# Patient Record
Sex: FEMALE | Race: BLACK OR AFRICAN AMERICAN | Employment: OTHER | URBAN - METROPOLITAN AREA
[De-identification: names, ages, dates, MRNs, and addresses within clinical notes are randomized per-mention and may not be internally consistent; named-entity substitution may affect disease eponyms.]

---

## 2021-08-11 ENCOUNTER — APPOINTMENT (OUTPATIENT)
Dept: GENERAL RADIOLOGY | Facility: HOSPITAL | Age: 61
End: 2021-08-11
Attending: EMERGENCY MEDICINE
Payer: MEDICAID

## 2021-08-11 ENCOUNTER — HOSPITAL ENCOUNTER (EMERGENCY)
Facility: HOSPITAL | Age: 61
Discharge: HOME | End: 2021-08-12
Attending: EMERGENCY MEDICINE
Payer: MEDICAID

## 2021-08-11 DIAGNOSIS — K59.00 CONSTIPATION, UNSPECIFIED CONSTIPATION TYPE: Primary | ICD-10-CM

## 2021-08-11 PROCEDURE — 93010 ELECTROCARDIOGRAM REPORT: CPT | Performed by: EMERGENCY MEDICINE

## 2021-08-11 PROCEDURE — 93005 ELECTROCARDIOGRAM TRACING: CPT

## 2021-08-11 PROCEDURE — 74022 RADEX COMPL AQT ABD SERIES: CPT | Performed by: EMERGENCY MEDICINE

## 2021-08-11 PROCEDURE — 99284 EMERGENCY DEPT VISIT MOD MDM: CPT

## 2021-08-12 VITALS
OXYGEN SATURATION: 97 % | HEIGHT: 63 IN | TEMPERATURE: 97 F | WEIGHT: 170 LBS | DIASTOLIC BLOOD PRESSURE: 72 MMHG | HEART RATE: 80 BPM | RESPIRATION RATE: 16 BRPM | SYSTOLIC BLOOD PRESSURE: 121 MMHG | BODY MASS INDEX: 30.12 KG/M2

## 2021-08-12 RX ORDER — MAGNESIUM CARB/ALUMINUM HYDROX 105-160MG
296 TABLET,CHEWABLE ORAL ONCE
Qty: 296 ML | Refills: 0 | Status: SHIPPED | OUTPATIENT
Start: 2021-08-12 | End: 2021-08-12

## 2021-08-12 RX ORDER — ONDANSETRON 4 MG/1
4 TABLET, ORALLY DISINTEGRATING ORAL EVERY 4 HOURS PRN
Qty: 10 TABLET | Refills: 0 | Status: SHIPPED | OUTPATIENT
Start: 2021-08-12 | End: 2021-08-19

## 2021-08-12 RX ORDER — DOCUSATE SODIUM 100 MG/1
100 CAPSULE, LIQUID FILLED ORAL 2 TIMES DAILY
Qty: 30 CAPSULE | Refills: 0 | Status: SHIPPED | OUTPATIENT
Start: 2021-08-12 | End: 2021-08-27

## 2021-08-12 NOTE — ED PROVIDER NOTES
Patient Seen in: [facility] AND [facility] Emergency Department    History   Patient presents with:  Constipation  Nausea/Vomiting/Diarrhea  Difficulty Breathing      HPI    59-year-old female presents the ER with complaint of constipation.   Patient states she has interaction with the patient were taken. The patient was already wearing a droplet mask on my arrival to the room.  Personal protective equipment including droplet mask, eye protection, and gloves were worn throughout the duration of the exam.  Handwashing 0004    IMPRESSION:  No acute pulmonary process. No abnormally dilated loops of bowel to suggest a bowel obstruction. No free air.     ED Medications Administered: Medications - No data to display      [facility]      08/11/21  2308   BP: 127/68   Pulse: 88 for 15 days.   Qty: 30 capsule Refills: 0

## 2021-08-12 NOTE — ED INITIAL ASSESSMENT (HPI)
Constipation x2.5 weeks, last time she passed any stool was \"a few days ago. \" +N/V. Reporting shortness of breath starting today, worse with ambulation. +passing gas. Denies fevers, cough, urinary symptoms.

## 2021-09-06 ENCOUNTER — HOSPITAL ENCOUNTER (EMERGENCY)
Facility: HOSPITAL | Age: 61
Discharge: HOME | End: 2021-09-06
Attending: EMERGENCY MEDICINE
Payer: MEDICAID

## 2021-09-06 ENCOUNTER — APPOINTMENT (OUTPATIENT)
Dept: CT IMAGING | Facility: HOSPITAL | Age: 61
End: 2021-09-06
Attending: EMERGENCY MEDICINE
Payer: MEDICAID

## 2021-09-06 VITALS
TEMPERATURE: 98 F | HEART RATE: 99 BPM | BODY MASS INDEX: 29.44 KG/M2 | RESPIRATION RATE: 18 BRPM | SYSTOLIC BLOOD PRESSURE: 130 MMHG | HEIGHT: 62 IN | WEIGHT: 160 LBS | DIASTOLIC BLOOD PRESSURE: 95 MMHG | OXYGEN SATURATION: 99 %

## 2021-09-06 DIAGNOSIS — E87.6 HYPOKALEMIA: ICD-10-CM

## 2021-09-06 DIAGNOSIS — K59.00 CONSTIPATION, UNSPECIFIED CONSTIPATION TYPE: ICD-10-CM

## 2021-09-06 DIAGNOSIS — R10.84 ABDOMINAL PAIN, GENERALIZED: Primary | ICD-10-CM

## 2021-09-06 LAB
ALBUMIN SERPL-MCNC: 3.4 G/DL (ref 3.4–5)
ALP LIVER SERPL-CCNC: 85 U/L
ALT SERPL-CCNC: 15 U/L
ANION GAP SERPL CALC-SCNC: 12 MMOL/L (ref 0–18)
AST SERPL-CCNC: 30 U/L (ref 15–37)
BASOPHILS # BLD AUTO: 0.02 X10(3) UL (ref 0–0.2)
BASOPHILS NFR BLD AUTO: 0.3 %
BILIRUB DIRECT SERPL-MCNC: <0.1 MG/DL (ref 0–0.2)
BILIRUB SERPL-MCNC: 0.4 MG/DL (ref 0.1–2)
BILIRUB UR QL CFM: NEGATIVE
BUN BLD-MCNC: 8 MG/DL (ref 7–18)
BUN/CREAT SERPL: 6.9 (ref 10–20)
CALCIUM BLD-MCNC: 9.6 MG/DL (ref 8.5–10.1)
CHLORIDE SERPL-SCNC: 108 MMOL/L (ref 98–112)
CO2 SERPL-SCNC: 17 MMOL/L (ref 21–32)
COLOR UR: YELLOW
CREAT BLD-MCNC: 1.16 MG/DL
DEPRECATED RDW RBC AUTO: 53.5 FL (ref 35.1–46.3)
EOSINOPHIL # BLD AUTO: 0.01 X10(3) UL (ref 0–0.7)
EOSINOPHIL NFR BLD AUTO: 0.1 %
ERYTHROCYTE [DISTWIDTH] IN BLOOD BY AUTOMATED COUNT: 15.5 % (ref 11–15)
GLUCOSE BLD-MCNC: 154 MG/DL (ref 70–99)
GLUCOSE UR-MCNC: NEGATIVE MG/DL
HAV IGM SER QL: 1.7 MG/DL (ref 1.6–2.6)
HCT VFR BLD AUTO: 38.5 %
HGB BLD-MCNC: 12.2 G/DL
HGB UR QL STRIP.AUTO: NEGATIVE
HYALINE CASTS #/AREA URNS AUTO: PRESENT /LPF
IMM GRANULOCYTES # BLD AUTO: 0.05 X10(3) UL (ref 0–1)
IMM GRANULOCYTES NFR BLD: 0.7 %
LIPASE SERPL-CCNC: 275 U/L (ref 73–393)
LYMPHOCYTES # BLD AUTO: 0.58 X10(3) UL (ref 1–4)
LYMPHOCYTES NFR BLD AUTO: 7.7 %
M PROTEIN MFR SERPL ELPH: 6.9 G/DL (ref 6.4–8.2)
MCH RBC QN AUTO: 29.6 PG (ref 26–34)
MCHC RBC AUTO-ENTMCNC: 31.7 G/DL (ref 31–37)
MCV RBC AUTO: 93.4 FL
MONOCYTES # BLD AUTO: 0.33 X10(3) UL (ref 0.1–1)
MONOCYTES NFR BLD AUTO: 4.4 %
NEUTROPHILS # BLD AUTO: 6.56 X10 (3) UL (ref 1.5–7.7)
NEUTROPHILS # BLD AUTO: 6.56 X10(3) UL (ref 1.5–7.7)
NEUTROPHILS NFR BLD AUTO: 86.8 %
NITRITE UR QL STRIP.AUTO: NEGATIVE
OSMOLALITY SERPL CALC.SUM OF ELEC: 285 MOSM/KG (ref 275–295)
PH UR: 6 [PH] (ref 5–8)
PLATELET # BLD AUTO: 335 10(3)UL (ref 150–450)
POTASSIUM SERPL-SCNC: 2.9 MMOL/L (ref 3.5–5.1)
PROT UR-MCNC: 100 MG/DL
RBC # BLD AUTO: 4.12 X10(6)UL
SODIUM SERPL-SCNC: 137 MMOL/L (ref 136–145)
SP GR UR STRIP: 1.03 (ref 1–1.03)
UROBILINOGEN UR STRIP-ACNC: <2
WBC # BLD AUTO: 7.6 X10(3) UL (ref 4–11)

## 2021-09-06 PROCEDURE — 81001 URINALYSIS AUTO W/SCOPE: CPT | Performed by: EMERGENCY MEDICINE

## 2021-09-06 PROCEDURE — 74177 CT ABD & PELVIS W/CONTRAST: CPT | Performed by: EMERGENCY MEDICINE

## 2021-09-06 PROCEDURE — 80076 HEPATIC FUNCTION PANEL: CPT | Performed by: EMERGENCY MEDICINE

## 2021-09-06 PROCEDURE — 96374 THER/PROPH/DIAG INJ IV PUSH: CPT

## 2021-09-06 PROCEDURE — 85025 COMPLETE CBC W/AUTO DIFF WBC: CPT | Performed by: EMERGENCY MEDICINE

## 2021-09-06 PROCEDURE — 87086 URINE CULTURE/COLONY COUNT: CPT | Performed by: EMERGENCY MEDICINE

## 2021-09-06 PROCEDURE — 83735 ASSAY OF MAGNESIUM: CPT | Performed by: EMERGENCY MEDICINE

## 2021-09-06 PROCEDURE — 96361 HYDRATE IV INFUSION ADD-ON: CPT

## 2021-09-06 PROCEDURE — 83690 ASSAY OF LIPASE: CPT | Performed by: EMERGENCY MEDICINE

## 2021-09-06 PROCEDURE — 99284 EMERGENCY DEPT VISIT MOD MDM: CPT

## 2021-09-06 PROCEDURE — 80048 BASIC METABOLIC PNL TOTAL CA: CPT | Performed by: EMERGENCY MEDICINE

## 2021-09-06 PROCEDURE — 96375 TX/PRO/DX INJ NEW DRUG ADDON: CPT

## 2021-09-06 RX ORDER — METOCLOPRAMIDE 10 MG/1
10 TABLET ORAL 3 TIMES DAILY PRN
Qty: 10 TABLET | Refills: 0 | Status: SHIPPED | OUTPATIENT
Start: 2021-09-06 | End: 2021-10-06

## 2021-09-06 RX ORDER — KETOROLAC TROMETHAMINE 15 MG/ML
15 INJECTION, SOLUTION INTRAMUSCULAR; INTRAVENOUS ONCE
Status: COMPLETED | OUTPATIENT
Start: 2021-09-06 | End: 2021-09-06

## 2021-09-06 RX ORDER — METOCLOPRAMIDE HYDROCHLORIDE 5 MG/ML
INJECTION INTRAMUSCULAR; INTRAVENOUS
Status: COMPLETED
Start: 2021-09-06 | End: 2021-09-06

## 2021-09-06 RX ORDER — METOCLOPRAMIDE HYDROCHLORIDE 5 MG/ML
5 INJECTION INTRAMUSCULAR; INTRAVENOUS ONCE
Status: COMPLETED | OUTPATIENT
Start: 2021-09-06 | End: 2021-09-06

## 2021-09-06 RX ORDER — POTASSIUM CHLORIDE 20 MEQ/1
40 TABLET, EXTENDED RELEASE ORAL ONCE
Status: COMPLETED | OUTPATIENT
Start: 2021-09-06 | End: 2021-09-06

## 2021-09-06 RX ORDER — POTASSIUM CHLORIDE 20 MEQ/1
20 TABLET, EXTENDED RELEASE ORAL DAILY
Qty: 5 TABLET | Refills: 0 | Status: SHIPPED | OUTPATIENT
Start: 2021-09-06 | End: 2021-09-11

## 2021-09-06 RX ORDER — DICYCLOMINE HCL 20 MG
20 TABLET ORAL 4 TIMES DAILY PRN
Qty: 30 TABLET | Refills: 0 | Status: SHIPPED | OUTPATIENT
Start: 2021-09-06 | End: 2021-10-06

## 2021-09-06 NOTE — ED PROVIDER NOTES
Patient Seen in: [facility] AND [facility] Emergency Department      History   Patient presents with:  Constipation  Nausea/Vomiting/Diarrhea    Stated Complaint: constipation     HPI/Subjective:   HPI    17-year-old female here with her daughter with history of 72.6 kg   SpO2 99%   BMI 29.26 kg/m²         Physical Exam    Constitutional: Oriented to person, place, and time. Appears well-developed. No distress. Head: Normocephalic and atraumatic.    Eyes: Conjunctivae are normal. Pupils are equal, round, and cindy Platelet.   Procedure                               Abnormality         Status                     ---------                               -----------         ------                     CBC W/ DIFFERENTIAL[[id number]]          Abnormal            Final resul edema suspected in the pancreaticoduodenal groove. SPLEEN: No enlargement. ADRENALS:   No defined mass or abnormal enlargement. KIDNEYS:   Symmetric enhancement is seen without evidence of hydronephrosis or underlying solid masses.  GI/MESENTERY:  Jacqui Grady superior endplate compression deformity in the L4 vertebral body is of uncertain chronicity; correlate clinically for overlying point tenderness. Grade I anterolisthesis of L5 on L6 due to facet joint arthropathy.      Dictated by (CST): Willie Garcia, her primary doctor first and return before with any worsening or change. She feels well at the time of discharge.                              Disposition and Plan     Clinical Impression:  Abdominal pain, generalized  (primary encounter diagnosis)  Consti

## 2021-09-06 NOTE — ED INITIAL ASSESSMENT (HPI)
Pt states constipation, nausea/vomiting, weakness, urinary retention, abd pain. Last bm (small) on Wednesday.

## 2024-01-01 ENCOUNTER — APPOINTMENT (OUTPATIENT)
Dept: GENERAL RADIOLOGY | Facility: HOSPITAL | Age: 64
End: 2024-01-01
Attending: HOSPITALIST
Payer: COMMERCIAL

## 2024-01-01 ENCOUNTER — APPOINTMENT (OUTPATIENT)
Dept: GENERAL RADIOLOGY | Facility: HOSPITAL | Age: 64
End: 2024-01-01
Attending: PHYSICIAN ASSISTANT
Payer: COMMERCIAL

## 2024-01-01 ENCOUNTER — APPOINTMENT (OUTPATIENT)
Dept: GENERAL RADIOLOGY | Facility: HOSPITAL | Age: 64
End: 2024-01-01
Attending: EMERGENCY MEDICINE
Payer: COMMERCIAL

## 2024-01-01 ENCOUNTER — ANESTHESIA (OUTPATIENT)
Dept: ENDOSCOPY | Facility: HOSPITAL | Age: 64
End: 2024-01-01
Payer: COMMERCIAL

## 2024-01-01 ENCOUNTER — ANESTHESIA EVENT (OUTPATIENT)
Dept: ENDOSCOPY | Facility: HOSPITAL | Age: 64
End: 2024-01-01
Payer: COMMERCIAL

## 2024-01-01 ENCOUNTER — APPOINTMENT (OUTPATIENT)
Dept: PICC SERVICES | Facility: HOSPITAL | Age: 64
End: 2024-01-01
Attending: HOSPITALIST
Payer: COMMERCIAL

## 2024-01-01 ENCOUNTER — HOSPITAL ENCOUNTER (INPATIENT)
Facility: HOSPITAL | Age: 64
LOS: 1 days | End: 2024-01-01
Attending: EMERGENCY MEDICINE | Admitting: HOSPITALIST
Payer: COMMERCIAL

## 2024-01-01 ENCOUNTER — APPOINTMENT (OUTPATIENT)
Dept: CT IMAGING | Facility: HOSPITAL | Age: 64
End: 2024-01-01
Attending: EMERGENCY MEDICINE
Payer: COMMERCIAL

## 2024-01-01 ENCOUNTER — APPOINTMENT (OUTPATIENT)
Dept: GENERAL RADIOLOGY | Facility: HOSPITAL | Age: 64
End: 2024-01-01
Attending: INTERNAL MEDICINE
Payer: COMMERCIAL

## 2024-01-01 ENCOUNTER — APPOINTMENT (OUTPATIENT)
Dept: ULTRASOUND IMAGING | Facility: HOSPITAL | Age: 64
End: 2024-01-01
Attending: HOSPITALIST
Payer: COMMERCIAL

## 2024-01-01 ENCOUNTER — HOSPITAL ENCOUNTER (INPATIENT)
Facility: HOSPITAL | Age: 64
LOS: 13 days | Discharge: NURSING, REHAB OR LONG-TERM CARE | End: 2024-01-01
Attending: EMERGENCY MEDICINE | Admitting: INTERNAL MEDICINE
Payer: COMMERCIAL

## 2024-01-01 VITALS
DIASTOLIC BLOOD PRESSURE: 87 MMHG | TEMPERATURE: 98 F | SYSTOLIC BLOOD PRESSURE: 118 MMHG | HEART RATE: 80 BPM | WEIGHT: 164 LBS | OXYGEN SATURATION: 100 % | BODY MASS INDEX: 30.18 KG/M2 | RESPIRATION RATE: 16 BRPM | HEIGHT: 62 IN

## 2024-01-01 VITALS
TEMPERATURE: 98 F | WEIGHT: 164 LBS | RESPIRATION RATE: 30 BRPM | SYSTOLIC BLOOD PRESSURE: 33 MMHG | BODY MASS INDEX: 30 KG/M2 | HEART RATE: 122 BPM | DIASTOLIC BLOOD PRESSURE: 24 MMHG | OXYGEN SATURATION: 44 %

## 2024-01-01 DIAGNOSIS — R13.10 ODYNOPHAGIA: ICD-10-CM

## 2024-01-01 DIAGNOSIS — E27.40 ADRENAL INSUFFICIENCY (HCC): ICD-10-CM

## 2024-01-01 DIAGNOSIS — L03.115 CELLULITIS OF RIGHT LOWER EXTREMITY: Primary | ICD-10-CM

## 2024-01-01 DIAGNOSIS — R41.82 ALTERED MENTAL STATUS, UNSPECIFIED ALTERED MENTAL STATUS TYPE: ICD-10-CM

## 2024-01-01 DIAGNOSIS — E16.2 HYPOGLYCEMIA: Primary | ICD-10-CM

## 2024-01-01 LAB
ALBUMIN SERPL-MCNC: 1.2 G/DL (ref 3.2–4.8)
ALBUMIN SERPL-MCNC: 1.7 G/DL (ref 3.2–4.8)
ALBUMIN SERPL-MCNC: 1.9 G/DL (ref 3.2–4.8)
ALBUMIN SERPL-MCNC: 1.9 G/DL (ref 3.2–4.8)
ALBUMIN SERPL-MCNC: 2.1 G/DL (ref 3.2–4.8)
ALBUMIN SERPL-MCNC: 2.2 G/DL (ref 3.2–4.8)
ALBUMIN SERPL-MCNC: 3.2 G/DL (ref 3.2–4.8)
ALBUMIN/GLOB SERPL: 1 {RATIO} (ref 1–2)
ALBUMIN/GLOB SERPL: 1.1 {RATIO} (ref 1–2)
ALBUMIN/GLOB SERPL: 1.2 {RATIO} (ref 1–2)
ALBUMIN/GLOB SERPL: 1.3 {RATIO} (ref 1–2)
ALP LIVER SERPL-CCNC: 201 U/L
ALP LIVER SERPL-CCNC: 275 U/L
ALP LIVER SERPL-CCNC: 331 U/L
ALP LIVER SERPL-CCNC: 373 U/L
ALP LIVER SERPL-CCNC: 373 U/L
ALP LIVER SERPL-CCNC: 534 U/L
ALT SERPL-CCNC: 124 U/L
ALT SERPL-CCNC: 31 U/L
ALT SERPL-CCNC: 44 U/L
ALT SERPL-CCNC: 85 U/L
ALT SERPL-CCNC: 85 U/L
ALT SERPL-CCNC: 98 U/L
ANION GAP SERPL CALC-SCNC: 10 MMOL/L (ref 0–18)
ANION GAP SERPL CALC-SCNC: 11 MMOL/L (ref 0–18)
ANION GAP SERPL CALC-SCNC: 12 MMOL/L (ref 0–18)
ANION GAP SERPL CALC-SCNC: 14 MMOL/L (ref 0–18)
ANION GAP SERPL CALC-SCNC: 6 MMOL/L (ref 0–18)
ANION GAP SERPL CALC-SCNC: 6 MMOL/L (ref 0–18)
ANION GAP SERPL CALC-SCNC: 8 MMOL/L (ref 0–18)
ANION GAP SERPL CALC-SCNC: 8 MMOL/L (ref 0–18)
ANION GAP SERPL CALC-SCNC: 9 MMOL/L (ref 0–18)
ANION GAP SERPL CALC-SCNC: 9 MMOL/L (ref 0–18)
APTT PPP: 94.5 SECONDS (ref 23–36)
AST SERPL-CCNC: 180 U/L (ref ?–34)
AST SERPL-CCNC: 180 U/L (ref ?–34)
AST SERPL-CCNC: 23 U/L (ref ?–34)
AST SERPL-CCNC: 241 U/L (ref ?–34)
AST SERPL-CCNC: 37 U/L (ref ?–34)
AST SERPL-CCNC: 410 U/L (ref ?–34)
ATRIAL RATE: 112 BPM
ATRIAL RATE: 136 BPM
ATRIAL RATE: 88 BPM
BASE EXCESS BLD CALC-SCNC: -23.3 MMOL/L (ref ?–2)
BASOPHILS # BLD AUTO: 0.01 X10(3) UL (ref 0–0.2)
BASOPHILS # BLD AUTO: 0.02 X10(3) UL (ref 0–0.2)
BASOPHILS # BLD AUTO: 0.03 X10(3) UL (ref 0–0.2)
BASOPHILS # BLD AUTO: 0.03 X10(3) UL (ref 0–0.2)
BASOPHILS # BLD: 0 X10(3) UL (ref 0–0.2)
BASOPHILS # BLD: 0 X10(3) UL (ref 0–0.2)
BASOPHILS NFR BLD AUTO: 0.1 %
BASOPHILS NFR BLD AUTO: 0.2 %
BASOPHILS NFR BLD AUTO: 0.3 %
BASOPHILS NFR BLD AUTO: 0.3 %
BASOPHILS NFR BLD: 0 %
BASOPHILS NFR BLD: 0 %
BILIRUB DIRECT SERPL-MCNC: 0.1 MG/DL (ref ?–0.3)
BILIRUB DIRECT SERPL-MCNC: 0.5 MG/DL (ref ?–0.3)
BILIRUB SERPL-MCNC: 0.2 MG/DL (ref 0.2–1.1)
BILIRUB SERPL-MCNC: 0.3 MG/DL (ref 0.2–1.1)
BILIRUB SERPL-MCNC: 0.3 MG/DL (ref 0.2–1.1)
BILIRUB SERPL-MCNC: 0.6 MG/DL (ref 0.2–1.1)
BILIRUB UR QL: NEGATIVE
BILIRUB UR QL: NEGATIVE
BUN BLD-MCNC: 11 MG/DL (ref 9–23)
BUN BLD-MCNC: 14 MG/DL (ref 9–23)
BUN BLD-MCNC: 15 MG/DL (ref 9–23)
BUN BLD-MCNC: 17 MG/DL (ref 9–23)
BUN BLD-MCNC: 17 MG/DL (ref 9–23)
BUN BLD-MCNC: 18 MG/DL (ref 9–23)
BUN BLD-MCNC: 5 MG/DL (ref 9–23)
BUN BLD-MCNC: 5 MG/DL (ref 9–23)
BUN BLD-MCNC: 6 MG/DL (ref 9–23)
BUN BLD-MCNC: 7 MG/DL (ref 9–23)
BUN BLD-MCNC: <5 MG/DL (ref 9–23)
BUN/CREAT SERPL: 10.7 (ref 10–20)
BUN/CREAT SERPL: 11.3 (ref 10–20)
BUN/CREAT SERPL: 11.8 (ref 10–20)
BUN/CREAT SERPL: 14 (ref 10–20)
BUN/CREAT SERPL: 14.7 (ref 10–20)
BUN/CREAT SERPL: 15.8 (ref 10–20)
BUN/CREAT SERPL: 19 (ref 10–20)
BUN/CREAT SERPL: 19.5 (ref 10–20)
BUN/CREAT SERPL: 8.5 (ref 10–20)
BUN/CREAT SERPL: 8.8 (ref 10–20)
BUN/CREAT SERPL: 9.3 (ref 10–20)
BUN/CREAT SERPL: 9.3 (ref 10–20)
BUN/CREAT SERPL: 9.4 (ref 10–20)
CA-I BLD-SCNC: 1.15 MMOL/L (ref 0.95–1.32)
CALCIUM BLD-MCNC: 5.7 MG/DL (ref 8.7–10.4)
CALCIUM BLD-MCNC: 5.7 MG/DL (ref 8.7–10.4)
CALCIUM BLD-MCNC: 6.1 MG/DL (ref 8.7–10.4)
CALCIUM BLD-MCNC: 6.4 MG/DL (ref 8.7–10.4)
CALCIUM BLD-MCNC: 7.7 MG/DL (ref 8.7–10.4)
CALCIUM BLD-MCNC: 7.7 MG/DL (ref 8.7–10.4)
CALCIUM BLD-MCNC: 7.8 MG/DL (ref 8.7–10.4)
CALCIUM BLD-MCNC: 7.9 MG/DL (ref 8.7–10.4)
CALCIUM BLD-MCNC: 8 MG/DL (ref 8.7–10.4)
CALCIUM BLD-MCNC: 8.1 MG/DL (ref 8.7–10.4)
CALCIUM BLD-MCNC: 8.2 MG/DL (ref 8.7–10.4)
CALCIUM BLD-MCNC: 8.2 MG/DL (ref 8.7–10.4)
CALCIUM BLD-MCNC: 8.4 MG/DL (ref 8.7–10.4)
CALCIUM BLD-MCNC: 8.9 MG/DL (ref 8.7–10.4)
CHLORIDE SERPL-SCNC: 106 MMOL/L (ref 98–112)
CHLORIDE SERPL-SCNC: 112 MMOL/L (ref 98–112)
CHLORIDE SERPL-SCNC: 112 MMOL/L (ref 98–112)
CHLORIDE SERPL-SCNC: 113 MMOL/L (ref 98–112)
CHLORIDE SERPL-SCNC: 114 MMOL/L (ref 98–112)
CHLORIDE SERPL-SCNC: 115 MMOL/L (ref 98–112)
CHLORIDE SERPL-SCNC: 116 MMOL/L (ref 98–112)
CHLORIDE SERPL-SCNC: 117 MMOL/L (ref 98–112)
CHLORIDE SERPL-SCNC: 121 MMOL/L (ref 98–112)
CHOLEST SERPL-MCNC: 55 MG/DL (ref ?–200)
CLARITY UR: CLEAR
CO2 SERPL-SCNC: 12 MMOL/L (ref 21–32)
CO2 SERPL-SCNC: 14 MMOL/L (ref 21–32)
CO2 SERPL-SCNC: 15 MMOL/L (ref 21–32)
CO2 SERPL-SCNC: 15 MMOL/L (ref 21–32)
CO2 SERPL-SCNC: 16 MMOL/L (ref 21–32)
CO2 SERPL-SCNC: 17 MMOL/L (ref 21–32)
CO2 SERPL-SCNC: 18 MMOL/L (ref 21–32)
CO2 SERPL-SCNC: 18 MMOL/L (ref 21–32)
CO2 SERPL-SCNC: <10 MMOL/L (ref 21–32)
COHGB MFR BLD: 1.5 % (ref 0–3)
COLOR UR: YELLOW
CREAT BLD-MCNC: 0.39 MG/DL
CREAT BLD-MCNC: 0.49 MG/DL
CREAT BLD-MCNC: 0.51 MG/DL
CREAT BLD-MCNC: 0.53 MG/DL
CREAT BLD-MCNC: 0.54 MG/DL
CREAT BLD-MCNC: 0.54 MG/DL
CREAT BLD-MCNC: 0.56 MG/DL
CREAT BLD-MCNC: 0.58 MG/DL
CREAT BLD-MCNC: 0.68 MG/DL
CREAT BLD-MCNC: 0.71 MG/DL
CREAT BLD-MCNC: 0.75 MG/DL
CREAT BLD-MCNC: 0.77 MG/DL
CREAT BLD-MCNC: 0.95 MG/DL
CREAT BLD-MCNC: 1.14 MG/DL
CREAT BLD-MCNC: 1.21 MG/DL
CREAT BLD-MCNC: 1.51 MG/DL
DEPRECATED HBV CORE AB SER IA-ACNC: 133 NG/ML
DEPRECATED RDW RBC AUTO: 49.9 FL (ref 35.1–46.3)
DEPRECATED RDW RBC AUTO: 50.5 FL (ref 35.1–46.3)
DEPRECATED RDW RBC AUTO: 51.8 FL (ref 35.1–46.3)
DEPRECATED RDW RBC AUTO: 51.9 FL (ref 35.1–46.3)
DEPRECATED RDW RBC AUTO: 52 FL (ref 35.1–46.3)
DEPRECATED RDW RBC AUTO: 52.1 FL (ref 35.1–46.3)
DEPRECATED RDW RBC AUTO: 52.2 FL (ref 35.1–46.3)
DEPRECATED RDW RBC AUTO: 52.5 FL (ref 35.1–46.3)
DEPRECATED RDW RBC AUTO: 52.7 FL (ref 35.1–46.3)
DEPRECATED RDW RBC AUTO: 53 FL (ref 35.1–46.3)
DEPRECATED RDW RBC AUTO: 57 FL (ref 35.1–46.3)
DEPRECATED RDW RBC AUTO: 62.4 FL (ref 35.1–46.3)
EGFRCR SERPLBLD CKD-EPI 2021: 101 ML/MIN/1.73M2 (ref 60–?)
EGFRCR SERPLBLD CKD-EPI 2021: 102 ML/MIN/1.73M2 (ref 60–?)
EGFRCR SERPLBLD CKD-EPI 2021: 103 ML/MIN/1.73M2 (ref 60–?)
EGFRCR SERPLBLD CKD-EPI 2021: 104 ML/MIN/1.73M2 (ref 60–?)
EGFRCR SERPLBLD CKD-EPI 2021: 105 ML/MIN/1.73M2 (ref 60–?)
EGFRCR SERPLBLD CKD-EPI 2021: 111 ML/MIN/1.73M2 (ref 60–?)
EGFRCR SERPLBLD CKD-EPI 2021: 38 ML/MIN/1.73M2 (ref 60–?)
EGFRCR SERPLBLD CKD-EPI 2021: 50 ML/MIN/1.73M2 (ref 60–?)
EGFRCR SERPLBLD CKD-EPI 2021: 54 ML/MIN/1.73M2 (ref 60–?)
EGFRCR SERPLBLD CKD-EPI 2021: 67 ML/MIN/1.73M2 (ref 60–?)
EGFRCR SERPLBLD CKD-EPI 2021: 86 ML/MIN/1.73M2 (ref 60–?)
EGFRCR SERPLBLD CKD-EPI 2021: 89 ML/MIN/1.73M2 (ref 60–?)
EGFRCR SERPLBLD CKD-EPI 2021: 95 ML/MIN/1.73M2 (ref 60–?)
EGFRCR SERPLBLD CKD-EPI 2021: 97 ML/MIN/1.73M2 (ref 60–?)
EOSINOPHIL # BLD AUTO: 0 X10(3) UL (ref 0–0.7)
EOSINOPHIL # BLD AUTO: 0.01 X10(3) UL (ref 0–0.7)
EOSINOPHIL # BLD: 0 X10(3) UL (ref 0–0.7)
EOSINOPHIL # BLD: 0 X10(3) UL (ref 0–0.7)
EOSINOPHIL NFR BLD AUTO: 0 %
EOSINOPHIL NFR BLD AUTO: 0.1 %
EOSINOPHIL NFR BLD: 0 %
EOSINOPHIL NFR BLD: 0 %
ERYTHROCYTE [DISTWIDTH] IN BLOOD BY AUTOMATED COUNT: 15.3 % (ref 11–15)
ERYTHROCYTE [DISTWIDTH] IN BLOOD BY AUTOMATED COUNT: 15.7 % (ref 11–15)
ERYTHROCYTE [DISTWIDTH] IN BLOOD BY AUTOMATED COUNT: 15.9 % (ref 11–15)
ERYTHROCYTE [DISTWIDTH] IN BLOOD BY AUTOMATED COUNT: 16 % (ref 11–15)
ERYTHROCYTE [DISTWIDTH] IN BLOOD BY AUTOMATED COUNT: 16 % (ref 11–15)
ERYTHROCYTE [DISTWIDTH] IN BLOOD BY AUTOMATED COUNT: 16.2 % (ref 11–15)
ERYTHROCYTE [DISTWIDTH] IN BLOOD BY AUTOMATED COUNT: 16.3 % (ref 11–15)
ERYTHROCYTE [DISTWIDTH] IN BLOOD BY AUTOMATED COUNT: 16.4 % (ref 11–15)
ERYTHROCYTE [DISTWIDTH] IN BLOOD BY AUTOMATED COUNT: 16.6 % (ref 11–15)
ERYTHROCYTE [DISTWIDTH] IN BLOOD BY AUTOMATED COUNT: 17 % (ref 11–15)
ERYTHROCYTE [DISTWIDTH] IN BLOOD BY AUTOMATED COUNT: 17.5 % (ref 11–15)
ERYTHROCYTE [DISTWIDTH] IN BLOOD BY AUTOMATED COUNT: 17.6 % (ref 11–15)
EST. AVERAGE GLUCOSE BLD GHB EST-MCNC: 91 MG/DL (ref 68–126)
GLOBULIN PLAS-MCNC: 0.9 G/DL (ref 2–3.5)
GLOBULIN PLAS-MCNC: 1.5 G/DL (ref 2–3.5)
GLOBULIN PLAS-MCNC: 2 G/DL (ref 2–3.5)
GLOBULIN PLAS-MCNC: 2.6 G/DL (ref 2–3.5)
GLUCOSE BLD-MCNC: 106 MG/DL (ref 70–99)
GLUCOSE BLD-MCNC: 109 MG/DL (ref 70–99)
GLUCOSE BLD-MCNC: 114 MG/DL (ref 70–99)
GLUCOSE BLD-MCNC: 133 MG/DL (ref 70–99)
GLUCOSE BLD-MCNC: 139 MG/DL (ref 70–99)
GLUCOSE BLD-MCNC: 140 MG/DL (ref 70–99)
GLUCOSE BLD-MCNC: 141 MG/DL (ref 70–99)
GLUCOSE BLD-MCNC: 287 MG/DL (ref 70–99)
GLUCOSE BLD-MCNC: 326 MG/DL (ref 70–99)
GLUCOSE BLD-MCNC: 416 MG/DL (ref 70–99)
GLUCOSE BLD-MCNC: 48 MG/DL (ref 70–99)
GLUCOSE BLD-MCNC: 68 MG/DL (ref 70–99)
GLUCOSE BLD-MCNC: 69 MG/DL (ref 70–99)
GLUCOSE BLD-MCNC: 77 MG/DL (ref 70–99)
GLUCOSE BLD-MCNC: 93 MG/DL (ref 70–99)
GLUCOSE BLD-MCNC: 95 MG/DL (ref 70–99)
GLUCOSE BLDC GLUCOMTR-MCNC: 102 MG/DL (ref 70–99)
GLUCOSE BLDC GLUCOMTR-MCNC: 104 MG/DL (ref 70–99)
GLUCOSE BLDC GLUCOMTR-MCNC: 105 MG/DL (ref 70–99)
GLUCOSE BLDC GLUCOMTR-MCNC: 106 MG/DL (ref 70–99)
GLUCOSE BLDC GLUCOMTR-MCNC: 109 MG/DL (ref 70–99)
GLUCOSE BLDC GLUCOMTR-MCNC: 109 MG/DL (ref 70–99)
GLUCOSE BLDC GLUCOMTR-MCNC: 110 MG/DL (ref 70–99)
GLUCOSE BLDC GLUCOMTR-MCNC: 113 MG/DL (ref 70–99)
GLUCOSE BLDC GLUCOMTR-MCNC: 115 MG/DL (ref 70–99)
GLUCOSE BLDC GLUCOMTR-MCNC: 116 MG/DL (ref 70–99)
GLUCOSE BLDC GLUCOMTR-MCNC: 117 MG/DL (ref 70–99)
GLUCOSE BLDC GLUCOMTR-MCNC: 121 MG/DL (ref 70–99)
GLUCOSE BLDC GLUCOMTR-MCNC: 123 MG/DL (ref 70–99)
GLUCOSE BLDC GLUCOMTR-MCNC: 123 MG/DL (ref 70–99)
GLUCOSE BLDC GLUCOMTR-MCNC: 124 MG/DL (ref 70–99)
GLUCOSE BLDC GLUCOMTR-MCNC: 13 MG/DL (ref 70–99)
GLUCOSE BLDC GLUCOMTR-MCNC: 130 MG/DL (ref 70–99)
GLUCOSE BLDC GLUCOMTR-MCNC: 143 MG/DL (ref 70–99)
GLUCOSE BLDC GLUCOMTR-MCNC: 143 MG/DL (ref 70–99)
GLUCOSE BLDC GLUCOMTR-MCNC: 146 MG/DL (ref 70–99)
GLUCOSE BLDC GLUCOMTR-MCNC: 147 MG/DL (ref 70–99)
GLUCOSE BLDC GLUCOMTR-MCNC: 154 MG/DL (ref 70–99)
GLUCOSE BLDC GLUCOMTR-MCNC: 165 MG/DL (ref 70–99)
GLUCOSE BLDC GLUCOMTR-MCNC: 180 MG/DL (ref 70–99)
GLUCOSE BLDC GLUCOMTR-MCNC: 189 MG/DL (ref 70–99)
GLUCOSE BLDC GLUCOMTR-MCNC: 275 MG/DL (ref 70–99)
GLUCOSE BLDC GLUCOMTR-MCNC: 278 MG/DL (ref 70–99)
GLUCOSE BLDC GLUCOMTR-MCNC: 314 MG/DL (ref 70–99)
GLUCOSE BLDC GLUCOMTR-MCNC: 319 MG/DL (ref 70–99)
GLUCOSE BLDC GLUCOMTR-MCNC: 378 MG/DL (ref 70–99)
GLUCOSE BLDC GLUCOMTR-MCNC: 42 MG/DL (ref 70–99)
GLUCOSE BLDC GLUCOMTR-MCNC: 49 MG/DL (ref 70–99)
GLUCOSE BLDC GLUCOMTR-MCNC: 55 MG/DL (ref 70–99)
GLUCOSE BLDC GLUCOMTR-MCNC: 57 MG/DL (ref 70–99)
GLUCOSE BLDC GLUCOMTR-MCNC: 57 MG/DL (ref 70–99)
GLUCOSE BLDC GLUCOMTR-MCNC: 59 MG/DL (ref 70–99)
GLUCOSE BLDC GLUCOMTR-MCNC: 60 MG/DL (ref 70–99)
GLUCOSE BLDC GLUCOMTR-MCNC: 61 MG/DL (ref 70–99)
GLUCOSE BLDC GLUCOMTR-MCNC: 61 MG/DL (ref 70–99)
GLUCOSE BLDC GLUCOMTR-MCNC: 62 MG/DL (ref 70–99)
GLUCOSE BLDC GLUCOMTR-MCNC: 67 MG/DL (ref 70–99)
GLUCOSE BLDC GLUCOMTR-MCNC: 69 MG/DL (ref 70–99)
GLUCOSE BLDC GLUCOMTR-MCNC: 69 MG/DL (ref 70–99)
GLUCOSE BLDC GLUCOMTR-MCNC: 72 MG/DL (ref 70–99)
GLUCOSE BLDC GLUCOMTR-MCNC: 73 MG/DL (ref 70–99)
GLUCOSE BLDC GLUCOMTR-MCNC: 75 MG/DL (ref 70–99)
GLUCOSE BLDC GLUCOMTR-MCNC: 77 MG/DL (ref 70–99)
GLUCOSE BLDC GLUCOMTR-MCNC: 80 MG/DL (ref 70–99)
GLUCOSE BLDC GLUCOMTR-MCNC: 80 MG/DL (ref 70–99)
GLUCOSE BLDC GLUCOMTR-MCNC: 81 MG/DL (ref 70–99)
GLUCOSE BLDC GLUCOMTR-MCNC: 84 MG/DL (ref 70–99)
GLUCOSE BLDC GLUCOMTR-MCNC: 85 MG/DL (ref 70–99)
GLUCOSE BLDC GLUCOMTR-MCNC: 87 MG/DL (ref 70–99)
GLUCOSE BLDC GLUCOMTR-MCNC: 90 MG/DL (ref 70–99)
GLUCOSE BLDC GLUCOMTR-MCNC: 91 MG/DL (ref 70–99)
GLUCOSE BLDC GLUCOMTR-MCNC: 94 MG/DL (ref 70–99)
GLUCOSE BLDC GLUCOMTR-MCNC: 95 MG/DL (ref 70–99)
GLUCOSE BLDC GLUCOMTR-MCNC: 98 MG/DL (ref 70–99)
GLUCOSE UR-MCNC: NORMAL MG/DL
GLUCOSE UR-MCNC: NORMAL MG/DL
HBA1C MFR BLD: 4.8 % (ref ?–5.7)
HCO3 BLDA-SCNC: 6 MEQ/L (ref 21–27)
HCT VFR BLD AUTO: 13.3 %
HCT VFR BLD AUTO: 20.4 %
HCT VFR BLD AUTO: 26.7 %
HCT VFR BLD AUTO: 26.8 %
HCT VFR BLD AUTO: 27.6 %
HCT VFR BLD AUTO: 29.2 %
HCT VFR BLD AUTO: 29.5 %
HCT VFR BLD AUTO: 29.9 %
HCT VFR BLD AUTO: 30.4 %
HCT VFR BLD AUTO: 30.6 %
HCT VFR BLD AUTO: 31.2 %
HCT VFR BLD AUTO: 38.5 %
HDLC SERPL-MCNC: <5 MG/DL (ref 40–59)
HGB BLD-MCNC: 10 G/DL
HGB BLD-MCNC: 12.5 G/DL
HGB BLD-MCNC: 4 G/DL
HGB BLD-MCNC: 6.6 G/DL
HGB BLD-MCNC: 8.7 G/DL
HGB BLD-MCNC: 8.8 G/DL
HGB BLD-MCNC: 9 G/DL
HGB BLD-MCNC: 9.1 G/DL
HGB BLD-MCNC: 9.4 G/DL
HGB BLD-MCNC: 9.6 G/DL
HGB BLD-MCNC: 9.7 G/DL
HGB BLD-MCNC: 9.8 G/DL
HGB BLD-MCNC: 9.8 G/DL
HGB UR QL STRIP.AUTO: NEGATIVE
HYALINE CASTS #/AREA URNS AUTO: PRESENT /LPF
IMM GRANULOCYTES # BLD AUTO: 0.07 X10(3) UL (ref 0–1)
IMM GRANULOCYTES # BLD AUTO: 0.11 X10(3) UL (ref 0–1)
IMM GRANULOCYTES # BLD AUTO: 0.15 X10(3) UL (ref 0–1)
IMM GRANULOCYTES # BLD AUTO: 0.18 X10(3) UL (ref 0–1)
IMM GRANULOCYTES # BLD AUTO: 0.18 X10(3) UL (ref 0–1)
IMM GRANULOCYTES # BLD AUTO: 0.24 X10(3) UL (ref 0–1)
IMM GRANULOCYTES NFR BLD: 0.6 %
IMM GRANULOCYTES NFR BLD: 0.7 %
IMM GRANULOCYTES NFR BLD: 0.8 %
IMM GRANULOCYTES NFR BLD: 1.4 %
IMM GRANULOCYTES NFR BLD: 1.7 %
IMM GRANULOCYTES NFR BLD: 1.9 %
IMM GRANULOCYTES NFR BLD: 2 %
IMM GRANULOCYTES NFR BLD: 2 %
INR BLD: 2.41 (ref 0.8–1.2)
INR BLD: 3.96 (ref 0.8–1.2)
IRON SATN MFR SERPL: 12 %
IRON SERPL-MCNC: 14 UG/DL
KETONES UR-MCNC: NEGATIVE MG/DL
KETONES UR-MCNC: NEGATIVE MG/DL
LACTATE BLD-SCNC: 9.6 MMOL/L (ref 0.5–2)
LACTATE SERPL-SCNC: 1.1 MMOL/L (ref 0.5–2)
LACTATE SERPL-SCNC: 6 MMOL/L (ref 0.5–2)
LACTATE SERPL-SCNC: 7 MMOL/L (ref 0.5–2)
LACTATE SERPL-SCNC: 8.8 MMOL/L (ref 0.5–2)
LACTATE SERPL-SCNC: 9.5 MMOL/L (ref 0.5–2)
LEUKOCYTE ESTERASE UR QL STRIP.AUTO: 500
LEUKOCYTE ESTERASE UR QL STRIP.AUTO: NEGATIVE
LIPASE SERPL-CCNC: 31 U/L (ref 12–53)
LYMPHOCYTES # BLD AUTO: 0.8 X10(3) UL (ref 1–4)
LYMPHOCYTES # BLD AUTO: 1.21 X10(3) UL (ref 1–4)
LYMPHOCYTES # BLD AUTO: 1.23 X10(3) UL (ref 1–4)
LYMPHOCYTES # BLD AUTO: 1.25 X10(3) UL (ref 1–4)
LYMPHOCYTES # BLD AUTO: 1.68 X10(3) UL (ref 1–4)
LYMPHOCYTES # BLD AUTO: 2.58 X10(3) UL (ref 1–4)
LYMPHOCYTES # BLD AUTO: 2.63 X10(3) UL (ref 1–4)
LYMPHOCYTES # BLD AUTO: 2.69 X10(3) UL (ref 1–4)
LYMPHOCYTES NFR BLD AUTO: 10.6 %
LYMPHOCYTES NFR BLD AUTO: 11.7 %
LYMPHOCYTES NFR BLD AUTO: 15.8 %
LYMPHOCYTES NFR BLD AUTO: 17.6 %
LYMPHOCYTES NFR BLD AUTO: 29.3 %
LYMPHOCYTES NFR BLD AUTO: 29.7 %
LYMPHOCYTES NFR BLD AUTO: 30 %
LYMPHOCYTES NFR BLD AUTO: 7.3 %
LYMPHOCYTES NFR BLD: 0.6 X10(3) UL (ref 1–4)
LYMPHOCYTES NFR BLD: 1.85 X10(3) UL (ref 1–4)
LYMPHOCYTES NFR BLD: 14 %
LYMPHOCYTES NFR BLD: 7 %
MAGNESIUM SERPL-MCNC: 1.6 MG/DL (ref 1.6–2.6)
MAGNESIUM SERPL-MCNC: 1.7 MG/DL (ref 1.6–2.6)
MAGNESIUM SERPL-MCNC: 1.8 MG/DL (ref 1.6–2.6)
MAGNESIUM SERPL-MCNC: 2 MG/DL (ref 1.6–2.6)
MAGNESIUM SERPL-MCNC: 2.1 MG/DL (ref 1.6–2.6)
MCH RBC QN AUTO: 27.8 PG (ref 26–34)
MCH RBC QN AUTO: 28.1 PG (ref 26–34)
MCH RBC QN AUTO: 28.4 PG (ref 26–34)
MCH RBC QN AUTO: 28.7 PG (ref 26–34)
MCH RBC QN AUTO: 28.9 PG (ref 26–34)
MCH RBC QN AUTO: 28.9 PG (ref 26–34)
MCH RBC QN AUTO: 29 PG (ref 26–34)
MCH RBC QN AUTO: 29 PG (ref 26–34)
MCH RBC QN AUTO: 29.3 PG (ref 26–34)
MCH RBC QN AUTO: 29.5 PG (ref 26–34)
MCHC RBC AUTO-ENTMCNC: 30.1 G/DL (ref 31–37)
MCHC RBC AUTO-ENTMCNC: 31.9 G/DL (ref 31–37)
MCHC RBC AUTO-ENTMCNC: 32 G/DL (ref 31–37)
MCHC RBC AUTO-ENTMCNC: 32.1 G/DL (ref 31–37)
MCHC RBC AUTO-ENTMCNC: 32.2 G/DL (ref 31–37)
MCHC RBC AUTO-ENTMCNC: 32.4 G/DL (ref 31–37)
MCHC RBC AUTO-ENTMCNC: 32.5 G/DL (ref 31–37)
MCHC RBC AUTO-ENTMCNC: 32.8 G/DL (ref 31–37)
MCHC RBC AUTO-ENTMCNC: 33 G/DL (ref 31–37)
MCHC RBC AUTO-ENTMCNC: 33.7 G/DL (ref 31–37)
MCV RBC AUTO: 82.4 FL
MCV RBC AUTO: 87.4 FL
MCV RBC AUTO: 87.6 FL
MCV RBC AUTO: 87.6 FL
MCV RBC AUTO: 88.1 FL
MCV RBC AUTO: 88.7 FL
MCV RBC AUTO: 89.1 FL
MCV RBC AUTO: 89.5 FL
MCV RBC AUTO: 90.1 FL
MCV RBC AUTO: 90.4 FL
MCV RBC AUTO: 90.4 FL
MCV RBC AUTO: 96.4 FL
METAMYELOCYTES # BLD: 0.26 X10(3) UL
METAMYELOCYTES # BLD: 1.58 X10(3) UL
METAMYELOCYTES NFR BLD: 12 %
METAMYELOCYTES NFR BLD: 3 %
METHGB MFR BLD: 1 % SAT (ref 0.4–1.5)
MONOCYTES # BLD AUTO: 0.44 X10(3) UL (ref 0.1–1)
MONOCYTES # BLD AUTO: 0.49 X10(3) UL (ref 0.1–1)
MONOCYTES # BLD AUTO: 0.62 X10(3) UL (ref 0.1–1)
MONOCYTES # BLD AUTO: 0.83 X10(3) UL (ref 0.1–1)
MONOCYTES # BLD AUTO: 0.86 X10(3) UL (ref 0.1–1)
MONOCYTES # BLD AUTO: 0.93 X10(3) UL (ref 0.1–1)
MONOCYTES # BLD AUTO: 0.98 X10(3) UL (ref 0.1–1)
MONOCYTES # BLD AUTO: 1.06 X10(3) UL (ref 0.1–1)
MONOCYTES # BLD: 0 X10(3) UL (ref 0.1–1)
MONOCYTES # BLD: 0.34 X10(3) UL (ref 0.1–1)
MONOCYTES NFR BLD AUTO: 10.8 %
MONOCYTES NFR BLD AUTO: 12 %
MONOCYTES NFR BLD AUTO: 4.1 %
MONOCYTES NFR BLD AUTO: 5.7 %
MONOCYTES NFR BLD AUTO: 6.5 %
MONOCYTES NFR BLD AUTO: 8.2 %
MONOCYTES NFR BLD AUTO: 8.9 %
MONOCYTES NFR BLD AUTO: 9.1 %
MONOCYTES NFR BLD: 0 %
MONOCYTES NFR BLD: 4 %
MRSA DNA SPEC QL NAA+PROBE: POSITIVE
MYELOCYTES # BLD: 0.09 X10(3) UL
MYELOCYTES # BLD: 0.13 X10(3) UL
MYELOCYTES NFR BLD: 1 %
MYELOCYTES NFR BLD: 1 %
NEUTROPHILS # BLD AUTO: 11.06 X10 (3) UL (ref 1.5–7.7)
NEUTROPHILS # BLD AUTO: 4.92 X10 (3) UL (ref 1.5–7.7)
NEUTROPHILS # BLD AUTO: 4.92 X10(3) UL (ref 1.5–7.7)
NEUTROPHILS # BLD AUTO: 5.06 X10 (3) UL (ref 1.5–7.7)
NEUTROPHILS # BLD AUTO: 5.06 X10(3) UL (ref 1.5–7.7)
NEUTROPHILS # BLD AUTO: 5.44 X10 (3) UL (ref 1.5–7.7)
NEUTROPHILS # BLD AUTO: 5.44 X10(3) UL (ref 1.5–7.7)
NEUTROPHILS # BLD AUTO: 5.91 X10 (3) UL (ref 1.5–7.7)
NEUTROPHILS # BLD AUTO: 5.91 X10(3) UL (ref 1.5–7.7)
NEUTROPHILS # BLD AUTO: 7.05 X10 (3) UL (ref 1.5–7.7)
NEUTROPHILS # BLD AUTO: 7.05 X10(3) UL (ref 1.5–7.7)
NEUTROPHILS # BLD AUTO: 7.19 X10 (3) UL (ref 1.5–7.7)
NEUTROPHILS # BLD AUTO: 8.3 X10 (3) UL (ref 1.5–7.7)
NEUTROPHILS # BLD AUTO: 8.3 X10(3) UL (ref 1.5–7.7)
NEUTROPHILS # BLD AUTO: 9.15 X10 (3) UL (ref 1.5–7.7)
NEUTROPHILS # BLD AUTO: 9.15 X10(3) UL (ref 1.5–7.7)
NEUTROPHILS # BLD AUTO: 9.84 X10 (3) UL (ref 1.5–7.7)
NEUTROPHILS # BLD AUTO: 9.84 X10(3) UL (ref 1.5–7.7)
NEUTROPHILS NFR BLD AUTO: 57.1 %
NEUTROPHILS NFR BLD AUTO: 57.4 %
NEUTROPHILS NFR BLD AUTO: 59.3 %
NEUTROPHILS NFR BLD AUTO: 73.8 %
NEUTROPHILS NFR BLD AUTO: 77 %
NEUTROPHILS NFR BLD AUTO: 79.3 %
NEUTROPHILS NFR BLD AUTO: 83.1 %
NEUTROPHILS NFR BLD AUTO: 83.2 %
NEUTROPHILS NFR BLD: 65 %
NEUTROPHILS NFR BLD: 81 %
NEUTS BAND NFR BLD: 3 %
NEUTS BAND NFR BLD: 7 %
NEUTS HYPERSEG # BLD: 7.22 X10(3) UL (ref 1.5–7.7)
NEUTS HYPERSEG # BLD: 9.5 X10(3) UL (ref 1.5–7.7)
NITRITE UR QL STRIP.AUTO: NEGATIVE
NITRITE UR QL STRIP.AUTO: NEGATIVE
NRBC BLD MANUAL-RTO: 2 %
NRBC BLD MANUAL-RTO: 8 %
O2 CT BLD-SCNC: 6.9 VOL% (ref 15–23)
O2/TOTAL GAS SETTING VFR VENT: 50 %
OSMOLALITY SERPL CALC.SUM OF ELEC: 280 MOSM/KG (ref 275–295)
OSMOLALITY SERPL CALC.SUM OF ELEC: 283 MOSM/KG (ref 275–295)
OSMOLALITY SERPL CALC.SUM OF ELEC: 284 MOSM/KG (ref 275–295)
OSMOLALITY SERPL CALC.SUM OF ELEC: 286 MOSM/KG (ref 275–295)
OSMOLALITY SERPL CALC.SUM OF ELEC: 287 MOSM/KG (ref 275–295)
OSMOLALITY SERPL CALC.SUM OF ELEC: 288 MOSM/KG (ref 275–295)
OSMOLALITY SERPL CALC.SUM OF ELEC: 290 MOSM/KG (ref 275–295)
OSMOLALITY SERPL CALC.SUM OF ELEC: 291 MOSM/KG (ref 275–295)
OSMOLALITY SERPL CALC.SUM OF ELEC: 292 MOSM/KG (ref 275–295)
OSMOLALITY SERPL CALC.SUM OF ELEC: 292 MOSM/KG (ref 275–295)
OSMOLALITY SERPL CALC.SUM OF ELEC: 300 MOSM/KG (ref 275–295)
OSMOLALITY SERPL CALC.SUM OF ELEC: 305 MOSM/KG (ref 275–295)
OSMOLALITY SERPL CALC.SUM OF ELEC: 307 MOSM/KG (ref 275–295)
OXYGEN LITERS/MINUTE: 40 L/MIN
P AXIS: 29 DEGREES
P AXIS: 41 DEGREES
P AXIS: 70 DEGREES
P-R INTERVAL: 162 MS
P-R INTERVAL: 162 MS
P-R INTERVAL: 168 MS
P-R INTERVAL: 68 MS
PCO2 BLDA: 23 MM HG (ref 35–45)
PH BLDA: 7.02 [PH] (ref 7.35–7.45)
PH UR: 6 [PH] (ref 5–8)
PH UR: 7.5 [PH] (ref 5–8)
PHOSPHATE SERPL-MCNC: 1.8 MG/DL (ref 2.4–5.1)
PHOSPHATE SERPL-MCNC: 3.4 MG/DL (ref 2.4–5.1)
PHOSPHATE SERPL-MCNC: 3.5 MG/DL (ref 2.4–5.1)
PLATELET # BLD AUTO: 121 10(3)UL (ref 150–450)
PLATELET # BLD AUTO: 136 10(3)UL (ref 150–450)
PLATELET # BLD AUTO: 157 10(3)UL (ref 150–450)
PLATELET # BLD AUTO: 160 10(3)UL (ref 150–450)
PLATELET # BLD AUTO: 163 10(3)UL (ref 150–450)
PLATELET # BLD AUTO: 20 10(3)UL (ref 150–450)
PLATELET # BLD AUTO: 207 10(3)UL (ref 150–450)
PLATELET # BLD AUTO: 213 10(3)UL (ref 150–450)
PLATELET # BLD AUTO: 228 10(3)UL (ref 150–450)
PLATELET # BLD AUTO: 260 10(3)UL (ref 150–450)
PLATELET # BLD AUTO: 339 10(3)UL (ref 150–450)
PLATELET # BLD AUTO: 77 10(3)UL (ref 150–450)
PLATELET MORPHOLOGY: NORMAL
PLATELETS.RETICULATED NFR BLD AUTO: 4.2 % (ref 0–7)
PLATELETS.RETICULATED NFR BLD AUTO: 8.1 % (ref 0–7)
PO2 BLDA: 39 MM HG (ref 80–100)
POTASSIUM BLD-SCNC: 3.9 MMOL/L (ref 3.6–5.1)
POTASSIUM SERPL-SCNC: 2.8 MMOL/L (ref 3.5–5.1)
POTASSIUM SERPL-SCNC: 3 MMOL/L (ref 3.5–5.1)
POTASSIUM SERPL-SCNC: 3.1 MMOL/L (ref 3.5–5.1)
POTASSIUM SERPL-SCNC: 3.1 MMOL/L (ref 3.5–5.1)
POTASSIUM SERPL-SCNC: 3.3 MMOL/L (ref 3.5–5.1)
POTASSIUM SERPL-SCNC: 3.6 MMOL/L (ref 3.5–5.1)
POTASSIUM SERPL-SCNC: 3.7 MMOL/L (ref 3.5–5.1)
POTASSIUM SERPL-SCNC: 3.7 MMOL/L (ref 3.5–5.1)
POTASSIUM SERPL-SCNC: 3.8 MMOL/L (ref 3.5–5.1)
POTASSIUM SERPL-SCNC: 3.8 MMOL/L (ref 3.5–5.1)
POTASSIUM SERPL-SCNC: 3.9 MMOL/L (ref 3.5–5.1)
POTASSIUM SERPL-SCNC: 4 MMOL/L (ref 3.5–5.1)
POTASSIUM SERPL-SCNC: 4 MMOL/L (ref 3.5–5.1)
POTASSIUM SERPL-SCNC: 4.2 MMOL/L (ref 3.5–5.1)
POTASSIUM SERPL-SCNC: 4.3 MMOL/L (ref 3.5–5.1)
POTASSIUM SERPL-SCNC: 4.8 MMOL/L (ref 3.5–5.1)
PROCALCITONIN SERPL-MCNC: >100 NG/ML (ref ?–0.05)
PROMYELOCYTES # BLD: 0.09 X10(3) UL
PROMYELOCYTES # BLD: 0.13 X10(3) UL
PROMYELOCYTES NFR BLD: 1 %
PROMYELOCYTES NFR BLD: 1 %
PROT SERPL-MCNC: 2.1 G/DL (ref 5.7–8.2)
PROT SERPL-MCNC: 3.2 G/DL (ref 5.7–8.2)
PROT SERPL-MCNC: 3.9 G/DL (ref 5.7–8.2)
PROT SERPL-MCNC: 5.8 G/DL (ref 5.7–8.2)
PROT UR-MCNC: 30 MG/DL
PROT UR-MCNC: 70 MG/DL
PROTHROMBIN TIME: 27.5 SECONDS (ref 11.6–14.8)
PROTHROMBIN TIME: 40.5 SECONDS (ref 11.6–14.8)
PUNCTURE CHARGE: NO
Q-T INTERVAL: 258 MS
Q-T INTERVAL: 268 MS
Q-T INTERVAL: 292 MS
Q-T INTERVAL: 330 MS
QRS DURATION: 70 MS
QRS DURATION: 72 MS
QRS DURATION: 74 MS
QRS DURATION: 76 MS
QTC CALCULATION (BEZET): 312 MS
QTC CALCULATION (BEZET): 398 MS
QTC CALCULATION (BEZET): 403 MS
QTC CALCULATION (BEZET): 519 MS
R AXIS: -10 DEGREES
R AXIS: -22 DEGREES
R AXIS: -33 DEGREES
R AXIS: 8 DEGREES
RBC # BLD AUTO: 1.38 X10(6)UL
RBC # BLD AUTO: 2.28 X10(6)UL
RBC # BLD AUTO: 3.06 X10(6)UL
RBC # BLD AUTO: 3.15 X10(6)UL
RBC # BLD AUTO: 3.24 X10(6)UL
RBC # BLD AUTO: 3.32 X10(6)UL
RBC # BLD AUTO: 3.34 X10(6)UL
RBC # BLD AUTO: 3.35 X10(6)UL
RBC # BLD AUTO: 3.41 X10(6)UL
RBC # BLD AUTO: 3.45 X10(6)UL
RBC # BLD AUTO: 3.45 X10(6)UL
RBC # BLD AUTO: 4.26 X10(6)UL
SAO2 % BLDA: 56 % (ref 94–100)
SARS-COV-2 RNA RESP QL NAA+PROBE: NOT DETECTED
SODIUM BLD-SCNC: 135 MMOL/L (ref 135–145)
SODIUM SERPL-SCNC: 133 MMOL/L (ref 136–145)
SODIUM SERPL-SCNC: 137 MMOL/L (ref 136–145)
SODIUM SERPL-SCNC: 137 MMOL/L (ref 136–145)
SODIUM SERPL-SCNC: 138 MMOL/L (ref 136–145)
SODIUM SERPL-SCNC: 139 MMOL/L (ref 136–145)
SODIUM SERPL-SCNC: 139 MMOL/L (ref 136–145)
SODIUM SERPL-SCNC: 140 MMOL/L (ref 136–145)
SODIUM SERPL-SCNC: 140 MMOL/L (ref 136–145)
SODIUM SERPL-SCNC: 141 MMOL/L (ref 136–145)
SODIUM SERPL-SCNC: 142 MMOL/L (ref 136–145)
SODIUM SERPL-SCNC: 142 MMOL/L (ref 136–145)
SODIUM SERPL-SCNC: 143 MMOL/L (ref 136–145)
SP GR UR STRIP: 1.02 (ref 1–1.03)
SP GR UR STRIP: >1.03 (ref 1–1.03)
T AXIS: -71 DEGREES
T AXIS: 169 DEGREES
T AXIS: 45 DEGREES
T AXIS: 49 DEGREES
TIBC SERPL-MCNC: 115 UG/DL (ref 250–425)
TOTAL CELLS COUNTED BLD: 100
TOTAL CELLS COUNTED BLD: 100
TRANSFERRIN SERPL-MCNC: 77 MG/DL (ref 250–380)
TRIGL SERPL-MCNC: 98 MG/DL (ref 30–149)
TROPONIN I SERPL HS-MCNC: 90 NG/L
TSI SER-ACNC: 2.41 UIU/ML (ref 0.55–4.78)
UROBILINOGEN UR STRIP-ACNC: NORMAL
UROBILINOGEN UR STRIP-ACNC: NORMAL
VANCOMYCIN PEAK SERPL-MCNC: 42.7 UG/ML (ref 30–50)
VANCOMYCIN TROUGH SERPL-MCNC: 13.1 UG/ML (ref 10–20)
VANCOMYCIN TROUGH SERPL-MCNC: 21.2 UG/ML (ref 10–20)
VENTRICULAR RATE: 112 BPM
VENTRICULAR RATE: 136 BPM
VENTRICULAR RATE: 149 BPM
VENTRICULAR RATE: 88 BPM
WBC # BLD AUTO: 10.5 X10(3) UL (ref 4–11)
WBC # BLD AUTO: 11 X10(3) UL (ref 4–11)
WBC # BLD AUTO: 11.8 X10(3) UL (ref 4–11)
WBC # BLD AUTO: 11.8 X10(3) UL (ref 4–11)
WBC # BLD AUTO: 13.2 X10(3) UL (ref 4–11)
WBC # BLD AUTO: 13.8 X10(3) UL (ref 4–11)
WBC # BLD AUTO: 7.7 X10(3) UL (ref 4–11)
WBC # BLD AUTO: 8.6 X10(3) UL (ref 4–11)
WBC # BLD AUTO: 8.6 X10(3) UL (ref 4–11)
WBC # BLD AUTO: 8.9 X10(3) UL (ref 4–11)
WBC # BLD AUTO: 9.2 X10(3) UL (ref 4–11)
WBC # BLD AUTO: 9.6 X10(3) UL (ref 4–11)
WBC #/AREA URNS AUTO: >50 /HPF

## 2024-01-01 PROCEDURE — 99233 SBSQ HOSP IP/OBS HIGH 50: CPT | Performed by: HOSPITALIST

## 2024-01-01 PROCEDURE — 99255 IP/OBS CONSLTJ NEW/EST HI 80: CPT | Performed by: INTERNAL MEDICINE

## 2024-01-01 PROCEDURE — 99238 HOSP IP/OBS DSCHRG MGMT 30/<: CPT | Performed by: HOSPITALIST

## 2024-01-01 PROCEDURE — 74221 X-RAY XM ESOPHAGUS 2CNTRST: CPT | Performed by: PHYSICIAN ASSISTANT

## 2024-01-01 PROCEDURE — 74177 CT ABD & PELVIS W/CONTRAST: CPT | Performed by: EMERGENCY MEDICINE

## 2024-01-01 PROCEDURE — 99233 SBSQ HOSP IP/OBS HIGH 50: CPT | Performed by: INTERNAL MEDICINE

## 2024-01-01 PROCEDURE — 06HY33Z INSERTION OF INFUSION DEVICE INTO LOWER VEIN, PERCUTANEOUS APPROACH: ICD-10-PCS | Performed by: EMERGENCY MEDICINE

## 2024-01-01 PROCEDURE — 71260 CT THORAX DX C+: CPT | Performed by: EMERGENCY MEDICINE

## 2024-01-01 PROCEDURE — 90792 PSYCH DIAG EVAL W/MED SRVCS: CPT | Performed by: NURSE PRACTITIONER

## 2024-01-01 PROCEDURE — 93971 EXTREMITY STUDY: CPT | Performed by: HOSPITALIST

## 2024-01-01 PROCEDURE — 43239 EGD BIOPSY SINGLE/MULTIPLE: CPT | Performed by: INTERNAL MEDICINE

## 2024-01-01 PROCEDURE — 99232 SBSQ HOSP IP/OBS MODERATE 35: CPT | Performed by: REGISTERED NURSE

## 2024-01-01 PROCEDURE — 99223 1ST HOSP IP/OBS HIGH 75: CPT | Performed by: INTERNAL MEDICINE

## 2024-01-01 PROCEDURE — 0BH17EZ INSERTION OF ENDOTRACHEAL AIRWAY INTO TRACHEA, VIA NATURAL OR ARTIFICIAL OPENING: ICD-10-PCS | Performed by: EMERGENCY MEDICINE

## 2024-01-01 PROCEDURE — 71045 X-RAY EXAM CHEST 1 VIEW: CPT | Performed by: EMERGENCY MEDICINE

## 2024-01-01 PROCEDURE — 02HV33Z INSERTION OF INFUSION DEVICE INTO SUPERIOR VENA CAVA, PERCUTANEOUS APPROACH: ICD-10-PCS | Performed by: INTERNAL MEDICINE

## 2024-01-01 PROCEDURE — 99232 SBSQ HOSP IP/OBS MODERATE 35: CPT | Performed by: NURSE PRACTITIONER

## 2024-01-01 PROCEDURE — 99232 SBSQ HOSP IP/OBS MODERATE 35: CPT | Performed by: INTERNAL MEDICINE

## 2024-01-01 PROCEDURE — 99231 SBSQ HOSP IP/OBS SF/LOW 25: CPT | Performed by: NURSE PRACTITIONER

## 2024-01-01 PROCEDURE — 99291 CRITICAL CARE FIRST HOUR: CPT | Performed by: HOSPITALIST

## 2024-01-01 PROCEDURE — 99497 ADVNCD CARE PLAN 30 MIN: CPT | Performed by: HOSPITALIST

## 2024-01-01 PROCEDURE — 74220 X-RAY XM ESOPHAGUS 1CNTRST: CPT | Performed by: INTERNAL MEDICINE

## 2024-01-01 PROCEDURE — 99223 1ST HOSP IP/OBS HIGH 75: CPT | Performed by: HOSPITALIST

## 2024-01-01 PROCEDURE — B5181ZA FLUOROSCOPY OF SUPERIOR VENA CAVA USING LOW OSMOLAR CONTRAST, GUIDANCE: ICD-10-PCS | Performed by: INTERNAL MEDICINE

## 2024-01-01 PROCEDURE — 71045 X-RAY EXAM CHEST 1 VIEW: CPT | Performed by: HOSPITALIST

## 2024-01-01 PROCEDURE — 70450 CT HEAD/BRAIN W/O DYE: CPT | Performed by: EMERGENCY MEDICINE

## 2024-01-01 PROCEDURE — 99222 1ST HOSP IP/OBS MODERATE 55: CPT | Performed by: INTERNAL MEDICINE

## 2024-01-01 PROCEDURE — 99239 HOSP IP/OBS DSCHRG MGMT >30: CPT | Performed by: INTERNAL MEDICINE

## 2024-01-01 PROCEDURE — 5A1945Z RESPIRATORY VENTILATION, 24-96 CONSECUTIVE HOURS: ICD-10-PCS | Performed by: EMERGENCY MEDICINE

## 2024-01-01 PROCEDURE — 99291 CRITICAL CARE FIRST HOUR: CPT | Performed by: INTERNAL MEDICINE

## 2024-01-01 PROCEDURE — 0D718ZZ DILATION OF UPPER ESOPHAGUS, VIA NATURAL OR ARTIFICIAL OPENING ENDOSCOPIC: ICD-10-PCS | Performed by: INTERNAL MEDICINE

## 2024-01-01 PROCEDURE — 73620 X-RAY EXAM OF FOOT: CPT | Performed by: HOSPITALIST

## 2024-01-01 PROCEDURE — 0DB18ZX EXCISION OF UPPER ESOPHAGUS, VIA NATURAL OR ARTIFICIAL OPENING ENDOSCOPIC, DIAGNOSTIC: ICD-10-PCS | Performed by: INTERNAL MEDICINE

## 2024-01-01 PROCEDURE — 99222 1ST HOSP IP/OBS MODERATE 55: CPT | Performed by: NURSE PRACTITIONER

## 2024-01-01 PROCEDURE — 5A0935A ASSISTANCE WITH RESPIRATORY VENTILATION, LESS THAN 24 CONSECUTIVE HOURS, HIGH NASAL FLOW/VELOCITY: ICD-10-PCS | Performed by: EMERGENCY MEDICINE

## 2024-01-01 PROCEDURE — 74220 X-RAY XM ESOPHAGUS 1CNTRST: CPT | Performed by: PHYSICIAN ASSISTANT

## 2024-01-01 PROCEDURE — 99233 SBSQ HOSP IP/OBS HIGH 50: CPT | Performed by: OTHER

## 2024-01-01 PROCEDURE — 43249 ESOPH EGD DILATION <30 MM: CPT | Performed by: INTERNAL MEDICINE

## 2024-01-01 PROCEDURE — 3E043XZ INTRODUCTION OF VASOPRESSOR INTO CENTRAL VEIN, PERCUTANEOUS APPROACH: ICD-10-PCS | Performed by: EMERGENCY MEDICINE

## 2024-01-01 PROCEDURE — 99232 SBSQ HOSP IP/OBS MODERATE 35: CPT | Performed by: PHYSICIAN ASSISTANT

## 2024-01-01 RX ORDER — VANCOMYCIN HYDROCHLORIDE
15 EVERY 24 HOURS
Status: DISCONTINUED | OUTPATIENT
Start: 2024-01-01 | End: 2024-01-01 | Stop reason: DRUGHIGH

## 2024-01-01 RX ORDER — CLOPIDOGREL BISULFATE 75 MG/1
75 TABLET ORAL DAILY
Status: DISCONTINUED | OUTPATIENT
Start: 2024-01-01 | End: 2024-01-01

## 2024-01-01 RX ORDER — DEXTROSE MONOHYDRATE 25 G/50ML
50 INJECTION, SOLUTION INTRAVENOUS
Status: DISCONTINUED | OUTPATIENT
Start: 2024-01-01 | End: 2024-01-01

## 2024-01-01 RX ORDER — HYDROCODONE BITARTRATE AND ACETAMINOPHEN 5; 325 MG/1; MG/1
1 TABLET ORAL EVERY 4 HOURS PRN
Status: DISCONTINUED | OUTPATIENT
Start: 2024-01-01 | End: 2024-01-01

## 2024-01-01 RX ORDER — MAGNESIUM SULFATE HEPTAHYDRATE 40 MG/ML
2 INJECTION, SOLUTION INTRAVENOUS ONCE
Status: COMPLETED | OUTPATIENT
Start: 2024-01-01 | End: 2024-01-01

## 2024-01-01 RX ORDER — ONDANSETRON 4 MG/1
8 TABLET, ORALLY DISINTEGRATING ORAL
Status: DISCONTINUED | OUTPATIENT
Start: 2024-01-01 | End: 2024-01-01

## 2024-01-01 RX ORDER — DEXTROSE MONOHYDRATE 100 MG/ML
INJECTION, SOLUTION INTRAVENOUS CONTINUOUS
Status: DISCONTINUED | OUTPATIENT
Start: 2024-01-01 | End: 2024-01-01

## 2024-01-01 RX ORDER — HYDROCODONE BITARTRATE AND ACETAMINOPHEN 7.5; 325 MG/15ML; MG/15ML
15 SOLUTION ORAL EVERY 4 HOURS PRN
Qty: 1 EACH | Refills: 0 | Status: SHIPPED | OUTPATIENT
Start: 2024-01-01 | End: 2024-01-01

## 2024-01-01 RX ORDER — ACETAMINOPHEN 160 MG/5ML
650 SOLUTION ORAL EVERY 4 HOURS PRN
Status: DISCONTINUED | OUTPATIENT
Start: 2024-01-01 | End: 2024-01-01

## 2024-01-01 RX ORDER — POTASSIUM CHLORIDE 14.9 MG/ML
20 INJECTION INTRAVENOUS ONCE
Status: COMPLETED | OUTPATIENT
Start: 2024-01-01 | End: 2024-01-01

## 2024-01-01 RX ORDER — ASPIRIN 81 MG/1
81 TABLET, CHEWABLE ORAL DAILY
Status: DISCONTINUED | OUTPATIENT
Start: 2024-01-01 | End: 2024-01-01

## 2024-01-01 RX ORDER — NICOTINE POLACRILEX 4 MG
30 LOZENGE BUCCAL
Status: DISCONTINUED | OUTPATIENT
Start: 2024-01-01 | End: 2024-01-01

## 2024-01-01 RX ORDER — MAGNESIUM OXIDE 400 MG/1
400 TABLET ORAL ONCE
Status: COMPLETED | OUTPATIENT
Start: 2024-01-01 | End: 2024-01-01

## 2024-01-01 RX ORDER — SODIUM CHLORIDE 9 MG/ML
INJECTION, SOLUTION INTRAVENOUS CONTINUOUS
Status: DISCONTINUED | OUTPATIENT
Start: 2024-01-01 | End: 2024-01-01

## 2024-01-01 RX ORDER — SODIUM BICARBONATE 650 MG/1
650 TABLET ORAL 2 TIMES DAILY
Status: DISCONTINUED | OUTPATIENT
Start: 2024-01-01 | End: 2024-01-01

## 2024-01-01 RX ORDER — MAGNESIUM OXIDE 400 MG/1
400 TABLET ORAL ONCE
Status: DISCONTINUED | OUTPATIENT
Start: 2024-01-01 | End: 2024-01-01

## 2024-01-01 RX ORDER — LAMOTRIGINE 25 MG/1
50 TABLET ORAL 2 TIMES DAILY
Qty: 120 TABLET | Refills: 0 | Status: SHIPPED | OUTPATIENT
Start: 2024-01-01 | End: 2024-01-01

## 2024-01-01 RX ORDER — ONDANSETRON 2 MG/ML
4 INJECTION INTRAMUSCULAR; INTRAVENOUS ONCE
Status: COMPLETED | OUTPATIENT
Start: 2024-01-01 | End: 2024-01-01

## 2024-01-01 RX ORDER — DEXTROSE MONOHYDRATE 25 G/50ML
INJECTION, SOLUTION INTRAVENOUS
Status: COMPLETED
Start: 2024-01-01 | End: 2024-01-01

## 2024-01-01 RX ORDER — PROCHLORPERAZINE EDISYLATE 5 MG/ML
5 INJECTION INTRAMUSCULAR; INTRAVENOUS EVERY 8 HOURS PRN
Status: DISCONTINUED | OUTPATIENT
Start: 2024-01-01 | End: 2024-01-01

## 2024-01-01 RX ORDER — POTASSIUM CHLORIDE 1.5 G/1.58G
40 POWDER, FOR SOLUTION ORAL ONCE
Status: COMPLETED | OUTPATIENT
Start: 2024-01-01 | End: 2024-01-01

## 2024-01-01 RX ORDER — VANCOMYCIN HYDROCHLORIDE
15 ONCE
Status: COMPLETED | OUTPATIENT
Start: 2024-01-01 | End: 2024-01-01

## 2024-01-01 RX ORDER — HYDROCODONE BITARTRATE AND ACETAMINOPHEN 7.5; 325 MG/15ML; MG/15ML
15 SOLUTION ORAL EVERY 4 HOURS PRN
Status: DISCONTINUED | OUTPATIENT
Start: 2024-01-01 | End: 2024-01-01

## 2024-01-01 RX ORDER — MORPHINE SULFATE 4 MG/ML
4 INJECTION, SOLUTION INTRAMUSCULAR; INTRAVENOUS ONCE
Status: COMPLETED | OUTPATIENT
Start: 2024-01-01 | End: 2024-01-01

## 2024-01-01 RX ORDER — ALBUMIN HUMAN 50 G/1000ML
25 SOLUTION INTRAVENOUS ONCE
Status: COMPLETED | OUTPATIENT
Start: 2024-01-01 | End: 2024-01-01

## 2024-01-01 RX ORDER — OXYBUTYNIN CHLORIDE 5 MG/1
10 TABLET, EXTENDED RELEASE ORAL DAILY
Status: DISCONTINUED | OUTPATIENT
Start: 2024-01-01 | End: 2024-01-01

## 2024-01-01 RX ORDER — POTASSIUM CHLORIDE 1500 MG/1
40 TABLET, EXTENDED RELEASE ORAL ONCE
Status: DISCONTINUED | OUTPATIENT
Start: 2024-01-01 | End: 2024-01-01

## 2024-01-01 RX ORDER — DEXTROSE MONOHYDRATE 100 MG/ML
1000 INJECTION, SOLUTION INTRAVENOUS ONCE
Status: COMPLETED | OUTPATIENT
Start: 2024-01-01 | End: 2024-01-01

## 2024-01-01 RX ORDER — DEXTROSE MONOHYDRATE 25 G/50ML
50 INJECTION, SOLUTION INTRAVENOUS ONCE
Status: COMPLETED | OUTPATIENT
Start: 2024-01-01 | End: 2024-01-01

## 2024-01-01 RX ORDER — ACETAMINOPHEN 325 MG/1
650 TABLET ORAL EVERY 4 HOURS PRN
Status: DISCONTINUED | OUTPATIENT
Start: 2024-01-01 | End: 2024-01-01

## 2024-01-01 RX ORDER — ALBUMIN (HUMAN) 12.5 G/50ML
SOLUTION INTRAVENOUS
Status: DISPENSED
Start: 2024-01-01 | End: 2024-01-01

## 2024-01-01 RX ORDER — HEPARIN SODIUM 5000 [USP'U]/ML
5000 INJECTION, SOLUTION INTRAVENOUS; SUBCUTANEOUS EVERY 12 HOURS SCHEDULED
Status: DISCONTINUED | OUTPATIENT
Start: 2024-01-01 | End: 2024-01-01

## 2024-01-01 RX ORDER — HYDROCORTISONE SODIUM SUCCINATE 100 MG/2ML
100 INJECTION INTRAMUSCULAR; INTRAVENOUS EVERY 8 HOURS
Status: DISCONTINUED | OUTPATIENT
Start: 2024-01-01 | End: 2024-01-01

## 2024-01-01 RX ORDER — HYDROCORTISONE SODIUM SUCCINATE 100 MG/2ML
INJECTION INTRAMUSCULAR; INTRAVENOUS
Status: COMPLETED
Start: 2024-01-01 | End: 2024-01-01

## 2024-01-01 RX ORDER — QUETIAPINE FUMARATE 25 MG/1
50 TABLET, FILM COATED ORAL NIGHTLY
Status: DISCONTINUED | OUTPATIENT
Start: 2024-01-01 | End: 2024-01-01

## 2024-01-01 RX ORDER — DEXTROSE MONOHYDRATE 25 G/50ML
50 INJECTION, SOLUTION INTRAVENOUS AS NEEDED
Status: DISCONTINUED | OUTPATIENT
Start: 2024-01-01 | End: 2024-01-01

## 2024-01-01 RX ORDER — POTASSIUM CHLORIDE 29.8 MG/ML
40 INJECTION INTRAVENOUS ONCE
Status: COMPLETED | OUTPATIENT
Start: 2024-01-01 | End: 2024-01-01

## 2024-01-01 RX ORDER — ALPRAZOLAM 0.25 MG/1
0.25 TABLET ORAL 2 TIMES DAILY PRN
Status: DISCONTINUED | OUTPATIENT
Start: 2024-01-01 | End: 2024-01-01

## 2024-01-01 RX ORDER — DOCUSATE SODIUM 100 MG/1
100 CAPSULE, LIQUID FILLED ORAL 2 TIMES DAILY
COMMUNITY

## 2024-01-01 RX ORDER — ACETAMINOPHEN 500 MG
500 TABLET ORAL EVERY 4 HOURS PRN
Status: DISCONTINUED | OUTPATIENT
Start: 2024-01-01 | End: 2024-01-01

## 2024-01-01 RX ORDER — ONDANSETRON 4 MG/1
4 TABLET, FILM COATED ORAL EVERY 8 HOURS PRN
COMMUNITY

## 2024-01-01 RX ORDER — PREDNISONE 10 MG/1
10 TABLET ORAL 2 TIMES DAILY
Status: DISCONTINUED | OUTPATIENT
Start: 2024-01-01 | End: 2024-01-01

## 2024-01-01 RX ORDER — ONDANSETRON 2 MG/ML
4 INJECTION INTRAMUSCULAR; INTRAVENOUS EVERY 6 HOURS PRN
Status: DISCONTINUED | OUTPATIENT
Start: 2024-01-01 | End: 2024-01-01

## 2024-01-01 RX ORDER — NICOTINE POLACRILEX 4 MG
15 LOZENGE BUCCAL
Status: DISCONTINUED | OUTPATIENT
Start: 2024-01-01 | End: 2024-01-01

## 2024-01-01 RX ORDER — NALOXONE HYDROCHLORIDE 0.4 MG/ML
0.08 INJECTION, SOLUTION INTRAMUSCULAR; INTRAVENOUS; SUBCUTANEOUS ONCE AS NEEDED
Status: DISCONTINUED | OUTPATIENT
Start: 2024-01-01 | End: 2024-01-01 | Stop reason: HOSPADM

## 2024-01-01 RX ORDER — ALBUMIN (HUMAN) 12.5 G/50ML
25 SOLUTION INTRAVENOUS ONCE
Status: COMPLETED | OUTPATIENT
Start: 2024-01-01 | End: 2024-01-01

## 2024-01-01 RX ORDER — HYDROXYCHLOROQUINE SULFATE 200 MG/1
200 TABLET, FILM COATED ORAL 2 TIMES DAILY
Status: DISCONTINUED | OUTPATIENT
Start: 2024-01-01 | End: 2024-01-01

## 2024-01-01 RX ORDER — SODIUM CHLORIDE, SODIUM LACTATE, POTASSIUM CHLORIDE, CALCIUM CHLORIDE 600; 310; 30; 20 MG/100ML; MG/100ML; MG/100ML; MG/100ML
INJECTION, SOLUTION INTRAVENOUS CONTINUOUS
Status: DISCONTINUED | OUTPATIENT
Start: 2024-01-01 | End: 2024-01-01

## 2024-01-01 RX ORDER — SUCRALFATE ORAL 1 G/10ML
1 SUSPENSION ORAL
Qty: 1200 ML | Refills: 0 | Status: SHIPPED | OUTPATIENT
Start: 2024-01-01 | End: 2024-01-01

## 2024-01-01 RX ORDER — NICOTINE POLACRILEX 4 MG
15 LOZENGE BUCCAL ONCE
Status: COMPLETED | OUTPATIENT
Start: 2024-01-01 | End: 2024-01-01

## 2024-01-01 RX ORDER — DEXMEDETOMIDINE HYDROCHLORIDE 4 UG/ML
INJECTION, SOLUTION INTRAVENOUS
Status: COMPLETED
Start: 2024-01-01 | End: 2024-01-01

## 2024-01-01 RX ORDER — MORPHINE SULFATE 2 MG/ML
2 INJECTION, SOLUTION INTRAMUSCULAR; INTRAVENOUS EVERY 4 HOURS PRN
Status: DISCONTINUED | OUTPATIENT
Start: 2024-01-01 | End: 2024-01-01

## 2024-01-01 RX ORDER — LAMOTRIGINE 25 MG/1
50 TABLET ORAL 2 TIMES DAILY
Status: DISCONTINUED | OUTPATIENT
Start: 2024-01-01 | End: 2024-01-01

## 2024-01-01 RX ORDER — HYDROCODONE BITARTRATE AND ACETAMINOPHEN 5; 325 MG/1; MG/1
2 TABLET ORAL EVERY 4 HOURS PRN
Status: DISCONTINUED | OUTPATIENT
Start: 2024-01-01 | End: 2024-01-01

## 2024-01-01 RX ORDER — SODIUM BICARBONATE 650 MG/1
650 TABLET ORAL 2 TIMES DAILY
Qty: 60 TABLET | Refills: 0 | Status: SHIPPED | OUTPATIENT
Start: 2024-01-01 | End: 2024-01-01

## 2024-01-01 RX ORDER — ETOMIDATE 2 MG/ML
INJECTION INTRAVENOUS
Status: COMPLETED
Start: 2024-01-01 | End: 2024-01-01

## 2024-01-01 RX ORDER — DEXTROSE MONOHYDRATE AND SODIUM CHLORIDE 5; .45 G/100ML; G/100ML
INJECTION, SOLUTION INTRAVENOUS CONTINUOUS
Status: DISCONTINUED | OUTPATIENT
Start: 2024-01-01 | End: 2024-01-01

## 2024-01-01 RX ORDER — MIRTAZAPINE 15 MG/1
15 TABLET, FILM COATED ORAL NIGHTLY
Status: DISCONTINUED | OUTPATIENT
Start: 2024-01-01 | End: 2024-01-01

## 2024-01-01 RX ORDER — SODIUM CHLORIDE 9 MG/ML
INJECTION, SOLUTION INTRAVENOUS ONCE
Status: DISCONTINUED | OUTPATIENT
Start: 2024-01-01 | End: 2024-01-01

## 2024-01-01 RX ORDER — ONDANSETRON 8 MG/1
8 TABLET, ORALLY DISINTEGRATING ORAL
Status: SHIPPED | COMMUNITY
Start: 2024-01-01

## 2024-01-01 RX ORDER — SUCRALFATE ORAL 1 G/10ML
1 SUSPENSION ORAL
Status: DISCONTINUED | OUTPATIENT
Start: 2024-01-01 | End: 2024-01-01

## 2024-01-01 RX ORDER — MORPHINE SULFATE 4 MG/ML
INJECTION, SOLUTION INTRAMUSCULAR; INTRAVENOUS
Status: DISPENSED
Start: 2024-01-01 | End: 2024-01-01

## 2024-01-01 RX ORDER — ACETAMINOPHEN 160 MG/5ML
650 SOLUTION ORAL EVERY 6 HOURS PRN
Status: DISCONTINUED | OUTPATIENT
Start: 2024-01-01 | End: 2024-01-01

## 2024-01-01 RX ORDER — HYDROCORTISONE SODIUM SUCCINATE 100 MG/2ML
50 INJECTION INTRAMUSCULAR; INTRAVENOUS ONCE
Status: DISCONTINUED | OUTPATIENT
Start: 2024-01-01 | End: 2024-01-01

## 2024-01-01 RX ORDER — ONDANSETRON 4 MG/1
4 TABLET, ORALLY DISINTEGRATING ORAL
Status: DISCONTINUED | OUTPATIENT
Start: 2024-01-01 | End: 2024-01-01

## 2024-01-01 RX ORDER — ALBUTEROL SULFATE 90 UG/1
2 INHALANT RESPIRATORY (INHALATION) EVERY 4 HOURS PRN
Status: DISCONTINUED | OUTPATIENT
Start: 2024-01-01 | End: 2024-01-01

## 2024-01-01 RX ORDER — LORAZEPAM 2 MG/ML
1 CONCENTRATE ORAL ONCE
Status: COMPLETED | OUTPATIENT
Start: 2024-01-01 | End: 2024-01-01

## 2024-01-01 RX ORDER — HEPARIN SODIUM 5000 [USP'U]/ML
5000 INJECTION, SOLUTION INTRAVENOUS; SUBCUTANEOUS EVERY 8 HOURS SCHEDULED
Status: DISCONTINUED | OUTPATIENT
Start: 2024-01-01 | End: 2024-01-01

## 2024-01-01 RX ORDER — DEXTROSE MONOHYDRATE 100 MG/ML
1000 INJECTION, SOLUTION INTRAVENOUS ONCE
Status: DISCONTINUED | OUTPATIENT
Start: 2024-01-01 | End: 2024-01-01

## 2024-01-01 RX ORDER — METOPROLOL TARTRATE 25 MG/1
12.5 TABLET, FILM COATED ORAL 2 TIMES DAILY
Qty: 30 TABLET | Refills: 0 | Status: SHIPPED | OUTPATIENT
Start: 2024-01-01 | End: 2024-01-01

## 2024-01-01 RX ORDER — HYDROCORTISONE SODIUM SUCCINATE 100 MG/2ML
100 INJECTION INTRAMUSCULAR; INTRAVENOUS ONCE
Status: COMPLETED | OUTPATIENT
Start: 2024-01-01 | End: 2024-01-01

## 2024-01-01 RX ORDER — DEXMEDETOMIDINE HYDROCHLORIDE 4 UG/ML
INJECTION, SOLUTION INTRAVENOUS CONTINUOUS
Status: DISCONTINUED | OUTPATIENT
Start: 2024-01-01 | End: 2024-01-01

## 2024-01-01 RX ORDER — LIDOCAINE HYDROCHLORIDE 10 MG/ML
5 INJECTION, SOLUTION EPIDURAL; INFILTRATION; INTRACAUDAL; PERINEURAL
Status: COMPLETED | OUTPATIENT
Start: 2024-01-01 | End: 2024-01-01

## 2024-01-01 RX ORDER — VANCOMYCIN 2 GRAM/500 ML IN 0.9 % SODIUM CHLORIDE INTRAVENOUS
25 ONCE
Status: COMPLETED | OUTPATIENT
Start: 2024-01-01 | End: 2024-01-01

## 2024-01-01 RX ORDER — MIRTAZAPINE 7.5 MG/1
7.5 TABLET, FILM COATED ORAL NIGHTLY
Status: DISCONTINUED | OUTPATIENT
Start: 2024-01-01 | End: 2024-01-01

## 2024-01-01 RX ORDER — MIRTAZAPINE 15 MG/1
15 TABLET, FILM COATED ORAL NIGHTLY
Qty: 30 TABLET | Refills: 0 | Status: SHIPPED | OUTPATIENT
Start: 2024-01-01 | End: 2024-01-01

## 2024-01-01 RX ORDER — DOCUSATE SODIUM 100 MG/1
100 CAPSULE, LIQUID FILLED ORAL 2 TIMES DAILY
Status: DISCONTINUED | OUTPATIENT
Start: 2024-01-01 | End: 2024-01-01

## 2024-01-01 RX ADMIN — ONDANSETRON 4 MG: 2 INJECTION INTRAMUSCULAR; INTRAVENOUS at 10:12:00

## 2024-06-28 ENCOUNTER — OFFICE VISIT (OUTPATIENT)
Dept: INTERNAL MEDICINE CLINIC | Facility: CLINIC | Age: 64
End: 2024-06-28
Payer: COMMERCIAL

## 2024-06-28 VITALS
DIASTOLIC BLOOD PRESSURE: 56 MMHG | WEIGHT: 192 LBS | SYSTOLIC BLOOD PRESSURE: 95 MMHG | BODY MASS INDEX: 35.33 KG/M2 | HEART RATE: 92 BPM | HEIGHT: 62 IN | RESPIRATION RATE: 16 BRPM

## 2024-06-28 DIAGNOSIS — I10 BENIGN HYPERTENSION: ICD-10-CM

## 2024-06-28 DIAGNOSIS — E78.2 MIXED HYPERLIPIDEMIA: ICD-10-CM

## 2024-06-28 DIAGNOSIS — M05.20 RHEUMATOID ARTERITIS (HCC): ICD-10-CM

## 2024-06-28 DIAGNOSIS — Z09 HOSPITAL DISCHARGE FOLLOW-UP: Primary | ICD-10-CM

## 2024-06-28 DIAGNOSIS — R55 SYNCOPE AND COLLAPSE: ICD-10-CM

## 2024-06-28 PROCEDURE — 99204 OFFICE O/P NEW MOD 45 MIN: CPT | Performed by: NURSE PRACTITIONER

## 2024-06-28 RX ORDER — PREDNISONE 5 MG/1
5 TABLET ORAL DAILY
COMMUNITY
Start: 2004-06-27

## 2024-06-28 RX ORDER — ATORVASTATIN CALCIUM 40 MG/1
40 TABLET, FILM COATED ORAL DAILY
COMMUNITY
Start: 2004-06-27 | End: 2024-06-28 | Stop reason: ALTCHOICE

## 2024-06-28 RX ORDER — PREDNISONE 1 MG/1
1 TABLET ORAL AS DIRECTED
COMMUNITY
Start: 2024-06-21

## 2024-06-28 RX ORDER — HYDROCODONE BITARTRATE AND ACETAMINOPHEN 10; 325 MG/1; MG/1
1 TABLET ORAL 3 TIMES DAILY
Qty: 30 TABLET | Refills: 0 | Status: SHIPPED | OUTPATIENT
Start: 2024-06-28

## 2024-06-28 RX ORDER — FUROSEMIDE 40 MG/1
1 TABLET ORAL DAILY
COMMUNITY

## 2024-06-28 RX ORDER — METOPROLOL TARTRATE 50 MG/1
50 TABLET, FILM COATED ORAL 2 TIMES DAILY
COMMUNITY
Start: 2024-05-16

## 2024-06-28 RX ORDER — FOLIC ACID 1 MG/1
1 TABLET ORAL DAILY
Qty: 90 TABLET | Refills: 0 | Status: SHIPPED | OUTPATIENT
Start: 2024-06-28

## 2024-06-28 RX ORDER — FOLIC ACID 1 MG/1
1 TABLET ORAL DAILY
COMMUNITY
Start: 2004-06-27 | End: 2024-06-28

## 2024-06-28 RX ORDER — METHOTREXATE 2.5 MG/1
2.5 TABLET ORAL WEEKLY
COMMUNITY
Start: 2024-05-16

## 2024-06-28 RX ORDER — OXYBUTYNIN CHLORIDE 10 MG/1
10 TABLET, EXTENDED RELEASE ORAL DAILY
COMMUNITY

## 2024-06-28 RX ORDER — IBUPROFEN 600 MG/1
1 TABLET ORAL 3 TIMES DAILY
COMMUNITY
Start: 2022-09-15

## 2024-06-28 RX ORDER — HYDROXYCHLOROQUINE SULFATE 200 MG/1
200 TABLET, FILM COATED ORAL 2 TIMES DAILY
Qty: 180 TABLET | Refills: 0 | Status: SHIPPED | OUTPATIENT
Start: 2024-06-28

## 2024-06-28 RX ORDER — ATORVASTATIN CALCIUM 10 MG/1
1 TABLET, FILM COATED ORAL DAILY
COMMUNITY
Start: 2024-05-16

## 2024-06-28 RX ORDER — HYDROXYCHLOROQUINE SULFATE 200 MG/1
200 TABLET, FILM COATED ORAL 2 TIMES DAILY
COMMUNITY
Start: 2008-06-27 | End: 2024-06-28

## 2024-06-28 RX ORDER — POTASSIUM CHLORIDE 750 MG/1
1 TABLET, FILM COATED, EXTENDED RELEASE ORAL 2 TIMES DAILY
COMMUNITY
Start: 2023-09-06

## 2024-06-28 RX ORDER — ERGOCALCIFEROL 1.25 MG/1
50000 CAPSULE ORAL WEEKLY
COMMUNITY

## 2024-06-28 RX ORDER — AMLODIPINE BESYLATE 5 MG/1
1 TABLET ORAL DAILY
COMMUNITY

## 2024-06-28 RX ORDER — CEFUROXIME AXETIL 500 MG/1
500 TABLET ORAL 2 TIMES DAILY
COMMUNITY
Start: 2024-06-21

## 2024-06-28 RX ORDER — LOSARTAN POTASSIUM 50 MG/1
50 TABLET ORAL DAILY
COMMUNITY

## 2024-06-28 RX ORDER — CLOPIDOGREL BISULFATE 75 MG/1
75 TABLET ORAL DAILY
COMMUNITY
Start: 2024-06-22

## 2024-06-28 RX ORDER — CYCLOBENZAPRINE HCL 10 MG
10 TABLET ORAL NIGHTLY
COMMUNITY
Start: 2024-06-21

## 2024-06-28 RX ORDER — OMEPRAZOLE 40 MG/1
40 CAPSULE, DELAYED RELEASE ORAL DAILY
COMMUNITY
Start: 2023-11-01

## 2024-06-28 NOTE — PROGRESS NOTES
Sheri Garzon is a 63 year old female.  Chief Complaint   Patient presents with    Hospital F/U     HPI:   She presents to the office for hospital follow up. She went to the ER on 6/21.     She was also hospitalized on 5/16 at [facility].     She had 2 episodes where she blacked out. She is unsure what happened. She woke up on the floor. She was told by her PCP in [place] to go to the ER for evaluation. She had an angiogram when she was hospitalized on 6/21 but she is unsure of the results. She does not have the records from her last hospital stay. The records are not available on my chart.     She has a history of Rheumatoid Arthritis  HTN, hyperlipidemia.     She needs a referral for rheumatology and pain management. She does take norco for pain related to RA.     She lived in [place] for 3 years. She is originally from [place]. She has recently relocated from [place]. She is here in the office today with her sister.     Current Outpatient Medications   Medication Sig Dispense Refill    amLODIPine 5 MG Oral Tab Take 1 tablet (5 mg total) by mouth daily.      Aspirin 81 MG Oral Cap 81 mg.      atorvastatin 10 MG Oral Tab Take 1 tablet (10 mg total) by mouth daily.      cefuroxime 500 MG Oral Tab Take 1 tablet (500 mg total) by mouth 2 (two) times daily.      clopidogrel 75 MG Oral Tab Take 1 tablet (75 mg total) by mouth daily.      cyclobenzaprine 10 MG Oral Tab Take 1 tablet (10 mg total) by mouth nightly.      ergocalciferol 1.25 MG (09713 UT) Oral Cap Take 1 capsule (50,000 Units total) by mouth once a week.      furosemide 40 MG Oral Tab Take 1 tablet (40 mg total) by mouth daily.      ibuprofen 600 MG Oral Tab Take 1 tablet (600 mg total) by mouth 3 (three) times daily.      losartan 50 MG Oral Tab Take 1 tablet (50 mg total) by mouth daily.      methotrexate 2.5 MG Oral Tab Take 1 tablet (2.5 mg total) by mouth once a week.      metoprolol tartrate 50 MG Oral Tab Take 1 tablet (50 mg total) by  mouth 2 (two) times daily.      Omeprazole 40 MG Oral Capsule Delayed Release Take 1 capsule (40 mg total) by mouth daily.      oxybutynin ER 10 MG Oral Tablet 24 Hr Take 1 tablet (10 mg total) by mouth daily.      Potassium Chloride ER 10 MEQ Oral Tab CR Take 1 tablet (10 mEq total) by mouth 2 (two) times daily.      predniSONE 5 MG Oral Tab Take 1 tablet (5 mg total) by mouth daily. 4xs weekly Sunday,Tuesday,Thursday, saturday      predniSONE 1 MG Oral Tab Take 1 tablet (1 mg total) by mouth As Directed. 3tablets Monday,Wednesday, Friday      folic acid 1 MG Oral Tab Take 1 tablet (1 mg total) by mouth daily. 90 tablet 0    hydroxychloroquine 200 MG Oral Tab Take 1 tablet (200 mg total) by mouth 2 (two) times daily. 180 tablet 0    HYDROcodone-acetaminophen (NORCO)  MG Oral Tab Take 1 tablet by mouth in the morning and 1 tablet at noon and 1 tablet in the evening. 30 tablet 0      Past Medical History:    Asthma (HCC)    Esophageal reflux    Essential hypertension    Hyperlipidemia    Pancreatitis (HCC)    RA (rheumatoid arthritis) (HCC)      Past Surgical History:   Procedure Laterality Date    Revision of uterine anomaly      Rotator cuff repair      Wrist fracture surgery        Social History:  Social History     Socioeconomic History    Marital status: Single   Tobacco Use    Smoking status: Never    Smokeless tobacco: Never   Vaping Use    Vaping status: Never Used   Substance and Sexual Activity    Alcohol use: Not Currently    Drug use: Never      History reviewed. No pertinent family history.   Allergies   Allergen Reactions    Penicillins OTHER (SEE COMMENTS)     esophagitis    Sulfa Antibiotics OTHER (SEE COMMENTS)     esophagitis        REVIEW OF SYSTEMS:     Review of Systems   Constitutional:  Negative for fever.   HENT: Negative.     Respiratory:  Negative for cough, shortness of breath and wheezing.    Cardiovascular:  Negative for chest pain.   Gastrointestinal:  Negative for abdominal pain.    Genitourinary: Negative.    Musculoskeletal:  Positive for arthralgias and gait problem (wheelchair).   Skin: Negative.    Psychiatric/Behavioral: Negative.        Wt Readings from Last 5 Encounters:   06/28/24 192 lb (87.1 kg)   09/06/21 160 lb (72.6 kg)   08/11/21 170 lb (77.1 kg)     Body mass index is 35.12 kg/m².      EXAM:   BP 95/56   Pulse 92   Resp 16   Ht 5' 2\" (1.575 m)   Wt 192 lb (87.1 kg)   BMI 35.12 kg/m²     Physical Exam  Vitals reviewed.   Constitutional:       Appearance: Normal appearance.   HENT:      Head: Normocephalic.   Cardiovascular:      Rate and Rhythm: Normal rate and regular rhythm.      Pulses: Normal pulses.   Pulmonary:      Breath sounds: Normal breath sounds. No wheezing.   Musculoskeletal:         General: No swelling. Normal range of motion.   Skin:     General: Skin is warm and dry.   Neurological:      Mental Status: She is alert and oriented to person, place, and time.   Psychiatric:         Mood and Affect: Mood normal.         Behavior: Behavior normal.            ASSESSMENT AND PLAN:   1. Hospital discharge follow-up  - unable to review records  - dw patient to request medical records     2. Syncope and collapse  - Cardio Referral - Internal    3. Rheumatoid arteritis (HCC)  - methotrexate 2.5 MG Oral Tab; Take 1 tablet (2.5 mg total) by mouth once a week.  - hydroxychloroquine 200 MG Oral Tab; Take 1 tablet (200 mg total) by mouth 2 (two) times daily.  Dispense: 180 tablet; Refill: 0  - Rheumatology Referral - In Network  - Pain Management Referral - In Network  - HYDROcodone-acetaminophen (NORCO)  MG Oral Tab; Take 1 tablet by mouth in the morning and 1 tablet at noon and 1 tablet in the evening.  Dispense: 30 tablet; Refill: 0    4. Benign hypertension  - CPM  - BP stable in office   - amLODIPine 5 MG Oral Tab; Take 1 tablet (5 mg total) by mouth daily.  - losartan 50 MG Oral Tab; Take 1 tablet (50 mg total) by mouth daily.  - metoprolol tartrate 50 MG Oral  Tab; Take 1 tablet (50 mg total) by mouth 2 (two) times daily.    5. Mixed hyperlipidemia  - atorvastatin 10 MG Oral Tab; Take 1 tablet (10 mg total) by mouth daily.      The patient indicates understanding of these issues and agrees to the plan.  Return for make an appointment with cardiology / if you have another episode of syncope go to ER.    Patient to establish care with PCP.

## 2024-07-01 ENCOUNTER — TELEPHONE (OUTPATIENT)
Dept: INTERNAL MEDICINE CLINIC | Facility: CLINIC | Age: 64
End: 2024-07-01

## 2024-07-01 DIAGNOSIS — R39.9 UTI SYMPTOMS: Primary | ICD-10-CM

## 2024-07-01 NOTE — TELEPHONE ENCOUNTER
Patient called (identified name and ),   States she saw Umu BECK 2024 to follow up hospital discharge in [place].  Was supposed to treat UTI with cefuroxime 500 mg but she never got it filled because pharmacy had a question about it (she is listed with penicillin allergy, causing esophagitis). States she is having dark urine, frequency and urgency.  Umu BECK, please advise, do you want to recheck urine?

## 2024-07-01 NOTE — TELEPHONE ENCOUNTER
Verified name and .    Patient was advised of EBONY Sanz message. Patient verbalizes understanding and agrees with plan.

## 2024-07-01 NOTE — TELEPHONE ENCOUNTER
Umu Schmid patient is calling to know the status of her request from earlier today. Patient was inform we are still waiting for the reply.

## 2024-07-06 ENCOUNTER — LAB ENCOUNTER (OUTPATIENT)
Dept: LAB | Facility: HOSPITAL | Age: 64
End: 2024-07-06
Attending: NURSE PRACTITIONER
Payer: COMMERCIAL

## 2024-07-06 DIAGNOSIS — R39.9 UTI SYMPTOMS: Primary | ICD-10-CM

## 2024-07-08 ENCOUNTER — LAB ENCOUNTER (OUTPATIENT)
Dept: LAB | Facility: HOSPITAL | Age: 64
End: 2024-07-08
Attending: NURSE PRACTITIONER
Payer: COMMERCIAL

## 2024-07-08 DIAGNOSIS — R39.9 UTI SYMPTOMS: ICD-10-CM

## 2024-07-09 ENCOUNTER — LAB ENCOUNTER (OUTPATIENT)
Dept: LAB | Facility: HOSPITAL | Age: 64
End: 2024-07-09
Attending: NURSE PRACTITIONER
Payer: COMMERCIAL

## 2024-07-09 DIAGNOSIS — R39.9 UTI SYMPTOMS: ICD-10-CM

## 2024-07-09 LAB
BILIRUB UR QL: NEGATIVE
COLOR UR: YELLOW
GLUCOSE UR-MCNC: NORMAL MG/DL
HGB UR QL STRIP.AUTO: NEGATIVE
HYALINE CASTS #/AREA URNS AUTO: PRESENT /LPF
KETONES UR-MCNC: NEGATIVE MG/DL
LEUKOCYTE ESTERASE UR QL STRIP.AUTO: 500
NITRITE UR QL STRIP.AUTO: NEGATIVE
PH UR: 6.5 [PH] (ref 5–8)
PROT UR-MCNC: 30 MG/DL
SP GR UR STRIP: 1.01 (ref 1–1.03)
UROBILINOGEN UR STRIP-ACNC: NORMAL

## 2024-07-09 PROCEDURE — 87186 SC STD MICRODIL/AGAR DIL: CPT

## 2024-07-09 PROCEDURE — 87077 CULTURE AEROBIC IDENTIFY: CPT

## 2024-07-09 PROCEDURE — 87086 URINE CULTURE/COLONY COUNT: CPT

## 2024-07-09 PROCEDURE — 81001 URINALYSIS AUTO W/SCOPE: CPT

## 2024-07-11 ENCOUNTER — TELEPHONE (OUTPATIENT)
Dept: INTERNAL MEDICINE CLINIC | Facility: CLINIC | Age: 64
End: 2024-07-11

## 2024-07-11 RX ORDER — CIPROFLOXACIN 500 MG/1
500 TABLET, FILM COATED ORAL 2 TIMES DAILY
Qty: 14 TABLET | Refills: 0 | Status: SHIPPED | OUTPATIENT
Start: 2024-07-11 | End: 2024-07-18

## 2024-07-11 NOTE — TELEPHONE ENCOUNTER
Patient states she needs the ciprofloxacin sent to [facility] on [address] In [place]. It was sent to [facility] in [facility]. She is staying at her sister's house.     Medication canceled at [facility] and sent to requested [facility]amesh.

## 2024-07-21 ENCOUNTER — APPOINTMENT (OUTPATIENT)
Dept: CT IMAGING | Facility: HOSPITAL | Age: 64
End: 2024-07-21
Attending: STUDENT IN AN ORGANIZED HEALTH CARE EDUCATION/TRAINING PROGRAM
Payer: COMMERCIAL

## 2024-07-21 ENCOUNTER — HOSPITAL ENCOUNTER (INPATIENT)
Facility: HOSPITAL | Age: 64
LOS: 5 days | Discharge: HOME | End: 2024-07-27
Attending: STUDENT IN AN ORGANIZED HEALTH CARE EDUCATION/TRAINING PROGRAM | Admitting: HOSPITALIST
Payer: COMMERCIAL

## 2024-07-21 DIAGNOSIS — E87.20 METABOLIC ACIDOSIS: ICD-10-CM

## 2024-07-21 DIAGNOSIS — E86.0 DEHYDRATION: Primary | ICD-10-CM

## 2024-07-21 LAB
ALBUMIN SERPL-MCNC: 3.7 G/DL (ref 3.2–4.8)
ALP LIVER SERPL-CCNC: 92 U/L
ALT SERPL-CCNC: 17 U/L
ANION GAP SERPL CALC-SCNC: 11 MMOL/L (ref 0–18)
ANION GAP SERPL CALC-SCNC: 14 MMOL/L (ref 0–18)
AST SERPL-CCNC: 37 U/L (ref ?–34)
BASOPHILS # BLD AUTO: 0.05 X10(3) UL (ref 0–0.2)
BASOPHILS NFR BLD AUTO: 0.7 %
BILIRUB DIRECT SERPL-MCNC: 0.2 MG/DL (ref ?–0.3)
BILIRUB SERPL-MCNC: 0.6 MG/DL (ref 0.2–1.1)
BILIRUB UR QL: NEGATIVE
BUN BLD-MCNC: 6 MG/DL (ref 9–23)
BUN BLD-MCNC: 9 MG/DL (ref 9–23)
BUN/CREAT SERPL: 8.1 (ref 10–20)
BUN/CREAT SERPL: 9.3 (ref 10–20)
CALCIUM BLD-MCNC: 7.8 MG/DL (ref 8.7–10.4)
CALCIUM BLD-MCNC: 8.6 MG/DL (ref 8.7–10.4)
CHLORIDE SERPL-SCNC: 112 MMOL/L (ref 98–112)
CHLORIDE SERPL-SCNC: 115 MMOL/L (ref 98–112)
CLARITY UR: CLEAR
CO2 SERPL-SCNC: 14 MMOL/L (ref 21–32)
CO2 SERPL-SCNC: 14 MMOL/L (ref 21–32)
CREAT BLD-MCNC: 0.74 MG/DL
CREAT BLD-MCNC: 0.97 MG/DL
DEPRECATED RDW RBC AUTO: 54.3 FL (ref 35.1–46.3)
EGFRCR SERPLBLD CKD-EPI 2021: 65 ML/MIN/1.73M2 (ref 60–?)
EGFRCR SERPLBLD CKD-EPI 2021: 90 ML/MIN/1.73M2 (ref 60–?)
EOSINOPHIL # BLD AUTO: 0.05 X10(3) UL (ref 0–0.7)
EOSINOPHIL NFR BLD AUTO: 0.7 %
ERYTHROCYTE [DISTWIDTH] IN BLOOD BY AUTOMATED COUNT: 17.2 % (ref 11–15)
GLUCOSE BLD-MCNC: 75 MG/DL (ref 70–99)
GLUCOSE BLD-MCNC: 84 MG/DL (ref 70–99)
GLUCOSE UR-MCNC: NORMAL MG/DL
HCT VFR BLD AUTO: 36.4 %
HGB BLD-MCNC: 12.1 G/DL
HGB UR QL STRIP.AUTO: NEGATIVE
HYALINE CASTS #/AREA URNS AUTO: PRESENT /LPF
IMM GRANULOCYTES # BLD AUTO: 0.06 X10(3) UL (ref 0–1)
IMM GRANULOCYTES NFR BLD: 0.8 %
LEUKOCYTE ESTERASE UR QL STRIP.AUTO: 250
LIPASE SERPL-CCNC: 34 U/L (ref 12–53)
LYMPHOCYTES # BLD AUTO: 1.9 X10(3) UL (ref 1–4)
LYMPHOCYTES NFR BLD AUTO: 26.6 %
MAGNESIUM SERPL-MCNC: 1.3 MG/DL (ref 1.6–2.6)
MCH RBC QN AUTO: 28.9 PG (ref 26–34)
MCHC RBC AUTO-ENTMCNC: 33.2 G/DL (ref 31–37)
MCV RBC AUTO: 86.9 FL
MONOCYTES # BLD AUTO: 1 X10(3) UL (ref 0.1–1)
MONOCYTES NFR BLD AUTO: 14 %
NEUTROPHILS # BLD AUTO: 4.09 X10 (3) UL (ref 1.5–7.7)
NEUTROPHILS # BLD AUTO: 4.09 X10(3) UL (ref 1.5–7.7)
NEUTROPHILS NFR BLD AUTO: 57.2 %
NITRITE UR QL STRIP.AUTO: NEGATIVE
OSMOLALITY SERPL CALC.SUM OF ELEC: 286 MOSM/KG (ref 275–295)
OSMOLALITY SERPL CALC.SUM OF ELEC: 288 MOSM/KG (ref 275–295)
PH UR: 6.5 [PH] (ref 5–8)
PLATELET # BLD AUTO: 264 10(3)UL (ref 150–450)
POTASSIUM SERPL-SCNC: 2.8 MMOL/L (ref 3.5–5.1)
POTASSIUM SERPL-SCNC: 3.8 MMOL/L (ref 3.5–5.1)
PROT SERPL-MCNC: 6.2 G/DL (ref 5.7–8.2)
PROT UR-MCNC: 20 MG/DL
RBC # BLD AUTO: 4.19 X10(6)UL
SODIUM SERPL-SCNC: 140 MMOL/L (ref 136–145)
SODIUM SERPL-SCNC: 140 MMOL/L (ref 136–145)
UROBILINOGEN UR STRIP-ACNC: NORMAL
WBC # BLD AUTO: 7.2 X10(3) UL (ref 4–11)

## 2024-07-21 PROCEDURE — 99223 1ST HOSP IP/OBS HIGH 75: CPT | Performed by: HOSPITALIST

## 2024-07-21 PROCEDURE — 74177 CT ABD & PELVIS W/CONTRAST: CPT | Performed by: STUDENT IN AN ORGANIZED HEALTH CARE EDUCATION/TRAINING PROGRAM

## 2024-07-21 RX ORDER — SODIUM CHLORIDE, SODIUM LACTATE, POTASSIUM CHLORIDE, CALCIUM CHLORIDE 600; 310; 30; 20 MG/100ML; MG/100ML; MG/100ML; MG/100ML
INJECTION, SOLUTION INTRAVENOUS ONCE
Status: COMPLETED | OUTPATIENT
Start: 2024-07-21 | End: 2024-07-21

## 2024-07-21 RX ORDER — MAGNESIUM SULFATE HEPTAHYDRATE 40 MG/ML
2 INJECTION, SOLUTION INTRAVENOUS ONCE
Status: COMPLETED | OUTPATIENT
Start: 2024-07-21 | End: 2024-07-21

## 2024-07-21 RX ORDER — ONDANSETRON 2 MG/ML
4 INJECTION INTRAMUSCULAR; INTRAVENOUS ONCE
Status: COMPLETED | OUTPATIENT
Start: 2024-07-21 | End: 2024-07-21

## 2024-07-21 RX ORDER — METOCLOPRAMIDE HYDROCHLORIDE 5 MG/ML
10 INJECTION INTRAMUSCULAR; INTRAVENOUS ONCE
Status: COMPLETED | OUTPATIENT
Start: 2024-07-21 | End: 2024-07-21

## 2024-07-21 RX ORDER — MAGNESIUM HYDROXIDE/ALUMINUM HYDROXICE/SIMETHICONE 120; 1200; 1200 MG/30ML; MG/30ML; MG/30ML
30 SUSPENSION ORAL ONCE
Status: DISCONTINUED | OUTPATIENT
Start: 2024-07-21 | End: 2024-07-21

## 2024-07-21 NOTE — ED INITIAL ASSESSMENT (HPI)
S: pt has a litany of complaints. Most recently c/o vomiting, sore to right posterior leg and sob. Pt had a recent angiogram on 6/21. Pt states that these symptoms (black outs, sob, decreased po intake) have been occurring since angiogram but there is a visit dated 5/16 where patient was complaining of the same symptoms.

## 2024-07-21 NOTE — ED PROVIDER NOTES
Diffuse lateral  Patient Seen in: [facility] Emergency Department      History     Chief Complaint   Patient presents with    Abdomen/Flank Pain     Stated Complaint: Weakness    Subjective:   HPI    63 yo female with history of RA hypertension hyperlipidemia asthma and prior bouts of pancreatitis presenting with generalized weakness.  She describes a 3-week history of daily multiple episodes of nonbloody nonbilious emesis constipation decreased urine output as well as generalized weakness.  No chest pain or trouble breathing.  No fevers chills or night sweats.  No diarrhea.  No bleeding from any orifice.  She was taking antibiotics for a right calf wound, but has not been able to tolerate them due to vomiting.  Review of chart notes that she was started on ciprofloxacin.  Before that she was taking cefuroxime.  She was also on methotrexate but this was stopped as it interacted with patient's antibiotics.    Objective:   Past Medical History:    Asthma (HCC)    Esophageal reflux    Essential hypertension    Hyperlipidemia    Pancreatitis (HCC)    RA (rheumatoid arthritis) (HCC)              Past Surgical History:   Procedure Laterality Date    Revision of uterine anomaly      Rotator cuff repair      Wrist fracture surgery                  Social History     Socioeconomic History    Marital status: Single   Tobacco Use    Smoking status: Never    Smokeless tobacco: Never   Vaping Use    Vaping status: Never Used   Substance and Sexual Activity    Alcohol use: Not Currently    Drug use: Never              Review of Systems    Positive for stated Chief Complaint: Abdomen/Flank Pain    Other systems are as noted in HPI.  Constitutional and vital signs reviewed.      All other systems reviewed and negative except as noted above.    Physical Exam     ED Triage Vitals [07/21/24 1415]   /72   Pulse (!) 128   Resp 20   Temp 98.3 °F (36.8 °C)   Temp src    SpO2 100 %   O2 Device None (Room air)       Current  Vitals:   Vital Signs  BP: 110/66  Pulse: 104  Resp: 22  Temp: 98.3 °F (36.8 °C)  MAP (mmHg): 81    Oxygen Therapy  SpO2: 100 %  O2 Device: None (Room air)            Physical Exam    Constitutional: awake, alert, no sig distress  HENT: mmm, no lesions,  Neck: normal range of motion, no tenderness, supple.  Eyes: PERRL, EOMI, conjunctiva normal, no discharge. Sclera anicteric.  Cardiovascular: tachycardic regular  Respiratory: Normal breath sounds, no respiratory distress, no wheezing, no chest tenderness.  GI: Bowel sounds normal, Soft, no tenderness, no masses, no pulsatile masses.  : No CVA tenderness.  Skin: Warm, dry, no erythema, no rash.  Musculoskeletal: Intact distal pulses, no edema, no tenderness, no cyanosis, no clubbing. Good range of motion in all major joints. No tenderness to palpation or major deformities noted. Back- No tenderness.  Neurologic: Alert & oriented x 3, normal motor function, normal sensory function, no focal deficits noted.  Psych: Calm, cooperative, nl affect        ED Course     Labs Reviewed   BASIC METABOLIC PANEL (8) - Abnormal; Notable for the following components:       Result Value    Potassium 2.8 (*)     CO2 14.0 (*)     BUN/CREA Ratio 9.3 (*)     Calcium, Total 8.6 (*)     All other components within normal limits   HEPATIC FUNCTION PANEL (7) - Abnormal; Notable for the following components:    AST 37 (*)     All other components within normal limits   MAGNESIUM - Abnormal; Notable for the following components:    Magnesium 1.3 (*)     All other components within normal limits   URINALYSIS WITH CULTURE REFLEX - Abnormal; Notable for the following components:    Ketones Urine Trace (*)     Protein Urine 20 (*)     Leukocyte Esterase Urine 250 (*)     WBC Urine 6-10 (*)     RBC Urine 3-5 (*)     Bacteria Urine Rare (*)     Squamous Epi. Cells Few (*)     Hyaline Casts Present (*)     All other components within normal limits   BASIC METABOLIC PANEL (8) - Abnormal; Notable  for the following components:    Chloride 115 (*)     CO2 14.0 (*)     BUN 6 (*)     BUN/CREA Ratio 8.1 (*)     Calcium, Total 7.8 (*)     All other components within normal limits   CBC W/ DIFFERENTIAL - Abnormal; Notable for the following components:    RDW-SD 54.3 (*)     RDW 17.2 (*)     All other components within normal limits   LIPASE - Normal   CBC WITH DIFFERENTIAL WITH PLATELET    Narrative:     The following orders were created for panel order CBC With Differential With Platelet.  Procedure                               Abnormality         Status                     ---------                               -----------         ------                     CBC W/ DIFFERENTIAL[[id number]]          Abnormal            Final result                 Please view results for these tests on the individual orders.   RAINBOW DRAW LAVENDER   RAINBOW DRAW LIGHT GREEN   RAINBOW DRAW BLUE   URINE CULTURE, ROUTINE                 ED Course as of 07/21/24 2227  ------------------------------------------------------------  Time: 07/21 1455  Comment: EKG as interpreted by ED Physician:  BPM, left axis deviation, no st segment changes, no stemi. Qtc 411ms  ------------------------------------------------------------  Time: 07/21 1746  Comment: CONCLUSION:   1. Limited exam secondary to patient respiratory motion artifact.   2. Mural thickening of distal gastric body and antrum suggests gastritis.  Correlation with endoscopy recommended if not recently performed.                 MDM      64F with history as above presenting for evaluation of generalized weakness and vomiting on arrival she is tachycardic otherwise vitals are stable and reassuring.  DDx includes dehydration metabolic derangement AMRIT pancreatitis    Admission disposition: 7/21/2024  9:59 PM         I have independently reviewed patient's CT abdomen and pelvis, I do not appreciate any acute intra-abdominal pathology.    Labs reviewed which is significant for  persistent nongap metabolic acidosis despite IV fluids.  Hypokalemia and hypomagnesemia were corrected.  These are likely attributable to prolonged vomiting.    Given continued vomiting in the ER and persistent metabolic acidosis will admit.  Discussed with hospitalist.                           Medical Decision Making      Disposition and Plan     Clinical Impression:  1. Dehydration    2. Metabolic acidosis         Disposition:  Admit  7/21/2024  9:59 pm    Follow-up:  No follow-up provider specified.        Medications Prescribed:  Current Discharge Medication List                            Hospital Problems       Present on Admission  Date Reviewed: 6/28/2024            ICD-10-CM Noted POA    Dehydration E86.0 7/21/2024 Unknown

## 2024-07-22 PROBLEM — E87.20 METABOLIC ACIDOSIS: Status: ACTIVE | Noted: 2024-07-22

## 2024-07-22 PROBLEM — R11.14 BILIOUS VOMITING WITH NAUSEA: Status: ACTIVE | Noted: 2024-07-22

## 2024-07-22 PROBLEM — E87.20 METABOLIC ACIDOSIS: Status: ACTIVE | Noted: 2024-01-01

## 2024-07-22 PROBLEM — R11.14 BILIOUS VOMITING WITH NAUSEA: Status: ACTIVE | Noted: 2024-01-01

## 2024-07-22 LAB
ANION GAP SERPL CALC-SCNC: 13 MMOL/L (ref 0–18)
ATRIAL RATE: 126 BPM
BASOPHILS # BLD AUTO: 0.03 X10(3) UL (ref 0–0.2)
BASOPHILS NFR BLD AUTO: 0.5 %
BUN BLD-MCNC: <5 MG/DL (ref 9–23)
CALCIUM BLD-MCNC: 7.6 MG/DL (ref 8.7–10.4)
CHLORIDE SERPL-SCNC: 119 MMOL/L (ref 98–112)
CO2 SERPL-SCNC: 12 MMOL/L (ref 21–32)
CREAT BLD-MCNC: 0.65 MG/DL
DEPRECATED RDW RBC AUTO: 56 FL (ref 35.1–46.3)
EGFRCR SERPLBLD CKD-EPI 2021: 98 ML/MIN/1.73M2 (ref 60–?)
EOSINOPHIL # BLD AUTO: 0.07 X10(3) UL (ref 0–0.7)
EOSINOPHIL NFR BLD AUTO: 1.2 %
ERYTHROCYTE [DISTWIDTH] IN BLOOD BY AUTOMATED COUNT: 17.7 % (ref 11–15)
GLUCOSE BLD-MCNC: 56 MG/DL (ref 70–99)
GLUCOSE BLDC GLUCOMTR-MCNC: 111 MG/DL (ref 70–99)
GLUCOSE BLDC GLUCOMTR-MCNC: 40 MG/DL (ref 70–99)
GLUCOSE BLDC GLUCOMTR-MCNC: 93 MG/DL (ref 70–99)
HCT VFR BLD AUTO: 30.2 %
HGB BLD-MCNC: 10 G/DL
IMM GRANULOCYTES # BLD AUTO: 0.04 X10(3) UL (ref 0–1)
IMM GRANULOCYTES NFR BLD: 0.7 %
LYMPHOCYTES # BLD AUTO: 2.06 X10(3) UL (ref 1–4)
LYMPHOCYTES NFR BLD AUTO: 34.6 %
MAGNESIUM SERPL-MCNC: 1.6 MG/DL (ref 1.6–2.6)
MCH RBC QN AUTO: 28.9 PG (ref 26–34)
MCHC RBC AUTO-ENTMCNC: 33.1 G/DL (ref 31–37)
MCV RBC AUTO: 87.3 FL
MONOCYTES # BLD AUTO: 0.84 X10(3) UL (ref 0.1–1)
MONOCYTES NFR BLD AUTO: 14.1 %
NEUTROPHILS # BLD AUTO: 2.92 X10 (3) UL (ref 1.5–7.7)
NEUTROPHILS # BLD AUTO: 2.92 X10(3) UL (ref 1.5–7.7)
NEUTROPHILS NFR BLD AUTO: 48.9 %
O2 CT BLD-SCNC: 13.9 VOL% (ref 15–23)
P AXIS: 42 DEGREES
P-R INTERVAL: 162 MS
PCO2 BLDA: 21 MM HG (ref 35–45)
PH BLDA: 7.37 [PH] (ref 7.35–7.45)
PLATELET # BLD AUTO: 248 10(3)UL (ref 150–450)
PO2 BLDA: 95 MM HG (ref 80–100)
POTASSIUM SERPL-SCNC: 3.7 MMOL/L (ref 3.5–5.1)
PROCALCITONIN SERPL-MCNC: 0.1 NG/ML (ref ?–0.05)
PUNCTURE CHARGE: YES
Q-T INTERVAL: 284 MS
QRS DURATION: 60 MS
QTC CALCULATION (BEZET): 411 MS
R AXIS: -9 DEGREES
RBC # BLD AUTO: 3.46 X10(6)UL
SAO2 % BLDA: 98.3 % (ref 94–100)
SODIUM SERPL-SCNC: 144 MMOL/L (ref 136–145)
T AXIS: 62 DEGREES
VENTRICULAR RATE: 126 BPM
WBC # BLD AUTO: 6 X10(3) UL (ref 4–11)

## 2024-07-22 PROCEDURE — 99223 1ST HOSP IP/OBS HIGH 75: CPT | Performed by: INTERNAL MEDICINE

## 2024-07-22 PROCEDURE — 99233 SBSQ HOSP IP/OBS HIGH 50: CPT | Performed by: HOSPITALIST

## 2024-07-22 RX ORDER — METOCLOPRAMIDE HYDROCHLORIDE 5 MG/ML
10 INJECTION INTRAMUSCULAR; INTRAVENOUS EVERY 6 HOURS PRN
Status: DISCONTINUED | OUTPATIENT
Start: 2024-07-22 | End: 2024-07-22

## 2024-07-22 RX ORDER — ATORVASTATIN CALCIUM 10 MG/1
10 TABLET, FILM COATED ORAL DAILY
Status: DISCONTINUED | OUTPATIENT
Start: 2024-07-22 | End: 2024-07-27

## 2024-07-22 RX ORDER — HYDROCODONE BITARTRATE AND ACETAMINOPHEN 10; 325 MG/1; MG/1
1 TABLET ORAL EVERY 6 HOURS PRN
Status: DISCONTINUED | OUTPATIENT
Start: 2024-07-22 | End: 2024-07-27

## 2024-07-22 RX ORDER — ACETAMINOPHEN 325 MG/1
650 TABLET ORAL EVERY 6 HOURS PRN
Status: DISCONTINUED | OUTPATIENT
Start: 2024-07-22 | End: 2024-07-27

## 2024-07-22 RX ORDER — POTASSIUM CHLORIDE 1.5 G/1.58G
10 POWDER, FOR SOLUTION ORAL 2 TIMES DAILY
Status: DISCONTINUED | OUTPATIENT
Start: 2024-07-22 | End: 2024-07-23

## 2024-07-22 RX ORDER — METOCLOPRAMIDE HYDROCHLORIDE 5 MG/ML
10 INJECTION INTRAMUSCULAR; INTRAVENOUS EVERY 6 HOURS
Status: DISCONTINUED | OUTPATIENT
Start: 2024-07-22 | End: 2024-07-27

## 2024-07-22 RX ORDER — AMLODIPINE BESYLATE 5 MG/1
5 TABLET ORAL DAILY
Status: DISCONTINUED | OUTPATIENT
Start: 2024-07-22 | End: 2024-07-27

## 2024-07-22 RX ORDER — ONDANSETRON 2 MG/ML
4 INJECTION INTRAMUSCULAR; INTRAVENOUS EVERY 6 HOURS PRN
Status: DISCONTINUED | OUTPATIENT
Start: 2024-07-22 | End: 2024-07-27

## 2024-07-22 RX ORDER — MAGNESIUM HYDROXIDE/ALUMINUM HYDROXICE/SIMETHICONE 120; 1200; 1200 MG/30ML; MG/30ML; MG/30ML
30 SUSPENSION ORAL 4 TIMES DAILY PRN
Status: DISCONTINUED | OUTPATIENT
Start: 2024-07-22 | End: 2024-07-27

## 2024-07-22 RX ORDER — DEXTROSE MONOHYDRATE 25 G/50ML
50 INJECTION, SOLUTION INTRAVENOUS AS NEEDED
Status: DISCONTINUED | OUTPATIENT
Start: 2024-07-22 | End: 2024-07-22

## 2024-07-22 RX ORDER — DEXTROSE MONOHYDRATE AND SODIUM CHLORIDE 5; .45 G/100ML; G/100ML
INJECTION, SOLUTION INTRAVENOUS CONTINUOUS
Status: DISCONTINUED | OUTPATIENT
Start: 2024-07-22 | End: 2024-07-23

## 2024-07-22 RX ORDER — ZOLPIDEM TARTRATE 5 MG/1
5 TABLET ORAL NIGHTLY PRN
Status: DISCONTINUED | OUTPATIENT
Start: 2024-07-22 | End: 2024-07-27

## 2024-07-22 RX ORDER — DEXTROSE MONOHYDRATE 25 G/50ML
50 INJECTION, SOLUTION INTRAVENOUS
Status: DISCONTINUED | OUTPATIENT
Start: 2024-07-22 | End: 2024-07-27

## 2024-07-22 RX ORDER — HEPARIN SODIUM 5000 [USP'U]/ML
5000 INJECTION, SOLUTION INTRAVENOUS; SUBCUTANEOUS EVERY 12 HOURS
Status: DISCONTINUED | OUTPATIENT
Start: 2024-07-22 | End: 2024-07-27

## 2024-07-22 RX ORDER — ASPIRIN 81 MG/1
81 TABLET ORAL DAILY
Status: DISCONTINUED | OUTPATIENT
Start: 2024-07-22 | End: 2024-07-27

## 2024-07-22 RX ORDER — CLOPIDOGREL BISULFATE 75 MG/1
75 TABLET ORAL DAILY
Status: DISCONTINUED | OUTPATIENT
Start: 2024-07-22 | End: 2024-07-27

## 2024-07-22 RX ORDER — MORPHINE SULFATE 2 MG/ML
1 INJECTION, SOLUTION INTRAMUSCULAR; INTRAVENOUS EVERY 6 HOURS PRN
Status: DISCONTINUED | OUTPATIENT
Start: 2024-07-22 | End: 2024-07-22

## 2024-07-22 RX ORDER — SODIUM CHLORIDE AND POTASSIUM CHLORIDE 150; 900 MG/100ML; MG/100ML
INJECTION, SOLUTION INTRAVENOUS CONTINUOUS
Status: DISCONTINUED | OUTPATIENT
Start: 2024-07-22 | End: 2024-07-22

## 2024-07-22 RX ORDER — NICOTINE POLACRILEX 4 MG
15 LOZENGE BUCCAL
Status: DISCONTINUED | OUTPATIENT
Start: 2024-07-22 | End: 2024-07-27

## 2024-07-22 RX ORDER — MAGNESIUM OXIDE 400 MG/1
800 TABLET ORAL ONCE
Status: DISCONTINUED | OUTPATIENT
Start: 2024-07-22 | End: 2024-07-27

## 2024-07-22 RX ORDER — NICOTINE POLACRILEX 4 MG
30 LOZENGE BUCCAL
Status: DISCONTINUED | OUTPATIENT
Start: 2024-07-22 | End: 2024-07-27

## 2024-07-22 RX ORDER — MORPHINE SULFATE 2 MG/ML
2 INJECTION, SOLUTION INTRAMUSCULAR; INTRAVENOUS EVERY 6 HOURS PRN
Status: DISCONTINUED | OUTPATIENT
Start: 2024-07-22 | End: 2024-07-27

## 2024-07-22 NOTE — H&P
[facility]  part of [facility]    History & Physical    Sheri Garzon Patient Status:  Emergency    1960 MRN [id number]   Location [facility] EMERGENCY DEPARTMENT Attending Shorty Roman*   Hosp Day # 0 PCP No primary care provider on file.     Date:  2024  Date of Admission:  2024    Chief Complaint:  Chief Complaint   Patient presents with    Abdomen/Flank Pain       History of Present Illness:  Sheri Garzon is a(n) 64 year old female, with a past medical history significant for rheumatoid arthritis, hypertension, GERD, asthma presents with complaint of intractable nausea vomiting and abdominal discomfort ongoing for the past few weeks.  Describes the onset as sudden, symptoms however have persisted since with very poor oral intake as a result of her nausea.  Claims her emesis appears bilious in nature denies any blood.  Her abdominal pain is cramping in nature and sharp at times.  Denies any diarrhea or hematochezia however claims she has had extended periods of constipation of late.  For symptoms she went to an urgent care facility recently was diagnosed with possible pneumonia and UTI given oral antibiotics which she claims she has not been able to tolerate.  Claims she has not had an EGD approximately 2 years ago and colonoscopy twice over the last year, with no significant findings.  CT scan done in ER shows evidence of likely gastritis.    History:  Past Medical History:    Asthma (HCC)    Esophageal reflux    Essential hypertension    Hyperlipidemia    Pancreatitis (HCC)    RA (rheumatoid arthritis) (HCC)     Past Surgical History:   Procedure Laterality Date    Revision of uterine anomaly      Rotator cuff repair      Wrist fracture surgery       Family history: No sick contacts   reports that she has never smoked. She has never used smokeless tobacco. She reports that she does not currently use alcohol. She reports that she does not use  drugs.    Allergies:  Allergies   Allergen Reactions    Penicillins OTHER (SEE COMMENTS)     esophagitis    Sulfa Antibiotics OTHER (SEE COMMENTS)     esophagitis       Home Medications:  Prior to Admission Medications   Prescriptions Last Dose Informant Patient Reported? Taking?   Aspirin 81 MG Oral Cap   Yes No   Si mg.   HYDROcodone-acetaminophen (NORCO)  MG Oral Tab   No No   Sig: Take 1 tablet by mouth in the morning and 1 tablet at noon and 1 tablet in the evening.   Omeprazole 40 MG Oral Capsule Delayed Release   Yes No   Sig: Take 1 capsule (40 mg total) by mouth daily.   Potassium Chloride ER 10 MEQ Oral Tab CR   Yes No   Sig: Take 1 tablet (10 mEq total) by mouth 2 (two) times daily.   amLODIPine 5 MG Oral Tab   Yes No   Sig: Take 1 tablet (5 mg total) by mouth daily.   atorvastatin 10 MG Oral Tab   Yes No   Sig: Take 1 tablet (10 mg total) by mouth daily.   cefuroxime 500 MG Oral Tab   Yes No   Sig: Take 1 tablet (500 mg total) by mouth 2 (two) times daily.   ciprofloxacin 500 MG Oral Tab   No No   Sig: Take 1 tablet (500 mg total) by mouth 2 (two) times daily for 7 days.   clopidogrel 75 MG Oral Tab   Yes No   Sig: Take 1 tablet (75 mg total) by mouth daily.   cyclobenzaprine 10 MG Oral Tab   Yes No   Sig: Take 1 tablet (10 mg total) by mouth nightly.   ergocalciferol 1.25 MG (77243 UT) Oral Cap   Yes No   Sig: Take 1 capsule (50,000 Units total) by mouth once a week.   folic acid 1 MG Oral Tab   No No   Sig: Take 1 tablet (1 mg total) by mouth daily.   furosemide 40 MG Oral Tab   Yes No   Sig: Take 1 tablet (40 mg total) by mouth daily.   hydroxychloroquine 200 MG Oral Tab   No No   Sig: Take 1 tablet (200 mg total) by mouth 2 (two) times daily.   ibuprofen 600 MG Oral Tab   Yes No   Sig: Take 1 tablet (600 mg total) by mouth 3 (three) times daily.   losartan 50 MG Oral Tab   Yes No   Sig: Take 1 tablet (50 mg total) by mouth daily.   methotrexate 2.5 MG Oral Tab   Yes No   Sig: Take 1 tablet  (2.5 mg total) by mouth once a week.   metoprolol tartrate 50 MG Oral Tab   Yes No   Sig: Take 1 tablet (50 mg total) by mouth 2 (two) times daily.   oxybutynin ER 10 MG Oral Tablet 24 Hr   Yes No   Sig: Take 1 tablet (10 mg total) by mouth daily.   predniSONE 1 MG Oral Tab   Yes No   Sig: Take 1 tablet (1 mg total) by mouth As Directed. 3tablets Monday,Wednesday, Friday   predniSONE 5 MG Oral Tab   Yes No   Sig: Take 1 tablet (5 mg total) by mouth daily. 4xs weekly Sunday,Tuesday,Thursday, saturday      Facility-Administered Medications: None       Review of Systems:  Constitutional:  Weakness, Fatigue.  Eye:  Negative.  Ear/Nose/Mouth/Throat:  Negative.  Respiratory:  Negative  Cardiovascular: Negative  Gastrointestinal: Nausea vomiting abdominal pain and constipation  Genitourinary:  Negative  Endocrine:  Negative.  Immunologic:  Negative.  Musculoskeletal:  Negative.  Integumentary:  Negative.  Neurologic:  Negative.  Psychiatric:  Negative.  ROS reviewed as documented in chart    Physical Exam:  Temp:  [98.3 °F (36.8 °C)] 98.3 °F (36.8 °C)  Pulse:  [102-128] 104  Resp:  [15-24] 22  BP: (108-119)/(66-94) 110/66  SpO2:  [91 %-100 %] 100 %    General:  Alert and oriented.  Diffuse skin problem:  None.  Eye:  Pupils are equal, round and reactive to light, extraocular movements are intact, Normal conjunctiva.  HENT:  Normocephalic, oral mucosa is moist.  Head:  Normocephalic, atraumatic.  Neck:  Supple, non-tender, no carotid bruit, no jugular venous distention, no lymphadenopathy, no thyromegaly.  Respiratory:  Lungs are clear to auscultation, respirations are non-labored, breath sounds are equal, symmetrical chest wall expansion.  Cardiovascular:  Normal rate, regular rhythm, no murmur, no edema.  Gastrointestinal:  Soft, non-tender, non-distended, normal bowel sounds, no organomegaly.  Lymphatics:  No lymphadenopathy neck, axilla, groin.  Musculoskeletal: Normal range of motion.  normal strength.  Feet:  Normal  pulses.  Neurologic:  Alert, oriented, no focal deficits, cranial nerves II-XII are grossly intact.  Cognition and Speech:  Oriented, speech clear and coherent.  Psychiatric:  Cooperative, appropriate mood & affect.      Laboratory Data:   Lab Results   Component Value Date    WBC 7.2 07/21/2024    HGB 12.1 07/21/2024    HCT 36.4 07/21/2024    .0 07/21/2024    CREATSERUM 0.74 07/21/2024    BUN 6 07/21/2024     07/21/2024    K 3.8 07/21/2024     07/21/2024    CO2 14.0 07/21/2024    GLU 75 07/21/2024    CA 7.8 07/21/2024    ALB 3.7 07/21/2024    ALKPHO 92 07/21/2024    BILT 0.6 07/21/2024    TP 6.2 07/21/2024    AST 37 07/21/2024    ALT 17 07/21/2024    LIP 34 07/21/2024    MG 1.3 07/21/2024       Imaging:  CT ABDOMEN+PELVIS(CONTRAST ONLY)(CPT=74177)    Result Date: 7/21/2024  CONCLUSION:  1. Limited exam secondary to patient respiratory motion artifact. 2. Mural thickening of distal gastric body and antrum suggests gastritis.  Correlation with endoscopy recommended if not recently performed.    Dictated by (CST): Cristóbal Banks MD on 7/21/2024 at 5:32 PM     Finalized by (CST): Cristóbal Banks MD on 7/21/2024 at 5:39 PM            Assessment and Plan:    Intractable emesis secondary to gastritis  Will start patient on Protonix 40 mg IV daily, IV fluids initiated.  Will use around-the-clock Reglan and as needed Zofran, will attempt dietary challenge with clear liquids, advance as tolerated.  Consider GI consult if continues to remain unable to tolerate p.o. hold NSAIDs    Nonanion gap metabolic acidosis  Secondary to volume loss, as stated previously IV fluids initiated, will repeat labs in AM.    Acute UTI  Will start patient on Rocephin 1 g IV by every 24 hours, cultures pending, will continue to monitor.    Hypokalemia/hypomagnesemia  Secondary to repeated emesis, initiate electrolyte replacement protocol.    Essential hypertension  Blood pressure well-controlled, resume home meds.    Rheumatoid  arthritis  Stable, continue outpatient management upon discharge.    Prophylaxis  Subcutaneous heparin    CODE STATUS  Full    Primary care physician  No primary care provider on file.    MDM: High, acute illness/severe exacerbation of chronic illness posing threat to life.  IV medications requiring close inpatient monitoring  60 minutes spent on this admission - examining patient, obtaining history, reviewing previous medical records, going over test results/imaging and discussing plan of care. All questions answered.     Disposition  Clinical course will dictate outcome      RONNELL SUAREZ MD  7/21/2024  10:31 PM

## 2024-07-22 NOTE — CONSULTS
[facility]   Gastroenterology Consultation Note    Sheri Garzon  Patient Status:    Inpatient  Date of Admission:         7/21/2024, Hospital day #0  Attending:   Leroy Hsieh MD  PCP:     No primary care provider on file.    Reason for Consultation:  N/v, weight loss, dysphagia    History of Present Illness:  Sheri Garzon is a a(n) 64 year old female w/ a history of GERD, HTN, asthma, HLD, RA on plaquenil, on PLAVIX who presents with intractable nausea and emesis and dehydration. States ongoing for at least a month but worsened over the past 2 weeks with poor oral intake, persistent nausea with intermittent emesis often described as bilious in appearance. Chronic heartburn with sensation of dysphagia. Also with mid upper abdominal pain. No diarrhea. Does note constipation. No blood in stool. No fever or chills. Takes nsaids. Last EGD over 10 years ago.    History:  Past Medical History:    Asthma (HCC)    Esophageal reflux    Essential hypertension    Hyperlipidemia    Pancreatitis (HCC)    RA (rheumatoid arthritis) (HCC)     Past Surgical History:   Procedure Laterality Date    Revision of uterine anomaly      Rotator cuff repair      Wrist fracture surgery       No family history on file.   reports that she has never smoked. She has never used smokeless tobacco. She reports that she does not currently use alcohol. She reports that she does not use drugs.    Allergies:  Allergies   Allergen Reactions    Gadolinium Derivatives FEVER    Penicillins OTHER (SEE COMMENTS)     esophagitis    Sulfa Antibiotics OTHER (SEE COMMENTS)     esophagitis       Medications:    Current Facility-Administered Medications:     amLODIPine (Norvasc) tab 5 mg, 5 mg, Oral, Daily    atorvastatin (Lipitor) tab 10 mg, 10 mg, Oral, Daily    aspirin DR tab 81 mg, 81 mg, Oral, Daily    clopidogrel (Plavix) tab 75 mg, 75 mg, Oral, Daily    HYDROcodone-acetaminophen (Norco)  MG per tab 1 tablet, 1 tablet, Oral, Q6H  PRN    potassium chloride (Klor-Con) 20 MEQ oral powder 10 mEq, 10 mEq, Oral, BID    acetaminophen (Tylenol) tab 650 mg, 650 mg, Oral, Q6H PRN    heparin (Porcine) 5000 UNIT/ML injection 5,000 Units, 5,000 Units, Subcutaneous, Q12H    ondansetron (Zofran) 4 MG/2ML injection 4 mg, 4 mg, Intravenous, Q6H PRN    zolpidem (Ambien) tab 5 mg, 5 mg, Oral, Nightly PRN    alum-mag hydroxide-simethicone (Maalox) 200-200-20 MG/5ML oral suspension 30 mL, 30 mL, Oral, QID PRN    cefTRIAXone (Rocephin) 1 g in sodium chloride 0.9% 100 mL IVPB-ADDV, 1 g, Intravenous, Q24H    magnesium oxide (Mag-Ox) tab 800 mg, 800 mg, Oral, Once    metoclopramide (Reglan) 5 mg/mL injection 10 mg, 10 mg, Intravenous, Q6H    morphINE PF 2 MG/ML injection 2 mg, 2 mg, Intravenous, Q6H PRN    dextrose 50% injection 50 mL, 50 mL, Intravenous, PRN    dextrose 5%-sodium chloride 0.45% infusion, , Intravenous, Continuous    pantoprazole (Protonix) 40 mg in sodium chloride 0.9% PF 10 mL IV push, 40 mg, Intravenous, Q12H  Medications Prior to Admission   Medication Sig Dispense Refill Last Dose    amLODIPine 5 MG Oral Tab Take 1 tablet (5 mg total) by mouth daily.   7/21/2024    Aspirin 81 MG Oral Cap 81 mg.   7/21/2024 at 0900    atorvastatin 10 MG Oral Tab Take 1 tablet (10 mg total) by mouth daily.   7/21/2024 at 0900    cefuroxime 500 MG Oral Tab Take 1 tablet (500 mg total) by mouth 2 (two) times daily.   7/21/2024    clopidogrel 75 MG Oral Tab Take 1 tablet (75 mg total) by mouth daily.   7/21/2024    cyclobenzaprine 10 MG Oral Tab Take 1 tablet (10 mg total) by mouth nightly.   7/21/2024    ergocalciferol 1.25 MG (45596 UT) Oral Cap Take 1 capsule (50,000 Units total) by mouth once a week.   Past Week    furosemide 40 MG Oral Tab Take 1 tablet (40 mg total) by mouth daily.   7/21/2024    ibuprofen 600 MG Oral Tab Take 1 tablet (600 mg total) by mouth 3 (three) times daily.   7/21/2024 at 0900    losartan 50 MG Oral Tab Take 1 tablet (50 mg total) by  mouth daily.   2024 at 0900    methotrexate 2.5 MG Oral Tab Take 1 tablet (2.5 mg total) by mouth once a week.   2024    metoprolol tartrate 50 MG Oral Tab Take 1 tablet (50 mg total) by mouth 2 (two) times daily.   2024    Omeprazole 40 MG Oral Capsule Delayed Release Take 1 capsule (40 mg total) by mouth daily.   2024    oxybutynin ER 10 MG Oral Tablet 24 Hr Take 1 tablet (10 mg total) by mouth daily.   2024    Potassium Chloride ER 10 MEQ Oral Tab CR Take 1 tablet (10 mEq total) by mouth 2 (two) times daily.   2024    predniSONE 5 MG Oral Tab Take 1 tablet (5 mg total) by mouth daily. 4xs weekly ,Tuesday,Thursday, 2024    predniSONE 1 MG Oral Tab Take 1 tablet (1 mg total) by mouth As Directed. 3tablets Monday,Wednesday, 2024    folic acid 1 MG Oral Tab Take 1 tablet (1 mg total) by mouth daily. 90 tablet 0 2024    hydroxychloroquine 200 MG Oral Tab Take 1 tablet (200 mg total) by mouth 2 (two) times daily. 180 tablet 0 2024    HYDROcodone-acetaminophen (NORCO)  MG Oral Tab Take 1 tablet by mouth in the morning and 1 tablet at noon and 1 tablet in the evening. 30 tablet 0 2024 at 0900    [] ciprofloxacin 500 MG Oral Tab Take 1 tablet (500 mg total) by mouth 2 (two) times daily for 7 days. 14 tablet 0        Review of Systems:  CONSTITUTIONAL:  negative for fevers, rigors  EYES:  negative for diplopia   RESPIRATORY:  negative for severe shortness of breath  CARDIOVASCULAR:  negative for crushing sub-sternal chest pain  GASTROINTESTINAL:  see HPI  GENITOURINARY:  negative for dysuria or gross hematuria  INTEGUMENT/BREAST:  SKIN:  negative for jaundice   ALLERGIC/IMMUNOLOGIC:  negative for hay fever  ENDOCRINE:  negative for cold intolerance and heat intolerance  MUSCULOSKELETAL:  negative for joint effusion/severe erythema  NEURO: negative for dizziness or loss of conscious or paresthesias  BEHAVIOR/PSYCH:  negative for  psychotic behavior    Physical Exam:    Blood pressure 103/68, pulse 100, temperature 97.7 °F (36.5 °C), temperature source Oral, resp. rate 16, weight 183 lb 12.8 oz (83.4 kg), SpO2 100%. Body mass index is 33.62 kg/m².    General: awake, alert and oriented, no acute distress  HEENT: moist mucus membranes  PULM: no conversational dyspnea  CARDIOVASCULAR: regular rate and rhythm, the extremities are warm and well perfused  GI: soft, non-tender, non-distended, + BS, no rebound/guarding   EXTREMITIES: no edema, moving all extremities  SKIN: no visible rash  NEURO: appropriate and interactive    Laboratory Data:  Lab Results   Component Value Date    WBC 6.0 07/22/2024    HGB 10.0 07/22/2024    HCT 30.2 07/22/2024    .0 07/22/2024    CREATSERUM 0.65 07/22/2024    BUN <5 07/22/2024     07/22/2024    K 3.7 07/22/2024     07/22/2024    CO2 12.0 07/22/2024    GLU 56 07/22/2024    CA 7.6 07/22/2024    MG 1.6 07/22/2024       Imaging:  CT ABDOMEN+PELVIS(CONTRAST ONLY)(CPT=74177)    Result Date: 7/21/2024  CONCLUSION:  1. Limited exam secondary to patient respiratory motion artifact. 2. Mural thickening of distal gastric body and antrum suggests gastritis.  Correlation with endoscopy recommended if not recently performed.    Dictated by (CST): Cristóbal Banks MD on 7/21/2024 at 5:32 PM     Finalized by (CST): Cristóbal Banks MD on 7/21/2024 at 5:39 PM           Assessment & Plan   Sheri Garzon is a a(n) 64 year old female w/ a history of GERD, HTN, asthma, HLD, RA on plaquenil, on PLAVIX who presents with intractable nausea and emesis with upper abdominal pain leading to weight loss and dysphagia. Possible PUD vs erosive esophagitis vs peptic stricture vs neoplasm.     Recommend:  -empiric PPI  -plan EGD tomorrow    Maryse Hardin MD  [facility]  7/22/2024    This note was partially prepared using Dragon Medical voice recognition dictation software. As a result, errors may occur. When  identified, these errors have been corrected. While every attempt is made to correct errors during dictation, discrepancies may still exist.

## 2024-07-22 NOTE — PLAN OF CARE
Patient is A&Ox4. VSS. RA. Bolus administered this morning d/t low bp, blood pressure normalized after. Blood sugar was low, MD Notified, new orders were placed. Blood sugar increased following D50 amp administration. GI placed on consult today. Patient to be NPO after midnight for an EGD tomorrow. Patient is continent and SBA. Call light and personal belongings are within reach. Bed is at the lowest point. Frequent rounding is being done throughout shift.   Problem: Patient Centered Care  Goal: Patient preferences are identified and integrated in the patient's plan of care  Description: Interventions:  - What would you like us to know as we care for you?   - Provide timely, complete, and accurate information to patient/family  - Incorporate patient and family knowledge, values, beliefs, and cultural backgrounds into the planning and delivery of care  - Encourage patient/family to participate in care and decision-making at the level they choose  - Honor patient and family perspectives and choices  Outcome: Progressing     Problem: Patient/Family Goals  Goal: Patient/Family Long Term Goal  Description: Patient's Long Term Goal:     Interventions:  - See additional Care Plan goals for specific interventions  Outcome: Progressing  Goal: Patient/Family Short Term Goal  Description: Patient's Short Term Goal:     Interventions:   - See additional Care Plan goals for specific interventions  Outcome: Progressing     Problem: PAIN - ADULT  Goal: Verbalizes/displays adequate comfort level or patient's stated pain goal  Description: INTERVENTIONS:  - Encourage pt to monitor pain and request assistance  - Assess pain using appropriate pain scale  - Administer analgesics based on type and severity of pain and evaluate response  - Implement non-pharmacological measures as appropriate and evaluate response  - Consider cultural and social influences on pain and pain management  - Manage/alleviate anxiety  - Utilize distraction  and/or relaxation techniques  - Monitor for opioid side effects  - Notify MD/LIP if interventions unsuccessful or patient reports new pain  - Anticipate increased pain with activity and pre-medicate as appropriate  Outcome: Progressing     Problem: RISK FOR INFECTION - ADULT  Goal: Absence of fever/infection during anticipated neutropenic period  Description: INTERVENTIONS  - Monitor WBC  - Administer growth factors as ordered  - Implement neutropenic guidelines  Outcome: Progressing     Problem: SAFETY ADULT - FALL  Goal: Free from fall injury  Description: INTERVENTIONS:  - Assess pt frequently for physical needs  - Identify cognitive and physical deficits and behaviors that affect risk of falls.  - [place] fall precautions as indicated by assessment.  - Educate pt/family on patient safety including physical limitations  - Instruct pt to call for assistance with activity based on assessment  - Modify environment to reduce risk of injury  - Provide assistive devices as appropriate  - Consider OT/PT consult to assist with strengthening/mobility  - Encourage toileting schedule  Outcome: Progressing     Problem: DISCHARGE PLANNING  Goal: Discharge to home or other facility with appropriate resources  Description: INTERVENTIONS:  - Identify barriers to discharge w/pt and caregiver  - Include patient/family/discharge partner in discharge planning  - Arrange for needed discharge resources and transportation as appropriate  - Identify discharge learning needs (meds, wound care, etc)  - Arrange for interpreters to assist at discharge as needed  - Consider post-discharge preferences of patient/family/discharge partner  - Complete POLST form as appropriate  - Assess patient's ability to be responsible for managing their own health  - Refer to Case Management Department for coordinating discharge planning if the patient needs post-hospital services based on physician/LIP order or complex needs related to functional status,  cognitive ability or social support system  Outcome: Progressing     Problem: Altered Communication/Language Barrier  Goal: Patient/Family is able to understand and participate in their care  Description: Interventions:  - Assess communication ability and preferred communication style  - Implement communication aides and strategies  - Use visual cues when possible  - Listen attentively, be patient, do not interrupt  - Minimize distractions  - Allow time for understanding and response  - Establish method for patient to ask for assistance (call light)  - Provide an  as needed  - Communicate barriers and strategies to overcome with those who interact with patient  Outcome: Progressing

## 2024-07-22 NOTE — PROGRESS NOTES
[facility]  part of [facility]    Progress Note    Sheri Garzon Patient Status:  Inpatient    1960 MRN [id number]   Location [facility] 4W/SW/SE Attending Leroy Hsieh MD   Hosp Day # 0 PCP No primary care provider on file.     Chief Complaint:     Dehydration    Subjective:   Subjective:    Patient seen and examined  Hypoglycemic today.   Tachycardic, normotensive.  Afebrile.    Objective:   Blood pressure 103/68, pulse 100, temperature 97.7 °F (36.5 °C), temperature source Oral, resp. rate 16, weight 183 lb 12.8 oz (83.4 kg), SpO2 100%.  Physical Exam    General: Patient is alert and oriented x3 does not appear to be in acute distress at this time  HEENT: EOMI PERRLA, atraumatic normocephalic  Cardiac: S1-S2 appreciated  Lungs: Good air entry bilaterally clear to auscultation  Abdomen: Soft nontender nondistended positive bowel sounds  Ext: Peripheral pulses are positive  Neuro: No focal deficits noted  Psych: Normal mood  Skin: No rashes noted  MSK: Full range of motion intact      Results:   Lab Results   Component Value Date    WBC 6.0 2024    HGB 10.0 (L) 2024    HCT 30.2 (L) 2024    .0 2024    CREATSERUM 0.65 2024    BUN <5 (L) 2024     2024    K 3.7 2024     (H) 2024    CO2 12.0 (L) 2024    GLU 56 (L) 2024    CA 7.6 (L) 2024    ALB 3.7 2024    ALKPHO 92 2024    BILT 0.6 2024    TP 6.2 2024    AST 37 (H) 2024    ALT 17 2024    LIP 34 2024    MG 1.6 2024       CT ABDOMEN+PELVIS(CONTRAST ONLY)(CPT=74177)    Result Date: 2024  CONCLUSION:  1. Limited exam secondary to patient respiratory motion artifact. 2. Mural thickening of distal gastric body and antrum suggests gastritis.  Correlation with endoscopy recommended if not recently performed.    Dictated by (CST): Cristóbal Banks MD on 2024 at 5:32 PM     Finalized by  (CST): Cristóbal Banks MD on 7/21/2024 at 5:39 PM         EKG 12 Lead    Result Date: 7/22/2024  Sinus tachycardia Possible Left atrial enlargement Septal infarct , age undetermined Abnormal ECG No previous ECGs found in [facility]     Assessment & Plan:       Intractable emesis secondary to gastritis  -Will start patient on Protonix 40 mg IV daily, IV fluids initiated.    -Will use around-the-clock Reglan and as needed Zofran,   -will attempt dietary challenge with clear liquids, advance as tolerated.    -Consider GI consult if continues to remain unable to tolerate p.o. hold NSAIDs     Hypoglycemia  -stat AMP of D50  -initiation of hypoglycemia protocol  -will switch D5 gtt,   -will monitor.    Nonanion gap metabolic acidosis  Secondary to volume loss, as stated previously IV fluids initiated, will repeat labs in AM.     Acute UTI  Will start patient on Rocephin 1 g IV by every 24 hours, cultures pending, will continue to monitor.     Hypokalemia/hypomagnesemia  Secondary to repeated emesis, initiate electrolyte replacement protocol.     Essential hypertension  Blood pressure well-controlled, resume home meds.     Rheumatoid arthritis  Stable, continue outpatient management upon discharge.     Prophylaxis  Subcutaneous heparin     CODE STATUS  Full     Global A/P  -severe hypoglycemia - will initiate d5 GTT, hypoglycemia protocol.   -Severe hypokalemia - improved, continue electrolyte replacement protocol.   -Metabolic Acidosis - will check ABG.   -continue to monitor closely  -continue IV abx for UTI.   -Reviewed previous consultant notes  -Reviewed CBC, BMP, Mag, and Phos  -Reviewed tests ordered  -Repeat labs in am  -MDM: High, severe exacerbation of chronic illness posing a threat to life. IV medications requiring close inpatient monitoring.         **Certification      PHYSICIAN Certification of Need for Inpatient Hospitalization - Initial Certification    Patient will require inpatient services that will reasonably  be expected to span two midnight's based on the clinical documentation in H+P.   Based on patients current state of illness, I anticipate that, after discharge, patient will require TBD.      Leroy Hsieh MD

## 2024-07-22 NOTE — ED QUICK NOTES
Orders for admission, patient is aware of plan and ready to go upstairs. Any questions, please call ED RN jamie at extension [phone].     Patient Covid vaccination status: Fully vaccinated     COVID Test Ordered in ED: None    COVID Suspicion at Admission: N/A    Running Infusions:      Mental Status/LOC at time of transport: A&Ox4    Other pertinent information:   CIWA score: N/A   NIH score:  N/A

## 2024-07-22 NOTE — PLAN OF CARE
Patient admitted onto unit. A&Ox4. RA. VSS. NPO at this time after episode of emesis, per orders. PRN Zofran and Reglan given. Continuous IVF in place. Voiding freely to the bathroom. Bed alarm on. Fall precautions in place. Call light within reach. Plan TBD.   Problem: Patient Centered Care  Goal: Patient preferences are identified and integrated in the patient's plan of care  Description: Interventions:  - What would you like us to know as we care for you? I live with my sister and daughter.  - Provide timely, complete, and accurate information to patient/family  - Incorporate patient and family knowledge, values, beliefs, and cultural backgrounds into the planning and delivery of care  - Encourage patient/family to participate in care and decision-making at the level they choose  - Honor patient and family perspectives and choices  Outcome: Progressing     Problem: Patient/Family Goals  Goal: Patient/Family Long Term Goal  Description: Patient's Long Term Goal: Go home    Interventions:  - monitor labs, replace electrolytes, consults  - See additional Care Plan goals for specific interventions  Outcome: Progressing  Goal: Patient/Family Short Term Goal  Description: Patient's Short Term Goal: nausea control    Interventions:   - IVF, antemetic medications  - See additional Care Plan goals for specific interventions  Outcome: Progressing

## 2024-07-22 NOTE — H&P (VIEW-ONLY)
[facility]   Gastroenterology Consultation Note    Sheri Garzon  Patient Status:    Inpatient  Date of Admission:         7/21/2024, Hospital day #0  Attending:   Leroy Hsieh MD  PCP:     No primary care provider on file.    Reason for Consultation:  N/v, weight loss, dysphagia    History of Present Illness:  Sheri Garzon is a a(n) 64 year old female w/ a history of GERD, HTN, asthma, HLD, RA on plaquenil, on PLAVIX who presents with intractable nausea and emesis and dehydration. States ongoing for at least a month but worsened over the past 2 weeks with poor oral intake, persistent nausea with intermittent emesis often described as bilious in appearance. Chronic heartburn with sensation of dysphagia. Also with mid upper abdominal pain. No diarrhea. Does note constipation. No blood in stool. No fever or chills. Takes nsaids. Last EGD over 10 years ago.    History:  Past Medical History:    Asthma (HCC)    Esophageal reflux    Essential hypertension    Hyperlipidemia    Pancreatitis (HCC)    RA (rheumatoid arthritis) (HCC)     Past Surgical History:   Procedure Laterality Date    Revision of uterine anomaly      Rotator cuff repair      Wrist fracture surgery       No family history on file.   reports that she has never smoked. She has never used smokeless tobacco. She reports that she does not currently use alcohol. She reports that she does not use drugs.    Allergies:  Allergies   Allergen Reactions    Gadolinium Derivatives FEVER    Penicillins OTHER (SEE COMMENTS)     esophagitis    Sulfa Antibiotics OTHER (SEE COMMENTS)     esophagitis       Medications:    Current Facility-Administered Medications:     amLODIPine (Norvasc) tab 5 mg, 5 mg, Oral, Daily    atorvastatin (Lipitor) tab 10 mg, 10 mg, Oral, Daily    aspirin DR tab 81 mg, 81 mg, Oral, Daily    clopidogrel (Plavix) tab 75 mg, 75 mg, Oral, Daily    HYDROcodone-acetaminophen (Norco)  MG per tab 1 tablet, 1 tablet, Oral, Q6H  PRN    potassium chloride (Klor-Con) 20 MEQ oral powder 10 mEq, 10 mEq, Oral, BID    acetaminophen (Tylenol) tab 650 mg, 650 mg, Oral, Q6H PRN    heparin (Porcine) 5000 UNIT/ML injection 5,000 Units, 5,000 Units, Subcutaneous, Q12H    ondansetron (Zofran) 4 MG/2ML injection 4 mg, 4 mg, Intravenous, Q6H PRN    zolpidem (Ambien) tab 5 mg, 5 mg, Oral, Nightly PRN    alum-mag hydroxide-simethicone (Maalox) 200-200-20 MG/5ML oral suspension 30 mL, 30 mL, Oral, QID PRN    cefTRIAXone (Rocephin) 1 g in sodium chloride 0.9% 100 mL IVPB-ADDV, 1 g, Intravenous, Q24H    magnesium oxide (Mag-Ox) tab 800 mg, 800 mg, Oral, Once    metoclopramide (Reglan) 5 mg/mL injection 10 mg, 10 mg, Intravenous, Q6H    morphINE PF 2 MG/ML injection 2 mg, 2 mg, Intravenous, Q6H PRN    dextrose 50% injection 50 mL, 50 mL, Intravenous, PRN    dextrose 5%-sodium chloride 0.45% infusion, , Intravenous, Continuous    pantoprazole (Protonix) 40 mg in sodium chloride 0.9% PF 10 mL IV push, 40 mg, Intravenous, Q12H  Medications Prior to Admission   Medication Sig Dispense Refill Last Dose    amLODIPine 5 MG Oral Tab Take 1 tablet (5 mg total) by mouth daily.   7/21/2024    Aspirin 81 MG Oral Cap 81 mg.   7/21/2024 at 0900    atorvastatin 10 MG Oral Tab Take 1 tablet (10 mg total) by mouth daily.   7/21/2024 at 0900    cefuroxime 500 MG Oral Tab Take 1 tablet (500 mg total) by mouth 2 (two) times daily.   7/21/2024    clopidogrel 75 MG Oral Tab Take 1 tablet (75 mg total) by mouth daily.   7/21/2024    cyclobenzaprine 10 MG Oral Tab Take 1 tablet (10 mg total) by mouth nightly.   7/21/2024    ergocalciferol 1.25 MG (57363 UT) Oral Cap Take 1 capsule (50,000 Units total) by mouth once a week.   Past Week    furosemide 40 MG Oral Tab Take 1 tablet (40 mg total) by mouth daily.   7/21/2024    ibuprofen 600 MG Oral Tab Take 1 tablet (600 mg total) by mouth 3 (three) times daily.   7/21/2024 at 0900    losartan 50 MG Oral Tab Take 1 tablet (50 mg total) by  mouth daily.   2024 at 0900    methotrexate 2.5 MG Oral Tab Take 1 tablet (2.5 mg total) by mouth once a week.   2024    metoprolol tartrate 50 MG Oral Tab Take 1 tablet (50 mg total) by mouth 2 (two) times daily.   2024    Omeprazole 40 MG Oral Capsule Delayed Release Take 1 capsule (40 mg total) by mouth daily.   2024    oxybutynin ER 10 MG Oral Tablet 24 Hr Take 1 tablet (10 mg total) by mouth daily.   2024    Potassium Chloride ER 10 MEQ Oral Tab CR Take 1 tablet (10 mEq total) by mouth 2 (two) times daily.   2024    predniSONE 5 MG Oral Tab Take 1 tablet (5 mg total) by mouth daily. 4xs weekly ,Tuesday,Thursday, 2024    predniSONE 1 MG Oral Tab Take 1 tablet (1 mg total) by mouth As Directed. 3tablets Monday,Wednesday, 2024    folic acid 1 MG Oral Tab Take 1 tablet (1 mg total) by mouth daily. 90 tablet 0 2024    hydroxychloroquine 200 MG Oral Tab Take 1 tablet (200 mg total) by mouth 2 (two) times daily. 180 tablet 0 2024    HYDROcodone-acetaminophen (NORCO)  MG Oral Tab Take 1 tablet by mouth in the morning and 1 tablet at noon and 1 tablet in the evening. 30 tablet 0 2024 at 0900    [] ciprofloxacin 500 MG Oral Tab Take 1 tablet (500 mg total) by mouth 2 (two) times daily for 7 days. 14 tablet 0        Review of Systems:  CONSTITUTIONAL:  negative for fevers, rigors  EYES:  negative for diplopia   RESPIRATORY:  negative for severe shortness of breath  CARDIOVASCULAR:  negative for crushing sub-sternal chest pain  GASTROINTESTINAL:  see HPI  GENITOURINARY:  negative for dysuria or gross hematuria  INTEGUMENT/BREAST:  SKIN:  negative for jaundice   ALLERGIC/IMMUNOLOGIC:  negative for hay fever  ENDOCRINE:  negative for cold intolerance and heat intolerance  MUSCULOSKELETAL:  negative for joint effusion/severe erythema  NEURO: negative for dizziness or loss of conscious or paresthesias  BEHAVIOR/PSYCH:  negative for  psychotic behavior    Physical Exam:    Blood pressure 103/68, pulse 100, temperature 97.7 °F (36.5 °C), temperature source Oral, resp. rate 16, weight 183 lb 12.8 oz (83.4 kg), SpO2 100%. Body mass index is 33.62 kg/m².    General: awake, alert and oriented, no acute distress  HEENT: moist mucus membranes  PULM: no conversational dyspnea  CARDIOVASCULAR: regular rate and rhythm, the extremities are warm and well perfused  GI: soft, non-tender, non-distended, + BS, no rebound/guarding   EXTREMITIES: no edema, moving all extremities  SKIN: no visible rash  NEURO: appropriate and interactive    Laboratory Data:  Lab Results   Component Value Date    WBC 6.0 07/22/2024    HGB 10.0 07/22/2024    HCT 30.2 07/22/2024    .0 07/22/2024    CREATSERUM 0.65 07/22/2024    BUN <5 07/22/2024     07/22/2024    K 3.7 07/22/2024     07/22/2024    CO2 12.0 07/22/2024    GLU 56 07/22/2024    CA 7.6 07/22/2024    MG 1.6 07/22/2024       Imaging:  CT ABDOMEN+PELVIS(CONTRAST ONLY)(CPT=74177)    Result Date: 7/21/2024  CONCLUSION:  1. Limited exam secondary to patient respiratory motion artifact. 2. Mural thickening of distal gastric body and antrum suggests gastritis.  Correlation with endoscopy recommended if not recently performed.    Dictated by (CST): Cristóbal Banks MD on 7/21/2024 at 5:32 PM     Finalized by (CST): Cristóbal Banks MD on 7/21/2024 at 5:39 PM           Assessment & Plan   Sheri Garzon is a a(n) 64 year old female w/ a history of GERD, HTN, asthma, HLD, RA on plaquenil, on PLAVIX who presents with intractable nausea and emesis with upper abdominal pain leading to weight loss and dysphagia. Possible PUD vs erosive esophagitis vs peptic stricture vs neoplasm.     Recommend:  -empiric PPI  -plan EGD tomorrow    Maryse Hardin MD  [facility]  7/22/2024    This note was partially prepared using Dragon Medical voice recognition dictation software. As a result, errors may occur. When  identified, these errors have been corrected. While every attempt is made to correct errors during dictation, discrepancies may still exist.

## 2024-07-23 ENCOUNTER — APPOINTMENT (OUTPATIENT)
Dept: PICC SERVICES | Facility: HOSPITAL | Age: 64
End: 2024-07-23
Attending: INTERNAL MEDICINE
Payer: COMMERCIAL

## 2024-07-23 ENCOUNTER — APPOINTMENT (OUTPATIENT)
Dept: CT IMAGING | Facility: HOSPITAL | Age: 64
End: 2024-07-23
Attending: HOSPITALIST
Payer: COMMERCIAL

## 2024-07-23 ENCOUNTER — ANESTHESIA EVENT (OUTPATIENT)
Dept: ENDOSCOPY | Facility: HOSPITAL | Age: 64
End: 2024-07-23
Payer: COMMERCIAL

## 2024-07-23 ENCOUNTER — ANESTHESIA (OUTPATIENT)
Dept: ENDOSCOPY | Facility: HOSPITAL | Age: 64
End: 2024-07-23
Payer: COMMERCIAL

## 2024-07-23 PROBLEM — E87.6 HYPOKALEMIA: Status: ACTIVE | Noted: 2024-01-01

## 2024-07-23 PROBLEM — Z78.9 DIFFICULT INTRAVENOUS ACCESS: Status: ACTIVE | Noted: 2024-07-23

## 2024-07-23 PROBLEM — E87.6 HYPOKALEMIA: Status: ACTIVE | Noted: 2024-07-23

## 2024-07-23 PROBLEM — Z78.9 DIFFICULT INTRAVENOUS ACCESS: Status: ACTIVE | Noted: 2024-01-01

## 2024-07-23 LAB
ANION GAP SERPL CALC-SCNC: 10 MMOL/L (ref 0–18)
BASOPHILS # BLD AUTO: 0.04 X10(3) UL (ref 0–0.2)
BASOPHILS NFR BLD AUTO: 0.8 %
BUN BLD-MCNC: <5 MG/DL (ref 9–23)
CALCIUM BLD-MCNC: 7.3 MG/DL (ref 8.7–10.4)
CHLORIDE SERPL-SCNC: 119 MMOL/L (ref 98–112)
CO2 SERPL-SCNC: 14 MMOL/L (ref 21–32)
CREAT BLD-MCNC: 0.7 MG/DL
D DIMER PPP FEU-MCNC: 1.69 UG/ML FEU (ref ?–0.64)
DEPRECATED RDW RBC AUTO: 56 FL (ref 35.1–46.3)
EGFRCR SERPLBLD CKD-EPI 2021: 97 ML/MIN/1.73M2 (ref 60–?)
EOSINOPHIL # BLD AUTO: 0.15 X10(3) UL (ref 0–0.7)
EOSINOPHIL NFR BLD AUTO: 2.9 %
ERYTHROCYTE [DISTWIDTH] IN BLOOD BY AUTOMATED COUNT: 18 % (ref 11–15)
GLUCOSE BLD-MCNC: 96 MG/DL (ref 70–99)
GLUCOSE BLDC GLUCOMTR-MCNC: 53 MG/DL (ref 70–99)
GLUCOSE BLDC GLUCOMTR-MCNC: 74 MG/DL (ref 70–99)
GLUCOSE BLDC GLUCOMTR-MCNC: 82 MG/DL (ref 70–99)
HCT VFR BLD AUTO: 29.7 %
HGB BLD-MCNC: 10 G/DL
IMM GRANULOCYTES # BLD AUTO: 0.03 X10(3) UL (ref 0–1)
IMM GRANULOCYTES NFR BLD: 0.6 %
LYMPHOCYTES # BLD AUTO: 1.82 X10(3) UL (ref 1–4)
LYMPHOCYTES NFR BLD AUTO: 35.1 %
MAGNESIUM SERPL-MCNC: 1.3 MG/DL (ref 1.6–2.6)
MAGNESIUM SERPL-MCNC: 1.3 MG/DL (ref 1.6–2.6)
MCH RBC QN AUTO: 29.2 PG (ref 26–34)
MCHC RBC AUTO-ENTMCNC: 33.7 G/DL (ref 31–37)
MCV RBC AUTO: 86.8 FL
MONOCYTES # BLD AUTO: 0.7 X10(3) UL (ref 0.1–1)
MONOCYTES NFR BLD AUTO: 13.5 %
NEUTROPHILS # BLD AUTO: 2.44 X10 (3) UL (ref 1.5–7.7)
NEUTROPHILS # BLD AUTO: 2.44 X10(3) UL (ref 1.5–7.7)
NEUTROPHILS NFR BLD AUTO: 47.1 %
PHOSPHATE SERPL-MCNC: 1.6 MG/DL (ref 2.4–5.1)
PLATELET # BLD AUTO: 269 10(3)UL (ref 150–450)
POTASSIUM SERPL-SCNC: 2.9 MMOL/L (ref 3.5–5.1)
POTASSIUM SERPL-SCNC: 3.2 MMOL/L (ref 3.5–5.1)
POTASSIUM SERPL-SCNC: 4.6 MMOL/L (ref 3.5–5.1)
RBC # BLD AUTO: 3.42 X10(6)UL
SODIUM SERPL-SCNC: 143 MMOL/L (ref 136–145)
WBC # BLD AUTO: 5.2 X10(3) UL (ref 4–11)

## 2024-07-23 PROCEDURE — 05H633Z INSERTION OF INFUSION DEVICE INTO LEFT SUBCLAVIAN VEIN, PERCUTANEOUS APPROACH: ICD-10-PCS | Performed by: INTERNAL MEDICINE

## 2024-07-23 PROCEDURE — B547ZZA ULTRASONOGRAPHY OF LEFT SUBCLAVIAN VEIN, GUIDANCE: ICD-10-PCS | Performed by: INTERNAL MEDICINE

## 2024-07-23 PROCEDURE — 99233 SBSQ HOSP IP/OBS HIGH 50: CPT | Performed by: HOSPITALIST

## 2024-07-23 PROCEDURE — 99233 SBSQ HOSP IP/OBS HIGH 50: CPT | Performed by: INTERNAL MEDICINE

## 2024-07-23 PROCEDURE — 71260 CT THORAX DX C+: CPT | Performed by: HOSPITALIST

## 2024-07-23 RX ORDER — COSYNTROPIN 0.25 MG/ML
0.25 INJECTION, POWDER, FOR SOLUTION INTRAMUSCULAR; INTRAVENOUS ONCE
Status: COMPLETED | OUTPATIENT
Start: 2024-07-24 | End: 2024-07-24

## 2024-07-23 RX ORDER — MAGNESIUM SULFATE HEPTAHYDRATE 40 MG/ML
2 INJECTION, SOLUTION INTRAVENOUS ONCE
Status: COMPLETED | OUTPATIENT
Start: 2024-07-23 | End: 2024-07-23

## 2024-07-23 RX ORDER — POTASSIUM CHLORIDE 750 MG/1
10 TABLET, EXTENDED RELEASE ORAL 2 TIMES DAILY
Status: DISCONTINUED | OUTPATIENT
Start: 2024-07-23 | End: 2024-07-24

## 2024-07-23 RX ORDER — DEXTROSE MONOHYDRATE 100 MG/ML
INJECTION, SOLUTION INTRAVENOUS CONTINUOUS
Status: DISCONTINUED | OUTPATIENT
Start: 2024-07-23 | End: 2024-07-25

## 2024-07-23 NOTE — CM/SW NOTE
07/23/24 1100   CM/SW Referral Data   Referral Source    Reason for Referral Discharge planning   Informant Patient;Sibling  (Gladys Whitney)   Medical Hx   Does patient have an established PCP? No   Patient Info   Patient's Current Mental Status at Time of Assessment Alert;Oriented   Patient's Home Environment House   Number of Levels in Home 3   Number of Stair in Home 5-6 in between each level   Patient lives with Sibling   Patient Status Prior to Admission   Independent with ADLs and Mobility No   Pt. requires assistance with Ambulating   Services in place prior to admission DME/Supplies at home   Type of DME/Supplies Straight Cane   Discharge Needs   Anticipated D/C needs To be determined       CM self-referral for discharge planning.     LAURA intern met with pt, pt's sister Gladys at bedside. Above assessment completed.     Pt currently lives with her sister Gladys. Gladys states that her home is a multi-level home and there are 5-6 steps in between each level. Gladys state that pt goes on the 2nd and 3rd level because the kitchen and bathroom are on those floors. Pt reports having no difficulty going up stairs.     Pt states she ambulates with a cane.   Pt has no home O2.   Pt states she has no other medical equipment at home.     Pt states that she is independent with ADLs. Pt states that she still drives but if needed, her sister or her daughter drives her to and from places.     Pt states that she has no hx of HH or SULTANA. Pt states that she would be interested in having HH at discharge.     Pt requested that SW intern provide her any resources for affordable housing. SW intern to follow up with pt with housing resources. Pt states that she recently just relocated to her sister's home short-term until she is able to find a place for herself to stay.     Plan: TBD - pending pt needs    Nanci Wagner, LAURA Intern, MSW candidate.

## 2024-07-23 NOTE — PROGRESS NOTES
[facility]     Gastroenterology Progress Note    Sheir Garzon Patient Status:  Inpatient    1960 MRN [id number]   Location [facility] ENDOSCOPY LAB SUITES Attending Leroy Hsieh MD   Hosp Day # 1 PCP No primary care provider on file.       Subjective:   No n/v today, hasn't eaten or drank anything. Procedure delayed 2/2 low K. Then cancelled pending cards evaluation.     Objective:   Blood pressure 118/86, pulse 106, temperature 98.4 °F (36.9 °C), temperature source Oral, resp. rate 21, height 5' 2\" (1.575 m), weight 183 lb 12.8 oz (83.4 kg), SpO2 99%. Body mass index is 33.62 kg/m².    General: awake, alert and oriented, no acute distress  HEENT: moist mucus membranes  PULM: no conversational dyspnea  CARDIOVASCULAR: regular rate and rhythm, the extremities are warm and well perfused  GI: soft, non-tender, non-distended, + BS, no rebound/guarding   EXTREMITIES: no edema, moving all extremities  SKIN: no visible rash  NEURO: appropriate and interactive    Assessment and Plan:   Sheri Garzon is a a(n) 64 year old female w/ a history of GERD, HTN, asthma, HLD, RA on plaquenil, on PLAVIX who presents with intractable nausea and emesis with upper abdominal pain leading to weight loss and dysphagia. Possible PUD vs erosive esophagitis vs peptic stricture vs neoplasm.     Continue PPI BID  Awaiting cards evaluation  Plan EGD pending cards maricarmen Serra Ma, MD  [facility] Gastroenterology      Results:     Lab Results   Component Value Date    WBC 5.2 2024    HGB 10.0 (L) 2024    HCT 29.7 (L) 2024    .0 2024    CREATSERUM 0.70 2024    BUN <5 (L) 2024     2024    K 3.2 (L) 2024     (H) 2024    CO2 14.0 (L) 2024    GLU 96 2024    CA 7.3 (L) 2024    ALB 3.7 2024    ALKPHO 92 2024    BILT 0.6 2024    TP 6.2 2024    AST 37 (H) 2024    ALT 17 2024    LIP  34 07/21/2024    DDIMER 1.69 (H) 07/23/2024    MG 1.3 (L) 07/23/2024    MG 1.3 (L) 07/23/2024    PHOS 1.6 (L) 07/23/2024       CT ABDOMEN+PELVIS(CONTRAST ONLY)(CPT=74177)    Result Date: 7/21/2024  CONCLUSION:  1. Limited exam secondary to patient respiratory motion artifact. 2. Mural thickening of distal gastric body and antrum suggests gastritis.  Correlation with endoscopy recommended if not recently performed.    Dictated by (CST): Cristóbal Banks MD on 7/21/2024 at 5:32 PM     Finalized by (CST): Cristóbal Banks MD on 7/21/2024 at 5:39 PM

## 2024-07-23 NOTE — PROGRESS NOTES
[facility]  part of [facility]    Progress Note    Sheri Garzon Patient Status:  Inpatient    1960 MRN [id number]   Location [facility] 4W/SW/SE Attending Leroy Hsieh MD   Hosp Day # 1 PCP No primary care provider on file.     Chief Complaint:     Dehydration    Subjective:   Subjective:    Patient seen and examined  Hypoglycemic today.   Tachycardic, normotensive.  Afebrile.  Tachyardic, hypoglycemic    Objective:   Blood pressure (!) 123/95, pulse (!) 122, temperature 98.4 °F (36.9 °C), temperature source Oral, resp. rate 18, weight 183 lb 12.8 oz (83.4 kg), SpO2 100%.  Physical Exam    General: Patient is alert and oriented x3 does not appear to be in acute distress at this time  HEENT: EOMI PERRLA, atraumatic normocephalic  Cardiac: S1-S2 appreciated  Lungs: Good air entry bilaterally clear to auscultation  Abdomen: Soft nontender nondistended positive bowel sounds  Ext: Peripheral pulses are positive  Neuro: No focal deficits noted  Psych: Normal mood  Skin: No rashes noted  MSK: Full range of motion intact      Results:   Lab Results   Component Value Date    WBC 5.2 2024    HGB 10.0 (L) 2024    HCT 29.7 (L) 2024    .0 2024    CREATSERUM 0.70 2024    BUN <5 (L) 2024     2024    K 4.6 2024     (H) 2024    CO2 14.0 (L) 2024    GLU 96 2024    CA 7.3 (L) 2024    ALB 3.7 2024    ALKPHO 92 2024    BILT 0.6 2024    TP 6.2 2024    AST 37 (H) 2024    ALT 17 2024    LIP 34 2024    MG 1.3 (L) 2024    MG 1.3 (L) 2024    PHOS 1.6 (L) 2024       CT ABDOMEN+PELVIS(CONTRAST ONLY)(CPT=74177)    Result Date: 2024  CONCLUSION:  1. Limited exam secondary to patient respiratory motion artifact. 2. Mural thickening of distal gastric body and antrum suggests gastritis.  Correlation with endoscopy recommended if not recently performed.     Dictated by (CST): Cristóbal Banks MD on 7/21/2024 at 5:32 PM     Finalized by (CST): Cristóbal Banks MD on 7/21/2024 at 5:39 PM         EKG 12 Lead    Result Date: 7/22/2024  Sinus tachycardia Possible Left atrial enlargement Septal infarct , age undetermined Abnormal ECG No previous ECGs found in [facility] Confirmed by ITA TURPIN PRATIK ([id number]) on 7/22/2024 5:53:22 PM     Assessment & Plan:       Intractable emesis secondary to gastritis  -Will start patient on Protonix 40 mg IV daily, IV fluids initiated.    -Will use around-the-clock Reglan and as needed Zofran,   -will attempt dietary challenge with clear liquids, advance as tolerated.    -Consider GI consult if continues to remain unable to tolerate p.o. hold NSAIDs     Hypoglycemia  -stat AMP of D50  -initiation of hypoglycemia protocol  -will switch D5 gtt,   -will monitor.    Nonanion gap metabolic acidosis  Secondary to volume loss, as stated previously IV fluids initiated, will repeat labs in AM.     Acute UTI  Will start patient on Rocephin 1 g IV by every 24 hours, cultures pending, will continue to monitor.     Hypokalemia/hypomagnesemia  Secondary to repeated emesis, initiate electrolyte replacement protocol.     Essential hypertension  Blood pressure well-controlled, resume home meds.     Rheumatoid arthritis  Stable, continue outpatient management upon discharge.     Prophylaxis  Subcutaneous heparin     CODE STATUS  Full     Global A/P  -severe hypoglycemia - will initiate d5 GTT, still persistent will change to d10 and have endocrinology placed on consult.   -tachycardia - persistent, will order Echo and have Cards placed on consult, patient with recent NSTEMI in [place].   -Ddimer elevated - CTA chest, LE duplex ordered.  -Persistent metabolic acidosis - will have nephro placed on consult  -hypoglycemia protocol.   -Severe hypokalemia - , continue electrolyte replacement protocol.   -plan for EGD today - but deferred due to electrolyte  abnormalities, maybe later today.  -Metabolic Acidosis    -continue to monitor closely  -continue IV abx for UTI.   -Reviewed previous consultant notes  -Reviewed CBC, BMP, Mag, and Phos  -Reviewed tests ordered  -Repeat labs in am  -MDM: High, severe exacerbation of chronic illness posing a threat to life. IV medications requiring close inpatient monitoring.       Leroy Hsieh MD

## 2024-07-23 NOTE — PAYOR COMM NOTE
--------------  ADMISSION REVIEW     Payor: [employer]  Subscriber #:  [id number]  Authorization Number: [id number]    Admit date: 7/22/24  Admit time: 12:20 AM       REVIEW DOCUMENTATION:     ED Provider Notes        ED Provider Notes signed by Shorty Roman MD at 7/21/2024 10:27 PM       Author: Shorty Roman MD Service: -- Author Type: Physician    Filed: 7/21/2024 10:27 PM Date of Service: 7/21/2024  2:59 PM Status: Signed    : Shorty Roman MD (Physician)         Diffuse lateral  Patient Seen in: [facility] Emergency Department      History     Chief Complaint   Patient presents with    Abdomen/Flank Pain     Stated Complaint: Weakness    Subjective:   HPI    65 yo female with history of RA hypertension hyperlipidemia asthma and prior bouts of pancreatitis presenting with generalized weakness.  She describes a 3-week history of daily multiple episodes of nonbloody nonbilious emesis constipation decreased urine output as well as generalized weakness.  No chest pain or trouble breathing.  No fevers chills or night sweats.  No diarrhea.  No bleeding from any orifice.  She was taking antibiotics for a right calf wound, but has not been able to tolerate them due to vomiting.  Review of chart notes that she was started on ciprofloxacin.  Before that she was taking cefuroxime.  She was also on methotrexate but this was stopped as it interacted with patient's antibiotics.    Objective:   Past Medical History:    Asthma (HCC)    Esophageal reflux    Essential hypertension    Hyperlipidemia    Pancreatitis (HCC)    RA (rheumatoid arthritis) (HCC)              Past Surgical History:   Procedure Laterality Date    Revision of uterine anomaly      Rotator cuff repair      Wrist fracture surgery                  Social History     Socioeconomic History    Marital status: Single   Tobacco Use    Smoking status: Never    Smokeless tobacco: Never   Vaping Use    Vaping  status: Never Used   Substance and Sexual Activity    Alcohol use: Not Currently    Drug use: Never              Review of Systems    Positive for stated Chief Complaint: Abdomen/Flank Pain    Other systems are as noted in HPI.  Constitutional and vital signs reviewed.      All other systems reviewed and negative except as noted above.    Physical Exam     ED Triage Vitals [07/21/24 1415]   /72   Pulse (!) 128   Resp 20   Temp 98.3 °F (36.8 °C)   Temp src    SpO2 100 %   O2 Device None (Room air)       Current Vitals:   Vital Signs  BP: 110/66  Pulse: 104  Resp: 22  Temp: 98.3 °F (36.8 °C)  MAP (mmHg): 81    Oxygen Therapy  SpO2: 100 %  O2 Device: None (Room air)            Physical Exam    Constitutional: awake, alert, no sig distress  HENT: mmm, no lesions,  Neck: normal range of motion, no tenderness, supple.  Eyes: PERRL, EOMI, conjunctiva normal, no discharge. Sclera anicteric.  Cardiovascular: tachycardic regular  Respiratory: Normal breath sounds, no respiratory distress, no wheezing, no chest tenderness.  GI: Bowel sounds normal, Soft, no tenderness, no masses, no pulsatile masses.  : No CVA tenderness.  Skin: Warm, dry, no erythema, no rash.  Musculoskeletal: Intact distal pulses, no edema, no tenderness, no cyanosis, no clubbing. Good range of motion in all major joints. No tenderness to palpation or major deformities noted. Back- No tenderness.  Neurologic: Alert & oriented x 3, normal motor function, normal sensory function, no focal deficits noted.  Psych: Calm, cooperative, nl affect        ED Course     Labs Reviewed   BASIC METABOLIC PANEL (8) - Abnormal; Notable for the following components:       Result Value    Potassium 2.8 (*)     CO2 14.0 (*)     BUN/CREA Ratio 9.3 (*)     Calcium, Total 8.6 (*)     All other components within normal limits   HEPATIC FUNCTION PANEL (7) - Abnormal; Notable for the following components:    AST 37 (*)     All other components within normal limits    MAGNESIUM - Abnormal; Notable for the following components:    Magnesium 1.3 (*)     All other components within normal limits   URINALYSIS WITH CULTURE REFLEX - Abnormal; Notable for the following components:    Ketones Urine Trace (*)     Protein Urine 20 (*)     Leukocyte Esterase Urine 250 (*)     WBC Urine 6-10 (*)     RBC Urine 3-5 (*)     Bacteria Urine Rare (*)     Squamous Epi. Cells Few (*)     Hyaline Casts Present (*)     All other components within normal limits   BASIC METABOLIC PANEL (8) - Abnormal; Notable for the following components:    Chloride 115 (*)     CO2 14.0 (*)     BUN 6 (*)     BUN/CREA Ratio 8.1 (*)     Calcium, Total 7.8 (*)     All other components within normal limits   CBC W/ DIFFERENTIAL - Abnormal; Notable for the following components:    RDW-SD 54.3 (*)     RDW 17.2 (*)     All other components within normal limits   LIPASE - Normal   CBC WITH DIFFERENTIAL WITH PLATELET    Narrative:     The following orders were created for panel order CBC With Differential With Platelet.  Procedure                               Abnormality         Status                     ---------                               -----------         ------                     CBC W/ DIFFERENTIAL[[id number]]          Abnormal            Final result                 Please view results for these tests on the individual orders.   RAINBOW DRAW LAVENDER   RAINBOW DRAW LIGHT GREEN   RAINBOW DRAW BLUE   URINE CULTURE, ROUTINE                 ED Course as of 07/21/24 2227  ------------------------------------------------------------  Time: 07/21 1455  Comment: EKG as interpreted by ED Physician:  BPM, left axis deviation, no st segment changes, no stemi. Qtc 411ms  ------------------------------------------------------------  Time: 07/21 1746  Comment: CONCLUSION:   1. Limited exam secondary to patient respiratory motion artifact.   2. Mural thickening of distal gastric body and antrum suggests gastritis.   Correlation with endoscopy recommended if not recently performed.                MDM      64F with history as above presenting for evaluation of generalized weakness and vomiting on arrival she is tachycardic otherwise vitals are stable and reassuring.  DDx includes dehydration metabolic derangement AMRIT pancreatitis    Admission disposition: 7/21/2024  9:59 PM         I have independently reviewed patient's CT abdomen and pelvis, I do not appreciate any acute intra-abdominal pathology.    Labs reviewed which is significant for persistent nongap metabolic acidosis despite IV fluids.  Hypokalemia and hypomagnesemia were corrected.  These are likely attributable to prolonged vomiting.    Given continued vomiting in the ER and persistent metabolic acidosis will admit.  Discussed with hospitalist.                           Medical Decision Making      Disposition and Plan     Clinical Impression:  1. Dehydration    2. Metabolic acidosis         Disposition:  Admit  7/21/2024  9:59 pm    Follow-up:  No follow-up provider specified.        Medications Prescribed:  Current Discharge Medication List                            Hospital Problems       Present on Admission  Date Reviewed: 6/28/2024            ICD-10-CM Noted POA    Dehydration E86.0 7/21/2024 Unknown                     Signed by Shorty Roman MD on 7/21/2024 10:27 PM         MEDICATIONS ADMINISTERED IN LAST 1 DAY:  amLODIPine (Norvasc) tab 5 mg       Date Action Dose Route User    7/23/2024 0816 Given 5 mg Oral Mee Espino RN          cefTRIAXone (Rocephin) 1 g in sodium chloride 0.9% 100 mL IVPB-ADDV       Date Action Dose Route User    7/23/2024 0127 New Bag 1 g Intravenous Candida Ortega RN          dextrose 10% infusion       Date Action Dose Route User    7/23/2024 1430 New Bag (none) Intravenous Mee Espino RN          dextrose 50% injection 50 mL       Date Action Dose Route User    7/23/2024 1348 Given 50 mL Intravenous Srinivas  JULIET Caban          dextrose 5%-sodium chloride 0.45% infusion       Date Action Dose Route User    7/23/2024 0817 New Bag (none) Intravenous Mee Espino RN    7/23/2024 0210 New Bag (none) Intravenous Candida Ortega RN          heparin (Porcine) 5000 UNIT/ML injection 5,000 Units       Date Action Dose Route User    7/22/2024 2018 Given 5,000 Units Subcutaneous (Right Upper Arm) Candida Ortega RN          HYDROcodone-acetaminophen (Norco)  MG per tab 1 tablet       Date Action Dose Route User    7/22/2024 1621 Given 1 tablet Oral Mee Espino RN          metoclopramide (Reglan) 5 mg/mL injection 10 mg       Date Action Dose Route User    7/23/2024 1430 Given 10 mg Intravenous Mee Espino RN    7/23/2024 0816 Given 10 mg Intravenous Mee Espino RN    7/23/2024 0127 Given 10 mg Intravenous Candida Ortega RN    7/22/2024 2024 Given 10 mg Intravenous Candida Ortega RN          morphINE PF 2 MG/ML injection 2 mg       Date Action Dose Route User    7/23/2024 1430 Given 2 mg Intravenous Mee Espino RN    7/23/2024 0828 Given 2 mg Intravenous Mee Espino RN          pantoprazole (Protonix) 40 mg in sodium chloride 0.9% PF 10 mL IV push       Date Action Dose Route User    7/23/2024 0816 Given 40 mg Intravenous Mee Espino RN    7/22/2024 2019 Given 40 mg Intravenous Candida Ortega RN          potassium chloride (Klor-Con) 20 MEQ oral powder 10 mEq       Date Action Dose Route User    7/22/2024 2023 Given 10 mEq Oral Candida Ortega RN          potassium chloride 40 mEq in 250mL sodium chloride 0.9% IVPB premix       Date Action Dose Route User    7/23/2024 1430 New Bag 40 mEq Intravenous Mee Espino RN          potassium chloride (Klor-Con M10) tab 10 mEq       Date Action Dose Route User    7/23/2024 1126 Given 10 mEq Oral Mee Espino RN          potassium phosphate dibasic 15 mmol in sodium chloride 0.9% 250 mL IVPB       Date Action Dose Route User    7/23/2024 0817 New Bag  15 mmol Intravenous Mee Espino, JULIET          sodium chloride 0.9 % IV bolus 500 mL       Date Action Dose Route User    7/23/2024 0523 New Bag 500 mL Intravenous Candida Ortega, RN            Vitals (last day)       Date/Time Temp Pulse Resp BP SpO2 Weight O2 Device O2 Flow Rate (L/min) [facility]    07/23/24 1539 -- 106 21 118/86 99 % -- None (Room air) --     07/23/24 0806 98.4 °F (36.9 °C) 122 18 123/95 100 % -- None (Room air) --     07/23/24 0627 -- -- -- 93/63 -- -- -- --     07/23/24 0506 98.4 °F (36.9 °C) 111 18 88/72 99 % -- None (Room air) --     07/22/24 1947 97.6 °F (36.4 °C) 120 18 119/80 100 % -- None (Room air) --     07/22/24 1900 -- 119 -- -- -- -- -- --     07/22/24 1618 98.4 °F (36.9 °C) -- 16 116/87 100 % -- None (Room air) --     07/22/24 1610 98.6 °F (37 °C) 100 16 104/70 100 % -- None (Room air) --     07/22/24 0946 -- -- -- 103/68 -- -- -- --     07/22/24 0756 -- -- -- 90/76 -- -- -- --     07/22/24 0747 97.7 °F (36.5 °C) 100 16 81/65 100 % -- None (Room air) --     07/22/24 0627 -- -- -- -- -- 183 lb 12.8 oz (83.4 kg) -- -- [facility]    07/22/24 0459 98.7 °F (37.1 °C) 103 16 104/73 100 % -- None (Room air) -- DJ    07/22/24 0025 97.4 °F (36.3 °C) 93 16 109/76 95 % -- None (Room air) -- [facility]    07/22/24 0000 -- 92 15 106/78 100 % -- None (Room air) --      7/21 H&P    Chief Complaint:      Chief Complaint   Patient presents with    Abdomen/Flank Pain         History of Present Illness:  Sheri Garzon is a(n) 64 year old female, with a past medical history significant for rheumatoid arthritis, hypertension, GERD, asthma presents with complaint of intractable nausea vomiting and abdominal discomfort ongoing for the past few weeks.  Describes the onset as sudden, symptoms however have persisted since with very poor oral intake as a result of her nausea.  Claims her emesis appears bilious in nature denies any blood.  Her abdominal pain is cramping in nature and sharp at times.  Denies  any diarrhea or hematochezia however claims she has had extended periods of constipation of late.  For symptoms she went to an urgent care facility recently was diagnosed with possible pneumonia and UTI given oral antibiotics which she claims she has not been able to tolerate.  Claims she has not had an EGD approximately 2 years ago and colonoscopy twice over the last year, with no significant findings.  CT scan done in ER shows evidence of likely gastritis.       Assessment and Plan:     Intractable emesis secondary to gastritis  Will start patient on Protonix 40 mg IV daily, IV fluids initiated.  Will use around-the-clock Reglan and as needed Zofran, will attempt dietary challenge with clear liquids, advance as tolerated.  Consider GI consult if continues to remain unable to tolerate p.o. hold NSAIDs     Nonanion gap metabolic acidosis  Secondary to volume loss, as stated previously IV fluids initiated, will repeat labs in AM.     Acute UTI  Will start patient on Rocephin 1 g IV by every 24 hours, cultures pending, will continue to monitor.     Hypokalemia/hypomagnesemia  Secondary to repeated emesis, initiate electrolyte replacement protocol.     Essential hypertension  Blood pressure well-controlled, resume home meds.     Rheumatoid arthritis  Stable, continue outpatient management upon discharge.     Prophylaxis  Subcutaneous heparin     CODE STATUS  Full    7/22 HOSPITALIST NOTE    Patient seen and examined  Hypoglycemic today.   Tachycardic, normotensive.  Afebrile.           Objective:  Blood pressure 103/68, pulse 100, temperature 97.7 °F (36.5 °C), temperature source Oral, resp. rate 16, weight 183 lb 12.8 oz (83.4 kg), SpO2 100%.      Results:        Lab Results   Component Value Date     WBC 6.0 07/22/2024     HGB 10.0 (L) 07/22/2024     HCT 30.2 (L) 07/22/2024     .0 07/22/2024     CREATSERUM 0.65 07/22/2024     BUN <5 (L) 07/22/2024      07/22/2024     K 3.7 07/22/2024      (H)  07/22/2024     CO2 12.0 (L) 07/22/2024     GLU 56 (L) 07/22/2024     CA 7.6 (L) 07/22/2024          Assessment & Plan:  Intractable emesis secondary to gastritis  -Will start patient on Protonix 40 mg IV daily, IV fluids initiated.    -Will use around-the-clock Reglan and as needed Zofran,   -will attempt dietary challenge with clear liquids, advance as tolerated.    -Consider GI consult if continues to remain unable to tolerate p.o. hold NSAIDs     Hypoglycemia  -stat AMP of D50  -initiation of hypoglycemia protocol  -will switch D5 gtt,   -will monitor.     Nonanion gap metabolic acidosis  Secondary to volume loss, as stated previously IV fluids initiated, will repeat labs in AM.     Acute UTI  Will start patient on Rocephin 1 g IV by every 24 hours, cultures pending, will continue to monitor.     Hypokalemia/hypomagnesemia  Secondary to repeated emesis, initiate electrolyte replacement protocol.     Essential hypertension  Blood pressure well-controlled, resume home meds.     Rheumatoid arthritis  Stable, continue outpatient management upon discharge.     Prophylaxis  Subcutaneous heparin     CODE STATUS  Full     Global A/P  -severe hypoglycemia - will initiate d5 GTT, hypoglycemia protocol.   -Severe hypokalemia - improved, continue electrolyte replacement protocol.   -Metabolic Acidosis - will check ABG.   -continue to monitor closely  -continue IV abx for UTI.   -Reviewed previous consultant notes  -Reviewed CBC, BMP, Mag, and Phos  -Reviewed tests ordered  -Repeat labs in am  -MDM: High, severe exacerbation of chronic illness posing a threat to life. IV medications requiring close inpatient monitoring.      7/22 GI CONSULT NOTE    Reason for Consultation:  N/v, weight loss, dysphagia     History of Present Illness:  Sheri Grazon is a a(n) 64 year old female w/ a history of GERD, HTN, asthma, HLD, RA on plaquenil, on PLAVIX who presents with intractable nausea and emesis and dehydration. States ongoing for  at least a month but worsened over the past 2 weeks with poor oral intake, persistent nausea with intermittent emesis often described as bilious in appearance. Chronic heartburn with sensation of dysphagia. Also with mid upper abdominal pain. No diarrhea. Does note constipation. No blood in stool. No fever or chills. Takes nsaids. Last EGD over 10 years ago.      Assessment & Plan   Sheri Garzon is a a(n) 64 year old female w/ a history of GERD, HTN, asthma, HLD, RA on plaquenil, on PLAVIX who presents with intractable nausea and emesis with upper abdominal pain leading to weight loss and dysphagia. Possible PUD vs erosive esophagitis vs peptic stricture vs neoplasm.      Recommend:  -empiric PPI  -plan EGD tomorrow     7/23 HOSPITALIST NOTE    Subjective:  Subjective:     Patient seen and examined  Hypoglycemic today.   Tachycardic, normotensive.  Afebrile.  Tachyardic, hypoglycemic           Objective:  Blood pressure (!) 123/95, pulse (!) 122, temperature 98.4 °F (36.9 °C), temperature source Oral, resp. rate 18, weight 183 lb 12.8 oz (83.4 kg), SpO2 100%.         Results:        Lab Results   Component Value Date     WBC 5.2 07/23/2024     HGB 10.0 (L) 07/23/2024     HCT 29.7 (L) 07/23/2024     .0 07/23/2024     CREATSERUM 0.70 07/23/2024     BUN <5 (L) 07/23/2024      07/23/2024     K 4.6 07/23/2024      (H) 07/23/2024     CO2 14.0 (L) 07/23/2024     GLU 96 07/23/2024     CA 7.3 (L) 07/23/2024                 Assessment & Plan:  Intractable emesis secondary to gastritis  -Will start patient on Protonix 40 mg IV daily, IV fluids initiated.    -Will use around-the-clock Reglan and as needed Zofran,   -will attempt dietary challenge with clear liquids, advance as tolerated.    -Consider GI consult if continues to remain unable to tolerate p.o. hold NSAIDs     Hypoglycemia  -stat AMP of D50  -initiation of hypoglycemia protocol  -will switch D5 gtt,   -will monitor.     Nonanion gap metabolic  acidosis  Secondary to volume loss, as stated previously IV fluids initiated, will repeat labs in AM.     Acute UTI  Will start patient on Rocephin 1 g IV by every 24 hours, cultures pending, will continue to monitor.     Hypokalemia/hypomagnesemia  Secondary to repeated emesis, initiate electrolyte replacement protocol.     Essential hypertension  Blood pressure well-controlled, resume home meds.     Rheumatoid arthritis  Stable, continue outpatient management upon discharge.     Prophylaxis  Subcutaneous heparin     CODE STATUS  Full     Global A/P  -severe hypoglycemia - will initiate d5 GTT, still persistent will change to d10 and have endocrinology placed on consult.   -tachycardia - persistent, will order Echo and have Cards placed on consult, patient with recent NSTEMI in [place].   -Persistent metabolic acidosis - will have nephro placed on consult  -hypoglycemia protocol.   -Severe hypokalemia - , continue electrolyte replacement protocol.   -plan for EGD today - but deferred due to electrolyte abnormalities, maybe later today.  -Metabolic Acidosis    -continue to monitor closely  -continue IV abx for UTI.   -Reviewed previous consultant notes  -Reviewed CBC, BMP, Mag, and Phos  -Reviewed tests ordered  -Repeat labs in am  -MDM: High, severe exacerbation of chronic illness posing a threat to life. IV medications requiring close inpatient monitoring.

## 2024-07-23 NOTE — PLAN OF CARE
Patient is A&Ox4. VSS. RA. Up independently. EGD today with Dr. Hardin, patient was NPO. Hypoglycemia episode occurred today, blood sugar increased with administration of D50 amp. Potassium was replaced today as well. Heart rated noted to be elevated on exertion. Consults placed today for: nephrology (metabolic acidosis), endocrinology (persistent hypoglycemia), and cardiology (tachycardia). Patient is continent. Dextrose 10 running at 75cc/hr. Heparin and SCDs for VTE prophylaxis. Call light is within reach as well as personal belongings.   Problem: Patient Centered Care  Goal: Patient preferences are identified and integrated in the patient's plan of care  Description: Interventions:  - What would you like us to know as we care for you?   - Provide timely, complete, and accurate information to patient/family  - Incorporate patient and family knowledge, values, beliefs, and cultural backgrounds into the planning and delivery of care  - Encourage patient/family to participate in care and decision-making at the level they choose  - Honor patient and family perspectives and choices  Outcome: Progressing     Problem: Patient/Family Goals  Goal: Patient/Family Long Term Goal  Description: Patient's Long Term Goal:     Interventions:  - See additional Care Plan goals for specific interventions  Outcome: Progressing  Goal: Patient/Family Short Term Goal  Description: Patient's Short Term Goal:     Interventions:  - See additional Care Plan goals for specific interventions  Outcome: Progressing     Problem: PAIN - ADULT  Goal: Verbalizes/displays adequate comfort level or patient's stated pain goal  Description: INTERVENTIONS:  - Encourage pt to monitor pain and request assistance  - Assess pain using appropriate pain scale  - Administer analgesics based on type and severity of pain and evaluate response  - Implement non-pharmacological measures as appropriate and evaluate response  - Consider cultural and social influences on  pain and pain management  - Manage/alleviate anxiety  - Utilize distraction and/or relaxation techniques  - Monitor for opioid side effects  - Notify MD/LIP if interventions unsuccessful or patient reports new pain  - Anticipate increased pain with activity and pre-medicate as appropriate  Outcome: Progressing     Problem: RISK FOR INFECTION - ADULT  Goal: Absence of fever/infection during anticipated neutropenic period  Description: INTERVENTIONS  - Monitor WBC  - Administer growth factors as ordered  - Implement neutropenic guidelines  Outcome: Progressing     Problem: SAFETY ADULT - FALL  Goal: Free from fall injury  Description: INTERVENTIONS:  - Assess pt frequently for physical needs  - Identify cognitive and physical deficits and behaviors that affect risk of falls.  - [place] fall precautions as indicated by assessment.  - Educate pt/family on patient safety including physical limitations  - Instruct pt to call for assistance with activity based on assessment  - Modify environment to reduce risk of injury  - Provide assistive devices as appropriate  - Consider OT/PT consult to assist with strengthening/mobility  - Encourage toileting schedule  Outcome: Progressing     Problem: DISCHARGE PLANNING  Goal: Discharge to home or other facility with appropriate resources  Description: INTERVENTIONS:  - Identify barriers to discharge w/pt and caregiver  - Include patient/family/discharge partner in discharge planning  - Arrange for needed discharge resources and transportation as appropriate  - Identify discharge learning needs (meds, wound care, etc)  - Arrange for interpreters to assist at discharge as needed  - Consider post-discharge preferences of patient/family/discharge partner  - Complete POLST form as appropriate  - Assess patient's ability to be responsible for managing their own health  - Refer to Case Management Department for coordinating discharge planning if the patient needs post-hospital services  based on physician/LIP order or complex needs related to functional status, cognitive ability or social support system  Outcome: Progressing     Problem: Altered Communication/Language Barrier  Goal: Patient/Family is able to understand and participate in their care  Description: Interventions:  - Assess communication ability and preferred communication style  - Implement communication aides and strategies  - Use visual cues when possible  - Listen attentively, be patient, do not interrupt  - Minimize distractions  - Allow time for understanding and response  - Establish method for patient to ask for assistance (call light)  - Provide an  as needed  - Communicate barriers and strategies to overcome with those who interact with patient  Outcome: Progressing

## 2024-07-23 NOTE — CONSULTS
[facility]  part of [facility]    Nephrology Consult Note    Date of Admission:  7/21/2024  Date of Consult:  7/23/2024   Reason for Consultation:   Hypokalemia, metabolic acidosis    History of Present Illness:   Patient is a 64 year old female with past medical history of HTN, HLD, RA, h/o pancreatitis, and GERD who presented with intermittent N/V and generalized weakness for 3 weeks. Denies diarrhea. She was recently started on antibiotics for a UTI outpatient. On admission, she is found to have potassium of 2.8 and bicarb 14.  Denies nausea and vomiting since admission.      Past Medical History  Past Medical History:    Asthma (HCC)    Esophageal reflux    Essential hypertension    Hyperlipidemia    Pancreatitis (HCC)    RA (rheumatoid arthritis) (HCC)       Past Surgical History  Past Surgical History:   Procedure Laterality Date    Revision of uterine anomaly      Rotator cuff repair      Wrist fracture surgery         Family History  No family history on file.    Social History  Pediatric History   Patient Parents    Not on file     Other Topics Concern    Not on file   Social History Narrative    Not on file           Current Medications:  Current Facility-Administered Medications   Medication Dose Route Frequency    potassium chloride 40 mEq in 250mL sodium chloride 0.9% IVPB premix  40 mEq Intravenous Once    potassium chloride (Klor-Con M10) tab 10 mEq  10 mEq Oral BID    magnesium sulfate in sterile water for injection 2 g/50mL IVPB premix 2 g  2 g Intravenous Once    dextrose 10% infusion   Intravenous Continuous    amLODIPine (Norvasc) tab 5 mg  5 mg Oral Daily    atorvastatin (Lipitor) tab 10 mg  10 mg Oral Daily    aspirin DR tab 81 mg  81 mg Oral Daily    clopidogrel (Plavix) tab 75 mg  75 mg Oral Daily    HYDROcodone-acetaminophen (Norco)  MG per tab 1 tablet  1 tablet Oral Q6H PRN    acetaminophen (Tylenol) tab 650 mg  650 mg Oral Q6H PRN    heparin (Porcine) 5000  UNIT/ML injection 5,000 Units  5,000 Units Subcutaneous Q12H    ondansetron (Zofran) 4 MG/2ML injection 4 mg  4 mg Intravenous Q6H PRN    zolpidem (Ambien) tab 5 mg  5 mg Oral Nightly PRN    alum-mag hydroxide-simethicone (Maalox) 200-200-20 MG/5ML oral suspension 30 mL  30 mL Oral QID PRN    cefTRIAXone (Rocephin) 1 g in sodium chloride 0.9% 100 mL IVPB-ADDV  1 g Intravenous Q24H    magnesium oxide (Mag-Ox) tab 800 mg  800 mg Oral Once    metoclopramide (Reglan) 5 mg/mL injection 10 mg  10 mg Intravenous Q6H    morphINE PF 2 MG/ML injection 2 mg  2 mg Intravenous Q6H PRN    pantoprazole (Protonix) 40 mg in sodium chloride 0.9% PF 10 mL IV push  40 mg Intravenous Q12H    glucose (Dex4) 15 GM/59ML oral liquid 15 g  15 g Oral Q15 Min PRN    Or    glucose (Glutose) 40% oral gel 15 g  15 g Oral Q15 Min PRN    Or    glucose-vitamin C (Dex-4) chewable tab 4 tablet  4 tablet Oral Q15 Min PRN    Or    dextrose 50% injection 50 mL  50 mL Intravenous Q15 Min PRN    Or    glucose (Dex4) 15 GM/59ML oral liquid 30 g  30 g Oral Q15 Min PRN    Or    glucose (Glutose) 40% oral gel 30 g  30 g Oral Q15 Min PRN    Or    glucose-vitamin C (Dex-4) chewable tab 8 tablet  8 tablet Oral Q15 Min PRN       Allergies  Allergies   Allergen Reactions    Gadolinium Derivatives FEVER    Penicillins OTHER (SEE COMMENTS)     esophagitis    Sulfa Antibiotics OTHER (SEE COMMENTS)     esophagitis       Review of Systems:   Review of Systems   Constitutional:  Positive for fatigue. Negative for appetite change, chills and fever.   HENT:  Negative for ear pain, rhinorrhea, sore throat and trouble swallowing.    Eyes:  Negative for pain and discharge.   Respiratory:  Negative for cough, chest tightness and shortness of breath.    Cardiovascular:  Negative for chest pain and leg swelling.   Gastrointestinal:  Negative for abdominal pain, constipation, diarrhea and vomiting.   Genitourinary:  Negative for decreased urine volume, dysuria and flank pain.    Musculoskeletal:  Negative for arthralgias, back pain, gait problem and myalgias.   Skin:  Negative for rash.   Neurological:  Negative for dizziness, speech difficulty, weakness, numbness and headaches.   Psychiatric/Behavioral:  Negative for agitation and confusion.        Physical Exam:   Height: --  Weight: --  BSA (Calculated - sq m): --  Pulse: 122 (07/23 0806)  BP: 123/95 (07/23 0806)  Temp: 98.4 °F (36.9 °C) (07/23 0806)  Do Not Use - Resp Rate: --  SpO2: 100 % (07/23 0806)  Temp:  [97.6 °F (36.4 °C)-98.6 °F (37 °C)] 98.4 °F (36.9 °C)  Pulse:  [100-122] 122  Resp:  [16-18] 18  BP: ()/(63-95) 123/95  SpO2:  [99 %-100 %] 100 %  SpO2: 100 %   No intake or output data in the 24 hours ending 07/23/24 1436  Wt Readings from Last 5 Encounters:   07/22/24 183 lb 12.8 oz (83.4 kg)   06/28/24 192 lb (87.1 kg)   09/06/21 160 lb (72.6 kg)   08/11/21 170 lb (77.1 kg)       Physical Exam  Constitutional:       Appearance: Normal appearance.   HENT:      Head: Normocephalic and atraumatic.      Nose: Nose normal.   Eyes:      General: No scleral icterus.  Cardiovascular:      Rate and Rhythm: Normal rate and regular rhythm.      Heart sounds: Normal heart sounds. No murmur heard.  Pulmonary:      Effort: Pulmonary effort is normal.      Breath sounds: Normal breath sounds.   Abdominal:      General: Bowel sounds are normal. There is no distension.      Palpations: Abdomen is soft.   Musculoskeletal:         General: No tenderness. Normal range of motion.      Cervical back: Normal range of motion and neck supple.      Comments: Neg edema   Skin:     General: Skin is warm and dry.      Findings: No rash.   Neurological:      General: No focal deficit present.      Mental Status: She is alert and oriented to person, place, and time. Mental status is at baseline.   Psychiatric:         Mood and Affect: Mood normal.         Behavior: Behavior normal.         Thought Content: Thought content normal.         Results:      Laboratory Data:  Recent Labs   Lab 07/21/24  1451 07/22/24  0934 07/23/24  0530   RBC 4.19 3.46* 3.42*   HGB 12.1 10.0* 10.0*   HCT 36.4 30.2* 29.7*   MCV 86.9 87.3 86.8   NEPRELIM 4.09 2.92 2.44   WBC 7.2 6.0 5.2   .0 248.0 269.0     Recent Labs   Lab 07/21/24  1451 07/21/24  2107 07/22/24  0934 07/23/24  0530 07/23/24  1249   GLU 84 75 56* 96  --    BUN 9 6* <5* <5*  --    CREATSERUM 0.97 0.74 0.65 0.70  --    CA 8.6* 7.8* 7.6* 7.3*  --    ALB 3.7  --   --   --   --     140 144 143  --    K 2.8* 3.8 3.7 2.9* 4.6    115* 119* 119*  --    CO2 14.0* 14.0* 12.0* 14.0*  --    ALKPHO 92  --   --   --   --    AST 37*  --   --   --   --    ALT 17  --   --   --   --    BILT 0.6  --   --   --   --    TP 6.2  --   --   --   --          Impression and Recommendations:     Patient is a 64 year old female with past medical history of HTN, HLD, RA, h/o pancreatitis, and GERD who presented with intermittent N/V and generalized weakness for 3 weeks.    Hypokalemia:   Likely due to decreased intake/vomiting.   Potassium improved to 3.2. Cont IV repletion.     Metabolic acidosis:   Likely due to NS  Now on D10 since hypoglycemia. Cont fluids.     N/V:   For EGD today.   GI following.       Thank you for allowing me to participate in the care of your patient.      Jarvis Fleming MD  Staff Nephrologist  7/23/2024

## 2024-07-23 NOTE — PLAN OF CARE
Alert and oriented x4 on room air. Remote tele, no calls this shift. ACHS. Denies N/V. NPO since midnight, plan for EGD today. IVF running as ordered. Call light within reach.    BP low this AM, Dr. Alvarez notified and bolus ordered. BP improved to systolic in the 90s.     Lab called critical K+ of 2.9   Dr. Alvarez notified, instructed to follow electrolyte protocol.    Problem: Patient Centered Care  Goal: Patient preferences are identified and integrated in the patient's plan of care  Description: Interventions:  - What would you like us to know as we care for you?   - Provide timely, complete, and accurate information to patient/family  - Incorporate patient and family knowledge, values, beliefs, and cultural backgrounds into the planning and delivery of care  - Encourage patient/family to participate in care and decision-making at the level they choose  - Honor patient and family perspectives and choices  Outcome: Progressing     Problem: Patient/Family Goals  Goal: Patient/Family Long Term Goal  Description: Patient's Long Term Goal:     Interventions:  -   - See additional Care Plan goals for specific interventions  Outcome: Progressing  Goal: Patient/Family Short Term Goal  Description: Patient's Short Term Goal:     Interventions:   -   - See additional Care Plan goals for specific interventions  Outcome: Progressing     Problem: PAIN - ADULT  Goal: Verbalizes/displays adequate comfort level or patient's stated pain goal  Description: INTERVENTIONS:  - Encourage pt to monitor pain and request assistance  - Assess pain using appropriate pain scale  - Administer analgesics based on type and severity of pain and evaluate response  - Implement non-pharmacological measures as appropriate and evaluate response  - Consider cultural and social influences on pain and pain management  - Manage/alleviate anxiety  - Utilize distraction and/or relaxation techniques  - Monitor for opioid side effects  - Notify  MD/LIP if interventions unsuccessful or patient reports new pain  - Anticipate increased pain with activity and pre-medicate as appropriate  Outcome: Progressing     Problem: RISK FOR INFECTION - ADULT  Goal: Absence of fever/infection during anticipated neutropenic period  Description: INTERVENTIONS  - Monitor WBC  - Administer growth factors as ordered  - Implement neutropenic guidelines  Outcome: Progressing     Problem: SAFETY ADULT - FALL  Goal: Free from fall injury  Description: INTERVENTIONS:  - Assess pt frequently for physical needs  - Identify cognitive and physical deficits and behaviors that affect risk of falls.  - Edwards fall precautions as indicated by assessment.  - Educate pt/family on patient safety including physical limitations  - Instruct pt to call for assistance with activity based on assessment  - Modify environment to reduce risk of injury  - Provide assistive devices as appropriate  - Consider OT/PT consult to assist with strengthening/mobility  - Encourage toileting schedule  Outcome: Progressing     Problem: DISCHARGE PLANNING  Goal: Discharge to home or other facility with appropriate resources  Description: INTERVENTIONS:  - Identify barriers to discharge w/pt and caregiver  - Include patient/family/discharge partner in discharge planning  - Arrange for needed discharge resources and transportation as appropriate  - Identify discharge learning needs (meds, wound care, etc)  - Arrange for interpreters to assist at discharge as needed  - Consider post-discharge preferences of patient/family/discharge partner  - Complete POLST form as appropriate  - Assess patient's ability to be responsible for managing their own health  - Refer to Case Management Department for coordinating discharge planning if the patient needs post-hospital services based on physician/LIP order or complex needs related to functional status, cognitive ability or social support system  Outcome: Progressing      Problem: Altered Communication/Language Barrier  Goal: Patient/Family is able to understand and participate in their care  Description: Interventions:  - Assess communication ability and preferred communication style  - Implement communication aides and strategies  - Use visual cues when possible  - Listen attentively, be patient, do not interrupt  - Minimize distractions  - Allow time for understanding and response  - Establish method for patient to ask for assistance (call light)  - Provide an  as needed  - Communicate barriers and strategies to overcome with those who interact with patient  Outcome: Progressing      Cimetidine Counseling:  I discussed with the patient the risks of Cimetidine including but not limited to gynecomastia, headache, diarrhea, nausea, drowsiness, arrhythmias, pancreatitis, skin rashes, psychosis, bone marrow suppression and kidney toxicity.

## 2024-07-23 NOTE — INTERVAL H&P NOTE
Pre-op Diagnosis: nausea vomiting    The above referenced H&P was reviewed by Maryse Sanders Ma, MD on 7/23/2024, the patient was examined and no significant changes have occurred in the patient's condition since the H&P was performed.  I discussed with the patient and/or legal representative the potential benefits, risks and side effects of this procedure; the likelihood of the patient achieving goals; and potential problems that might occur during recuperation.  I discussed reasonable alternatives to the procedure, including risks, benefits and side effects related to the alternatives and risks related to not receiving this procedure.  We will proceed with procedure as planned.

## 2024-07-24 ENCOUNTER — APPOINTMENT (OUTPATIENT)
Dept: ULTRASOUND IMAGING | Facility: HOSPITAL | Age: 64
End: 2024-07-24
Attending: HOSPITALIST
Payer: COMMERCIAL

## 2024-07-24 ENCOUNTER — APPOINTMENT (OUTPATIENT)
Dept: CT IMAGING | Facility: HOSPITAL | Age: 64
End: 2024-07-24
Attending: INTERNAL MEDICINE
Payer: COMMERCIAL

## 2024-07-24 PROBLEM — E83.42 HYPOMAGNESEMIA: Status: ACTIVE | Noted: 2024-07-24

## 2024-07-24 PROBLEM — E83.42 HYPOMAGNESEMIA: Status: ACTIVE | Noted: 2024-01-01

## 2024-07-24 LAB
ANION GAP SERPL CALC-SCNC: 12 MMOL/L (ref 0–18)
BASOPHILS # BLD AUTO: 0.04 X10(3) UL (ref 0–0.2)
BASOPHILS NFR BLD AUTO: 0.7 %
BUN BLD-MCNC: <5 MG/DL (ref 9–23)
CALCIUM BLD-MCNC: 7.4 MG/DL (ref 8.7–10.4)
CHLORIDE SERPL-SCNC: 119 MMOL/L (ref 98–112)
CO2 SERPL-SCNC: 11 MMOL/L (ref 21–32)
CORTIS SERPL-MCNC: 24.2 UG/DL
CORTIS SERPL-MCNC: 8.3 UG/DL
CREAT BLD-MCNC: 0.63 MG/DL
DEPRECATED RDW RBC AUTO: 57.1 FL (ref 35.1–46.3)
EGFRCR SERPLBLD CKD-EPI 2021: 99 ML/MIN/1.73M2 (ref 60–?)
EOSINOPHIL # BLD AUTO: 0.09 X10(3) UL (ref 0–0.7)
EOSINOPHIL NFR BLD AUTO: 1.7 %
ERYTHROCYTE [DISTWIDTH] IN BLOOD BY AUTOMATED COUNT: 18.3 % (ref 11–15)
GLUCOSE BLD-MCNC: 82 MG/DL (ref 70–99)
HCT VFR BLD AUTO: 30.4 %
HGB BLD-MCNC: 10.1 G/DL
IMM GRANULOCYTES # BLD AUTO: 0.02 X10(3) UL (ref 0–1)
IMM GRANULOCYTES NFR BLD: 0.4 %
LYMPHOCYTES # BLD AUTO: 1.7 X10(3) UL (ref 1–4)
LYMPHOCYTES NFR BLD AUTO: 31.3 %
MAGNESIUM SERPL-MCNC: 1.5 MG/DL (ref 1.6–2.6)
MCH RBC QN AUTO: 28.9 PG (ref 26–34)
MCHC RBC AUTO-ENTMCNC: 33.2 G/DL (ref 31–37)
MCV RBC AUTO: 87.1 FL
MONOCYTES # BLD AUTO: 0.74 X10(3) UL (ref 0.1–1)
MONOCYTES NFR BLD AUTO: 13.6 %
NEUTROPHILS # BLD AUTO: 2.85 X10 (3) UL (ref 1.5–7.7)
NEUTROPHILS # BLD AUTO: 2.85 X10(3) UL (ref 1.5–7.7)
NEUTROPHILS NFR BLD AUTO: 52.3 %
PHOSPHATE SERPL-MCNC: 1.7 MG/DL (ref 2.4–5.1)
PLATELET # BLD AUTO: 328 10(3)UL (ref 150–450)
POTASSIUM SERPL-SCNC: 3.7 MMOL/L (ref 3.5–5.1)
RBC # BLD AUTO: 3.49 X10(6)UL
SODIUM SERPL-SCNC: 142 MMOL/L (ref 136–145)
WBC # BLD AUTO: 5.4 X10(3) UL (ref 4–11)

## 2024-07-24 PROCEDURE — 99233 SBSQ HOSP IP/OBS HIGH 50: CPT | Performed by: INTERNAL MEDICINE

## 2024-07-24 PROCEDURE — 99232 SBSQ HOSP IP/OBS MODERATE 35: CPT | Performed by: REGISTERED NURSE

## 2024-07-24 PROCEDURE — 75635 CT ANGIO ABDOMINAL ARTERIES: CPT | Performed by: INTERNAL MEDICINE

## 2024-07-24 PROCEDURE — 93970 EXTREMITY STUDY: CPT | Performed by: HOSPITALIST

## 2024-07-24 RX ORDER — POLYETHYLENE GLYCOL 3350 17 G/17G
17 POWDER, FOR SOLUTION ORAL DAILY
Status: DISCONTINUED | OUTPATIENT
Start: 2024-07-24 | End: 2024-07-27

## 2024-07-24 RX ORDER — PREDNISONE 5 MG/1
10 TABLET ORAL
Status: DISCONTINUED | OUTPATIENT
Start: 2024-07-24 | End: 2024-07-27

## 2024-07-24 RX ORDER — LIDOCAINE HYDROCHLORIDE 10 MG/ML
5 INJECTION, SOLUTION EPIDURAL; INFILTRATION; INTRACAUDAL; PERINEURAL
Status: CANCELLED | OUTPATIENT
Start: 2024-07-24 | End: 2024-07-24

## 2024-07-24 RX ORDER — POTASSIUM CHLORIDE 20 MEQ/1
20 TABLET, EXTENDED RELEASE ORAL 2 TIMES DAILY
Status: DISCONTINUED | OUTPATIENT
Start: 2024-07-24 | End: 2024-07-25

## 2024-07-24 NOTE — PROGRESS NOTES
Patient is a 64 year old female who was admitted to the hospital for Dehydration:  Patient was admitted with nausea and vomiting metabolic acidosis and hypokalemia.  Patient also had hypoglycemia and she has history of rheumatoid arthritis patient has been on prednisone for the past 23 years.  She was diagnosed with adrenal insufficiency.  She is also been mildly tachycardic therefore CT scan of the chest was done without evidence for pulmonary embolism.  There was no DVT on lower extremity venous duplex.  Patient did have a cardiac catheterization in [place] and she was told that she had small arteries but no obstruction.  She was placed on aspirin and clopidogrel.  Afterwards she developed an ulceration of her right leg from the inside out where she could feel the right leg burning.  She had no injury to the area prior to her procedure.  There was no injury afterwards either.    Past Medical History:   Past Medical History:    Asthma (HCC)    Esophageal reflux    Essential hypertension    Hyperlipidemia    Pancreatitis (HCC)    RA (rheumatoid arthritis) (HCC)       Past Surgical History:  Past Surgical History:   Procedure Laterality Date    Revision of uterine anomaly      Rotator cuff repair      Wrist fracture surgery         Family History:  History reviewed. No pertinent family history.    Social History:  Pediatric History   Patient Parents    Not on file     Other Topics Concern    Not on file   Social History Narrative    Not on file           Current Medications:  Current Facility-Administered Medications   Medication Dose Route Frequency    polyethylene glycol (PEG 3350) (Miralax) 17 g oral packet 17 g  17 g Oral Daily    predniSONE (Deltasone) tab 10 mg  10 mg Oral Daily with breakfast    potassium chloride (Klor-Con M20) tab 20 mEq  20 mEq Oral BID    dextrose 10% infusion   Intravenous Continuous    amLODIPine (Norvasc) tab 5 mg  5 mg Oral Daily    atorvastatin (Lipitor) tab 10 mg  10 mg Oral Daily     aspirin DR tab 81 mg  81 mg Oral Daily    clopidogrel (Plavix) tab 75 mg  75 mg Oral Daily    HYDROcodone-acetaminophen (Norco)  MG per tab 1 tablet  1 tablet Oral Q6H PRN    acetaminophen (Tylenol) tab 650 mg  650 mg Oral Q6H PRN    heparin (Porcine) 5000 UNIT/ML injection 5,000 Units  5,000 Units Subcutaneous Q12H    ondansetron (Zofran) 4 MG/2ML injection 4 mg  4 mg Intravenous Q6H PRN    zolpidem (Ambien) tab 5 mg  5 mg Oral Nightly PRN    alum-mag hydroxide-simethicone (Maalox) 200-200-20 MG/5ML oral suspension 30 mL  30 mL Oral QID PRN    magnesium oxide (Mag-Ox) tab 800 mg  800 mg Oral Once    metoclopramide (Reglan) 5 mg/mL injection 10 mg  10 mg Intravenous Q6H    morphINE PF 2 MG/ML injection 2 mg  2 mg Intravenous Q6H PRN    pantoprazole (Protonix) 40 mg in sodium chloride 0.9% PF 10 mL IV push  40 mg Intravenous Q12H    glucose (Dex4) 15 GM/59ML oral liquid 15 g  15 g Oral Q15 Min PRN    Or    glucose (Glutose) 40% oral gel 15 g  15 g Oral Q15 Min PRN    Or    glucose-vitamin C (Dex-4) chewable tab 4 tablet  4 tablet Oral Q15 Min PRN    Or    dextrose 50% injection 50 mL  50 mL Intravenous Q15 Min PRN    Or    glucose (Dex4) 15 GM/59ML oral liquid 30 g  30 g Oral Q15 Min PRN    Or    glucose (Glutose) 40% oral gel 30 g  30 g Oral Q15 Min PRN    Or    glucose-vitamin C (Dex-4) chewable tab 8 tablet  8 tablet Oral Q15 Min PRN     Medications Prior to Admission   Medication Sig    amLODIPine 5 MG Oral Tab Take 1 tablet (5 mg total) by mouth daily.    Aspirin 81 MG Oral Cap 81 mg.    atorvastatin 10 MG Oral Tab Take 1 tablet (10 mg total) by mouth daily.    cefuroxime 500 MG Oral Tab Take 1 tablet (500 mg total) by mouth 2 (two) times daily.    clopidogrel 75 MG Oral Tab Take 1 tablet (75 mg total) by mouth daily.    cyclobenzaprine 10 MG Oral Tab Take 1 tablet (10 mg total) by mouth nightly.    ergocalciferol 1.25 MG (77061 UT) Oral Cap Take 1 capsule (50,000 Units total) by mouth once a week.     furosemide 40 MG Oral Tab Take 1 tablet (40 mg total) by mouth daily.    ibuprofen 600 MG Oral Tab Take 1 tablet (600 mg total) by mouth 3 (three) times daily.    losartan 50 MG Oral Tab Take 1 tablet (50 mg total) by mouth daily.    methotrexate 2.5 MG Oral Tab Take 1 tablet (2.5 mg total) by mouth once a week.    metoprolol tartrate 50 MG Oral Tab Take 1 tablet (50 mg total) by mouth 2 (two) times daily.    Omeprazole 40 MG Oral Capsule Delayed Release Take 1 capsule (40 mg total) by mouth daily.    oxybutynin ER 10 MG Oral Tablet 24 Hr Take 1 tablet (10 mg total) by mouth daily.    Potassium Chloride ER 10 MEQ Oral Tab CR Take 1 tablet (10 mEq total) by mouth 2 (two) times daily.    predniSONE 5 MG Oral Tab Take 1 tablet (5 mg total) by mouth daily. 4xs weekly ,Tuesday,Thursday, saturday    predniSONE 1 MG Oral Tab Take 1 tablet (1 mg total) by mouth As Directed. 3tablets Monday,Wednesday, Friday    folic acid 1 MG Oral Tab Take 1 tablet (1 mg total) by mouth daily.    hydroxychloroquine 200 MG Oral Tab Take 1 tablet (200 mg total) by mouth 2 (two) times daily.    HYDROcodone-acetaminophen (NORCO)  MG Oral Tab Take 1 tablet by mouth in the morning and 1 tablet at noon and 1 tablet in the evening.    [] ciprofloxacin 500 MG Oral Tab Take 1 tablet (500 mg total) by mouth 2 (two) times daily for 7 days.       Allergies:  Allergies   Allergen Reactions    Gadolinium Derivatives FEVER    Penicillins OTHER (SEE COMMENTS)     esophagitis    Sulfa Antibiotics OTHER (SEE COMMENTS)     esophagitis       Review of Systems:   A comprehensive 12 point review of system was performed.  All other review of systems are negative.    Physical Exam:   Blood pressure 118/81, pulse 104, temperature 98.8 °F (37.1 °C), temperature source Oral, resp. rate 16, height 5' 2\" (1.575 m), weight 183 lb 12.8 oz (83.4 kg), SpO2 91%.  Intake/Output:   Last 3 shifts: [id number]   This shift:   I/O this shift:  In: 1000  [P.O.:400; I.V.:600]  Out: -        Vent Settings:      Hemodynamic parameters (last 24 hours):      Scheduled Meds:    polyethylene glycol (PEG 3350)  17 g Oral Daily    predniSONE  10 mg Oral Daily with breakfast    potassium chloride  20 mEq Oral BID    amLODIPine  5 mg Oral Daily    atorvastatin  10 mg Oral Daily    aspirin  81 mg Oral Daily    clopidogrel  75 mg Oral Daily    heparin  5,000 Units Subcutaneous Q12H    magnesium oxide  800 mg Oral Once    metoclopramide  10 mg Intravenous Q6H    pantoprazole  40 mg Intravenous Q12H       Continuous Infusions:    dextrose 75 mL/hr at 07/24/24 0702         Physical Exam:    General: No acute distress.  HEENT: No scleral icterus.  External ears are normal.  Lids are normal.  Oral mucosa is moist.  Neck: No JVD, no bruits.  Cardiac: Normal rate, No murmur.  Lungs: Clear without rhales, rhochi or wheezing.  Abdomen: Soft, non-tender. No abdominal bruit. No hepatojugular reflux.  Extremities: No edema.  R leg ulceration with scab. R DP pulse palpable.   Neurologic: Alert and moving all 4 extremities.  Psychiatry: Patient has calm affect.  Lymphatic: No cervical or lower extremity lymphadenopathy.  Skin/nails: No clubbing.  Musculoskeletal: No joint effusions.    Results:     Laboratory Data:  Lab Results   Component Value Date    WBC 5.4 07/24/2024    HGB 10.1 (L) 07/24/2024    HCT 30.4 (L) 07/24/2024    .0 07/24/2024    CREATSERUM 0.63 07/24/2024    BUN <5 (L) 07/24/2024     07/24/2024    K 3.7 07/24/2024     (H) 07/24/2024    CO2 11.0 (L) 07/24/2024    GLU 82 07/24/2024    CA 7.4 (L) 07/24/2024    ALB 3.7 07/21/2024    ALKPHO 92 07/21/2024    TP 6.2 07/21/2024    AST 37 (H) 07/21/2024    ALT 17 07/21/2024    LIP 34 07/21/2024    DDIMER 1.69 (H) 07/23/2024    MG 1.5 (L) 07/24/2024    PHOS 1.7 (L) 07/24/2024         Imaging:  @[email or web address]@        IMPRESSION:  Sinus tachycardia of unclear etiology ? Due to adrenal insufficiency.  We will obtain  echocardiogram.  It has been ordered and it is still pending.  Patient was ruled out for pulmonary embolism there were no DVTs but lower extremity venous duplex.    Right lower extremity ulceration of unclear etiology.  There is a suspicion it could be arterial embolization from the groin site after procedure.  She denies having any kind of closure device that she recalls or having any kind of card.    Coronary artery disease with small vessel ischemic disease as described by the patient.  Patient is on dual antiplatelet therapy along with statin.    Adrenal insufficiency.  Patient has been resumed on prednisone.    Recommendations: Proceed with lower extremity CT angiogram to look for any arterial embolus.  She does have a good pulse on the right lower extremity dorsalis pedis.  Right leg is warm.        Thank you very much for the consultation. Please do not hesitate to call with questions.    Marquis Moctezuma DO [facility]  [address]  [place] [address]  [phone]

## 2024-07-24 NOTE — PLAN OF CARE
Alert and oriented x4 on room air. Remote tele, no calls this shift. Up standby assist and cane. No N/V. IVF running as ordered. EGD postponed. Pt needs cardiology done. Needs echo and venous doppler of lower extremities. Stim test to be done this AM per nursing communication. Per Dr. Powell, cosyntropin to be given sometime in the morning ideally before 10am.      Problem: Patient Centered Care  Goal: Patient preferences are identified and integrated in the patient's plan of care  Description: Interventions:  - What would you like us to know as we care for you?   - Provide timely, complete, and accurate information to patient/family  - Incorporate patient and family knowledge, values, beliefs, and cultural backgrounds into the planning and delivery of care  - Encourage patient/family to participate in care and decision-making at the level they choose  - Honor patient and family perspectives and choices  Outcome: Progressing     Problem: Patient/Family Goals  Goal: Patient/Family Long Term Goal  Description: Patient's Long Term Goal:     Interventions:  -   - See additional Care Plan goals for specific interventions  Outcome: Progressing  Goal: Patient/Family Short Term Goal  Description: Patient's Short Term Goal:     Interventions:   -   - See additional Care Plan goals for specific interventions  Outcome: Progressing     Problem: PAIN - ADULT  Goal: Verbalizes/displays adequate comfort level or patient's stated pain goal  Description: INTERVENTIONS:  - Encourage pt to monitor pain and request assistance  - Assess pain using appropriate pain scale  - Administer analgesics based on type and severity of pain and evaluate response  - Implement non-pharmacological measures as appropriate and evaluate response  - Consider cultural and social influences on pain and pain management  - Manage/alleviate anxiety  - Utilize distraction and/or relaxation techniques  - Monitor for opioid side effects  - Notify MD/LIP if  interventions unsuccessful or patient reports new pain  - Anticipate increased pain with activity and pre-medicate as appropriate  Outcome: Progressing     Problem: RISK FOR INFECTION - ADULT  Goal: Absence of fever/infection during anticipated neutropenic period  Description: INTERVENTIONS  - Monitor WBC  - Administer growth factors as ordered  - Implement neutropenic guidelines  Outcome: Progressing     Problem: SAFETY ADULT - FALL  Goal: Free from fall injury  Description: INTERVENTIONS:  - Assess pt frequently for physical needs  - Identify cognitive and physical deficits and behaviors that affect risk of falls.  - [place] fall precautions as indicated by assessment.  - Educate pt/family on patient safety including physical limitations  - Instruct pt to call for assistance with activity based on assessment  - Modify environment to reduce risk of injury  - Provide assistive devices as appropriate  - Consider OT/PT consult to assist with strengthening/mobility  - Encourage toileting schedule  Outcome: Progressing     Problem: DISCHARGE PLANNING  Goal: Discharge to home or other facility with appropriate resources  Description: INTERVENTIONS:  - Identify barriers to discharge w/pt and caregiver  - Include patient/family/discharge partner in discharge planning  - Arrange for needed discharge resources and transportation as appropriate  - Identify discharge learning needs (meds, wound care, etc)  - Arrange for interpreters to assist at discharge as needed  - Consider post-discharge preferences of patient/family/discharge partner  - Complete POLST form as appropriate  - Assess patient's ability to be responsible for managing their own health  - Refer to Case Management Department for coordinating discharge planning if the patient needs post-hospital services based on physician/LIP order or complex needs related to functional status, cognitive ability or social support system  Outcome: Progressing     Problem:  Altered Communication/Language Barrier  Goal: Patient/Family is able to understand and participate in their care  Description: Interventions:  - Assess communication ability and preferred communication style  - Implement communication aides and strategies  - Use visual cues when possible  - Listen attentively, be patient, do not interrupt  - Minimize distractions  - Allow time for understanding and response  - Establish method for patient to ask for assistance (call light)  - Provide an  as needed  - Communicate barriers and strategies to overcome with those who interact with patient  Outcome: Progressing

## 2024-07-24 NOTE — DIETARY NOTE
ADULT NUTRITION INITIAL ASSESSMENT    Pt is at moderate nutrition risk.  Pt does not meet malnutrition criteria.      RECOMMENDATIONS TO MD: See Nutrition Intervention for oral nutritional supplement (ONS) specifics     ADMITTING DIAGNOSIS:  Dehydration [E86.0]  Metabolic acidosis [E87.20]  PERTINENT PAST MEDICAL HISTORY:   Past Medical History:    Asthma (HCC)    Esophageal reflux    Essential hypertension    Hyperlipidemia    Pancreatitis (HCC)    RA (rheumatoid arthritis) (HCC)     PATIENT STATUS: Initial 07/24/24: Pt admit for dehydration and metabolic acidosis. PMH sig for RA, HTN, Hyperlipidemia, Asthma, hx pancreatitis and others noted above. Pt assessed due to screening at risk for decreased appetite and unintentional weight loss (MST 2). Chart reviewed, pt admitted with c/o generalized weakness. Pt c/o multiple episodes of non-bloody non-bilious emesis, constipation and decreased urine output. Discussion with RN, plan for NPO at midnight for EGD tomorrow. Intake reviewed, 100% x 1 meal since admit (good). Pt visited, family at bedside. Pt reports decreased appetite/intake for last 3 weeks (< 50%). Pt reports using Ensure Original prior to admission (PTA) - 2x/day but would only get about 3/4 of one in (prefers vanilla or strawberry). Denies nausea or abdominal pain. Pt reports weight loss, usual body weight (UBW) around 189# but got down to ~160#. Current weight 183# 12.8 oz. EMR weight review, last known weight 192# on 6/28/24 - 8.2# or 4.3% weight loss x 1 month (non-significant). Per EMR weight review utilizing care everywhere, pt noted weighing 205# on 5/16/24 - discussed with pt, reports not accurate. Nutrition focused physical exam (NFPE) completed, see below for details. Encouraged increased energy and protein intake. Discussed adding oral nutritional supplement (ONS) to help maximize nutrition - pt in agreement to Ensure Compact (dislikes Ensure Enlive/High Protein).     FOOD/NUTRITION RELATED  HISTORY:  Appetite:  decreased/poor appetite PTA x 3 weeks per pt  Intake: ~100% x1 meals documented since admit  Intake Meeting Needs: No, but oral nutrition supplements (ONS) to maximize  Percent Meals Eaten (last 3 days)       Date/Time Percent Meals Eaten (%)    07/24/24 1156 100 %           Food Allergies: No Known Food Allergies (NKFA)  Cultural/Ethnic/B[employer]ist Preferences: Not Obtained    GASTROINTESTINAL: constipation Pt reports no BM  x 3 weeks  Miralax daily noted    MEDICATIONS: reviewed D10W @ 75 ml/hr (provides 612 calories from Dextrose and 1800 ml volume)  Oral Prednisone daily  Electrolyte replacement per protocol (non-cardiac)   polyethylene glycol (PEG 3350)  17 g Oral Daily    predniSONE  10 mg Oral Daily with breakfast    potassium chloride  20 mEq Oral BID    magnesium sulfate  4 g Intravenous Once    amLODIPine  5 mg Oral Daily    atorvastatin  10 mg Oral Daily    aspirin  81 mg Oral Daily    clopidogrel  75 mg Oral Daily    heparin  5,000 Units Subcutaneous Q12H    magnesium oxide  800 mg Oral Once    metoclopramide  10 mg Intravenous Q6H    pantoprazole  40 mg Intravenous Q12H      dextrose 75 mL/hr at 07/24/24 0702     LABS: reviewed Hypokalemia resolved  Hypomagnesemia (1.5) and Hypophosphatemia (1.7) noted  Recent Labs     07/21/24  1451 07/21/24  2107 07/22/24  0934 07/23/24  0530 07/23/24  1249 07/23/24  1445 07/24/24  0855 07/24/24  1100   GLU 84 75 56* 96  --   --  82  --    BUN 9 6* <5* <5*  --   --  <5*  --    CREATSERUM 0.97 0.74 0.65 0.70  --   --  0.63  --    CA 8.6* 7.8* 7.6* 7.3*  --   --  7.4*  --    MG 1.3*  --  1.6 1.3*  1.3*  --   --   --  1.5*    140 144 143  --   --  142  --    K 2.8* 3.8 3.7 2.9* 4.6 3.2* 3.7  --     115* 119* 119*  --   --  119*  --    CO2 14.0* 14.0* 12.0* 14.0*  --   --  11.0*  --    PHOS  --   --   --  1.6*  --   --   --  1.7*   [facility] 288 165  --   --   --   --   --   --      NUTRITION RELATED PHYSICAL FINDINGS:  - Nutrition Focused  Physical Exam (NFPE): well nourished   - Fluid Accumulation: none  see RN documentation for details  - Skin Integrity: Pressure Injury: unstageable on right;lower;posterior leg  see RN documentation for details    ANTHROPOMETRICS:  HT: 157.5 cm (5' 2\")  WT: 83.4 kg (183 lb 12.8 oz)   BMI: Body mass index is 33.62 kg/m².  BMI CLASSIFICATION: 30-34.9 kg/m2 - obesity class I  IBW: 110 lbs        167% IBW  Usual Body Wt: 189 lbs per pt      97% UBW    WEIGHT HISTORY:  Patient Weight(s) for the past 336 hrs:   Weight   07/22/24 0627 83.4 kg (183 lb 12.8 oz)     Wt Readings from Last 10 Encounters:   07/22/24 83.4 kg (183 lb 12.8 oz)   06/28/24 87.1 kg (192 lb)   09/06/21 72.6 kg (160 lb)   08/11/21 77.1 kg (170 lb)     NUTRITION DIAGNOSIS/PROBLEM:   Inadequate oral intake related to Decreased ability to consume sufficient energy as evidenced by pt reports decreased appetite/intake x 3 weeks PTA    NUTRITION INTERVENTION:   NUTRITION PRESCRIPTION:   Estimated Nutrition needs: --dosing wt of 83.4 kg - wt taken on 7/22/2024  Calories: 3468-6827 calories/day (18-22 calories per kg Dosing wt)  Protein: 65-90 g protein/day (1.3-1.8 g protein/kg Ideal body wt (IBW)50 kg)    - Diet:       Procedures    Regular/General diet Is Patient on Accuchecks? No    NPO      - RD Care Plan: Encouraged increased PO intake, Encouraged small frequent meals with emphasis on high calorie/high protein, and Initiated ONS (oral nutritional supplements)  - Meals and snacks: Encouraged small frequent meals and Encouraged increased PO intake  - Medical Food Supplements-RD added Ensure Compact (220 calories/ 9 g protein each) BID Vanilla. Rational/use of oral supplements discussed.  - Vitamin and mineral supplements: none  - Feeding assistance: meal set up  - Nutrition education: Discussed importance of adequate energy and protein intake     - Coordination of nutrition care: collaboration with other providers  - Discharge and transfer of nutrition care  to new setting or provider: monitor plans    MONITOR AND EVALUATE/NUTRITION GOALS:  - Food and Nutrient Intake:      Monitor: adequacy of PO intake and adequacy of supplement intake  - Food and Nutrient Administration:      Monitor: N/A  - Anthropometric Measurement:    Monitor weight  - Nutrition Goals:      maintain wt within 5%, PO and supplement greater than 75% of needs, good supplement intake, labs within acceptable limits, minimize lean body mass loss, support wound healing, and improved GI status    DIETITIAN FOLLOW UP: RD to follow and monitor nutrition status    Patsy Sutherland MS, NANDO, RDN, LDN  [id number]

## 2024-07-24 NOTE — PROGRESS NOTES
[facility]     Gastroenterology Progress Note    Sheri Garzon Patient Status:  Inpatient    1960 MRN [id number]   Location [facility] 4W/SW/SE Attending Shorty Couch MD   Hosp Day # 2 PCP No primary care provider on file.       Subjective:   Nausea improved, no vomiting  Feels she still has a lot o phlegm contributing to her symptoms, typically takes mucinex with improvement  Tolerating general diet for lunch  No BM today, passing gas  No fever, chills  No chest pain, SOB  Objective:   Blood pressure 102/69, pulse 104, temperature 97.5 °F (36.4 °C), temperature source Oral, resp. rate 17, height 5' 2\" (1.575 m), weight 183 lb 12.8 oz (83.4 kg), SpO2 96%. Body mass index is 33.62 kg/m².    Gen: awake, alert patient, NAD  HEENT: EOMI, the sclera appears anicteric, oropharynx clear, mucus membranes appear moist  CV: RRR  Lung: no conversational dyspnea   Abdomen: soft NTND abdomen with NABS appreciated   Skin: dry, warm, no jaundice  Ext: no LE edema is evident  Neuro: Alert and interactive  Psych: calm, cooperative    Assessment and Plan:   Sheri Garzon is a a(n) 64 year old female w/ a history of GERD, HTN, asthma, HLD, RA on plaquenil, on PLAVIX who presents with intractable nausea and emesis with upper abdominal pain leading to weight loss and dysphagia. Possible PUD vs erosive esophagitis vs peptic stricture vs neoplasm.     #N/V  #Epigastric Pain  #Weight Loss  #Dysphagia  -Labs on admission with hypokalemia, hypomagnesemia, LFTs and lipase WNL, normocytic anemia   -Etiology possible PUD vs erosive esophagitis vs peptic stricture vs neoplasm  -Last EGD >10 years ago  -Plan for EGD once cleared by cardiology.    Recommend:  -Diet as tolerated  -Empiric PPI  -EGD once cleared by cardiology    Case discussed with Maryse Hardin MD and Marie CHUNG.    Margaret Levy DNP, FNP-[facility] Gastroenterology  2024      Results:     Lab Results   Component Value Date     WBC 5.2 07/23/2024    HGB 10.0 (L) 07/23/2024    HCT 29.7 (L) 07/23/2024    .0 07/23/2024    CREATSERUM 0.70 07/23/2024    BUN <5 (L) 07/23/2024     07/23/2024    K 3.2 (L) 07/23/2024     (H) 07/23/2024    CO2 14.0 (L) 07/23/2024    GLU 96 07/23/2024    CA 7.3 (L) 07/23/2024    ALB 3.7 07/21/2024    ALKPHO 92 07/21/2024    BILT 0.6 07/21/2024    TP 6.2 07/21/2024    AST 37 (H) 07/21/2024    ALT 17 07/21/2024    LIP 34 07/21/2024    DDIMER 1.69 (H) 07/23/2024    MG 1.3 (L) 07/23/2024    MG 1.3 (L) 07/23/2024    PHOS 1.6 (L) 07/23/2024       CT CHEST PE AORTA (IV ONLY) (CPT=71260)    Result Date: 7/23/2024  CONCLUSION:   No evidence of acute pulmonary embolism to the 1st subsegmental pulmonary artery level.  Enlargement of the main pulmonary artery suggests pulmonary hypertension.  No aneurysm or dissection of the thoracic aorta.  No acute pulmonary consolidation.  Additional chronic or incidental findings are described in the body of this report.    elm-remote    Dictated by (CST): Ti Zhou MD on 7/23/2024 at 7:45 PM     Finalized by (CST): Ti Zhou MD on 7/23/2024 at 7:48 PM

## 2024-07-24 NOTE — PROGRESS NOTES
[facility]     Sheri Garzon Patient Status:  Inpatient    1960 MRN [id number]   Location [facility] 4W/SW/SE Attending Shorty Couch MD   Hosp Day # 2 PCP No primary care provider on file.     Chief Complaint:   Chief Complaint   Patient presents with    Abdomen/Flank Pain        Subjective:     Patient seen sitting in bed.  No family at bedside.  Patient denies acute events overnight.  Patient reports only mild nausea today, but much improved.  Denies further episodes of emesis.  Tolerating some p.o. intake.    Objective:      Vital signs:  Vitals:    24 1539 24 2018 24 0511 24 0755   BP: 118/86 122/77 (!) 108/96 102/69   BP Location: Left arm Radial Right arm Right arm   Pulse: 106 98 104    Resp:    Temp:  97.7 °F (36.5 °C) 98.5 °F (36.9 °C) 97.5 °F (36.4 °C)   TempSrc:  Oral Axillary Oral   SpO2: 99% 100% 100% 96%   Weight:       Height: 5' 2\" (1.575 m)        No intake or output data in the 24 hours ending 24 1046        Physical Exam:    GENERAL:  Awake and alert, in no acute distress.  HEART:  Regular rhythm, regular rate  LUNGS:  Air entry was minimally decreased.  No crackles or wheezes   ABDOMEN: Soft and non-tender.    PSYCHIATRIC: Normal mood    Diagnostic Data:    Labs:    Recent Labs   Lab 24  1451 24  0934 24  0530   WBC 7.2 6.0 5.2   HGB 12.1 10.0* 10.0*   MCV 86.9 87.3 86.8   .0 248.0 269.0       Recent Labs   Lab 24  1451 24  2107 24  0934 24  0530 24  1249 24  1445   GLU 84 75 56* 96  --   --    BUN 9 6* <5* <5*  --   --    CREATSERUM 0.97 0.74 0.65 0.70  --   --    CA 8.6* 7.8* 7.6* 7.3*  --   --    ALB 3.7  --   --   --   --   --     140 144 143  --   --    K 2.8* 3.8 3.7 2.9* 4.6 3.2*    115* 119* 119*  --   --    CO2 14.0* 14.0* 12.0* 14.0*  --   --    ALKPHO 92  --   --   --   --   --     AST 37*  --   --   --   --   --    ALT 17  --   --   --   --   --    BILT 0.6  --   --   --   --   --    TP 6.2  --   --   --   --   --            Estimated Creatinine Clearance: 64.2 mL/min (based on SCr of 0.7 mg/dL).    No results for input(s): \"PTP\", \"INR\" in the last 168 hours.         COVID-19  No results found for: \"COVID19\"    Pro-Calcitonin  Recent Labs   Lab 07/22/24  0934   PCT 0.10*       Cardiac  No results for input(s): \"TROP\", \"PBNP\" in the last 168 hours.    Inflammatory Markers  Recent Labs   Lab 07/23/24  1445   DDIMER 1.69*       Culture:  Hospital Encounter on 07/21/24   1. Urine Culture, Routine     Status: None    Collection Time: 07/21/24  6:56 PM    Specimen: Urine, clean catch   Result Value Ref Range    Urine Culture  N/A     >100,000 cfu/ml Multiple species present- probable contamination.       US VENOUS DOPPLER LEG BILAT - DIAG IMG (CPT=93970)    Result Date: 7/24/2024  CONCLUSION:   No evidence of deep venous thrombosis.     Dictated by (CST): Ron Whitman MD on 7/24/2024 at 10:06 AM     Finalized by (CST): Ron Whitman MD on 7/24/2024 at 10:06 AM          CT CHEST PE AORTA (IV ONLY) (CPT=71260)    Result Date: 7/23/2024  CONCLUSION:   No evidence of acute pulmonary embolism to the 1st subsegmental pulmonary artery level.  Enlargement of the main pulmonary artery suggests pulmonary hypertension.  No aneurysm or dissection of the thoracic aorta.  No acute pulmonary consolidation.  Additional chronic or incidental findings are described in the body of this report.    elm-remote    Dictated by (CST): Ti Zhou MD on 7/23/2024 at 7:45 PM     Finalized by (CST): Ti Zhou MD on 7/23/2024 at 7:48 PM          CT ABDOMEN+PELVIS(CONTRAST ONLY)(CPT=74177)    Result Date: 7/21/2024  CONCLUSION:  1. Limited exam secondary to patient respiratory motion artifact. 2. Mural thickening of distal gastric body and antrum suggests gastritis.  Correlation with endoscopy recommended if not  recently performed.    Dictated by (CST): Cristóbal Banks MD on 7/21/2024 at 5:32 PM     Finalized by (CST): Cristóbal Banks MD on 7/21/2024 at 5:39 PM                 Medications:    potassium chloride  10 mEq Oral BID    amLODIPine  5 mg Oral Daily    atorvastatin  10 mg Oral Daily    aspirin  81 mg Oral Daily    clopidogrel  75 mg Oral Daily    heparin  5,000 Units Subcutaneous Q12H    cefTRIAXone  1 g Intravenous Q24H    magnesium oxide  800 mg Oral Once    metoclopramide  10 mg Intravenous Q6H    pantoprazole  40 mg Intravenous Q12H       Assessment & Plan:        Intractable emesis   -Possibly secondary to gastritis  -Continue on Protonix 4  -Weaning IV fluids   -Antiemetics as needed  -advance diet as tolerated.    -GI consulted, planning for EGD once cleared by cardiology.  Appreciate further recommendations.     History of ACS  -Patient describes recent NSTEMI in [place].  -Per patient report, did not have stent placed at that time.  Patient describes areas of disease which was not stented and opted for medical management  -Continue on aspirin and Plavix  -Cardiology consulted, appreciate further recommendations     Adrenal insufficiency   -With episode of hypoglycemia  -hypoglycemia protocol  -Patient on chronic prednisone, 10 mg daily, due to rheumatoid arthritis.  Restart prednisone  -Endocrinology on consult, appreciate further recommendations  -Continue to monitor     Nonanion gap metabolic acidosis  Hypokalemia  Likely secondary to decreased p.o. intake and vomiting.  Weaning D5 IV fluids  Continue to monitor.    Possible acute UTI  -UA with some signs of infection  -Urinary culture with multiple species, possible contaminant  -Completed short course of Rocephin      Essential hypertension  Controlled  -Continue current regimen  -Monitor and add agents as needed     Rheumatoid arthritis  Stable  -Restarted on prednisone as above  - continue outpatient management with methotrexate upon  discharge.      Plan of care discussed with patient at bedside . Discussed management/test result(s) with Rn and GI consultant    Quality:  DVT Prophylaxis: Heparin  CODE status: Full  Estimated date of discharge: TBD  Discharge is dependent on: clinical stability    55 minutes spent discussing with other providers, examining patient, obtaining history, reviewing medical records, interpreting and communicating test results/imaging, ordering tests/medications, discussing plan of care and documenting information.      Shorty Couch MD          This note was prepared using Dragon Medical voice recognition dictation software. As a result errors may occur. When identified these errors have been corrected. While every attempt is made to correct errors during dictation discrepancies may still exist

## 2024-07-24 NOTE — CONSULTS
[facility]  part of [facility]    Report of Consultation    Sheri Garzon Patient Status:  Inpatient    1960 MRN [id number]   Location [facility] 4W/SW/SE Attending Leroy Hsieh MD   Hosp Day # 2 PCP No primary care provider on file.     Date of Admission:  2024  Date of Consult:  2024     Reason for Consultation:  Hypoglycemia     History of Present Illness:  Sheri Garzon is a a(n) 64 year old female.     65 y/o F with rheumatoid arthritis presents for initial evaluation of hypoglycemia. Since admission she has developed recurrent hypoglycemia despite dextrose infusion.     Of note she has a long history of Rheumatoid Arthritis and maintained on prednisone for the past 23 years. She has been maintained on Prednisone 8-10mg per day for over 20 years.  She does feel well when maintained on prednisone therapy.     History:  Past Medical History:    Asthma (HCC)    Esophageal reflux    Essential hypertension    Hyperlipidemia    Pancreatitis (HCC)    RA (rheumatoid arthritis) (HCC)     Past Surgical History:   Procedure Laterality Date    Revision of uterine anomaly      Rotator cuff repair      Wrist fracture surgery       History reviewed. No pertinent family history.   reports that she has never smoked. She has never used smokeless tobacco. She reports that she does not currently use alcohol. She reports that she does not use drugs.    Allergies:  Allergies   Allergen Reactions    Gadolinium Derivatives FEVER    Penicillins OTHER (SEE COMMENTS)     esophagitis    Sulfa Antibiotics OTHER (SEE COMMENTS)     esophagitis       Medications:    Current Facility-Administered Medications:     potassium chloride (Klor-Con M10) tab 10 mEq, 10 mEq, Oral, BID    dextrose 10% infusion, , Intravenous, Continuous    cosyntropin (Cortrosyn) injection 0.25 mg, 0.25 mg, Intravenous, Once    amLODIPine (Norvasc) tab 5 mg, 5 mg, Oral, Daily    atorvastatin (Lipitor) tab 10 mg,  10 mg, Oral, Daily    aspirin DR tab 81 mg, 81 mg, Oral, Daily    clopidogrel (Plavix) tab 75 mg, 75 mg, Oral, Daily    HYDROcodone-acetaminophen (Norco)  MG per tab 1 tablet, 1 tablet, Oral, Q6H PRN    acetaminophen (Tylenol) tab 650 mg, 650 mg, Oral, Q6H PRN    heparin (Porcine) 5000 UNIT/ML injection 5,000 Units, 5,000 Units, Subcutaneous, Q12H    ondansetron (Zofran) 4 MG/2ML injection 4 mg, 4 mg, Intravenous, Q6H PRN    zolpidem (Ambien) tab 5 mg, 5 mg, Oral, Nightly PRN    alum-mag hydroxide-simethicone (Maalox) 200-200-20 MG/5ML oral suspension 30 mL, 30 mL, Oral, QID PRN    cefTRIAXone (Rocephin) 1 g in sodium chloride 0.9% 100 mL IVPB-ADDV, 1 g, Intravenous, Q24H    magnesium oxide (Mag-Ox) tab 800 mg, 800 mg, Oral, Once    metoclopramide (Reglan) 5 mg/mL injection 10 mg, 10 mg, Intravenous, Q6H    morphINE PF 2 MG/ML injection 2 mg, 2 mg, Intravenous, Q6H PRN    pantoprazole (Protonix) 40 mg in sodium chloride 0.9% PF 10 mL IV push, 40 mg, Intravenous, Q12H    glucose (Dex4) 15 GM/59ML oral liquid 15 g, 15 g, Oral, Q15 Min PRN **OR** glucose (Glutose) 40% oral gel 15 g, 15 g, Oral, Q15 Min PRN **OR** glucose-vitamin C (Dex-4) chewable tab 4 tablet, 4 tablet, Oral, Q15 Min PRN **OR** dextrose 50% injection 50 mL, 50 mL, Intravenous, Q15 Min PRN **OR** glucose (Dex4) 15 GM/59ML oral liquid 30 g, 30 g, Oral, Q15 Min PRN **OR** glucose (Glutose) 40% oral gel 30 g, 30 g, Oral, Q15 Min PRN **OR** glucose-vitamin C (Dex-4) chewable tab 8 tablet, 8 tablet, Oral, Q15 Min PRN    A comprehensive 10 point review of systems was completed.  Pertinent positives and negatives noted in the the HPI.    Physical Exam:  Blood pressure 102/69, pulse 104, temperature 97.5 °F (36.4 °C), temperature source Oral, resp. rate 17, height 5' 2\" (1.575 m), weight 183 lb 12.8 oz (83.4 kg), SpO2 96%.    General: Alert, orientated x3.  Cooperative.  No apparent distress.  HEENT: Exam is unremarkable.  Neck: Supple.  Abdomen:  Bowel  sounds present  Extremities:  No lower extremity edema noted.  Skin: Normal texture and turgor.  Lymphatic:  No cervical lymphadenopathy.    Impression and Plan:  Patient Active Problem List   Diagnosis    Dehydration    Metabolic acidosis    Bilious vomiting with nausea    Difficult intravenous access    Hypokalemia       Adrenal Insufficiency  - Discussed hypoglycemia is secondary to lack of adrenal function  - Discussed importance of long term steroid therapy   - Discussed importance of telling MDs that she has adrenal insufficiency and getting medical alert bracelet  - Restart Prednisone 10mg PO daily per home regimen    Will follow     Meredith Powell MD  7/24/2024  8:15 AM

## 2024-07-24 NOTE — PLAN OF CARE
Problem: PAIN - ADULT  Goal: Verbalizes/displays adequate comfort level or patient's stated pain goal  Description: INTERVENTIONS:  - Encourage pt to monitor pain and request assistance  - Assess pain using appropriate pain scale  - Administer analgesics based on type and severity of pain and evaluate response  - Implement non-pharmacological measures as appropriate and evaluate response  - Consider cultural and social influences on pain and pain management  - Manage/alleviate anxiety  - Utilize distraction and/or relaxation techniques  - Monitor for opioid side effects  - Notify MD/LIP if interventions unsuccessful or patient reports new pain  - Anticipate increased pain with activity and pre-medicate as appropriate  Outcome: Progressing     Problem: RISK FOR INFECTION - ADULT  Goal: Absence of fever/infection during anticipated neutropenic period  Description: INTERVENTIONS  - Monitor WBC  - Administer growth factors as ordered  - Implement neutropenic guidelines  Outcome: Progressing     Problem: SAFETY ADULT - FALL  Goal: Free from fall injury  Description: INTERVENTIONS:  - Assess pt frequently for physical needs  - Identify cognitive and physical deficits and behaviors that affect risk of falls.  - [place] fall precautions as indicated by assessment.  - Educate pt/family on patient safety including physical limitations  - Instruct pt to call for assistance with activity based on assessment  - Modify environment to reduce risk of injury  - Provide assistive devices as appropriate  - Consider OT/PT consult to assist with strengthening/mobility  - Encourage toileting schedule  Outcome: Progressing     Problem: DISCHARGE PLANNING  Goal: Discharge to home or other facility with appropriate resources  Description: INTERVENTIONS:  - Identify barriers to discharge w/pt and caregiver  - Include patient/family/discharge partner in discharge planning  - Arrange for needed discharge resources and transportation as  POAG OUDiscussed diagnosis with patientDiscussed the chronic progressive nature of this disease and various treatment optionsOCT done today; No NFL thinning OU. IOP improved to 16 OU. Cup to Disc 0.3 OUContinue off drops at this time hx of SLT in March of 2017. Continue to monitorRTC in 3 months w/Optos Ocular Allergies OU. Discussed diagnosis in detail with patient. Papillae improved on today's examContinue [place] QD OU/PRN. Continue systane PRN instead of clear eyesContinue to monitor. ERM/[place] ODDiscussed diagnosis in detail with patient. Patient is stable at this time. Continue to monitor. [place] OUDiscussed diagnosis in detail with patient. Patient is stable at this time. Continue to monitor.; 's Notes: MR 2/13/17DFE  1/4/21Optos 2/10/20OCT 1/4/21VF  9/25/20[place] 6/26/20SLT OD 2/22/17SLT OS 3/22/17 appropriate  - Identify discharge learning needs (meds, wound care, etc)  - Arrange for interpreters to assist at discharge as needed  - Consider post-discharge preferences of patient/family/discharge partner  - Complete POLST form as appropriate  - Assess patient's ability to be responsible for managing their own health  - Refer to Case Management Department for coordinating discharge planning if the patient needs post-hospital services based on physician/LIP order or complex needs related to functional status, cognitive ability or social support system  Outcome: Progressing   Pt alert and awake ivf continuing at 75 ml/hr via Midline cath. Unable to draw from midline cath. Phlebotomy try to draw blood and she was not able to draw. Cortisol level needed to be done x2 after cortisol injection and was not done. Notified Dr Powell and she is aware.pt went for doppler this morning and Echo is still pending.

## 2024-07-24 NOTE — PROGRESS NOTES
[facility]  part of [facility]    Nephrology Progress Note    Chief Complaint   Patient presents with    Abdomen/Flank Pain       Subjective:   64 year old female, following for metabolic acidosis, hypokalemia.     Denies nausea and vomiting.   Did not eat much yet.     Review of Systems:   Review of Systems   Constitutional:  Positive for fatigue. Negative for chills and fever.   Respiratory:  Negative for cough and shortness of breath.    Cardiovascular:  Negative for chest pain and leg swelling.   Gastrointestinal:  Negative for abdominal pain, constipation, diarrhea, nausea and vomiting.   Genitourinary:  Negative for difficulty urinating, dysuria and flank pain.       Objective:   Temp:  [97.5 °F (36.4 °C)-98.5 °F (36.9 °C)] 97.5 °F (36.4 °C)  Pulse:  [] 104  Resp:  [17-21] 17  BP: (102-122)/(69-96) 102/69  SpO2:  [96 %-100 %] 96 %  SpO2: 96 %   No intake or output data in the 24 hours ending 07/24/24 1115  Wt Readings from Last 3 Encounters:   07/22/24 183 lb 12.8 oz (83.4 kg)   06/28/24 192 lb (87.1 kg)   09/06/21 160 lb (72.6 kg)     Physical Exam  Constitutional:       Appearance: Normal appearance.   Cardiovascular:      Rate and Rhythm: Normal rate and regular rhythm.      Heart sounds: Normal heart sounds.   Pulmonary:      Effort: Pulmonary effort is normal.      Breath sounds: Normal breath sounds.   Musculoskeletal:         General: Normal range of motion.   Skin:     General: Skin is warm and dry.   Neurological:      General: No focal deficit present.      Mental Status: She is alert and oriented to person, place, and time.   Psychiatric:         Mood and Affect: Mood normal.         Behavior: Behavior normal.         Medications:  Current Facility-Administered Medications   Medication Dose Route Frequency    potassium chloride (Klor-Con M10) tab 10 mEq  10 mEq Oral BID    dextrose 10% infusion   Intravenous Continuous    amLODIPine (Norvasc) tab 5 mg  5 mg Oral Daily     atorvastatin (Lipitor) tab 10 mg  10 mg Oral Daily    aspirin DR tab 81 mg  81 mg Oral Daily    clopidogrel (Plavix) tab 75 mg  75 mg Oral Daily    HYDROcodone-acetaminophen (Norco)  MG per tab 1 tablet  1 tablet Oral Q6H PRN    acetaminophen (Tylenol) tab 650 mg  650 mg Oral Q6H PRN    heparin (Porcine) 5000 UNIT/ML injection 5,000 Units  5,000 Units Subcutaneous Q12H    ondansetron (Zofran) 4 MG/2ML injection 4 mg  4 mg Intravenous Q6H PRN    zolpidem (Ambien) tab 5 mg  5 mg Oral Nightly PRN    alum-mag hydroxide-simethicone (Maalox) 200-200-20 MG/5ML oral suspension 30 mL  30 mL Oral QID PRN    cefTRIAXone (Rocephin) 1 g in sodium chloride 0.9% 100 mL IVPB-ADDV  1 g Intravenous Q24H    magnesium oxide (Mag-Ox) tab 800 mg  800 mg Oral Once    metoclopramide (Reglan) 5 mg/mL injection 10 mg  10 mg Intravenous Q6H    morphINE PF 2 MG/ML injection 2 mg  2 mg Intravenous Q6H PRN    pantoprazole (Protonix) 40 mg in sodium chloride 0.9% PF 10 mL IV push  40 mg Intravenous Q12H    glucose (Dex4) 15 GM/59ML oral liquid 15 g  15 g Oral Q15 Min PRN    Or    glucose (Glutose) 40% oral gel 15 g  15 g Oral Q15 Min PRN    Or    glucose-vitamin C (Dex-4) chewable tab 4 tablet  4 tablet Oral Q15 Min PRN    Or    dextrose 50% injection 50 mL  50 mL Intravenous Q15 Min PRN    Or    glucose (Dex4) 15 GM/59ML oral liquid 30 g  30 g Oral Q15 Min PRN    Or    glucose (Glutose) 40% oral gel 30 g  30 g Oral Q15 Min PRN    Or    glucose-vitamin C (Dex-4) chewable tab 8 tablet  8 tablet Oral Q15 Min PRN              Results:     Recent Labs   Lab 07/21/24  1451 07/22/24  0934 07/23/24  0530   RBC 4.19 3.46* 3.42*   HGB 12.1 10.0* 10.0*   HCT 36.4 30.2* 29.7*   MCV 86.9 87.3 86.8   NEPRELIM 4.09 2.92 2.44   WBC 7.2 6.0 5.2   .0 248.0 269.0     Recent Labs   Lab 07/21/24  1451 07/21/24  2107 07/22/24  0934 07/23/24  0530 07/23/24  1249 07/23/24  1445   GLU 84 75 56* 96  --   --    BUN 9 6* <5* <5*  --   --    CREATSERUM 0.97  0.74 0.65 0.70  --   --    CA 8.6* 7.8* 7.6* 7.3*  --   --    ALB 3.7  --   --   --   --   --     140 144 143  --   --    K 2.8* 3.8 3.7 2.9* 4.6 3.2*    115* 119* 119*  --   --    CO2 14.0* 14.0* 12.0* 14.0*  --   --    ALKPHO 92  --   --   --   --   --    AST 37*  --   --   --   --   --    ALT 17  --   --   --   --   --    BILT 0.6  --   --   --   --   --    TP 6.2  --   --   --   --   --      No results found for: \"PTT\", \"INR\"  No results for input(s): \"BNP\" in the last 168 hours.  Recent Labs   Lab 07/21/24  1451 07/22/24  0934 07/23/24  0530   MG 1.3* 1.6 1.3*  1.3*   PHOS  --   --  1.6*        Recent Labs   Lab 07/21/24  1451 07/23/24  0530   PHOS  --  1.6*   ALB 3.7  --        US VENOUS DOPPLER LEG BILAT - DIAG IMG (CPT=93970)    Result Date: 7/24/2024  CONCLUSION:   No evidence of deep venous thrombosis.     Dictated by (CST): Ron Whitman MD on 7/24/2024 at 10:06 AM     Finalized by (CST): Ron Whitman MD on 7/24/2024 at 10:06 AM          CT CHEST PE AORTA (IV ONLY) (CPT=71260)    Result Date: 7/23/2024  CONCLUSION:   No evidence of acute pulmonary embolism to the 1st subsegmental pulmonary artery level.  Enlargement of the main pulmonary artery suggests pulmonary hypertension.  No aneurysm or dissection of the thoracic aorta.  No acute pulmonary consolidation.  Additional chronic or incidental findings are described in the body of this report.    [facility]    Dictated by (CST): Ti Zhou MD on 7/23/2024 at 7:45 PM     Finalized by (CST): Ti Zhou MD on 7/23/2024 at 7:48 PM               Assessment and Plan:     Patient is a 64 year old female with past medical history of HTN, HLD, RA, h/o pancreatitis, and GERD who presented with intermittent N/V and generalized weakness for 3 weeks.     Hypokalemia:   Likely due to decreased intake/vomiting.   Potassium improved to 3.7.   Cont potassium chloride 20 meq BID.     Hypomagnesemia:   Mag 4 grams IV x 1 ordered.      Metabolic  acidosis:   Likely due to NS/decrease oral intake.   Now on D10 since hypoglycemic. Cont fluids. Once BG stable can start sodium bicarb drip.      N/V:   GI following.     RA:   Restarted on Prednisone 10 mg daily.       Jarvis Fleming MD  7/24/2024

## 2024-07-24 NOTE — PROGRESS NOTES
Chart reviewed.  Full consult to follow.  CT angiogram of the chest without evidence for pulmonary embolism.  Echo has been ordered and pending.

## 2024-07-25 ENCOUNTER — APPOINTMENT (OUTPATIENT)
Dept: CV DIAGNOSTICS | Facility: HOSPITAL | Age: 64
End: 2024-07-25
Attending: HOSPITALIST
Payer: COMMERCIAL

## 2024-07-25 LAB
ANION GAP SERPL CALC-SCNC: 9 MMOL/L (ref 0–18)
BASOPHILS # BLD AUTO: 0.02 X10(3) UL (ref 0–0.2)
BASOPHILS NFR BLD AUTO: 0.4 %
BUN BLD-MCNC: <5 MG/DL (ref 9–23)
C-PEPTIDE: 1.6 NG/ML
CALCIUM BLD-MCNC: 8 MG/DL (ref 8.7–10.4)
CHLORIDE SERPL-SCNC: 115 MMOL/L (ref 98–112)
CO2 SERPL-SCNC: 12 MMOL/L (ref 21–32)
CREAT BLD-MCNC: 0.64 MG/DL
DEPRECATED RDW RBC AUTO: 57.7 FL (ref 35.1–46.3)
EGFRCR SERPLBLD CKD-EPI 2021: 99 ML/MIN/1.73M2 (ref 60–?)
EOSINOPHIL # BLD AUTO: 0.01 X10(3) UL (ref 0–0.7)
EOSINOPHIL NFR BLD AUTO: 0.2 %
ERYTHROCYTE [DISTWIDTH] IN BLOOD BY AUTOMATED COUNT: 18.1 % (ref 11–15)
GLUCOSE BLD-MCNC: 101 MG/DL (ref 70–99)
HCT VFR BLD AUTO: 31.2 %
HGB BLD-MCNC: 10 G/DL
IMM GRANULOCYTES # BLD AUTO: 0.04 X10(3) UL (ref 0–1)
IMM GRANULOCYTES NFR BLD: 0.8 %
LYMPHOCYTES # BLD AUTO: 2.09 X10(3) UL (ref 1–4)
LYMPHOCYTES NFR BLD AUTO: 43.5 %
MCH RBC QN AUTO: 28.1 PG (ref 26–34)
MCHC RBC AUTO-ENTMCNC: 32.1 G/DL (ref 31–37)
MCV RBC AUTO: 87.6 FL
MONOCYTES # BLD AUTO: 0.53 X10(3) UL (ref 0.1–1)
MONOCYTES NFR BLD AUTO: 11 %
NEUTROPHILS # BLD AUTO: 2.12 X10 (3) UL (ref 1.5–7.7)
NEUTROPHILS # BLD AUTO: 2.12 X10(3) UL (ref 1.5–7.7)
NEUTROPHILS NFR BLD AUTO: 44.1 %
PLATELET # BLD AUTO: 326 10(3)UL (ref 150–450)
POTASSIUM SERPL-SCNC: 3.6 MMOL/L (ref 3.5–5.1)
RBC # BLD AUTO: 3.56 X10(6)UL
SODIUM SERPL-SCNC: 136 MMOL/L (ref 136–145)
WBC # BLD AUTO: 4.8 X10(3) UL (ref 4–11)

## 2024-07-25 PROCEDURE — 99232 SBSQ HOSP IP/OBS MODERATE 35: CPT | Performed by: PHYSICIAN ASSISTANT

## 2024-07-25 PROCEDURE — 93306 TTE W/DOPPLER COMPLETE: CPT | Performed by: HOSPITALIST

## 2024-07-25 PROCEDURE — 99233 SBSQ HOSP IP/OBS HIGH 50: CPT | Performed by: INTERNAL MEDICINE

## 2024-07-25 RX ORDER — SODIUM BICARBONATE 650 MG/1
650 TABLET ORAL 4 TIMES DAILY
Status: DISCONTINUED | OUTPATIENT
Start: 2024-07-25 | End: 2024-07-27

## 2024-07-25 RX ORDER — POTASSIUM CHLORIDE 750 MG/1
10 TABLET, EXTENDED RELEASE ORAL 2 TIMES DAILY
Status: DISCONTINUED | OUTPATIENT
Start: 2024-07-25 | End: 2024-07-27

## 2024-07-25 NOTE — PROGRESS NOTES
[facility]     Gastroenterology Progress Note    Sheri Garzon Patient Status:  Inpatient    1960 MRN [id number]   Location [facility] 4W/SW/SE Attending Shorty Couch MD   Hosp Day # 3 PCP No primary care provider on file.       Subjective:   Patient continues to have nausea this am.     No abdominal pain. No vomiting.   Had small BM.   No CP, SOB, dizziness or light headedness  Objective:   Blood pressure 100/70, pulse 104, temperature 97.8 °F (36.6 °C), temperature source Oral, resp. rate 17, height 5' 2\" (1.575 m), weight 183 lb 12.8 oz (83.4 kg), SpO2 96%. Body mass index is 33.62 kg/m².    Gen: awake, alert patient, NAD  HEENT: EOMI, the sclera appears anicteric, oropharynx clear, mucus membranes appear moist  CV: RRR  Lung: no conversational dyspnea   Abdomen: soft NTND abdomen with NABS appreciated   Skin: dry, warm, no jaundice  Ext: no LE edema is evident  Neuro: Alert and interactive  Psych: calm, cooperative    Assessment and Plan:     Sheri Garzon is a a(n) 64 year old female w/ a history of GERD, HTN, asthma, HLD, RA on plaquenil, on PLAVIX who presents with intractable nausea and emesis with upper abdominal pain leading to weight loss and dysphagia. Possible PUD vs erosive esophagitis vs peptic stricture vs neoplasm.      #N/V  #Epigastric Pain  #Weight Loss  #Dysphagia  -Labs on admission with hypokalemia, hypomagnesemia, LFTs and lipase WNL, normocytic anemia   -Etiology possible PUD vs erosive esophagitis vs peptic stricture vs neoplasm  -Last EGD >10 years ago  -Plan for EGD tomorrow     Recommend:  -Diet as tolerated  -Empiric PPI  -NPO might night  -EGD tomorrow    EGD consent: I have discussed the risks, benefits, and alternatives to upper endoscopy/enteroscopy with the patient/primary decision maker [who demonstrated understanding], including but not limited to the risks of bleeding, infection, pain, death, as well as the risks of anesthesia and  perforation all leading to prolonged hospitalization, surgical intervention, or even death. I also specifically mentioned the miss rate of upper endoscopy of 5-10% in the best of all circumstances.  The patient has agreed to sign an informed consent and elected to proceed with procedure with possible intervention [i.e. polypectomy, stent placement, etc.] as indicated.      Case discussed with Maryse Hardin MD and Linnette CHUNG.    Juana Carroll PA-C  [facility]  7/25/2024      Results:     Lab Results   Component Value Date    WBC 4.8 07/25/2024    HGB 10.0 (L) 07/25/2024    HCT 31.2 (L) 07/25/2024    .0 07/25/2024    CREATSERUM 0.64 07/25/2024    BUN <5 (L) 07/25/2024     07/25/2024    K 3.6 07/25/2024     (H) 07/25/2024    CO2 12.0 (L) 07/25/2024     (H) 07/25/2024    CA 8.0 (L) 07/25/2024    ALB 3.7 07/21/2024    ALKPHO 92 07/21/2024    BILT 0.6 07/21/2024    TP 6.2 07/21/2024    AST 37 (H) 07/21/2024    ALT 17 07/21/2024    LIP 34 07/21/2024    DDIMER 1.69 (H) 07/23/2024    MG 1.5 (L) 07/24/2024    PHOS 1.7 (L) 07/24/2024       CTA ABDOMEN/PELVIS LOWER EXT BILAT W RUNOFF (WND=89142)    Result Date: 7/24/2024  CONCLUSION:   1. Symmetrical loss of opacification in the bilateral plantar arteries likely relating to atherosclerosis.  Otherwise, no  hemodynamically significant stenosis or evidence of arterial embolism in the major arteries of the bilateral lower extremities.   2. Subcutaneous edema in the right lower posterior calf.  No discrete fluid collection.  3. Advanced degenerative joint disease of the knees with bilateral joint effusions.  4. Right middle lobe pulmonary nodule measuring 2 mm.  If there are risk factors for lung cancer than optional follow-up CT in 1 year could be obtained.  5. Additional chronic or incidental findings are described in the body of this report.    [facility]     Dictated by (CST): Ti Zhou MD on 7/24/2024 at 8:48 PM     Finalized  by (CST): Ti Zhou MD on 7/24/2024 at 9:01 PM          US VENOUS DOPPLER LEG BILAT - DIAG IMG (CPT=93970)    Result Date: 7/24/2024  CONCLUSION:   No evidence of deep venous thrombosis.     Dictated by (CST): Ron Whitman MD on 7/24/2024 at 10:06 AM     Finalized by (CST): Ron Whitman MD on 7/24/2024 at 10:06 AM          CT CHEST PE AORTA (IV ONLY) (CPT=71260)    Result Date: 7/23/2024  CONCLUSION:   No evidence of acute pulmonary embolism to the 1st subsegmental pulmonary artery level.  Enlargement of the main pulmonary artery suggests pulmonary hypertension.  No aneurysm or dissection of the thoracic aorta.  No acute pulmonary consolidation.  Additional chronic or incidental findings are described in the body of this report.    [facility]    Dictated by (CST): Ti Zhou MD on 7/23/2024 at 7:45 PM     Finalized by (CST): Ti Zhou MD on 7/23/2024 at 7:48 PM

## 2024-07-25 NOTE — PLAN OF CARE
Problem: Patient Centered Care  Goal: Patient preferences are identified and integrated in the patient's plan of care  Description: Interventions:  - What would you like us to know as we care for you? I am agreeable to having EGD  - Provide timely, complete, and accurate information to patient/family  - Incorporate patient and family knowledge, values, beliefs, and cultural backgrounds into the planning and delivery of care  - Encourage patient/family to participate in care and decision-making at the level they choose  - Honor patient and family perspectives and choices  Outcome: Progressing     Problem: Patient/Family Goals  Goal: Patient/Family Long Term Goal  Description: Patient's Long Term Goal: to have normal   GI function    Interventions:  - planned EGD  - See additional Care Plan goals for specific interventions  Outcome: Progressing  Goal: Patient/Family Short Term Goal  Description: Patient's Short Term Goal: to be able biran eat today    Interventions:   - diet resumed.  - See additional Care Plan goals for specific interventions  Outcome: Progressing     Problem: PAIN - ADULT  Goal: Verbalizes/displays adequate comfort level or patient's stated pain goal  Description: INTERVENTIONS:  - Encourage pt to monitor pain and request assistance  - Assess pain using appropriate pain scale  - Administer analgesics based on type and severity of pain and evaluate response  - Implement non-pharmacological measures as appropriate and evaluate response  - Consider cultural and social influences on pain and pain management  - Manage/alleviate anxiety  - Utilize distraction and/or relaxation techniques  - Monitor for opioid side effects  - Notify MD/LIP if interventions unsuccessful or patient reports new pain  - Anticipate increased pain with activity and pre-medicate as appropriate  Outcome: Progressing     Problem: RISK FOR INFECTION - ADULT  Goal: Absence of fever/infection during anticipated neutropenic  period  Description: INTERVENTIONS  - Monitor WBC  - Administer growth factors as ordered  - Implement neutropenic guidelines  Outcome: Progressing     Problem: SAFETY ADULT - FALL  Goal: Free from fall injury  Description: INTERVENTIONS:  - Assess pt frequently for physical needs  - Identify cognitive and physical deficits and behaviors that affect risk of falls.  - [place] fall precautions as indicated by assessment.  - Educate pt/family on patient safety including physical limitations  - Instruct pt to call for assistance with activity based on assessment  - Modify environment to reduce risk of injury  - Provide assistive devices as appropriate  - Consider OT/PT consult to assist with strengthening/mobility  - Encourage toileting schedule  Outcome: Progressing     Problem: DISCHARGE PLANNING  Goal: Discharge to home or other facility with appropriate resources  Description: INTERVENTIONS:  - Identify barriers to discharge w/pt and caregiver  - Include patient/family/discharge partner in discharge planning  - Arrange for needed discharge resources and transportation as appropriate  - Identify discharge learning needs (meds, wound care, etc)  - Arrange for interpreters to assist at discharge as needed  - Consider post-discharge preferences of patient/family/discharge partner  - Complete POLST form as appropriate  - Assess patient's ability to be responsible for managing their own health  - Refer to Case Management Department for coordinating discharge planning if the patient needs post-hospital services based on physician/LIP order or complex needs related to functional status, cognitive ability or social support system  Outcome: Progressing     Problem: Altered Communication/Language Barrier  Goal: Patient/Family is able to understand and participate in their care  Description: Interventions:  - Assess communication ability and preferred communication style  - Implement communication aides and strategies  - Use  visual cues when possible  - Listen attentively, be patient, do not interrupt  - Minimize distractions  - Allow time for understanding and response  - Establish method for patient to ask for assistance (call light)  - Provide an  as needed  - Communicate barriers and strategies to overcome with those who interact with patient  Outcome: Progressing   EGD rescheduled for tomorrow.

## 2024-07-25 NOTE — PROGRESS NOTES
[facility]     Sheri Garzon Patient Status:  Inpatient    1960 MRN [id number]   Location [facility] 4W/SW/SE Attending Shorty Couch MD   Hosp Day # 3 PCP No primary care provider on file.     Chief Complaint:   Chief Complaint   Patient presents with    Abdomen/Flank Pain        Subjective:     Patient seen sitting in bed.  No family at bedside.  Patient denies acute events overnight.  Patient reports she has been able to tolerate some liquid diet.  Patient did note episode of nausea and vomiting this a.m. after attempting potassium pills.      Objective:      Vital signs:  Vitals:    24 1604 24 2102 24 0545 24 0837   BP: 118/81 115/80 114/82 100/70   BP Location: Radial Radial Right arm Right arm   Pulse:       Resp: 16 16 17    Temp:  97.7 °F (36.5 °C) 97.5 °F (36.4 °C) 97.8 °F (36.6 °C)   TempSrc: Oral Temporal Temporal Oral   SpO2: 91% 100% 95% 96%   Weight:       Height:           Intake/Output Summary (Last 24 hours) at 2024 1022  Last data filed at 2024 1848  Gross per 24 hour   Intake 1400 ml   Output --   Net 1400 ml           Physical Exam:    GENERAL:  Awake and alert, in no acute distress.  HEART:  Regular rhythm, regular rate  LUNGS:  Air entry was minimally decreased.  No crackles or wheezes   ABDOMEN: Soft and non-tender.    EXT: Posterior aspect of right lower extremity with ulceration, without surrounding erythema or warmth.  Scab/eschar intact.  PSYCHIATRIC: Normal mood    Diagnostic Data:    Labs:    Recent Labs   Lab 24  0530 24  1350 24  0620   WBC 5.2 5.4 4.8   HGB 10.0* 10.1* 10.0*   MCV 86.8 87.1 87.6   .0 328.0 326.0       Recent Labs   Lab 24  1451 24  2107 24  0530 24  1249 24  1445 24  0855 24  0620   GLU 84   < > 96  --   --  82 101*   BUN 9   < > <5*  --   --  <5* <5*   CREATSERUM 0.97   < >  0.70  --   --  0.63 0.64   CA 8.6*   < > 7.3*  --   --  7.4* 8.0*   ALB 3.7  --   --   --   --   --   --       < > 143  --   --  142 136   K 2.8*   < > 2.9*   < > 3.2* 3.7 3.6      < > 119*  --   --  119* 115*   CO2 14.0*   < > 14.0*  --   --  11.0* 12.0*   ALKPHO 92  --   --   --   --   --   --    AST 37*  --   --   --   --   --   --    ALT 17  --   --   --   --   --   --    BILT 0.6  --   --   --   --   --   --    TP 6.2  --   --   --   --   --   --     < > = values in this interval not displayed.           Estimated Creatinine Clearance: 70.2 mL/min (based on SCr of 0.64 mg/dL).    No results for input(s): \"PTP\", \"INR\" in the last 168 hours.         COVID-19  No results found for: \"COVID19\"    Pro-Calcitonin  Recent Labs   Lab 07/22/24  0934   PCT 0.10*       Cardiac  No results for input(s): \"TROP\", \"PBNP\" in the last 168 hours.    Inflammatory Markers  Recent Labs   Lab 07/23/24  1445   DDIMER 1.69*       Culture:  Hospital Encounter on 07/21/24   1. Urine Culture, Routine     Status: None    Collection Time: 07/21/24  6:56 PM    Specimen: Urine, clean catch   Result Value Ref Range    Urine Culture  N/A     >100,000 cfu/ml Multiple species present- probable contamination.       CTA ABDOMEN/PELVIS LOWER EXT BILAT W RUNOFF (LDK=90597)    Result Date: 7/24/2024  CONCLUSION:   1. Symmetrical loss of opacification in the bilateral plantar arteries likely relating to atherosclerosis.  Otherwise, no  hemodynamically significant stenosis or evidence of arterial embolism in the major arteries of the bilateral lower extremities.   2. Subcutaneous edema in the right lower posterior calf.  No discrete fluid collection.  3. Advanced degenerative joint disease of the knees with bilateral joint effusions.  4. Right middle lobe pulmonary nodule measuring 2 mm.  If there are risk factors for lung cancer than optional follow-up CT in 1 year could be obtained.  5. Additional chronic or incidental findings are  described in the body of this report.    elm-remote     Dictated by (CST): Ti Zhou MD on 7/24/2024 at 8:48 PM     Finalized by (CST): Ti Zhou MD on 7/24/2024 at 9:01 PM          US VENOUS DOPPLER LEG BILAT - DIAG IMG (CPT=93970)    Result Date: 7/24/2024  CONCLUSION:   No evidence of deep venous thrombosis.     Dictated by (CST): Ron Whitman MD on 7/24/2024 at 10:06 AM     Finalized by (CST): Ron Whitman MD on 7/24/2024 at 10:06 AM          CT CHEST PE AORTA (IV ONLY) (CPT=71260)    Result Date: 7/23/2024  CONCLUSION:   No evidence of acute pulmonary embolism to the 1st subsegmental pulmonary artery level.  Enlargement of the main pulmonary artery suggests pulmonary hypertension.  No aneurysm or dissection of the thoracic aorta.  No acute pulmonary consolidation.  Additional chronic or incidental findings are described in the body of this report.    elm-remote    Dictated by (CST): Ti Zhou MD on 7/23/2024 at 7:45 PM     Finalized by (CST): Ti Zhou MD on 7/23/2024 at 7:48 PM                 Medications:    sodium phosphate  15 mmol Intravenous Once    polyethylene glycol (PEG 3350)  17 g Oral Daily    predniSONE  10 mg Oral Daily with breakfast    potassium chloride  20 mEq Oral BID    amLODIPine  5 mg Oral Daily    atorvastatin  10 mg Oral Daily    aspirin  81 mg Oral Daily    clopidogrel  75 mg Oral Daily    heparin  5,000 Units Subcutaneous Q12H    magnesium oxide  800 mg Oral Once    metoclopramide  10 mg Intravenous Q6H    pantoprazole  40 mg Intravenous Q12H       Assessment & Plan:        Intractable emesis   -Possibly secondary to gastritis  -Continue on Protonix  -Weaning IV fluids   -Antiemetics as needed  -advance diet as tolerated.    -GI consulted, planning for EGD once cleared by cardiology.  Appreciate further recommendations.     History of ACS  -Patient describes recent NSTEMI in [place].  -Per patient report, did not have stent placed at that time.  Patient describes  areas of disease which was not stented and opted for medical management  -Continue on statin, aspirin and Plavix  -Cardiology consulted, appreciate further recommendations     Adrenal insufficiency   -With episode of hypoglycemia  -hypoglycemia protocol  -Patient on chronic prednisone, 10 mg daily, due to rheumatoid arthritis.  Continue daily prednisone  -Endocrinology on consult, appreciate further recommendations  -Continue to monitor     Nonanion gap metabolic acidosis  Hypokalemia  Likely secondary to decreased p.o. intake and vomiting.  Started on bicarb gtt.  Continue to monitor.  Nephrology on consult, appreciate further recommendations    Possible acute UTI  -UA with some signs of infection  -Urinary culture with multiple species, possible contaminant  -Completed short course of Rocephin      Essential hypertension  Controlled  -Continue current regimen  -Monitor and add agents as needed     Rheumatoid arthritis  Stable  -Restarted on prednisone as above  - continue outpatient management with methotrexate upon discharge.      Plan of care discussed with patient at bedside . Discussed management/test result(s) with Rn and GI consultant    Quality:  DVT Prophylaxis: Heparin  CODE status: Full  Estimated date of discharge: TBD  Discharge is dependent on: clinical stability    52 minutes spent discussing with other providers, examining patient, obtaining history, reviewing medical records, interpreting and communicating test results/imaging, ordering tests/medications, discussing plan of care and documenting information.      Shorty Couch MD          This note was prepared using Dragon Medical voice recognition dictation software. As a result errors may occur. When identified these errors have been corrected. While every attempt is made to correct errors during dictation discrepancies may still exist

## 2024-07-25 NOTE — PLAN OF CARE
Patient is Aox4 on RA. No acute changes during shift. Hep subcutaneous, ASA, and Plavix. Rmeote tele no calls. NPO. Plan for EGD. CT completed earlier in shift. Midline to R upper arm. 1x cane asst.      Problem: Patient Centered Care  Goal: Patient preferences are identified and integrated in the patient's plan of care  Description: Interventions:  - What would you like us to know as we care for you?       Problem: SAFETY ADULT - FALL  Goal: Free from fall injury  Description: INTERVENTIONS:  - Assess pt frequently for physical needs  - Identify cognitive and physical deficits and behaviors that affect risk of falls.  - [employer] fall precautions as indicated by assessment.  - Educate pt/family on patient safety including physical limitations  - Instruct pt to call for assistance with activity based on assessment  - Modify environment to reduce risk of injury  - Provide assistive devices as appropriate  - Consider OT/PT consult to assist with strengthening/mobility  - Encourage toileting schedule  Outcome: Progressing     Problem: DISCHARGE PLANNING  Goal: Discharge to home or other facility with appropriate resources  Description: INTERVENTIONS:  - Identify barriers to discharge w/pt and caregiver  - Include patient/family/discharge partner in discharge planning  - Arrange for needed discharge resources and transportation as appropriate  - Identify discharge learning needs (meds, wound care, etc)  - Arrange for interpreters to assist at discharge as needed  - Consider post-discharge preferences of patient/family/discharge partner  - Complete POLST form as appropriate  - Assess patient's ability to be responsible for managing their own health  - Refer to Case Management Department for coordinating discharge planning if the patient needs post-hospital services based on physician/LIP order or complex needs related to functional status, cognitive ability or social support system  Outcome: Progressing     Problem:  Altered Communication/Language Barrier  Goal: Patient/Family is able to understand and participate in their care  Description: Interventions:  - Assess communication ability and preferred communication style  - Implement communication aides and strategies  - Use visual cues when possible  - Listen attentively, be patient, do not interrupt  - Minimize distractions  - Allow time for understanding and response  - Establish method for patient to ask for assistance (call light)  - Provide an  as needed  - Communicate barriers and strategies to overcome with those who interact with patient  Outcome: Progressing   Problem: PAIN - ADULT  Goal: Verbalizes/displays adequate comfort level or patient's stated pain goal  Description: INTERVENTIONS:  - Encourage pt to monitor pain and request assistance  - Assess pain using appropriate pain scale  - Administer analgesics based on type and severity of pain and evaluate response  - Implement non-pharmacological measures as appropriate and evaluate response  - Consider cultural and social influences on pain and pain management  - Manage/alleviate anxiety  - Utilize distraction and/or relaxation techniques  - Monitor for opioid side effects  - Notify MD/LIP if interventions unsuccessful or patient reports new pain  - Anticipate increased pain with activity and pre-medicate as appropriate  Outcome: Progressing     Problem: RISK FOR INFECTION - ADULT  Goal: Absence of fever/infection during anticipated neutropenic period  Description: INTERVENTIONS  - Monitor WBC  - Administer growth factors as ordered  - Implement neutropenic guidelines  Outcome: Progressing     - Provide timely, complete, and accurate information to patient/family  - Incorporate patient and family knowledge, values, beliefs, and cultural backgrounds into the planning and delivery of care  - Encourage patient/family to participate in care and decision-making at the level they choose  - [place] patient and  family perspectives and choices  Outcome: Progressing

## 2024-07-25 NOTE — PROGRESS NOTES
[facility]  part of [facility]    Progress Note    Sheri Garzon Patient Status:  Inpatient    1960 MRN [id number]   Location [facility] 4W/SW/SE Attending Shorty Couch MD   Hosp Day # 3 PCP No primary care provider on file.     Subjective:  She is feeling much improved this am.  Weakness improved.  Normal glucose.  Stim testing was not performed yesterday due to access issues.     A comprehensive 10 point review of systems was completed.  Pertinent positives and negatives noted in the the HPI.      Objective/Physical Exam:  Physical Exam:   General: Alert, orientated x3.  Cooperative.  No apparent distress.  Vital Signs:  Blood pressure 114/82, pulse 104, temperature 97.5 °F (36.4 °C), temperature source Temporal, resp. rate 17, height 5' 2\" (1.575 m), weight 183 lb 12.8 oz (83.4 kg), SpO2 95%.  HEENT: Exam is unremarkable.  Neck: Supple.  Abdomen:  Bowel sounds present  Extremities:  No lower extremity edema noted.  Skin: Normal texture and turgor.  Lymphatic:  No cervical lymphadenopathy.    Labs:  Reviewed BMP    Assessment/Plan:  Patient Active Problem List   Diagnosis    Dehydration    Metabolic acidosis    Bilious vomiting with nausea    Difficult intravenous access    Hypokalemia    Hypomagnesemia     Adrenal Insufficiency  - Discussed hypoglycemia is secondary to lack of adrenal function  - Discussed importance of long term steroid therapy   - Discussed importance of telling MDs that she has adrenal insufficiency and getting medical alert bracelet  - Continue Prednisone 10mg PO daily per home regimen  - Discontinue D10 infusion     Endocrine Service will sign off.  Please call back with questions.      Will follow        Meredith Powell MD  2024  7:46 AM

## 2024-07-25 NOTE — PROGRESS NOTES
[facility]  part of [facility]    Cardiology Hospital Progress Note       Sheri Garzon Patient Status:  Inpatient    1960 MRN [id number]   Location [facility] 4W/SW/SE Attending Shorty Couch MD   Hosp Day # 3 PCP No primary care provider on file.       Patient is a 64 year old female who was admitted to the hospital for Dehydration:  Subjective:  Patient seen in the room  No cardiac c/o  C Monitor Sinus rhythm    HPI: patient admitted with N/V and hypokalemia and hypoglycemia with H/O RA on chronic use of Prednisone for 23 years.H/O Adrenal insufficiency. Patient was tachycardic and tachypnic, had Chest CTA without PE and No DVT per V doppler.  Patient states that she had c cath done in [place] in last 1 year and was told that she has small coronaries without occlusive disease. She was placed on DAP -ASA+Plavix and Statin.     Past Medical History:   Past Medical History:    Asthma (HCC)    Esophageal reflux    Essential hypertension    Hyperlipidemia    Pancreatitis (HCC)    RA (rheumatoid arthritis) (HCC)       Past Surgical History:  Past Surgical History:   Procedure Laterality Date    Revision of uterine anomaly      Rotator cuff repair      Wrist fracture surgery         Family History:  History reviewed. No pertinent family history.    Social History:  Pediatric History   Patient Parents    Not on file     Other Topics Concern    Not on file   Social History Narrative    Not on file           Current Medications:  Current Facility-Administered Medications   Medication Dose Route Frequency    sodium phosphate 15 mmol in 0.9% NaCl 100mL IVPB premix  15 mmol Intravenous Once    polyethylene glycol (PEG 3350) (Miralax) 17 g oral packet 17 g  17 g Oral Daily    predniSONE (Deltasone) tab 10 mg  10 mg Oral Daily with breakfast    potassium chloride (Klor-Con M20) tab 20 mEq  20 mEq Oral BID    amLODIPine (Norvasc) tab 5 mg  5 mg Oral Daily    atorvastatin (Lipitor)  tab 10 mg  10 mg Oral Daily    aspirin DR tab 81 mg  81 mg Oral Daily    clopidogrel (Plavix) tab 75 mg  75 mg Oral Daily    HYDROcodone-acetaminophen (Norco)  MG per tab 1 tablet  1 tablet Oral Q6H PRN    acetaminophen (Tylenol) tab 650 mg  650 mg Oral Q6H PRN    heparin (Porcine) 5000 UNIT/ML injection 5,000 Units  5,000 Units Subcutaneous Q12H    ondansetron (Zofran) 4 MG/2ML injection 4 mg  4 mg Intravenous Q6H PRN    zolpidem (Ambien) tab 5 mg  5 mg Oral Nightly PRN    alum-mag hydroxide-simethicone (Maalox) 200-200-20 MG/5ML oral suspension 30 mL  30 mL Oral QID PRN    magnesium oxide (Mag-Ox) tab 800 mg  800 mg Oral Once    metoclopramide (Reglan) 5 mg/mL injection 10 mg  10 mg Intravenous Q6H    morphINE PF 2 MG/ML injection 2 mg  2 mg Intravenous Q6H PRN    pantoprazole (Protonix) 40 mg in sodium chloride 0.9% PF 10 mL IV push  40 mg Intravenous Q12H    glucose (Dex4) 15 GM/59ML oral liquid 15 g  15 g Oral Q15 Min PRN    Or    glucose (Glutose) 40% oral gel 15 g  15 g Oral Q15 Min PRN    Or    glucose-vitamin C (Dex-4) chewable tab 4 tablet  4 tablet Oral Q15 Min PRN    Or    dextrose 50% injection 50 mL  50 mL Intravenous Q15 Min PRN    Or    glucose (Dex4) 15 GM/59ML oral liquid 30 g  30 g Oral Q15 Min PRN    Or    glucose (Glutose) 40% oral gel 30 g  30 g Oral Q15 Min PRN    Or    glucose-vitamin C (Dex-4) chewable tab 8 tablet  8 tablet Oral Q15 Min PRN     Medications Prior to Admission   Medication Sig    amLODIPine 5 MG Oral Tab Take 1 tablet (5 mg total) by mouth daily.    Aspirin 81 MG Oral Cap 81 mg.    atorvastatin 10 MG Oral Tab Take 1 tablet (10 mg total) by mouth daily.    cefuroxime 500 MG Oral Tab Take 1 tablet (500 mg total) by mouth 2 (two) times daily.    clopidogrel 75 MG Oral Tab Take 1 tablet (75 mg total) by mouth daily.    cyclobenzaprine 10 MG Oral Tab Take 1 tablet (10 mg total) by mouth nightly.    ergocalciferol 1.25 MG (85505 UT) Oral Cap Take 1 capsule (50,000 Units  total) by mouth once a week.    furosemide 40 MG Oral Tab Take 1 tablet (40 mg total) by mouth daily.    ibuprofen 600 MG Oral Tab Take 1 tablet (600 mg total) by mouth 3 (three) times daily.    losartan 50 MG Oral Tab Take 1 tablet (50 mg total) by mouth daily.    methotrexate 2.5 MG Oral Tab Take 1 tablet (2.5 mg total) by mouth once a week.    metoprolol tartrate 50 MG Oral Tab Take 1 tablet (50 mg total) by mouth 2 (two) times daily.    Omeprazole 40 MG Oral Capsule Delayed Release Take 1 capsule (40 mg total) by mouth daily.    oxybutynin ER 10 MG Oral Tablet 24 Hr Take 1 tablet (10 mg total) by mouth daily.    Potassium Chloride ER 10 MEQ Oral Tab CR Take 1 tablet (10 mEq total) by mouth 2 (two) times daily.    predniSONE 5 MG Oral Tab Take 1 tablet (5 mg total) by mouth daily. 4xs weekly ,Tuesday,Thursday, saturday    predniSONE 1 MG Oral Tab Take 1 tablet (1 mg total) by mouth As Directed. 3tablets Monday,Wednesday, Friday    folic acid 1 MG Oral Tab Take 1 tablet (1 mg total) by mouth daily.    hydroxychloroquine 200 MG Oral Tab Take 1 tablet (200 mg total) by mouth 2 (two) times daily.    HYDROcodone-acetaminophen (NORCO)  MG Oral Tab Take 1 tablet by mouth in the morning and 1 tablet at noon and 1 tablet in the evening.    [] ciprofloxacin 500 MG Oral Tab Take 1 tablet (500 mg total) by mouth 2 (two) times daily for 7 days.       Allergies:  Allergies   Allergen Reactions    Gadolinium Derivatives FEVER    Penicillins OTHER (SEE COMMENTS)     esophagitis    Sulfa Antibiotics OTHER (SEE COMMENTS)     esophagitis       Review of Systems:   A comprehensive 12 point review of system was performed.  All pertinent positive and negative findings per HPI.    Physical Exam:   Blood pressure 100/70, pulse 104, temperature 97.8 °F (36.6 °C), temperature source Oral, resp. rate 17, height 5' 2\" (1.575 m), weight 183 lb 12.8 oz (83.4 kg), SpO2 96%.  Intake/Output:   Last 3 shifts:  [id number]@   This shift: No intake/output data recorded.     Vent Settings:      Hemodynamic parameters (last 24 hours):      Scheduled Meds:    sodium phosphate  15 mmol Intravenous Once    polyethylene glycol (PEG 3350)  17 g Oral Daily    predniSONE  10 mg Oral Daily with breakfast    potassium chloride  20 mEq Oral BID    amLODIPine  5 mg Oral Daily    atorvastatin  10 mg Oral Daily    aspirin  81 mg Oral Daily    clopidogrel  75 mg Oral Daily    heparin  5,000 Units Subcutaneous Q12H    magnesium oxide  800 mg Oral Once    metoclopramide  10 mg Intravenous Q6H    pantoprazole  40 mg Intravenous Q12H       Continuous Infusions:       Physical Exam:    General: No acute distress./Obese  HEENT: No scleral icterus.  External ears are normal.  Lids are normal.  Oral mucosa is moist.  Neck: No JVD, no bruits.  Cardiac: Normal rate, No murmur.  Lungs: Clear without rhales, rhochi or wheezing.  Abdomen: Soft, non-tender.  Extremities: No edema.  RLE ulcer. Pulses palpable bilat LE  Neurologic: Alert and moving all 4 extremities.  Psychiatry: Patient has calm affect.      Results:     Laboratory Data:  Lab Results   Component Value Date    WBC 4.8 07/25/2024    HGB 10.0 (L) 07/25/2024    HCT 31.2 (L) 07/25/2024    .0 07/25/2024    CREATSERUM 0.64 07/25/2024    BUN <5 (L) 07/25/2024     07/25/2024    K 3.6 07/25/2024     (H) 07/25/2024    CO2 12.0 (L) 07/25/2024     (H) 07/25/2024    CA 8.0 (L) 07/25/2024    ALB 3.7 07/21/2024    ALKPHO 92 07/21/2024    TP 6.2 07/21/2024    AST 37 (H) 07/21/2024    ALT 17 07/21/2024    LIP 34 07/21/2024    DDIMER 1.69 (H) 07/23/2024    MG 1.5 (L) 07/24/2024    PHOS 1.7 (L) 07/24/2024         Imaging:    PROCEDURE: CTA ABDOMEN/PELVIS LOWER EXT BILAT W RUNOFF (CPT=75635)     COMPARISON: [facility], CT ABDOMEN + PELVIS (CONTRAST ONLY) (CPT=74177), 7/21/2024, 4:34 PM.  [facility], CT ABDOMEN + PELVIS (CONTRAST ONLY) (CPT=74177),  9/06/2021, 1:33 PM.     INDICATIONS: Rle ulcer. Suspected arterial embolus after cath.     TECHNIQUE: CT images of the abdomen, pelvis, and lower extremities were obtained without and with non-ionic intravenous contrast material. Multi-planar reformatted and 3-D images were created with vessel analysis performed on an independent workstation.   Automated exposure control for dose reduction was used. Adjustment of the mA and/or kV was done based on the patient's size. Use of iterative reconstruction technique for dose reduction was used.  Dose information is transmitted to the ACR (American  College of Radiology) NRDR (National Radiology Data Registry) which includes the Dose Index Registry.     FINDINGS:     ANGIOGRAPHY:  Precontrast:  Mild calcified atherosclerosis.  Post-contrast:  No aneurysm or dissection of the abdominal aorta.  The celiac trunk is patent.  The left gastric artery arises directly from the aorta.    The SMA is patent.  There are single renal arteries which are patent.  Mild narrowing at the origin of the left renal artery.  The ALLEN is patent.    The iliac arteries are patent bilaterally.  The common femoral arteries are patent bilaterally.  The deep and superficial femoral arteries are patent bilaterally.  There are bilateral 3 vessel runoffs.  Multifocal mild stenoses are seen.  The dorsalis pedis arteries are opacified bilaterally.  There is poor opacification of the plantar arteries bilaterally.     LOWER THORAX: Coronary artery calcifications are seen.  The heart is normal size.  Right middle lobe pulmonary nodule measures 2 mm.  LIVER:   No enlargement, atrophy, abnormal density, or significant focal lesion.  GALLBLADDER: Normal size and appearance.  BILIARY:   No visible dilatation or calcification.    PANCREAS:   No lesion, fluid collection, ductal dilatation, or atrophy.    SPLEEN:   No enlargement or focal lesion.    ADRENALS:   No mass or enlargement.    KIDNEYS:   No mass,  obstruction, or calcification.    RETROPERITONEUM:   No mass or enlarged adenopathy.    AORTA/VASCULAR:   No aneurysm.  PERITONEUM: No free fluid or air.  GI TRACT/MESENTERY:   No visible mass, obstruction, or bowel wall thickening. The appendix is unremarkable.  Mild diverticulosis without evidence of acute diverticulitis.  URINARY BLADDER: Excreted contrast from prior intravenous contrast administration.  REPRODUCTIVE ORGANS: The uterus is present.  The ovaries are unremarkable.  ABDOMINAL WALL:   No acute abnormality.  BONES: No acute fracture.  Anterolisthesis of L5 on S1 measures 5 mm.  Unchanged compression deformities at the superior endplates of L4, L2, and T11.  Moderate spondylosis.  LOWER EXTREMITIES: No acute fracture or dislocation.  Small bilateral knee effusions.  Moderate degenerative joint disease of the bilateral knees.  No discrete fluid collections identified.  Subcutaneous edema of the right lower posterior calf series 5,  image [id number].                  Impression   CONCLUSION:     1. Symmetrical loss of opacification in the bilateral plantar arteries likely relating to atherosclerosis.  Otherwise, no  hemodynamically significant stenosis or evidence of arterial embolism in the major arteries of the bilateral lower extremities.       2. Subcutaneous edema in the right lower posterior calf.  No discrete fluid collection.     3. Advanced degenerative joint disease of the knees with bilateral joint effusions.     4. Right middle lobe pulmonary nodule measuring 2 mm.  If there are risk factors for lung cancer than optional follow-up CT in 1 year could be obtained.     5. Additional chronic or incidental findings are described in the body of this report.           ECHO:pending       IMPRESSION:  Sinus tachycardia of unclear etiology ? Due to adrenal insufficiency.  We will obtain echocardiogram.  It has been ordered and it is still pending.  Patient was ruled out for pulmonary embolism there were no  DVTs but lower extremity venous duplex.    Right lower extremity ulceration of unclear etiology.  There is a suspicion it could be arterial embolization from the groin site after procedure.  She denies having any kind of closure device that she recalls : currently LE CTA as noted with peripheral atherosclerotic disease bilaterally symmetrical in [place] arteries. No evidence of Arterial Thrombus/embolism    Coronary artery disease with small vessel ischemic disease as described by the patient.  Patient is on dual antiplatelet therapy along with statin.    Adrenal insufficiency.  Patient has been resumed on prednisone.    Lung nodule: Non smoker: to be followed by PCP    Anemia of chronic disease and blood loss: Hb 10.0      RECOMMENDATIONS:    Await ECHO: will check it   Continue current dual antiplatelet therapy with ASA 81+Plavix 75 mg  Continue Statin  Amlodipine to be continued  Aggressive Physical therapy to improve circulation in LE  Weight loss advised    This was D/W patient in detail with complete understanding    D/W Nursing staff    Will follow in office post discharge, all instructions given     Stable from cardiac standpoint     Thank you very much for the consultation. Please do not hesitate to call with questions.    Montrell Garzon MD  [facility]  [address]  [place] [address]  [phone]    This note was prepared using Dragon Medical voice recognition dictation software. As a result errors may occur. When identified these errors have been corrected. While every attempt is made to correct errors during dictation discrepancies may still exist

## 2024-07-25 NOTE — PROGRESS NOTES
[facility]  part of [facility]    Nephrology Progress Note    Chief Complaint   Patient presents with    Abdomen/Flank Pain       Subjective:   64 year old female, following for metabolic acidosis, hypokalemia.     Had some vomiting after taking potassium tabs.   Does not have much appetite, not eating much yet.     Review of Systems:   Review of Systems   Constitutional:  Positive for fatigue. Negative for chills and fever.   Respiratory:  Negative for cough and shortness of breath.    Cardiovascular:  Negative for chest pain and leg swelling.   Gastrointestinal:  Negative for abdominal pain, constipation, diarrhea, nausea and vomiting.   Genitourinary:  Negative for difficulty urinating, dysuria and flank pain.       Objective:   Temp:  [97.5 °F (36.4 °C)-97.8 °F (36.6 °C)] 97.8 °F (36.6 °C)  Resp:  [16-17] 17  BP: (100-118)/(70-82) 100/70  SpO2:  [91 %-100 %] 96 %  SpO2: 96 %     Intake/Output Summary (Last 24 hours) at 7/25/2024 1349  Last data filed at 7/24/2024 1848  Gross per 24 hour   Intake 1000 ml   Output --   Net 1000 ml     Wt Readings from Last 3 Encounters:   07/22/24 183 lb 12.8 oz (83.4 kg)   06/28/24 192 lb (87.1 kg)   09/06/21 160 lb (72.6 kg)     Physical Exam  Constitutional:       Appearance: Normal appearance.   Cardiovascular:      Rate and Rhythm: Normal rate and regular rhythm.      Heart sounds: Normal heart sounds.   Pulmonary:      Effort: Pulmonary effort is normal.      Breath sounds: Normal breath sounds.   Musculoskeletal:         General: Normal range of motion.   Skin:     General: Skin is warm and dry.   Neurological:      General: No focal deficit present.      Mental Status: She is alert and oriented to person, place, and time.   Psychiatric:         Mood and Affect: Mood normal.         Behavior: Behavior normal.         Medications:  Current Facility-Administered Medications   Medication Dose Route Frequency    polyethylene glycol (PEG 3350) (Miralax) 17 g  oral packet 17 g  17 g Oral Daily    predniSONE (Deltasone) tab 10 mg  10 mg Oral Daily with breakfast    potassium chloride (Klor-Con M20) tab 20 mEq  20 mEq Oral BID    amLODIPine (Norvasc) tab 5 mg  5 mg Oral Daily    atorvastatin (Lipitor) tab 10 mg  10 mg Oral Daily    aspirin DR tab 81 mg  81 mg Oral Daily    clopidogrel (Plavix) tab 75 mg  75 mg Oral Daily    HYDROcodone-acetaminophen (Norco)  MG per tab 1 tablet  1 tablet Oral Q6H PRN    acetaminophen (Tylenol) tab 650 mg  650 mg Oral Q6H PRN    heparin (Porcine) 5000 UNIT/ML injection 5,000 Units  5,000 Units Subcutaneous Q12H    ondansetron (Zofran) 4 MG/2ML injection 4 mg  4 mg Intravenous Q6H PRN    zolpidem (Ambien) tab 5 mg  5 mg Oral Nightly PRN    alum-mag hydroxide-simethicone (Maalox) 200-200-20 MG/5ML oral suspension 30 mL  30 mL Oral QID PRN    magnesium oxide (Mag-Ox) tab 800 mg  800 mg Oral Once    metoclopramide (Reglan) 5 mg/mL injection 10 mg  10 mg Intravenous Q6H    morphINE PF 2 MG/ML injection 2 mg  2 mg Intravenous Q6H PRN    pantoprazole (Protonix) 40 mg in sodium chloride 0.9% PF 10 mL IV push  40 mg Intravenous Q12H    glucose (Dex4) 15 GM/59ML oral liquid 15 g  15 g Oral Q15 Min PRN    Or    glucose (Glutose) 40% oral gel 15 g  15 g Oral Q15 Min PRN    Or    glucose-vitamin C (Dex-4) chewable tab 4 tablet  4 tablet Oral Q15 Min PRN    Or    dextrose 50% injection 50 mL  50 mL Intravenous Q15 Min PRN    Or    glucose (Dex4) 15 GM/59ML oral liquid 30 g  30 g Oral Q15 Min PRN    Or    glucose (Glutose) 40% oral gel 30 g  30 g Oral Q15 Min PRN    Or    glucose-vitamin C (Dex-4) chewable tab 8 tablet  8 tablet Oral Q15 Min PRN              Results:     Recent Labs   Lab 07/23/24  0530 07/24/24  1350 07/25/24  0620   RBC 3.42* 3.49* 3.56*   HGB 10.0* 10.1* 10.0*   HCT 29.7* 30.4* 31.2*   MCV 86.8 87.1 87.6   NEPRELIM 2.44 2.85 2.12   WBC 5.2 5.4 4.8   .0 328.0 326.0     Recent Labs   Lab 07/21/24  1451 07/21/24  2103  07/23/24  0530 07/23/24  1249 07/23/24  1445 07/24/24  0855 07/25/24  0620   GLU 84   < > 96  --   --  82 101*   BUN 9   < > <5*  --   --  <5* <5*   CREATSERUM 0.97   < > 0.70  --   --  0.63 0.64   CA 8.6*   < > 7.3*  --   --  7.4* 8.0*   ALB 3.7  --   --   --   --   --   --       < > 143  --   --  142 136   K 2.8*   < > 2.9*   < > 3.2* 3.7 3.6      < > 119*  --   --  119* 115*   CO2 14.0*   < > 14.0*  --   --  11.0* 12.0*   ALKPHO 92  --   --   --   --   --   --    AST 37*  --   --   --   --   --   --    ALT 17  --   --   --   --   --   --    BILT 0.6  --   --   --   --   --   --    TP 6.2  --   --   --   --   --   --     < > = values in this interval not displayed.     No results found for: \"PTT\", \"INR\"  No results for input(s): \"BNP\" in the last 168 hours.  Recent Labs   Lab 07/22/24  0934 07/23/24  0530 07/24/24  1100   MG 1.6 1.3*  1.3* 1.5*   PHOS  --  1.6* 1.7*        Recent Labs   Lab 07/21/24  1451 07/23/24  0530 07/24/24  1100   PHOS  --  1.6* 1.7*   ALB 3.7  --   --        CTA ABDOMEN/PELVIS LOWER EXT BILAT W RUNOFF (XXO=23302)    Result Date: 7/24/2024  CONCLUSION:   1. Symmetrical loss of opacification in the bilateral plantar arteries likely relating to atherosclerosis.  Otherwise, no  hemodynamically significant stenosis or evidence of arterial embolism in the major arteries of the bilateral lower extremities.   2. Subcutaneous edema in the right lower posterior calf.  No discrete fluid collection.  3. Advanced degenerative joint disease of the knees with bilateral joint effusions.  4. Right middle lobe pulmonary nodule measuring 2 mm.  If there are risk factors for lung cancer than optional follow-up CT in 1 year could be obtained.  5. Additional chronic or incidental findings are described in the body of this report.    elm-remote     Dictated by (CST): Ti Zhou MD on 7/24/2024 at 8:48 PM     Finalized by (CST): Ti Zhou MD on 7/24/2024 at 9:01 PM          US VENOUS DOPPLER  LEG BILAT - DIAG IMG (CPT=93970)    Result Date: 7/24/2024  CONCLUSION:   No evidence of deep venous thrombosis.     Dictated by (CST): Ron Whitman MD on 7/24/2024 at 10:06 AM     Finalized by (CST): Ron Whitman MD on 7/24/2024 at 10:06 AM          CT CHEST PE AORTA (IV ONLY) (CPT=71260)    Result Date: 7/23/2024  CONCLUSION:   No evidence of acute pulmonary embolism to the 1st subsegmental pulmonary artery level.  Enlargement of the main pulmonary artery suggests pulmonary hypertension.  No aneurysm or dissection of the thoracic aorta.  No acute pulmonary consolidation.  Additional chronic or incidental findings are described in the body of this report.    elm-remote    Dictated by (CST): Ti Zhou MD on 7/23/2024 at 7:45 PM     Finalized by (CST): Ti Zhou MD on 7/23/2024 at 7:48 PM               Assessment and Plan:     Patient is a 64 year old female with past medical history of HTN, HLD, RA, h/o pancreatitis, and GERD who presented with intermittent N/V and generalized weakness for 3 weeks.     Hypokalemia:   Likely due to decreased intake/vomiting.   Potassium improved to 3.6 today.   Will give potassium chloride 40 meq IV x 1.    Cont potassium chloride 10 meq BID. Not tolerating 20 meq tabs    Hypomagnesemia:   Mag 4 grams IV x 1 7/24     Metabolic acidosis:   Likely due to NS/decrease oral intake.   Bicarb 12 today. Will start sodium bicarb 650 mg 4x/day.      N/V:   Improved.   GI following.     RA:   Restarted on Prednisone 10 mg daily.       Jarvis Fleming MD  7/25/2024

## 2024-07-26 ENCOUNTER — ANESTHESIA (OUTPATIENT)
Dept: ENDOSCOPY | Facility: HOSPITAL | Age: 64
End: 2024-07-26
Payer: COMMERCIAL

## 2024-07-26 ENCOUNTER — ANESTHESIA EVENT (OUTPATIENT)
Dept: ENDOSCOPY | Facility: HOSPITAL | Age: 64
End: 2024-07-26
Payer: COMMERCIAL

## 2024-07-26 ENCOUNTER — TELEPHONE (OUTPATIENT)
Dept: PAIN CLINIC | Facility: HOSPITAL | Age: 64
End: 2024-07-26

## 2024-07-26 PROBLEM — K44.9 HIATAL HERNIA: Status: ACTIVE | Noted: 2024-01-01

## 2024-07-26 PROBLEM — K44.9 HIATAL HERNIA: Status: ACTIVE | Noted: 2024-07-26

## 2024-07-26 LAB
ANION GAP SERPL CALC-SCNC: 7 MMOL/L (ref 0–18)
BASOPHILS # BLD AUTO: 0.04 X10(3) UL (ref 0–0.2)
BASOPHILS NFR BLD AUTO: 0.9 %
BUN BLD-MCNC: <5 MG/DL (ref 9–23)
CALCIUM BLD-MCNC: 7.9 MG/DL (ref 8.7–10.4)
CHLORIDE SERPL-SCNC: 120 MMOL/L (ref 98–112)
CO2 SERPL-SCNC: 14 MMOL/L (ref 21–32)
CREAT BLD-MCNC: 0.67 MG/DL
DEPRECATED RDW RBC AUTO: 58.4 FL (ref 35.1–46.3)
EGFRCR SERPLBLD CKD-EPI 2021: 98 ML/MIN/1.73M2 (ref 60–?)
EOSINOPHIL # BLD AUTO: 0.12 X10(3) UL (ref 0–0.7)
EOSINOPHIL NFR BLD AUTO: 2.6 %
ERYTHROCYTE [DISTWIDTH] IN BLOOD BY AUTOMATED COUNT: 18.5 % (ref 11–15)
GLUCOSE BLD-MCNC: 67 MG/DL (ref 70–99)
GLUCOSE BLDC GLUCOMTR-MCNC: 101 MG/DL (ref 70–99)
GLUCOSE BLDC GLUCOMTR-MCNC: 105 MG/DL (ref 70–99)
GLUCOSE BLDC GLUCOMTR-MCNC: 155 MG/DL (ref 70–99)
GLUCOSE BLDC GLUCOMTR-MCNC: 60 MG/DL (ref 70–99)
GLUCOSE BLDC GLUCOMTR-MCNC: 68 MG/DL (ref 70–99)
GLUCOSE BLDC GLUCOMTR-MCNC: 72 MG/DL (ref 70–99)
GLUCOSE BLDC GLUCOMTR-MCNC: 79 MG/DL (ref 70–99)
GLUCOSE BLDC GLUCOMTR-MCNC: 92 MG/DL (ref 70–99)
HCT VFR BLD AUTO: 29.4 %
HGB BLD-MCNC: 9.5 G/DL
IMM GRANULOCYTES # BLD AUTO: 0.05 X10(3) UL (ref 0–1)
IMM GRANULOCYTES NFR BLD: 1.1 %
LYMPHOCYTES # BLD AUTO: 1.9 X10(3) UL (ref 1–4)
LYMPHOCYTES NFR BLD AUTO: 40.9 %
MAGNESIUM SERPL-MCNC: 1.7 MG/DL (ref 1.6–2.6)
MCH RBC QN AUTO: 28.1 PG (ref 26–34)
MCHC RBC AUTO-ENTMCNC: 32.3 G/DL (ref 31–37)
MCV RBC AUTO: 87 FL
MONOCYTES # BLD AUTO: 0.77 X10(3) UL (ref 0.1–1)
MONOCYTES NFR BLD AUTO: 16.6 %
NEUTROPHILS # BLD AUTO: 1.76 X10 (3) UL (ref 1.5–7.7)
NEUTROPHILS # BLD AUTO: 1.76 X10(3) UL (ref 1.5–7.7)
NEUTROPHILS NFR BLD AUTO: 37.9 %
PHOSPHATE SERPL-MCNC: 2.1 MG/DL (ref 2.4–5.1)
PLATELET # BLD AUTO: 302 10(3)UL (ref 150–450)
POTASSIUM SERPL-SCNC: 4.2 MMOL/L (ref 3.5–5.1)
RBC # BLD AUTO: 3.38 X10(6)UL
SODIUM SERPL-SCNC: 141 MMOL/L (ref 136–145)
WBC # BLD AUTO: 4.6 X10(3) UL (ref 4–11)

## 2024-07-26 PROCEDURE — 0DB98ZX EXCISION OF DUODENUM, VIA NATURAL OR ARTIFICIAL OPENING ENDOSCOPIC, DIAGNOSTIC: ICD-10-PCS | Performed by: INTERNAL MEDICINE

## 2024-07-26 PROCEDURE — 43239 EGD BIOPSY SINGLE/MULTIPLE: CPT | Performed by: INTERNAL MEDICINE

## 2024-07-26 PROCEDURE — 0DB68ZX EXCISION OF STOMACH, VIA NATURAL OR ARTIFICIAL OPENING ENDOSCOPIC, DIAGNOSTIC: ICD-10-PCS | Performed by: INTERNAL MEDICINE

## 2024-07-26 PROCEDURE — 99233 SBSQ HOSP IP/OBS HIGH 50: CPT | Performed by: INTERNAL MEDICINE

## 2024-07-26 PROCEDURE — 0DB58ZX EXCISION OF ESOPHAGUS, VIA NATURAL OR ARTIFICIAL OPENING ENDOSCOPIC, DIAGNOSTIC: ICD-10-PCS | Performed by: INTERNAL MEDICINE

## 2024-07-26 RX ORDER — SODIUM CHLORIDE, SODIUM LACTATE, POTASSIUM CHLORIDE, CALCIUM CHLORIDE 600; 310; 30; 20 MG/100ML; MG/100ML; MG/100ML; MG/100ML
INJECTION, SOLUTION INTRAVENOUS CONTINUOUS
Status: DISCONTINUED | OUTPATIENT
Start: 2024-07-26 | End: 2024-07-26

## 2024-07-26 RX ORDER — DEXTROSE MONOHYDRATE AND SODIUM CHLORIDE 5; .9 G/100ML; G/100ML
INJECTION, SOLUTION INTRAVENOUS CONTINUOUS
Status: DISCONTINUED | OUTPATIENT
Start: 2024-07-26 | End: 2024-07-26

## 2024-07-26 RX ORDER — DEXAMETHASONE SODIUM PHOSPHATE 4 MG/ML
VIAL (ML) INJECTION AS NEEDED
Status: DISCONTINUED | OUTPATIENT
Start: 2024-07-26 | End: 2024-07-26 | Stop reason: SURG

## 2024-07-26 RX ORDER — PANTOPRAZOLE SODIUM 40 MG/1
40 TABLET, DELAYED RELEASE ORAL
Status: DISCONTINUED | OUTPATIENT
Start: 2024-07-26 | End: 2024-07-27

## 2024-07-26 RX ORDER — PANTOPRAZOLE SODIUM 40 MG/1
40 TABLET, DELAYED RELEASE ORAL
Status: DISCONTINUED | OUTPATIENT
Start: 2024-07-27 | End: 2024-07-26

## 2024-07-26 RX ORDER — NALOXONE HYDROCHLORIDE 0.4 MG/ML
0.08 INJECTION, SOLUTION INTRAMUSCULAR; INTRAVENOUS; SUBCUTANEOUS ONCE AS NEEDED
Status: DISCONTINUED | OUTPATIENT
Start: 2024-07-26 | End: 2024-07-26 | Stop reason: HOSPADM

## 2024-07-26 RX ORDER — SODIUM CHLORIDE 9 MG/ML
INJECTION, SOLUTION INTRAVENOUS CONTINUOUS
Status: DISCONTINUED | OUTPATIENT
Start: 2024-07-26 | End: 2024-07-26

## 2024-07-26 RX ORDER — ONDANSETRON 2 MG/ML
4 INJECTION INTRAMUSCULAR; INTRAVENOUS ONCE AS NEEDED
Status: DISCONTINUED | OUTPATIENT
Start: 2024-07-26 | End: 2024-07-26 | Stop reason: HOSPADM

## 2024-07-26 RX ORDER — SODIUM CHLORIDE, SODIUM LACTATE, POTASSIUM CHLORIDE, CALCIUM CHLORIDE 600; 310; 30; 20 MG/100ML; MG/100ML; MG/100ML; MG/100ML
INJECTION, SOLUTION INTRAVENOUS CONTINUOUS PRN
Status: DISCONTINUED | OUTPATIENT
Start: 2024-07-26 | End: 2024-07-26 | Stop reason: SURG

## 2024-07-26 RX ADMIN — SODIUM CHLORIDE, SODIUM LACTATE, POTASSIUM CHLORIDE, CALCIUM CHLORIDE: 600; 310; 30; 20 INJECTION, SOLUTION INTRAVENOUS at 14:57:00

## 2024-07-26 RX ADMIN — SODIUM CHLORIDE, SODIUM LACTATE, POTASSIUM CHLORIDE, CALCIUM CHLORIDE: 600; 310; 30; 20 INJECTION, SOLUTION INTRAVENOUS at 14:31:00

## 2024-07-26 RX ADMIN — DEXAMETHASONE SODIUM PHOSPHATE 4 MG: 4 MG/ML VIAL (ML) INJECTION at 14:36:00

## 2024-07-26 NOTE — PROGRESS NOTES
[facility]  part of [facility]    Nephrology Progress Note    Chief Complaint   Patient presents with    Abdomen/Flank Pain       Subjective:   64 year old female, following for metabolic acidosis, hypokalemia.     Had some vomiting after dinner last night.   Did not eat breakfast or lunch since was NPO for EGD.  Had EGD today which only showed a small hiatal hernia and otherwise normal.     Review of Systems:   Review of Systems   Constitutional:  Positive for fatigue. Negative for chills and fever.   Respiratory:  Negative for cough and shortness of breath.    Cardiovascular:  Negative for chest pain and leg swelling.   Gastrointestinal:  Positive for nausea. Negative for abdominal pain, constipation, diarrhea and vomiting.   Genitourinary:  Negative for difficulty urinating, dysuria and flank pain.       Objective:   Temp:  [97.5 °F (36.4 °C)-98.4 °F (36.9 °C)] 97.5 °F (36.4 °C)  Pulse:  [] 96  Resp:  [16-26] 19  BP: ()/(54-82) 104/67  SpO2:  [97 %-100 %] 100 %  SpO2: 100 %     Intake/Output Summary (Last 24 hours) at 7/26/2024 1817  Last data filed at 7/26/2024 1700  Gross per 24 hour   Intake 340 ml   Output --   Net 340 ml     Wt Readings from Last 3 Encounters:   07/22/24 183 lb 12.8 oz (83.4 kg)   06/28/24 192 lb (87.1 kg)   09/06/21 160 lb (72.6 kg)     Physical Exam  Constitutional:       Appearance: Normal appearance.   Cardiovascular:      Rate and Rhythm: Normal rate and regular rhythm.      Heart sounds: Normal heart sounds.   Pulmonary:      Effort: Pulmonary effort is normal.      Breath sounds: Normal breath sounds.   Musculoskeletal:         General: Normal range of motion.   Skin:     General: Skin is warm and dry.   Neurological:      General: No focal deficit present.      Mental Status: She is alert and oriented to person, place, and time.   Psychiatric:         Mood and Affect: Mood normal.         Behavior: Behavior normal.         Medications:  Current  Facility-Administered Medications   Medication Dose Route Frequency    pantoprazole (Protonix) DR tab 40 mg  40 mg Oral BID AC    sodium bicarbonate 150 mEq in dextrose 5% 1,000 mL infusion  150 mEq Intravenous Continuous    potassium chloride (Klor-Con M10) tab 10 mEq  10 mEq Oral BID    [Held by provider] sodium bicarbonate tab 650 mg  650 mg Oral QID    polyethylene glycol (PEG 3350) (Miralax) 17 g oral packet 17 g  17 g Oral Daily    predniSONE (Deltasone) tab 10 mg  10 mg Oral Daily with breakfast    amLODIPine (Norvasc) tab 5 mg  5 mg Oral Daily    atorvastatin (Lipitor) tab 10 mg  10 mg Oral Daily    aspirin DR tab 81 mg  81 mg Oral Daily    clopidogrel (Plavix) tab 75 mg  75 mg Oral Daily    HYDROcodone-acetaminophen (Norco)  MG per tab 1 tablet  1 tablet Oral Q6H PRN    acetaminophen (Tylenol) tab 650 mg  650 mg Oral Q6H PRN    heparin (Porcine) 5000 UNIT/ML injection 5,000 Units  5,000 Units Subcutaneous Q12H    ondansetron (Zofran) 4 MG/2ML injection 4 mg  4 mg Intravenous Q6H PRN    zolpidem (Ambien) tab 5 mg  5 mg Oral Nightly PRN    alum-mag hydroxide-simethicone (Maalox) 200-200-20 MG/5ML oral suspension 30 mL  30 mL Oral QID PRN    magnesium oxide (Mag-Ox) tab 800 mg  800 mg Oral Once    metoclopramide (Reglan) 5 mg/mL injection 10 mg  10 mg Intravenous Q6H    morphINE PF 2 MG/ML injection 2 mg  2 mg Intravenous Q6H PRN    glucose (Dex4) 15 GM/59ML oral liquid 15 g  15 g Oral Q15 Min PRN    Or    glucose (Glutose) 40% oral gel 15 g  15 g Oral Q15 Min PRN    Or    glucose-vitamin C (Dex-4) chewable tab 4 tablet  4 tablet Oral Q15 Min PRN    Or    dextrose 50% injection 50 mL  50 mL Intravenous Q15 Min PRN    Or    glucose (Dex4) 15 GM/59ML oral liquid 30 g  30 g Oral Q15 Min PRN    Or    glucose (Glutose) 40% oral gel 30 g  30 g Oral Q15 Min PRN    Or    glucose-vitamin C (Dex-4) chewable tab 8 tablet  8 tablet Oral Q15 Min PRN              Results:     Recent Labs   Lab 07/24/24  1350  07/25/24  0620 07/26/24  0640   RBC 3.49* 3.56* 3.38*   HGB 10.1* 10.0* 9.5*   HCT 30.4* 31.2* 29.4*   MCV 87.1 87.6 87.0   NEPRELIM 2.85 2.12 1.76   WBC 5.4 4.8 4.6   .0 326.0 302.0     Recent Labs   Lab 07/21/24  1451 07/21/24  2107 07/24/24  0855 07/25/24  0620 07/26/24  0640   GLU 84   < > 82 101* 67*   BUN 9   < > <5* <5* <5*   CREATSERUM 0.97   < > 0.63 0.64 0.67   CA 8.6*   < > 7.4* 8.0* 7.9*   ALB 3.7  --   --   --   --       < > 142 136 141   K 2.8*   < > 3.7 3.6 4.2      < > 119* 115* 120*   CO2 14.0*   < > 11.0* 12.0* 14.0*   ALKPHO 92  --   --   --   --    AST 37*  --   --   --   --    ALT 17  --   --   --   --    BILT 0.6  --   --   --   --    TP 6.2  --   --   --   --     < > = values in this interval not displayed.     No results found for: \"PTT\", \"INR\"  No results for input(s): \"BNP\" in the last 168 hours.  Recent Labs   Lab 07/23/24 0530 07/24/24 1100 07/26/24  0640   MG 1.3*  1.3* 1.5* 1.7   PHOS 1.6* 1.7* 2.1*        Recent Labs   Lab 07/21/24  1451 07/23/24  0530 07/24/24 1100 07/26/24  0640   PHOS  --  1.6* 1.7* 2.1*   ALB 3.7  --   --   --        CTA ABDOMEN/PELVIS LOWER EXT BILAT W RUNOFF ([id number])    Result Date: 7/24/2024  CONCLUSION:   1. Symmetrical loss of opacification in the bilateral plantar arteries likely relating to atherosclerosis.  Otherwise, no  hemodynamically significant stenosis or evidence of arterial embolism in the major arteries of the bilateral lower extremities.   2. Subcutaneous edema in the right lower posterior calf.  No discrete fluid collection.  3. Advanced degenerative joint disease of the knees with bilateral joint effusions.  4. Right middle lobe pulmonary nodule measuring 2 mm.  If there are risk factors for lung cancer than optional follow-up CT in 1 year could be obtained.  5. Additional chronic or incidental findings are described in the body of this report.    el-remote     Dictated by (CST): Ti Zhou MD on 7/24/2024 at 8:48  PM     Finalized by (CST): Ti Zhou MD on 7/24/2024 at 9:01 PM               Assessment and Plan:     Patient is a 64 year old female with past medical history of HTN, HLD, RA, h/o pancreatitis, and GERD who presented with intermittent N/V and generalized weakness for 3 weeks.     Hypokalemia:   Likely due to decreased intake/vomiting.   Potassium improved to 4.2 today.   Cont potassium chloride 10 meq BID.     Hypomagnesemia:   Mg 1.7. Mag 4 grams IV x 1 7/24    Hypophosphatemia:   Phos 2.1 today. Potassium phos 15 mmol x 1 today.      Metabolic acidosis:   Likely due to NS/decreased oral intake.   Bicarb 14 today. Will start sodium bicarb drip at 75 ml/hr for 1L.      N/V:   Still has some nausea, not eating much.   GI following.     RA:   On Prednisone 10 mg daily.       Jarvis Fleming MD  7/26/2024

## 2024-07-26 NOTE — ANESTHESIA PREPROCEDURE EVALUATION
Anesthesia PreOp Note    HPI:     Sheri Garzon is a 64 year old female who presents for preoperative consultation requested by: NEEL Wynne MD    Date of Surgery: 7/21/2024 - 7/26/2024    Procedure(s):  ESOPHAGOGASTRODUODENOSCOPY (EGD)  Indication: nausea/ vomiting    Relevant Problems   No relevant active problems       NPO:  Last Liquid Consumption Date: 07/26/24  Last Liquid Consumption Time: 1130  Last Solid Consumption Date: 07/25/24  Last Solid Consumption Time: 2300  Last Liquid Consumption Date: 07/26/24          History Review:  Patient Active Problem List    Diagnosis Date Noted    Hypomagnesemia 07/24/2024    Difficult intravenous access 07/23/2024    Hypokalemia 07/23/2024    Metabolic acidosis 07/22/2024    Bilious vomiting with nausea 07/22/2024    Dehydration 07/21/2024       Past Medical History:    Asthma (HCC)    Esophageal reflux    Essential hypertension    Hyperlipidemia    Pancreatitis (HCC)    RA (rheumatoid arthritis) (HCC)       Past Surgical History:   Procedure Laterality Date    Revision of uterine anomaly      Rotator cuff repair      Wrist fracture surgery         Medications Prior to Admission   Medication Sig Dispense Refill Last Dose    amLODIPine 5 MG Oral Tab Take 1 tablet (5 mg total) by mouth daily.   7/21/2024    Aspirin 81 MG Oral Cap 81 mg.   7/21/2024 at 0900    atorvastatin 10 MG Oral Tab Take 1 tablet (10 mg total) by mouth daily.   7/21/2024 at 0900    cefuroxime 500 MG Oral Tab Take 1 tablet (500 mg total) by mouth 2 (two) times daily.   7/21/2024    clopidogrel 75 MG Oral Tab Take 1 tablet (75 mg total) by mouth daily.   7/21/2024    cyclobenzaprine 10 MG Oral Tab Take 1 tablet (10 mg total) by mouth nightly.   7/21/2024    ergocalciferol 1.25 MG (48561 UT) Oral Cap Take 1 capsule (50,000 Units total) by mouth once a week.   Past Week    furosemide 40 MG Oral Tab Take 1 tablet (40 mg total) by mouth daily.   7/21/2024    ibuprofen 600 MG Oral Tab Take 1 tablet  (600 mg total) by mouth 3 (three) times daily.   2024 at 0900    losartan 50 MG Oral Tab Take 1 tablet (50 mg total) by mouth daily.   2024 at 0900    methotrexate 2.5 MG Oral Tab Take 1 tablet (2.5 mg total) by mouth once a week.   2024    metoprolol tartrate 50 MG Oral Tab Take 1 tablet (50 mg total) by mouth 2 (two) times daily.   2024    Omeprazole 40 MG Oral Capsule Delayed Release Take 1 capsule (40 mg total) by mouth daily.   2024    oxybutynin ER 10 MG Oral Tablet 24 Hr Take 1 tablet (10 mg total) by mouth daily.   2024    Potassium Chloride ER 10 MEQ Oral Tab CR Take 1 tablet (10 mEq total) by mouth 2 (two) times daily.   2024    predniSONE 5 MG Oral Tab Take 1 tablet (5 mg total) by mouth daily. 4xs weekly ,Tuesday,Thursday, 2024    predniSONE 1 MG Oral Tab Take 1 tablet (1 mg total) by mouth As Directed. 3tablets Monday,Wednesday, 2024    folic acid 1 MG Oral Tab Take 1 tablet (1 mg total) by mouth daily. 90 tablet 0 2024    hydroxychloroquine 200 MG Oral Tab Take 1 tablet (200 mg total) by mouth 2 (two) times daily. 180 tablet 0 2024    HYDROcodone-acetaminophen (NORCO)  MG Oral Tab Take 1 tablet by mouth in the morning and 1 tablet at noon and 1 tablet in the evening. 30 tablet 0 2024 at 0900    [] ciprofloxacin 500 MG Oral Tab Take 1 tablet (500 mg total) by mouth 2 (two) times daily for 7 days. 14 tablet 0      Current Facility-Administered Medications Ordered in Epic   Medication Dose Route Frequency Provider Last Rate Last Admin    dextrose 5%-sodium chloride 0.9% infusion   Intravenous Continuous Shorty Couch  mL/hr at 24 1307 Rate Change at 24 1307    potassium chloride (Klor-Con M10) tab 10 mEq  10 mEq Oral BID Jarvis Fleming MD   10 mEq at 07/25/24 2102    sodium bicarbonate tab 650 mg  650 mg Oral QID Jarvis Fleming MD   650 mg at 24    polyethylene glycol  (PEG 3350) (Miralax) 17 g oral packet 17 g  17 g Oral Daily Shorty Couch MD   17 g at 07/24/24 1224    predniSONE (Deltasone) tab 10 mg  10 mg Oral Daily with breakfast Meredith Powell MD   10 mg at 07/25/24 0933    amLODIPine (Norvasc) tab 5 mg  5 mg Oral Daily Alfonso Perez MD   5 mg at 07/25/24 0933    atorvastatin (Lipitor) tab 10 mg  10 mg Oral Daily Alfonso Perez MD   10 mg at 07/25/24 0933    aspirin DR tab 81 mg  81 mg Oral Daily Alfonso Perez MD   81 mg at 07/25/24 0933    clopidogrel (Plavix) tab 75 mg  75 mg Oral Daily Alfonso Perez MD   75 mg at 07/25/24 0933    HYDROcodone-acetaminophen (Norco)  MG per tab 1 tablet  1 tablet Oral Q6H PRN Alfonso Perez MD   1 tablet at 07/22/24 1621    acetaminophen (Tylenol) tab 650 mg  650 mg Oral Q6H PRN Alfonso Perez MD   650 mg at 07/26/24 1038    heparin (Porcine) 5000 UNIT/ML injection 5,000 Units  5,000 Units Subcutaneous Q12H Alfonso Perez MD   5,000 Units at 07/25/24 2101    ondansetron (Zofran) 4 MG/2ML injection 4 mg  4 mg Intravenous Q6H PRN Alfonso Perez MD   4 mg at 07/22/24 0150    zolpidem (Ambien) tab 5 mg  5 mg Oral Nightly PRN Alfonso Perez MD        alum-mag hydroxide-simethicone (Maalox) 200-200-20 MG/5ML oral suspension 30 mL  30 mL Oral QID PRN Alfonso Perez MD        magnesium oxide (Mag-Ox) tab 800 mg  800 mg Oral Once Yari Mcmahon MD        metoclopramide (Reglan) 5 mg/mL injection 10 mg  10 mg Intravenous Q6H Alfonso Perez MD   10 mg at 07/26/24 0500    morphINE PF 2 MG/ML injection 2 mg  2 mg Intravenous Q6H PRN Leroy Hsieh MD   2 mg at 07/25/24 1159    pantoprazole (Protonix) 40 mg in sodium chloride 0.9% PF 10 mL IV push  40 mg Intravenous Q12H Maryse Hardin MD   40 mg at 07/26/24 0839    glucose (Dex4) 15 GM/59ML oral liquid 15 g  15 g Oral Q15 Min PRN Leroy Hsieh MD        Or    glucose (Glutose) 40% oral gel 15 g  15 g Oral Q15 Min PRN Leroy Hsieh MD        Or     glucose-vitamin C (Dex-4) chewable tab 4 tablet  4 tablet Oral Q15 Min PRN Leroy Hsieh MD        Or    dextrose 50% injection 50 mL  50 mL Intravenous Q15 Min PRN Leroy Hsieh MD   50 mL at 07/26/24 1258    Or    glucose (Dex4) 15 GM/59ML oral liquid 30 g  30 g Oral Q15 Min PRN Leroy Hsieh MD        Or    glucose (Glutose) 40% oral gel 30 g  30 g Oral Q15 Min PRN Leroy Hsieh MD        Or    glucose-vitamin C (Dex-4) chewable tab 8 tablet  8 tablet Oral Q15 Min PRN Leroy Hsieh MD         No current Epic-ordered outpatient medications on file.       Allergies   Allergen Reactions    Gadolinium Derivatives FEVER    Penicillins OTHER (SEE COMMENTS)     esophagitis    Sulfa Antibiotics OTHER (SEE COMMENTS)     esophagitis       History reviewed. No pertinent family history.  Social History     Socioeconomic History    Marital status: Single   Tobacco Use    Smoking status: Never    Smokeless tobacco: Never   Vaping Use    Vaping status: Never Used   Substance and Sexual Activity    Alcohol use: Not Currently    Drug use: Never       Available pre-op labs reviewed.  Lab Results   Component Value Date    WBC 4.6 07/26/2024    RBC 3.38 (L) 07/26/2024    HGB 9.5 (L) 07/26/2024    HCT 29.4 (L) 07/26/2024    MCV 87.0 07/26/2024    MCH 28.1 07/26/2024    MCHC 32.3 07/26/2024    RDW 18.5 (H) 07/26/2024    .0 07/26/2024     Lab Results   Component Value Date     07/26/2024    K 4.2 07/26/2024     (H) 07/26/2024    CO2 14.0 (L) 07/26/2024    BUN <5 (L) 07/26/2024    CREATSERUM 0.67 07/26/2024    GLU 67 (L) 07/26/2024    PGLU 105 (H) 07/26/2024    CA 7.9 (L) 07/26/2024          Vital Signs:  Body mass index is 33.62 kg/m².   height is 1.575 m (5' 2\") and weight is 83.4 kg (183 lb 12.8 oz). Her oral temperature is 98.4 °F (36.9 °C). Her blood pressure is 114/82 and her pulse is 102. Her respiration is 20 and oxygen saturation is 97%.   Vitals:    07/26/24 0447 07/26/24 0653  07/26/24 0728 07/26/24 1310   BP: 90/54 (!) 87/62 90/58 114/82   Pulse: 102      Resp: 17  16 20   Temp:   98.2 °F (36.8 °C) 98.4 °F (36.9 °C)   TempSrc:   Oral Oral   SpO2: 98%  98% 97%   Weight:       Height:            Anesthesia Evaluation     Patient summary reviewed and Nursing notes reviewed    No history of anesthetic complications   Airway   Mallampati: III  TM distance: >3 FB  Neck ROM: limited  Dental          Pulmonary - normal exam   (+) asthma, sleep apnea  Cardiovascular - normal exam  (+) hypertension    ROS comment: Echo 7/25/24:  1. Left ventricle: The cavity size was normal. Wall thickness was moderately increased. Systolic function was normal. The estimated ejection fraction was 55-60%, by visual assessment. No diagnostic evidence for regional wall motion abnormalities. Doppler parameters are consistent with abnormal left ventricular relaxation - grade 1 diastolic dysfunction.   2. Ventricular septum: Thickness was moderately increased.   3. Left atrium: The left atrial volume was normal.   4. Mitral valve: There was mild regurgitation.       Neuro/Psych      Comments: + right arm weakness due to old rotator cuff injury    GI/Hepatic/Renal    (+) GERD    Comments:  + recent UTI on antibiotics    Endo/Other    (+) arthritis (RA. Arthritis in most joints but still able to move neck, though not full range of motion)    Comments: + adrenal insuffiencey due to chronic prednisone use x 23 years  Abdominal   (+) obese                 Anesthesia Plan:   ASA:  3  Plan:   MAC  Post-op Pain Management: IV analgesics  Plan Comments: Hypoglycemic this morning, now on D5 infusion. Blood sugar 92 in pre-op, denies feeling lightheaded. Is awake and alert.   Cleared by cardiology, echo from yesterday normal.   Has not vomited at all today.   Possibility of GA discussed.   Informed Consent Plan and Risks Discussed With:  Patient  Discussed plan with:  Surgeon      I have informed Sheri Garzon and/or legal  guardian or family member of the nature of the anesthetic plan, benefits, risks including possible dental damage if relevant, major complications, and any alternative forms of anesthetic management.   All of the patient's questions were answered to the best of my ability. The patient desires the anesthetic management as planned.  Eliza Nieto MD  7/26/2024 1:48 PM  Present on Admission:   Difficult intravenous access

## 2024-07-26 NOTE — PLAN OF CARE
Sheri is A&Ox4 on room air. She went down for an EGD today. She is on telemetry. Voiding freely. Is ambulating x1 with a cane. Is receiving IVF. Patients glucose dropped over night, and had another hypoglycemic episode in the afternoon. MD aware. D50 given. Patient on a full liquid diet. Encouraging oral intake. Plan is to dc back home once medically cleared.  Problem: Patient Centered Care  Goal: Patient preferences are identified and integrated in the patient's plan of care  Description: Interventions:  - What would you like us to know as we care for you?   - Provide timely, complete, and accurate information to patient/family  - Incorporate patient and family knowledge, values, beliefs, and cultural backgrounds into the planning and delivery of care  - Encourage patient/family to participate in care and decision-making at the level they choose  - Honor patient and family perspectives and choices  Outcome: Progressing     Problem: PAIN - ADULT  Goal: Verbalizes/displays adequate comfort level or patient's stated pain goal  Description: INTERVENTIONS:  - Encourage pt to monitor pain and request assistance  - Assess pain using appropriate pain scale  - Administer analgesics based on type and severity of pain and evaluate response  - Implement non-pharmacological measures as appropriate and evaluate response  - Consider cultural and social influences on pain and pain management  - Manage/alleviate anxiety  - Utilize distraction and/or relaxation techniques  - Monitor for opioid side effects  - Notify MD/LIP if interventions unsuccessful or patient reports new pain  - Anticipate increased pain with activity and pre-medicate as appropriate  Outcome: Progressing     Problem: RISK FOR INFECTION - ADULT  Goal: Absence of fever/infection during anticipated neutropenic period  Description: INTERVENTIONS  - Monitor WBC  - Administer growth factors as ordered  - Implement neutropenic guidelines  Outcome: Progressing      Problem: SAFETY ADULT - FALL  Goal: Free from fall injury  Description: INTERVENTIONS:  - Assess pt frequently for physical needs  - Identify cognitive and physical deficits and behaviors that affect risk of falls.  - [place] fall precautions as indicated by assessment.  - Educate pt/family on patient safety including physical limitations  - Instruct pt to call for assistance with activity based on assessment  - Modify environment to reduce risk of injury  - Provide assistive devices as appropriate  - Consider OT/PT consult to assist with strengthening/mobility  - Encourage toileting schedule  Outcome: Progressing     Problem: DISCHARGE PLANNING  Goal: Discharge to home or other facility with appropriate resources  Description: INTERVENTIONS:  - Identify barriers to discharge w/pt and caregiver  - Include patient/family/discharge partner in discharge planning  - Arrange for needed discharge resources and transportation as appropriate  - Identify discharge learning needs (meds, wound care, etc)  - Arrange for interpreters to assist at discharge as needed  - Consider post-discharge preferences of patient/family/discharge partner  - Complete POLST form as appropriate  - Assess patient's ability to be responsible for managing their own health  - Refer to Case Management Department for coordinating discharge planning if the patient needs post-hospital services based on physician/LIP order or complex needs related to functional status, cognitive ability or social support system  Outcome: Progressing     Problem: Altered Communication/Language Barrier  Goal: Patient/Family is able to understand and participate in their care  Description: Interventions:  - Assess communication ability and preferred communication style  - Implement communication aides and strategies  - Use visual cues when possible  - Listen attentively, be patient, do not interrupt  - Minimize distractions  - Allow time for understanding and response  -  Establish method for patient to ask for assistance (call light)  - Provide an  as needed  - Communicate barriers and strategies to overcome with those who interact with patient  Outcome: Progressing

## 2024-07-26 NOTE — OPERATIVE REPORT
ESOPHAGOGASTRODUODENOSCOPY (EGD) REPORT    Sheri Garzon     1960 Age 64 year old   PCP No primary care provider on file. Endoscopist Noa Wynne MD     Date of procedure: 24    Procedure: EGD w/cold biopsy    Pre-operative diagnosis: Poor appetite, weight loss    Post-operative diagnosis: Hiatal hernia    Medications: MAC    Complications: none    Procedure:  Informed consent was obtained from the patient after the risks of the procedure were discussed, including but not limited to bleeding, perforation, aspiration, infection, or possibility of a missed lesion. After discussions of the risks/benefits and alternatives to this procedure, as well as the planned sedation, the patient was placed in the left lateral decubitus position and begun on continuous blood pressure pulse oximetry and EKG monitoring and this was maintained throughout the procedure. Once an adequate level of sedation was obtained a bite block was placed. Then the lubricated tip of the [id number] diagnostic video upper endoscope was inserted and advanced using direct visualization into the posterior pharynx and ultimately into the esophagus.    Complications: None    Findings:      1. Esophagus: The squamocolumnar junction was noted at 36 cm and appeared regular. The diaphragmatic pinch was noted noted at 39 cm from the incisors. A 3 cm sliding hiatal hernia is noted. The esophageal mucosa appeared normal . There was no endoscopic findings of esophagitis, stricture or Sanabria's esophagus. Biopsies taken from proximal esophagus.    2. Stomach: The stomach distended normally. Normal rugal folds were seen. The pylorus was patent. The gastric mucosa appeared s/p random biopsies. There was a thickened antral fold that appears benign and biopsied separately. Retroflexion revealed a normal fundus and a mildly-patulous cardia.     3. Duodenum: The duodenal mucosa appeared normal in the 1st and 2nd portion of the duodenum. S/p  biopsies.    Impression:  1. Small hiatal hernia, otherwise normal.  2. No mass, ulcer or stricture.  3. Suspect nausea/vomiting and poor appetite issues may be due to dysbiosis from recent oral antibiotics or abx side-effect.    Recommend:  1. Await pathology.  2. Full liquid diet, advance diet as tolerated.  3. Continue oral PPI BID.  4. Avoid caffeine, chocolate, peppermint, and alcohol. Also avoid lying down flat or in a recumbent position for 3 hours after a meal.   5. Okay for plavix.   6. Okay for PRN zofran and reglan.     Okay to dc from GI standpoint when tolerating PO and f/u in GI clinic.  Will follow peripherally, if persistent nausea/vomiting, please obtain gastric emptying scan.    Specimens: gastric, duodenal, esophageal  Blood loss: <1 ml

## 2024-07-26 NOTE — TELEPHONE ENCOUNTER
Patient stated she is currently hospitalized and will not be able to come to today's appointment.  As requested patient was rescheduled.

## 2024-07-26 NOTE — PROGRESS NOTES
[facility]  [facility]     Sheri Garzon Patient Status:  Inpatient    1960 MRN [id number]   Location [facility] 4W/SW/SE Attending Shorty Couch MD   Hosp Day # 4 PCP No primary care provider on file.     Chief Complaint:   Chief Complaint   Patient presents with    Abdomen/Flank Pain        Subjective:     Patient seen sitting in bed.  No family at bedside.  Patient denies acute events overnight.  Patient reports she continues to have intermittent nausea.  Denies further evidence of vomiting.      Per RN, patient with episode of hypoglycemia this a.m.  Improved after D50.  Of note, patient is n.p.o. for EGD today.    Objective:      Vital signs:  Vitals:    24 1929 24 0447 24 0653 24 0728   BP: 111/70 90/54 (!) 87/62 90/58   BP Location: Right arm Right arm  Right arm   Pulse: 91 102     Resp: 18 17  16   Temp: 97.6 °F (36.4 °C)   98.2 °F (36.8 °C)   TempSrc: Temporal   Oral   SpO2: 100% 98%  98%   Weight:       Height:           Intake/Output Summary (Last 24 hours) at 2024 1000  Last data filed at 2024 1800  Gross per 24 hour   Intake 250 ml   Output --   Net 250 ml           Physical Exam:    GENERAL:  Awake and alert, in no acute distress.  HEART:  Regular rhythm, regular rate  LUNGS:  Air entry was minimally decreased.  No crackles or wheezes   ABDOMEN: Soft and non-tender.    EXT: Posterior aspect of right lower extremity with ulceration, without surrounding erythema or warmth.  Scab/eschar intact.  PSYCHIATRIC: Normal mood    Diagnostic Data:    Labs:    Recent Labs   Lab 24  1350 24  0620 24  0640   WBC 5.4 4.8 4.6   HGB 10.1* 10.0* 9.5*   MCV 87.1 87.6 87.0   .0 326.0 302.0       Recent Labs   Lab 24  1451 24  2107 24  0855 24  0620 24  0640   GLU 84   < > 82 101* 67*   BUN 9   < > <5* <5* <5*   CREATSERUM 0.97   < > 0.63 0.64 0.67   CA  8.6*   < > 7.4* 8.0* 7.9*   ALB 3.7  --   --   --   --       < > 142 136 141   K 2.8*   < > 3.7 3.6 4.2      < > 119* 115* 120*   CO2 14.0*   < > 11.0* 12.0* 14.0*   ALKPHO 92  --   --   --   --    AST 37*  --   --   --   --    ALT 17  --   --   --   --    BILT 0.6  --   --   --   --    TP 6.2  --   --   --   --     < > = values in this interval not displayed.           Estimated Creatinine Clearance: 67.1 mL/min (based on SCr of 0.67 mg/dL).    No results for input(s): \"PTP\", \"INR\" in the last 168 hours.         COVID-19  No results found for: \"COVID19\"    Pro-Calcitonin  Recent Labs   Lab 07/22/24  0934   PCT 0.10*       Cardiac  No results for input(s): \"TROP\", \"PBNP\" in the last 168 hours.    Inflammatory Markers  Recent Labs   Lab 07/23/24  1445   DDIMER 1.69*       Culture:  Hospital Encounter on 07/21/24   1. Urine Culture, Routine     Status: None    Collection Time: 07/21/24  6:56 PM    Specimen: Urine, clean catch   Result Value Ref Range    Urine Culture  N/A     >100,000 cfu/ml Multiple species present- probable contamination.       CTA ABDOMEN/PELVIS LOWER EXT BILAT W RUNOFF (SLH=62620)    Result Date: 7/24/2024  CONCLUSION:   1. Symmetrical loss of opacification in the bilateral plantar arteries likely relating to atherosclerosis.  Otherwise, no  hemodynamically significant stenosis or evidence of arterial embolism in the major arteries of the bilateral lower extremities.   2. Subcutaneous edema in the right lower posterior calf.  No discrete fluid collection.  3. Advanced degenerative joint disease of the knees with bilateral joint effusions.  4. Right middle lobe pulmonary nodule measuring 2 mm.  If there are risk factors for lung cancer than optional follow-up CT in 1 year could be obtained.  5. Additional chronic or incidental findings are described in the body of this report.    elm-remote     Dictated by (CST): Ti Zhou MD on 7/24/2024 at 8:48 PM     Finalized by (CST): Winnie,  Ti CASILLAS MD on 7/24/2024 at 9:01 PM          US VENOUS DOPPLER LEG BILAT - DIAG IMG (CPT=93970)    Result Date: 7/24/2024  CONCLUSION:   No evidence of deep venous thrombosis.     Dictated by (CST): Ron Whitman MD on 7/24/2024 at 10:06 AM     Finalized by (CST): Ron Whitman MD on 7/24/2024 at 10:06 AM          CT CHEST PE AORTA (IV ONLY) (CPT=71260)    Result Date: 7/23/2024  CONCLUSION:   No evidence of acute pulmonary embolism to the 1st subsegmental pulmonary artery level.  Enlargement of the main pulmonary artery suggests pulmonary hypertension.  No aneurysm or dissection of the thoracic aorta.  No acute pulmonary consolidation.  Additional chronic or incidental findings are described in the body of this report.    elm-remote    Dictated by (CST): Ti Zhou MD on 7/23/2024 at 7:45 PM     Finalized by (CST): Ti Zhou MD on 7/23/2024 at 7:48 PM                 Medications:    sodium phosphate  15 mmol Intravenous Once    potassium chloride  10 mEq Oral BID    sodium bicarbonate  650 mg Oral QID    polyethylene glycol (PEG 3350)  17 g Oral Daily    predniSONE  10 mg Oral Daily with breakfast    amLODIPine  5 mg Oral Daily    atorvastatin  10 mg Oral Daily    aspirin  81 mg Oral Daily    clopidogrel  75 mg Oral Daily    heparin  5,000 Units Subcutaneous Q12H    magnesium oxide  800 mg Oral Once    metoclopramide  10 mg Intravenous Q6H    pantoprazole  40 mg Intravenous Q12H       Assessment & Plan:        Intractable emesis   -Possibly secondary to gastritis  -Continue on Protonix  -Antiemetics as needed  -advance diet as tolerated.    -GI consulted, planning for EGD on 7/26 as cleared by cardiology.  Appreciate further recommendations.     History of ACS  -Patient describes recent NSTEMI in [place].  -Per patient report, did not have stent placed at that time.  Patient describes areas of disease which was not stented and opted for medical management  -Continue on statin, aspirin and  Plavix  -Cardiology consulted, appreciate further recommendations     Adrenal insufficiency   -With recurrent episodes of hypoglycemia  -hypoglycemia protocol  -Starting D5 IV fluids as patient is n.p.o. for procedure today.  -Patient on chronic prednisone, 10 mg daily, due to rheumatoid arthritis.  Continue daily prednisone  -Endocrinology on consult, appreciate further recommendations  -Continue to monitor     Nonanion gap metabolic acidosis  Hypokalemia  Likely secondary to decreased p.o. intake and vomiting.    -on bicarb gtt.  Continue to monitor.  Nephrology on consult, appreciate further recommendations    Possible acute UTI  -UA with some signs of infection  -Urinary culture with multiple species, possible contaminant  -Completed course of Rocephin      Essential hypertension  Controlled  -Continue current regimen  -Monitor and add agents as needed     Rheumatoid arthritis  Stable  -Restarted on prednisone as above  - continue outpatient management with methotrexate upon discharge.      Plan of care discussed with patient at bedside . Discussed management/test result(s) with Rn and GI consultant    Quality:  DVT Prophylaxis: Heparin  CODE status: Full  Estimated date of discharge: TBD  Discharge is dependent on: clinical stability      Shorty Couch MD          This note was prepared using Dragon Medical voice recognition dictation software. As a result errors may occur. When identified these errors have been corrected. While every attempt is made to correct errors during dictation discrepancies may still exist

## 2024-07-26 NOTE — ANESTHESIA POSTPROCEDURE EVALUATION
Patient: Sheri Garzon    Procedure Summary       Date: 07/26/24 Room / Location: [facility] ENDOSCOPY 01 / [facility] ENDOSCOPY    Anesthesia Start: 1430 Anesthesia Stop: 1458    Procedure: ESOPHAGOGASTRODUODENOSCOPY (EGD) Diagnosis: (hiatal hernia)    Surgeons: NEEL Wynne MD Anesthesiologist: Eliza Nieto MD    Anesthesia Type: MAC ASA Status: 3            Anesthesia Type: MAC    Vitals Value Taken Time   BP 90/58 07/26/24 1457   Temp  07/26/24 1458   Pulse 106 07/26/24 1457   Resp 24 07/26/24 1457   SpO2 99 % 07/26/24 1457   Vitals shown include unfiled device data.    [facility] AN Post Evaluation:   Patient Evaluated in PACU  Patient Participation: complete - patient participated  Level of Consciousness: awake and alert  Pain Management: adequate  Airway Patency:patent  Dental exam unchanged from preop  Yes    Nausea/Vomiting: none  Cardiovascular Status: acceptable and hemodynamically stable  Respiratory Status: acceptable, nonlabored ventilation, nasal cannula and spontaneous ventilation  Postoperative Hydration acceptable  Comments: Teeth noted to be intact after bite block removal.   Did not need to be orally suctioned by anesthesiologist at any point during procedure.   D5 infusion continued throughout procedure and in post-op, Sheri awake and alert in recovery.        Eliza Nieto MD  7/26/2024 2:58 PM

## 2024-07-26 NOTE — PROGRESS NOTES
[facility]  part of [facility]    Cardiology Hospital Progress Note       Sheri Garzon Patient Status:  Inpatient    1960 MRN [id number]   Location [facility] 4W/SW/SE Attending Shorty Couch MD   Hosp Day # 4 PCP No primary care provider on file.       Patient is a 64 year old female who was admitted to the hospital for Dehydration:  Subjective:  Patient seen in the room  No cardiac c/o  C Monitor Sinus rhythm  Patient for EGD today per GI    HPI: patient admitted with N/V and hypokalemia and hypoglycemia with H/O RA on chronic use of Prednisone for 23 years.H/O Adrenal insufficiency. Patient was tachycardic and tachypnic, had Chest CTA without PE and No DVT per V doppler.  Patient states that she had c cath done in [place] in last 1 year and was told that she has small coronaries without occlusive disease. She was placed on DAP -ASA+Plavix and Statin.     Past Medical History:   Past Medical History:    Asthma (HCC)    Esophageal reflux    Essential hypertension    Hyperlipidemia    Pancreatitis (HCC)    RA (rheumatoid arthritis) (HCC)       Past Surgical History:  Past Surgical History:   Procedure Laterality Date    Revision of uterine anomaly      Rotator cuff repair      Wrist fracture surgery         Family History:  History reviewed. No pertinent family history.    Social History:  Pediatric History   Patient Parents    Not on file     Other Topics Concern    Not on file   Social History Narrative    Not on file           Current Medications:  Current Facility-Administered Medications   Medication Dose Route Frequency    sodium phosphate 15 mmol in 0.9% NaCl 100mL IVPB premix  15 mmol Intravenous Once    dextrose 5%-sodium chloride 0.9% infusion   Intravenous Continuous    potassium chloride (Klor-Con M10) tab 10 mEq  10 mEq Oral BID    sodium bicarbonate tab 650 mg  650 mg Oral QID    polyethylene glycol (PEG 3350) (Miralax) 17 g oral packet 17 g  17 g Oral  Daily    predniSONE (Deltasone) tab 10 mg  10 mg Oral Daily with breakfast    amLODIPine (Norvasc) tab 5 mg  5 mg Oral Daily    atorvastatin (Lipitor) tab 10 mg  10 mg Oral Daily    aspirin DR tab 81 mg  81 mg Oral Daily    clopidogrel (Plavix) tab 75 mg  75 mg Oral Daily    HYDROcodone-acetaminophen (Norco)  MG per tab 1 tablet  1 tablet Oral Q6H PRN    acetaminophen (Tylenol) tab 650 mg  650 mg Oral Q6H PRN    heparin (Porcine) 5000 UNIT/ML injection 5,000 Units  5,000 Units Subcutaneous Q12H    ondansetron (Zofran) 4 MG/2ML injection 4 mg  4 mg Intravenous Q6H PRN    zolpidem (Ambien) tab 5 mg  5 mg Oral Nightly PRN    alum-mag hydroxide-simethicone (Maalox) 200-200-20 MG/5ML oral suspension 30 mL  30 mL Oral QID PRN    magnesium oxide (Mag-Ox) tab 800 mg  800 mg Oral Once    metoclopramide (Reglan) 5 mg/mL injection 10 mg  10 mg Intravenous Q6H    morphINE PF 2 MG/ML injection 2 mg  2 mg Intravenous Q6H PRN    pantoprazole (Protonix) 40 mg in sodium chloride 0.9% PF 10 mL IV push  40 mg Intravenous Q12H    glucose (Dex4) 15 GM/59ML oral liquid 15 g  15 g Oral Q15 Min PRN    Or    glucose (Glutose) 40% oral gel 15 g  15 g Oral Q15 Min PRN    Or    glucose-vitamin C (Dex-4) chewable tab 4 tablet  4 tablet Oral Q15 Min PRN    Or    dextrose 50% injection 50 mL  50 mL Intravenous Q15 Min PRN    Or    glucose (Dex4) 15 GM/59ML oral liquid 30 g  30 g Oral Q15 Min PRN    Or    glucose (Glutose) 40% oral gel 30 g  30 g Oral Q15 Min PRN    Or    glucose-vitamin C (Dex-4) chewable tab 8 tablet  8 tablet Oral Q15 Min PRN     Medications Prior to Admission   Medication Sig    amLODIPine 5 MG Oral Tab Take 1 tablet (5 mg total) by mouth daily.    Aspirin 81 MG Oral Cap 81 mg.    atorvastatin 10 MG Oral Tab Take 1 tablet (10 mg total) by mouth daily.    cefuroxime 500 MG Oral Tab Take 1 tablet (500 mg total) by mouth 2 (two) times daily.    clopidogrel 75 MG Oral Tab Take 1 tablet (75 mg total) by mouth daily.     cyclobenzaprine 10 MG Oral Tab Take 1 tablet (10 mg total) by mouth nightly.    ergocalciferol 1.25 MG (51622 UT) Oral Cap Take 1 capsule (50,000 Units total) by mouth once a week.    furosemide 40 MG Oral Tab Take 1 tablet (40 mg total) by mouth daily.    ibuprofen 600 MG Oral Tab Take 1 tablet (600 mg total) by mouth 3 (three) times daily.    losartan 50 MG Oral Tab Take 1 tablet (50 mg total) by mouth daily.    methotrexate 2.5 MG Oral Tab Take 1 tablet (2.5 mg total) by mouth once a week.    metoprolol tartrate 50 MG Oral Tab Take 1 tablet (50 mg total) by mouth 2 (two) times daily.    Omeprazole 40 MG Oral Capsule Delayed Release Take 1 capsule (40 mg total) by mouth daily.    oxybutynin ER 10 MG Oral Tablet 24 Hr Take 1 tablet (10 mg total) by mouth daily.    Potassium Chloride ER 10 MEQ Oral Tab CR Take 1 tablet (10 mEq total) by mouth 2 (two) times daily.    predniSONE 5 MG Oral Tab Take 1 tablet (5 mg total) by mouth daily. 4xs weekly ,Tuesday,Thursday, saturday    predniSONE 1 MG Oral Tab Take 1 tablet (1 mg total) by mouth As Directed. 3tablets Monday,Wednesday, Friday    folic acid 1 MG Oral Tab Take 1 tablet (1 mg total) by mouth daily.    hydroxychloroquine 200 MG Oral Tab Take 1 tablet (200 mg total) by mouth 2 (two) times daily.    HYDROcodone-acetaminophen (NORCO)  MG Oral Tab Take 1 tablet by mouth in the morning and 1 tablet at noon and 1 tablet in the evening.    [] ciprofloxacin 500 MG Oral Tab Take 1 tablet (500 mg total) by mouth 2 (two) times daily for 7 days.       Allergies:  Allergies   Allergen Reactions    Gadolinium Derivatives FEVER    Penicillins OTHER (SEE COMMENTS)     esophagitis    Sulfa Antibiotics OTHER (SEE COMMENTS)     esophagitis       Review of Systems:   A comprehensive 12 point review of system was performed.  All pertinent positive and negative findings per HPI.    Physical Exam:   Blood pressure 90/58, pulse 102, temperature 98.2 °F (36.8 °C),  temperature source Oral, resp. rate 16, height 5' 2\" (1.575 m), weight 183 lb 12.8 oz (83.4 kg), SpO2 98%.  Intake/Output:   Last 3 shifts: [id number]   This shift: No intake/output data recorded.     Vent Settings:      Hemodynamic parameters (last 24 hours):      Scheduled Meds:    sodium phosphate  15 mmol Intravenous Once    potassium chloride  10 mEq Oral BID    sodium bicarbonate  650 mg Oral QID    polyethylene glycol (PEG 3350)  17 g Oral Daily    predniSONE  10 mg Oral Daily with breakfast    amLODIPine  5 mg Oral Daily    atorvastatin  10 mg Oral Daily    aspirin  81 mg Oral Daily    clopidogrel  75 mg Oral Daily    heparin  5,000 Units Subcutaneous Q12H    magnesium oxide  800 mg Oral Once    metoclopramide  10 mg Intravenous Q6H    pantoprazole  40 mg Intravenous Q12H       Continuous Infusions:    dextrose 5%-sodium chloride 0.9% 75 mL/hr at 07/26/24 0937         Physical Exam:    General: No acute distress./Obese  HEENT: No scleral icterus.  External ears are normal.  Lids are normal.  Oral mucosa is moist.  Neck: No JVD, no bruits.  Cardiac: Normal rate, No murmur.  Lungs: Clear without rhales, rhochi or wheezing.  Abdomen: Soft, non-tender.  Extremities: No edema.  RLE ulcer. Pulses palpable bilat LE  Neurologic: Alert and moving all 4 extremities.  Psychiatry: Patient has calm affect.      Results:     Laboratory Data:  Lab Results   Component Value Date    WBC 4.6 07/26/2024    HGB 9.5 (L) 07/26/2024    HCT 29.4 (L) 07/26/2024    .0 07/26/2024    CREATSERUM 0.67 07/26/2024    BUN <5 (L) 07/26/2024     07/26/2024    K 4.2 07/26/2024     (H) 07/26/2024    CO2 14.0 (L) 07/26/2024    GLU 67 (L) 07/26/2024    CA 7.9 (L) 07/26/2024    ALB 3.7 07/21/2024    ALKPHO 92 07/21/2024    TP 6.2 07/21/2024    AST 37 (H) 07/21/2024    ALT 17 07/21/2024    LIP 34 07/21/2024    DDIMER 1.69 (H) 07/23/2024    MG 1.5 (L) 07/24/2024    PHOS 2.1 (L) 07/26/2024         Imaging:    PROCEDURE: CTA  ABDOMEN/PELVIS LOWER EXT BILAT W RUNOFF (CPT=75635)     COMPARISON: [facility], CT ABDOMEN + PELVIS (CONTRAST ONLY) (CPT=74177), 7/21/2024, 4:34 PM.  [facility], CT ABDOMEN + PELVIS (CONTRAST ONLY) (CPT=74177), 9/06/2021, 1:33 PM.     INDICATIONS: Rle ulcer. Suspected arterial embolus after cath.     TECHNIQUE: CT images of the abdomen, pelvis, and lower extremities were obtained without and with non-ionic intravenous contrast material. Multi-planar reformatted and 3-D images were created with vessel analysis performed on an independent workstation.   Automated exposure control for dose reduction was used. Adjustment of the mA and/or kV was done based on the patient's size. Use of iterative reconstruction technique for dose reduction was used.  Dose information is transmitted to the ACR (American  College of Radiology) NRDR (National Radiology Data Registry) which includes the Dose Index Registry.     FINDINGS:     ANGIOGRAPHY:  Precontrast:  Mild calcified atherosclerosis.  Post-contrast:  No aneurysm or dissection of the abdominal aorta.  The celiac trunk is patent.  The left gastric artery arises directly from the aorta.    The SMA is patent.  There are single renal arteries which are patent.  Mild narrowing at the origin of the left renal artery.  The ALLEN is patent.    The iliac arteries are patent bilaterally.  The common femoral arteries are patent bilaterally.  The deep and superficial femoral arteries are patent bilaterally.  There are bilateral 3 vessel runoffs.  Multifocal mild stenoses are seen.  The dorsalis pedis arteries are opacified bilaterally.  There is poor opacification of the plantar arteries bilaterally.     LOWER THORAX: Coronary artery calcifications are seen.  The heart is normal size.  Right middle lobe pulmonary nodule measures 2 mm.  LIVER:   No enlargement, atrophy, abnormal density, or significant focal lesion.  GALLBLADDER: Normal size and  appearance.  BILIARY:   No visible dilatation or calcification.    PANCREAS:   No lesion, fluid collection, ductal dilatation, or atrophy.    SPLEEN:   No enlargement or focal lesion.    ADRENALS:   No mass or enlargement.    KIDNEYS:   No mass, obstruction, or calcification.    RETROPERITONEUM:   No mass or enlarged adenopathy.    AORTA/VASCULAR:   No aneurysm.  PERITONEUM: No free fluid or air.  GI TRACT/MESENTERY:   No visible mass, obstruction, or bowel wall thickening. The appendix is unremarkable.  Mild diverticulosis without evidence of acute diverticulitis.  URINARY BLADDER: Excreted contrast from prior intravenous contrast administration.  REPRODUCTIVE ORGANS: The uterus is present.  The ovaries are unremarkable.  ABDOMINAL WALL:   No acute abnormality.  BONES: No acute fracture.  Anterolisthesis of L5 on S1 measures 5 mm.  Unchanged compression deformities at the superior endplates of L4, L2, and T11.  Moderate spondylosis.  LOWER EXTREMITIES: No acute fracture or dislocation.  Small bilateral knee effusions.  Moderate degenerative joint disease of the bilateral knees.  No discrete fluid collections identified.  Subcutaneous edema of the right lower posterior calf series 5,  image [id number].                  Impression   CONCLUSION:     1. Symmetrical loss of opacification in the bilateral plantar arteries likely relating to atherosclerosis.  Otherwise, no  hemodynamically significant stenosis or evidence of arterial embolism in the major arteries of the bilateral lower extremities.       2. Subcutaneous edema in the right lower posterior calf.  No discrete fluid collection.     3. Advanced degenerative joint disease of the knees with bilateral joint effusions.     4. Right middle lobe pulmonary nodule measuring 2 mm.  If there are risk factors for lung cancer than optional follow-up CT in 1 year could be obtained.     5. Additional chronic or incidental findings are described in the body of this report.            ECHO:     ECG rhythm:   Normal sinus     ----------------------------------------------------------------------------     Conclusions:     1. Left ventricle: The cavity size was normal. Wall thickness was moderately      increased. Systolic function was normal. The estimated ejection fraction      was 55-60%, by visual assessment. No diagnostic evidence for regional      wall motion abnormalities. Doppler parameters are consistent with      abnormal left ventricular relaxation - grade 1 diastolic dysfunction.   2. Ventricular septum: Thickness was moderately increased.   3. Left atrium: The left atrial volume was normal.   4. Mitral valve: There was mild regurgitation.   Impressions:  No previous study was available for comparison   IMPRESSION:  Sinus tachycardia of unclear etiology ? Due to adrenal insufficiency.  We will obtain echocardiogram.  It has been ordered and it is still pending.  Patient was ruled out for pulmonary embolism there were no DVTs but lower extremity venous duplex.    Right lower extremity ulceration of unclear etiology.  There is a suspicion it could be arterial embolization from the groin site after procedure.  She denies having any kind of closure device that she recalls : currently LE CTA as noted with peripheral atherosclerotic disease bilaterally symmetrical in [place] arteries. No evidence of Arterial Thrombus/embolism    Coronary artery disease (non occlusive) with small vessel ischemic disease as described by the patient.  Patient is on dual antiplatelet therapy along with statin.    Adrenal insufficiency.  Patient has been resumed on prednisone.    Lung nodule: Non smoker: to be followed by PCP    Anemia of chronic disease and blood loss: Hb 10.0-->9.5    Patient is going for EGD by GI  Patient is clear from cardiac standpoint for EGD    RECOMMENDATIONS:    ECHO: as noted  Continue current dual antiplatelet therapy with ASA 81+Plavix 75 mg  Continue Statin  Amlodipine to be  continued  Aggressive Physical therapy to improve circulation in LE  Weight loss advised    This was D/W patient in detail with complete understanding    D/W Nursing staff    Will follow in office post discharge, all instructions given     Stable from cardiac standpoint     Thank you very much for the consultation. Please do not hesitate to call with questions.    Montrell Garzon MD  [facility]  [address]  [place] [address]  [phone]    This note was prepared using Dragon Medical voice recognition dictation software. As a result errors may occur. When identified these errors have been corrected. While every attempt is made to correct errors during dictation discrepancies may still exist

## 2024-07-26 NOTE — INTERVAL H&P NOTE
Pre-op Diagnosis: nausea/ vomiting    The above referenced H&P was reviewed by NEEL Wynne MD on 7/26/2024, the patient was examined and no significant changes have occurred in the patient's condition since the H&P was performed.  I discussed with the patient and/or legal representative the potential benefits, risks and side effects of this procedure; the likelihood of the patient achieving goals; and potential problems that might occur during recuperation.  I discussed reasonable alternatives to the procedure, including risks, benefits and side effects related to the alternatives and risks related to not receiving this procedure.  We will proceed with procedure as planned.

## 2024-07-26 NOTE — PLAN OF CARE
Patient is A&OX4. RA. Low BP overnight, per MD fluids were ordered. NPO @ midnight. Remote tele. Heparin on hold. Voiding freely.  BG 68, treated her with Dextrose, , endorse to day shift nurse to continue with protocol treatment. Saline locked. Scheduled reglan. Denies pain. Up by self. Call light within reach, frequent rounding. Safety measures in place. Plan for EGD in am.     Problem: Patient Centered Care  Goal: Patient preferences are identified and integrated in the patient's plan of care  Description: Interventions:  - What would you like us to know as we care for you?   - Provide timely, complete, and accurate information to patient/family  - Incorporate patient and family knowledge, values, beliefs, and cultural backgrounds into the planning and delivery of care  - Encourage patient/family to participate in care and decision-making at the level they choose  - Honor patient and family perspectives and choices  Outcome: Progressing     Problem: Patient/Family Goals  Goal: Patient/Family Long Term Goal  Description: Patient's Long Term Goal:     Interventions:  -   - See additional Care Plan goals for specific interventions  Outcome: Progressing  Goal: Patient/Family Short Term Goal  Description: Patient's Short Term Goal:     Interventions:   -   - See additional Care Plan goals for specific interventions  Outcome: Progressing     Problem: PAIN - ADULT  Goal: Verbalizes/displays adequate comfort level or patient's stated pain goal  Description: INTERVENTIONS:  - Encourage pt to monitor pain and request assistance  - Assess pain using appropriate pain scale  - Administer analgesics based on type and severity of pain and evaluate response  - Implement non-pharmacological measures as appropriate and evaluate response  - Consider cultural and social influences on pain and pain management  - Manage/alleviate anxiety  - Utilize distraction and/or relaxation techniques  - Monitor for opioid side effects  -  Notify MD/LIP if interventions unsuccessful or patient reports new pain  - Anticipate increased pain with activity and pre-medicate as appropriate  Outcome: Progressing     Problem: RISK FOR INFECTION - ADULT  Goal: Absence of fever/infection during anticipated neutropenic period  Description: INTERVENTIONS  - Monitor WBC  - Administer growth factors as ordered  - Implement neutropenic guidelines  Outcome: Progressing     Problem: SAFETY ADULT - FALL  Goal: Free from fall injury  Description: INTERVENTIONS:  - Assess pt frequently for physical needs  - Identify cognitive and physical deficits and behaviors that affect risk of falls.  - Downs fall precautions as indicated by assessment.  - Educate pt/family on patient safety including physical limitations  - Instruct pt to call for assistance with activity based on assessment  - Modify environment to reduce risk of injury  - Provide assistive devices as appropriate  - Consider OT/PT consult to assist with strengthening/mobility  - Encourage toileting schedule  Outcome: Progressing     Problem: DISCHARGE PLANNING  Goal: Discharge to home or other facility with appropriate resources  Description: INTERVENTIONS:  - Identify barriers to discharge w/pt and caregiver  - Include patient/family/discharge partner in discharge planning  - Arrange for needed discharge resources and transportation as appropriate  - Identify discharge learning needs (meds, wound care, etc)  - Arrange for interpreters to assist at discharge as needed  - Consider post-discharge preferences of patient/family/discharge partner  - Complete POLST form as appropriate  - Assess patient's ability to be responsible for managing their own health  - Refer to Case Management Department for coordinating discharge planning if the patient needs post-hospital services based on physician/LIP order or complex needs related to functional status, cognitive ability or social support system  Outcome: Progressing      Problem: Altered Communication/Language Barrier  Goal: Patient/Family is able to understand and participate in their care  Description: Interventions:  - Assess communication ability and preferred communication style  - Implement communication aides and strategies  - Use visual cues when possible  - Listen attentively, be patient, do not interrupt  - Minimize distractions  - Allow time for understanding and response  - Establish method for patient to ask for assistance (call light)  - Provide an  as needed  - Communicate barriers and strategies to overcome with those who interact with patient  Outcome: Progressing

## 2024-07-27 VITALS
HEIGHT: 62 IN | SYSTOLIC BLOOD PRESSURE: 93 MMHG | OXYGEN SATURATION: 100 % | TEMPERATURE: 99 F | DIASTOLIC BLOOD PRESSURE: 57 MMHG | HEART RATE: 120 BPM | BODY MASS INDEX: 33.82 KG/M2 | RESPIRATION RATE: 18 BRPM | WEIGHT: 183.81 LBS

## 2024-07-27 LAB
ANION GAP SERPL CALC-SCNC: 10 MMOL/L (ref 0–18)
BUN BLD-MCNC: <5 MG/DL (ref 9–23)
CALCIUM BLD-MCNC: 7.9 MG/DL (ref 8.7–10.4)
CHLORIDE SERPL-SCNC: 119 MMOL/L (ref 98–112)
CO2 SERPL-SCNC: 14 MMOL/L (ref 21–32)
CREAT BLD-MCNC: 0.65 MG/DL
EGFRCR SERPLBLD CKD-EPI 2021: 98 ML/MIN/1.73M2 (ref 60–?)
GLUCOSE BLD-MCNC: 98 MG/DL (ref 70–99)
GLUCOSE BLDC GLUCOMTR-MCNC: 100 MG/DL (ref 70–99)
GLUCOSE BLDC GLUCOMTR-MCNC: 95 MG/DL (ref 70–99)
PHOSPHATE SERPL-MCNC: 2.1 MG/DL (ref 2.4–5.1)
POTASSIUM SERPL-SCNC: 3.8 MMOL/L (ref 3.5–5.1)
SODIUM SERPL-SCNC: 143 MMOL/L (ref 136–145)

## 2024-07-27 PROCEDURE — 99239 HOSP IP/OBS DSCHRG MGMT >30: CPT | Performed by: INTERNAL MEDICINE

## 2024-07-27 RX ORDER — POLYETHYLENE GLYCOL 3350 17 G/17G
34 POWDER, FOR SOLUTION ORAL DAILY
Status: DISCONTINUED | OUTPATIENT
Start: 2024-07-28 | End: 2024-07-27

## 2024-07-27 RX ORDER — ENEMA 19; 7 G/133ML; G/133ML
1 ENEMA RECTAL ONCE AS NEEDED
Status: DISCONTINUED | OUTPATIENT
Start: 2024-07-27 | End: 2024-07-27

## 2024-07-27 RX ORDER — BISACODYL 10 MG
10 SUPPOSITORY, RECTAL RECTAL
Status: DISCONTINUED | OUTPATIENT
Start: 2024-07-27 | End: 2024-07-27

## 2024-07-27 RX ORDER — SODIUM BICARBONATE 650 MG/1
650 TABLET ORAL 2 TIMES DAILY
Qty: 60 TABLET | Refills: 0 | Status: SHIPPED | OUTPATIENT
Start: 2024-07-27 | End: 2024-08-26

## 2024-07-27 RX ORDER — DOCUSATE SODIUM 100 MG/1
100 CAPSULE, LIQUID FILLED ORAL 2 TIMES DAILY
Status: DISCONTINUED | OUTPATIENT
Start: 2024-07-27 | End: 2024-07-27

## 2024-07-27 RX ORDER — PANTOPRAZOLE SODIUM 40 MG/1
40 TABLET, DELAYED RELEASE ORAL
Qty: 60 TABLET | Refills: 0 | Status: SHIPPED | OUTPATIENT
Start: 2024-07-27 | End: 2024-08-26

## 2024-07-27 RX ORDER — POTASSIUM CHLORIDE 1.5 G/1.58G
10 POWDER, FOR SOLUTION ORAL 2 TIMES DAILY
Status: DISCONTINUED | OUTPATIENT
Start: 2024-07-27 | End: 2024-07-27

## 2024-07-27 RX ORDER — PREDNISONE 10 MG/1
10 TABLET ORAL
Qty: 30 TABLET | Refills: 0 | Status: SHIPPED | OUTPATIENT
Start: 2024-07-28 | End: 2024-08-27

## 2024-07-27 RX ORDER — SODIUM BICARBONATE 650 MG/1
650 TABLET ORAL 4 TIMES DAILY
Qty: 120 TABLET | Refills: 0 | Status: SHIPPED | OUTPATIENT
Start: 2024-07-27 | End: 2024-07-27

## 2024-07-27 NOTE — DISCHARGE INSTRUCTIONS
take sodium bicarb 650 mg twice a day for 1 week   Stay hydrated and drink lots of fluids.  Do not skip meals, ensure you have a nutritious meal.   Follow up with PCP to keep checking electrolytes  Stay seated after meals to avoid any reflux  Dont lay down for about 2hrs after meals

## 2024-07-27 NOTE — SPIRITUAL CARE NOTE
Spiritual Care Visit Note    Patient Name: Sheri Garzon Date of Spiritual Care Visit: 24   : 1960 Primary Dx: Dehydration       Referred By: Referral From: Care Coordination    Spiritual Care Taxonomy:    Intended Effects: Aligning care plan with patient's values    Methods: Explore gavin and values;Explore nature of God;Explore cultural values;Encourage story-telling;Encourage someone to recognize their strengths;Encourage sharing of feelings;Explore spiritual/Catholic beliefs;Offer emotional support;Offer spiritual/Catholic support;Offer support    Interventions: Acknowledge current situation;Acknowledge response to difficult experience;Active listening;Ask guided questions;Ask guided questions about gavin;Ask questions to bring forth feelings;Assist someone with Advance Directives;Discuss spirituality/Religious with someone;[place];Share words of hope and inspiration    Visit Type/Summary:     - Spiritual Care: Offered empathic listening and emotional support. Patient and family expressed appreciation for  visit. Provided support for Patient's spiritual/Catholic requests. Offered prayer.  met with pt per EPIC consult for POA support. Pt stated she has POA and did not wish to complete new document at [facility]. Pt is an ordained [employer] . She shared with me about her call to [employer], her experiences as a woman seeking a call in a Jewish, her love of serving God, and positive outlook based on trust in God. I ended the visit in prayer.   - PoA: Other: Patient did not wish to receive advance directive information at this time.    Spiritual Care support can be requested via an Epic consult. For urgent/immediate needs, please contact the On Call  at: [place]: ext [phone]    Rev. Katie Young

## 2024-07-27 NOTE — DISCHARGE SUMMARY
[facility]    Discharge Summary    Sheri Garzno Patient Status:  Inpatient    1960 MRN [id number]   Location [facility] 4W/SW/SE Attending Shorty Couch MD   Hosp Day # 5 PCP No primary care provider on file.     Date of Admission: 2024     Date of Discharge: 24      Lace+ Score: 32  59-90 High Risk  29-58 Medium Risk  0-28   Low Risk.    TCM Follow-Up Recommendation:  LACE 29-58: Moderate Risk of readmission after discharge from the hospital.    DISCHARGE DX: Principal Problem:    Dehydration  Active Problems:    Metabolic acidosis    Bilious vomiting with nausea    Difficult intravenous access    Hypokalemia    Hypomagnesemia    Hiatal hernia       The patient was seen and examined on day of discharge and this discharge summary is in conjunction with any daily progress note from day of discharge.    HPI per admitting physician: \"Sheri Garzon is a(n) 64 year old female, with a past medical history significant for rheumatoid arthritis, hypertension, GERD, asthma presents with complaint of intractable nausea vomiting and abdominal discomfort ongoing for the past few weeks.  Describes the onset as sudden, symptoms however have persisted since with very poor oral intake as a result of her nausea.  Claims her emesis appears bilious in nature denies any blood.  Her abdominal pain is cramping in nature and sharp at times.  Denies any diarrhea or hematochezia however claims she has had extended periods of constipation of late.  For symptoms she went to an urgent care facility recently was diagnosed with possible pneumonia and UTI given oral antibiotics which she claims she has not been able to tolerate.  Claims she has not had an EGD approximately 2 years ago and colonoscopy twice over the last year, with no significant findings.  CT scan done in ER shows evidence of likely gastritis.\"    Hospital Course:      Intractable emesis    -Resolving  -Continue on Protonix  -Antiemetics as needed  -tolerated advancing diet   -GI consulted, s/p EGD on 7/26 which found small hiatal hernia, but otherwise normal.    -Rec N/V possibly related to previous abx/med use.      History of ACS  -Patient describes recent NSTEMI in [place].  -Per patient report, did not have stent placed at that time.  Patient describes areas of disease which was not stented and opted for medical management  -Continue on statin, aspirin and Plavix  -Cardiology consulted     Adrenal insufficiency   -With recurrent episodes of hypoglycemia, now resolving after increased PO intake  -hypoglycemia protocol  -Patient on chronic prednisone due to rheumatoid arthritis, continue home regimen    -Endocrinology on consult     Nonanion gap metabolic acidosis  Hypokalemia  Likely secondary to decreased p.o. intake and vomiting.    -Continue sodium bicarb BID as outpt.   -Nephrology on consult     Possible acute UTI  -UA with some signs of infection  -Urinary culture with multiple species, possible contaminant  -Completed course of Rocephin      Essential hypertension  Controlled  -Continue current regimen  -Monitor and add agents as needed     Rheumatoid arthritis  Stable  -Continue on prednisone as above  - continue outpatient management with methotrexate upon discharge.        Physical Exam:    Vitals:    07/26/24 2005 07/27/24 0545 07/27/24 0801 07/27/24 1439   BP: 111/56 110/87 106/81 93/57   BP Location: Right arm Right arm Right arm Right arm   Pulse: 103      Resp: 18 18 18 18   Temp: 97.8 °F (36.6 °C) 98 °F (36.7 °C) 97.8 °F (36.6 °C) 98.5 °F (36.9 °C)   TempSrc: Oral Oral Oral Oral   SpO2: 100% 100% 97% 100%   Weight:       Height:         No data found.    Intake/Output Summary (Last 24 hours) at 7/27/2024 1458  Last data filed at 7/27/2024 0946  Gross per 24 hour   Intake 590 ml   Output 0 ml   Net 590 ml       GENERAL:  Awake and alert, in no acute distress.  HEART:  Regular  rhythm, regular rate  LUNGS:  Air entry was minimally decreased.  No crackles or wheezes   ABDOMEN: Soft and non-tender.    EXT: Posterior aspect of right lower extremity with ulceration, without surrounding erythema or warmth.  Scab/eschar intact.  PSYCHIATRIC: Normal mood    CULTURE:   Hospital Encounter on 07/21/24   1. Urine Culture, Routine     Status: None    Collection Time: 07/21/24  6:56 PM    Specimen: Urine, clean catch   Result Value Ref Range    Urine Culture  N/A     >100,000 cfu/ml Multiple species present- probable contamination.       IMAGING STUDIES: SOME MAY NEED FOLLOW UP WITH PCP   CTA ABDOMEN/PELVIS LOWER EXT BILAT W RUNOFF (RXD=21978)    Result Date: 7/24/2024  CONCLUSION:   1. Symmetrical loss of opacification in the bilateral plantar arteries likely relating to atherosclerosis.  Otherwise, no  hemodynamically significant stenosis or evidence of arterial embolism in the major arteries of the bilateral lower extremities.   2. Subcutaneous edema in the right lower posterior calf.  No discrete fluid collection.  3. Advanced degenerative joint disease of the knees with bilateral joint effusions.  4. Right middle lobe pulmonary nodule measuring 2 mm.  If there are risk factors for lung cancer than optional follow-up CT in 1 year could be obtained.  5. Additional chronic or incidental findings are described in the body of this report.    elm-remote     Dictated by (CST): Ti Zhou MD on 7/24/2024 at 8:48 PM     Finalized by (CST): Ti Zhou MD on 7/24/2024 at 9:01 PM          US VENOUS DOPPLER LEG BILAT - DIAG IMG (CPT=93970)    Result Date: 7/24/2024  CONCLUSION:   No evidence of deep venous thrombosis.     Dictated by (CST): Ron Whitman MD on 7/24/2024 at 10:06 AM     Finalized by (CST): Ron Whitman MD on 7/24/2024 at 10:06 AM          CT CHEST PE AORTA (IV ONLY) (CPT=71260)    Result Date: 7/23/2024  CONCLUSION:   No evidence of acute pulmonary embolism to the 1st subsegmental  pulmonary artery level.  Enlargement of the main pulmonary artery suggests pulmonary hypertension.  No aneurysm or dissection of the thoracic aorta.  No acute pulmonary consolidation.  Additional chronic or incidental findings are described in the body of this report.    elm-remote    Dictated by (CST): Ti Zhou MD on 7/23/2024 at 7:45 PM     Finalized by (CST): Ti Zhou MD on 7/23/2024 at 7:48 PM          CT ABDOMEN+PELVIS(CONTRAST ONLY)(CPT=74177)    Result Date: 7/21/2024  CONCLUSION:  1. Limited exam secondary to patient respiratory motion artifact. 2. Mural thickening of distal gastric body and antrum suggests gastritis.  Correlation with endoscopy recommended if not recently performed.    Dictated by (CST): Cristóbal Banks MD on 7/21/2024 at 5:32 PM     Finalized by (CST): Cristóbal Banks MD on 7/21/2024 at 5:39 PM           LABS :     Lab Results   Component Value Date    WBC 4.6 07/26/2024    HGB 9.5 (L) 07/26/2024    HCT 29.4 (L) 07/26/2024    .0 07/26/2024    CREATSERUM 0.65 07/27/2024    BUN <5 (L) 07/27/2024     07/27/2024    K 3.8 07/27/2024     (H) 07/27/2024    CO2 14.0 (L) 07/27/2024    GLU 98 07/27/2024    CA 7.9 (L) 07/27/2024    ALB 3.7 07/21/2024    ALKPHO 92 07/21/2024    BILT 0.6 07/21/2024    TP 6.2 07/21/2024    AST 37 (H) 07/21/2024    ALT 17 07/21/2024    LIP 34 07/21/2024    DDIMER 1.69 (H) 07/23/2024    MG 1.7 07/26/2024    PHOS 2.1 (L) 07/27/2024       Recent Labs   Lab 07/24/24  1350 07/25/24  0620 07/26/24  0640   RBC 3.49* 3.56* 3.38*   HGB 10.1* 10.0* 9.5*   HCT 30.4* 31.2* 29.4*   MCV 87.1 87.6 87.0   MCH 28.9 28.1 28.1   MCHC 33.2 32.1 32.3   RDW 18.3* 18.1* 18.5*   NEPRELIM 2.85 2.12 1.76   WBC 5.4 4.8 4.6   .0 326.0 302.0     Recent Labs   Lab 07/21/24  1451 07/21/24  2107 07/25/24  0620 07/26/24  0640 07/27/24  0929   GLU 84   < > 101* 67* 98   BUN 9   < > <5* <5* <5*   CREATSERUM 0.97   < > 0.64 0.67 0.65   CA 8.6*   < > 8.0* 7.9* 7.9*   ALB  3.7  --   --   --   --       < > 136 141 143   K 2.8*   < > 3.6 4.2 3.8      < > 115* 120* 119*   CO2 14.0*   < > 12.0* 14.0* 14.0*   ALKPHO 92  --   --   --   --    AST 37*  --   --   --   --    ALT 17  --   --   --   --    BILT 0.6  --   --   --   --    TP 6.2  --   --   --   --     < > = values in this interval not displayed.     No results found for: \"PT\", \"INR\"    Disposition: Discharge to Home    Condition at Discharge: Stable     Discharge Medications:      Discharge Medications        START taking these medications        Instructions Prescription details   pantoprazole 40 MG Tbec  Commonly known as: Protonix      Take 1 tablet (40 mg total) by mouth 2 (two) times daily before meals.   Stop taking on: August 26, 2024  Quantity: 60 tablet  Refills: 0     sodium bicarbonate 650 MG Tabs      Take 1 tablet (650 mg total) by mouth 2 (two) times daily.   Stop taking on: August 26, 2024  Quantity: 60 tablet  Refills: 0            CHANGE how you take these medications        Instructions Prescription details   predniSONE 10 MG Tabs  Commonly known as: Deltasone  Start taking on: July 28, 2024  What changed:   medication strength  how much to take  when to take this  additional instructions  Another medication with the same name was removed. Continue taking this medication, and follow the directions you see here.      Take 1 tablet (10 mg total) by mouth daily with breakfast.   Stop taking on: August 27, 2024  Quantity: 30 tablet  Refills: 0            CONTINUE taking these medications        Instructions Prescription details   amLODIPine 5 MG Tabs  Commonly known as: Norvasc      Take 1 tablet (5 mg total) by mouth daily.   Refills: 0     Aspirin 81 MG Caps      81 mg.   Refills: 0     atorvastatin 10 MG Tabs  Commonly known as: Lipitor      Take 1 tablet (10 mg total) by mouth daily.   Refills: 0     clopidogrel 75 MG Tabs  Commonly known as: Plavix      Take 1 tablet (75 mg total) by mouth daily.    Refills: 0     ergocalciferol 1.25 MG (11286 UT) Caps  Commonly known as: Vitamin D2      Take 1 capsule (50,000 Units total) by mouth once a week.   Refills: 0     folic acid 1 MG Tabs  Commonly known as: Folvite      Take 1 tablet (1 mg total) by mouth daily.   Quantity: 90 tablet  Refills: 0     HYDROcodone-acetaminophen  MG Tabs  Commonly known as: Norco      Take 1 tablet by mouth in the morning and 1 tablet at noon and 1 tablet in the evening.   Quantity: 30 tablet  Refills: 0     hydroxychloroquine 200 MG Tabs  Commonly known as: Plaquenil      Take 1 tablet (200 mg total) by mouth 2 (two) times daily.   Quantity: 180 tablet  Refills: 0     methotrexate 2.5 MG Tabs  Commonly known as: Rheumatrex      Take 1 tablet (2.5 mg total) by mouth once a week.   Refills: 0     oxybutynin ER 10 MG Tb24  Commonly known as: Ditropan-XL      Take 1 tablet (10 mg total) by mouth daily.   Refills: 0     Potassium Chloride ER 10 MEQ Tbcr      Take 1 tablet (10 mEq total) by mouth 2 (two) times daily.   Refills: 0            STOP taking these medications      cefuroxime 500 MG Tabs  Commonly known as: Ceftin        ciprofloxacin 500 MG Tabs  Commonly known as: Cipro        cyclobenzaprine 10 MG Tabs  Commonly known as: Flexeril        furosemide 40 MG Tabs  Commonly known as: Lasix        ibuprofen 600 MG Tabs  Commonly known as: Motrin        losartan 50 MG Tabs  Commonly known as: Cozaar        metoprolol tartrate 50 MG Tabs  Commonly known as: Lopressor        Omeprazole 40 MG Cpdr                  Where to Get Your Medications        These medications were sent to [id number] [place], [place] - [address], [phone], [phone] [address] [address]      Phone: [phone]   pantoprazole 40 MG Tbec  predniSONE 10 MG Tabs  sodium bicarbonate 650 MG Tabs         Follow up Visits  Montrell Garzon MD  [address]  [address]  [place]  [place] [address]  [phone]    Schedule an appointment as soon as possible for a visit in 1 week(s)      No primary care provider on file.    Consultants         Provider   Role Specialty     Ezio Bhardwaj MD      Consulting Physician NEPHROLOGY     Meredith Powell MD      Consulting Physician ENDOCRINOLOGY     Marquis Moctezuma DO      Consulting Physician Cardiovascular Diseases     Ma, Maryse Sanders MD      Consulting Physician GASTROENTEROLOGY              Other Discharge Instructions:       ----------------------------------------------------  36 MIN SPENT ON THIS DC   Shorty Couch MD    7/27/2024

## 2024-07-27 NOTE — PLAN OF CARE
Patient is A&Ox4. RA. Full liquid diet. Remote tele. Heparin for dvt prophylaxis. Remote tele. Voiding freely. Scheduled Reglan. Up by self. Call light within reach, frequent rounding. Safety measures in place. Plan for Home when medically clear.       Problem: Patient Centered Care  Goal: Patient preferences are identified and integrated in the patient's plan of care  Description: Interventions:  - What would you like us to know as we care for you?   - Provide timely, complete, and accurate information to patient/family  - Incorporate patient and family knowledge, values, beliefs, and cultural backgrounds into the planning and delivery of care  - Encourage patient/family to participate in care and decision-making at the level they choose  - Honor patient and family perspectives and choices  Outcome: Progressing     Problem: Patient/Family Goals  Goal: Patient/Family Long Term Goal  Description: Patient's Long Term Goal:     Interventions:  -   - See additional Care Plan goals for specific interventions  Outcome: Progressing  Goal: Patient/Family Short Term Goal  Description: Patient's Short Term Goal    Interventions:   -   - See additional Care Plan goals for specific interventions  Outcome: Progressing     Problem: PAIN - ADULT  Goal: Verbalizes/displays adequate comfort level or patient's stated pain goal  Description: INTERVENTIONS:  - Encourage pt to monitor pain and request assistance  - Assess pain using appropriate pain scale  - Administer analgesics based on type and severity of pain and evaluate response  - Implement non-pharmacological measures as appropriate and evaluate response  - Consider cultural and social influences on pain and pain management  - Manage/alleviate anxiety  - Utilize distraction and/or relaxation techniques  - Monitor for opioid side effects  - Notify MD/LIP if interventions unsuccessful or patient reports new pain  - Anticipate increased pain with activity and pre-medicate as  appropriate  Outcome: Progressing     Problem: RISK FOR INFECTION - ADULT  Goal: Absence of fever/infection during anticipated neutropenic period  Description: INTERVENTIONS  - Monitor WBC  - Administer growth factors as ordered  - Implement neutropenic guidelines  Outcome: Progressing     Problem: SAFETY ADULT - FALL  Goal: Free from fall injury  Description: INTERVENTIONS:  - Assess pt frequently for physical needs  - Identify cognitive and physical deficits and behaviors that affect risk of falls.  - [place]ore fall precautions as indicated by assessment.  - Educate pt/family on patient safety including physical limitations  - Instruct pt to call for assistance with activity based on assessment  - Modify environment to reduce risk of injury  - Provide assistive devices as appropriate  - Consider OT/PT consult to assist with strengthening/mobility  - Encourage toileting schedule  Outcome: Progressing     Problem: DISCHARGE PLANNING  Goal: Discharge to home or other facility with appropriate resources  Description: INTERVENTIONS:  - Identify barriers to discharge w/pt and caregiver  - Include patient/family/discharge partner in discharge planning  - Arrange for needed discharge resources and transportation as appropriate  - Identify discharge learning needs (meds, wound care, etc)  - Arrange for interpreters to assist at discharge as needed  - Consider post-discharge preferences of patient/family/discharge partner  - Complete POLST form as appropriate  - Assess patient's ability to be responsible for managing their own health  - Refer to Case Management Department for coordinating discharge planning if the patient needs post-hospital services based on physician/LIP order or complex needs related to functional status, cognitive ability or social support system  Outcome: Progressing     Problem: Altered Communication/Language Barrier  Goal: Patient/Family is able to understand and participate in their  care  Description: Interventions:  - Assess communication ability and preferred communication style  - Implement communication aides and strategies  - Use visual cues when possible  - Listen attentively, be patient, do not interrupt  - Minimize distractions  - Allow time for understanding and response  - Establish method for patient to ask for assistance (call light)  - Provide an  as needed  - Communicate barriers and strategies to overcome with those who interact with patient  Outcome: Progressing

## 2024-07-27 NOTE — PROGRESS NOTES
[facility]  part of [facility]    Cardiology [facility] Progress Note       Sheri Garzon Patient Status:  Inpatient    1960 MRN [id number]   Location [facility] 4W/SW/SE Attending Shorty Couch MD   Hosp Day # 5 PCP No primary care provider on file.       Patient is a 64 year old female who was admitted to the hospital for Dehydration:  Subjective:  Patient seen in the room  No cardiac c/o  C Monitor Sinus rhythm  Patient for EGD today per GI    HPI: patient admitted with N/V and hypokalemia and hypoglycemia with H/O RA on chronic use of Prednisone for 23 years.H/O Adrenal insufficiency. Patient was tachycardic and tachypnic, had Chest CTA without PE and No DVT per V doppler.  Patient states that she had c cath done in [place] in last 1 year and was told that she has small coronaries without occlusive disease. She was placed on DAP -ASA+Plavix and Statin.     Past Medical History:   Past Medical History:    Asthma (HCC)    Esophageal reflux    Essential hypertension    High blood pressure    High cholesterol    Hyperlipidemia    Pancreatitis (HCC)    Problems with swallowing    RA (rheumatoid arthritis) (HCC)    Sleep apnea       Past Surgical History:  Past Surgical History:   Procedure Laterality Date    Revision of uterine anomaly      Rotator cuff repair      Wrist fracture surgery         Family History:  History reviewed. No pertinent family history.    Social History:  Pediatric History   Patient Parents    Not on file     Other Topics Concern    Not on file   Social History Narrative    Not on file           Current Medications:  Current Facility-Administered Medications   Medication Dose Route Frequency    sodium phosphate 15 mmol in 0.9% NaCl 100mL IVPB premix  15 mmol Intravenous Once    pantoprazole (Protonix) DR tab 40 mg  40 mg Oral BID AC    sodium bicarbonate 150 mEq in dextrose 5% 1,000 mL infusion  150 mEq Intravenous Continuous    potassium chloride  (Klor-Con M10) tab 10 mEq  10 mEq Oral BID    [Held by provider] sodium bicarbonate tab 650 mg  650 mg Oral QID    polyethylene glycol (PEG 3350) (Miralax) 17 g oral packet 17 g  17 g Oral Daily    predniSONE (Deltasone) tab 10 mg  10 mg Oral Daily with breakfast    amLODIPine (Norvasc) tab 5 mg  5 mg Oral Daily    atorvastatin (Lipitor) tab 10 mg  10 mg Oral Daily    aspirin DR tab 81 mg  81 mg Oral Daily    clopidogrel (Plavix) tab 75 mg  75 mg Oral Daily    HYDROcodone-acetaminophen (Norco)  MG per tab 1 tablet  1 tablet Oral Q6H PRN    acetaminophen (Tylenol) tab 650 mg  650 mg Oral Q6H PRN    heparin (Porcine) 5000 UNIT/ML injection 5,000 Units  5,000 Units Subcutaneous Q12H    ondansetron (Zofran) 4 MG/2ML injection 4 mg  4 mg Intravenous Q6H PRN    zolpidem (Ambien) tab 5 mg  5 mg Oral Nightly PRN    alum-mag hydroxide-simethicone (Maalox) 200-200-20 MG/5ML oral suspension 30 mL  30 mL Oral QID PRN    magnesium oxide (Mag-Ox) tab 800 mg  800 mg Oral Once    metoclopramide (Reglan) 5 mg/mL injection 10 mg  10 mg Intravenous Q6H    morphINE PF 2 MG/ML injection 2 mg  2 mg Intravenous Q6H PRN    glucose (Dex4) 15 GM/59ML oral liquid 15 g  15 g Oral Q15 Min PRN    Or    glucose (Glutose) 40% oral gel 15 g  15 g Oral Q15 Min PRN    Or    glucose-vitamin C (Dex-4) chewable tab 4 tablet  4 tablet Oral Q15 Min PRN    Or    dextrose 50% injection 50 mL  50 mL Intravenous Q15 Min PRN    Or    glucose (Dex4) 15 GM/59ML oral liquid 30 g  30 g Oral Q15 Min PRN    Or    glucose (Glutose) 40% oral gel 30 g  30 g Oral Q15 Min PRN    Or    glucose-vitamin C (Dex-4) chewable tab 8 tablet  8 tablet Oral Q15 Min PRN     Medications Prior to Admission   Medication Sig    amLODIPine 5 MG Oral Tab Take 1 tablet (5 mg total) by mouth daily.    Aspirin 81 MG Oral Cap 81 mg.    atorvastatin 10 MG Oral Tab Take 1 tablet (10 mg total) by mouth daily.    cefuroxime 500 MG Oral Tab Take 1 tablet (500 mg total) by mouth 2 (two) times  daily.    clopidogrel 75 MG Oral Tab Take 1 tablet (75 mg total) by mouth daily.    cyclobenzaprine 10 MG Oral Tab Take 1 tablet (10 mg total) by mouth nightly.    ergocalciferol 1.25 MG (26434 UT) Oral Cap Take 1 capsule (50,000 Units total) by mouth once a week.    furosemide 40 MG Oral Tab Take 1 tablet (40 mg total) by mouth daily.    ibuprofen 600 MG Oral Tab Take 1 tablet (600 mg total) by mouth 3 (three) times daily.    losartan 50 MG Oral Tab Take 1 tablet (50 mg total) by mouth daily.    methotrexate 2.5 MG Oral Tab Take 1 tablet (2.5 mg total) by mouth once a week.    metoprolol tartrate 50 MG Oral Tab Take 1 tablet (50 mg total) by mouth 2 (two) times daily.    Omeprazole 40 MG Oral Capsule Delayed Release Take 1 capsule (40 mg total) by mouth daily.    oxybutynin ER 10 MG Oral Tablet 24 Hr Take 1 tablet (10 mg total) by mouth daily.    Potassium Chloride ER 10 MEQ Oral Tab CR Take 1 tablet (10 mEq total) by mouth 2 (two) times daily.    predniSONE 5 MG Oral Tab Take 1 tablet (5 mg total) by mouth daily. 4xs weekly ,Tuesday,Thursday, saturday    predniSONE 1 MG Oral Tab Take 1 tablet (1 mg total) by mouth As Directed. 3tablets Monday,Wednesday, Friday    folic acid 1 MG Oral Tab Take 1 tablet (1 mg total) by mouth daily.    hydroxychloroquine 200 MG Oral Tab Take 1 tablet (200 mg total) by mouth 2 (two) times daily.    HYDROcodone-acetaminophen (NORCO)  MG Oral Tab Take 1 tablet by mouth in the morning and 1 tablet at noon and 1 tablet in the evening.    [] ciprofloxacin 500 MG Oral Tab Take 1 tablet (500 mg total) by mouth 2 (two) times daily for 7 days.       Allergies:  Allergies   Allergen Reactions    Gadolinium Derivatives FEVER    Penicillins OTHER (SEE COMMENTS)     esophagitis    Sulfa Antibiotics OTHER (SEE COMMENTS)     esophagitis       Review of Systems:   A comprehensive 12 point review of system was performed.  All pertinent positive and negative findings per  HPI.    Physical Exam:   Blood pressure 106/81, pulse 103, temperature 97.8 °F (36.6 °C), temperature source Oral, resp. rate 18, height 5' 2\" (1.575 m), weight 183 lb 12.8 oz (83.4 kg), SpO2 97%.  Intake/Output:   Last 3 shifts: [id number]   This shift: I/O this shift:  In: 100 [P.O.:100]  Out: -      Vent Settings:      Hemodynamic parameters (last 24 hours):      Scheduled Meds:    sodium phosphate  15 mmol Intravenous Once    pantoprazole  40 mg Oral BID AC    potassium chloride  10 mEq Oral BID    [Held by provider] sodium bicarbonate  650 mg Oral QID    polyethylene glycol (PEG 3350)  17 g Oral Daily    predniSONE  10 mg Oral Daily with breakfast    amLODIPine  5 mg Oral Daily    atorvastatin  10 mg Oral Daily    aspirin  81 mg Oral Daily    clopidogrel  75 mg Oral Daily    heparin  5,000 Units Subcutaneous Q12H    magnesium oxide  800 mg Oral Once    metoclopramide  10 mg Intravenous Q6H       Continuous Infusions:    sodium bicarbonate 150 mEq in dextrose 5% 1,000 mL infusion 150 mEq (07/26/24 2028)         Physical Exam:    General: No acute distress./Obese  HEENT: No scleral icterus.  External ears are normal.  Lids are normal.  Oral mucosa is moist.  Neck: No JVD, no bruits.  Cardiac: Normal rate, No murmur.  Lungs: Clear without rhales, rhochi or wheezing.  Abdomen: Soft, non-tender.  Extremities: No edema.  RLE ulcer. Pulses palpable bilat LE  Neurologic: Alert and moving all 4 extremities.  Psychiatry: Patient has calm affect.      Results:     Laboratory Data:  Lab Results   Component Value Date    WBC 4.6 07/26/2024    HGB 9.5 (L) 07/26/2024    HCT 29.4 (L) 07/26/2024    .0 07/26/2024    CREATSERUM 0.65 07/27/2024    BUN <5 (L) 07/27/2024     07/27/2024    K 3.8 07/27/2024     (H) 07/27/2024    CO2 14.0 (L) 07/27/2024    GLU 98 07/27/2024    CA 7.9 (L) 07/27/2024    ALB 3.7 07/21/2024    ALKPHO 92 07/21/2024    TP 6.2 07/21/2024    AST 37 (H) 07/21/2024    ALT 17 07/21/2024     LIP 34 07/21/2024    DDIMER 1.69 (H) 07/23/2024    MG 1.7 07/26/2024    PHOS 2.1 (L) 07/27/2024         Imaging:    PROCEDURE: CTA ABDOMEN/PELVIS LOWER EXT BILAT W RUNOFF (CPT=75635)     COMPARISON: [facility], CT ABDOMEN + PELVIS (CONTRAST ONLY) (CPT=74177), 7/21/2024, 4:34 PM.  [facility], CT ABDOMEN + PELVIS (CONTRAST ONLY) (CPT=74177), 9/06/2021, 1:33 PM.     INDICATIONS: Rle ulcer. Suspected arterial embolus after cath.     TECHNIQUE: CT images of the abdomen, pelvis, and lower extremities were obtained without and with non-ionic intravenous contrast material. Multi-planar reformatted and 3-D images were created with vessel analysis performed on an independent workstation.   Automated exposure control for dose reduction was used. Adjustment of the mA and/or kV was done based on the patient's size. Use of iterative reconstruction technique for dose reduction was used.  Dose information is transmitted to the ACR (American  College of Radiology) NRDR (National Radiology Data Registry) which includes the Dose Index Registry.     FINDINGS:     ANGIOGRAPHY:  Precontrast:  Mild calcified atherosclerosis.  Post-contrast:  No aneurysm or dissection of the abdominal aorta.  The celiac trunk is patent.  The left gastric artery arises directly from the aorta.    The SMA is patent.  There are single renal arteries which are patent.  Mild narrowing at the origin of the left renal artery.  The ALLEN is patent.    The iliac arteries are patent bilaterally.  The common femoral arteries are patent bilaterally.  The deep and superficial femoral arteries are patent bilaterally.  There are bilateral 3 vessel runoffs.  Multifocal mild stenoses are seen.  The dorsalis pedis arteries are opacified bilaterally.  There is poor opacification of the plantar arteries bilaterally.     LOWER THORAX: Coronary artery calcifications are seen.  The heart is normal size.  Right middle lobe pulmonary nodule measures 2  mm.  LIVER:   No enlargement, atrophy, abnormal density, or significant focal lesion.  GALLBLADDER: Normal size and appearance.  BILIARY:   No visible dilatation or calcification.    PANCREAS:   No lesion, fluid collection, ductal dilatation, or atrophy.    SPLEEN:   No enlargement or focal lesion.    ADRENALS:   No mass or enlargement.    KIDNEYS:   No mass, obstruction, or calcification.    RETROPERITONEUM:   No mass or enlarged adenopathy.    AORTA/VASCULAR:   No aneurysm.  PERITONEUM: No free fluid or air.  GI TRACT/MESENTERY:   No visible mass, obstruction, or bowel wall thickening. The appendix is unremarkable.  Mild diverticulosis without evidence of acute diverticulitis.  URINARY BLADDER: Excreted contrast from prior intravenous contrast administration.  REPRODUCTIVE ORGANS: The uterus is present.  The ovaries are unremarkable.  ABDOMINAL WALL:   No acute abnormality.  BONES: No acute fracture.  Anterolisthesis of L5 on S1 measures 5 mm.  Unchanged compression deformities at the superior endplates of L4, L2, and T11.  Moderate spondylosis.  LOWER EXTREMITIES: No acute fracture or dislocation.  Small bilateral knee effusions.  Moderate degenerative joint disease of the bilateral knees.  No discrete fluid collections identified.  Subcutaneous edema of the right lower posterior calf series 5,  image [id number].                  Impression   CONCLUSION:     1. Symmetrical loss of opacification in the bilateral plantar arteries likely relating to atherosclerosis.  Otherwise, no  hemodynamically significant stenosis or evidence of arterial embolism in the major arteries of the bilateral lower extremities.       2. Subcutaneous edema in the right lower posterior calf.  No discrete fluid collection.     3. Advanced degenerative joint disease of the knees with bilateral joint effusions.     4. Right middle lobe pulmonary nodule measuring 2 mm.  If there are risk factors for lung cancer than optional follow-up CT in 1 year  could be obtained.     5. Additional chronic or incidental findings are described in the body of this report.           ECHO:     ECG rhythm:   Normal sinus     ----------------------------------------------------------------------------     Conclusions:     1. Left ventricle: The cavity size was normal. Wall thickness was moderately      increased. Systolic function was normal. The estimated ejection fraction      was 55-60%, by visual assessment. No diagnostic evidence for regional      wall motion abnormalities. Doppler parameters are consistent with      abnormal left ventricular relaxation - grade 1 diastolic dysfunction.   2. Ventricular septum: Thickness was moderately increased.   3. Left atrium: The left atrial volume was normal.   4. Mitral valve: There was mild regurgitation.   Impressions:  No previous study was available for comparison   IMPRESSION:  Sinus tachycardia of unclear etiology ? Due to adrenal insufficiency.  We will obtain echocardiogram.  It has been ordered and it is still pending.  Patient was ruled out for pulmonary embolism there were no DVTs but lower extremity venous duplex.    Right lower extremity ulceration of unclear etiology.  There is a suspicion it could be arterial embolization from the groin site after procedure.  She denies having any kind of closure device that she recalls : currently LE CTA as noted with peripheral atherosclerotic disease bilaterally symmetrical in [place] arteries. No evidence of Arterial Thrombus/embolism    Coronary artery disease (non occlusive) with small vessel ischemic disease as described by the patient.  Patient is on dual antiplatelet therapy along with statin.    Adrenal insufficiency.  Patient has been resumed on prednisone.    Lung nodule: Non smoker: to be followed by PCP    Anemia of chronic disease and blood loss: Hb 10.0-->9.5    Patient is going for EGD by GI  Patient is clear from cardiac standpoint for EGD    RECOMMENDATIONS:    ECHO  reviewed  CTA reviewed  Electrolyte replacement as ordered

## 2024-07-27 NOTE — DISCHARGE PLANNING
Sheri is A&Ox4 on room air. She is discharging home today in care of her sister who she lives with. Discharge instructions have been given to the family and all questions have been answered.

## 2024-07-27 NOTE — PROGRESS NOTES
[facility]  part of [facility]    Nephrology Progress Note    Chief Complaint   Patient presents with    Abdomen/Flank Pain       Subjective:   64 year old female, following for metabolic acidosis, hypokalemia.     Tolerating liquid diet.   Denies N/V today.     Review of Systems:   Review of Systems   Constitutional:  Positive for fatigue. Negative for chills and fever.   Respiratory:  Negative for cough and shortness of breath.    Cardiovascular:  Negative for chest pain and leg swelling.   Gastrointestinal:  Negative for abdominal pain, constipation, diarrhea, nausea and vomiting.   Genitourinary:  Negative for difficulty urinating, dysuria and flank pain.       Objective:   Temp:  [97.5 °F (36.4 °C)-98.4 °F (36.9 °C)] 97.8 °F (36.6 °C)  Pulse:  [] 103  Resp:  [18-26] 18  BP: ()/(55-87) 106/81  SpO2:  [97 %-100 %] 97 %  SpO2: 97 %     Intake/Output Summary (Last 24 hours) at 7/27/2024 1134  Last data filed at 7/27/2024 0946  Gross per 24 hour   Intake 790 ml   Output 0 ml   Net 790 ml     Wt Readings from Last 3 Encounters:   07/22/24 183 lb 12.8 oz (83.4 kg)   06/28/24 192 lb (87.1 kg)   09/06/21 160 lb (72.6 kg)     Physical Exam  Constitutional:       Appearance: Normal appearance.   Cardiovascular:      Rate and Rhythm: Normal rate and regular rhythm.      Heart sounds: Normal heart sounds.   Pulmonary:      Effort: Pulmonary effort is normal.      Breath sounds: Normal breath sounds.   Musculoskeletal:         General: Normal range of motion.   Skin:     General: Skin is warm and dry.   Neurological:      General: No focal deficit present.      Mental Status: She is alert and oriented to person, place, and time.   Psychiatric:         Mood and Affect: Mood normal.         Behavior: Behavior normal.         Medications:  Current Facility-Administered Medications   Medication Dose Route Frequency    sodium phosphate 15 mmol in 0.9% NaCl 100mL IVPB premix  15 mmol Intravenous  Once    potassium chloride (Klor-Con) 20 MEQ oral powder 10 mEq  10 mEq Oral BID    pantoprazole (Protonix) DR tab 40 mg  40 mg Oral BID AC    sodium bicarbonate tab 650 mg  650 mg Oral QID    polyethylene glycol (PEG 3350) (Miralax) 17 g oral packet 17 g  17 g Oral Daily    predniSONE (Deltasone) tab 10 mg  10 mg Oral Daily with breakfast    amLODIPine (Norvasc) tab 5 mg  5 mg Oral Daily    atorvastatin (Lipitor) tab 10 mg  10 mg Oral Daily    aspirin DR tab 81 mg  81 mg Oral Daily    clopidogrel (Plavix) tab 75 mg  75 mg Oral Daily    HYDROcodone-acetaminophen (Norco)  MG per tab 1 tablet  1 tablet Oral Q6H PRN    acetaminophen (Tylenol) tab 650 mg  650 mg Oral Q6H PRN    heparin (Porcine) 5000 UNIT/ML injection 5,000 Units  5,000 Units Subcutaneous Q12H    ondansetron (Zofran) 4 MG/2ML injection 4 mg  4 mg Intravenous Q6H PRN    zolpidem (Ambien) tab 5 mg  5 mg Oral Nightly PRN    alum-mag hydroxide-simethicone (Maalox) 200-200-20 MG/5ML oral suspension 30 mL  30 mL Oral QID PRN    magnesium oxide (Mag-Ox) tab 800 mg  800 mg Oral Once    metoclopramide (Reglan) 5 mg/mL injection 10 mg  10 mg Intravenous Q6H    morphINE PF 2 MG/ML injection 2 mg  2 mg Intravenous Q6H PRN    glucose (Dex4) 15 GM/59ML oral liquid 15 g  15 g Oral Q15 Min PRN    Or    glucose (Glutose) 40% oral gel 15 g  15 g Oral Q15 Min PRN    Or    glucose-vitamin C (Dex-4) chewable tab 4 tablet  4 tablet Oral Q15 Min PRN    Or    dextrose 50% injection 50 mL  50 mL Intravenous Q15 Min PRN    Or    glucose (Dex4) 15 GM/59ML oral liquid 30 g  30 g Oral Q15 Min PRN    Or    glucose (Glutose) 40% oral gel 30 g  30 g Oral Q15 Min PRN    Or    glucose-vitamin C (Dex-4) chewable tab 8 tablet  8 tablet Oral Q15 Min PRN              Results:     Recent Labs   Lab 07/24/24  1350 07/25/24  0620 07/26/24  0640   RBC 3.49* 3.56* 3.38*   HGB 10.1* 10.0* 9.5*   HCT 30.4* 31.2* 29.4*   MCV 87.1 87.6 87.0   NEPRELIM 2.85 2.12 1.76   WBC 5.4 4.8 4.6   PLT  328.0 326.0 302.0     Recent Labs   Lab 07/21/24  1451 07/21/24  2107 07/25/24  0620 07/26/24  0640 07/27/24  0929   GLU 84   < > 101* 67* 98   BUN 9   < > <5* <5* <5*   CREATSERUM 0.97   < > 0.64 0.67 0.65   CA 8.6*   < > 8.0* 7.9* 7.9*   ALB 3.7  --   --   --   --       < > 136 141 143   K 2.8*   < > 3.6 4.2 3.8      < > 115* 120* 119*   CO2 14.0*   < > 12.0* 14.0* 14.0*   ALKPHO 92  --   --   --   --    AST 37*  --   --   --   --    ALT 17  --   --   --   --    BILT 0.6  --   --   --   --    TP 6.2  --   --   --   --     < > = values in this interval not displayed.     No results found for: \"PTT\", \"INR\"  No results for input(s): \"BNP\" in the last 168 hours.  Recent Labs   Lab 07/23/24  0530 07/24/24 1100 07/26/24  0640 07/27/24  0929   MG 1.3*  1.3* 1.5* 1.7  --    PHOS 1.6* 1.7* 2.1* 2.1*        Recent Labs   Lab 07/21/24  1451 07/23/24  0530 07/24/24  1100 07/26/24  0640 07/27/24  0929   PHOS  --    < > 1.7* 2.1* 2.1*   ALB 3.7  --   --   --   --     < > = values in this interval not displayed.       No results found.        Assessment and Plan:     Patient is a 64 year old female with past medical history of HTN, HLD, RA, h/o pancreatitis, and GERD who presented with intermittent N/V and generalized weakness for 3 weeks.     Hypokalemia:   Likely due to decreased intake/vomiting.   Potassium 3.8 today.   Cont potassium chloride 10 meq BID.     Hypomagnesemia:   Mg 1.7. Mag 4 grams IV x 1 7/24    Hypophosphatemia:   Phos 2.1 today. Potassium phos 15 mmol x 1 today.      Metabolic acidosis:   Likely due to NS/decreased oral intake.   Bicarb 14 today. Did not improved much with sodium bicarb drip, will stop.  Will restart Sodium bicarb 650 mg 4x/day.      N/V:   Improving. Tolerating liquid diet.   Had EGD (7/27) which only showed a small hiatal hernia and otherwise normal.   GI following.     RA:   On Prednisone 10 mg daily.       Jarvis Fleming MD  7/27/2024

## 2024-07-29 NOTE — PAYOR COMM NOTE
--------------  DISCHARGE REVIEW    Payor: [employer]  Subscriber #:  [id number]  Authorization Number: [id number]    Admit date: 24  Admit time:  12:20 AM  Discharge Date: 2024  4:40 PM     Admitting Physician: Yari Mcmahon MD  Attending Physician:  No att. providers found  Primary Care Physician: No primary care provider on file.          Discharge Summary Notes        Discharge Summary signed by Shorty Couch MD at 2024  8:28 AM       Author: Shorty Couch MD Specialty: HOSPITALIST, Internal Medicine Author Type: Physician    Filed: 2024  8:28 AM Date of Service: 2024  2:58 PM Status: Signed    : Shorty Couch MD (Physician)         [facility]  part of [facility]    Discharge Summary    Sheri Garzon Patient Status:  Inpatient    1960 MRN [id number]   Location [facility] 4W/SW/SE Attending Shorty Couch MD   Hosp Day # 5 PCP No primary care provider on file.     Date of Admission: 2024     Date of Discharge: 24      Lace+ Score: 32  59-90 High Risk  29-58 Medium Risk  0-28   Low Risk.    TCM Follow-Up Recommendation:  LACE 29-58: Moderate Risk of readmission after discharge from the hospital.    DISCHARGE DX: Principal Problem:    Dehydration  Active Problems:    Metabolic acidosis    Bilious vomiting with nausea    Difficult intravenous access    Hypokalemia    Hypomagnesemia    Hiatal hernia       The patient was seen and examined on day of discharge and this discharge summary is in conjunction with any daily progress note from day of discharge.    HPI per admitting physician: \"Sheri Garzon is a(n) 64 year old female, with a past medical history significant for rheumatoid arthritis, hypertension, GERD, asthma presents with complaint of intractable nausea vomiting and abdominal discomfort ongoing for the past few weeks.  Describes the onset as sudden, symptoms however have persisted since  with very poor oral intake as a result of her nausea.  Claims her emesis appears bilious in nature denies any blood.  Her abdominal pain is cramping in nature and sharp at times.  Denies any diarrhea or hematochezia however claims she has had extended periods of constipation of late.  For symptoms she went to an urgent care facility recently was diagnosed with possible pneumonia and UTI given oral antibiotics which she claims she has not been able to tolerate.  Claims she has not had an EGD approximately 2 years ago and colonoscopy twice over the last year, with no significant findings.  CT scan done in ER shows evidence of likely gastritis.\"    Hospital Course:      Intractable emesis   -Resolving  -Continue on Protonix  -Antiemetics as needed  -tolerated advancing diet   -GI consulted, s/p EGD on 7/26 which found small hiatal hernia, but otherwise normal.    -Rec N/V possibly related to previous abx/med use.      History of ACS  -Patient describes recent NSTEMI in [place].  -Per patient report, did not have stent placed at that time.  Patient describes areas of disease which was not stented and opted for medical management  -Continue on statin, aspirin and Plavix  -Cardiology consulted     Adrenal insufficiency   -With recurrent episodes of hypoglycemia, now resolving after increased PO intake  -hypoglycemia protocol  -Patient on chronic prednisone due to rheumatoid arthritis, continue home regimen    -Endocrinology on consult     Nonanion gap metabolic acidosis  Hypokalemia  Likely secondary to decreased p.o. intake and vomiting.    -Continue sodium bicarb BID as outpt.   -Nephrology on consult     Possible acute UTI  -UA with some signs of infection  -Urinary culture with multiple species, possible contaminant  -Completed course of Rocephin      Essential hypertension  Controlled  -Continue current regimen  -Monitor and add agents as needed     Rheumatoid arthritis  Stable  -Continue on prednisone as above  -  continue outpatient management with methotrexate upon discharge.        Physical Exam:    Vitals:    07/26/24 2005 07/27/24 0545 07/27/24 0801 07/27/24 1439   BP: 111/56 110/87 106/81 93/57   BP Location: Right arm Right arm Right arm Right arm   Pulse: 103      Resp: 18 18 18 18   Temp: 97.8 °F (36.6 °C) 98 °F (36.7 °C) 97.8 °F (36.6 °C) 98.5 °F (36.9 °C)   TempSrc: Oral Oral Oral Oral   SpO2: 100% 100% 97% 100%   Weight:       Height:         No data found.    Intake/Output Summary (Last 24 hours) at 7/27/2024 1458  Last data filed at 7/27/2024 0946  Gross per 24 hour   Intake 590 ml   Output 0 ml   Net 590 ml       GENERAL:  Awake and alert, in no acute distress.  HEART:  Regular rhythm, regular rate  LUNGS:  Air entry was minimally decreased.  No crackles or wheezes   ABDOMEN: Soft and non-tender.    EXT: Posterior aspect of right lower extremity with ulceration, without surrounding erythema or warmth.  Scab/eschar intact.  PSYCHIATRIC: Normal mood    CULTURE:   Hospital Encounter on 07/21/24   1. Urine Culture, Routine     Status: None    Collection Time: 07/21/24  6:56 PM    Specimen: Urine, clean catch   Result Value Ref Range    Urine Culture  N/A     >100,000 cfu/ml Multiple species present- probable contamination.       IMAGING STUDIES: SOME MAY NEED FOLLOW UP WITH PCP   CTA ABDOMEN/PELVIS LOWER EXT BILAT W RUNOFF (DKB=94227)    Result Date: 7/24/2024  CONCLUSION:   1. Symmetrical loss of opacification in the bilateral plantar arteries likely relating to atherosclerosis.  Otherwise, no  hemodynamically significant stenosis or evidence of arterial embolism in the major arteries of the bilateral lower extremities.   2. Subcutaneous edema in the right lower posterior calf.  No discrete fluid collection.  3. Advanced degenerative joint disease of the knees with bilateral joint effusions.  4. Right middle lobe pulmonary nodule measuring 2 mm.  If there are risk factors for lung cancer than optional follow-up CT  in 1 year could be obtained.  5. Additional chronic or incidental findings are described in the body of this report.    elm-remote     Dictated by (CST): Ti Zhou MD on 7/24/2024 at 8:48 PM     Finalized by (CST): Ti Zhou MD on 7/24/2024 at 9:01 PM          US VENOUS DOPPLER LEG BILAT - DIAG IMG (CPT=93970)    Result Date: 7/24/2024  CONCLUSION:   No evidence of deep venous thrombosis.     Dictated by (CST): Ron Whitman MD on 7/24/2024 at 10:06 AM     Finalized by (CST): Ron Whitman MD on 7/24/2024 at 10:06 AM          CT CHEST PE AORTA (IV ONLY) (CPT=71260)    Result Date: 7/23/2024  CONCLUSION:   No evidence of acute pulmonary embolism to the 1st subsegmental pulmonary artery level.  Enlargement of the main pulmonary artery suggests pulmonary hypertension.  No aneurysm or dissection of the thoracic aorta.  No acute pulmonary consolidation.  Additional chronic or incidental findings are described in the body of this report.    elm-remote    Dictated by (CST): Ti Zhou MD on 7/23/2024 at 7:45 PM     Finalized by (CST): Ti Zhou MD on 7/23/2024 at 7:48 PM          CT ABDOMEN+PELVIS(CONTRAST ONLY)(CPT=74177)    Result Date: 7/21/2024  CONCLUSION:  1. Limited exam secondary to patient respiratory motion artifact. 2. Mural thickening of distal gastric body and antrum suggests gastritis.  Correlation with endoscopy recommended if not recently performed.    Dictated by (CST): Cristóbal Banks MD on 7/21/2024 at 5:32 PM     Finalized by (CST): Cristóbal Banks MD on 7/21/2024 at 5:39 PM           LABS :     Lab Results   Component Value Date    WBC 4.6 07/26/2024    HGB 9.5 (L) 07/26/2024    HCT 29.4 (L) 07/26/2024    .0 07/26/2024    CREATSERUM 0.65 07/27/2024    BUN <5 (L) 07/27/2024     07/27/2024    K 3.8 07/27/2024     (H) 07/27/2024    CO2 14.0 (L) 07/27/2024    GLU 98 07/27/2024    CA 7.9 (L) 07/27/2024    ALB 3.7 07/21/2024    ALKPHO 92 07/21/2024    BILT 0.6 07/21/2024     TP 6.2 07/21/2024    AST 37 (H) 07/21/2024    ALT 17 07/21/2024    LIP 34 07/21/2024    DDIMER 1.69 (H) 07/23/2024    MG 1.7 07/26/2024    PHOS 2.1 (L) 07/27/2024       Recent Labs   Lab 07/24/24  1350 07/25/24  0620 07/26/24  0640   RBC 3.49* 3.56* 3.38*   HGB 10.1* 10.0* 9.5*   HCT 30.4* 31.2* 29.4*   MCV 87.1 87.6 87.0   MCH 28.9 28.1 28.1   MCHC 33.2 32.1 32.3   RDW 18.3* 18.1* 18.5*   NEPRELIM 2.85 2.12 1.76   WBC 5.4 4.8 4.6   .0 326.0 302.0     Recent Labs   Lab 07/21/24  1451 07/21/24  2107 07/25/24  0620 07/26/24  0640 07/27/24  0929   GLU 84   < > 101* 67* 98   BUN 9   < > <5* <5* <5*   CREATSERUM 0.97   < > 0.64 0.67 0.65   CA 8.6*   < > 8.0* 7.9* 7.9*   ALB 3.7  --   --   --   --       < > 136 141 143   K 2.8*   < > 3.6 4.2 3.8      < > 115* 120* 119*   CO2 14.0*   < > 12.0* 14.0* 14.0*   ALKPHO 92  --   --   --   --    AST 37*  --   --   --   --    ALT 17  --   --   --   --    BILT 0.6  --   --   --   --    TP 6.2  --   --   --   --     < > = values in this interval not displayed.     No results found for: \"PT\", \"INR\"    Disposition: Discharge to Home    Condition at Discharge: Stable     Discharge Medications:      Discharge Medications        START taking these medications        Instructions Prescription details   pantoprazole 40 MG Tbec  Commonly known as: Protonix      Take 1 tablet (40 mg total) by mouth 2 (two) times daily before meals.   Stop taking on: August 26, 2024  Quantity: 60 tablet  Refills: 0     sodium bicarbonate 650 MG Tabs      Take 1 tablet (650 mg total) by mouth 2 (two) times daily.   Stop taking on: August 26, 2024  Quantity: 60 tablet  Refills: 0            CHANGE how you take these medications        Instructions Prescription details   predniSONE 10 MG Tabs  Commonly known as: Deltasone  Start taking on: July 28, 2024  What changed:   medication strength  how much to take  when to take this  additional instructions  Another medication with the same name  was removed. Continue taking this medication, and follow the directions you see here.      Take 1 tablet (10 mg total) by mouth daily with breakfast.   Stop taking on: August 27, 2024  Quantity: 30 tablet  Refills: 0            CONTINUE taking these medications        Instructions Prescription details   amLODIPine 5 MG Tabs  Commonly known as: Norvasc      Take 1 tablet (5 mg total) by mouth daily.   Refills: 0     Aspirin 81 MG Caps      81 mg.   Refills: 0     atorvastatin 10 MG Tabs  Commonly known as: Lipitor      Take 1 tablet (10 mg total) by mouth daily.   Refills: 0     clopidogrel 75 MG Tabs  Commonly known as: Plavix      Take 1 tablet (75 mg total) by mouth daily.   Refills: 0     ergocalciferol 1.25 MG (63393 UT) Caps  Commonly known as: Vitamin D2      Take 1 capsule (50,000 Units total) by mouth once a week.   Refills: 0     folic acid 1 MG Tabs  Commonly known as: Folvite      Take 1 tablet (1 mg total) by mouth daily.   Quantity: 90 tablet  Refills: 0     HYDROcodone-acetaminophen  MG Tabs  Commonly known as: Norco      Take 1 tablet by mouth in the morning and 1 tablet at noon and 1 tablet in the evening.   Quantity: 30 tablet  Refills: 0     hydroxychloroquine 200 MG Tabs  Commonly known as: Plaquenil      Take 1 tablet (200 mg total) by mouth 2 (two) times daily.   Quantity: 180 tablet  Refills: 0     methotrexate 2.5 MG Tabs  Commonly known as: Rheumatrex      Take 1 tablet (2.5 mg total) by mouth once a week.   Refills: 0     oxybutynin ER 10 MG Tb24  Commonly known as: Ditropan-XL      Take 1 tablet (10 mg total) by mouth daily.   Refills: 0     Potassium Chloride ER 10 MEQ Tbcr      Take 1 tablet (10 mEq total) by mouth 2 (two) times daily.   Refills: 0            STOP taking these medications      cefuroxime 500 MG Tabs  Commonly known as: Ceftin        ciprofloxacin 500 MG Tabs  Commonly known as: Cipro        cyclobenzaprine 10 MG Tabs  Commonly known as: Flexeril        furosemide 40  MG Tabs  Commonly known as: Lasix        ibuprofen 600 MG Tabs  Commonly known as: Motrin        losartan 50 MG Tabs  Commonly known as: Cozaar        metoprolol tartrate 50 MG Tabs  Commonly known as: Lopressor        Omeprazole 40 MG Cpdr                  Where to Get Your Medications        These medications were sent to [id number] - [place], [place] - [address] AT [address], [phone], [phone] [address], [place] [address]      Phone: [phone]   pantoprazole 40 MG Tbec  predniSONE 10 MG Tabs  sodium bicarbonate 650 MG Tabs         Follow up Visits  Montrell Garzon MD  [address]  [address]  [facility] [place] [address] [phone]    Schedule an appointment as soon as possible for a visit in 1 week(s)      No primary care provider on file.    Consultants         Provider   Role Specialty     Ezio Bhardwaj MD      Consulting Physician NEPHROLOGY     Meredith Powell MD      Consulting Physician ENDOCRINOLOGY     Marquis Moctezuma DO      Consulting Physician Cardiovascular Diseases     Ma, Maryse Sanders MD      Consulting Physician GASTROENTEROLOGY              Other Discharge Instructions:       ----------------------------------------------------  36 MIN SPENT ON THIS DC   Shorty Couch MD    7/27/2024      Electronically signed by Shorty Couch MD on 7/28/2024  8:28 AM         REVIEWER COMMENTS

## 2024-07-30 ENCOUNTER — APPOINTMENT (OUTPATIENT)
Dept: MRI IMAGING | Facility: HOSPITAL | Age: 64
End: 2024-07-30
Attending: STUDENT IN AN ORGANIZED HEALTH CARE EDUCATION/TRAINING PROGRAM
Payer: COMMERCIAL

## 2024-07-30 ENCOUNTER — TELEPHONE (OUTPATIENT)
Dept: INTERNAL MEDICINE CLINIC | Facility: CLINIC | Age: 64
End: 2024-07-30

## 2024-07-30 ENCOUNTER — HOSPITAL ENCOUNTER (EMERGENCY)
Facility: HOSPITAL | Age: 64
Discharge: HOME | End: 2024-07-30
Attending: STUDENT IN AN ORGANIZED HEALTH CARE EDUCATION/TRAINING PROGRAM
Payer: COMMERCIAL

## 2024-07-30 ENCOUNTER — PATIENT OUTREACH (OUTPATIENT)
Dept: CASE MANAGEMENT | Age: 64
End: 2024-07-30

## 2024-07-30 VITALS
DIASTOLIC BLOOD PRESSURE: 82 MMHG | RESPIRATION RATE: 18 BRPM | HEART RATE: 98 BPM | SYSTOLIC BLOOD PRESSURE: 119 MMHG | OXYGEN SATURATION: 100 % | TEMPERATURE: 98 F

## 2024-07-30 DIAGNOSIS — E86.0 DEHYDRATION: ICD-10-CM

## 2024-07-30 DIAGNOSIS — H53.8 BLURRY VISION, LEFT EYE: Primary | ICD-10-CM

## 2024-07-30 DIAGNOSIS — Z02.9 ENCOUNTERS FOR ADMINISTRATIVE PURPOSE: Primary | ICD-10-CM

## 2024-07-30 LAB
ALBUMIN SERPL-MCNC: 3.1 G/DL (ref 3.2–4.8)
ALBUMIN/GLOB SERPL: 1.2 {RATIO} (ref 1–2)
ALP LIVER SERPL-CCNC: 85 U/L
ALT SERPL-CCNC: 26 U/L
ANION GAP SERPL CALC-SCNC: 10 MMOL/L (ref 0–18)
AST SERPL-CCNC: 35 U/L (ref ?–34)
BASOPHILS # BLD AUTO: 0.05 X10(3) UL (ref 0–0.2)
BASOPHILS NFR BLD AUTO: 0.6 %
BILIRUB SERPL-MCNC: 0.6 MG/DL (ref 0.2–1.1)
BUN BLD-MCNC: 6 MG/DL (ref 9–23)
BUN/CREAT SERPL: 7.8 (ref 10–20)
CALCIUM BLD-MCNC: 8.4 MG/DL (ref 8.7–10.4)
CHLORIDE SERPL-SCNC: 113 MMOL/L (ref 98–112)
CO2 SERPL-SCNC: 18 MMOL/L (ref 21–32)
CREAT BLD-MCNC: 0.77 MG/DL
DEPRECATED RDW RBC AUTO: 57.8 FL (ref 35.1–46.3)
EGFRCR SERPLBLD CKD-EPI 2021: 86 ML/MIN/1.73M2 (ref 60–?)
EOSINOPHIL # BLD AUTO: 0.03 X10(3) UL (ref 0–0.7)
EOSINOPHIL NFR BLD AUTO: 0.3 %
ERYTHROCYTE [DISTWIDTH] IN BLOOD BY AUTOMATED COUNT: 18.1 % (ref 11–15)
GLOBULIN PLAS-MCNC: 2.5 G/DL (ref 2–3.5)
GLUCOSE BLD-MCNC: 84 MG/DL (ref 70–99)
HCT VFR BLD AUTO: 31.9 %
HGB BLD-MCNC: 10.7 G/DL
IMM GRANULOCYTES # BLD AUTO: 0.16 X10(3) UL (ref 0–1)
IMM GRANULOCYTES NFR BLD: 1.9 %
LYMPHOCYTES # BLD AUTO: 3.73 X10(3) UL (ref 1–4)
LYMPHOCYTES NFR BLD AUTO: 43.3 %
MCH RBC QN AUTO: 29.3 PG (ref 26–34)
MCHC RBC AUTO-ENTMCNC: 33.5 G/DL (ref 31–37)
MCV RBC AUTO: 87.4 FL
MONOCYTES # BLD AUTO: 1.65 X10(3) UL (ref 0.1–1)
MONOCYTES NFR BLD AUTO: 19.2 %
NEUTROPHILS # BLD AUTO: 2.99 X10 (3) UL (ref 1.5–7.7)
NEUTROPHILS # BLD AUTO: 2.99 X10(3) UL (ref 1.5–7.7)
NEUTROPHILS NFR BLD AUTO: 34.7 %
OSMOLALITY SERPL CALC.SUM OF ELEC: 289 MOSM/KG (ref 275–295)
PLATELET # BLD AUTO: 257 10(3)UL (ref 150–450)
POTASSIUM SERPL-SCNC: 3.2 MMOL/L (ref 3.5–5.1)
PROT SERPL-MCNC: 5.6 G/DL (ref 5.7–8.2)
RBC # BLD AUTO: 3.65 X10(6)UL
SODIUM SERPL-SCNC: 141 MMOL/L (ref 136–145)
WBC # BLD AUTO: 8.6 X10(3) UL (ref 4–11)

## 2024-07-30 PROCEDURE — 36415 COLL VENOUS BLD VENIPUNCTURE: CPT

## 2024-07-30 PROCEDURE — 99284 EMERGENCY DEPT VISIT MOD MDM: CPT

## 2024-07-30 PROCEDURE — 99285 EMERGENCY DEPT VISIT HI MDM: CPT

## 2024-07-30 PROCEDURE — 85025 COMPLETE CBC W/AUTO DIFF WBC: CPT | Performed by: STUDENT IN AN ORGANIZED HEALTH CARE EDUCATION/TRAINING PROGRAM

## 2024-07-30 PROCEDURE — 1111F DSCHRG MED/CURRENT MED MERGE: CPT

## 2024-07-30 PROCEDURE — 80053 COMPREHEN METABOLIC PANEL: CPT | Performed by: STUDENT IN AN ORGANIZED HEALTH CARE EDUCATION/TRAINING PROGRAM

## 2024-07-30 PROCEDURE — 70551 MRI BRAIN STEM W/O DYE: CPT | Performed by: STUDENT IN AN ORGANIZED HEALTH CARE EDUCATION/TRAINING PROGRAM

## 2024-07-30 RX ORDER — LORAZEPAM 1 MG/1
1 TABLET ORAL ONCE
Status: COMPLETED | OUTPATIENT
Start: 2024-07-30 | End: 2024-07-30

## 2024-07-30 RX ORDER — ACETAMINOPHEN 500 MG
1000 TABLET ORAL ONCE
Status: COMPLETED | OUTPATIENT
Start: 2024-07-30 | End: 2024-07-30

## 2024-07-30 RX ORDER — POTASSIUM CHLORIDE 20 MEQ/1
40 TABLET, EXTENDED RELEASE ORAL ONCE
Status: COMPLETED | OUTPATIENT
Start: 2024-07-30 | End: 2024-07-30

## 2024-07-30 NOTE — PROGRESS NOTES
Initial Post Discharge Follow Up   Discharge Date: 7/27/24  Contact Date: 7/30/2024    Consent Verification:  Assessment Completed With: Patient  HIPAA Verified?  Yes    Discharge Dx:     Dehydration  Active Problems:    Metabolic acidosis    Bilious vomiting with nausea    Difficult intravenous access    Hypokalemia    Hypomagnesemia    Hiatal hernia       General:   How have you been since your discharge from the hospital?   Patient reports she started to have blurry vision in her left eye Saturday night since returning home from the hospital and states this has been worsening. Patient states she went to the eye doctor yesterday and had testing done and was told that this is due to her cataracts worsening or her Rheumatoid Arthritis. Patient states she has not experienced any facial numbness, tingling, drooping, slurred speech, or confusion. Patients speech sounds slurred on the phone but patient states this is because her mouth is dry and she has always had a dry mouth. Patient states she was given new disposable contacts to wear in this eye. Patient states she is also being referred over to a eye specialist for further work up and she needs to schedule an appointment. The patient stated she continues to feel weak from being home from the hospital but feels this has been improving. The patient stated she also continues to experience heart palpitations with certain activities. The patient denies chest pain, shortness of breath, dizziness, lightheadedness, nausea, vomiting. Patient states she was constipated but states since being home from the hospital she has had frequent bms. Patient states she has been tolerating her meals. Patient states she is very concerned for these vision changes she is having. NCM advised patient she should go to the ER to be evaluated since this is a new change for her and patient declined stating that she just got out of the hospital and wants to follow up with the eye doctor only.       NCM confirmed with patient she does not have a PCP. The patient previously saw Umu SMITH on 06/28/2024-- [facility] offered an appointment for Thursday 08/01/2024 and patient declined. Patient stated she only saw her once and wants to establish care with a different provider. [facility] scheduled the patient for a new appointment to establish care with Dr. Juarez on 08/08/2024.       Future Appointments   Date Time Provider Department Center   8/8/2024  1:30 PM Sulma Juarez MD [facility]  [facility]   8/16/2024 12:00 PM Doris Sagastume,  [facility]   9/23/2024  2:00 PM Doreen Brooke MD [facility]         Do you have any pain since discharge?  No    How well was your pain managed while in the hospital?   On a scale of 1-5   1- Very Poor and 5- Very well   Very Well  When you were leaving the hospital were your discharge instructions reviewed with you? Yes  How well were your discharge instructions explained to you?   On a scale of 1-5   1- Very Poor and 5- Very well   Very Well  Do you have any questions about your discharge instructions?  No  Before leaving the hospital was your diagnoses explained to you? Yes  Do you have any questions about your diagnoses? No  Are you able to perform normal daily activities of living as you have prior to your hospital stay (dressing, bathing, ambulating to the bathroom, etc)? yes  ([facility]) Was patient given a different diet per AVS? no      Medications:   Current Outpatient Medications   Medication Sig Dispense Refill    predniSONE 10 MG Oral Tab Take 1 tablet (10 mg total) by mouth daily with breakfast. 30 tablet 0    pantoprazole 40 MG Oral Tab EC Take 1 tablet (40 mg total) by mouth 2 (two) times daily before meals. 60 tablet 0    sodium bicarbonate 650 MG Oral Tab Take 1 tablet (650 mg total) by mouth 2 (two) times daily. 60 tablet 0    amLODIPine 5 MG Oral Tab Take 1 tablet (5 mg total) by mouth daily.      Aspirin 81 MG Oral Cap 81 mg.      atorvastatin 10  MG Oral Tab Take 1 tablet (10 mg total) by mouth daily.      clopidogrel 75 MG Oral Tab Take 1 tablet (75 mg total) by mouth daily.      ergocalciferol 1.25 MG (94660 UT) Oral Cap Take 1 capsule (50,000 Units total) by mouth once a week.      methotrexate 2.5 MG Oral Tab Take 1 tablet (2.5 mg total) by mouth once a week.      oxybutynin ER 10 MG Oral Tablet 24 Hr Take 1 tablet (10 mg total) by mouth daily.      Potassium Chloride ER 10 MEQ Oral Tab CR Take 1 tablet (10 mEq total) by mouth 2 (two) times daily.      folic acid 1 MG Oral Tab Take 1 tablet (1 mg total) by mouth daily. 90 tablet 0    hydroxychloroquine 200 MG Oral Tab Take 1 tablet (200 mg total) by mouth 2 (two) times daily. 180 tablet 0    HYDROcodone-acetaminophen (NORCO)  MG Oral Tab Take 1 tablet by mouth in the morning and 1 tablet at noon and 1 tablet in the evening. 30 tablet 0     Were there any changes to your current medication(s) noted on the AVS? Yes  NCM reviewed the following changes with the patient:     START taking:  pantoprazole (Protonix)  sodium bicarbonate  CHANGE how you take:  predniSONE (Deltasone)  STOP taking:  cefuroxime 500 MG Tabs (Ceftin)  ciprofloxacin 500 MG Tabs (Cipro)  cyclobenzaprine 10 MG Tabs (Flexeril)  furosemide 40 MG Tabs (Lasix)  ibuprofen 600 MG Tabs (Motrin)  losartan 50 MG Tabs (Cozaar)  metoprolol tartrate 50 MG Tabs (Lopressor)  Omeprazole 40 MG Cpdr  If so, were these medication changes discussed with you prior to leaving the hospital? Yes  If a new medication was prescribed:    Was the new medication's purpose & side effects reviewed? Yes  Do you have any questions about your new medication? No  Did you  your discharge medications when you left the hospital? Yes  Let's go over your medications together to make sure we are not missing anything. Medications Reviewed  Are there any reasons that keep you from taking your medication as prescribed? No  Are you having any concerns with  constipation? No      Discharge medications reviewed/discussed/and reconciled against outpatient medications with patient.  Any changes or updates to medications sent to PCP.  Patient Acknowledged     Referrals/orders at D/C:  Referrals/orders placed at D/C? no      DME ordered at D/C? No      Discharge orders, AVS reviewed and discussed with patient. Any changes or updates to orders sent to PCP.  Patient Acknowledged      SDOH:   Transportation Needs: No Transportation Needs (7/22/2024)    Transportation Needs     Lack of Transportation: No     Car Seat: Not on file         Follow up appointments:      Your appointments       Date & Time Appointment Department (Center)    Aug 16, 2024 12:00 PM CDT  (Arrive by 11:45 AM) [facility] New Visit Consult with Doris Sagastume DO [facility] ([facility])    Please verify insurance coverage before your appointment. Arrive 15 minutes early for registration with a list of your medications, copies of MRI's, X-rays or other pertinent information, your insurance card and photo ID. Park in the yellow parking lot enter the building turn left and go to the Diabetes department  and check in at the window.     Please bring the referral from your physician or make sure a copy was sent to the clinic. If you are unable to make your appointment please cancel within 24 hrs of appointment.        Sep 23, 2024 2:00 PM CDT Consult with Doreen Brooke MD [facility] ([facility])    Pleaese arrive 15 min. prior to your appointment to complete your registration.   Bring your ID, Insuance card, co-pay amount and test results.   **If patient has an HMO insurance: Please bring your referral**              [facility]  [facility]  [address] [address]  [phone] [facility]  [facility], [place] [address] [facility] [address] [phone]            TCC  Was TCC ordered: No    PCP (If no TCC appointment)  Does patient already have a PCP appointment scheduled? No  NCM Scheduled PCP office HFU appointment with patient      Specialist    Does the patient have any other follow up appointment(s) needing to be scheduled? Yes  If yes: NCM reviewed upcoming specialist appointment with patient: Yes-- NCM confirmed with patient she has the following contact information and will call to schedule:   Schedule an appointment with Montrell Garzon as soon as  possible for a visit in 1 week(s)  Specialty: Interventional, Cardiology  [facility]  [facility]  [address]  [place] [address]  [phone]  Does the patient need assistance scheduling appointment(s): No    Is there any reason as to why you cannot make your appointment(s)?  No     Needs post D/C:   Now that you are home, are there any needs or concerns you need addressed before your next visit with your PCP?  (DME, meds, questions, etc.): Yes    Interventions by NCM:   A condition update was sent to the office and the patient is aware she will be contacted directly. NCM advised patient adequate nutrition and to stay hydrated. A new patient appointment was scheduled to establish care with Dr. Juarez on 08/08/2024. All d/c instructions reviewed with pt.  Reviewed when to call MD vs when to go to ER/call 911.  Educated pt on the importance of taking all meds as prescribed as well as close f/u with PCP/specialists.  Pt verbalized understanding and will contact office with any further questions or concerns.     Overall Rating:   How would you rate the care you received while in the hospital? excellent    CCM referral placed:    No

## 2024-07-30 NOTE — ED QUICK NOTES
Pt presents to the ED for eval of blurry vision. Pt reports blurry vision to L eye since 12:00 yesterday. Went to see ophthalmology today and sent here for further eval. Pt also reports increased to BLE and CP. Denies any cough or any other neurological deficits. Here for further eval.

## 2024-07-30 NOTE — TELEPHONE ENCOUNTER
Spoke to patient for TCM today.     The patient was recently hospitalized for the following:     Dehydration  Active Problems:    Metabolic acidosis    Bilious vomiting with nausea    Difficult intravenous access    Hypokalemia    Hypomagnesemia    Hiatal hernia      Patient reports she started to have blurry vision in her left eye Saturday night since returning home from the hospital and states this has been worsening. Patient states she went to the eye doctor yesterday and had testing done and was told that this is due to her cataracts worsening or her Rheumatoid Arthritis. Patient states she has not experienced any facial numbness, tingling, drooping, slurred speech, or confusion. Patients speech sounds slurred on the phone but patient states this is because her mouth is dry and she has always had a dry mouth. Patient states she was given new disposable contacts to wear in this eye. Patient states she is also being referred over to a eye specialist for further work up and she needs to schedule an appointment.  Patient states she is very concerned for these vision changes she is having. NCM advised patient she should go to the ER to be evaluated since this is a new change for her and patient declined stating that she just got out of the hospital and wants to follow up with the eye doctor only. I offered an appointment with Umu SMITH on 08/01/2024 and patient declined.     Clinical staff: Please advise. Please call the patient with further recommendations. Thank you.

## 2024-07-30 NOTE — ED INITIAL ASSESSMENT (HPI)
Pt c/o of L eye blurriness that started yesterday at 12pm.   Pt went to the Eye Dr yesterday and told her that it can be many things but to come to ED to r/o stroke or electrolyte imbalance.   Pt A&OX4. Denies numbness or tinging. Pt does have weakness to one side but states she had it for 1-2 weeks. No facial drooped noted.

## 2024-07-31 NOTE — ED PROVIDER NOTES
Patient Seen in: [facility] Emergency Department      History     Chief Complaint   Patient presents with    Eye Visual Problem     Stated Complaint: Eye problem    Subjective:   HPI    64-year-old female with history of hypertension hyperlipidemia, rheumatoid arthritis, asthma, presenting for evaluation of blurry vision.  She describes monocular blurry vision affecting left eye.  Onset yesterday morning when she woke up.  She was evaluated by an optometrist, who was concerned for \"stroke or an electrolyte inbalance\".  And urged her to come to the ER for evaluation.  She denies any other neurologic deficits.    Objective:   Past Medical History:    Asthma (HCC)    Esophageal reflux    Essential hypertension    High blood pressure    High cholesterol    Hyperlipidemia    Pancreatitis (HCC)    Problems with swallowing    RA (rheumatoid arthritis) (HCC)    Sleep apnea              Past Surgical History:   Procedure Laterality Date    Revision of uterine anomaly      Rotator cuff repair      Wrist fracture surgery                  Social History     Socioeconomic History    Marital status: Single   Tobacco Use    Smoking status: Never    Smokeless tobacco: Never   Vaping Use    Vaping status: Never Used   Substance and Sexual Activity    Alcohol use: Not Currently    Drug use: Never     Social Determinants of Health     Food Insecurity: No Food Insecurity (7/22/2024)    Food Insecurity     Food Insecurity: Never true   Transportation Needs: No Transportation Needs (7/22/2024)    Transportation Needs     Lack of Transportation: No   Housing Stability: Low Risk  (7/22/2024)    Housing Stability     Housing Instability: No              Review of Systems    Positive for stated Chief Complaint: Eye Visual Problem    Other systems are as noted in HPI.  Constitutional and vital signs reviewed.      All other systems reviewed and negative except as noted above.    Physical Exam     ED Triage Vitals [07/30/24 1126]   BP  119/86   Pulse 91   Resp 18   Temp 98.4 °F (36.9 °C)   Temp src Temporal   SpO2 100 %   O2 Device None (Room air)       Current Vitals:   Vital Signs  BP: 119/82  Pulse: 98  Resp: 18  Temp: 98.4 °F (36.9 °C)  Temp src: Temporal  MAP (mmHg): 94    Oxygen Therapy  SpO2: 100 %  O2 Device: None (Room air)            Physical Exam    Constitutional: awake, alert, no sig distress  HENT: mmm, no lesions,  Neck: normal range of motion, no tenderness, supple.  Eyes: PERRL, EOMI, conjunctiva normal, no discharge. Sclera anicteric.  Cardiovascular: rr no murmur  Respiratory: Normal breath sounds, no respiratory distress, no wheezing, no chest tenderness.  GI: Bowel sounds normal, Soft, no tenderness, no masses, no pulsatile masses.  : No CVA tenderness.  Skin: Warm, dry, no erythema, no rash.  Musculoskeletal: Intact distal pulses, no edema, no tenderness, no cyanosis, no clubbing. Good range of motion in all major joints. No tenderness to palpation or major deformities noted. Back- No tenderness.  Neurologic: Alert & oriented x 3, normal motor function, normal sensory function, no focal deficits noted.  Psych: Calm, cooperative, nl affect    ED Course     Labs Reviewed   COMP METABOLIC PANEL (14) - Abnormal; Notable for the following components:       Result Value    Potassium 3.2 (*)     Chloride 113 (*)     CO2 18.0 (*)     BUN 6 (*)     BUN/CREA Ratio 7.8 (*)     Calcium, Total 8.4 (*)     AST 35 (*)     Total Protein 5.6 (*)     Albumin 3.1 (*)     All other components within normal limits   CBC W/ DIFFERENTIAL - Abnormal; Notable for the following components:    RBC 3.65 (*)     HGB 10.7 (*)     HCT 31.9 (*)     RDW-SD 57.8 (*)     RDW 18.1 (*)     Monocyte Absolute 1.65 (*)     All other components within normal limits   CBC WITH DIFFERENTIAL WITH PLATELET    Narrative:     The following orders were created for panel order CBC With Differential With Platelet.  Procedure                               Abnormality          Status                     ---------                               -----------         ------                     CBC W/ DIFFERENTIAL[[id number]]          Abnormal            Final result                 Please view results for these tests on the individual orders.   RAINBOW DRAW LAVENDER   RAINBOW DRAW LIGHT GREEN   RAINBOW DRAW BLUE   RAINBOW DRAW GOLD                      MDM      64-year-old female with history as above presenting with isolated left monocular blurry vision.  Visual acuities per optometry visit 20/20 right eye 20/100 left.  There is no extraocular movement deficits.  No other neurologic symptoms.  MRI brain obtained without evidence of acute or subacute infarct.  Will discharge with ophthalmology follow-up.  Return precautions and follow-up instructions were discussed with patient who voiced understanding and agreement the plan.  All questions were answered to patient satisfaction.                                   Medical Decision Making      Disposition and Plan     Clinical Impression:  1. Blurry vision, left eye         Disposition:  Discharge  7/30/2024  5:48 pm    Follow-up:  [facility] Emergency Department  [address] [facility] [address] [phone]  Follow up  As needed, If symptoms worsen    [facility] [place]  [address]  [facility] [address]  Call today            Medications Prescribed:  Discharge Medication List as of 7/30/2024  5:56 PM

## 2024-08-01 ENCOUNTER — TELEPHONE (OUTPATIENT)
Dept: CASE MANAGEMENT | Facility: HOSPITAL | Age: 64
End: 2024-08-01

## 2024-08-07 DIAGNOSIS — M05.20 RHEUMATOID ARTERITIS (HCC): ICD-10-CM

## 2024-08-07 RX ORDER — HYDROCODONE BITARTRATE AND ACETAMINOPHEN 10; 325 MG/1; MG/1
1 TABLET ORAL 3 TIMES DAILY
Qty: 30 TABLET | Refills: 0 | Status: CANCELLED | OUTPATIENT
Start: 2024-08-07

## 2024-08-08 ENCOUNTER — OFFICE VISIT (OUTPATIENT)
Dept: INTERNAL MEDICINE CLINIC | Facility: CLINIC | Age: 64
End: 2024-08-08

## 2024-08-08 VITALS
BODY MASS INDEX: 33.49 KG/M2 | DIASTOLIC BLOOD PRESSURE: 79 MMHG | HEIGHT: 62 IN | TEMPERATURE: 98 F | HEART RATE: 101 BPM | WEIGHT: 182 LBS | SYSTOLIC BLOOD PRESSURE: 114 MMHG

## 2024-08-08 DIAGNOSIS — E87.20 METABOLIC ACIDOSIS: ICD-10-CM

## 2024-08-08 DIAGNOSIS — I25.10 CORONARY ARTERY DISEASE INVOLVING NATIVE CORONARY ARTERY OF NATIVE HEART WITHOUT ANGINA PECTORIS: ICD-10-CM

## 2024-08-08 DIAGNOSIS — S81.801S LEG WOUND, RIGHT, SEQUELA: ICD-10-CM

## 2024-08-08 DIAGNOSIS — E55.9 VITAMIN D DEFICIENCY: ICD-10-CM

## 2024-08-08 DIAGNOSIS — M05.20 RHEUMATOID ARTERITIS (HCC): Primary | ICD-10-CM

## 2024-08-08 DIAGNOSIS — M06.9 RHEUMATOID ARTHRITIS INVOLVING BOTH HANDS, UNSPECIFIED WHETHER RHEUMATOID FACTOR PRESENT (HCC): ICD-10-CM

## 2024-08-08 PROCEDURE — 99215 OFFICE O/P EST HI 40 MIN: CPT | Performed by: INTERNAL MEDICINE

## 2024-08-08 RX ORDER — DIAZEPAM 5 MG/1
TABLET ORAL
Status: ON HOLD | COMMUNITY
Start: 2024-04-25

## 2024-08-08 RX ORDER — HYDROCODONE BITARTRATE AND ACETAMINOPHEN 10; 325 MG/1; MG/1
1 TABLET ORAL 3 TIMES DAILY
Qty: 30 TABLET | Refills: 0 | Status: ON HOLD | OUTPATIENT
Start: 2024-08-08

## 2024-08-08 RX ORDER — HYDROXYCHLOROQUINE SULFATE 200 MG/1
200 TABLET, FILM COATED ORAL 2 TIMES DAILY
Qty: 180 TABLET | Refills: 0 | Status: ON HOLD | OUTPATIENT
Start: 2024-08-08

## 2024-08-08 RX ORDER — FUROSEMIDE 40 MG/1
40 TABLET ORAL DAILY
Status: ON HOLD | COMMUNITY
Start: 2024-07-28

## 2024-08-08 RX ORDER — ALBUTEROL SULFATE 90 UG/1
2 AEROSOL, METERED RESPIRATORY (INHALATION)
Status: ON HOLD | COMMUNITY
Start: 2024-07-17

## 2024-08-08 RX ORDER — PREDNISONE 20 MG/1
40 TABLET ORAL DAILY
Status: ON HOLD | COMMUNITY
Start: 2024-07-18

## 2024-08-08 RX ORDER — LOSARTAN POTASSIUM 50 MG/1
50 TABLET ORAL DAILY
Status: ON HOLD | COMMUNITY
Start: 2024-07-29

## 2024-08-08 NOTE — PATIENT INSTRUCTIONS
Right leg wound output order for home wound nurse referral for wound clinic    Coronary artery disease referral for cardiology  Acidosis current on sodium bicarb follow-up with nephrology  Rheumatoid arthritis continue with methotrexate and Plaquenil, follow-up with rheumatology  Generalized weakness referral for PT OT

## 2024-08-08 NOTE — PROGRESS NOTES
Subjective:     Patient ID: Sheri Garzon is a 64 year old female.    HPI    History/Other: Today to establish care with new physician.  According to her she recently moved from [place]  Recently hospitalized due to nausea and vomiting she underwent a EGD which showed small hiatal hernia.  She also according to her was recently admitted to [place] because of NSTEMI they did cardiac cath but they were not able to put any stent because her arteries were so small advised to continue with aspirin Plavix and statin.  She was found to be acidotic they started her on sodium bicarb and she was advised to continue with sodium bicarb.  She has history of rheumatoid arthritis she is on methotrexate and Plaquenil she needs a referral for rheumatology  She also states that while she was hospitalized she did have this wound on the back of her leg which according to her started while she was in [place] and got worse.  They advised her to continue with antibiotic and follow-up  She is very weak unsteady when she walks she needs physical therapy  Review of Systems   Constitutional: Negative.    HENT: Negative.     Eyes: Negative.    Respiratory: Negative.     Cardiovascular: Negative.    Gastrointestinal: Negative.    Endocrine: Negative.    Genitourinary: Negative.    Musculoskeletal: Negative.    Skin:         Wound on the back of her leg   Neurological: Negative.    Hematological: Negative.    Psychiatric/Behavioral: Negative.       Current Outpatient Medications   Medication Sig Dispense Refill    albuterol 108 (90 Base) MCG/ACT Inhalation Aero Soln Inhale 2 puffs into the lungs every 4 to 6 hours as needed for Wheezing.      predniSONE 20 MG Oral Tab Take 2 tablets (40 mg total) by mouth daily.      furosemide 40 MG Oral Tab Take 1 tablet (40 mg total) by mouth daily.      losartan 50 MG Oral Tab Take 1 tablet (50 mg total) by mouth daily.      hydroxychloroquine 200 MG Oral Tab Take 1 tablet (200 mg total) by mouth 2 (two) times  daily. 180 tablet 0    HYDROcodone-acetaminophen (NORCO)  MG Oral Tab Take 1 tablet by mouth in the morning and 1 tablet at noon and 1 tablet in the evening. 30 tablet 0    pantoprazole 40 MG Oral Tab EC Take 1 tablet (40 mg total) by mouth 2 (two) times daily before meals. 60 tablet 0    sodium bicarbonate 650 MG Oral Tab Take 1 tablet (650 mg total) by mouth 2 (two) times daily. 60 tablet 0    amLODIPine 5 MG Oral Tab Take 1 tablet (5 mg total) by mouth daily.      Aspirin 81 MG Oral Cap 81 mg.      atorvastatin 10 MG Oral Tab Take 1 tablet (10 mg total) by mouth daily.      clopidogrel 75 MG Oral Tab Take 1 tablet (75 mg total) by mouth daily.      ergocalciferol 1.25 MG (74393 UT) Oral Cap Take 1 capsule (50,000 Units total) by mouth once a week.      methotrexate 2.5 MG Oral Tab Take 1 tablet (2.5 mg total) by mouth once a week.      oxybutynin ER 10 MG Oral Tablet 24 Hr Take 1 tablet (10 mg total) by mouth daily.      Potassium Chloride ER 10 MEQ Oral Tab CR Take 1 tablet (10 mEq total) by mouth 2 (two) times daily.      folic acid 1 MG Oral Tab Take 1 tablet (1 mg total) by mouth daily. 90 tablet 0    diazePAM 5 MG Oral Tab TAKE 1 TO 2 TABLETS BY MOUTH 1 HOUR BEFORE MRI FOR 1 DAY AS NEEDED (Patient not taking: Reported on 8/8/2024)      predniSONE 10 MG Oral Tab Take 1 tablet (10 mg total) by mouth daily with breakfast. (Patient not taking: Reported on 8/8/2024) 30 tablet 0     Allergies:  Allergies   Allergen Reactions    Cephalosporins ANAPHYLAXIS, NAUSEA AND VOMITING and OTHER (SEE COMMENTS)     Other reaction(s): Fever    Gadolinium Derivatives FEVER    Penicillins OTHER (SEE COMMENTS)     esophagitis    Sulfa Antibiotics OTHER (SEE COMMENTS)     esophagitis       Past Medical History:    Allergic rhinitis    Anxiety    Arthritis    RA and Osteoarthritis    Asthma (HCC)    Depression    Not officially diagnosed    Esophageal reflux    Essential hypertension    High blood pressure    High cholesterol     Hyperlipidemia    Myocardial infarction (HCC)    Cardiac event    Osteoarthritis    Pancreatitis (HCC)    Problems with swallowing    RA (rheumatoid arthritis) (HCC)    Sleep apnea      Past Surgical History:   Procedure Laterality Date    Colonoscopy      Other surgical history      Revision of uterine anomaly      Rotator cuff repair      Wrist fracture surgery        Family History   Problem Relation Age of Onset    Diabetes Mother     Genetic Disease Mother     Heart Disorder Mother     Hypertension Mother     Obesity Mother     Diabetes Father     Hypertension Father     Depression Daughter     Psychiatric Daughter       Social History:   Social History     Socioeconomic History    Marital status: Single   Tobacco Use    Smoking status: Never    Smokeless tobacco: Never   Vaping Use    Vaping status: Never Used   Substance and Sexual Activity    Alcohol use: Not Currently    Drug use: Never     Social Determinants of Health     Food Insecurity: No Food Insecurity (7/22/2024)    Food Insecurity     Food Insecurity: Never true   Transportation Needs: No Transportation Needs (7/22/2024)    Transportation Needs     Lack of Transportation: No   Housing Stability: Low Risk  (7/22/2024)    Housing Stability     Housing Instability: No        Objective:   Physical Exam  Vitals and nursing note reviewed.   Constitutional:       Appearance: Normal appearance.   HENT:      Head: Normocephalic and atraumatic.   Cardiovascular:      Rate and Rhythm: Normal rate and regular rhythm.      Pulses: Normal pulses.      Heart sounds: Normal heart sounds.   Pulmonary:      Effort: Pulmonary effort is normal.      Breath sounds: Normal breath sounds.   Musculoskeletal:         General: Normal range of motion.      Cervical back: Normal range of motion and neck supple.   Skin:         Neurological:      Mental Status: She is alert. Mental status is at baseline.         Assessment & Plan:   Right leg wound output order for home  wound nurse referral for wound clinic    Coronary artery disease referral for cardiology  Acidosis current on sodium bicarb follow-up with nephrology  Rheumatoid arthritis continue with methotrexate and Plaquenil, follow-up with rheumatology  Generalized weakness referral for PT OT    1. Metabolic acidosis    2. Coronary artery disease involving native coronary artery of native heart without angina pectoris    3. Rheumatoid arthritis involving both hands, unspecified whether rheumatoid factor present (HCC)    4. Rheumatoid arteritis (HCC)    5. Vitamin D deficiency    6. Leg wound, right, sequela        Orders Placed This Encounter   Procedures    Vitamin D [E]    Basic Metabolic Panel (8) [E]       Meds This Visit:  Requested Prescriptions     Signed Prescriptions Disp Refills    hydroxychloroquine 200 MG Oral Tab 180 tablet 0     Sig: Take 1 tablet (200 mg total) by mouth 2 (two) times daily.    HYDROcodone-acetaminophen (NORCO)  MG Oral Tab 30 tablet 0     Sig: Take 1 tablet by mouth in the morning and 1 tablet at noon and 1 tablet in the evening.       Imaging & Referrals:  NEPHROLOGY - INTERNAL  CARDIO - INTERNAL  RHEUMATOLOGY - INTERNAL  [facility] WOUND EVAL  [facility] REFERRAL

## 2024-08-09 ENCOUNTER — TELEPHONE (OUTPATIENT)
Facility: CLINIC | Age: 64
End: 2024-08-09

## 2024-08-09 NOTE — TELEPHONE ENCOUNTER
I mailed out EGD results letter to pt  Updated health maintenance  EGD done 7/26/2024. No EGD recall entered.

## 2024-08-12 ENCOUNTER — APPOINTMENT (OUTPATIENT)
Dept: CT IMAGING | Facility: HOSPITAL | Age: 64
End: 2024-08-12
Attending: EMERGENCY MEDICINE
Payer: COMMERCIAL

## 2024-08-12 ENCOUNTER — HOSPITAL ENCOUNTER (INPATIENT)
Facility: HOSPITAL | Age: 64
LOS: 12 days | Discharge: HOME | End: 2024-08-24
Attending: EMERGENCY MEDICINE | Admitting: HOSPITALIST
Payer: COMMERCIAL

## 2024-08-12 ENCOUNTER — APPOINTMENT (OUTPATIENT)
Dept: GENERAL RADIOLOGY | Facility: HOSPITAL | Age: 64
End: 2024-08-12
Attending: EMERGENCY MEDICINE
Payer: COMMERCIAL

## 2024-08-12 DIAGNOSIS — E86.0 DEHYDRATION: ICD-10-CM

## 2024-08-12 DIAGNOSIS — E87.20 METABOLIC ACIDOSIS: ICD-10-CM

## 2024-08-12 DIAGNOSIS — Z78.9 DIFFICULT INTRAVENOUS ACCESS: ICD-10-CM

## 2024-08-12 DIAGNOSIS — D70.9 NEUTROPENIA, UNSPECIFIED TYPE (HCC): ICD-10-CM

## 2024-08-12 DIAGNOSIS — E83.42 HYPOMAGNESEMIA: ICD-10-CM

## 2024-08-12 DIAGNOSIS — R11.14 BILIOUS VOMITING WITH NAUSEA: ICD-10-CM

## 2024-08-12 DIAGNOSIS — N25.89 RTA (RENAL TUBULAR ACIDOSIS): ICD-10-CM

## 2024-08-12 DIAGNOSIS — E16.2 HYPOGLYCEMIA: ICD-10-CM

## 2024-08-12 DIAGNOSIS — K44.9 HIATAL HERNIA: ICD-10-CM

## 2024-08-12 DIAGNOSIS — E87.6 HYPOKALEMIA: ICD-10-CM

## 2024-08-12 DIAGNOSIS — L03.115 CELLULITIS OF RIGHT LOWER EXTREMITY: Primary | ICD-10-CM

## 2024-08-12 PROBLEM — L03.90 CELLULITIS: Status: ACTIVE | Noted: 2024-01-01

## 2024-08-12 PROBLEM — L03.90 CELLULITIS: Status: ACTIVE | Noted: 2024-08-12

## 2024-08-12 LAB
ANION GAP SERPL CALC-SCNC: 17 MMOL/L (ref 0–18)
ATRIAL RATE: 123 BPM
BASOPHILS # BLD: 0 X10(3) UL (ref 0–0.2)
BASOPHILS NFR BLD: 0 %
BUN BLD-MCNC: 7 MG/DL (ref 9–23)
BUN/CREAT SERPL: 10.8 (ref 10–20)
CALCIUM BLD-MCNC: 8.8 MG/DL (ref 8.7–10.4)
CHLORIDE SERPL-SCNC: 113 MMOL/L (ref 98–112)
CO2 SERPL-SCNC: 12 MMOL/L (ref 21–32)
CREAT BLD-MCNC: 0.65 MG/DL
DEPRECATED RDW RBC AUTO: 56.3 FL (ref 35.1–46.3)
EGFRCR SERPLBLD CKD-EPI 2021: 98 ML/MIN/1.73M2 (ref 60–?)
EOSINOPHIL # BLD: 0 X10(3) UL (ref 0–0.7)
EOSINOPHIL NFR BLD: 0 %
ERYTHROCYTE [DISTWIDTH] IN BLOOD BY AUTOMATED COUNT: 17.5 % (ref 11–15)
GLUCOSE BLD-MCNC: 66 MG/DL (ref 70–99)
GLUCOSE BLDC GLUCOMTR-MCNC: 120 MG/DL (ref 70–99)
GLUCOSE BLDC GLUCOMTR-MCNC: 59 MG/DL (ref 70–99)
GLUCOSE BLDC GLUCOMTR-MCNC: 66 MG/DL (ref 70–99)
GLUCOSE BLDC GLUCOMTR-MCNC: 96 MG/DL (ref 70–99)
HCT VFR BLD AUTO: 32.9 %
HGB BLD-MCNC: 10.5 G/DL
LACTATE SERPL-SCNC: 1.8 MMOL/L (ref 0.5–2)
LACTATE SERPL-SCNC: 2.1 MMOL/L (ref 0.5–2)
LYMPHOCYTES NFR BLD: 0.36 X10(3) UL (ref 1–4)
LYMPHOCYTES NFR BLD: 8 %
MCH RBC QN AUTO: 28.2 PG (ref 26–34)
MCHC RBC AUTO-ENTMCNC: 31.9 G/DL (ref 31–37)
MCV RBC AUTO: 88.2 FL
MONOCYTES # BLD: 0 X10(3) UL (ref 0.1–1)
MONOCYTES NFR BLD: 0 %
NEUTROPHILS # BLD AUTO: 3.43 X10 (3) UL (ref 1.5–7.7)
NEUTROPHILS NFR BLD: 92 %
NEUTS HYPERSEG # BLD: 4.14 X10(3) UL (ref 1.5–7.7)
NEUTS HYPERSEG BLD QL SMEAR: PRESENT
OSMOLALITY SERPL CALC.SUM OF ELEC: 290 MOSM/KG (ref 275–295)
P AXIS: 31 DEGREES
P-R INTERVAL: 150 MS
PLATELET # BLD AUTO: 117 10(3)UL (ref 150–450)
PLATELET MORPHOLOGY: NORMAL
POTASSIUM SERPL-SCNC: 3.2 MMOL/L (ref 3.5–5.1)
Q-T INTERVAL: 286 MS
QRS DURATION: 62 MS
QTC CALCULATION (BEZET): 409 MS
R AXIS: -38 DEGREES
RBC # BLD AUTO: 3.73 X10(6)UL
SODIUM SERPL-SCNC: 142 MMOL/L (ref 136–145)
T AXIS: 11 DEGREES
TOTAL CELLS COUNTED BLD: 100
VENTRICULAR RATE: 123 BPM
WBC # BLD AUTO: 4.5 X10(3) UL (ref 4–11)

## 2024-08-12 PROCEDURE — 73706 CT ANGIO LWR EXTR W/O&W/DYE: CPT | Performed by: EMERGENCY MEDICINE

## 2024-08-12 PROCEDURE — 99223 1ST HOSP IP/OBS HIGH 75: CPT | Performed by: HOSPITALIST

## 2024-08-12 PROCEDURE — 73630 X-RAY EXAM OF FOOT: CPT | Performed by: EMERGENCY MEDICINE

## 2024-08-12 RX ORDER — DEXTROSE MONOHYDRATE 25 G/50ML
25 INJECTION, SOLUTION INTRAVENOUS ONCE
Status: COMPLETED | OUTPATIENT
Start: 2024-08-12 | End: 2024-08-12

## 2024-08-12 RX ORDER — PROCHLORPERAZINE EDISYLATE 5 MG/ML
5 INJECTION INTRAMUSCULAR; INTRAVENOUS EVERY 8 HOURS PRN
Status: DISCONTINUED | OUTPATIENT
Start: 2024-08-12 | End: 2024-08-24

## 2024-08-12 RX ORDER — DEXTROSE MONOHYDRATE 25 G/50ML
50 INJECTION, SOLUTION INTRAVENOUS ONCE
Status: DISCONTINUED | OUTPATIENT
Start: 2024-08-12 | End: 2024-08-12

## 2024-08-12 RX ORDER — MORPHINE SULFATE 2 MG/ML
2 INJECTION, SOLUTION INTRAMUSCULAR; INTRAVENOUS ONCE
Status: COMPLETED | OUTPATIENT
Start: 2024-08-12 | End: 2024-08-12

## 2024-08-12 RX ORDER — ONDANSETRON 2 MG/ML
4 INJECTION INTRAMUSCULAR; INTRAVENOUS EVERY 6 HOURS PRN
Status: DISCONTINUED | OUTPATIENT
Start: 2024-08-12 | End: 2024-08-24

## 2024-08-12 RX ORDER — ACETAMINOPHEN 325 MG/1
650 TABLET ORAL EVERY 4 HOURS PRN
Status: DISCONTINUED | OUTPATIENT
Start: 2024-08-12 | End: 2024-08-24

## 2024-08-12 RX ORDER — HYDROCODONE BITARTRATE AND ACETAMINOPHEN 5; 325 MG/1; MG/1
1 TABLET ORAL EVERY 4 HOURS PRN
Status: DISCONTINUED | OUTPATIENT
Start: 2024-08-12 | End: 2024-08-24

## 2024-08-12 RX ORDER — ENOXAPARIN SODIUM 100 MG/ML
40 INJECTION SUBCUTANEOUS DAILY
Status: DISCONTINUED | OUTPATIENT
Start: 2024-08-13 | End: 2024-08-24

## 2024-08-12 RX ORDER — VANCOMYCIN HYDROCHLORIDE
15 ONCE
Status: COMPLETED | OUTPATIENT
Start: 2024-08-12 | End: 2024-08-12

## 2024-08-12 RX ORDER — SODIUM CHLORIDE 9 MG/ML
INJECTION, SOLUTION INTRAVENOUS CONTINUOUS
Status: DISCONTINUED | OUTPATIENT
Start: 2024-08-12 | End: 2024-08-16

## 2024-08-12 RX ORDER — ACETAMINOPHEN 500 MG
500 TABLET ORAL EVERY 4 HOURS PRN
Status: DISCONTINUED | OUTPATIENT
Start: 2024-08-12 | End: 2024-08-24

## 2024-08-12 RX ORDER — HYDROCODONE BITARTRATE AND ACETAMINOPHEN 5; 325 MG/1; MG/1
2 TABLET ORAL EVERY 4 HOURS PRN
Status: DISCONTINUED | OUTPATIENT
Start: 2024-08-12 | End: 2024-08-24

## 2024-08-12 NOTE — ED QUICK NOTES
This RN received notice from lab for low blood sugar of 66mg/dL. MD made aware. Patient given apple juice. Recheck in 15 minutes.

## 2024-08-12 NOTE — ED INITIAL ASSESSMENT (HPI)
Pt c/o wound/swelling to right foot and leg that has been there for aprrox 2 months. Pt states wound was healing and is not oozing.

## 2024-08-12 NOTE — ED QUICK NOTES
Patient's blood sugar rechecked immediately returning from CT. RN made aware of abnormal blood glucose. MD made aware. Patient given oral glucose. Will recheck in 15 minutes.

## 2024-08-13 LAB
ANION GAP SERPL CALC-SCNC: 15 MMOL/L (ref 0–18)
BASOPHILS # BLD AUTO: 0.02 X10(3) UL (ref 0–0.2)
BASOPHILS NFR BLD AUTO: 0.4 %
BUN BLD-MCNC: 6 MG/DL (ref 9–23)
BUN/CREAT SERPL: 12.2 (ref 10–20)
CALCIUM BLD-MCNC: 7.9 MG/DL (ref 8.7–10.4)
CHLORIDE SERPL-SCNC: 116 MMOL/L (ref 98–112)
CO2 SERPL-SCNC: 13 MMOL/L (ref 21–32)
CREAT BLD-MCNC: 0.49 MG/DL
DEPRECATED RDW RBC AUTO: 60 FL (ref 35.1–46.3)
EGFRCR SERPLBLD CKD-EPI 2021: 105 ML/MIN/1.73M2 (ref 60–?)
EOSINOPHIL # BLD AUTO: 0 X10(3) UL (ref 0–0.7)
EOSINOPHIL NFR BLD AUTO: 0 %
ERYTHROCYTE [DISTWIDTH] IN BLOOD BY AUTOMATED COUNT: 17.9 % (ref 11–15)
GLUCOSE BLD-MCNC: 105 MG/DL (ref 70–99)
GLUCOSE BLDC GLUCOMTR-MCNC: 101 MG/DL (ref 70–99)
HCT VFR BLD AUTO: 29.9 %
HGB BLD-MCNC: 9.3 G/DL
IMM GRANULOCYTES # BLD AUTO: 0.23 X10(3) UL (ref 0–1)
IMM GRANULOCYTES NFR BLD: 4.4 %
LYMPHOCYTES # BLD AUTO: 0.76 X10(3) UL (ref 1–4)
LYMPHOCYTES NFR BLD AUTO: 14.7 %
MCH RBC QN AUTO: 28.4 PG (ref 26–34)
MCHC RBC AUTO-ENTMCNC: 31.1 G/DL (ref 31–37)
MCV RBC AUTO: 91.2 FL
MONOCYTES # BLD AUTO: 0.03 X10(3) UL (ref 0.1–1)
MONOCYTES NFR BLD AUTO: 0.6 %
NEUTROPHILS # BLD AUTO: 4.13 X10 (3) UL (ref 1.5–7.7)
NEUTROPHILS # BLD AUTO: 4.13 X10(3) UL (ref 1.5–7.7)
NEUTROPHILS NFR BLD AUTO: 79.9 %
NEUTS HYPERSEG BLD QL SMEAR: PRESENT
OSMOLALITY SERPL CALC.SUM OF ELEC: 296 MOSM/KG (ref 275–295)
PLATELET # BLD AUTO: 101 10(3)UL (ref 150–450)
PLATELET MORPHOLOGY: NORMAL
PLATELETS.RETICULATED NFR BLD AUTO: 1.4 % (ref 0–7)
POTASSIUM SERPL-SCNC: 3.5 MMOL/L (ref 3.5–5.1)
RBC # BLD AUTO: 3.28 X10(6)UL
SODIUM SERPL-SCNC: 144 MMOL/L (ref 136–145)
WBC # BLD AUTO: 5.2 X10(3) UL (ref 4–11)

## 2024-08-13 PROCEDURE — 99233 SBSQ HOSP IP/OBS HIGH 50: CPT | Performed by: INTERNAL MEDICINE

## 2024-08-13 PROCEDURE — 99254 IP/OBS CNSLTJ NEW/EST MOD 60: CPT | Performed by: SURGERY

## 2024-08-13 RX ORDER — ASPIRIN 81 MG/1
81 TABLET, CHEWABLE ORAL DAILY
Status: DISCONTINUED | OUTPATIENT
Start: 2024-08-13 | End: 2024-08-24

## 2024-08-13 RX ORDER — XYLITOL/YERBA SANTA
AEROSOL, SPRAY WITH PUMP (ML) MUCOUS MEMBRANE AS NEEDED
Status: DISCONTINUED | OUTPATIENT
Start: 2024-08-13 | End: 2024-08-24

## 2024-08-13 RX ORDER — FUROSEMIDE 40 MG
40 TABLET ORAL DAILY
Status: DISCONTINUED | OUTPATIENT
Start: 2024-08-13 | End: 2024-08-17

## 2024-08-13 RX ORDER — ALBUTEROL SULFATE 90 UG/1
2 AEROSOL, METERED RESPIRATORY (INHALATION) EVERY 4 HOURS PRN
Status: DISCONTINUED | OUTPATIENT
Start: 2024-08-13 | End: 2024-08-24

## 2024-08-13 RX ORDER — ATORVASTATIN CALCIUM 10 MG/1
10 TABLET, FILM COATED ORAL DAILY
Status: DISCONTINUED | OUTPATIENT
Start: 2024-08-13 | End: 2024-08-24

## 2024-08-13 RX ORDER — HYDROXYCHLOROQUINE SULFATE 200 MG/1
200 TABLET, FILM COATED ORAL 2 TIMES DAILY
Status: DISCONTINUED | OUTPATIENT
Start: 2024-08-13 | End: 2024-08-24

## 2024-08-13 RX ORDER — CLOPIDOGREL BISULFATE 75 MG/1
75 TABLET ORAL DAILY
Status: DISCONTINUED | OUTPATIENT
Start: 2024-08-13 | End: 2024-08-24

## 2024-08-13 RX ORDER — AMLODIPINE BESYLATE 5 MG/1
5 TABLET ORAL DAILY
Status: DISCONTINUED | OUTPATIENT
Start: 2024-08-13 | End: 2024-08-24

## 2024-08-13 RX ORDER — LOSARTAN POTASSIUM 50 MG/1
50 TABLET ORAL DAILY
Status: DISCONTINUED | OUTPATIENT
Start: 2024-08-13 | End: 2024-08-24

## 2024-08-13 RX ORDER — DOCUSATE SODIUM 100 MG/1
100 CAPSULE, LIQUID FILLED ORAL DAILY PRN
Status: DISCONTINUED | OUTPATIENT
Start: 2024-08-13 | End: 2024-08-24

## 2024-08-13 RX ORDER — OXYBUTYNIN CHLORIDE 5 MG/1
10 TABLET, EXTENDED RELEASE ORAL DAILY
Status: DISCONTINUED | OUTPATIENT
Start: 2024-08-13 | End: 2024-08-24

## 2024-08-13 NOTE — PLAN OF CARE
Patient VSS, no acute changes during shift. Updated patient on plan of care. Frequent rounding, no needs at this time. Call light always in reach and safety precautions in place.     Problem: Patient Centered Care  Goal: Patient preferences are identified and integrated in the patient's plan of care  Description: Interventions:  - What would you like us to know as we care for you?   - Provide timely, complete, and accurate information to patient/family  - Incorporate patient and family knowledge, values, beliefs, and cultural backgrounds into the planning and delivery of care  - Encourage patient/family to participate in care and decision-making at the level they choose  - Honor patient and family perspectives and choices  Outcome: Progressing     Problem: Patient/Family Goals  Goal: Patient/Family Long Term Goal  Description: Patient's Long Term Goal:     Interventions:  -   - See additional Care Plan goals for specific interventions  Outcome: Progressing  Goal: Patient/Family Short Term Goal  Description: Patient's Short Term Goal:     Interventions:   -   - See additional Care Plan goals for specific interventions  Outcome: Progressing     Problem: GASTROINTESTINAL - ADULT  Goal: Maintains or returns to baseline bowel function  Description: INTERVENTIONS:  - Assess bowel function  - Maintain adequate hydration with IV or PO as ordered and tolerated  - Evaluate effectiveness of GI medications  - Encourage mobilization and activity  - Obtain nutritional consult as needed  - Establish a toileting routine/schedule  - Consider collaborating with pharmacy to review patient's medication profile  Outcome: Progressing     Problem: SKIN/TISSUE INTEGRITY - ADULT  Goal: Skin integrity remains intact  Description: INTERVENTIONS  - Assess and document risk factors for pressure ulcer development  - Assess and document skin integrity  - Monitor for areas of redness and/or skin breakdown  - Initiate interventions, skin care  algorithm/standards of care as needed  Outcome: Progressing  Goal: Incision(s), wounds(s) or drain site(s) healing without S/S of infection  Description: INTERVENTIONS:  - Assess and document risk factors for pressure ulcer development  - Assess and document skin integrity  - Assess and document dressing/incision, wound bed, drain sites and surrounding tissue  - Implement wound care per orders  - Initiate isolation precautions as appropriate  - Initiate Pressure Ulcer prevention bundle as indicated  Outcome: Progressing     Problem: PAIN - ADULT  Goal: Verbalizes/displays adequate comfort level or patient's stated pain goal  Description: INTERVENTIONS:  - Encourage pt to monitor pain and request assistance  - Assess pain using appropriate pain scale  - Administer analgesics based on type and severity of pain and evaluate response  - Implement non-pharmacological measures as appropriate and evaluate response  - Consider cultural and social influences on pain and pain management  - Manage/alleviate anxiety  - Utilize distraction and/or relaxation techniques  - Monitor for opioid side effects  - Notify MD/LIP if interventions unsuccessful or patient reports new pain  - Anticipate increased pain with activity and pre-medicate as appropriate  Outcome: Progressing     Problem: RISK FOR INFECTION - ADULT  Goal: Absence of fever/infection during anticipated neutropenic period  Description: INTERVENTIONS  - Monitor WBC  - Administer growth factors as ordered  - Implement neutropenic guidelines  Outcome: Progressing     Problem: SAFETY ADULT - FALL  Goal: Free from fall injury  Description: INTERVENTIONS:  - Assess pt frequently for physical needs  - Identify cognitive and physical deficits and behaviors that affect risk of falls.  - Myra fall precautions as indicated by assessment.  - Educate pt/family on patient safety including physical limitations  - Instruct pt to call for assistance with activity based on  assessment  - Modify environment to reduce risk of injury  - Provide assistive devices as appropriate  - Consider OT/PT consult to assist with strengthening/mobility  - Encourage toileting schedule  Outcome: Progressing     Problem: DISCHARGE PLANNING  Goal: Discharge to home or other facility with appropriate resources  Description: INTERVENTIONS:  - Identify barriers to discharge w/pt and caregiver  - Include patient/family/discharge partner in discharge planning  - Arrange for needed discharge resources and transportation as appropriate  - Identify discharge learning needs (meds, wound care, etc)  - Arrange for interpreters to assist at discharge as needed  - Consider post-discharge preferences of patient/family/discharge partner  - Complete POLST form as appropriate  - Assess patient's ability to be responsible for managing their own health  - Refer to Case Management Department for coordinating discharge planning if the patient needs post-hospital services based on physician/LIP order or complex needs related to functional status, cognitive ability or social support system  Outcome: Progressing

## 2024-08-13 NOTE — PROGRESS NOTES
08/13/24 0836   Wound 08/12/24 Leg Lower;Posterior;Right   Date First Assessed/Time First Assessed: 08/12/24 0030   Present on Original Admission: Yes  Primary Wound Type: (c) Other (comment)  Location: Leg  Wound Location Orientation: Lower;Posterior;Right  Wound Description (Comments): R posterior calf unst...   Wound Image    Site Assessment Dry;Fragile;Painful;Tan;Brown;Black   Closure Not approximated   Drainage Amount Scant   Drainage Description Clear;Tan   Treatments Cleansed;Saline   Dressing 2x2s;Aquacel Foam   Dressing Changed New   Non-staged Wound Description Full thickness   Norma-wound Assessment Clean;Dry;Intact;Fragile   Wound Bed Slough (%) 50 %   Wound Bed Eschar (%) 50 %   State of Healing Eschar;Non-healing   Wound Follow Up   Follow up needed Yes       WOUND CARE NOTE    History:  Past Medical History:    Allergic rhinitis    Anxiety    Arthritis    RA and Osteoarthritis    Asthma (HCC)    Depression    Not officially diagnosed    Esophageal reflux    Essential hypertension    High blood pressure    High cholesterol    Hyperlipidemia    Myocardial infarction (HCC)    Cardiac event    Osteoarthritis    Pancreatitis (HCC)    Problems with swallowing    RA (rheumatoid arthritis) (HCC)    Sleep apnea     Past Surgical History:   Procedure Laterality Date    Colonoscopy      Other surgical history      Revision of uterine anomaly      Rotator cuff repair      Wrist fracture surgery        Social History     Socioeconomic History    Marital status: Single   Tobacco Use    Smoking status: Never    Smokeless tobacco: Never   Vaping Use    Vaping status: Never Used   Substance and Sexual Activity    Alcohol use: Not Currently    Drug use: Never     Social Determinants of Health     Food Insecurity: No Food Insecurity (8/12/2024)    Food Insecurity     Food Insecurity: Never true   Transportation Needs: No Transportation Needs (8/12/2024)    Transportation Needs     Lack of Transportation: No   Housing  Stability: Low Risk  (8/12/2024)    Housing Stability     Housing Instability: No       PLAN   Recommendations:  Dr. YAO & ANGY currently on consult  Dietary consult for recommendations for nutrition to optimize wound healing  Heels elevated using pillows, heel wedge or heel boots to offload heels    Wound(s)  Location: RIGHT POSTERIOR CALF  Cleansing  Saline   Topical SANTYL  Dressings SALINE MOIST 2X2  Secure with BORDERED FOAM  Frequency DAILY     OBJECTIVE   MD Consult new  Reason for Consult right lower leg      ASSESSMENT   Omid Score:  Omid Scale Score: 20    Chart Reviewed: yes    Wound(s):    Pt seen for above chronic wound. Pt states it has \"been there for months and is getting worse\". Recommend application of santyl for enzymatic debridement of non viable tissues. Spoke to Dr. Yao and bedside Rn.       Allergies: Cephalosporins, Gadolinium derivatives, Penicillins, and Sulfa antibiotics    Labs:   Lab Results   Component Value Date    WBC 5.2 08/13/2024    HGB 9.3 (L) 08/13/2024    HCT 29.9 (L) 08/13/2024    .0 (L) 08/13/2024    CREATSERUM 0.49 (L) 08/13/2024    BUN 6 (L) 08/13/2024     08/13/2024    K 3.5 08/13/2024     (H) 08/13/2024    CO2 13.0 (L) 08/13/2024     (H) 08/13/2024    CA 7.9 (L) 08/13/2024    ALB 3.1 (L) 07/30/2024    ALKPHO 85 07/30/2024    BILT 0.6 07/30/2024    TP 5.6 (L) 07/30/2024    AST 35 (H) 07/30/2024    ALT 26 07/30/2024    LIP 34 07/21/2024    MG 1.7 07/26/2024    PHOS 2.1 (L) 07/27/2024     No results found for: \"PREALBUMIN\"      Time Spent 30 Minutes.    Natalie Jones, RN, BSN,[facility]  [facility]  [phone]

## 2024-08-13 NOTE — ED QUICK NOTES
Orders for admission, patient is aware of plan and ready to go upstairs. Any questions, please call ED JULIET Garcia at extension [phone].     Patient Covid vaccination status: Fully vaccinated     COVID Test Ordered in ED: None    COVID Suspicion at Admission: N/A    Running Infusions:  Vanc. 1.25    Mental Status/LOC at time of transport: A/Ox4    Other pertinent information: Ambulatory with walker from home. Ulcer on RLE. Hard stick, need phleb.  CIWA score: N/A   NIH score:  N/A

## 2024-08-13 NOTE — PAYOR COMM NOTE
--------------  ADMISSION REVIEW     Payor: [employer]  Subscriber #:  [id number]  Authorization Number: [id number]    Admit date: 8/12/24  Admit time:  8:49 PM     Patient Seen in: [facility] ED    History   Stated Complaint: Leg Wound    Patient complains of  swollen r  leg.  Started few days ago.  Had blister on foot now swollen red tender.  Has stasis ulcer on r lower leg posteriorly, this is chronic no sig changes.  Patient has hx adrenal insufficiency and on daily prednisone.. no chest pain, no SOB.  no recent travel, no immobilization.  mild calf pain.  no fever, + redness    Alleviating factors: none  Exacerbating factors: activity    Past Medical History:    Allergic rhinitis    Anxiety    Arthritis    RA and Osteoarthritis    Asthma (HCC)    Depression    Not officially diagnosed    Esophageal reflux    Essential hypertension    High blood pressure    High cholesterol    Hyperlipidemia    Myocardial infarction (HCC)    Cardiac event    Osteoarthritis    Pancreatitis (HCC)    Problems with swallowing    RA (rheumatoid arthritis) (HCC)    Sleep apnea     Past Surgical History:   Procedure Laterality Date    Colonoscopy      Other surgical history      Revision of uterine anomaly      Rotator cuff repair      Wrist fracture surgery       Physical Exam     ED Triage Vitals   BP 08/12/24 1302 126/86   Pulse 08/12/24 1302 120   Resp 08/12/24 1302 22   Temp 08/12/24 1302 98.7 °F (37.1 °C)   Temp src 08/12/24 1302 Oral   SpO2 08/12/24 1302 96 %   O2 Device 08/12/24 1500 None (Room air)     Current:/71   Pulse 115   Temp 97.7 °F (36.5 °C) (Oral)   Resp 11   Ht 157.5 cm (5' 2\")   Wt 81.9 kg   SpO2 94%   BMI 33.03 kg/m²      EXTREMITIES: r lower leg edematous foot edematous erythematous and tender, r post lower leg 2 inch circular stasis ulcer    Labs Reviewed   CBC WITH DIFFERENTIAL WITH PLATELET - Abnormal; Notable for the following components:       Result Value    RBC 3.73 (*)      HGB 10.5 (*)     HCT 32.9 (*)     RDW-SD 56.3 (*)     RDW 17.5 (*)     .0 (*)     All other components within normal limits   BASIC METABOLIC PANEL (8) - Abnormal; Notable for the following components:    Glucose 66 (*)     Potassium 3.2 (*)     Chloride 113 (*)     CO2 12.0 (*)     BUN 7 (*)     All other components within normal limits   LACTIC ACID, PLASMA - Abnormal; Notable for the following components:    Lactic Acid 2.1 (*)     All other components within normal limits    All other components within normal limits   POCT GLUCOSE - Abnormal; Notable for the following components:    POC Glucose  59 (*)     All other components within normal limits   POCT GLUCOSE - Abnormal; Notable for the following components:    POC Glucose  66 (*)     All other components within normal limits   POCT GLUCOSE - Abnormal; Notable for the following components:    POC Glucose  120 (*)     All other components within normal limits   POCT GLUCOSE - Normal   LACTIC ACID REFLEX POST POSTIVE   BLOOD CULTURE   BLOOD CULTURE       Sinus Rhythm  Reading: s tach with non specifc changes    CTA LOWER EXTREMITY BILATERAL   1. RIGHT lower extremity:  Stable findings since recent prior July, 2024 CT angiogram.  Iliac, femoral, and popliteal arteries demonstrate mild-to-moderate scattered atherosclerosis, but no high-grade stenosis or occlusion.  There is a three-vessel runoff with mild-to-moderate multifocal stenoses throughout the runoff vessels related to atherosclerotic disease.  Plantar artery again demonstrates thready flow and/or partial occlusion.  2. LEFT lower extremity:  Mild scattered atherosclerotic disease throughout the iliac, femoral, and popliteal vessels, but with no high-grade stenosis or occlusion.  Note that on arterial phase, there is no significant opacification of the runoff vessels  or distal popliteal artery.  This does, however, improved on delayed phase with opacification of the distal popliteal and  proximal run-off arteries.  There is a three-vessel runoff proximally.  On delayed phase, there is thready flow throughout the distal run-off arteries.  This finding is favored to represent sequelae of patient hemodynamics and lower extremity edema.  Nonetheless, please correlate with left lower extremity arterial Dopplers given asymmetry.  3. Marked subcutaneous edema and soft tissue swelling throughout the right greater than left lower extremities.  No definite associated destructive/erosive osseous changes to suggest osteomyelitis by CT. 4. Advanced bilateral knee osteoarthritis with bilateral suprapatellar joint effusions and left knee chondrocalcinosis. 5. Colonic diverticulosis.            XR FOOT R, COMPLETE    1. Marked dorsal midfoot soft tissue swelling.  Small nonmetallic polygonal morphology radiodense foci within the dorsal soft tissues, which could relate to debris, packing material, or less likely foreign bodies. 2. No acute fracture or dislocation of the right foot.  No destructive/erosive osseous changes are seen to suggest osteomyelitis radiographically.    BP nl and lactic <4 so no bolus indicated. Cultures difficult to obtain so abx started before 2 cultures obtained.    Medical Decision Making  Admit Dr. Davis in ER, consulted endocrine and ID    Problems Addressed:  Cellulitis of right lower extremity: acute illness or injury     Details: Iv abx, hydrocortisone for adrenal insufficiency  Hypoglycemia: acute illness or injury     Details: Po/iv dextrose and stress dose steroids consulted endocrine.    Disposition and Plan     Clinical Impression:  1. Cellulitis of right lower extremity          History and Physical       HISTORY OF PRESENT ILLNESS:  The patient is a 64-year-old  female, debilitated with severe rheumatoid arthritis.  She does have chronic skin striae from chronic prednisone exposure.  She had chronic ulceration on the posterior aspects of her right leg.  Around  3 to 4 days ago, started developing erythema extending from her right posterior leg to the anterior aspect up to the knee and to the dorsum of the right foot with some right foot blistering unroofed.  Today came into the emergency department for evaluation for fatigue and subjective fevers.  CBC showed white blood cell count of 4.5.  No left shift.  Platelet count 117, hemoglobin 10.5.  Chemistry showed potassium 3.2, chloride 113, and bicarbonate 12.  Numbers are chronic.  She was slightly hypoglycemic at 59.  She was started on IV Solu-Cortef, IV vancomycin.  X-ray of the foot showed marked dorsum mid foot soft tissue swelling, small nonmetallic polygonal morphology, radiopaque foci within the dorsal soft tissue which could relate to debris packing material or less likely foreign body.  CT angiogram of the lower extremities bilaterally with and without contrast still pending.     PAST MEDICAL HISTORY:  Hypertension, rheumatoid arthritis, chronic adrenal insufficiency secondary to chronic corticosteroids use, asthma, pancreatitis, obstructive sleep apnea, obesity, hyperlipidemia, hypertension, anxiety, and depression.  Anemia of chronic inflammatory disease and nonobstructive coronary artery disease.     PAST SURGICAL HISTORY:  Right shoulder rotator cuff repair.  She had wrist fracture open reduction and internal fixation.     MEDICATIONS:  Please see medication reconciliation list.     ALLERGIES:  Gadolinium and cephalosporins.  Penicillin and sulfa causes esophagitis.     FAMILY HISTORY:  Mother had diabetes mellitus type 2 and heart disease.  Father diabetes mellitus type 2.     SOCIAL HISTORY:  No tobacco, alcohol, or drug use.  Lives with her family.  Almost bedbound but able to use a walker for a few steps.      REVIEW OF SYSTEMS:  Patient reports that she does have chronic wound and skin ulcer at the posterior right distal leg.  Around 3 days ago started developing erythema extending from her knee all the  way down to dorsum of the right foot with blistering at the dorsum of the right foot.  She had subjective fevers at home, and she felt fatigue.  Other 12-point review of systems is negative.        PHYSICAL EXAMINATION:    GENERAL:  Alert and oriented to time, place, and person.  Fatigued, no acute distress.  VITAL SIGNS:  Temperature 98.7, pulse 103, respiratory rate 14, blood pressure 133/99, pulse ox 100% on room air.    HEENT:  Atraumatic.  Oropharynx clear.  Dry mucous membranes.  Normal hard and soft palate.  Eyes:  Anicteric sclerae.  NECK:  Supple.  No lymphadenopathy.  Trachea midline.  Full range of motion.  LUNGS:  Clear to auscultation bilaterally.  Normal respiratory effort.   HEART:  Regular rate, rhythm.  S1 and S2 auscultated.  No murmur.   ABDOMEN:  Soft, nondistended.  No tenderness.  Positive bowel sounds.    EXTREMITIES:  Nonpitting edema both legs.  Right leg erythema extending to the dorsum of the right foot with unroofed multifocal blister.  Posterior right leg with superficial venous stasis ulcer.  NEUROLOGIC:  Motor and sensory intact.      ASSESSMENT:    1.       Right lower extremity cellulitis.  2.       Chronic metabolic acidosis with elevated chloride, suggestive of renal tubular acidosis.  3.       Hypoglycemia with underlying iatrogenic adrenal insufficiency.  4.       Rheumatoid arthritis.     PLAN:  Patient will be admitted to general medical floor.  Wound service consult.  IV vancomycin.  IV hydrocortisone.  Endocrinology and infectious disease consult.  Wound service consult.  Hold methotrexate until full recovery.  Further recommendations to follow.        8/13:    HOSPITALIST:    Complaining of a headache.       Vital signs:  Temp:  [97.5 °F (36.4 °C)-98.7 °F (37.1 °C)] 97.8 °F (36.6 °C)  Pulse:  [] 97  Resp:  [11-26] 18  BP: (120-144)/() 136/91  SpO2:  [94 %-100 %] 99 %      Physical Exam:    Gen: NAD AO x3  Chest: good air entry CTABL  CVS: normal s1 and s2  RR  Abd: NABS soft NT ND  Neuro: CN 2-12 grossly intact  Ext: RLE warm, erythematous     Lab 08/12/24  1535 08/13/24  0712   WBC 4.5 5.2   HGB 10.5* 9.3*   MCV 88.2 91.2   .0* 101.0*      GLU 66* 105*   BUN 7* 6*   CREATSERUM 0.65 0.49*   CA 8.8 7.9*    144   K 3.2* 3.5   * 116*   CO2 12.0* 13.0*     Medications:   Scheduled Medications[]Expand by Default    collagenase   Topical Daily    amLODIPine  5 mg Oral Daily    Aspirin  81 mg Oral Daily    atorvastatin  10 mg Oral Daily    clopidogrel  75 mg Oral Daily    furosemide  40 mg Oral Daily    hydroxychloroquine  200 mg Oral BID    losartan  50 mg Oral Daily    oxybutynin ER  10 mg Oral Daily    enoxaparin  40 mg Subcutaneous Daily    hydrocortisone sodium succinate  100 mg Intravenous Q8H    vancomycin  1,000 mg Intravenous Q12H         Current Continuous Medications  Collapse  Hide  (From admission, onward)  Ordered   Start Stop   08/12/24 2052  sodium chloride 0.9% infusion  Intravenous,   100 mL/hr,   Continuous        References:    Jazzy-Comp    08/12/24 2053 --   08/12/24 1911  sodium chloride 0.9 % IV bolus 2,463 mL  30 mL/kg,   Intravenous,   821 mL/hr,   Continuous        References:    Jazzy-Comp    08/12/24 1912 08/12/24 2225        Assessment & Plan:  RLE cellulitis  Imaging reviewed  Started on IV abx  ID on consult  Appreciate recs  Gsx on consult  Appreciate recs  Rheumatoid arthritis  Continue home meds  CAD  HTN  HLD  Continue ASA, Statin, Plavix  Continue home antihypertensives      SURGERY:    Sheri is a 65yo female with history of rheumatoid arthritis, HTN, and chronic venous stasis who presented to the ED with leg swelling and right lower leg wound. Wound present for around 2 months, slowly worsening over time. Wound associated with pain, no fever, chills, nausea, or vomiting. She reports her leg swelling has improved recently. WBC 5.2, afebrile.      Recommendations:  Pt seen. Right lower leg wound with some cellulitis. No  acute surgical intervention indicated at this time. Recommend wound care and IV abx, can consider debridement if clinically worsening despite supportive care    MEDICATIONS ADMINISTERED IN LAST 1 DAY:  glucose (Dex4) 15 GM/59ML oral liquid 15 g       Date Action Dose Route User    8/12/2024 1713 Given 15 g Oral Radha Plascencia RN          dextrose 50% injection 25 mL       Date Action Dose Route User    8/12/2024 1741 Given 25 mL Intravenous Radha Plascencia RN          enoxaparin (Lovenox) 40 MG/0.4ML SUBQ injection 40 mg       Date Action Dose Route User    8/13/2024 0855 Given 40 mg Subcutaneous (Left Lower Abdomen) Hilary Larsen RN       hydrocortisone Na succinate PF (Solu-CORTEF) injection 100 mg       Date Action Dose Route User    8/12/2024 1827 Given 100 mg Intravenous Radha Plascencia RN          hydrocortisone Na succinate PF (Solu-CORTEF) injection 100 mg       Date Action Dose Route User    8/13/2024 0623 Given 100 mg Intravenous Yael Jordan RN    8/12/2024 2326 Given 100 mg Intravenous Yael Jordan RN          hydroxychloroquine (Plaquenil) tab 200 mg       Date Action Dose Route User    8/13/2024 1142 Given 200 mg Oral Reina Huerta RN       morphINE PF 2 MG/ML injection 2 mg       Date Action Dose Route User    8/12/2024 1551 Given 2 mg Intravenous Radha Plascencia RN          ondansetron (Zofran) 4 MG/2ML injection 4 mg       Date Action Dose Route User    8/13/2024 1143 Given 4 mg Intravenous Reina Huerta RN          sodium chloride 0.9% infusion  100/hr      Date Action Dose Route User    8/13/2024 0112 New Bag (none) Intravenous Yael Jordan RN          sodium chloride 0.9 % IV bolus 2,463 mL       Date Action Dose Route User    8/12/2024 1926 New Bag 2,463 mL Intravenous Yulisa Lindquist RN          vancomycin (Vancocin) 1,000 mg in sodium chloride 0.9% 250 mL IVPB-ADDV       Date Action Dose Route User    8/13/2024 0624 New Bag 1,000 mg Intravenous Yael Jordan RN           vancomycin (Vancocin) 1.25 g in sodium chloride 0.9% 250mL IVPB premix       Date Action Dose Route User    8/12/2024 1828 New Bag 1,250 mg Intravenous Radha Plascencia, RN            Vitals (last day)       Date/Time Temp Pulse Resp BP SpO2 Weight O2 Device O2 Flow Rate (L/min) [facility]    08/13/24 0853 97.8 °F (36.6 °C) 97 18 136/91 99 % -- None (Room air) -- [facility]    08/13/24 0500 97.5 °F (36.4 °C) 95 18 129/85 98 % -- None (Room air) -- [facility]    08/12/24 2052 -- -- -- -- -- 180 lb 9.6 oz (81.9 kg) -- -- PN    08/12/24 2049 97.7 °F (36.5 °C) 115 11 132/71 94 % -- None (Room air) -- PN    08/12/24 2000 -- 99 19 120/85 100 % -- -- -- MM    08/12/24 1830 -- 105 24 135/88 98 % -- -- -- TF    08/12/24 1715 -- 103 14 133/99 100 % -- -- -- TF    08/12/24 1600 -- 108 22 134/103 99 % -- None (Room air) -- TF    08/12/24 1512 -- -- -- -- -- -- None (Room air) -- TF    08/12/24 1500 -- 107 26 144/106 99 % -- None (Room air) -- TF    08/12/24 1302 98.7 °F (37.1 °C) 120 22 126/86 96 % 181 lb (82.1 kg) -- -- KI

## 2024-08-13 NOTE — ED QUICK NOTES
Rounding Completed    Plan of Care reviewed. Waiting for admission.  Elimination needs assessed.  Provided reassurance.    Bed is locked and in lowest position. Call light within reach.

## 2024-08-13 NOTE — CONSULTS
[facility]  part of [facility] ID CONSULT NOTE    Sheri Garzon Patient Status:  Inpatient    1960 MRN [id number]   Location [facility] 5SW/SE Attending All Arroyo MD   Hosp Day # 1 PCP TERI MCKENNA MD       Reason for Consultation:  RLE cellulitis    ASSESSMENT:    Antibiotics: Vancomycin -    # Acute RLE cellulitis with posterior leg wound in immunocompromised patient  # Rheumatoid arthritis, on 20 mg prednisone daily, methotrexate, HCQ    PLAN:  -  Continue on vancomycin.  -  Surgery to see.  -  Monitor R foot.  -  Follow fever curve, wbc.  -  Reviewed labs, micro, imaging reports, available old records.  -  Case d/w patient, RN.      History of Present Illness:  Sheri Garzon is a 64 year old female with a history of rheumatoid arthritis on chronic 20 mg prednisone daily, methotrexate, HCQ, with recent [facility] admission - with vomiting s/p EGD with small hiatal hernia, who presented to [facility] ED on  with worsening pain in her right foot. Has had a wound for the last month and a half that has not healed. Was on ciprofloxacin last month. No fevers or chills at home. On arrival, afebrile, wbc 4.5, blood cx obtained, XR R foot with soft tissue swelling, started on vancomycin. ID consulted.    History:  Past Medical History:    Allergic rhinitis    Anxiety    Arthritis    RA and Osteoarthritis    Asthma (HCC)    Depression    Not officially diagnosed    Esophageal reflux    Essential hypertension    High blood pressure    High cholesterol    Hyperlipidemia    Myocardial infarction (HCC)    Cardiac event    Osteoarthritis    Pancreatitis (HCC)    Problems with swallowing    RA (rheumatoid arthritis) (HCC)    Sleep apnea     Past Surgical History:   Procedure Laterality Date    Colonoscopy      Other surgical history      Revision of uterine anomaly      Rotator cuff repair      Wrist fracture surgery       Family History   Problem Relation Age of  Onset    Diabetes Mother     Genetic Disease Mother     Heart Disorder Mother     Hypertension Mother     Obesity Mother     Diabetes Father     Hypertension Father     Depression Daughter     Psychiatric Daughter       reports that she has never smoked. She has never used smokeless tobacco. She reports that she does not currently use alcohol. She reports that she does not use drugs.    Allergies:  Allergies   Allergen Reactions    Cephalosporins ANAPHYLAXIS, NAUSEA AND VOMITING and OTHER (SEE COMMENTS)     Other reaction(s): Fever  Pt received multiple doses of ceftriaxone on 7/2024 without any complcations    Gadolinium Derivatives FEVER    Penicillins OTHER (SEE COMMENTS)     esophagitis    Sulfa Antibiotics OTHER (SEE COMMENTS)     esophagitis       Medications:    Current Facility-Administered Medications:     collagenase (Santyl) 250 UNIT/GM ointment, , Topical, Daily    glucose (Dex4) 15 GM/59ML oral liquid 15 g, 15 g, Oral, PRN    sodium chloride 0.9% infusion, , Intravenous, Continuous    enoxaparin (Lovenox) 40 MG/0.4ML SUBQ injection 40 mg, 40 mg, Subcutaneous, Daily    acetaminophen (Tylenol Extra Strength) tab 500 mg, 500 mg, Oral, Q4H PRN    ondansetron (Zofran) 4 MG/2ML injection 4 mg, 4 mg, Intravenous, Q6H PRN    prochlorperazine (Compazine) 10 MG/2ML injection 5 mg, 5 mg, Intravenous, Q8H PRN    acetaminophen (Tylenol) tab 650 mg, 650 mg, Oral, Q4H PRN **OR** HYDROcodone-acetaminophen (Norco) 5-325 MG per tab 1 tablet, 1 tablet, Oral, Q4H PRN **OR** HYDROcodone-acetaminophen (Norco) 5-325 MG per tab 2 tablet, 2 tablet, Oral, Q4H PRN    hydrocortisone Na succinate PF (Solu-CORTEF) injection 100 mg, 100 mg, Intravenous, Q8H    vancomycin (Vancocin) 1,000 mg in sodium chloride 0.9% 250 mL IVPB-ADDV, 1,000 mg, Intravenous, Q12H    Review of Systems:  CONSTITUTIONAL:  No weight loss, weakness or fatigue.  HEENT:  Eyes:  No visual loss, blurred vision, double vision or yellow sclerae. Ears, Nose, Throat:   No hearing loss, sneezing, congestion, runny nose or sore throat.  SKIN:  No rash or itching.  CARDIOVASCULAR:  No chest pain, chest pressure or chest discomfort  RESPIRATORY:  No shortness of breath, cough or sputum.  GASTROINTESTINAL:  No anorexia, nausea, vomiting or diarrhea. No abdominal pain or blood.  GENITOURINARY:  No Burning on urination.   NEUROLOGICAL:  No headache, dizziness, syncope, paralysis, ataxia, numbness or tingling in the extremities.  MUSCULOSKELETAL:  +RLE pain  HEMATOLOGIC:  No anemia, bleeding or bruising.  ALLERGIES:  No history of asthma, hives, eczema or rhinitis.    Physical Exam:  Vital signs: Blood pressure (!) 136/91, pulse 97, temperature 97.8 °F (36.6 °C), temperature source Oral, resp. rate 18, height 5' 2\" (1.575 m), weight 180 lb 9.6 oz (81.9 kg), SpO2 99%.    General: Awake, in bed  HEENT: Moist mucous membranes.   Neck: No lymphadenopathy.  Supple.  Cardiovascular: RRR  Respiratory: Clear to auscultation bilaterally.  No wheezes. No rhonchi.  Abdomen: Soft, nontender, nondistended.   Musculoskeletal: RLE edema  Integument: RLE edema with blistering on dorsal foot with posterior wound   Lines: PIV+    Laboratory Data:  Recent Labs   Lab 08/12/24  1535 08/13/24  0712   RBC 3.73* 3.28*   HGB 10.5* 9.3*   HCT 32.9* 29.9*   MCV 88.2 91.2   MCH 28.2 28.4   MCHC 31.9 31.1   RDW 17.5* 17.9*   NEPRELIM 3.43 4.13   WBC 4.5 5.2   .0* 101.0*   NEUT 92  --    LYMPH 8  --    MON 0  --    EOS 0  --      Recent Labs   Lab 08/12/24  1535 08/13/24  0710   GLU 66* 105*   BUN 7* 6*   CREATSERUM 0.65 0.49*   CA 8.8 7.9*    144   K 3.2* 3.5   * 116*   CO2 12.0* 13.0*       Microbiology: Reviewed in EMR    Radiology: Reviewed    Thank you for allowing us to participate in the care of this patient. Please do not hesitate to call if you have any questions.   We will continue to follow with you and will make further recommendations based on his progress.    Ursula Leslie PA-C    [facility]  [phone]  8/13/2024

## 2024-08-13 NOTE — H&P
[facility]    PATIENT'S NAME: CYNDI MORROW   ATTENDING PHYSICIAN: Dexter Peng MD   PATIENT ACCOUNT#:   [id number]    LOCATION:  [place] [address]  MEDICAL RECORD #:   [id number]       YOB: 1960  ADMISSION DATE:       08/12/2024    HISTORY AND PHYSICAL EXAMINATION    CHIEF COMPLAINT:  Right lower extremity cellulitis.    HISTORY OF PRESENT ILLNESS:  The patient is a 64-year-old  female, debilitated with severe rheumatoid arthritis.  She does have chronic skin striae from chronic prednisone exposure.  She had chronic ulceration on the posterior aspects of her right leg.  Around 3 to 4 days ago, started developing erythema extending from her right posterior leg to the anterior aspect up to the knee and to the dorsum of the right foot with some right foot blistering unroofed.  Today came into the emergency department for evaluation for fatigue and subjective fevers.  CBC showed white blood cell count of 4.5.  No left shift.  Platelet count 117, hemoglobin 10.5.  Chemistry showed potassium 3.2, chloride 113, and bicarbonate 12.  Numbers are chronic.  She was slightly hypoglycemic at 59.  She was started on IV Solu-Cortef, IV vancomycin.  X-ray of the foot showed marked dorsum mid foot soft tissue swelling, small nonmetallic polygonal morphology, radiopaque foci within the dorsal soft tissue which could relate to debris packing material or less likely foreign body.  CT angiogram of the lower extremities bilaterally with and without contrast still pending.    PAST MEDICAL HISTORY:  Hypertension, rheumatoid arthritis, chronic adrenal insufficiency secondary to chronic corticosteroids use, asthma, pancreatitis, obstructive sleep apnea, obesity, hyperlipidemia, hypertension, anxiety, and depression.  Anemia of chronic inflammatory disease and nonobstructive coronary artery disease.    PAST SURGICAL HISTORY:  Right shoulder rotator cuff repair.  She had wrist fracture open  reduction and internal fixation.    MEDICATIONS:  Please see medication reconciliation list.    ALLERGIES:  Gadolinium and cephalosporins.  Penicillin and sulfa causes esophagitis.    FAMILY HISTORY:  Mother had diabetes mellitus type 2 and heart disease.  Father diabetes mellitus type 2.    SOCIAL HISTORY:  No tobacco, alcohol, or drug use.  Lives with her family.  Almost bedbound but able to use a walker for a few steps.     REVIEW OF SYSTEMS:  Patient reports that she does have chronic wound and skin ulcer at the posterior right distal leg.  Around 3 days ago started developing erythema extending from her knee all the way down to dorsum of the right foot with blistering at the dorsum of the right foot.  She had subjective fevers at home, and she felt fatigue.  Other 12-point review of systems is negative.       PHYSICAL EXAMINATION:    GENERAL:  Alert and oriented to time, place, and person.  Fatigued, no acute distress.  VITAL SIGNS:  Temperature 98.7, pulse 103, respiratory rate 14, blood pressure 133/99, pulse ox 100% on room air.    HEENT:  Atraumatic.  Oropharynx clear.  Dry mucous membranes.  Normal hard and soft palate.  Eyes:  Anicteric sclerae.  NECK:  Supple.  No lymphadenopathy.  Trachea midline.  Full range of motion.  LUNGS:  Clear to auscultation bilaterally.  Normal respiratory effort.   HEART:  Regular rate, rhythm.  S1 and S2 auscultated.  No murmur.   ABDOMEN:  Soft, nondistended.  No tenderness.  Positive bowel sounds.    EXTREMITIES:  Nonpitting edema both legs.  Right leg erythema extending to the dorsum of the right foot with unroofed multifocal blister.  Posterior right leg with superficial venous stasis ulcer.  NEUROLOGIC:  Motor and sensory intact.     ASSESSMENT:    1.   Right lower extremity cellulitis.  2.   Chronic metabolic acidosis with elevated chloride, suggestive of renal tubular acidosis.  3.   Hypoglycemia with underlying iatrogenic adrenal insufficiency.  4.   Rheumatoid  arthritis.    PLAN:  Patient will be admitted to general medical floor.  Wound service consult.  IV vancomycin.  IV hydrocortisone.  Endocrinology and infectious disease consult.  Wound service consult.  Hold methotrexate until full recovery.  Further recommendations to follow.     Dictated By Christianne Davis MD  d: 08/12/2024 18:22:24  t: 08/12/2024 18:44:09  Job [id number]  FB/

## 2024-08-13 NOTE — PLAN OF CARE
Problem: Patient Centered Care  Goal: Patient preferences are identified and integrated in the patient's plan of care  Description: Interventions:  - What would you like us to know as we care for you?   - Provide timely, complete, and accurate information to patient/family  - Incorporate patient and family knowledge, values, beliefs, and cultural backgrounds into the planning and delivery of care  - Encourage patient/family to participate in care and decision-making at the level they choose  - Honor patient and family perspectives and choices  Outcome: Progressing     Outcome: Progressing     Problem: GASTROINTESTINAL - ADULT  Goal: Maintains or returns to baseline bowel function  Description: INTERVENTIONS:  - Assess bowel function  - Maintain adequate hydration with IV or PO as ordered and tolerated  - Evaluate effectiveness of GI medications  - Encourage mobilization and activity  - Obtain nutritional consult as needed  - Establish a toileting routine/schedule  - Consider collaborating with pharmacy to review patient's medication profile  Outcome: Progressing     Problem: SKIN/TISSUE INTEGRITY - ADULT  Goal: Skin integrity remains intact  Description: INTERVENTIONS  - Assess and document risk factors for pressure ulcer development  - Assess and document skin integrity  - Monitor for areas of redness and/or skin breakdown  - Initiate interventions, skin care algorithm/standards of care as needed  Outcome: Progressing  Goal: Incision(s), wounds(s) or drain site(s) healing without S/S of infection  Description: INTERVENTIONS:  - Assess and document risk factors for pressure ulcer development  - Assess and document skin integrity  - Assess and document dressing/incision, wound bed, drain sites and surrounding tissue  - Implement wound care per orders  - Initiate isolation precautions as appropriate  - Initiate Pressure Ulcer prevention bundle as indicated  Outcome: Progressing     Problem: PAIN - ADULT  Goal:  Verbalizes/displays adequate comfort level or patient's stated pain goal  Description: INTERVENTIONS:  - Encourage pt to monitor pain and request assistance  - Assess pain using appropriate pain scale  - Administer analgesics based on type and severity of pain and evaluate response  - Implement non-pharmacological measures as appropriate and evaluate response  - Consider cultural and social influences on pain and pain management  - Manage/alleviate anxiety  - Utilize distraction and/or relaxation techniques  - Monitor for opioid side effects  - Notify MD/LIP if interventions unsuccessful or patient reports new pain  - Anticipate increased pain with activity and pre-medicate as appropriate  Outcome: Progressing     Problem: RISK FOR INFECTION - ADULT  Goal: Absence of fever/infection during anticipated neutropenic period  Description: INTERVENTIONS  - Monitor WBC  - Administer growth factors as ordered  - Implement neutropenic guidelines  Outcome: Progressing     Problem: SAFETY ADULT - FALL  Goal: Free from fall injury  Description: INTERVENTIONS:  - Assess pt frequently for physical needs  - Identify cognitive and physical deficits and behaviors that affect risk of falls.  - Rush fall precautions as indicated by assessment.  - Educate pt/family on patient safety including physical limitations  - Instruct pt to call for assistance with activity based on assessment  - Modify environment to reduce risk of injury  - Provide assistive devices as appropriate  - Consider OT/PT consult to assist with strengthening/mobility  - Encourage toileting schedule  Outcome: Progressing     Problem: DISCHARGE PLANNING  Goal: Discharge to home or other facility with appropriate resources  Description: INTERVENTIONS:  - Identify barriers to discharge w/pt and caregiver  - Include patient/family/discharge partner in discharge planning  - Arrange for needed discharge resources and transportation as appropriate  - Identify discharge  learning needs (meds, wound care, etc)  - Arrange for interpreters to assist at discharge as needed  - Consider post-discharge preferences of patient/family/discharge partner  - Complete POLST form as appropriate  - Assess patient's ability to be responsible for managing their own health  - Refer to Case Management Department for coordinating discharge planning if the patient needs post-hospital services based on physician/LIP order or complex needs related to functional status, cognitive ability or social support system  Outcome: Progressing

## 2024-08-13 NOTE — PROGRESS NOTES
[facility] Pharmacy Dosing Service      Initial Pharmacokinetic Consult for Vancomycin Dosing     Sheri Garzon is a 64 year old female who is being initiated on vancomycin therapy for cellulitis.  Pharmacy has been asked to dose vancomycin by Dr Davis.  The initial treatment and monitoring approach will be steady state AUC strategy.        Weight and Temperature:    Wt Readings from Last 1 Encounters:   24 81.9 kg (180 lb 9.6 oz)        Temp Readings from Last 1 Encounters:   24 97.7 °F (36.5 °C) (Oral)      Labs:   Recent Labs   Lab 24  1535   CREATSERUM 0.65      Estimated Creatinine Clearance: 69.2 mL/min (based on SCr of 0.65 mg/dL).     Recent Labs   Lab 24  1535   WBC 4.5          The Pharmacokinetic Target is:     to 600 mg-h/L and trough <=15 mg/L    Renal Dosing Considerations:    None     Assessment/Plan:   Initial/Loading dose: Has received 1250 mg IV (15 mg/kg, capped at 2250 mg) x 1 initial dose.      Maintenance dose: Pharmacy will dose vancomycin at 1000 mg IV every 12 hours    Monitorin) Plan for vancomycin peak and trough to be obtained at steady state    2) Pharmacy will order SCr as clinically indicated to assess renal function.    3) Pharmacy will monitor for toxicity and efficacy, adjust vancomycin dose and/or frequency, and order vancomycin levels as appropriate per the Pharmacy and Therapeutics Committee approved protocol until discontinuation of the medication.       We appreciate the opportunity to assist in the care of this patient.     Fran Noel, PharmD  2024  9:00 PM  [facility]: [phone]        intact

## 2024-08-13 NOTE — PROGRESS NOTES
[facility]  part of [facility]     Hospitalist Progress Note     Sheri Garzon Patient Status:  Inpatient    1960 MRN [id number]   Location [facility] 5SW/SE Attending All Arroyo MD   Hosp Day # 1 PCP TERI MCKENNA MD     Subjective:     Patient resting comfortably and in NAD. In good spirits this a.m.   Complaining of a headache.       Objective:    Review of Systems:   ROS completed; pertinent positive and negatives stated in subjective.      Vital signs:  Temp:  [97.5 °F (36.4 °C)-98.7 °F (37.1 °C)] 97.8 °F (36.6 °C)  Pulse:  [] 97  Resp:  [11-26] 18  BP: (120-144)/() 136/91  SpO2:  [94 %-100 %] 99 %      Physical Exam:    Gen: NAD AO x3  Chest: good air entry CTABL  CVS: normal s1 and s2 RR  Abd: NABS soft NT ND  Neuro: CN 2-12 grossly intact  Ext: RLE warm, erythematous      Diagnostic Data:    Labs:  Recent Labs   Lab 24  1535 24  0712   WBC 4.5 5.2   HGB 10.5* 9.3*   MCV 88.2 91.2   .0* 101.0*       Recent Labs   Lab 24  1535 24  0710   GLU 66* 105*   BUN 7* 6*   CREATSERUM 0.65 0.49*   CA 8.8 7.9*    144   K 3.2* 3.5   * 116*   CO2 12.0* 13.0*       Estimated Creatinine Clearance: 91.7 mL/min (A) (based on SCr of 0.49 mg/dL (L)).    No results for input(s): \"PTP\", \"INR\" in the last 168 hours.           Imaging: Imaging data reviewed in Epic.    Medications:    collagenase   Topical Daily    amLODIPine  5 mg Oral Daily    Aspirin  81 mg Oral Daily    atorvastatin  10 mg Oral Daily    clopidogrel  75 mg Oral Daily    furosemide  40 mg Oral Daily    hydroxychloroquine  200 mg Oral BID    losartan  50 mg Oral Daily    oxybutynin ER  10 mg Oral Daily    enoxaparin  40 mg Subcutaneous Daily    hydrocortisone sodium succinate  100 mg Intravenous Q8H    vancomycin  1,000 mg Intravenous Q12H       Assessment & Plan:     RLE cellulitis  Imaging reviewed  Started on IV abx  ID on consult  Appreciate recs  Gsx on  consult  Appreciate recs  Rheumatoid arthritis  Continue home meds  CAD  HTN  HLD  Continue ASA, Statin, Plavix  Continue home antihypertensives      Plan of care discussed with patient or family at bedside.      Supplementary Documentation:     Quality:  DVT Prophylaxis: Lovenox s/c  CODE status: Full       Estimated date of discharge: TBD  Discharge is dependent on: clinical stability  At this point Ms. Garzon is expected to be discharge to: home                   All Arroyo MD  Hospitalist    MDM: High, I personally reviewed the available laboratories, imaging including CTA, XR. I discussed the case with RN. I ordered laboratories, studies including AM labs. I adjusted medications including home meds.   Medical decision making high, risk is high.       The 21st Century Cures Act makes medical notes like these available to patients in the interest of transparency. Please be advised this is a medical document. Medical documents are intended to carry relevant information, facts as evident, and the clinical opinion of the practitioner. The medical note is intended as peer to peer communication and may appear blunt or direct. It is written in medical language and may contain abbreviations or verbiage that are unfamiliar.

## 2024-08-13 NOTE — ED PROVIDER NOTES
Patient Seen in: [facility] 5sw/se    History     Chief Complaint   Patient presents with    Swelling Edema    Cellulitis     Stated Complaint: Leg Wound    HPI    Patient complains of  swollen r  leg.  Started few days ago.  Had blister on foot now swollen red tender.  Has stasis ulcer on r lower leg posteriorly, this is chronic no sig changes.  Patient has hx adrenal insufficiency and on daily prednisone.. no chest pain, no SOB.  no recent travel, no immobilization.  mild calf pain.  no fever, + redness    Alleviating factors: none  Exacerbating factors: activity    Past Medical History:    Allergic rhinitis    Anxiety    Arthritis    RA and Osteoarthritis    Asthma (HCC)    Depression    Not officially diagnosed    Esophageal reflux    Essential hypertension    High blood pressure    High cholesterol    Hyperlipidemia    Myocardial infarction (HCC)    Cardiac event    Osteoarthritis    Pancreatitis (HCC)    Problems with swallowing    RA (rheumatoid arthritis) (HCC)    Sleep apnea       Past Surgical History:   Procedure Laterality Date    Colonoscopy      Other surgical history      Revision of uterine anomaly      Rotator cuff repair      Wrist fracture surgery              Family History   Problem Relation Age of Onset    Diabetes Mother     Genetic Disease Mother     Heart Disorder Mother     Hypertension Mother     Obesity Mother     Diabetes Father     Hypertension Father     Depression Daughter     Psychiatric Daughter        Social History     Socioeconomic History    Marital status: Single   Tobacco Use    Smoking status: Never    Smokeless tobacco: Never   Vaping Use    Vaping status: Never Used   Substance and Sexual Activity    Alcohol use: Not Currently    Drug use: Never     Social Determinants of Health     Food Insecurity: No Food Insecurity (8/12/2024)    Food Insecurity     Food Insecurity: Never true   Transportation Needs: No Transportation Needs (8/12/2024)    Transportation Needs      Lack of Transportation: No   Housing Stability: Low Risk  (8/12/2024)    Housing Stability     Housing Instability: No       Review of Systems    Positive for stated complaint: Leg Wound  Other systems are as noted in HPI.  Constitutional and vital signs reviewed.      All other systems reviewed and negative except as noted above.    PSFH elements reviewed from today and agreed except as otherwise stated in HPI.    Physical Exam     ED Triage Vitals   BP 08/12/24 1302 126/86   Pulse 08/12/24 1302 120   Resp 08/12/24 1302 22   Temp 08/12/24 1302 98.7 °F (37.1 °C)   Temp src 08/12/24 1302 Oral   SpO2 08/12/24 1302 96 %   O2 Device 08/12/24 1500 None (Room air)       Current:/71   Pulse 115   Temp 97.7 °F (36.5 °C) (Oral)   Resp 11   Ht 157.5 cm (5' 2\")   Wt 81.9 kg   SpO2 94%   BMI 33.03 kg/m²    PULSE OX nl  GENERAL: awake alert  HEAD: normocephalic, atraumatic,   EYES: PERRLA, EOMI, conj sclera clear    LUNGS: no resp distress,   CARDIO: RR    EXTREMITIES: r lower leg edematous foot edematous erythematous and tender, r post lower leg 2 inch circular stasis ulcer  NEURO: alert and oiented *3, 2-12 intact, no focal deficit noted  SKIN:see above  PSYCH: calm, cooperative,    Differential includes: DVT vs. Lymphedema vs. Venous insufficiency vs. Cellulitis       ED Course     Labs Reviewed   CBC WITH DIFFERENTIAL WITH PLATELET - Abnormal; Notable for the following components:       Result Value    RBC 3.73 (*)     HGB 10.5 (*)     HCT 32.9 (*)     RDW-SD 56.3 (*)     RDW 17.5 (*)     .0 (*)     All other components within normal limits   BASIC METABOLIC PANEL (8) - Abnormal; Notable for the following components:    Glucose 66 (*)     Potassium 3.2 (*)     Chloride 113 (*)     CO2 12.0 (*)     BUN 7 (*)     All other components within normal limits   LACTIC ACID, PLASMA - Abnormal; Notable for the following components:    Lactic Acid 2.1 (*)     All other components within normal limits   MANUAL  DIFFERENTIAL - Abnormal; Notable for the following components:    Lymphocyte Absolute Manual 0.36 (*)     Monocyte Absolute Manual 0.00 (*)     RBC Morphology See morphology below (*)     Hypersegmented Neutrophils Present (*)     All other components within normal limits   POCT GLUCOSE - Abnormal; Notable for the following components:    POC Glucose  59 (*)     All other components within normal limits   POCT GLUCOSE - Abnormal; Notable for the following components:    POC Glucose  66 (*)     All other components within normal limits   POCT GLUCOSE - Abnormal; Notable for the following components:    POC Glucose  120 (*)     All other components within normal limits   POCT GLUCOSE - Normal   LACTIC ACID REFLEX POST POSTIVE   BLOOD CULTURE   BLOOD CULTURE     EKG    Rate, intervals and axes as noted on EKG Report.  Rate: 123  Rhythm: Sinus Rhythm  Reading: s tach with non specifc changes           MDM           Radiology Interpretation:  CTA LOWER EXTREMITY BILATERAL (W+WO) (CPT=73706-50)    Result Date: 8/12/2024  CONCLUSION:   1. RIGHT lower extremity:  Stable findings since recent prior July, 2024 CT angiogram.  Iliac, femoral, and popliteal arteries demonstrate mild-to-moderate scattered atherosclerosis, but no high-grade stenosis or occlusion.  There is a three-vessel runoff with mild-to-moderate multifocal stenoses throughout the runoff vessels related to atherosclerotic disease.  Plantar artery again demonstrates thready flow and/or partial occlusion.  2. LEFT lower extremity:  Mild scattered atherosclerotic disease throughout the iliac, femoral, and popliteal vessels, but with no high-grade stenosis or occlusion.  Note that on arterial phase, there is no significant opacification of the runoff vessels  or distal popliteal artery.  This does, however, improved on delayed phase with opacification of the distal popliteal and proximal run-off arteries.  There is a three-vessel runoff proximally.  On delayed  phase, there is thready flow throughout the distal run-off arteries.  This finding is favored to represent sequelae of patient hemodynamics and lower extremity edema.  Nonetheless, please correlate with left lower extremity arterial Dopplers given asymmetry.  3. Marked subcutaneous edema and soft tissue swelling throughout the right greater than left lower extremities.  No definite associated destructive/erosive osseous changes to suggest osteomyelitis by CT. 4. Advanced bilateral knee osteoarthritis with bilateral suprapatellar joint effusions and left knee chondrocalcinosis. 5. Colonic diverticulosis.   Dictated by (CST): Bandar Roy MD on 8/12/2024 at 5:59 PM     Finalized by (CST): Bandar Roy MD on 8/12/2024 at 6:18 PM          XR FOOT, COMPLETE (MIN 3 VIEWS), RIGHT (CPT=73630)    Result Date: 8/12/2024  CONCLUSION:  1. Marked dorsal midfoot soft tissue swelling.  Small nonmetallic polygonal morphology radiodense foci within the dorsal soft tissues, which could relate to debris, packing material, or less likely foreign bodies. 2. No acute fracture or dislocation of the right foot.  No destructive/erosive osseous changes are seen to suggest osteomyelitis radiographically.   Dictated by (CST): Bandar Roy MD on 8/12/2024 at 4:05 PM     Finalized by (CST): Bandar Roy MD on 8/12/2024 at 4:07 PM             [facility]  part of [facility]      Sepsis Reassessment Note    /71   Pulse 115   Temp 97.7 °F (36.5 °C) (Oral)   Resp 11   Ht 157.5 cm (5' 2\")   Wt 81.9 kg   SpO2 94%   BMI 33.03 kg/m²      I completed the sepsis reassessment at 1800    Cardiac:  Regularity: Regular  Rate: Tachycardic  Heart Sounds: S1,S2    Lungs:   Right: Clear  Left: Clear    Peripheral Pulses:  Radial: Right 1+ or Left 1+      Capillary Refill:  <3 Secs    Skin:  Temp/Moisture: Warm and Dry  Color: Normal      BP nl and lactic <4 so no bolus indicated. Cultures difficult to obtain so abx  started before 2 cultures obtained.          Medical Decision Making  Admit Dr. Davis in ER, consulted endocrine and ID    Problems Addressed:  Cellulitis of right lower extremity: acute illness or injury     Details: Iv abx, hydrocortisone for adrenal insufficiency  Hypoglycemia: acute illness or injury     Details: Po/iv dextrose and stress dose steroids consulted endocrine.    Amount and/or Complexity of Data Reviewed  Labs: ordered. Decision-making details documented in ED Course.  Radiology: ordered. Decision-making details documented in ED Course.  ECG/medicine tests: ordered and independent interpretation performed. Decision-making details documented in ED Course.  Discussion of management or test interpretation with external provider(s): I spent a total of 40 minutes of critical care time in obtaining history, performing a physical exam, bedside monitoring of interventions, collecting and interpreting tests and discussion with consultants but not including time spent performing procedures.      Risk  Decision regarding hospitalization.        Disposition and Plan     Clinical Impression:  1. Cellulitis of right lower extremity        Disposition:  Admit    Follow-up:  No follow-up provider specified.    Medications Prescribed:  Current Discharge Medication List          Hospital Problems       Present on Admission  Date Reviewed: 8/8/2024            ICD-10-CM Noted POA    * (Principal) Cellulitis of right lower extremity L03.115 8/12/2024 Unknown    Cellulitis L03.90 8/12/2024 Unknown

## 2024-08-13 NOTE — CONSULTS
[facility]  part of [facility]    Report of Consultation    Sheri Garzon Patient Status:  Inpatient    1960 MRN [id number]   Location [facility] 5SW/SE Attending All Arroyo MD   Hosp Day # 1 PCP TERI MCKENNA MD     Date of Admission:  2024  Date of Consult:  24  Reason for Consultation:   Management of Iatrogenic Adrenal Insufficiency    History of Present Illness:   Patient is a 64 year old female who was admitted to the hospital for Cellulitis of right lower extremity:  She has rheumatoid Arthritis for which she is being treated with methotrexate as well as prednisone. The prednisone had been stopped and soon after this, she had been admitted with cellulitis and hypoglycemia. The hypoglycemia persisted despite starting dextrose.     It was then determined that she had iatrogenic adrenal insufficiency.     Past Medical History  Past Medical History:    Allergic rhinitis    Anxiety    Arthritis    RA and Osteoarthritis    Asthma (HCC)    Depression    Not officially diagnosed    Esophageal reflux    Essential hypertension    High blood pressure    High cholesterol    Hyperlipidemia    Myocardial infarction (HCC)    Cardiac event    Osteoarthritis    Pancreatitis (HCC)    Problems with swallowing    RA (rheumatoid arthritis) (HCC)    Sleep apnea       Past Surgical History  Past Surgical History:   Procedure Laterality Date    Colonoscopy      Other surgical history      Revision of uterine anomaly      Rotator cuff repair      Wrist fracture surgery         Family History  Family History   Problem Relation Age of Onset    Diabetes Mother     Genetic Disease Mother     Heart Disorder Mother     Hypertension Mother     Obesity Mother     Diabetes Father     Hypertension Father     Depression Daughter     Psychiatric Daughter        Social History  Social History     Socioeconomic History    Marital status: Single   Tobacco Use    Smoking status: Never    Smokeless  tobacco: Never   Vaping Use    Vaping status: Never Used   Substance and Sexual Activity    Alcohol use: Not Currently    Drug use: Never     Social Determinants of Health     Food Insecurity: No Food Insecurity (8/12/2024)    Food Insecurity     Food Insecurity: Never true   Transportation Needs: No Transportation Needs (8/12/2024)    Transportation Needs     Lack of Transportation: No   Housing Stability: Low Risk  (8/12/2024)    Housing Stability     Housing Instability: No           Current Medications:  Current Facility-Administered Medications   Medication Dose Route Frequency    collagenase (Santyl) 250 UNIT/GM ointment   Topical Daily    albuterol (Ventolin HFA) 108 (90 Base) MCG/ACT inhaler 2 puff  2 puff Inhalation Q4H PRN    amLODIPine (Norvasc) tab 5 mg  5 mg Oral Daily    aspirin chewable tab 81 mg  81 mg Oral Daily    atorvastatin (Lipitor) tab 10 mg  10 mg Oral Daily    clopidogrel (Plavix) tab 75 mg  75 mg Oral Daily    furosemide (Lasix) tab 40 mg  40 mg Oral Daily    hydroxychloroquine (Plaquenil) tab 200 mg  200 mg Oral BID    losartan (Cozaar) tab 50 mg  50 mg Oral Daily    oxybutynin ER (Ditropan-XL) 24 hr tab 10 mg  10 mg Oral Daily    glucose (Dex4) 15 GM/59ML oral liquid 15 g  15 g Oral PRN    sodium chloride 0.9% infusion   Intravenous Continuous    enoxaparin (Lovenox) 40 MG/0.4ML SUBQ injection 40 mg  40 mg Subcutaneous Daily    acetaminophen (Tylenol Extra Strength) tab 500 mg  500 mg Oral Q4H PRN    ondansetron (Zofran) 4 MG/2ML injection 4 mg  4 mg Intravenous Q6H PRN    prochlorperazine (Compazine) 10 MG/2ML injection 5 mg  5 mg Intravenous Q8H PRN    acetaminophen (Tylenol) tab 650 mg  650 mg Oral Q4H PRN    Or    HYDROcodone-acetaminophen (Norco) 5-325 MG per tab 1 tablet  1 tablet Oral Q4H PRN    Or    HYDROcodone-acetaminophen (Norco) 5-325 MG per tab 2 tablet  2 tablet Oral Q4H PRN    hydrocortisone Na succinate PF (Solu-CORTEF) injection 100 mg  100 mg Intravenous Q8H     vancomycin (Vancocin) 1,000 mg in sodium chloride 0.9% 250 mL IVPB-ADDV  1,000 mg Intravenous Q12H     Medications Prior to Admission   Medication Sig    albuterol 108 (90 Base) MCG/ACT Inhalation Aero Soln Inhale 2 puffs into the lungs every 4 to 6 hours as needed for Wheezing.    predniSONE 20 MG Oral Tab Take 2 tablets (40 mg total) by mouth daily. Pt is taking different than order:  takes 2 - 10 mg tablets by mouth daily    furosemide 40 MG Oral Tab Take 1 tablet (40 mg total) by mouth daily.    losartan 50 MG Oral Tab Take 1 tablet (50 mg total) by mouth daily.    hydroxychloroquine 200 MG Oral Tab Take 1 tablet (200 mg total) by mouth 2 (two) times daily.    HYDROcodone-acetaminophen (NORCO)  MG Oral Tab Take 1 tablet by mouth in the morning and 1 tablet at noon and 1 tablet in the evening.    pantoprazole 40 MG Oral Tab EC Take 1 tablet (40 mg total) by mouth 2 (two) times daily before meals.    sodium bicarbonate 650 MG Oral Tab Take 1 tablet (650 mg total) by mouth 2 (two) times daily.    amLODIPine 5 MG Oral Tab Take 1 tablet (5 mg total) by mouth daily.    Aspirin 81 MG Oral Cap 81 mg.    atorvastatin 10 MG Oral Tab Take 1 tablet (10 mg total) by mouth daily.    clopidogrel 75 MG Oral Tab Take 1 tablet (75 mg total) by mouth daily.    ergocalciferol 1.25 MG (54167 UT) Oral Cap Take 1 capsule (50,000 Units total) by mouth once a week.    methotrexate 2.5 MG Oral Tab Take 1 tablet (2.5 mg total) by mouth once a week.    oxybutynin ER 10 MG Oral Tablet 24 Hr Take 1 tablet (10 mg total) by mouth daily.    Potassium Chloride ER 10 MEQ Oral Tab CR Take 1 tablet (10 mEq total) by mouth 2 (two) times daily.    folic acid 1 MG Oral Tab Take 1 tablet (1 mg total) by mouth daily.       Allergies  Allergies   Allergen Reactions    Cephalosporins ANAPHYLAXIS, NAUSEA AND VOMITING and OTHER (SEE COMMENTS)     Other reaction(s): Fever  Pt received multiple doses of ceftriaxone on 7/2024 without any complcations     Gadolinium Derivatives FEVER    Penicillins OTHER (SEE COMMENTS)     esophagitis    Sulfa Antibiotics OTHER (SEE COMMENTS)     esophagitis       Review of Systems:   Pertinent items are noted in HPI.    Physical Exam:   Vital Signs:  Blood pressure 133/90, pulse 94, temperature 97.3 °F (36.3 °C), temperature source Oral, resp. rate 18, height 5' 2\" (1.575 m), weight 180 lb 9.6 oz (81.9 kg), SpO2 100%.     General: No acute distress. Alert and oriented x 3.  HEENT: Moist mucous membranes. EOM-I. PERRL  Neck: No lymphadenopathy.  No JVD. No carotid bruits.  Respiratory: Clear to auscultation bilaterally.  No wheezes. No rhonchi.  Cardiovascular: S1, S2.  Regular rate and rhythm.  No murmurs. Equal pulses   Abdomen: Soft, nontender, nondistended.  Positive bowel sounds. No rebound tenderness  Neurologic: No focal neurological deficits.  Musculoskeletal: Full range of motion of all extremities.  No swelling noted.  Integument: No lesions. No erythema.  Psychiatric: Appropriate mood and affect.    Results:     Laboratory Data:  Lab Results   Component Value Date    WBC 5.2 08/13/2024    HGB 9.3 (L) 08/13/2024    HCT 29.9 (L) 08/13/2024    .0 (L) 08/13/2024    CREATSERUM 0.49 (L) 08/13/2024    BUN 6 (L) 08/13/2024     08/13/2024    K 3.5 08/13/2024     (H) 08/13/2024    CO2 13.0 (L) 08/13/2024     (H) 08/13/2024    CA 7.9 (L) 08/13/2024    ALB 3.1 (L) 07/30/2024    ALKPHO 85 07/30/2024    TP 5.6 (L) 07/30/2024    AST 35 (H) 07/30/2024    ALT 26 07/30/2024    LIP 34 07/21/2024    DDIMER 1.69 (H) 07/23/2024    MG 1.7 07/26/2024    PHOS 2.1 (L) 07/27/2024         Imaging:  CTA LOWER EXTREMITY BILATERAL (W+WO) (CPT=73706-50)    Result Date: 8/12/2024  CONCLUSION:   1. RIGHT lower extremity:  Stable findings since recent prior July, 2024 CT angiogram.  Iliac, femoral, and popliteal arteries demonstrate mild-to-moderate scattered atherosclerosis, but no high-grade stenosis or occlusion.  There is a  three-vessel runoff with mild-to-moderate multifocal stenoses throughout the runoff vessels related to atherosclerotic disease.  Plantar artery again demonstrates thready flow and/or partial occlusion.  2. LEFT lower extremity:  Mild scattered atherosclerotic disease throughout the iliac, femoral, and popliteal vessels, but with no high-grade stenosis or occlusion.  Note that on arterial phase, there is no significant opacification of the runoff vessels  or distal popliteal artery.  This does, however, improved on delayed phase with opacification of the distal popliteal and proximal run-off arteries.  There is a three-vessel runoff proximally.  On delayed phase, there is thready flow throughout the distal run-off arteries.  This finding is favored to represent sequelae of patient hemodynamics and lower extremity edema.  Nonetheless, please correlate with left lower extremity arterial Dopplers given asymmetry.  3. Marked subcutaneous edema and soft tissue swelling throughout the right greater than left lower extremities.  No definite associated destructive/erosive osseous changes to suggest osteomyelitis by CT. 4. Advanced bilateral knee osteoarthritis with bilateral suprapatellar joint effusions and left knee chondrocalcinosis. 5. Colonic diverticulosis.   Dictated by (CST): Bandar Roy MD on 8/12/2024 at 5:59 PM     Finalized by (CST): Bandar Roy MD on 8/12/2024 at 6:18 PM          XR FOOT, COMPLETE (MIN 3 VIEWS), RIGHT (CPT=73630)    Result Date: 8/12/2024  CONCLUSION:  1. Marked dorsal midfoot soft tissue swelling.  Small nonmetallic polygonal morphology radiodense foci within the dorsal soft tissues, which could relate to debris, packing material, or less likely foreign bodies. 2. No acute fracture or dislocation of the right foot.  No destructive/erosive osseous changes are seen to suggest osteomyelitis radiographically.   Dictated by (CST): Bandar Roy MD on 8/12/2024 at 4:05 PM     Finalized by  (CST): Bandar Roy MD on 8/12/2024 at 4:07 PM              Impression:     Cellulitis of right lower extremity  - As per primary team      Iatrogenic Adrenal Insufficiency  - Decrease HC dose from 100mg to 50mg IV q8  - We will taper as needed      Thank you for allowing me to participate in the care of your patient.    Melanie Sanchez MD  8/13/2024

## 2024-08-14 ENCOUNTER — APPOINTMENT (OUTPATIENT)
Dept: PICC SERVICES | Facility: HOSPITAL | Age: 64
End: 2024-08-14
Attending: HOSPITALIST
Payer: COMMERCIAL

## 2024-08-14 LAB
ANION GAP SERPL CALC-SCNC: 13 MMOL/L (ref 0–18)
BUN BLD-MCNC: 6 MG/DL (ref 9–23)
BUN/CREAT SERPL: 12.2 (ref 10–20)
CALCIUM BLD-MCNC: 7.7 MG/DL (ref 8.7–10.4)
CHLORIDE SERPL-SCNC: 118 MMOL/L (ref 98–112)
CO2 SERPL-SCNC: 14 MMOL/L (ref 21–32)
CREAT BLD-MCNC: 0.49 MG/DL
EGFRCR SERPLBLD CKD-EPI 2021: 105 ML/MIN/1.73M2 (ref 60–?)
GLUCOSE BLD-MCNC: 108 MG/DL (ref 70–99)
OSMOLALITY SERPL CALC.SUM OF ELEC: 298 MOSM/KG (ref 275–295)
POTASSIUM SERPL-SCNC: 3.1 MMOL/L (ref 3.5–5.1)
SODIUM SERPL-SCNC: 145 MMOL/L (ref 136–145)
VANCOMYCIN PEAK SERPL-MCNC: 41.5 UG/ML (ref 30–50)

## 2024-08-14 PROCEDURE — 99233 SBSQ HOSP IP/OBS HIGH 50: CPT | Performed by: HOSPITALIST

## 2024-08-14 PROCEDURE — 99232 SBSQ HOSP IP/OBS MODERATE 35: CPT | Performed by: SURGERY

## 2024-08-14 RX ORDER — POTASSIUM CHLORIDE 1500 MG/1
40 TABLET, EXTENDED RELEASE ORAL ONCE
Status: COMPLETED | OUTPATIENT
Start: 2024-08-14 | End: 2024-08-14

## 2024-08-14 NOTE — PLAN OF CARE
No acute changes overnight, family at bedside. Safety precautions in place    Problem: Patient Centered Care  Goal: Patient preferences are identified and integrated in the patient's plan of care  Description: Interventions:  - What would you like us to know as we care for you?   - Provide timely, complete, and accurate information to patient/family  - Incorporate patient and family knowledge, values, beliefs, and cultural backgrounds into the planning and delivery of care  - Encourage patient/family to participate in care and decision-making at the level they choose  - Honor patient and family perspectives and choices  Outcome: Progressing     Problem: Patient/Family Goals  Goal: Patient/Family Long Term Goal  Description: Patient's Long Term Goal:     Interventions:  -   - See additional Care Plan goals for specific interventions  Outcome: Progressing  Goal: Patient/Family Short Term Goal  Description: Patient's Short Term Goal:     Interventions:   -   - See additional Care Plan goals for specific interventions  Outcome: Progressing     Problem: GASTROINTESTINAL - ADULT  Goal: Maintains or returns to baseline bowel function  Description: INTERVENTIONS:  - Assess bowel function  - Maintain adequate hydration with IV or PO as ordered and tolerated  - Evaluate effectiveness of GI medications  - Encourage mobilization and activity  - Obtain nutritional consult as needed  - Establish a toileting routine/schedule  - Consider collaborating with pharmacy to review patient's medication profile  Outcome: Progressing     Problem: SKIN/TISSUE INTEGRITY - ADULT  Goal: Skin integrity remains intact  Description: INTERVENTIONS  - Assess and document risk factors for pressure ulcer development  - Assess and document skin integrity  - Monitor for areas of redness and/or skin breakdown  - Initiate interventions, skin care algorithm/standards of care as needed  Outcome: Progressing  Goal: Incision(s), wounds(s) or drain site(s)  healing without S/S of infection  Description: INTERVENTIONS:  - Assess and document risk factors for pressure ulcer development  - Assess and document skin integrity  - Assess and document dressing/incision, wound bed, drain sites and surrounding tissue  - Implement wound care per orders  - Initiate isolation precautions as appropriate  - Initiate Pressure Ulcer prevention bundle as indicated  Outcome: Progressing     Problem: PAIN - ADULT  Goal: Verbalizes/displays adequate comfort level or patient's stated pain goal  Description: INTERVENTIONS:  - Encourage pt to monitor pain and request assistance  - Assess pain using appropriate pain scale  - Administer analgesics based on type and severity of pain and evaluate response  - Implement non-pharmacological measures as appropriate and evaluate response  - Consider cultural and social influences on pain and pain management  - Manage/alleviate anxiety  - Utilize distraction and/or relaxation techniques  - Monitor for opioid side effects  - Notify MD/LIP if interventions unsuccessful or patient reports new pain  - Anticipate increased pain with activity and pre-medicate as appropriate  Outcome: Progressing     Problem: RISK FOR INFECTION - ADULT  Goal: Absence of fever/infection during anticipated neutropenic period  Description: INTERVENTIONS  - Monitor WBC  - Administer growth factors as ordered  - Implement neutropenic guidelines  Outcome: Progressing     Problem: SAFETY ADULT - FALL  Goal: Free from fall injury  Description: INTERVENTIONS:  - Assess pt frequently for physical needs  - Identify cognitive and physical deficits and behaviors that affect risk of falls.  - [place] fall precautions as indicated by assessment.  - Educate pt/family on patient safety including physical limitations  - Instruct pt to call for assistance with activity based on assessment  - Modify environment to reduce risk of injury  - Provide assistive devices as appropriate  - Consider  OT/PT consult to assist with strengthening/mobility  - Encourage toileting schedule  Outcome: Progressing     Problem: DISCHARGE PLANNING  Goal: Discharge to home or other facility with appropriate resources  Description: INTERVENTIONS:  - Identify barriers to discharge w/pt and caregiver  - Include patient/family/discharge partner in discharge planning  - Arrange for needed discharge resources and transportation as appropriate  - Identify discharge learning needs (meds, wound care, etc)  - Arrange for interpreters to assist at discharge as needed  - Consider post-discharge preferences of patient/family/discharge partner  - Complete POLST form as appropriate  - Assess patient's ability to be responsible for managing their own health  - Refer to Case Management Department for coordinating discharge planning if the patient needs post-hospital services based on physician/LIP order or complex needs related to functional status, cognitive ability or social support system  Outcome: Progressing

## 2024-08-14 NOTE — DIETARY NOTE
ADULT NUTRITION INITIAL ASSESSMENT    Pt is at moderate nutrition risk.  Pt does not meet malnutrition criteria.      RECOMMENDATIONS TO MD: See Nutrition Intervention    ADMITTING DIAGNOSIS:  Cellulitis of right lower extremity [L03.115]  PERTINENT PAST MEDICAL HISTORY:   Past Medical History:    Allergic rhinitis    Anxiety    Arthritis    RA and Osteoarthritis    Asthma (HCC)    Depression    Not officially diagnosed    Esophageal reflux    Essential hypertension    High blood pressure    High cholesterol    Hyperlipidemia    Myocardial infarction (HCC)    Cardiac event    Osteoarthritis    Pancreatitis (HCC)    Problems with swallowing    RA (rheumatoid arthritis) (HCC)    Sleep apnea     PATIENT STATUS: Initial 08/14/24: Pt admit for cellulitis of RLE. PMH seen above. Pt assessed due to screening at risk for MST of 4 for weight loss and declined PO intake. Pt with h/o rheumatoid arthritis and chronic prednisone use which causes her some skin issues. Pt has a wound on the back of her right leg (necrosis present) with possible bedside debridement planned for tomorrow. Visited pt at bedside. Pt reports poor poor appetite at home which comes and goes. Reports past history of significant weight loss, but feels she has been relatively stable recently which is consistent with EMR wt hx findings. Pt does drink Ensure original at home at least once daily when she finds her appetite is reduced. Pt agreeable to Ensure Enlive daily (strawberry) for now. Allowed pt to order more than once daily if she desires, but she would like to start with one for now.        FOOD/NUTRITION RELATED HISTORY:  Appetite: Poor  Intake:  poor  Intake Meeting Needs: No, but oral nutrition supplements (ONS) to maximize  Percent Meals Eaten (last 6 days)       Date/Time Percent Meals Eaten (%)    08/14/24 1021 25 %            Food Allergies: No Known Food Allergies (NKFA)  Cultural/Ethnic/[place]ah[place]ism Preferences: None    GASTROINTESTINAL:  constipation per pt report. Asked to relay this to RN and ask for laxative/stool softener is possible. Relayed this to JULIET Cheung today.     MEDICATIONS: reviewed   hydrocortisone sodium succinate  50 mg Intravenous Q8H    collagenase   Topical Daily    amLODIPine  5 mg Oral Daily    aspirin  81 mg Oral Daily    atorvastatin  10 mg Oral Daily    clopidogrel  75 mg Oral Daily    furosemide  40 mg Oral Daily    hydroxychloroquine  200 mg Oral BID    losartan  50 mg Oral Daily    oxybutynin ER  10 mg Oral Daily    enoxaparin  40 mg Subcutaneous Daily    vancomycin  1,000 mg Intravenous Q12H     LABS: reviewed; hypokalemia, 40 mEq rider given today   Recent Labs     08/12/24  1535 08/13/24  0710 08/14/24  0554   GLU 66* 105* 108*   BUN 7* 6* 6*   CREATSERUM 0.65 0.49* 0.49*   CA 8.8 7.9* 7.7*    144 145   K 3.2* 3.5 3.1*   * 116* 118*   CO2 12.0* 13.0* 14.0*   OSMOCALC 290 296* 298*     NUTRITION RELATED PHYSICAL FINDINGS:  - Nutrition Focused Physical Exam (NFPE): well nourished  - Fluid Accumulation: non-pitting Left Lower extremity and Foot , +1 Right Lower extremity, and +2 Right Foot  ---> See RN documentation for details  - Skin Integrity:  necrotic wound to back of right leg. Debridement planned for tomorrow  ---> See RN documentation for details    ANTHROPOMETRICS:  HT: 157.5 cm (5' 2\")  WT: 81.9 kg (180 lb 9.6 oz)   BMI: Body mass index is 33.03 kg/m².  BMI CLASSIFICATION: 30-34.9 kg/m2 - obesity class I  IBW: 110 lbs        163% IBW  Usual Body Wt: ~170 lbs per pt last year      105% UBW    WEIGHT HISTORY:  Patient Weight(s) for the past 336 hrs:   Weight   08/12/24 2052 81.9 kg (180 lb 9.6 oz)   08/12/24 1302 82.1 kg (181 lb)     Wt Readings from Last 10 Encounters:   08/12/24 81.9 kg (180 lb 9.6 oz)   08/08/24 82.6 kg (182 lb)   07/22/24 83.4 kg (183 lb 12.8 oz)   06/28/24 87.1 kg (192 lb)   09/06/21 72.6 kg (160 lb)   08/11/21 77.1 kg (170 lb)     NUTRITION DIAGNOSIS/PROBLEM:   Inadequate oral  intake related to Decreased ability to consume sufficient energy as evidenced by poor appetite and poor intakes per pt report     NUTRITION INTERVENTION:     NUTRITION PRESCRIPTION:   Estimated Nutrition needs: --dosing wt of 81.9 kg - wt taken on 8/12  Calories: 1853-9395 calories/day (18-22 calories per kg Dosing wt)  Protein: 60-80 g protein/day (1.2-1.6 g protein/kg Dosing wt)  - Diet:       Procedures    Cardiac diet Cardiac; Is Patient on Accuchecks? Yes      - RD Care Plan: Encouraged increased PO intake and Initiated ONS (oral nutritional supplements)  - Meals and snacks: Encouraged small frequent meals and Encouraged increased PO intake  - Medical Food Supplements-RD added Ensure Enlive (350 calories/ 20 g protein each) Daily Strawberry. Rational/use of oral supplements discussed.  - Vitamin and mineral supplements: none  - Feeding assistance: independent  - Nutrition education: Discussed importance of adequate energy and protein intake     - Coordination of nutrition care: collaboration with other providers  - Discharge and transfer of nutrition care to new setting or provider: monitor plans    MONITOR AND EVALUATE/NUTRITION GOALS:  - Food and Nutrient Intake:      Monitor: adequacy of PO intake and adequacy of supplement intake  - Food and Nutrient Administration:      Monitor: N/A  - Anthropometric Measurement:    Monitor weight  - Nutrition Goals:      allow wt loss due to fluid losses, PO greater than 75% of meals, good supplement intake, labs within acceptable limits, and support body systems    DIETITIAN FOLLOW UP: RD to follow and monitor nutrition status       Kayla Ahumada RD, LDN  Clinical Dietitian  P: [phone]

## 2024-08-14 NOTE — PLAN OF CARE
Problem: Patient Centered Care  Goal: Patient preferences are identified and integrated in the patient's plan of care  Description: Interventions:  - What would you like us to know as we care for you? Home with family.  Problem: GASTROINTESTINAL - ADULT  Goal: Maintains or returns to baseline bowel function  Description: INTERVENTIONS:  - Assess bowel function  - Maintain adequate hydration with IV or PO as ordered and tolerated  - Evaluate effectiveness of GI medications  - Encourage mobilization and activity  - Obtain nutritional consult as needed  - Establish a toileting routine/schedule  - Consider collaborating with pharmacy to review patient's medication profile  Outcome: Progressing     Problem: SKIN/TISSUE INTEGRITY - ADULT  Goal: Skin integrity remains intact  Description: INTERVENTIONS  - Assess and document risk factors for pressure ulcer development  - Assess and document skin integrity  - Monitor for areas of redness and/or skin breakdown  - Initiate interventions, skin care algorithm/standards of care as needed  Outcome: Progressing  Goal: Incision(s), wounds(s) or drain site(s) healing without S/S of infection  Description: INTERVENTIONS:  - Assess and document risk factors for pressure ulcer development  - Assess and document skin integrity  - Assess and document dressing/incision, wound bed, drain sites and surrounding tissue  - Implement wound care per orders  - Initiate isolation precautions as appropriate  - Initiate Pressure Ulcer prevention bundle as indicated  Outcome: Progressing     Problem: PAIN - ADULT  Goal: Verbalizes/displays adequate comfort level or patient's stated pain goal  Description: INTERVENTIONS:  - Encourage pt to monitor pain and request assistance  - Assess pain using appropriate pain scale  - Administer analgesics based on type and severity of pain and evaluate response  - Implement non-pharmacological measures as appropriate and evaluate response  - Consider cultural  and social influences on pain and pain management  - Manage/alleviate anxiety  - Utilize distraction and/or relaxation techniques  - Monitor for opioid side effects  - Notify MD/LIP if interventions unsuccessful or patient reports new pain  - Anticipate increased pain with activity and pre-medicate as appropriate  Outcome: Progressing     Problem: RISK FOR INFECTION - ADULT  Goal: Absence of fever/infection during anticipated neutropenic period  Description: INTERVENTIONS  - Monitor WBC  - Administer growth factors as ordered  - Implement neutropenic guidelines  Outcome: Progressing     Problem: SAFETY ADULT - FALL  Goal: Free from fall injury  Description: INTERVENTIONS:  - Assess pt frequently for physical needs  - Identify cognitive and physical deficits and behaviors that affect risk of falls.  - [place] fall precautions as indicated by assessment.  - Educate pt/family on patient safety including physical limitations  - Instruct pt to call for assistance with activity based on assessment  - Modify environment to reduce risk of injury  - Provide assistive devices as appropriate  - Consider OT/PT consult to assist with strengthening/mobility  - Encourage toileting schedule  Outcome: Progressing     Problem: DISCHARGE PLANNING  Goal: Discharge to home or other facility with appropriate resources  Description: INTERVENTIONS:  - Identify barriers to discharge w/pt and caregiver  - Include patient/family/discharge partner in discharge planning  - Arrange for needed discharge resources and transportation as appropriate  - Identify discharge learning needs (meds, wound care, etc)  - Arrange for interpreters to assist at discharge as needed  - Consider post-discharge preferences of patient/family/discharge partner  - Complete POLST form as appropriate  - Assess patient's ability to be responsible for managing their own health  - Refer to Case Management Department for coordinating discharge planning if the patient  needs post-hospital services based on physician/LIP order or complex needs related to functional status, cognitive ability or social support system  Outcome: Progressing   family  - Provide timely, complete, and accurate information to patient/family  - Incorporate patient and family knowledge, values, beliefs, and cultural backgrounds into the planning and delivery of care  - Encourage patient/family to participate in care and decision-making at the level they choose  - Honor patient and family perspectives and choices  Outcome: Progressing   Patient A/O x4. Vital signs stable. She continues on zosyn and vanco.   seen patient this afternoon, possible bedside debridement today or tomorrow. RLE pain controlled with norco.

## 2024-08-14 NOTE — PROGRESS NOTES
INFECTIOUS DISEASE PROGRESS NOTE  [facility]  part of [facility] ID PROGRESS NOTE    Sheri Garzon Patient Status:  Inpatient    1960 MRN [id number]   Location [facility] 5SW/SE Attending Derick Hsieh MD   Hosp Day # 2 PCP TERI MCKENNA MD     Subjective:  ROS reviewed. Feels better and notes improvement in swelling.    ASSESSMENT:    Antibiotics: Vancomycin -     # Acute RLE cellulitis with posterior leg wound in immunocompromised patient  # Rheumatoid arthritis, on 20 mg prednisone daily, methotrexate, HCQ     PLAN:  -  Continue on vancomycin.  -  Follow fever curve, wbc.  -  Reviewed labs, micro, imaging reports, available old records.  -  Case d/w patient, RN.     History of Present Illness:  Sheri Garzon is a 64 year old female with a history of rheumatoid arthritis on chronic 20 mg prednisone daily, methotrexate, HCQ, with recent [facility] admission - with vomiting s/p EGD with small hiatal hernia, who presented to [facility] ED on  with worsening pain in her right foot. Has had a wound for the last month and a half that has not healed. Was on ciprofloxacin last month. No fevers or chills at home. On arrival, afebrile, wbc 4.5, blood cx obtained, XR R foot with soft tissue swelling, started on vancomycin. ID consulted.    Physical Exam:  /81 (BP Location: Right arm)   Pulse 100   Temp 97.5 °F (36.4 °C) (Oral)   Resp 18   Ht 5' 2\" (1.575 m)   Wt 180 lb 9.6 oz (81.9 kg)   SpO2 100%   BMI 33.03 kg/m²     Gen:   Awake, in bed  HEENT:  EOMI, neck supple  CV/lungs:  RRR, CTAB  Abdom:  Soft, NT/ND, +BS  Skin/extrem:  Posterior R leg wound with serous drainage, R foot edema with stable blisters  Lines:  PIV+    Laboratory Data: Reviewed    Microbiology: Reviewed    Radiology: Reviewed      [facility]  [phone]  2024

## 2024-08-14 NOTE — PAYOR COMM NOTE
--------------  8/14:  CONTINUED STAY REVIEW    Payor: [employer] STATE [employer]  Subscriber #:  [id number]  Authorization Number: [id number]    Admit date: 8/12/24  Admit time:  8:49 PM    SURGERY:    Assessment and Plan:        Cellulitis of right lower extremity    Wound necrosis     Recommend:  Will plan for bedside excisional sharp debridement of necrotic tissue tomorrow.     Subjective:   Feels better, less leg discomfort     Objective:   Patient Vitals for the past 24 hrs:    BP Temp Temp src Pulse Resp SpO2   08/14/24 0849 123/81 97.5 °F (36.4 °C) Oral 100 18 100 %   08/14/24 0542 -- -- -- 97 -- --   08/14/24 0532 120/87 97.7 °F (36.5 °C) Oral 97 18 100 %   08/13/24 2036 130/85 98 °F (36.7 °C) Oral 97 16 100 %   08/13/24 1521 133/90 97.3 °F (36.3 °C) Oral 94 18 100 %            Exam:   /81 (BP Location: Right arm)   Pulse 100   Temp 97.5 °F (36.4 °C) (Oral)   Resp 18   Ht 5' 2\" (1.575 m)   Wt 180 lb 9.6 oz (81.9 kg)   SpO2 100%   BMI 33.03 kg/m²   Gen:  NAD  Right leg wound is necrotic tissue.     Results:            Lab Results   Component Value Date     WBC 5.2 08/13/2024     HGB 9.3 (L) 08/13/2024     HCT 29.9 (L) 08/13/2024     .0 (L) 08/13/2024     CREATSERUM 0.49 (L) 08/14/2024     BUN 6 (L) 08/14/2024      08/14/2024     K 3.1 (L) 08/14/2024      (H) 08/14/2024     CO2 14.0 (L) 08/14/2024      (H) 08/14/2024     CA 7.7 (L) 08/14/2024     ALB 3.1 (L) 07/30/2024     ALKPHO 85 07/30/2024     BILT 0.6 07/30/2024     TP 5.6 (L) 07/30/2024     AST 35 (H) 07/30/2024     ALT 26 07/30/2024     LIP 34 07/21/2024     DDIMER 1.69 (H) 07/23/2024     MG 1.7 07/26/2024     PHOS 2.1 (L) 07/27/2024         CTA LOWER EXTREMITY BILATERAL (W+WO) (CPT=73706-50)     Result Date: 8/12/2024  CONCLUSION:   1. RIGHT lower extremity:  Stable findings since recent prior July, 2024 CT angiogram.  Iliac, femoral, and popliteal arteries demonstrate mild-to-moderate scattered atherosclerosis,  but no high-grade stenosis or occlusion.  There is a three-vessel runoff with mild-to-moderate multifocal stenoses throughout the runoff vessels related to atherosclerotic disease.  Plantar artery again demonstrates thready flow and/or partial occlusion.  2. LEFT lower extremity:  Mild scattered atherosclerotic disease throughout the iliac, femoral, and popliteal vessels, but with no high-grade stenosis or occlusion.  Note that on arterial phase, there is no significant opacification of the runoff vessels  or distal popliteal artery.  This does, however, improved on delayed phase with opacification of the distal popliteal and proximal run-off arteries.  There is a three-vessel runoff proximally.  On delayed phase, there is thready flow throughout the distal run-off arteries.  This finding is favored to represent sequelae of patient hemodynamics and lower extremity edema.  Nonetheless, please correlate with left lower extremity arterial Dopplers given asymmetry.  3. Marked subcutaneous edema and soft tissue swelling throughout the right greater than left lower extremities.  No definite associated destructive/erosive osseous changes to suggest osteomyelitis by CT. 4. Advanced bilateral knee osteoarthritis with bilateral suprapatellar joint effusions and left knee chondrocalcinosis. 5. Colonic diverticulosis.   Dictated by (CST): Bandar Roy MD on 8/12/2024 at 5:59 PM     Finalized by (CST): Bandar Roy MD on 8/12/2024 at 6:18 PM           XR FOOT, COMPLETE (MIN 3 VIEWS), RIGHT (CPT=73630)     Result Date: 8/12/2024  CONCLUSION:  1. Marked dorsal midfoot soft tissue swelling.  Small nonmetallic polygonal morphology radiodense foci within the dorsal soft tissues, which could relate to debris, packing material, or less likely foreign bodies. 2. No acute fracture or dislocation of the right foot.  No destructive/erosive osseous changes are seen to suggest osteomyelitis radiographically.   Dictated by (CST): Kirill  MD Bandar on 8/12/2024 at 4:05 PM     Finalized by (CST): Bandar Roy MD on 8/12/2024 at 4:07 PM                Ruben Henriquez MD  8/14/2024         MEDICATIONS ADMINISTERED IN LAST 1 DAY:  acetaminophen (Tylenol) tab 650 mg       Date Action Dose Route User    8/14/2024 0841 Given 650 mg Oral Skye Garcia RN          amLODIPine (Norvasc) tab 5 mg       Date Action Dose Route User    8/14/2024 0840 Given 5 mg Oral Skye Garcia RN          aspirin chewable tab 81 mg       Date Action Dose Route User    8/14/2024 0840 Given 81 mg Oral Skye Garcia RN          atorvastatin (Lipitor) tab 10 mg       Date Action Dose Route User    8/14/2024 0840 Given 10 mg Oral Skye Garcia RN          clopidogrel (Plavix) tab 75 mg       Date Action Dose Route User    8/14/2024 0840 Given 75 mg Oral Skye Garcia RN          collagenase (Santyl) 250 UNIT/GM ointment       Date Action Dose Route User    8/14/2024 0842 Given (none) Topical Skye Garcia RN          docusate sodium (Colace) cap 100 mg       Date Action Dose Route User    8/13/2024 2306 Given 100 mg Oral Gabbi Daniel RN          enoxaparin (Lovenox) 40 MG/0.4ML SUBQ injection 40 mg       Date Action Dose Route User    8/14/2024 0839 Given 40 mg Subcutaneous (Left Lower Abdomen) Skye Garcia RN          furosemide (Lasix) tab 40 mg       Date Action Dose Route User    8/14/2024 0842 Given 40 mg Oral Skye Garcia RN          HYDROcodone-acetaminophen (Norco) 5-325 MG per tab 1 tablet       Date Action Dose Route User    8/14/2024 0620 Given 1 tablet Oral Anuradha Vera RN          HYDROcodone-acetaminophen (Norco) 5-325 MG per tab 2 tablet       Date Action Dose Route User    8/13/2024 2048 Given 2 tablet Oral Gabbi Daniel RN          hydrocortisone Na succinate PF (Solu-CORTEF) injection 100 mg       Date Action Dose Route User    8/13/2024 2306 Given 100 mg Intravenous Gabbi Daniel RN    8/13/2024 1521 Given 100 mg Intravenous Reina Huerat RN           hydrocortisone Na succinate PF (Solu-CORTEF) injection 50 mg       Date Action Dose Route User    8/14/2024 0900 Given 50 mg Intravenous Skye Garcia RN          hydroxychloroquine (Plaquenil) tab 200 mg       Date Action Dose Route User    8/14/2024 0842 Given 200 mg Oral Skye Garcia RN    8/13/2024 2048 Given 200 mg Oral Gabbi Daniel RN          losartan (Cozaar) tab 50 mg       Date Action Dose Route User    8/14/2024 0840 Given 50 mg Oral Skye Garcia RN          artificial saliva substitute (Mouth Kote) oral solution       Date Action Dose Route User    8/13/2024 2306 Given (none) Mouth/Throat Gabbi Daniel RN          oxybutynin ER (Ditropan-XL) 24 hr tab 10 mg       Date Action Dose Route User    8/14/2024 0840 Given 10 mg Oral Skye Garcia RN          potassium chloride (Klor-Con M20) tab 40 mEq       Date Action Dose Route User    8/14/2024 0840 Given 40 mEq Oral Skye Garcia RN          sodium chloride 0.9% infusion       Date Action Dose Route User    8/14/2024 0944 New Bag (none) Intravenous Skye Garcia RN    8/13/2024 2308 New Bag (none) Intravenous Gabbi Daniel RN          vancomycin (Vancocin) 1,000 mg in sodium chloride 0.9% 250 mL IVPB-ADDV       Date Action Dose Route User    8/14/2024 0615 New Bag 1,000 mg Intravenous Anuradha Vera RN    8/13/2024 1757 New Bag 1,000 mg Intravenous Reina Huerta RN            Vitals (last day)       Date/Time Temp Pulse Resp BP SpO2 Weight O2 Device O2 Flow Rate (L/min) Who    08/14/24 0849 97.5 °F (36.4 °C) 100 18 123/81 100 % -- None (Room air) -- MS    08/14/24 0542 -- 97 -- -- -- -- -- -- KD    08/14/24 0532 97.7 °F (36.5 °C) 97 18 120/87 100 % -- None (Room air) -- LV    08/13/24 2036 98 °F (36.7 °C) 97 16 130/85 100 % -- None (Room air) -- PK    08/13/24 1521 97.3 °F (36.3 °C) 94 18 133/90 100 % -- None (Room air) -- CE    08/13/24 0853 97.8 °F (36.6 °C) 97 18 136/91 99 % -- None (Room air) -- VA    08/13/24 0500 97.5 °F (36.4 °C) 95 18  129/85 98 % -- None (Room air) -- PN

## 2024-08-14 NOTE — PROGRESS NOTES
[facility]  part of [facility]    Progress Note    Sheri Garzon Patient Status:  Inpatient    1960 MRN [id number]   Location [facility] 5SW/SE Attending Derick Hsieh MD   Hosp Day # 2 PCP TERI MCKENNA MD     Assessment and Plan:       Cellulitis of right lower extremity    Wound necrosis    Recommend:  Will plan for bedside excisional sharp debridement of necrotic tissue tomorrow.    Subjective:   Feels better, less leg discomfort    Objective:   Patient Vitals for the past 24 hrs:   BP Temp Temp src Pulse Resp SpO2   24 0849 123/81 97.5 °F (36.4 °C) Oral 100 18 100 %   24 0542 -- -- -- 97 -- --   24 0532 120/87 97.7 °F (36.5 °C) Oral 97 18 100 %   24 2036 130/85 98 °F (36.7 °C) Oral 97 16 100 %   24 1521 133/90 97.3 °F (36.3 °C) Oral 94 18 100 %       Intake/Output                   24 0700 - 24 0659 24 0700 - 24 0659 24 0700 - 08/15/24 0659       Intake    P.O.  --  --  120    P.O. -- -- 120    I.V.  571.7  2390  --    Volume (mL) (sodium chloride 0.9% infusion) 571.7 2390 --    IV PIGGYBACK  250  --  --    Volume (mL) (vancomycin (Vancocin) 1.25 g in sodium chloride 0.9% 250mL IVPB premix) 250 -- --    Total Intake 821.7 2390 120       Output    Urine  --  --  --    Urine Occurrence -- 1 x --    Total Output -- -- --       Net I/O     821.7 2390 120            Exam:   /81 (BP Location: Right arm)   Pulse 100   Temp 97.5 °F (36.4 °C) (Oral)   Resp 18   Ht 5' 2\" (1.575 m)   Wt 180 lb 9.6 oz (81.9 kg)   SpO2 100%   BMI 33.03 kg/m²   Gen:  NAD  Right leg wound is necrotic tissue.    Results:     Lab Results   Component Value Date    WBC 5.2 2024    HGB 9.3 (L) 2024    HCT 29.9 (L) 2024    .0 (L) 2024    CREATSERUM 0.49 (L) 2024    BUN 6 (L) 2024     2024    K 3.1 (L) 2024     (H) 2024    CO2 14.0 (L) 2024     (H)  08/14/2024    CA 7.7 (L) 08/14/2024    ALB 3.1 (L) 07/30/2024    ALKPHO 85 07/30/2024    BILT 0.6 07/30/2024    TP 5.6 (L) 07/30/2024    AST 35 (H) 07/30/2024    ALT 26 07/30/2024    LIP 34 07/21/2024    DDIMER 1.69 (H) 07/23/2024    MG 1.7 07/26/2024    PHOS 2.1 (L) 07/27/2024       CTA LOWER EXTREMITY BILATERAL (W+WO) (CPT=73706-50)    Result Date: 8/12/2024  CONCLUSION:   1. RIGHT lower extremity:  Stable findings since recent prior July, 2024 CT angiogram.  Iliac, femoral, and popliteal arteries demonstrate mild-to-moderate scattered atherosclerosis, but no high-grade stenosis or occlusion.  There is a three-vessel runoff with mild-to-moderate multifocal stenoses throughout the runoff vessels related to atherosclerotic disease.  Plantar artery again demonstrates thready flow and/or partial occlusion.  2. LEFT lower extremity:  Mild scattered atherosclerotic disease throughout the iliac, femoral, and popliteal vessels, but with no high-grade stenosis or occlusion.  Note that on arterial phase, there is no significant opacification of the runoff vessels  or distal popliteal artery.  This does, however, improved on delayed phase with opacification of the distal popliteal and proximal run-off arteries.  There is a three-vessel runoff proximally.  On delayed phase, there is thready flow throughout the distal run-off arteries.  This finding is favored to represent sequelae of patient hemodynamics and lower extremity edema.  Nonetheless, please correlate with left lower extremity arterial Dopplers given asymmetry.  3. Marked subcutaneous edema and soft tissue swelling throughout the right greater than left lower extremities.  No definite associated destructive/erosive osseous changes to suggest osteomyelitis by CT. 4. Advanced bilateral knee osteoarthritis with bilateral suprapatellar joint effusions and left knee chondrocalcinosis. 5. Colonic diverticulosis.   Dictated by (CST): Bandar Roy MD on 8/12/2024 at 5:59  PM     Finalized by (CST): Bandar Roy MD on 8/12/2024 at 6:18 PM          XR FOOT, COMPLETE (MIN 3 VIEWS), RIGHT (CPT=73630)    Result Date: 8/12/2024  CONCLUSION:  1. Marked dorsal midfoot soft tissue swelling.  Small nonmetallic polygonal morphology radiodense foci within the dorsal soft tissues, which could relate to debris, packing material, or less likely foreign bodies. 2. No acute fracture or dislocation of the right foot.  No destructive/erosive osseous changes are seen to suggest osteomyelitis radiographically.   Dictated by (CST): Bandar Roy MD on 8/12/2024 at 4:05 PM     Finalized by (CST): Bandar Roy MD on 8/12/2024 at 4:07 PM               Ruben Henriquez MD  8/14/2024

## 2024-08-14 NOTE — PROGRESS NOTES
[facility]  part of [facility]     Hospitalist Progress Note     Sheri Garzon Patient Status:  Inpatient    1960 MRN [id number]   Location [facility] 5SW/SE Attending Derick Hsieh MD   Hosp Day # 2 PCP TERI MCKENNA MD     Subjective:     Pt was seen and examined  RLE pain controlled  No cp, sob, f,c, abd pain or HA  Feeling nauseous this AM       Objective:    Review of Systems:   ROS completed; pertinent positive and negatives stated in subjective.      Vital signs:  Temp:  [97.3 °F (36.3 °C)-98 °F (36.7 °C)] 97.5 °F (36.4 °C)  Pulse:  [] 100  Resp:  [16-18] 18  BP: (120-133)/(81-90) 123/81  SpO2:  [100 %] 100 %      Physical Exam:    Gen: NAD AO x3  Chest: good air entry CTABL  CVS: normal s1 and s2 RR  Abd: NABS soft NT ND  Neuro: CN 2-12 grossly intact  Ext: RLE warm, erythematous      Diagnostic Data:    Labs:  Recent Labs   Lab 24  1535 24  0712   WBC 4.5 5.2   HGB 10.5* 9.3*   MCV 88.2 91.2   .0* 101.0*       Recent Labs   Lab 24  1535 24  0710 24  0554   GLU 66* 105* 108*   BUN 7* 6* 6*   CREATSERUM 0.65 0.49* 0.49*   CA 8.8 7.9* 7.7*    144 145   K 3.2* 3.5 3.1*   * 116* 118*   CO2 12.0* 13.0* 14.0*       Estimated Creatinine Clearance: 91.7 mL/min (A) (based on SCr of 0.49 mg/dL (L)).    No results for input(s): \"PTP\", \"INR\" in the last 168 hours.           Imaging: Imaging data reviewed in Epic.    Medications:    hydrocortisone sodium succinate  50 mg Intravenous Q8H    collagenase   Topical Daily    amLODIPine  5 mg Oral Daily    aspirin  81 mg Oral Daily    atorvastatin  10 mg Oral Daily    clopidogrel  75 mg Oral Daily    furosemide  40 mg Oral Daily    hydroxychloroquine  200 mg Oral BID    losartan  50 mg Oral Daily    oxybutynin ER  10 mg Oral Daily    enoxaparin  40 mg Subcutaneous Daily    vancomycin  1,000 mg Intravenous Q12H       Assessment & Plan:     RLE cellulitis -> improved   Imaging  reviewed  Started on IV abx, cont for now  ID on consult  Appreciate recs  Gsx on consult  Appreciate recs  Rheumatoid arthritis  Continue home meds  CAD  HTN  HLD  Continue ASA, Statin, Plavix  Continue home antihypertensives      Plan of care discussed with patient or family at bedside.      Supplementary Documentation:     Quality:  DVT Prophylaxis: Lovenox s/c  CODE status: Full       Estimated date of discharge: TBD  Discharge is dependent on: clinical stability  At this point Ms. Garzon is expected to be discharge to: home    MDM: High

## 2024-08-15 LAB
ALBUMIN SERPL-MCNC: 2.8 G/DL (ref 3.2–4.8)
ANION GAP SERPL CALC-SCNC: 11 MMOL/L (ref 0–18)
BASOPHILS # BLD AUTO: 0.01 X10(3) UL (ref 0–0.2)
BASOPHILS NFR BLD AUTO: 0.2 %
BUN BLD-MCNC: 7 MG/DL (ref 9–23)
BUN/CREAT SERPL: 12.1 (ref 10–20)
CALCIUM BLD-MCNC: 7.4 MG/DL (ref 8.7–10.4)
CHLORIDE SERPL-SCNC: 120 MMOL/L (ref 98–112)
CO2 SERPL-SCNC: 14 MMOL/L (ref 21–32)
CREAT BLD-MCNC: 0.58 MG/DL
DEPRECATED RDW RBC AUTO: 54.7 FL (ref 35.1–46.3)
EGFRCR SERPLBLD CKD-EPI 2021: 101 ML/MIN/1.73M2 (ref 60–?)
EOSINOPHIL # BLD AUTO: 0 X10(3) UL (ref 0–0.7)
EOSINOPHIL NFR BLD AUTO: 0 %
ERYTHROCYTE [DISTWIDTH] IN BLOOD BY AUTOMATED COUNT: 17.6 % (ref 11–15)
GLUCOSE BLD-MCNC: 92 MG/DL (ref 70–99)
HCT VFR BLD AUTO: 25.5 %
HGB BLD-MCNC: 8.5 G/DL
IMM GRANULOCYTES # BLD AUTO: 0.04 X10(3) UL (ref 0–1)
IMM GRANULOCYTES NFR BLD: 0.8 %
LYMPHOCYTES # BLD AUTO: 0.71 X10(3) UL (ref 1–4)
LYMPHOCYTES NFR BLD AUTO: 14.8 %
MAGNESIUM SERPL-MCNC: 1.4 MG/DL (ref 1.6–2.6)
MCH RBC QN AUTO: 28.4 PG (ref 26–34)
MCHC RBC AUTO-ENTMCNC: 33.3 G/DL (ref 31–37)
MCV RBC AUTO: 85.3 FL
MONOCYTES # BLD AUTO: 0.04 X10(3) UL (ref 0.1–1)
MONOCYTES NFR BLD AUTO: 0.8 %
NEUTROPHILS # BLD AUTO: 4.01 X10 (3) UL (ref 1.5–7.7)
NEUTROPHILS # BLD AUTO: 4.01 X10(3) UL (ref 1.5–7.7)
NEUTROPHILS NFR BLD AUTO: 83.4 %
OSMOLALITY SERPL CALC.SUM OF ELEC: 298 MOSM/KG (ref 275–295)
PHOSPHATE SERPL-MCNC: 1.9 MG/DL (ref 2.4–5.1)
PLATELET # BLD AUTO: 71 10(3)UL (ref 150–450)
PLATELETS.RETICULATED NFR BLD AUTO: 2.4 % (ref 0–7)
POTASSIUM SERPL-SCNC: 2.4 MMOL/L (ref 3.5–5.1)
POTASSIUM SERPL-SCNC: 2.5 MMOL/L (ref 3.5–5.1)
POTASSIUM SERPL-SCNC: 2.5 MMOL/L (ref 3.5–5.1)
RBC # BLD AUTO: 2.99 X10(6)UL
SODIUM SERPL-SCNC: 145 MMOL/L (ref 136–145)
VANCOMYCIN TROUGH SERPL-MCNC: 29.3 UG/ML (ref 10–20)
WBC # BLD AUTO: 4.8 X10(3) UL (ref 4–11)

## 2024-08-15 PROCEDURE — 0JBN0ZZ EXCISION OF RIGHT LOWER LEG SUBCUTANEOUS TISSUE AND FASCIA, OPEN APPROACH: ICD-10-PCS

## 2024-08-15 PROCEDURE — 11042 DBRDMT SUBQ TIS 1ST 20SQCM/<: CPT

## 2024-08-15 PROCEDURE — 99232 SBSQ HOSP IP/OBS MODERATE 35: CPT | Performed by: SURGERY

## 2024-08-15 PROCEDURE — 99233 SBSQ HOSP IP/OBS HIGH 50: CPT | Performed by: HOSPITALIST

## 2024-08-15 RX ORDER — MAGNESIUM OXIDE 400 MG/1
800 TABLET ORAL ONCE
Status: COMPLETED | OUTPATIENT
Start: 2024-08-15 | End: 2024-08-15

## 2024-08-15 RX ORDER — POTASSIUM CHLORIDE 14.9 MG/ML
20 INJECTION INTRAVENOUS ONCE
Status: COMPLETED | OUTPATIENT
Start: 2024-08-16 | End: 2024-08-16

## 2024-08-15 RX ORDER — POLYETHYLENE GLYCOL 3350 17 G/17G
17 POWDER, FOR SOLUTION ORAL DAILY PRN
Status: DISCONTINUED | OUTPATIENT
Start: 2024-08-15 | End: 2024-08-24

## 2024-08-15 RX ORDER — BISACODYL 10 MG
10 SUPPOSITORY, RECTAL RECTAL
Status: DISCONTINUED | OUTPATIENT
Start: 2024-08-15 | End: 2024-08-24

## 2024-08-15 NOTE — PAYOR COMM NOTE
--------------  8/15 CONTINUED STAY REVIEW    Payor: [facility] OUT OF STATE HMO  Subscriber #:  [id number]  Authorization Number: [id number]      ID:    ESTEBAN reviewed. Feels better. Daughter at bedside.     ASSESSMENT:     Antibiotics: Vancomycin 8/12-     # Acute RLE cellulitis with posterior leg wound in immunocompromised patient  # Rheumatoid arthritis, on 20 mg prednisone daily, methotrexate, HCQ     PLAN:  -  Continue on vancomycin, day 4. Hope to convert to course of linezolid on DC.  -  Awaiting bedside debridement.  -  Follow fever curve, wbc.  -  Reviewed labs, micro, imaging reports, available old records.  -  Case d/w patient, RN.    Physical Exam:  /78 (BP Location: Right arm)   Pulse 93   Temp 98.1 °F (36.7 °C) (Oral)   Resp 16   Ht 5' 2\" (1.575 m)   Wt 180 lb 9.6 oz (81.9 kg)   SpO2 98%   BMI 33.03 kg/m²      Gen:                   Awake, in bed  HEENT:             EOMI, neck supple  CV/lungs:           RRR, CTAB  Abdom:              Soft, NT/ND, +BS  Skin/extrem:      Posterior R leg wound stable, R foot edema with stable blisters, dry skin on lateral ankle  Lines:                 PIV+    Lab 08/12/24  1535 08/13/24  0712 08/15/24  0548   WBC 4.5 5.2 4.8   HGB 10.5* 9.3* 8.5*   MCV 88.2 91.2 85.3   .0* 101.0* 71.0*      * 108* 92   BUN 6* 6* 7*   CREATSERUM 0.49* 0.49* 0.58   CA 7.9* 7.7* 7.4*   ALB  --   --  2.8*    145 145   K 3.5 3.1* 2.5*  2.5*   * 118* 120*   CO2 13.0* 14.0* 14.0*        Medications:   Scheduled Medications[]Expand by Default    hydrocortisone sodium succinate  50 mg Intravenous 2 times per day    [Held by provider] vancomycin  1,000 mg Intravenous Q24H    potassium chloride  40 mEq Intravenous Once    collagenase   Topical Daily    amLODIPine  5 mg Oral Daily    aspirin  81 mg Oral Daily    atorvastatin  10 mg Oral Daily    clopidogrel  75 mg Oral Daily    furosemide  40 mg Oral Daily    hydroxychloroquine  200 mg Oral BID    losartan  50 mg  Oral Daily    oxybutynin ER  10 mg Oral Daily    enoxaparin  40 mg Subcutaneous Daily        HOSPITALIST:    Assessment & Plan:  RLE cellulitis -> improved, still with significant drainage   Imaging reviewed  Started on IV abx, cont for now  ID on consult  Appreciate recs  Gsx on consult  Appreciate recs, will undergo I+D today  Rheumatoid arthritis  Continue home meds  CAD  HTN  HLD  Continue ASA, Statin, Plavix  Continue home antihypertensives       SURGERY:        Procedure Note   Preop: RLE posterior leg wound with surrounding cellulitis measuring 3cm x 3cm  Postop:  Same  Procedure: Sharp excisional debridement of RLE posterior leg wound with scalpel and scissors including skin and subcutaneous tissue  Anesthesia:  None  EBL:  Minimal  Description:  The skin was prepped and draped in sterile fashion. A #15 blade and scissors were utilized to sharply debride the necrotic eschar and slough, extending down into the subcutaneous tissue. Total area of debrided tissue measured approximately 2cm x 2cm. Hemostasis was maintained. The patient tolerated the procedure well. The wound was dressed with gauze.            Assessment and Plan:        Cellulitis of right lower extremity    Wound necrosis  -clinically improving.        Recommend:  Bedside excisional debridement today.     Subjective:   Doing well, less leg discomfort    MEDICATIONS ADMINISTERED IN LAST 1 DAY:    hydrocortisone Na succinate PF (Solu-CORTEF) injection 50 mg       Date Action Dose Route User    8/15/2024 0052 Given 50 mg Intravenous Gabbi Daniel RN    8/14/2024 1634 Given 50 mg Intravenous Skye Garcia RN          hydrocortisone Na succinate PF (Solu-CORTEF) injection 50 mg       Date Action Dose Route User    8/15/2024 0944 Given 50 mg Intravenous Sylvie Mckeon RN     potassium chloride 40 mEq in 250mL sodium chloride 0.9% IVPB premix       Date Action Dose Route User    8/15/2024 1244 New Bag 40 mEq Intravenous Sylvie Mckeon RN           potassium phosphate dibasic 15 mmol in sodium chloride 0.9% 250 mL IVPB       Date Action Dose Route User    8/15/2024 0828 New Bag 15 mmol Intravenous Sylvie Mckeon RN          sodium chloride 0.9% infusion       Date Action Dose Route User    8/15/2024 0053 New Bag (none) Intravenous Gabbi Daniel RN          vancomycin (Vancocin) 1,000 mg in sodium chloride 0.9% 250 mL IVPB-ADDV       Date Action Dose Route User    8/15/2024 0659 New Bag 1,000 mg Intravenous Gabbi Daniel RN    8/14/2024 1817 New Bag 1,000 mg Intravenous Skye Garcia RN            Vitals (last day)       Date/Time Temp Pulse Resp BP SpO2 Weight O2 Device O2 Flow Rate (L/min) [facility]    08/15/24 1519 98 °F (36.7 °C) 91 18 117/79 99 % -- None (Room air) -- JN    08/15/24 0826 98.1 °F (36.7 °C) 93 16 116/78 98 % -- None (Room air) -- JN    08/15/24 0553 98.2 °F (36.8 °C) 94 18 111/79 99 % -- None (Room air) -- PK    08/14/24 2129 97.8 °F (36.6 °C) 91 18 127/82 100 % -- None (Room air) -- PK    08/14/24 1904 -- 100 -- -- -- -- -- -- GT    08/14/24 1635 98.1 °F (36.7 °C) 94 18 128/89 100 % -- None (Room air) -- MS

## 2024-08-15 NOTE — PROGRESS NOTES
[facility] [place]  part of [facility]     Hospitalist Progress Note     Sheri Garzon Patient Status:  Inpatient    1960 MRN [id number]   Location [facility] 5SW/SE Attending Derick Hsieh MD   Hosp Day # 3 PCP TERI MCKENNA MD     Subjective:     Pt was seen and examined  RLE pain controlled  Will undergo bedside I+D today  No cp, sob, f,c, abd pain or HA  No nausea       Objective:    Review of Systems:   ROS completed; pertinent positive and negatives stated in subjective.      Vital signs:  Temp:  [97.8 °F (36.6 °C)-98.2 °F (36.8 °C)] 98.1 °F (36.7 °C)  Pulse:  [] 93  Resp:  [16-18] 16  BP: (111-128)/(78-89) 116/78  SpO2:  [98 %-100 %] 98 %      Physical Exam:    Gen: NAD AO x3  Chest: good air entry CTABL  CVS: normal s1 and s2 RR  Abd: NABS soft NT ND  Neuro: CN 2-12 grossly intact  Ext: RLE warm, erythematous      Diagnostic Data:    Labs:  Recent Labs   Lab 24  1535 24  0712 08/15/24  0548   WBC 4.5 5.2 4.8   HGB 10.5* 9.3* 8.5*   MCV 88.2 91.2 85.3   .0* 101.0* 71.0*       Recent Labs   Lab 24  0710 24  0554 08/15/24  0548   * 108* 92   BUN 6* 6* 7*   CREATSERUM 0.49* 0.49* 0.58   CA 7.9* 7.7* 7.4*   ALB  --   --  2.8*    145 145   K 3.5 3.1* 2.5*  2.5*   * 118* 120*   CO2 13.0* 14.0* 14.0*       Estimated Creatinine Clearance: 77.5 mL/min (based on SCr of 0.58 mg/dL).    No results for input(s): \"PTP\", \"INR\" in the last 168 hours.           Imaging: Imaging data reviewed in Epic.    Medications:    hydrocortisone sodium succinate  50 mg Intravenous 2 times per day    [Held by provider] vancomycin  1,000 mg Intravenous Q24H    potassium chloride  40 mEq Intravenous Once    collagenase   Topical Daily    amLODIPine  5 mg Oral Daily    aspirin  81 mg Oral Daily    atorvastatin  10 mg Oral Daily    clopidogrel  75 mg Oral Daily    furosemide  40 mg Oral Daily    hydroxychloroquine  200 mg Oral BID    losartan  50 mg  Oral Daily    oxybutynin ER  10 mg Oral Daily    enoxaparin  40 mg Subcutaneous Daily       Assessment & Plan:     RLE cellulitis -> improved, still with significant drainage   Imaging reviewed  Started on IV abx, cont for now  ID on consult  Appreciate recs  Gsx on consult  Appreciate recs, will undergo I+D today  Rheumatoid arthritis  Continue home meds  CAD  HTN  HLD  Continue ASA, Statin, Plavix  Continue home antihypertensives      Plan of care discussed with patient at bedside.      Supplementary Documentation:     Quality:  DVT Prophylaxis: Lovenox s/c  CODE status: Full       Estimated date of discharge: TBD  Discharge is dependent on: clinical stability  At this point Ms. Garzon is expected to be discharge to: home    MDM: High

## 2024-08-15 NOTE — PROGRESS NOTES
[facility]  part of [facility]    Progress Note    Sheri aGrzon Patient Status:  Inpatient    1960 MRN [id number]   Location [facility] 5SW/SE Attending All Arroyo MD   Hosp Day # 3 PCP TERI MCKENNA MD     Date of Admission:  2024  Date of Consult:  24  Reason for Consultation:   Management of Iatrogenic Adrenal Insufficiency    History of Present Illness:   Patient is a 64 year old female who was admitted to the hospital for Cellulitis of right lower extremity:  She has rheumatoid Arthritis for which she is being treated with methotrexate as well as prednisone. The prednisone had been stopped and soon after this, she had been admitted with cellulitis and hypoglycemia. The hypoglycemia persisted despite starting dextrose.     It was then determined that she had iatrogenic adrenal insufficiency.     Past Medical History  Past Medical History:    Allergic rhinitis    Anxiety    Arthritis    RA and Osteoarthritis    Asthma (HCC)    Depression    Not officially diagnosed    Esophageal reflux    Essential hypertension    High blood pressure    High cholesterol    Hyperlipidemia    Myocardial infarction (HCC)    Cardiac event    Osteoarthritis    Pancreatitis (HCC)    Problems with swallowing    RA (rheumatoid arthritis) (HCC)    Sleep apnea       Past Surgical History  Past Surgical History:   Procedure Laterality Date    Colonoscopy      Other surgical history      Revision of uterine anomaly      Rotator cuff repair      Wrist fracture surgery         Family History  Family History   Problem Relation Age of Onset    Diabetes Mother     Genetic Disease Mother     Heart Disorder Mother     Hypertension Mother     Obesity Mother     Diabetes Father     Hypertension Father     Depression Daughter     Psychiatric Daughter        Social History  Social History     Socioeconomic History    Marital status: Single   Tobacco Use    Smoking status: Never    Smokeless tobacco:  Never   Vaping Use    Vaping status: Never Used   Substance and Sexual Activity    Alcohol use: Not Currently    Drug use: Never     Social Determinants of Health     Food Insecurity: No Food Insecurity (8/12/2024)    Food Insecurity     Food Insecurity: Never true   Transportation Needs: No Transportation Needs (8/12/2024)    Transportation Needs     Lack of Transportation: No   Housing Stability: Low Risk  (8/12/2024)    Housing Stability     Housing Instability: No           Current Medications:  Current Facility-Administered Medications   Medication Dose Route Frequency    hydrocortisone Na succinate PF (Solu-CORTEF) injection 50 mg  50 mg Intravenous Q8H    collagenase (Santyl) 250 UNIT/GM ointment   Topical Daily    albuterol (Ventolin HFA) 108 (90 Base) MCG/ACT inhaler 2 puff  2 puff Inhalation Q4H PRN    amLODIPine (Norvasc) tab 5 mg  5 mg Oral Daily    aspirin chewable tab 81 mg  81 mg Oral Daily    atorvastatin (Lipitor) tab 10 mg  10 mg Oral Daily    clopidogrel (Plavix) tab 75 mg  75 mg Oral Daily    furosemide (Lasix) tab 40 mg  40 mg Oral Daily    hydroxychloroquine (Plaquenil) tab 200 mg  200 mg Oral BID    losartan (Cozaar) tab 50 mg  50 mg Oral Daily    oxybutynin ER (Ditropan-XL) 24 hr tab 10 mg  10 mg Oral Daily    docusate sodium (Colace) cap 100 mg  100 mg Oral Daily PRN    artificial saliva substitute (Mouth Kote) oral solution   Mouth/Throat PRN    glucose (Dex4) 15 GM/59ML oral liquid 15 g  15 g Oral PRN    sodium chloride 0.9% infusion   Intravenous Continuous    enoxaparin (Lovenox) 40 MG/0.4ML SUBQ injection 40 mg  40 mg Subcutaneous Daily    acetaminophen (Tylenol Extra Strength) tab 500 mg  500 mg Oral Q4H PRN    ondansetron (Zofran) 4 MG/2ML injection 4 mg  4 mg Intravenous Q6H PRN    prochlorperazine (Compazine) 10 MG/2ML injection 5 mg  5 mg Intravenous Q8H PRN    acetaminophen (Tylenol) tab 650 mg  650 mg Oral Q4H PRN    Or    HYDROcodone-acetaminophen (Norco) 5-325 MG per tab 1  tablet  1 tablet Oral Q4H PRN    Or    HYDROcodone-acetaminophen (Norco) 5-325 MG per tab 2 tablet  2 tablet Oral Q4H PRN    vancomycin (Vancocin) 1,000 mg in sodium chloride 0.9% 250 mL IVPB-ADDV  1,000 mg Intravenous Q12H     Medications Prior to Admission   Medication Sig    albuterol 108 (90 Base) MCG/ACT Inhalation Aero Soln Inhale 2 puffs into the lungs every 4 to 6 hours as needed for Wheezing.    predniSONE 20 MG Oral Tab Take 2 tablets (40 mg total) by mouth daily. Pt is taking different than order:  takes 2 - 10 mg tablets by mouth daily    furosemide 40 MG Oral Tab Take 1 tablet (40 mg total) by mouth daily.    losartan 50 MG Oral Tab Take 1 tablet (50 mg total) by mouth daily.    hydroxychloroquine 200 MG Oral Tab Take 1 tablet (200 mg total) by mouth 2 (two) times daily.    HYDROcodone-acetaminophen (NORCO)  MG Oral Tab Take 1 tablet by mouth in the morning and 1 tablet at noon and 1 tablet in the evening.    pantoprazole 40 MG Oral Tab EC Take 1 tablet (40 mg total) by mouth 2 (two) times daily before meals.    sodium bicarbonate 650 MG Oral Tab Take 1 tablet (650 mg total) by mouth 2 (two) times daily.    amLODIPine 5 MG Oral Tab Take 1 tablet (5 mg total) by mouth daily.    Aspirin 81 MG Oral Cap 81 mg.    atorvastatin 10 MG Oral Tab Take 1 tablet (10 mg total) by mouth daily.    clopidogrel 75 MG Oral Tab Take 1 tablet (75 mg total) by mouth daily.    ergocalciferol 1.25 MG (39946 UT) Oral Cap Take 1 capsule (50,000 Units total) by mouth once a week.    methotrexate 2.5 MG Oral Tab Take 1 tablet (2.5 mg total) by mouth once a week.    oxybutynin ER 10 MG Oral Tablet 24 Hr Take 1 tablet (10 mg total) by mouth daily.    Potassium Chloride ER 10 MEQ Oral Tab CR Take 1 tablet (10 mEq total) by mouth 2 (two) times daily.    folic acid 1 MG Oral Tab Take 1 tablet (1 mg total) by mouth daily.       Allergies  Allergies   Allergen Reactions    Cephalosporins ANAPHYLAXIS, NAUSEA AND VOMITING and OTHER  (SEE COMMENTS)     Other reaction(s): Fever  Pt received multiple doses of ceftriaxone on 7/2024 without any complcations    Gadolinium Derivatives FEVER    Penicillins OTHER (SEE COMMENTS)     esophagitis    Sulfa Antibiotics OTHER (SEE COMMENTS)     esophagitis       Review of Systems:   Pertinent items are noted in HPI.    Physical Exam:   Vital Signs:  Blood pressure 111/79, pulse 94, temperature 98.2 °F (36.8 °C), temperature source Oral, resp. rate 18, height 5' 2\" (1.575 m), weight 180 lb 9.6 oz (81.9 kg), SpO2 99%.     General: No acute distress. Alert and oriented x 3.  HEENT: Moist mucous membranes. EOM-I. PERRL  Neck: No lymphadenopathy.  No JVD. No carotid bruits.  Respiratory: Clear to auscultation bilaterally.  No wheezes. No rhonchi.  Cardiovascular: S1, S2.  Regular rate and rhythm.  No murmurs. Equal pulses   Abdomen: Soft, nontender, nondistended.  Positive bowel sounds. No rebound tenderness  Neurologic: No focal neurological deficits.  Musculoskeletal: Full range of motion of all extremities.  No swelling noted.  Integument: No lesions. No erythema.  Psychiatric: Appropriate mood and affect.    Results:     Laboratory Data:  Lab Results   Component Value Date    WBC 5.2 08/13/2024    HGB 9.3 (L) 08/13/2024    HCT 29.9 (L) 08/13/2024    .0 (L) 08/13/2024    CREATSERUM 0.49 (L) 08/14/2024    BUN 6 (L) 08/14/2024     08/14/2024    K 3.1 (L) 08/14/2024     (H) 08/14/2024    CO2 14.0 (L) 08/14/2024     (H) 08/14/2024    CA 7.7 (L) 08/14/2024    ALB 3.1 (L) 07/30/2024    ALKPHO 85 07/30/2024    TP 5.6 (L) 07/30/2024    AST 35 (H) 07/30/2024    ALT 26 07/30/2024    LIP 34 07/21/2024    DDIMER 1.69 (H) 07/23/2024    MG 1.7 07/26/2024    PHOS 2.1 (L) 07/27/2024         Imaging:  No results found.       Impression:     Cellulitis of right lower extremity  - As per primary team      Iatrogenic Adrenal Insufficiency  - Decrease HC dose from 50mg IV q8 to 50mg IV q12  - We will taper  as needed      Thank you for allowing me to participate in the care of your patient.    Melanie Sanchez MD  8/15/2024

## 2024-08-15 NOTE — PROGRESS NOTES
[facility]  part of [facility]    Progress Note    Sheri Garzon Patient Status:  Inpatient    1960 MRN [id number]   Location [facility] 5SW/SE Attending Derick Hsieh MD   Hosp Day # 3 PCP TERI MCKENNA MD     Assessment and Plan:       Cellulitis of right lower extremity    Wound necrosis  -clinically improving.      Recommend:  Bedside excisional debridement today.    Subjective:   Doing well, less leg discomfort    Objective:   Patient Vitals for the past 24 hrs:   BP Temp Temp src Pulse Resp SpO2   08/15/24 1519 117/79 98 °F (36.7 °C) Oral 91 18 99 %   08/15/24 0826 116/78 98.1 °F (36.7 °C) Oral 93 16 98 %   08/15/24 0553 111/79 98.2 °F (36.8 °C) Oral 94 18 99 %   24 2129 127/82 97.8 °F (36.6 °C) Oral 91 18 100 %   24 1904 -- -- -- 100 -- --   24 1635 128/89 98.1 °F (36.7 °C) Oral 94 18 100 %       Intake/Output                   24 0700 - 24 0659 24 0700 - 08/15/24 0659 08/15/24 0700 - 24 0659       Intake    P.O.  --  120  120    P.O. -- 120 120    I.V.  2390  1308.3  --    Volume (mL) (sodium chloride 0.9% infusion) 2390 1308.3 --    IV PIGGYBACK  --  250  --    Volume (mL) (vancomycin (Vancocin) 1,000 mg in sodium chloride 0.9% 250 mL IVPB-ADDV) -- 250 --    Total Intake 2390 1678.3 120       Output    Urine  --  --  --    Urine Occurrence 1 x 3 x 3 x    Total Output -- -- --       Net I/O     2390 1678.3 120            Exam:   /79 (BP Location: Right arm)   Pulse 91   Temp 98 °F (36.7 °C) (Oral)   Resp 18   Ht 5' 2\" (1.575 m)   Wt 180 lb 9.6 oz (81.9 kg)   SpO2 99%   BMI 33.03 kg/m²   Gen:  NAD  Right leg wound unchanged.    Results:     Lab Results   Component Value Date    WBC 4.8 08/15/2024    HGB 8.5 (L) 08/15/2024    HCT 25.5 (L) 08/15/2024    PLT 71.0 (L) 08/15/2024    CREATSERUM 0.58 08/15/2024    BUN 7 (L) 08/15/2024     08/15/2024    K 2.5 (LL) 08/15/2024    K 2.5 (LL) 08/15/2024     (H)  08/15/2024    CO2 14.0 (L) 08/15/2024    GLU 92 08/15/2024    CA 7.4 (L) 08/15/2024    ALB 2.8 (L) 08/15/2024    ALKPHO 85 07/30/2024    BILT 0.6 07/30/2024    TP 5.6 (L) 07/30/2024    AST 35 (H) 07/30/2024    ALT 26 07/30/2024    LIP 34 07/21/2024    DDIMER 1.69 (H) 07/23/2024    MG 1.4 (L) 08/15/2024    PHOS 1.9 (L) 08/15/2024       No results found.        Ruben Henriquez MD  8/15/2024

## 2024-08-15 NOTE — PROCEDURES
Procedure Note  The risks, benefits, alternatives and expected recovery were explained.  The pt expressed understanding and wished to proceed with the procedure.      Preop: RLE posterior leg wound with surrounding cellulitis measuring 3cm x 3cm  Postop:  Same  Procedure: Sharp excisional debridement of RLE posterior leg wound with scalpel and scissors including skin and subcutaneous tissue  Anesthesia:  None  EBL:  Minimal  Description:  The skin was prepped and draped in sterile fashion. A #15 blade and scissors were utilized to sharply debride the necrotic eschar and slough, extending down into the subcutaneous tissue. Total area of debrided tissue measured approximately 2cm x 2cm. Hemostasis was maintained. The patient tolerated the procedure well. The wound was dressed with gauze.      Ron Latif PA-C

## 2024-08-15 NOTE — PROGRESS NOTES
INFECTIOUS DISEASE PROGRESS NOTE  [facility]  part of [facility] ID PROGRESS NOTE    Sheri Garzon Patient Status:  Inpatient    1960 MRN [id number]   Location [facility] 5SW/SE Attending Derick Hsieh MD   Hosp Day # 3 PCP TERI MCKENNA MD     Subjective:  ROS reviewed. Feels better. Daughter at bedside.    ASSESSMENT:    Antibiotics: Vancomycin -     # Acute RLE cellulitis with posterior leg wound in immunocompromised patient  # Rheumatoid arthritis, on 20 mg prednisone daily, methotrexate, HCQ     PLAN:  -  Continue on vancomycin, day 4. Hope to convert to course of linezolid on DC.  -  Awaiting bedside debridement.  -  Follow fever curve, wbc.  -  Reviewed labs, micro, imaging reports, available old records.  -  Case d/w patient, RN.     History of Present Illness:  Sheri Garzon is a 64 year old female with a history of rheumatoid arthritis on chronic 20 mg prednisone daily, methotrexate, HCQ, with recent [facility] admission - with vomiting s/p EGD with small hiatal hernia, who presented to [facility] ED on  with worsening pain in her right foot. Has had a wound for the last month and a half that has not healed. Was on ciprofloxacin last month. No fevers or chills at home. On arrival, afebrile, wbc 4.5, blood cx obtained, XR R foot with soft tissue swelling, started on vancomycin. ID consulted.    Physical Exam:  /78 (BP Location: Right arm)   Pulse 93   Temp 98.1 °F (36.7 °C) (Oral)   Resp 16   Ht 5' 2\" (1.575 m)   Wt 180 lb 9.6 oz (81.9 kg)   SpO2 98%   BMI 33.03 kg/m²     Gen:   Awake, in bed  HEENT:  EOMI, neck supple  CV/lungs:  RRR, CTAB  Abdom:  Soft, NT/ND, +BS  Skin/extrem:  Posterior R leg wound stable, R foot edema with stable blisters, dry skin on lateral ankle  Lines:  PIV+    Laboratory Data: Reviewed    Microbiology: Reviewed    Radiology: Reviewed      [facility]  [phone]  8/15/2024

## 2024-08-15 NOTE — PLAN OF CARE
No acute changes overnight. Possible bedside debridement  during the day. Safety precaution in place, call light within reach, daughter at bedside.   Problem: Patient Centered Care  Goal: Patient preferences are identified and integrated in the patient's plan of care  Description: Interventions:  - What would you like us to know as we care for you? From home w/ family  - Provide timely, complete, and accurate information to patient/family  - Incorporate patient and family knowledge, values, beliefs, and cultural backgrounds into the planning and delivery of care  - Encourage patient/family to participate in care and decision-making at the level they choose  - Honor patient and family perspectives and choices  Outcome: Progressing     Problem: Patient/Family Goals  Goal: Patient/Family Long Term Goal  Description: Patient's Long Term Goal: go home    Interventions:  - follow md orders  - See additional Care Plan goals for specific interventions  Outcome: Progressing  Goal: Patient/Family Short Term Goal  Description: Patient's Short Term Goal: be pain free    Interventions:   - prn meds  - See additional Care Plan goals for specific interventions  Outcome: Progressing     Problem: GASTROINTESTINAL - ADULT  Goal: Maintains or returns to baseline bowel function  Description: INTERVENTIONS:  - Assess bowel function  - Maintain adequate hydration with IV or PO as ordered and tolerated  - Evaluate effectiveness of GI medications  - Encourage mobilization and activity  - Obtain nutritional consult as needed  - Establish a toileting routine/schedule  - Consider collaborating with pharmacy to review patient's medication profile  Outcome: Progressing     Problem: SKIN/TISSUE INTEGRITY - ADULT  Goal: Skin integrity remains intact  Description: INTERVENTIONS  - Assess and document risk factors for pressure ulcer development  - Assess and document skin integrity  - Monitor for areas of redness and/or skin breakdown  - Initiate  interventions, skin care algorithm/standards of care as needed  Outcome: Progressing  Goal: Incision(s), wounds(s) or drain site(s) healing without S/S of infection  Description: INTERVENTIONS:  - Assess and document risk factors for pressure ulcer development  - Assess and document skin integrity  - Assess and document dressing/incision, wound bed, drain sites and surrounding tissue  - Implement wound care per orders  - Initiate isolation precautions as appropriate  - Initiate Pressure Ulcer prevention bundle as indicated  Outcome: Progressing     Problem: PAIN - ADULT  Goal: Verbalizes/displays adequate comfort level or patient's stated pain goal  Description: INTERVENTIONS:  - Encourage pt to monitor pain and request assistance  - Assess pain using appropriate pain scale  - Administer analgesics based on type and severity of pain and evaluate response  - Implement non-pharmacological measures as appropriate and evaluate response  - Consider cultural and social influences on pain and pain management  - Manage/alleviate anxiety  - Utilize distraction and/or relaxation techniques  - Monitor for opioid side effects  - Notify MD/LIP if interventions unsuccessful or patient reports new pain  - Anticipate increased pain with activity and pre-medicate as appropriate  Outcome: Progressing     Problem: RISK FOR INFECTION - ADULT  Goal: Absence of fever/infection during anticipated neutropenic period  Description: INTERVENTIONS  - Monitor WBC  - Administer growth factors as ordered  - Implement neutropenic guidelines  Outcome: Progressing     Problem: SAFETY ADULT - FALL  Goal: Free from fall injury  Description: INTERVENTIONS:  - Assess pt frequently for physical needs  - Identify cognitive and physical deficits and behaviors that affect risk of falls.  - [place] fall precautions as indicated by assessment.  - Educate pt/family on patient safety including physical limitations  - Instruct pt to call for assistance with  activity based on assessment  - Modify environment to reduce risk of injury  - Provide assistive devices as appropriate  - Consider OT/PT consult to assist with strengthening/mobility  - Encourage toileting schedule  Outcome: Progressing     Problem: DISCHARGE PLANNING  Goal: Discharge to home or other facility with appropriate resources  Description: INTERVENTIONS:  - Identify barriers to discharge w/pt and caregiver  - Include patient/family/discharge partner in discharge planning  - Arrange for needed discharge resources and transportation as appropriate  - Identify discharge learning needs (meds, wound care, etc)  - Arrange for interpreters to assist at discharge as needed  - Consider post-discharge preferences of patient/family/discharge partner  - Complete POLST form as appropriate  - Assess patient's ability to be responsible for managing their own health  - Refer to Case Management Department for coordinating discharge planning if the patient needs post-hospital services based on physician/LIP order or complex needs related to functional status, cognitive ability or social support system  Outcome: Progressing

## 2024-08-15 NOTE — PLAN OF CARE
Patient had bedside I&D by general surgery. Electrolytes replaced per protocol. Call light within reach.   Problem: Patient Centered Care  Goal: Patient preferences are identified and integrated in the patient's plan of care  Description: Interventions:  - What would you like us to know as we care for you? From home with family   - Provide timely, complete, and accurate information to patient/family  - Incorporate patient and family knowledge, values, beliefs, and cultural backgrounds into the planning and delivery of care  - Encourage patient/family to participate in care and decision-making at the level they choose  - Honor patient and family perspectives and choices  Outcome: Progressing     Problem: Patient/Family Goals  Goal: Patient/Family Long Term Goal  Description: Patient's Long Term Goal: to go home     Interventions:  -   - See additional Care Plan goals for specific interventions  Outcome: Progressing  Goal: Patient/Family Short Term Goal  Description: Patient's Short Term Goal: feel better     Interventions:   -  - See additional Care Plan goals for specific interventions  Outcome: Progressing     Problem: GASTROINTESTINAL - ADULT  Goal: Maintains or returns to baseline bowel function  Description: INTERVENTIONS:  - Assess bowel function  - Maintain adequate hydration with IV or PO as ordered and tolerated  - Evaluate effectiveness of GI medications  - Encourage mobilization and activity  - Obtain nutritional consult as needed  - Establish a toileting routine/schedule  - Consider collaborating with pharmacy to review patient's medication profile  Outcome: Progressing     Problem: SKIN/TISSUE INTEGRITY - ADULT  Goal: Skin integrity remains intact  Description: INTERVENTIONS  - Assess and document risk factors for pressure ulcer development  - Assess and document skin integrity  - Monitor for areas of redness and/or skin breakdown  - Initiate interventions, skin care algorithm/standards of care as  needed  Outcome: Progressing  Goal: Incision(s), wounds(s) or drain site(s) healing without S/S of infection  Description: INTERVENTIONS:  - Assess and document risk factors for pressure ulcer development  - Assess and document skin integrity  - Assess and document dressing/incision, wound bed, drain sites and surrounding tissue  - Implement wound care per orders  - Initiate isolation precautions as appropriate  - Initiate Pressure Ulcer prevention bundle as indicated  Outcome: Progressing     Problem: PAIN - ADULT  Goal: Verbalizes/displays adequate comfort level or patient's stated pain goal  Description: INTERVENTIONS:  - Encourage pt to monitor pain and request assistance  - Assess pain using appropriate pain scale  - Administer analgesics based on type and severity of pain and evaluate response  - Implement non-pharmacological measures as appropriate and evaluate response  - Consider cultural and social influences on pain and pain management  - Manage/alleviate anxiety  - Utilize distraction and/or relaxation techniques  - Monitor for opioid side effects  - Notify MD/LIP if interventions unsuccessful or patient reports new pain  - Anticipate increased pain with activity and pre-medicate as appropriate  Outcome: Progressing     Problem: RISK FOR INFECTION - ADULT  Goal: Absence of fever/infection during anticipated neutropenic period  Description: INTERVENTIONS  - Monitor WBC  - Administer growth factors as ordered  - Implement neutropenic guidelines  Outcome: Progressing     Problem: SAFETY ADULT - FALL  Goal: Free from fall injury  Description: INTERVENTIONS:  - Assess pt frequently for physical needs  - Identify cognitive and physical deficits and behaviors that affect risk of falls.  - [place] fall precautions as indicated by assessment.  - Educate pt/family on patient safety including physical limitations  - Instruct pt to call for assistance with activity based on assessment  - Modify environment to  reduce risk of injury  - Provide assistive devices as appropriate  - Consider OT/PT consult to assist with strengthening/mobility  - Encourage toileting schedule  Outcome: Progressing     Problem: DISCHARGE PLANNING  Goal: Discharge to home or other facility with appropriate resources  Description: INTERVENTIONS:  - Identify barriers to discharge w/pt and caregiver  - Include patient/family/discharge partner in discharge planning  - Arrange for needed discharge resources and transportation as appropriate  - Identify discharge learning needs (meds, wound care, etc)  - Arrange for interpreters to assist at discharge as needed  - Consider post-discharge preferences of patient/family/discharge partner  - Complete POLST form as appropriate  - Assess patient's ability to be responsible for managing their own health  - Refer to Case Management Department for coordinating discharge planning if the patient needs post-hospital services based on physician/LIP order or complex needs related to functional status, cognitive ability or social support system  Outcome: Progressing

## 2024-08-16 LAB
ALBUMIN SERPL-MCNC: 2.7 G/DL (ref 3.2–4.8)
ANION GAP SERPL CALC-SCNC: 10 MMOL/L (ref 0–18)
BASOPHILS # BLD AUTO: 0 X10(3) UL (ref 0–0.2)
BASOPHILS NFR BLD AUTO: 0 %
BUN BLD-MCNC: 10 MG/DL (ref 9–23)
BUN/CREAT SERPL: 17.5 (ref 10–20)
CALCIUM BLD-MCNC: 7.4 MG/DL (ref 8.7–10.4)
CHLORIDE SERPL-SCNC: 125 MMOL/L (ref 98–112)
CO2 SERPL-SCNC: 13 MMOL/L (ref 21–32)
CREAT BLD-MCNC: 0.57 MG/DL
DEPRECATED RDW RBC AUTO: 54.5 FL (ref 35.1–46.3)
EGFRCR SERPLBLD CKD-EPI 2021: 101 ML/MIN/1.73M2 (ref 60–?)
EOSINOPHIL # BLD AUTO: 0 X10(3) UL (ref 0–0.7)
EOSINOPHIL NFR BLD AUTO: 0 %
ERYTHROCYTE [DISTWIDTH] IN BLOOD BY AUTOMATED COUNT: 17.7 % (ref 11–15)
GLUCOSE BLD-MCNC: 95 MG/DL (ref 70–99)
HCT VFR BLD AUTO: 23.4 %
HGB BLD-MCNC: 7.7 G/DL
IMM GRANULOCYTES # BLD AUTO: 0.03 X10(3) UL (ref 0–1)
IMM GRANULOCYTES NFR BLD: 0.7 %
LYMPHOCYTES # BLD AUTO: 1.01 X10(3) UL (ref 1–4)
LYMPHOCYTES NFR BLD AUTO: 22 %
MAGNESIUM SERPL-MCNC: 1.3 MG/DL (ref 1.6–2.6)
MAGNESIUM SERPL-MCNC: 1.3 MG/DL (ref 1.6–2.6)
MCH RBC QN AUTO: 28.1 PG (ref 26–34)
MCHC RBC AUTO-ENTMCNC: 32.9 G/DL (ref 31–37)
MCV RBC AUTO: 85.4 FL
MONOCYTES # BLD AUTO: 0.1 X10(3) UL (ref 0.1–1)
MONOCYTES NFR BLD AUTO: 2.2 %
NEUTROPHILS # BLD AUTO: 3.45 X10 (3) UL (ref 1.5–7.7)
NEUTROPHILS # BLD AUTO: 3.45 X10(3) UL (ref 1.5–7.7)
NEUTROPHILS NFR BLD AUTO: 75.1 %
OSMOLALITY SERPL CALC.SUM OF ELEC: 305 MOSM/KG (ref 275–295)
PHOSPHATE SERPL-MCNC: 1.5 MG/DL (ref 2.4–5.1)
PHOSPHATE SERPL-MCNC: 1.5 MG/DL (ref 2.4–5.1)
PLATELET # BLD AUTO: 68 10(3)UL (ref 150–450)
PLATELETS.RETICULATED NFR BLD AUTO: 4.4 % (ref 0–7)
POTASSIUM SERPL-SCNC: 3.3 MMOL/L (ref 3.5–5.1)
POTASSIUM SERPL-SCNC: 3.4 MMOL/L (ref 3.5–5.1)
POTASSIUM SERPL-SCNC: 3.4 MMOL/L (ref 3.5–5.1)
RBC # BLD AUTO: 2.74 X10(6)UL
SODIUM SERPL-SCNC: 148 MMOL/L (ref 136–145)
VANCOMYCIN SERPL-MCNC: 20.8 UG/ML (ref ?–40)
WBC # BLD AUTO: 4.6 X10(3) UL (ref 4–11)

## 2024-08-16 PROCEDURE — 99233 SBSQ HOSP IP/OBS HIGH 50: CPT | Performed by: HOSPITALIST

## 2024-08-16 RX ORDER — DEXTROSE MONOHYDRATE AND SODIUM CHLORIDE 5; .45 G/100ML; G/100ML
INJECTION, SOLUTION INTRAVENOUS CONTINUOUS
Status: DISCONTINUED | OUTPATIENT
Start: 2024-08-16 | End: 2024-08-17

## 2024-08-16 RX ORDER — LINEZOLID 600 MG/1
600 TABLET, FILM COATED ORAL 2 TIMES DAILY
Qty: 28 TABLET | Refills: 0 | Status: SHIPPED | OUTPATIENT
Start: 2024-08-16 | End: 2024-08-23

## 2024-08-16 RX ORDER — MAGNESIUM OXIDE 400 MG/1
800 TABLET ORAL ONCE
Status: COMPLETED | OUTPATIENT
Start: 2024-08-16 | End: 2024-08-16

## 2024-08-16 NOTE — PROGRESS NOTES
[facility]  part of [facility]    Progress Note    Sheri Garzon Patient Status:  Inpatient    1960 MRN [id number]   Location [facility] 5SW/SE Attending Derick Hsieh MD   Hosp Day # 4 PCP TERI MCKENNA MD       Subjective:   S/p bedside debridement. Pt reports leg pain and swelling improving. No fever or chills.    Objective:   Vital Signs:  Blood pressure 122/89, pulse 94, temperature 98 °F (36.7 °C), temperature source Oral, resp. rate 18, height 5' 2\" (1.575 m), weight 180 lb 9.6 oz (81.9 kg), SpO2 100%.    Physical Exam     General: No acute distress. Alert and oriented x 3.  HEENT: Moist mucous membranes. Anicteric.   Neck: Supple, No JVD.   Respiratory: Nonlabored resp.  Cardiovascular: Regular rate.   Abdomen: Soft, NT, no rebound tnd, no guarding, nondistended.  No masses. Normal bowel sounds.    Neurologic: No focal neurological deficits.  Musculoskeletal: Full range of motion of all extremities. No calf tenderness. No swelling noted.  Integument: No lesions. No erythema. RLE wound with some slough  Psychiatric: Appropriate mood and affect.         Assessment and Plan:     Cellulitis of right lower extremity      Cellulitis    Continue local wound care and dressing changes. Abx per ID. No additional surgical intervention indicated at this time.    Results:     Lab Results   Component Value Date    WBC 4.6 2024    HGB 7.7 (L) 2024    HCT 23.4 (L) 2024    PLT 68.0 (L) 2024    CREATSERUM 0.57 2024    BUN 10 2024     (H) 2024    K 3.4 (L) 2024    K 3.4 (L) 2024     (H) 2024    CO2 13.0 (L) 2024    GLU 95 2024    CA 7.4 (L) 2024    ALB 2.7 (L) 2024    ALKPHO 85 2024    BILT 0.6 2024    TP 5.6 (L) 2024    AST 35 (H) 2024    ALT 26 2024    LIP 34 2024    DDIMER 1.69 (H) 2024    MG 1.3 (L) 2024    MG 1.3 (L) 2024    PHOS  1.5 (L) 08/16/2024    PHOS 1.5 (L) 08/16/2024       No results found.                Ron Latif PA-C  8/16/2024

## 2024-08-16 NOTE — PLAN OF CARE
Call light within reach. Bed alarm on. Safety precautions in place. Calls appropriately.    Problem: Patient Centered Care  Goal: Patient preferences are identified and integrated in the patient's plan of care  Description: Interventions:  - What would you like us to know as we care for you? From home with family.  - Provide timely, complete, and accurate information to patient/family  - Incorporate patient and family knowledge, values, beliefs, and cultural backgrounds into the planning and delivery of care  - Encourage patient/family to participate in care and decision-making at the level they choose  - Honor patient and family perspectives and choices  Outcome: Progressing     Problem: GASTROINTESTINAL - ADULT  Goal: Maintains or returns to baseline bowel function  Description: INTERVENTIONS:  - Assess bowel function  - Maintain adequate hydration with IV or PO as ordered and tolerated  - Evaluate effectiveness of GI medications  - Encourage mobilization and activity  - Obtain nutritional consult as needed  - Establish a toileting routine/schedule  - Consider collaborating with pharmacy to review patient's medication profile  Outcome: Progressing     Problem: SKIN/TISSUE INTEGRITY - ADULT  Goal: Skin integrity remains intact  Description: INTERVENTIONS  - Assess and document risk factors for pressure ulcer development  - Assess and document skin integrity  - Monitor for areas of redness and/or skin breakdown  - Initiate interventions, skin care algorithm/standards of care as needed  Outcome: Progressing  Goal: Incision(s), wounds(s) or drain site(s) healing without S/S of infection  Description: INTERVENTIONS:  - Assess and document risk factors for pressure ulcer development  - Assess and document skin integrity  - Assess and document dressing/incision, wound bed, drain sites and surrounding tissue  - Implement wound care per orders  - Initiate isolation precautions as appropriate  - Initiate Pressure Ulcer  prevention bundle as indicated  Outcome: Progressing     Problem: PAIN - ADULT  Goal: Verbalizes/displays adequate comfort level or patient's stated pain goal  Description: INTERVENTIONS:  - Encourage pt to monitor pain and request assistance  - Assess pain using appropriate pain scale  - Administer analgesics based on type and severity of pain and evaluate response  - Implement non-pharmacological measures as appropriate and evaluate response  - Consider cultural and social influences on pain and pain management  - Manage/alleviate anxiety  - Utilize distraction and/or relaxation techniques  - Monitor for opioid side effects  - Notify MD/LIP if interventions unsuccessful or patient reports new pain  - Anticipate increased pain with activity and pre-medicate as appropriate  Outcome: Progressing     Problem: RISK FOR INFECTION - ADULT  Goal: Absence of fever/infection during anticipated neutropenic period  Description: INTERVENTIONS  - Monitor WBC  - Administer growth factors as ordered  - Implement neutropenic guidelines  Outcome: Progressing     Problem: SAFETY ADULT - FALL  Goal: Free from fall injury  Description: INTERVENTIONS:  - Assess pt frequently for physical needs  - Identify cognitive and physical deficits and behaviors that affect risk of falls.  - [place] fall precautions as indicated by assessment.  - Educate pt/family on patient safety including physical limitations  - Instruct pt to call for assistance with activity based on assessment  - Modify environment to reduce risk of injury  - Provide assistive devices as appropriate  - Consider OT/PT consult to assist with strengthening/mobility  - Encourage toileting schedule  Outcome: Progressing     Problem: DISCHARGE PLANNING  Goal: Discharge to home or other facility with appropriate resources  Description: INTERVENTIONS:  - Identify barriers to discharge w/pt and caregiver  - Include patient/family/discharge partner in discharge planning  - Arrange  for needed discharge resources and transportation as appropriate  - Identify discharge learning needs (meds, wound care, etc)  - Arrange for interpreters to assist at discharge as needed  - Consider post-discharge preferences of patient/family/discharge partner  - Complete POLST form as appropriate  - Assess patient's ability to be responsible for managing their own health  - Refer to Case Management Department for coordinating discharge planning if the patient needs post-hospital services based on physician/LIP order or complex needs related to functional status, cognitive ability or social support system  Outcome: Progressing

## 2024-08-16 NOTE — PLAN OF CARE
Problem: Patient Centered Care  Goal: Patient preferences are identified and integrated in the patient's plan of care  Description: Interventions:  - What would you like us to know as we care for you? Home with daughter.  - Provide timely, complete, and accurate information to patient/family  - Incorporate patient and family knowledge, values, beliefs, and cultural backgrounds into the planning and delivery of care  - Encourage patient/family to participate in care and decision-making at the level they choose  - Honor patient and family perspectives and choices  Outcome: Progressing     Problem: GASTROINTESTINAL - ADULT  Goal: Maintains or returns to baseline bowel function  Description: INTERVENTIONS:  - Assess bowel function  - Maintain adequate hydration with IV or PO as ordered and tolerated  - Evaluate effectiveness of GI medications  - Encourage mobilization and activity  - Obtain nutritional consult as needed  - Establish a toileting routine/schedule  - Consider collaborating with pharmacy to review patient's medication profile  Outcome: Progressing     Problem: SKIN/TISSUE INTEGRITY - ADULT  Goal: Skin integrity remains intact  Description: INTERVENTIONS  - Assess and document risk factors for pressure ulcer development  - Assess and document skin integrity  - Monitor for areas of redness and/or skin breakdown  - Initiate interventions, skin care algorithm/standards of care as needed  Outcome: Progressing  Goal: Incision(s), wounds(s) or drain site(s) healing without S/S of infection  Description: INTERVENTIONS:  - Assess and document risk factors for pressure ulcer development  - Assess and document skin integrity  - Assess and document dressing/incision, wound bed, drain sites and surrounding tissue  - Implement wound care per orders  - Initiate isolation precautions as appropriate  - Initiate Pressure Ulcer prevention bundle as indicated  Outcome: Progressing     Problem: PAIN - ADULT  Goal:  Verbalizes/displays adequate comfort level or patient's stated pain goal  Description: INTERVENTIONS:  - Encourage pt to monitor pain and request assistance  - Assess pain using appropriate pain scale  - Administer analgesics based on type and severity of pain and evaluate response  - Implement non-pharmacological measures as appropriate and evaluate response  - Consider cultural and social influences on pain and pain management  - Manage/alleviate anxiety  - Utilize distraction and/or relaxation techniques  - Monitor for opioid side effects  - Notify MD/LIP if interventions unsuccessful or patient reports new pain  - Anticipate increased pain with activity and pre-medicate as appropriate  Outcome: Progressing     Problem: RISK FOR INFECTION - ADULT  Goal: Absence of fever/infection during anticipated neutropenic period  Description: INTERVENTIONS  - Monitor WBC  - Administer growth factors as ordered  - Implement neutropenic guidelines  Outcome: Progressing     Problem: SAFETY ADULT - FALL  Goal: Free from fall injury  Description: INTERVENTIONS:  - Assess pt frequently for physical needs  - Identify cognitive and physical deficits and behaviors that affect risk of falls.  - [place] fall precautions as indicated by assessment.  - Educate pt/family on patient safety including physical limitations  - Instruct pt to call for assistance with activity based on assessment  - Modify environment to reduce risk of injury  - Provide assistive devices as appropriate  - Consider OT/PT consult to assist with strengthening/mobility  - Encourage toileting schedule  Outcome: Progressing     Problem: DISCHARGE PLANNING  Goal: Discharge to home or other facility with appropriate resources  Description: INTERVENTIONS:  - Identify barriers to discharge w/pt and caregiver  - Include patient/family/discharge partner in discharge planning  - Arrange for needed discharge resources and transportation as appropriate  - Identify discharge  learning needs (meds, wound care, etc)  - Arrange for interpreters to assist at discharge as needed  - Consider post-discharge preferences of patient/family/discharge partner  - Complete POLST form as appropriate  - Assess patient's ability to be responsible for managing their own health  - Refer to Case Management Department for coordinating discharge planning if the patient needs post-hospital services based on physician/LIP order or complex needs related to functional status, cognitive ability or social support system  Outcome: Progressing  Patient A/O x4. Good appetite. RLE wound care done per MD order. Electrolytes replaced per protocol, labs in AM. If all in normal range plan to discharge home tomorrow on PO antibiotic per ID.

## 2024-08-16 NOTE — PROGRESS NOTES
[facility]     Sheri Garzon Patient Status:  Inpatient    1960 MRN [id number]   Location [facility] 5SW/SE Attending Derick Hsieh MD   Hosp Day # 4 PCP TERI MCKENNA MD     Subjective:     Pt was seen and examined  RLE pain improved  No cp, sob, f,c, abd pain or HA  No nausea       Objective:    Review of Systems:   ROS completed; pertinent positive and negatives stated in subjective.      Vital signs:  Temp:  [97.6 °F (36.4 °C)-98.2 °F (36.8 °C)] 98 °F (36.7 °C)  Pulse:  [] 94  Resp:  [18] 18  BP: (108-127)/(70-89) 122/89  SpO2:  [99 %-100 %] 100 %      Physical Exam:    Gen: NAD AO x3  Chest: good air entry CTABL  CVS: normal s1 and s2 RR  Abd: NABS soft NT ND  Neuro: CN 2-12 grossly intact  Ext: RLE surgical site CDI      Diagnostic Data:    Labs:  Recent Labs   Lab 24  1535 24  0712 08/15/24  0548 24  0606   WBC 4.5 5.2 4.8 4.6   HGB 10.5* 9.3* 8.5* 7.7*   MCV 88.2 91.2 85.3 85.4   .0* 101.0* 71.0* 68.0*       Recent Labs   Lab 24  0554 08/15/24  0548 08/15/24  2142 24  0606   * 92  --  95   BUN 6* 7*  --  10   CREATSERUM 0.49* 0.58  --  0.57   CA 7.7* 7.4*  --  7.4*   ALB  --  2.8*  --  2.7*    145  --  148*   K 3.1* 2.5*  2.5* 2.4* 3.4*  3.4*   * 120*  --  125*   CO2 14.0* 14.0*  --  13.0*       Estimated Creatinine Clearance: 78.9 mL/min (based on SCr of 0.57 mg/dL).    No results for input(s): \"PTP\", \"INR\" in the last 168 hours.           Imaging: Imaging data reviewed in Epic.    Medications:    hydrocortisone sodium succinate  50 mg Intravenous 2 times per day    [Held by provider] vancomycin  1,000 mg Intravenous Q24H    collagenase   Topical Daily    amLODIPine  5 mg Oral Daily    aspirin  81 mg Oral Daily    atorvastatin  10 mg Oral Daily    clopidogrel  75 mg Oral Daily    furosemide  40 mg Oral Daily    hydroxychloroquine  200 mg Oral BID     losartan  50 mg Oral Daily    oxybutynin ER  10 mg Oral Daily    enoxaparin  40 mg Subcutaneous Daily       Assessment & Plan:     RLE cellulitis -> improved, still with significant drainage   Imaging reviewed  Started on IV abx, cont for now  ID on consult  Appreciate recs  Gsx on consult  Underwent I+D of RLE on 8/15/24  Rheumatoid arthritis  Continue home meds  CAD  HTN  HLD  Continue ASA, Statin, Plavix  Continue home antihypertensives  6.   Multiple electrolyte abnormalities   1.    Cont to monitor and replace PRN  7.   Hypernatremia   1.     Will change 0.9 to d5+.045   2.     Cont to monitor      Plan of care discussed with patient at bedside.      Supplementary Documentation:     Quality:  DVT Prophylaxis: Lovenox s/c  CODE status: Full       Estimated date of discharge: TBD  Discharge is dependent on: clinical stability  At this point Ms. Garzon is expected to be discharge to: home    MDM: High

## 2024-08-16 NOTE — PROGRESS NOTES
[facility]  part of [facility]    Progress Note    Sheri Garzon Patient Status:  Inpatient    1960 MRN [id number]   Location [facility] 5SW/SE Attending All Arroyo MD   Hosp Day # 4 PCP TERI MCKENNA MD     Date of Admission:  2024  Date of Consult:  24  Reason for Consultation:   Management of Iatrogenic Adrenal Insufficiency    History of Present Illness:   Patient is a 64 year old female who was admitted to the hospital for Cellulitis of right lower extremity:  She has rheumatoid Arthritis for which she is being treated with methotrexate as well as prednisone. The prednisone had been stopped and soon after this, she had been admitted with cellulitis and hypoglycemia. The hypoglycemia persisted despite starting dextrose.     It was then determined that she had iatrogenic adrenal insufficiency.     Past Medical History  Past Medical History:    Allergic rhinitis    Anxiety    Arthritis    RA and Osteoarthritis    Asthma (HCC)    Depression    Not officially diagnosed    Esophageal reflux    Essential hypertension    High blood pressure    High cholesterol    Hyperlipidemia    Myocardial infarction (HCC)    Cardiac event    Osteoarthritis    Pancreatitis (HCC)    Problems with swallowing    RA (rheumatoid arthritis) (HCC)    Sleep apnea       Past Surgical History  Past Surgical History:   Procedure Laterality Date    Colonoscopy      Other surgical history      Revision of uterine anomaly      Rotator cuff repair      Wrist fracture surgery         Family History  Family History   Problem Relation Age of Onset    Diabetes Mother     Genetic Disease Mother     Heart Disorder Mother     Hypertension Mother     Obesity Mother     Diabetes Father     Hypertension Father     Depression Daughter     Psychiatric Daughter        Social History  Social History     Socioeconomic History    Marital status: Single   Tobacco Use    Smoking status: Never    Smokeless tobacco:  Never   Vaping Use    Vaping status: Never Used   Substance and Sexual Activity    Alcohol use: Not Currently    Drug use: Never     Social Determinants of Health     Food Insecurity: No Food Insecurity (8/12/2024)    Food Insecurity     Food Insecurity: Never true   Transportation Needs: No Transportation Needs (8/12/2024)    Transportation Needs     Lack of Transportation: No   Housing Stability: Low Risk  (8/12/2024)    Housing Stability     Housing Instability: No           Current Medications:  Current Facility-Administered Medications   Medication Dose Route Frequency    hydrocortisone Na succinate PF (Solu-CORTEF) injection 50 mg  50 mg Intravenous 2 times per day    [Held by provider] vancomycin (Vancocin) 1,000 mg in sodium chloride 0.9% 250 mL IVPB-ADDV  1,000 mg Intravenous Q24H    polyethylene glycol (PEG 3350) (Miralax) 17 g oral packet 17 g  17 g Oral Daily PRN    bisacodyl (Dulcolax) 10 MG rectal suppository 10 mg  10 mg Rectal Daily PRN    collagenase (Santyl) 250 UNIT/GM ointment   Topical Daily    albuterol (Ventolin HFA) 108 (90 Base) MCG/ACT inhaler 2 puff  2 puff Inhalation Q4H PRN    amLODIPine (Norvasc) tab 5 mg  5 mg Oral Daily    aspirin chewable tab 81 mg  81 mg Oral Daily    atorvastatin (Lipitor) tab 10 mg  10 mg Oral Daily    clopidogrel (Plavix) tab 75 mg  75 mg Oral Daily    furosemide (Lasix) tab 40 mg  40 mg Oral Daily    hydroxychloroquine (Plaquenil) tab 200 mg  200 mg Oral BID    losartan (Cozaar) tab 50 mg  50 mg Oral Daily    oxybutynin ER (Ditropan-XL) 24 hr tab 10 mg  10 mg Oral Daily    docusate sodium (Colace) cap 100 mg  100 mg Oral Daily PRN    artificial saliva substitute (Mouth Kote) oral solution   Mouth/Throat PRN    glucose (Dex4) 15 GM/59ML oral liquid 15 g  15 g Oral PRN    sodium chloride 0.9% infusion   Intravenous Continuous    enoxaparin (Lovenox) 40 MG/0.4ML SUBQ injection 40 mg  40 mg Subcutaneous Daily    acetaminophen (Tylenol Extra Strength) tab 500 mg  500  mg Oral Q4H PRN    ondansetron (Zofran) 4 MG/2ML injection 4 mg  4 mg Intravenous Q6H PRN    prochlorperazine (Compazine) 10 MG/2ML injection 5 mg  5 mg Intravenous Q8H PRN    acetaminophen (Tylenol) tab 650 mg  650 mg Oral Q4H PRN    Or    HYDROcodone-acetaminophen (Norco) 5-325 MG per tab 1 tablet  1 tablet Oral Q4H PRN    Or    HYDROcodone-acetaminophen (Norco) 5-325 MG per tab 2 tablet  2 tablet Oral Q4H PRN     Medications Prior to Admission   Medication Sig    albuterol 108 (90 Base) MCG/ACT Inhalation Aero Soln Inhale 2 puffs into the lungs every 4 to 6 hours as needed for Wheezing.    predniSONE 20 MG Oral Tab Take 2 tablets (40 mg total) by mouth daily. Pt is taking different than order:  takes 2 - 10 mg tablets by mouth daily    furosemide 40 MG Oral Tab Take 1 tablet (40 mg total) by mouth daily.    losartan 50 MG Oral Tab Take 1 tablet (50 mg total) by mouth daily.    hydroxychloroquine 200 MG Oral Tab Take 1 tablet (200 mg total) by mouth 2 (two) times daily.    HYDROcodone-acetaminophen (NORCO)  MG Oral Tab Take 1 tablet by mouth in the morning and 1 tablet at noon and 1 tablet in the evening.    pantoprazole 40 MG Oral Tab EC Take 1 tablet (40 mg total) by mouth 2 (two) times daily before meals.    sodium bicarbonate 650 MG Oral Tab Take 1 tablet (650 mg total) by mouth 2 (two) times daily.    amLODIPine 5 MG Oral Tab Take 1 tablet (5 mg total) by mouth daily.    Aspirin 81 MG Oral Cap 81 mg.    atorvastatin 10 MG Oral Tab Take 1 tablet (10 mg total) by mouth daily.    clopidogrel 75 MG Oral Tab Take 1 tablet (75 mg total) by mouth daily.    ergocalciferol 1.25 MG (40237 UT) Oral Cap Take 1 capsule (50,000 Units total) by mouth once a week.    methotrexate 2.5 MG Oral Tab Take 1 tablet (2.5 mg total) by mouth once a week.    oxybutynin ER 10 MG Oral Tablet 24 Hr Take 1 tablet (10 mg total) by mouth daily.    Potassium Chloride ER 10 MEQ Oral Tab CR Take 1 tablet (10 mEq total) by mouth 2 (two)  times daily.    folic acid 1 MG Oral Tab Take 1 tablet (1 mg total) by mouth daily.       Allergies  Allergies   Allergen Reactions    Cephalosporins ANAPHYLAXIS, NAUSEA AND VOMITING and OTHER (SEE COMMENTS)     Other reaction(s): Fever  Pt received multiple doses of ceftriaxone on 7/2024 without any complcations    Gadolinium Derivatives FEVER    Penicillins OTHER (SEE COMMENTS)     esophagitis    Sulfa Antibiotics OTHER (SEE COMMENTS)     esophagitis       Review of Systems:   Pertinent items are noted in HPI.    Physical Exam:   Vital Signs:  Blood pressure 108/70, pulse 99, temperature 98.2 °F (36.8 °C), temperature source Oral, resp. rate 18, height 5' 2\" (1.575 m), weight 180 lb 9.6 oz (81.9 kg), SpO2 100%.     General: No acute distress. Alert and oriented x 3.  HEENT: Moist mucous membranes. EOM-I. PERRL  Neck: No lymphadenopathy.  No JVD. No carotid bruits.  Respiratory: Clear to auscultation bilaterally.  No wheezes. No rhonchi.  Cardiovascular: S1, S2.  Regular rate and rhythm.  No murmurs. Equal pulses   Abdomen: Soft, nontender, nondistended.  Positive bowel sounds. No rebound tenderness  Neurologic: No focal neurological deficits.  Musculoskeletal: Full range of motion of all extremities.  No swelling noted.  Integument: No lesions. No erythema.  Psychiatric: Appropriate mood and affect.    Results:     Laboratory Data:  Lab Results   Component Value Date    WBC 4.6 08/16/2024    HGB 7.7 (L) 08/16/2024    HCT 23.4 (L) 08/16/2024    PLT 68.0 (L) 08/16/2024    CREATSERUM 0.57 08/16/2024    BUN 10 08/16/2024     (H) 08/16/2024    K 3.4 (L) 08/16/2024    K 3.4 (L) 08/16/2024     (H) 08/16/2024    CO2 13.0 (L) 08/16/2024    GLU 95 08/16/2024    CA 7.4 (L) 08/16/2024    ALB 2.7 (L) 08/16/2024    ALKPHO 85 07/30/2024    TP 5.6 (L) 07/30/2024    AST 35 (H) 07/30/2024    ALT 26 07/30/2024    LIP 34 07/21/2024    DDIMER 1.69 (H) 07/23/2024    MG 1.3 (L) 08/16/2024    MG 1.3 (L) 08/16/2024    PHOS 1.5  (L) 08/16/2024    PHOS 1.5 (L) 08/16/2024         Imaging:  No results found.       Impression:     Cellulitis of right lower extremity  - As per primary team      Iatrogenic Adrenal Insufficiency  - Continue HC dose 50mg IV q12  - We will taper as needed      Thank you for allowing me to participate in the care of your patient.    Melanie Sanchez MD  8/16/2024

## 2024-08-16 NOTE — PROGRESS NOTES
INFECTIOUS DISEASE PROGRESS NOTE  [facility]  part of [facility] ID PROGRESS NOTE    Sheri Garzon Patient Status:  Inpatient    1960 MRN [id number]   Location [facility] 5SW/SE Attending Derick Hsieh MD   Hosp Day # 4 PCP TERI MCKENNA MD     Subjective:  ROS reviewed. Feels better. Swelling improved.    ASSESSMENT:    Antibiotics: Vancomycin -     # Acute RLE cellulitis with posterior leg wound in immunocompromised patient   -s/p bedside debridement 8/15  # Rheumatoid arthritis, on 20 mg prednisone daily, methotrexate, HCQ     PLAN:  -  Currently on vancomycin, day 5. Can DC on PO linezolid x 2 week course.  -  Follow fever curve, wbc.  -  Reviewed labs, micro, imaging reports, available old records.  -  Case d/w patient, RN.     History of Present Illness:  Sheri Garzon is a 64 year old female with a history of rheumatoid arthritis on chronic 20 mg prednisone daily, methotrexate, HCQ, with recent [facility] admission - with vomiting s/p EGD with small hiatal hernia, who presented to [facility] ED on  with worsening pain in her right foot. Has had a wound for the last month and a half that has not healed. Was on ciprofloxacin last month. No fevers or chills at home. On arrival, afebrile, wbc 4.5, blood cx obtained, XR R foot with soft tissue swelling, started on vancomycin. ID consulted.    Physical Exam:  /89 (BP Location: Right arm)   Pulse 94   Temp 98 °F (36.7 °C) (Oral)   Resp 18   Ht 5' 2\" (1.575 m)   Wt 180 lb 9.6 oz (81.9 kg)   SpO2 100%   BMI 33.03 kg/m²     Gen:   Awake, in bed  HEENT:  EOMI, neck supple  CV/lungs:  RRR, CTAB  Abdom:  Soft, NT/ND, +BS  Skin/extrem:  Posterior R leg wound  with red grandulation tissue, R foot edema with stable blisters, dry skin on lateral ankle  Lines:  PIV+    Laboratory Data: Reviewed    Microbiology: Reviewed    Radiology: Reviewed      [facility]  [phone]  8/16/2024

## 2024-08-17 PROBLEM — N25.89 RTA (RENAL TUBULAR ACIDOSIS): Status: ACTIVE | Noted: 2024-01-01

## 2024-08-17 PROBLEM — N25.89 RTA (RENAL TUBULAR ACIDOSIS): Status: ACTIVE | Noted: 2024-08-17

## 2024-08-17 LAB
ALBUMIN SERPL-MCNC: 2.6 G/DL (ref 3.2–4.8)
ALBUMIN SERPL-MCNC: 2.7 G/DL (ref 3.2–4.8)
ALP LIVER SERPL-CCNC: 66 U/L
ALT SERPL-CCNC: 15 U/L
ANION GAP SERPL CALC-SCNC: 9 MMOL/L (ref 0–18)
AST SERPL-CCNC: 20 U/L (ref ?–34)
BASOPHILS # BLD AUTO: 0 X10(3) UL (ref 0–0.2)
BASOPHILS NFR BLD AUTO: 0 %
BILIRUB DIRECT SERPL-MCNC: 0.2 MG/DL (ref ?–0.3)
BILIRUB SERPL-MCNC: 0.3 MG/DL (ref 0.2–1.1)
BILIRUB UR QL: NEGATIVE
BUN BLD-MCNC: 9 MG/DL (ref 9–23)
BUN/CREAT SERPL: 16.1 (ref 10–20)
CALCIUM BLD-MCNC: 7.6 MG/DL (ref 8.7–10.4)
CALCIUM SERPL-MCNC: 5 MG/DL
CHLORIDE SERPL-SCNC: 122 MMOL/L (ref 98–112)
CHLORIDE UR-SCNC: 149 MMOL/L
CLARITY UR: CLEAR
CO2 SERPL-SCNC: 15 MMOL/L (ref 21–32)
CREAT BLD-MCNC: 0.56 MG/DL
CREAT UR-SCNC: 51.8 MG/DL
CREAT UR-SCNC: 51.8 MG/DL
DEPRECATED RDW RBC AUTO: 54.8 FL (ref 35.1–46.3)
EGFRCR SERPLBLD CKD-EPI 2021: 102 ML/MIN/1.73M2 (ref 60–?)
EOSINOPHIL # BLD AUTO: 0 X10(3) UL (ref 0–0.7)
EOSINOPHIL NFR BLD AUTO: 0 %
ERYTHROCYTE [DISTWIDTH] IN BLOOD BY AUTOMATED COUNT: 17.8 % (ref 11–15)
GLUCOSE BLD-MCNC: 95 MG/DL (ref 70–99)
GLUCOSE UR-MCNC: NORMAL MG/DL
HCT VFR BLD AUTO: 22.9 %
HGB BLD-MCNC: 7.7 G/DL
HGB UR QL STRIP.AUTO: NEGATIVE
IMM GRANULOCYTES # BLD AUTO: 0.02 X10(3) UL (ref 0–1)
IMM GRANULOCYTES NFR BLD: 0.6 %
KETONES UR-MCNC: NEGATIVE MG/DL
LEUKOCYTE ESTERASE UR QL STRIP.AUTO: NEGATIVE
LYMPHOCYTES # BLD AUTO: 0.91 X10(3) UL (ref 1–4)
LYMPHOCYTES NFR BLD AUTO: 26.9 %
MAGNESIUM SERPL-MCNC: 1.2 MG/DL (ref 1.6–2.6)
MCH RBC QN AUTO: 28.2 PG (ref 26–34)
MCHC RBC AUTO-ENTMCNC: 33.6 G/DL (ref 31–37)
MCV RBC AUTO: 83.9 FL
MICROALBUMIN UR-MCNC: <0.3 MG/DL
MONOCYTES # BLD AUTO: 0.15 X10(3) UL (ref 0.1–1)
MONOCYTES NFR BLD AUTO: 4.4 %
NEUTROPHILS # BLD AUTO: 2.3 X10 (3) UL (ref 1.5–7.7)
NEUTROPHILS # BLD AUTO: 2.3 X10(3) UL (ref 1.5–7.7)
NEUTROPHILS NFR BLD AUTO: 68.1 %
NITRITE UR QL STRIP.AUTO: NEGATIVE
OSMOLALITY SERPL CALC.SUM OF ELEC: 300 MOSM/KG (ref 275–295)
PH UR: 6.5 [PH] (ref 5–8)
PHOSPHATE SERPL-MCNC: 2.7 MG/DL (ref 2.4–5.1)
PHOSPHATE SERPL-MCNC: 2.7 MG/DL (ref 2.4–5.1)
PLATELET # BLD AUTO: 73 10(3)UL (ref 150–450)
PLATELETS.RETICULATED NFR BLD AUTO: 7 % (ref 0–7)
POTASSIUM SERPL-SCNC: 2.8 MMOL/L (ref 3.5–5.1)
POTASSIUM SERPL-SCNC: 2.8 MMOL/L (ref 3.5–5.1)
POTASSIUM SERPL-SCNC: 3.5 MMOL/L (ref 3.5–5.1)
POTASSIUM UR-SCNC: 75.5 MMOL/L
PROT SERPL-MCNC: 4.4 G/DL (ref 5.7–8.2)
PROT UR-MCNC: 20 MG/DL
RBC # BLD AUTO: 2.73 X10(6)UL
SODIUM SERPL-SCNC: 146 MMOL/L (ref 136–145)
SODIUM SERPL-SCNC: 78 MMOL/L
SP GR UR STRIP: 1.01 (ref 1–1.03)
UROBILINOGEN UR STRIP-ACNC: NORMAL
UUN UR-MCNC: 340 MG/DL
VANCOMYCIN SERPL-MCNC: 13 UG/ML (ref ?–40)
WBC # BLD AUTO: 3.4 X10(3) UL (ref 4–11)

## 2024-08-17 PROCEDURE — 99255 IP/OBS CONSLTJ NEW/EST HI 80: CPT | Performed by: INTERNAL MEDICINE

## 2024-08-17 PROCEDURE — 99233 SBSQ HOSP IP/OBS HIGH 50: CPT | Performed by: HOSPITALIST

## 2024-08-17 RX ORDER — MAGNESIUM OXIDE 400 MG/1
800 TABLET ORAL ONCE
Status: COMPLETED | OUTPATIENT
Start: 2024-08-17 | End: 2024-08-17

## 2024-08-17 RX ORDER — POTASSIUM CHLORIDE 14.9 MG/ML
20 INJECTION INTRAVENOUS ONCE
Status: COMPLETED | OUTPATIENT
Start: 2024-08-17 | End: 2024-08-17

## 2024-08-17 RX ORDER — POTASSIUM CITRATE AND CITRIC ACID MONOHYDRATE 1100; 334 MG/5ML; MG/5ML
15 SOLUTION ORAL
Status: DISCONTINUED | OUTPATIENT
Start: 2024-08-18 | End: 2024-08-18

## 2024-08-17 RX ORDER — POTASSIUM CITRATE AND CITRIC ACID MONOHYDRATE 1100; 334 MG/5ML; MG/5ML
30 SOLUTION ORAL
Status: DISCONTINUED | OUTPATIENT
Start: 2024-08-17 | End: 2024-08-17

## 2024-08-17 NOTE — PROGRESS NOTES
[facility]  [facility]     Sheri Garzon Patient Status:  Inpatient    1960 MRN [id number]   Location [facility] 5SW/SE Attending Derick Hsieh MD   Hosp Day # 5 PCP TERI MCKENNA MD     Subjective:     Pt was seen and examined  RLE pain improved  No cp, sob, f,c, abd pain or HA  No nausea   Wants to go home    Objective:    Review of Systems:   ROS completed; pertinent positive and negatives stated in subjective.      Vital signs:  Temp:  [98 °F (36.7 °C)-98.2 °F (36.8 °C)] 98 °F (36.7 °C)  Pulse:  [] 88  Resp:  [17-20] 18  BP: (119-135)/(78-93) 127/82  SpO2:  [98 %-100 %] 100 %      Physical Exam:    Gen: NAD AO x3  Chest: good air entry CTABL  CVS: normal s1 and s2 RR  Abd: NABS soft NT ND  Neuro: CN 2-12 grossly intact  Ext: RLE surgical site CDI      Diagnostic Data:    Labs:  Recent Labs   Lab 24  1535 24  0712 08/15/24  0548 24  0606 24  0601   WBC 4.5 5.2 4.8 4.6 3.4*   HGB 10.5* 9.3* 8.5* 7.7* 7.7*   MCV 88.2 91.2 85.3 85.4 83.9   .0* 101.0* 71.0* 68.0* 73.0*       Recent Labs   Lab 08/15/24  0548 08/15/24  2142 24  0606 24  0601   GLU 92  --  95  --  95   BUN 7*  --  10  --  9   CREATSERUM 0.58  --  0.57  --  0.56   CA 7.4*  --  7.4*  --  7.6*   ALB 2.8*  --  2.7*  --  2.6*  2.7*     --  148*  --  146*   K 2.5*  2.5*   < > 3.4*  3.4* 3.3* 2.8*  2.8*   *  --  125*  --  122*   CO2 14.0*  --  13.0*  --  15.0*   ALKPHO  --   --   --   --  66   AST  --   --   --   --  20   ALT  --   --   --   --  15   BILT  --   --   --   --  0.3   TP  --   --   --   --  4.4*    < > = values in this interval not displayed.       Estimated Creatinine Clearance: 80.3 mL/min (based on SCr of 0.56 mg/dL).    No results for input(s): \"PTP\", \"INR\" in the last 168 hours.           Imaging: Imaging data reviewed in Epic.    Medications:    hydrocortisone sodium succinate  50 mg  Intravenous 2 times per day    vancomycin  1,000 mg Intravenous Q24H    collagenase   Topical Daily    amLODIPine  5 mg Oral Daily    aspirin  81 mg Oral Daily    atorvastatin  10 mg Oral Daily    clopidogrel  75 mg Oral Daily    furosemide  40 mg Oral Daily    hydroxychloroquine  200 mg Oral BID    losartan  50 mg Oral Daily    oxybutynin ER  10 mg Oral Daily    enoxaparin  40 mg Subcutaneous Daily       Assessment & Plan:     RLE cellulitis -> improved, still with significant drainage   Imaging reviewed  Started on IV abx, cont for now  ID on consult  Appreciate recs  Gsx on consult  Underwent I+D of RLE on 8/15/24  Rheumatoid arthritis  Continue home meds  CAD  HTN  HLD  Continue ASA, Statin, Plavix  Continue home antihypertensives  6.   Multiple electrolyte abnormalities and NAGMA   1.    Consider possible RTA, consulted nephrology for further eval   2.    Cont to replete lytes, monitor labs  7.   Hypernatremia   1.    Improving s/p d5+0.45, cont      Plan of care discussed with patient at bedside.      Supplementary Documentation:     Quality:  DVT Prophylaxis: Lovenox s/c  CODE status: Full       Estimated date of discharge: TBD  Discharge is dependent on: clinical stability  At this point Ms. Garzon is expected to be discharge to: home    MDM: High

## 2024-08-17 NOTE — PLAN OF CARE
Call light within reach. Bed alarm on. Safety precautions in place. Calls appropriately.    Problem: Patient Centered Care  Goal: Patient preferences are identified and integrated in the patient's plan of care  Description: Interventions:  - What would you like us to know as we care for you? Home with family.  - Provide timely, complete, and accurate information to patient/family  - Incorporate patient and family knowledge, values, beliefs, and cultural backgrounds into the planning and delivery of care  - Encourage patient/family to participate in care and decision-making at the level they choose  - Honor patient and family perspectives and choices  Outcome: Progressing     Problem: GASTROINTESTINAL - ADULT  Goal: Maintains or returns to baseline bowel function  Description: INTERVENTIONS:  - Assess bowel function  - Maintain adequate hydration with IV or PO as ordered and tolerated  - Evaluate effectiveness of GI medications  - Encourage mobilization and activity  - Obtain nutritional consult as needed  - Establish a toileting routine/schedule  - Consider collaborating with pharmacy to review patient's medication profile  Outcome: Progressing     Problem: SKIN/TISSUE INTEGRITY - ADULT  Goal: Skin integrity remains intact  Description: INTERVENTIONS  - Assess and document risk factors for pressure ulcer development  - Assess and document skin integrity  - Monitor for areas of redness and/or skin breakdown  - Initiate interventions, skin care algorithm/standards of care as needed  Outcome: Progressing  Goal: Incision(s), wounds(s) or drain site(s) healing without S/S of infection  Description: INTERVENTIONS:  - Assess and document risk factors for pressure ulcer development  - Assess and document skin integrity  - Assess and document dressing/incision, wound bed, drain sites and surrounding tissue  - Implement wound care per orders  - Initiate isolation precautions as appropriate  - Initiate Pressure Ulcer  prevention bundle as indicated  Outcome: Progressing     Problem: PAIN - ADULT  Goal: Verbalizes/displays adequate comfort level or patient's stated pain goal  Description: INTERVENTIONS:  - Encourage pt to monitor pain and request assistance  - Assess pain using appropriate pain scale  - Administer analgesics based on type and severity of pain and evaluate response  - Implement non-pharmacological measures as appropriate and evaluate response  - Consider cultural and social influences on pain and pain management  - Manage/alleviate anxiety  - Utilize distraction and/or relaxation techniques  - Monitor for opioid side effects  - Notify MD/LIP if interventions unsuccessful or patient reports new pain  - Anticipate increased pain with activity and pre-medicate as appropriate  Outcome: Progressing     Problem: RISK FOR INFECTION - ADULT  Goal: Absence of fever/infection during anticipated neutropenic period  Description: INTERVENTIONS  - Monitor WBC  - Administer growth factors as ordered  - Implement neutropenic guidelines  Outcome: Progressing     Problem: SAFETY ADULT - FALL  Goal: Free from fall injury  Description: INTERVENTIONS:  - Assess pt frequently for physical needs  - Identify cognitive and physical deficits and behaviors that affect risk of falls.  - [place] fall precautions as indicated by assessment.  - Educate pt/family on patient safety including physical limitations  - Instruct pt to call for assistance with activity based on assessment  - Modify environment to reduce risk of injury  - Provide assistive devices as appropriate  - Consider OT/PT consult to assist with strengthening/mobility  - Encourage toileting schedule  Outcome: Progressing     Problem: DISCHARGE PLANNING  Goal: Discharge to home or other facility with appropriate resources  Description: INTERVENTIONS:  - Identify barriers to discharge w/pt and caregiver  - Include patient/family/discharge partner in discharge planning  - Arrange  for needed discharge resources and transportation as appropriate  - Identify discharge learning needs (meds, wound care, etc)  - Arrange for interpreters to assist at discharge as needed  - Consider post-discharge preferences of patient/family/discharge partner  - Complete POLST form as appropriate  - Assess patient's ability to be responsible for managing their own health  - Refer to Case Management Department for coordinating discharge planning if the patient needs post-hospital services based on physician/LIP order or complex needs related to functional status, cognitive ability or social support system  Outcome: Progressing

## 2024-08-17 NOTE — PLAN OF CARE
Problem: Patient Centered Care  Goal: Patient preferences are identified and integrated in the patient's plan of care  Description: Interventions:  - What would you like us to know as we care for you?   - Provide timely, complete, and accurate information to patient/family  - Incorporate patient and family knowledge, values, beliefs, and cultural backgrounds into the planning and delivery of care  - Encourage patient/family to participate in care and decision-making at the level they choose  - Honor patient and family perspectives and choices  Outcome: Progressing       Problem: GASTROINTESTINAL - ADULT  Goal: Maintains or returns to baseline bowel function  Description: INTERVENTIONS:  - Assess bowel function  - Maintain adequate hydration with IV or PO as ordered and tolerated  - Evaluate effectiveness of GI medications  - Encourage mobilization and activity  - Obtain nutritional consult as needed  - Establish a toileting routine/schedule  - Consider collaborating with pharmacy to review patient's medication profile  Outcome: Progressing     Problem: SKIN/TISSUE INTEGRITY - ADULT  Goal: Skin integrity remains intact  Description: INTERVENTIONS  - Assess and document risk factors for pressure ulcer development  - Assess and document skin integrity  - Monitor for areas of redness and/or skin breakdown  - Initiate interventions, skin care algorithm/standards of care as needed  Outcome: Progressing  Goal: Incision(s), wounds(s) or drain site(s) healing without S/S of infection  Description: INTERVENTIONS:  - Assess and document risk factors for pressure ulcer development  - Assess and document skin integrity  - Assess and document dressing/incision, wound bed, drain sites and surrounding tissue  - Implement wound care per orders  - Initiate isolation precautions as appropriate  - Initiate Pressure Ulcer prevention bundle as indicated  Outcome: Progressing     Problem: PAIN - ADULT  Goal: Verbalizes/displays adequate  comfort level or patient's stated pain goal  Description: INTERVENTIONS:  - Encourage pt to monitor pain and request assistance  - Assess pain using appropriate pain scale  - Administer analgesics based on type and severity of pain and evaluate response  - Implement non-pharmacological measures as appropriate and evaluate response  - Consider cultural and social influences on pain and pain management  - Manage/alleviate anxiety  - Utilize distraction and/or relaxation techniques  - Monitor for opioid side effects  - Notify MD/LIP if interventions unsuccessful or patient reports new pain  - Anticipate increased pain with activity and pre-medicate as appropriate  Outcome: Progressing     Problem: RISK FOR INFECTION - ADULT  Goal: Absence of fever/infection during anticipated neutropenic period  Description: INTERVENTIONS  - Monitor WBC  - Administer growth factors as ordered  - Implement neutropenic guidelines  Outcome: Progressing     Problem: SAFETY ADULT - FALL  Goal: Free from fall injury  Description: INTERVENTIONS:  - Assess pt frequently for physical needs  - Identify cognitive and physical deficits and behaviors that affect risk of falls.  - [place] fall precautions as indicated by assessment.  - Educate pt/family on patient safety including physical limitations  - Instruct pt to call for assistance with activity based on assessment  - Modify environment to reduce risk of injury  - Provide assistive devices as appropriate  - Consider OT/PT consult to assist with strengthening/mobility  - Encourage toileting schedule  Outcome: Progressing     Problem: DISCHARGE PLANNING  Goal: Discharge to home or other facility with appropriate resources  Description: INTERVENTIONS:  - Identify barriers to discharge w/pt and caregiver  - Include patient/family/discharge partner in discharge planning  - Arrange for needed discharge resources and transportation as appropriate  - Identify discharge learning needs (meds, wound  care, etc)  - Arrange for interpreters to assist at discharge as needed  - Consider post-discharge preferences of patient/family/discharge partner  - Complete POLST form as appropriate  - Assess patient's ability to be responsible for managing their own health  - Refer to Case Management Department for coordinating discharge planning if the patient needs post-hospital services based on physician/LIP order or complex needs related to functional status, cognitive ability or social support system  Outcome: Progressing

## 2024-08-18 LAB
ALBUMIN SERPL-MCNC: 2.7 G/DL (ref 3.2–4.8)
ANION GAP SERPL CALC-SCNC: 9 MMOL/L (ref 0–18)
BASOPHILS # BLD AUTO: 0 X10(3) UL (ref 0–0.2)
BASOPHILS NFR BLD AUTO: 0 %
BUN BLD-MCNC: 6 MG/DL (ref 9–23)
BUN/CREAT SERPL: 11.5 (ref 10–20)
CALCIUM BLD-MCNC: 7.1 MG/DL (ref 8.7–10.4)
CHLORIDE SERPL-SCNC: 120 MMOL/L (ref 98–112)
CO2 SERPL-SCNC: 17 MMOL/L (ref 21–32)
CREAT BLD-MCNC: 0.52 MG/DL
DEPRECATED RDW RBC AUTO: 53.1 FL (ref 35.1–46.3)
EGFRCR SERPLBLD CKD-EPI 2021: 104 ML/MIN/1.73M2 (ref 60–?)
EOSINOPHIL # BLD AUTO: 0 X10(3) UL (ref 0–0.7)
EOSINOPHIL NFR BLD AUTO: 0 %
ERYTHROCYTE [DISTWIDTH] IN BLOOD BY AUTOMATED COUNT: 17.6 % (ref 11–15)
GLUCOSE BLD-MCNC: 96 MG/DL (ref 70–99)
GLUCOSE BLDC GLUCOMTR-MCNC: 74 MG/DL (ref 70–99)
HCT VFR BLD AUTO: 22.2 %
HGB BLD-MCNC: 7.5 G/DL
IMM GRANULOCYTES # BLD AUTO: 0.01 X10(3) UL (ref 0–1)
IMM GRANULOCYTES NFR BLD: 0.4 %
IRON SATN MFR SERPL: 19 %
IRON SERPL-MCNC: 25 UG/DL
LYMPHOCYTES # BLD AUTO: 1.07 X10(3) UL (ref 1–4)
LYMPHOCYTES NFR BLD AUTO: 40.2 %
MAGNESIUM SERPL-MCNC: 2.2 MG/DL (ref 1.6–2.6)
MAGNESIUM SERPL-MCNC: 2.2 MG/DL (ref 1.6–2.6)
MCH RBC QN AUTO: 28.4 PG (ref 26–34)
MCHC RBC AUTO-ENTMCNC: 33.8 G/DL (ref 31–37)
MCV RBC AUTO: 84.1 FL
MONOCYTES # BLD AUTO: 0.21 X10(3) UL (ref 0.1–1)
MONOCYTES NFR BLD AUTO: 7.9 %
NEUTROPHILS # BLD AUTO: 1.37 X10 (3) UL (ref 1.5–7.7)
NEUTROPHILS # BLD AUTO: 1.37 X10(3) UL (ref 1.5–7.7)
NEUTROPHILS NFR BLD AUTO: 51.5 %
OSMOLALITY SERPL CALC.SUM OF ELEC: 299 MOSM/KG (ref 275–295)
PHOSPHATE SERPL-MCNC: 2 MG/DL (ref 2.4–5.1)
PLATELET # BLD AUTO: 106 10(3)UL (ref 150–450)
PLATELETS.RETICULATED NFR BLD AUTO: 6.2 % (ref 0–7)
POTASSIUM SERPL-SCNC: 3.2 MMOL/L (ref 3.5–5.1)
POTASSIUM SERPL-SCNC: 3.4 MMOL/L (ref 3.5–5.1)
RBC # BLD AUTO: 2.64 X10(6)UL
SODIUM SERPL-SCNC: 146 MMOL/L (ref 136–145)
TIBC SERPL-MCNC: 131 UG/DL (ref 250–425)
TRANSFERRIN SERPL-MCNC: 88 MG/DL (ref 250–380)
WBC # BLD AUTO: 2.7 X10(3) UL (ref 4–11)

## 2024-08-18 PROCEDURE — 99233 SBSQ HOSP IP/OBS HIGH 50: CPT | Performed by: INTERNAL MEDICINE

## 2024-08-18 PROCEDURE — 99233 SBSQ HOSP IP/OBS HIGH 50: CPT | Performed by: HOSPITALIST

## 2024-08-18 RX ORDER — POTASSIUM CHLORIDE 14.9 MG/ML
20 INJECTION INTRAVENOUS ONCE
Status: COMPLETED | OUTPATIENT
Start: 2024-08-18 | End: 2024-08-18

## 2024-08-18 RX ORDER — SODIUM BICARBONATE 650 MG/1
650 TABLET ORAL 3 TIMES DAILY
Status: DISCONTINUED | OUTPATIENT
Start: 2024-08-18 | End: 2024-08-24

## 2024-08-18 RX ORDER — POTASSIUM CHLORIDE 1500 MG/1
40 TABLET, EXTENDED RELEASE ORAL 2 TIMES DAILY
Status: DISCONTINUED | OUTPATIENT
Start: 2024-08-18 | End: 2024-08-24

## 2024-08-18 RX ORDER — PREDNISONE 10 MG/1
10 TABLET ORAL
Status: DISCONTINUED | OUTPATIENT
Start: 2024-08-18 | End: 2024-08-20

## 2024-08-18 NOTE — PROGRESS NOTES
[facility]     Sheri Garzon Patient Status:  Inpatient    1960 MRN [id number]   Location [facility] 5SW/SE Attending Derick Hsieh MD   Hosp Day # 6 PCP TERI MCKENNA MD     Subjective:     Pt was seen and examined  RLE pain controlled  No cp, sob, f,c, abd pain or HA  No nausea   Still has not had a BM    Objective:    Review of Systems:   ROS completed; pertinent positive and negatives stated in subjective.      Vital signs:  Temp:  [97.3 °F (36.3 °C)-98.2 °F (36.8 °C)] 98.1 °F (36.7 °C)  Pulse:  [] 103  Resp:  [18] 18  BP: (100-117)/(56-80) 104/62  SpO2:  [100 %] 100 %      Physical Exam:    Gen: NAD AO x3  Chest: good air entry CTABL  CVS: normal s1 and s2 RR  Abd: NABS soft NT ND  Neuro: CN 2-12 grossly intact  Ext: RLE surgical site CDI      Diagnostic Data:    Labs:  Recent Labs   Lab 24  0712 08/15/24  0548 24  0606 24  0601 24  0529   WBC 5.2 4.8 4.6 3.4* 2.7*   HGB 9.3* 8.5* 7.7* 7.7* 7.5*   MCV 91.2 85.3 85.4 83.9 84.1   .0* 71.0* 68.0* 73.0* 106.0*       Recent Labs   Lab 24  0606 24  0601 24  1755 24  0529   GLU 95  --  95  --  96   BUN 10  --  9  --  6*   CREATSERUM 0.57  --  0.56  --  0.52*   CA 7.4*  --  7.6*  --  7.1*   ALB 2.7*  --  2.6*  2.7*  --  2.7*   *  --  146*  --  146*   K 3.4*  3.4*   < > 2.8*  2.8* 3.5 3.4*   *  --  122*  --  120*   CO2 13.0*  --  15.0*  --  17.0*   ALKPHO  --   --  66  --   --    AST  --   --  20  --   --    ALT  --   --  15  --   --    BILT  --   --  0.3  --   --    TP  --   --  4.4*  --   --     < > = values in this interval not displayed.       Estimated Creatinine Clearance: 86.4 mL/min (A) (based on SCr of 0.52 mg/dL (L)).    No results for input(s): \"PTP\", \"INR\" in the last 168 hours.           Imaging: Imaging data reviewed in Epic.    Medications:    potassium phosphate dibasic 15  mmol in sodium chloride 0.9% 250 mL IVPB  15 mmol Intravenous Once    Followed by    potassium chloride  20 mEq Intravenous Once    predniSONE  10 mg Oral Daily with breakfast    potassium citrate-citric acid  15 mL Oral TID CC    vancomycin  1,000 mg Intravenous Q24H    collagenase   Topical Daily    amLODIPine  5 mg Oral Daily    aspirin  81 mg Oral Daily    atorvastatin  10 mg Oral Daily    clopidogrel  75 mg Oral Daily    hydroxychloroquine  200 mg Oral BID    losartan  50 mg Oral Daily    oxybutynin ER  10 mg Oral Daily    enoxaparin  40 mg Subcutaneous Daily       Assessment & Plan:     RLE cellulitis -> improved  Imaging reviewed  Started on IV abx, cont for now  ID on consult  Appreciate recs  Gsx on consult  Underwent I+D of RLE on 8/15/24  Rheumatoid arthritis  Continue home meds  CAD  HTN  HLD  Continue ASA, Statin, Plavix  Continue home antihypertensives  6.   Multiple electrolyte abnormalities and NAGMA   1.    Due to Distal RTA   2.    Cont electrolyte replacement  7.   Hypernatremia   1.    Improving s/p d5+0.45, cont      Plan of care discussed with patient at bedside.      Supplementary Documentation:     Quality:  DVT Prophylaxis: Lovenox s/c  CODE status: Full       Estimated date of discharge: TBD  Discharge is dependent on: clinical stability  At this point Ms. Garzon is expected to be discharge to: home    MDM: High

## 2024-08-18 NOTE — PLAN OF CARE
Problem: Patient Centered Care  Goal: Patient preferences are identified and integrated in the patient's plan of care  Description: Interventions:  - What would you like us to know as we care for you?   - Provide timely, complete, and accurate information to patient/family  - Incorporate patient and family knowledge, values, beliefs, and cultural backgrounds into the planning and delivery of care  - Encourage patient/family to participate in care and decision-making at the level they choose  - Honor patient and family perspectives and choices  Outcome: Progressing       Problem: GASTROINTESTINAL - ADULT  Goal: Maintains or returns to baseline bowel function  Description: INTERVENTIONS:  - Assess bowel function  - Maintain adequate hydration with IV or PO as ordered and tolerated  - Evaluate effectiveness of GI medications  - Encourage mobilization and activity  - Obtain nutritional consult as needed  - Establish a toileting routine/schedule  - Consider collaborating with pharmacy to review patient's medication profile  Outcome: Progressing     Problem: SKIN/TISSUE INTEGRITY - ADULT  Goal: Skin integrity remains intact  Description: INTERVENTIONS  - Assess and document risk factors for pressure ulcer development  - Assess and document skin integrity  - Monitor for areas of redness and/or skin breakdown  - Initiate interventions, skin care algorithm/standards of care as needed  Outcome: Progressing  Goal: Incision(s), wounds(s) or drain site(s) healing without S/S of infection  Description: INTERVENTIONS:  - Assess and document risk factors for pressure ulcer development  - Assess and document skin integrity  - Assess and document dressing/incision, wound bed, drain sites and surrounding tissue  - Implement wound care per orders  - Initiate isolation precautions as appropriate  - Initiate Pressure Ulcer prevention bundle as indicated  Outcome: Progressing     Problem: PAIN - ADULT  Goal: Verbalizes/displays adequate  comfort level or patient's stated pain goal  Description: INTERVENTIONS:  - Encourage pt to monitor pain and request assistance  - Assess pain using appropriate pain scale  - Administer analgesics based on type and severity of pain and evaluate response  - Implement non-pharmacological measures as appropriate and evaluate response  - Consider cultural and social influences on pain and pain management  - Manage/alleviate anxiety  - Utilize distraction and/or relaxation techniques  - Monitor for opioid side effects  - Notify MD/LIP if interventions unsuccessful or patient reports new pain  - Anticipate increased pain with activity and pre-medicate as appropriate  Outcome: Progressing     Problem: RISK FOR INFECTION - ADULT  Goal: Absence of fever/infection during anticipated neutropenic period  Description: INTERVENTIONS  - Monitor WBC  - Administer growth factors as ordered  - Implement neutropenic guidelines  Outcome: Progressing     Problem: SAFETY ADULT - FALL  Goal: Free from fall injury  Description: INTERVENTIONS:  - Assess pt frequently for physical needs  - Identify cognitive and physical deficits and behaviors that affect risk of falls.  - [place] fall precautions as indicated by assessment.  - Educate pt/family on patient safety including physical limitations  - Instruct pt to call for assistance with activity based on assessment  - Modify environment to reduce risk of injury  - Provide assistive devices as appropriate  - Consider OT/PT consult to assist with strengthening/mobility  - Encourage toileting schedule  Outcome: Progressing     Problem: DISCHARGE PLANNING  Goal: Discharge to home or other facility with appropriate resources  Description: INTERVENTIONS:  - Identify barriers to discharge w/pt and caregiver  - Include patient/family/discharge partner in discharge planning  - Arrange for needed discharge resources and transportation as appropriate  - Identify discharge learning needs (meds, wound  care, etc)  - Arrange for interpreters to assist at discharge as needed  - Consider post-discharge preferences of patient/family/discharge partner  - Complete POLST form as appropriate  - Assess patient's ability to be responsible for managing their own health  - Refer to Case Management Department for coordinating discharge planning if the patient needs post-hospital services based on physician/LIP order or complex needs related to functional status, cognitive ability or social support system  Outcome: Progressing

## 2024-08-18 NOTE — PROGRESS NOTES
Endocrine Note    65 y/o F with iatrogenic adrenal insufficiency is re-admitted with infection.  She was previously seen during hospitalization in July and diagnosed with AI at that time. Will transition to home regimen.  Discontinue Hydrocortisone and restart Prednisone 10mg PO daily.     Eventual discharge on home regimen of Prednisone.

## 2024-08-18 NOTE — CONSULTS
[facility]    PATIENT'S NAME: ODALYS CYNDI MONISHA   ATTENDING PHYSICIAN: Derick Hsieh MD   CONSULTING PHYSICIAN: Ezio Bhardwaj MD   PATIENT ACCOUNT#:   [id number]    LOCATION:  [address] [place] [address]  MEDICAL RECORD #:   [id number]       YOB: 1960  ADMISSION DATE:       08/12/2024      CONSULT DATE:  08/17/2024    REPORT OF CONSULTATION    HISTORY OF PRESENT ILLNESS:  The patient is a 64-year-old female with a history of rheumatoid arthritis, iatrogenic adrenal insufficiency, hypercholesterolemia, hypertension, obstructive sleep apnea, who came in for cellulitis involving the right lower extremity.  He had some surgical debridement, now on antibiotics, and symptoms have been improving.  The patient though has been noted to have persistent normal anion gap metabolic acidosis associated with hypokalemia and hypomagnesemia despite replacement and Renal consultation has now been called.    The patient had been residing in [place] and just recently moved back to this area.  She states she has had problems with low potassiums while down in [place].  She never saw a nephrologist.  Reviewing laboratory studies in Care Everywhere and Epic, back in September 2021, she had a potassium of 2.9 with a CO2 of 17.  There was a big gap but during recent laboratory studies from 07/21/2024, until now, her potassium has ranged from 2.5 to 3.8, and CO2 ranged from 11 to 17.  She has also had continued problems with hypomagnesemia.    PAST MEDICAL HISTORY:  Significant for longstanding rheumatoid arthritis, iatrogenic adrenal insufficiency secondary to withdrawal of prednisone, history of hypercholesterolemia, hypertension, anemia, obstructive sleep apnea.    MEDICATIONS:  Her current medications include amlodipine, baby aspirin, Lipitor, Plavix, Lovenox, Solu-Cortef, Plaquenil, losartan, magnesium oxide, Ditropan, potassium chloride, and Lasix 40 mg daily.  Apparently, at home, she is also on  hydrochlorothiazide.    ALLERGIES:  The patient is allergic to cephalosporins, gadolinium, penicillin, and sulfa.    SOCIAL HISTORY:  She is a nonsmoker, former .  Again, previously lived in this area, but was living down in [place] for a while, but decided to move back which she just recently did so.    FAMILY HISTORY:  Negative for renal pathology.    REVIEW OF SYSTEMS:  At the present time, the patient states she is feeling okay without any chest pain, shortness of breath, or GI symptoms.  Not the best appetite but drinking fluids okay.  Tendency toward constipation.  Arthritic symptoms under pretty good control.  A 10-point review of systems is otherwise unremarkable.    PHYSICAL EXAMINATION:  GENERAL:  The patient is awake, alert, and oriented x3, not in acute distress.  VITAL SIGNS:  Blood pressure 127/82 with a pulse of 88, respirations 18, temperature 98 degrees, and O2 saturation was 100% on room air.  She weighed 180 pounds.  NECK:  Supple without JVD or lymphadenopathy.  LUNGS:  Clear to auscultation and percussion.  HEART:  Regular rate and rhythm with an S4, but no other gallops, murmurs, or rubs.  ABDOMEN:  Soft, flat, nontender without organomegaly, masses, or bruits.  EXTREMITIES:  No clubbing, cyanosis, or edema.  Superficial wound on the right distal calf covered with a bandage.  No surrounding cellulitis.    LABORATORY DATA:  Laboratory studies today show a potassium of 2.8, CO2 of 15, creatinine 0.50, calcium 7.6, anion gap is 9, magnesium 1.2, phosphorus 2.7, albumin 2.0.  White count was 3.4, hemoglobin 7.7, hematocrit 22.9, and platelets were 73,000.  Urine studies are pending.    IMPRESSION:  The patient is a 64-year-old female who now appears to have chronic normal anion gap metabolic acidosis.  This is also associated with hypomagnesemia and hypokalemia.  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  Hypokalemia and  hypomagnesemia being exacerbated by Lasix.    In addition the patient also has longstanding rheumatoid arthritis, hypertension, iatrogenic adrenal insufficiency, and pancytopenia.  Right lower extremity cellulitis has improved.      PLAN:  Urine studies will be obtained.  Urinalysis shows that her pH is 6.5, which is consistent with a distal RTA.  Other urinary studies are pending.  We will place Lasix on hold until electrolytes improved.  Vigorously replace potassium and magnesium.  We will also start potassium citrate.  Potassium will help improve hypokalemia and citrate will be converted by the liver into bicarbonate.  We will follow with I's and O's, daily weights, and serial chemistries.  Her diuretic needs to be resumed.  We will add a potassium-sparing diuretic.  The patient also has pancytopenia and may benefit from hematologic evaluation.  Discussed the above with the patient and Dr. Hsieh.    Dictated By Ezio Bhardwaj MD  d: 08/17/2024 17:16:15  t: 08/17/2024 19:53:39  [facility] [id number]  [facility]/    cc: Derick Hsieh MD

## 2024-08-18 NOTE — PROGRESS NOTES
[facility]  Nephrology Daily Progress Note    Sheri Garzon  [id number]  64 year old      HPI:   Sheri Garzon is a 64 year old female.  Overall feels OK but not tolerating potassium citrate.  State it caused burning in throat.  Constiated.        ROS:     Constitutional:  Negative for decreased activity, fever, irritability and lethargy  Endocrine:  Negative for abnormal sleep patterns, increased activity, polydipsia and polyphagia  Cardiovascular:  Negative for cool extremity and irregular heartbeat/palpitations  Gastrointestinal:  Negative for abdominal pain, constipation, decreased appetite, diarrhea and vomiting  Genitourinary:  Negative for dysuria and hematuria  Hema/Lymph:  Negative for easy bleeding and easy bruising  Integumentary:  Negative for pruritus and rash  Musculoskeletal:  Negative for bone/joint symptoms  Neurological:  Negative for gait disturbance  Psychiatric:  Negative for inappropriate interaction and psychiatric symptoms  Respiratory:  Negative for cough, dyspnea and wheezing      PHYSICAL EXAM:   Temp:  [97.3 °F (36.3 °C)-98.3 °F (36.8 °C)] 98.3 °F (36.8 °C)  Pulse:  [] 94  Resp:  [18] 18  BP: (100-117)/(56-80) 112/75  SpO2:  [100 %] 100 %  Patient Weight for the past 72 hrs:   Weight   08/18/24 0500 180 lb 14.4 oz (82.1 kg)       Constitutional: appears well hydrated alert and responsive no acute distress noted  Neck/Thyroid: neck is supple without adenopathy  Lymphatic: no abnormal cervical, supraclavicular or axillary adenopathy is noted  Respiratory: normal to inspection lungs are clear to auscultation bilaterally normal respiratory effort  Cardiovascular: regular rate and rhythm no murmurs, gallups, or rubs  Abdomen: soft non-tender non-distended no organomegaly noted no masses  Musculoskeletal: full ROM all extremities good strength  no deformities  Extremities: no edema, cyanosis, or clubbing  Neurological: exam appropriate for age reflexes and motor skills  appropriate for age    Labs:  Lab Results   Component Value Date    WBC 2.7 08/18/2024    HGB 7.5 08/18/2024    HCT 22.2 08/18/2024    .0 08/18/2024    CREATSERUM 0.52 08/18/2024    BUN 6 08/18/2024     08/18/2024    K 3.4 08/18/2024     08/18/2024    CO2 17.0 08/18/2024    GLU 96 08/18/2024    CA 7.1 08/18/2024    ALB 2.7 08/18/2024    MG 2.2 08/18/2024    MG 2.2 08/18/2024    PHOS 2.0 08/18/2024     Recent Labs   Lab 08/16/24  0606 08/17/24  0601 08/18/24  0529   WBC 4.6 3.4* 2.7*   HGB 7.7* 7.7* 7.5*   HCT 23.4* 22.9* 22.2*   PLT 68.0* 73.0* 106.0*     Recent Labs   Lab 08/16/24  0606 08/16/24 2058 08/17/24  0601 08/17/24  1755 08/18/24  0529   GLU 95  --  95  --  96   BUN 10  --  9  --  6*   CREATSERUM 0.57  --  0.56  --  0.52*   CA 7.4*  --  7.6*  --  7.1*   ALB 2.7*  --  2.6*  2.7*  --  2.7*   *  --  146*  --  146*   K 3.4*  3.4*   < > 2.8*  2.8* 3.5 3.4*   *  --  122*  --  120*   CO2 13.0*  --  15.0*  --  17.0*   ALKPHO  --   --  66  --   --    AST  --   --  20  --   --    ALT  --   --  15  --   --    BILT  --   --  0.3  --   --    TP  --   --  4.4*  --   --    PHOS 1.5*  1.5*  --  2.7  2.7  --  2.0*    < > = values in this interval not displayed.       Imaging  No results found.      Medications:    Current Facility-Administered Medications:     [COMPLETED] potassium phosphate dibasic 15 mmol in sodium chloride 0.9% 250 mL IVPB, 15 mmol, Intravenous, Once **FOLLOWED BY** potassium chloride 20 mEq/100mL IVPB premix 20 mEq, 20 mEq, Intravenous, Once    predniSONE (Deltasone) tab 10 mg, 10 mg, Oral, Daily with breakfast    magnesium hydroxide (Milk of Magnesia) 400 MG/5ML oral suspension 30 mL, 30 mL, Oral, Daily PRN    potassium citrate-citric acid (Polycitra-K) 1100-334 MG/5ML oral solution 30 mEq, 15 mL, Oral, TID CC    vancomycin (Vancocin) 1,000 mg in sodium chloride 0.9% 250 mL IVPB-ADDV, 1,000 mg, Intravenous, Q24H    polyethylene glycol (PEG 3350) (Miralax) 17 g oral  packet 17 g, 17 g, Oral, Daily PRN    bisacodyl (Dulcolax) 10 MG rectal suppository 10 mg, 10 mg, Rectal, Daily PRN    collagenase (Santyl) 250 UNIT/GM ointment, , Topical, Daily    albuterol (Ventolin HFA) 108 (90 Base) MCG/ACT inhaler 2 puff, 2 puff, Inhalation, Q4H PRN    amLODIPine (Norvasc) tab 5 mg, 5 mg, Oral, Daily    aspirin chewable tab 81 mg, 81 mg, Oral, Daily    atorvastatin (Lipitor) tab 10 mg, 10 mg, Oral, Daily    clopidogrel (Plavix) tab 75 mg, 75 mg, Oral, Daily    hydroxychloroquine (Plaquenil) tab 200 mg, 200 mg, Oral, BID    losartan (Cozaar) tab 50 mg, 50 mg, Oral, Daily    oxybutynin ER (Ditropan-XL) 24 hr tab 10 mg, 10 mg, Oral, Daily    docusate sodium (Colace) cap 100 mg, 100 mg, Oral, Daily PRN    artificial saliva substitute (Mouth Kote) oral solution, , Mouth/Throat, PRN    glucose (Dex4) 15 GM/59ML oral liquid 15 g, 15 g, Oral, PRN    enoxaparin (Lovenox) 40 MG/0.4ML SUBQ injection 40 mg, 40 mg, Subcutaneous, Daily    acetaminophen (Tylenol Extra Strength) tab 500 mg, 500 mg, Oral, Q4H PRN    ondansetron (Zofran) 4 MG/2ML injection 4 mg, 4 mg, Intravenous, Q6H PRN    prochlorperazine (Compazine) 10 MG/2ML injection 5 mg, 5 mg, Intravenous, Q8H PRN    acetaminophen (Tylenol) tab 650 mg, 650 mg, Oral, Q4H PRN **OR** HYDROcodone-acetaminophen (Norco) 5-325 MG per tab 1 tablet, 1 tablet, Oral, Q4H PRN **OR** HYDROcodone-acetaminophen (Norco) 5-325 MG per tab 2 tablet, 2 tablet, Oral, Q4H PRN    Allergies:  Allergies   Allergen Reactions    Cephalosporins ANAPHYLAXIS, NAUSEA AND VOMITING and OTHER (SEE COMMENTS)     Other reaction(s): Fever  Pt received multiple doses of ceftriaxone on 7/2024 without any complcations    Gadolinium Derivatives FEVER    Penicillins OTHER (SEE COMMENTS)     esophagitis    Sulfa Antibiotics OTHER (SEE COMMENTS)     esophagitis       Input/Output:    Intake/Output Summary (Last 24 hours) at 8/18/2024 1620  Last data filed at 8/17/2024 2129  Gross per 24 hour    Intake 110 ml   Output 150 ml   Net -40 ml          ASSESSMENT/PLAN:   Assessment   Patient Active Problem List   Diagnosis    Dehydration    Metabolic acidosis    Bilious vomiting with nausea    Difficult intravenous access    Hypokalemia    Hypomagnesemia    Hiatal hernia    Cellulitis of right lower extremity    Cellulitis    RTA (renal tubular acidosis)       W/up so far c/w distal RTA.  Labs today a little better with CO2 17. K 3.4 and Mg 2.2.. Replace K and PO4.  As pt not tolerating potassium citrate will discontinue and start sodium bicarb  650 mg tid and KCL 40 meq bid.  Titrate up prn.  Also concerned about pancytopenia with Hb drifting down to 7.5 and WBC down to 2.7.  Plts better 100.  Consider hematology eval. Continue to hold Lasix until electrolytes better.  Will use potassium sparing diuretic if resumed. Discussed with Dr. Hsieh.             8/18/2024  Ezio Bhardwaj MD

## 2024-08-18 NOTE — PROGRESS NOTES
[facility]  part of [facility]    Progress Note    Sheri Garzon Patient Status:  Inpatient    1960 MRN [id number]   Location [facility] 5SW/SE Attending All Arroyo MD   Hosp Day # 5 PCP TERI MCKENNA MD     Date of Admission:  2024  Date of Consult:  24  Reason for Consultation:   Management of Iatrogenic Adrenal Insufficiency    History of Present Illness:   Patient is a 64 year old female who was admitted to the hospital for Cellulitis of right lower extremity:  She has rheumatoid Arthritis for which she is being treated with methotrexate as well as prednisone. The prednisone had been stopped and soon after this, she had been admitted with cellulitis and hypoglycemia. The hypoglycemia persisted despite starting dextrose.     It was then determined that she had iatrogenic adrenal insufficiency.     Past Medical History  Past Medical History:    Allergic rhinitis    Anxiety    Arthritis    RA and Osteoarthritis    Asthma (HCC)    Depression    Not officially diagnosed    Esophageal reflux    Essential hypertension    High blood pressure    High cholesterol    Hyperlipidemia    Myocardial infarction (HCC)    Cardiac event    Osteoarthritis    Pancreatitis (HCC)    Problems with swallowing    RA (rheumatoid arthritis) (HCC)    Sleep apnea       Past Surgical History  Past Surgical History:   Procedure Laterality Date    Colonoscopy      Other surgical history      Revision of uterine anomaly      Rotator cuff repair      Wrist fracture surgery         Family History  Family History   Problem Relation Age of Onset    Diabetes Mother     Genetic Disease Mother     Heart Disorder Mother     Hypertension Mother     Obesity Mother     Diabetes Father     Hypertension Father     Depression Daughter     Psychiatric Daughter        Social History  Social History     Socioeconomic History    Marital status: Single   Tobacco Use    Smoking status: Never    Smokeless tobacco:  Never   Vaping Use    Vaping status: Never Used   Substance and Sexual Activity    Alcohol use: Not Currently    Drug use: Never     Social Determinants of Health     Food Insecurity: No Food Insecurity (8/12/2024)    Food Insecurity     Food Insecurity: Never true   Transportation Needs: No Transportation Needs (8/12/2024)    Transportation Needs     Lack of Transportation: No   Housing Stability: Low Risk  (8/12/2024)    Housing Stability     Housing Instability: No           Current Medications:  Current Facility-Administered Medications   Medication Dose Route Frequency    [START ON 8/18/2024] potassium citrate-citric acid (Polycitra-K) 1100-334 MG/5ML oral solution 30 mEq  15 mL Oral TID CC    hydrocortisone Na succinate PF (Solu-CORTEF) injection 50 mg  50 mg Intravenous 2 times per day    vancomycin (Vancocin) 1,000 mg in sodium chloride 0.9% 250 mL IVPB-ADDV  1,000 mg Intravenous Q24H    polyethylene glycol (PEG 3350) (Miralax) 17 g oral packet 17 g  17 g Oral Daily PRN    bisacodyl (Dulcolax) 10 MG rectal suppository 10 mg  10 mg Rectal Daily PRN    collagenase (Santyl) 250 UNIT/GM ointment   Topical Daily    albuterol (Ventolin HFA) 108 (90 Base) MCG/ACT inhaler 2 puff  2 puff Inhalation Q4H PRN    amLODIPine (Norvasc) tab 5 mg  5 mg Oral Daily    aspirin chewable tab 81 mg  81 mg Oral Daily    atorvastatin (Lipitor) tab 10 mg  10 mg Oral Daily    clopidogrel (Plavix) tab 75 mg  75 mg Oral Daily    hydroxychloroquine (Plaquenil) tab 200 mg  200 mg Oral BID    losartan (Cozaar) tab 50 mg  50 mg Oral Daily    oxybutynin ER (Ditropan-XL) 24 hr tab 10 mg  10 mg Oral Daily    docusate sodium (Colace) cap 100 mg  100 mg Oral Daily PRN    artificial saliva substitute (Mouth Kote) oral solution   Mouth/Throat PRN    glucose (Dex4) 15 GM/59ML oral liquid 15 g  15 g Oral PRN    enoxaparin (Lovenox) 40 MG/0.4ML SUBQ injection 40 mg  40 mg Subcutaneous Daily    acetaminophen (Tylenol Extra Strength) tab 500 mg  500 mg  Oral Q4H PRN    ondansetron (Zofran) 4 MG/2ML injection 4 mg  4 mg Intravenous Q6H PRN    prochlorperazine (Compazine) 10 MG/2ML injection 5 mg  5 mg Intravenous Q8H PRN    acetaminophen (Tylenol) tab 650 mg  650 mg Oral Q4H PRN    Or    HYDROcodone-acetaminophen (Norco) 5-325 MG per tab 1 tablet  1 tablet Oral Q4H PRN    Or    HYDROcodone-acetaminophen (Norco) 5-325 MG per tab 2 tablet  2 tablet Oral Q4H PRN     Medications Prior to Admission   Medication Sig    albuterol 108 (90 Base) MCG/ACT Inhalation Aero Soln Inhale 2 puffs into the lungs every 4 to 6 hours as needed for Wheezing.    predniSONE 20 MG Oral Tab Take 2 tablets (40 mg total) by mouth daily. Pt is taking different than order:  takes 2 - 10 mg tablets by mouth daily    furosemide 40 MG Oral Tab Take 1 tablet (40 mg total) by mouth daily.    losartan 50 MG Oral Tab Take 1 tablet (50 mg total) by mouth daily.    hydroxychloroquine 200 MG Oral Tab Take 1 tablet (200 mg total) by mouth 2 (two) times daily.    HYDROcodone-acetaminophen (NORCO)  MG Oral Tab Take 1 tablet by mouth in the morning and 1 tablet at noon and 1 tablet in the evening.    pantoprazole 40 MG Oral Tab EC Take 1 tablet (40 mg total) by mouth 2 (two) times daily before meals.    sodium bicarbonate 650 MG Oral Tab Take 1 tablet (650 mg total) by mouth 2 (two) times daily.    amLODIPine 5 MG Oral Tab Take 1 tablet (5 mg total) by mouth daily.    Aspirin 81 MG Oral Cap 81 mg.    atorvastatin 10 MG Oral Tab Take 1 tablet (10 mg total) by mouth daily.    clopidogrel 75 MG Oral Tab Take 1 tablet (75 mg total) by mouth daily.    ergocalciferol 1.25 MG (74325 UT) Oral Cap Take 1 capsule (50,000 Units total) by mouth once a week.    methotrexate 2.5 MG Oral Tab Take 1 tablet (2.5 mg total) by mouth once a week.    oxybutynin ER 10 MG Oral Tablet 24 Hr Take 1 tablet (10 mg total) by mouth daily.    Potassium Chloride ER 10 MEQ Oral Tab CR Take 1 tablet (10 mEq total) by mouth 2 (two)  times daily.    folic acid 1 MG Oral Tab Take 1 tablet (1 mg total) by mouth daily.       Allergies  Allergies   Allergen Reactions    Cephalosporins ANAPHYLAXIS, NAUSEA AND VOMITING and OTHER (SEE COMMENTS)     Other reaction(s): Fever  Pt received multiple doses of ceftriaxone on 7/2024 without any complcations    Gadolinium Derivatives FEVER    Penicillins OTHER (SEE COMMENTS)     esophagitis    Sulfa Antibiotics OTHER (SEE COMMENTS)     esophagitis       Review of Systems:   Pertinent items are noted in HPI.    Physical Exam:   Vital Signs:  Blood pressure 114/80, pulse 97, temperature 97.5 °F (36.4 °C), temperature source Oral, resp. rate 18, height 5' 2\" (1.575 m), weight 180 lb 9.6 oz (81.9 kg), SpO2 100%.     General: No acute distress. Alert and oriented x 3.  HEENT: Moist mucous membranes. EOM-I. PERRL  Neck: No lymphadenopathy.  No JVD. No carotid bruits.  Respiratory: Clear to auscultation bilaterally.  No wheezes. No rhonchi.  Cardiovascular: S1, S2.  Regular rate and rhythm.  No murmurs. Equal pulses   Abdomen: Soft, nontender, nondistended.  Positive bowel sounds. No rebound tenderness  Neurologic: No focal neurological deficits.  Musculoskeletal: Full range of motion of all extremities.  No swelling noted.  Integument: No lesions. No erythema.  Psychiatric: Appropriate mood and affect.    Results:     Laboratory Data:  Lab Results   Component Value Date    WBC 3.4 (L) 08/17/2024    HGB 7.7 (L) 08/17/2024    HCT 22.9 (L) 08/17/2024    PLT 73.0 (L) 08/17/2024    CREATSERUM 0.56 08/17/2024    BUN 9 08/17/2024     (H) 08/17/2024    K 3.5 08/17/2024     (H) 08/17/2024    CO2 15.0 (L) 08/17/2024    GLU 95 08/17/2024    CA 7.6 (L) 08/17/2024    ALB 2.7 (L) 08/17/2024    ALB 2.6 (L) 08/17/2024    ALKPHO 66 08/17/2024    TP 4.4 (L) 08/17/2024    AST 20 08/17/2024    ALT 15 08/17/2024    LIP 34 07/21/2024    DDIMER 1.69 (H) 07/23/2024    MG 1.2 (L) 08/17/2024    PHOS 2.7 08/17/2024    PHOS 2.7  08/17/2024         Imaging:  No results found.       Impression:     Cellulitis of right lower extremity  - As per primary team      Iatrogenic Adrenal Insufficiency  - Continue HC dose 50mg IV q12  - We will taper as needed      Thank you for allowing me to participate in the care of your patient.    Melanie Sanchez MD  8/16/2024

## 2024-08-19 LAB
ALBUMIN SERPL-MCNC: 2.7 G/DL (ref 3.2–4.8)
ANION GAP SERPL CALC-SCNC: 6 MMOL/L (ref 0–18)
BASOPHILS # BLD AUTO: 0 X10(3) UL (ref 0–0.2)
BASOPHILS NFR BLD AUTO: 0 %
BUN BLD-MCNC: 7 MG/DL (ref 9–23)
BUN/CREAT SERPL: 13.7 (ref 10–20)
CALCIUM BLD-MCNC: 7.9 MG/DL (ref 8.7–10.4)
CHLORIDE SERPL-SCNC: 122 MMOL/L (ref 98–112)
CO2 SERPL-SCNC: 18 MMOL/L (ref 21–32)
CREAT BLD-MCNC: 0.51 MG/DL
DEPRECATED RDW RBC AUTO: 54.5 FL (ref 35.1–46.3)
EGFRCR SERPLBLD CKD-EPI 2021: 104 ML/MIN/1.73M2 (ref 60–?)
EOSINOPHIL # BLD AUTO: 0.03 X10(3) UL (ref 0–0.7)
EOSINOPHIL NFR BLD AUTO: 1 %
ERYTHROCYTE [DISTWIDTH] IN BLOOD BY AUTOMATED COUNT: 19.3 % (ref 11–15)
GLUCOSE BLD-MCNC: 102 MG/DL (ref 70–99)
HCT VFR BLD AUTO: 23.3 %
HGB BLD-MCNC: 7.9 G/DL
IMM GRANULOCYTES # BLD AUTO: 0.02 X10(3) UL (ref 0–1)
IMM GRANULOCYTES NFR BLD: 0.7 %
LYMPHOCYTES # BLD AUTO: 1.79 X10(3) UL (ref 1–4)
LYMPHOCYTES NFR BLD AUTO: 61.1 %
MAGNESIUM SERPL-MCNC: 2.1 MG/DL (ref 1.6–2.6)
MCH RBC QN AUTO: 28.8 PG (ref 26–34)
MCHC RBC AUTO-ENTMCNC: 33.9 G/DL (ref 31–37)
MCV RBC AUTO: 85 FL
MONOCYTES # BLD AUTO: 0.4 X10(3) UL (ref 0.1–1)
MONOCYTES NFR BLD AUTO: 13.7 %
NEUTROPHILS # BLD AUTO: 0.69 X10 (3) UL (ref 1.5–7.7)
NEUTROPHILS # BLD AUTO: 0.69 X10(3) UL (ref 1.5–7.7)
NEUTROPHILS NFR BLD AUTO: 23.5 %
OSMOLALITY SERPL CALC.SUM OF ELEC: 300 MOSM/KG (ref 275–295)
PHOSPHATE SERPL-MCNC: 2.4 MG/DL (ref 2.4–5.1)
PHOSPHATE SERPL-MCNC: 2.4 MG/DL (ref 2.4–5.1)
PLATELET # BLD AUTO: 153 10(3)UL (ref 150–450)
POTASSIUM SERPL-SCNC: 4.4 MMOL/L (ref 3.5–5.1)
POTASSIUM SERPL-SCNC: 4.4 MMOL/L (ref 3.5–5.1)
RBC # BLD AUTO: 2.74 X10(6)UL
SODIUM SERPL-SCNC: 146 MMOL/L (ref 136–145)
WBC # BLD AUTO: 2.9 X10(3) UL (ref 4–11)

## 2024-08-19 PROCEDURE — 99232 SBSQ HOSP IP/OBS MODERATE 35: CPT | Performed by: INTERNAL MEDICINE

## 2024-08-19 PROCEDURE — 99233 SBSQ HOSP IP/OBS HIGH 50: CPT | Performed by: HOSPITALIST

## 2024-08-19 RX ORDER — IBUPROFEN 400 MG/1
400 TABLET, FILM COATED ORAL EVERY 6 HOURS PRN
Status: DISCONTINUED | OUTPATIENT
Start: 2024-08-19 | End: 2024-08-24

## 2024-08-19 RX ORDER — LINEZOLID 600 MG/1
600 TABLET, FILM COATED ORAL EVERY 12 HOURS
Status: DISCONTINUED | OUTPATIENT
Start: 2024-08-19 | End: 2024-08-24

## 2024-08-19 NOTE — PLAN OF CARE
Problem: Patient Centered Care  Goal: Patient preferences are identified and integrated in the patient's plan of care  Description: Interventions:  - What would you like us to know as we care for you?   - Provide timely, complete, and accurate information to patient/family  - Incorporate patient and family knowledge, values, beliefs, and cultural backgrounds into the planning and delivery of care  - Encourage patient/family to participate in care and decision-making at the level they choose  - Honor patient and family perspectives and choices  Outcome: Progressing     Problem: Patient/Family Goals  Goal: Patient/Family Long Term Goal  Description: Patient's Long Term Goal:     Interventions:  - See additional Care Plan goals for specific interventions  Outcome: Progressing  Goal: Patient/Family Short Term Goal  Description: Patient's Short Term Goal:    Interventions:   - See additional Care Plan goals for specific interventions  Outcome: Progressing     Problem: GASTROINTESTINAL - ADULT  Goal: Maintains or returns to baseline bowel function  Description: INTERVENTIONS:  - Assess bowel function  - Maintain adequate hydration with IV or PO as ordered and tolerated  - Evaluate effectiveness of GI medications  - Encourage mobilization and activity  - Obtain nutritional consult as needed  - Establish a toileting routine/schedule  - Consider collaborating with pharmacy to review patient's medication profile  Outcome: Progressing     Problem: SKIN/TISSUE INTEGRITY - ADULT  Goal: Skin integrity remains intact  Description: INTERVENTIONS  - Assess and document risk factors for pressure ulcer development  - Assess and document skin integrity  - Monitor for areas of redness and/or skin breakdown  - Initiate interventions, skin care algorithm/standards of care as needed  Outcome: Progressing  Goal: Incision(s), wounds(s) or drain site(s) healing without S/S of infection  Description: INTERVENTIONS:  - Assess and document  risk factors for pressure ulcer development  - Assess and document skin integrity  - Assess and document dressing/incision, wound bed, drain sites and surrounding tissue  - Implement wound care per orders  - Initiate isolation precautions as appropriate  - Initiate Pressure Ulcer prevention bundle as indicated  Outcome: Progressing     Problem: PAIN - ADULT  Goal: Verbalizes/displays adequate comfort level or patient's stated pain goal  Description: INTERVENTIONS:  - Encourage pt to monitor pain and request assistance  - Assess pain using appropriate pain scale  - Administer analgesics based on type and severity of pain and evaluate response  - Implement non-pharmacological measures as appropriate and evaluate response  - Consider cultural and social influences on pain and pain management  - Manage/alleviate anxiety  - Utilize distraction and/or relaxation techniques  - Monitor for opioid side effects  - Notify MD/LIP if interventions unsuccessful or patient reports new pain  - Anticipate increased pain with activity and pre-medicate as appropriate  Outcome: Progressing     Problem: RISK FOR INFECTION - ADULT  Goal: Absence of fever/infection during anticipated neutropenic period  Description: INTERVENTIONS  - Monitor WBC  - Administer growth factors as ordered  - Implement neutropenic guidelines  Outcome: Progressing     Problem: SAFETY ADULT - FALL  Goal: Free from fall injury  Description: INTERVENTIONS:  - Assess pt frequently for physical needs  - Identify cognitive and physical deficits and behaviors that affect risk of falls.  - [place] fall precautions as indicated by assessment.  - Educate pt/family on patient safety including physical limitations  - Instruct pt to call for assistance with activity based on assessment  - Modify environment to reduce risk of injury  - Provide assistive devices as appropriate  - Consider OT/PT consult to assist with strengthening/mobility  - Encourage toileting  schedule  Outcome: Progressing     Problem: DISCHARGE PLANNING  Goal: Discharge to home or other facility with appropriate resources  Description: INTERVENTIONS:  - Identify barriers to discharge w/pt and caregiver  - Include patient/family/discharge partner in discharge planning  - Arrange for needed discharge resources and transportation as appropriate  - Identify discharge learning needs (meds, wound care, etc)  - Arrange for interpreters to assist at discharge as needed  - Consider post-discharge preferences of patient/family/discharge partner  - Complete POLST form as appropriate  - Assess patient's ability to be responsible for managing their own health  - Refer to Case Management Department for coordinating discharge planning if the patient needs post-hospital services based on physician/LIP order or complex needs related to functional status, cognitive ability or social support system  Outcome: Progressing   Scheduled medications given- see MAR, bed at lowest position, bed alarm on, belongings and call light within reach. Frequent staff rounding.

## 2024-08-19 NOTE — PLAN OF CARE
Problem: Patient Centered Care  Goal: Patient preferences are identified and integrated in the patient's plan of care  Description: Interventions:  - What would you like us to know as we care for you? Pt hasn't had bowel movement in 3 weeks according to pt  - Provide timely, complete, and accurate information to patient/family  - Incorporate patient and family knowledge, values, beliefs, and cultural backgrounds into the planning and delivery of care  - Encourage patient/family to participate in care and decision-making at the level they choose  - Honor patient and family perspectives and choices  8/19/2024 1259 by Pura Gonzalez RN  Outcome: Progressing  8/19/2024 1258 by Pura Gonzalez RN  Outcome: Progressing     Problem: Patient/Family Goals  Goal: Patient/Family Long Term Goal  Description: Patient's Long Term Goal: Pt would like to return home    Interventions:  - Pt updated on plan of care  - See additional Care Plan goals for specific interventions  8/19/2024 1259 by Pura Gonzalez RN  Outcome: Progressing  8/19/2024 1258 by Pura Gonzalez RN  Outcome: Progressing  Goal: Patient/Family Short Term Goal  Description: Patient's Short Term Goal: Pt would like to have a bowel movement     Interventions:   - prn medications given  - See additional Care Plan goals for specific interventions  8/19/2024 1259 by Pura Gonzalez RN  Outcome: Progressing  8/19/2024 1258 by Pura Gonzalez RN  Outcome: Progressing     Problem: GASTROINTESTINAL - ADULT  Goal: Maintains or returns to baseline bowel function  Description: INTERVENTIONS:  - Assess bowel function  - Maintain adequate hydration with IV or PO as ordered and tolerated  - Evaluate effectiveness of GI medications  - Encourage mobilization and activity  - Obtain nutritional consult as needed  - Establish a toileting routine/schedule  - Consider collaborating with pharmacy to review patient's medication profile  8/19/2024 1259 by Pura Gonzalez  RN  Outcome: Progressing  8/19/2024 1258 by Pura Gonzalez RN  Outcome: Progressing     Problem: SKIN/TISSUE INTEGRITY - ADULT  Goal: Skin integrity remains intact  Description: INTERVENTIONS  - Assess and document risk factors for pressure ulcer development  - Assess and document skin integrity  - Monitor for areas of redness and/or skin breakdown  - Initiate interventions, skin care algorithm/standards of care as needed  8/19/2024 1259 by Pura Gonzalez RN  Outcome: Progressing  8/19/2024 1258 by Pura Gonzalez RN  Outcome: Progressing  Goal: Incision(s), wounds(s) or drain site(s) healing without S/S of infection  Description: INTERVENTIONS:  - Assess and document risk factors for pressure ulcer development  - Assess and document skin integrity  - Assess and document dressing/incision, wound bed, drain sites and surrounding tissue  - Implement wound care per orders  - Initiate isolation precautions as appropriate  - Initiate Pressure Ulcer prevention bundle as indicated  8/19/2024 1259 by Pura Gonzalez RN  Outcome: Progressing  8/19/2024 1258 by Pura Gonzalez RN  Outcome: Progressing     Problem: PAIN - ADULT  Goal: Verbalizes/displays adequate comfort level or patient's stated pain goal  Description: INTERVENTIONS:  - Encourage pt to monitor pain and request assistance  - Assess pain using appropriate pain scale  - Administer analgesics based on type and severity of pain and evaluate response  - Implement non-pharmacological measures as appropriate and evaluate response  - Consider cultural and social influences on pain and pain management  - Manage/alleviate anxiety  - Utilize distraction and/or relaxation techniques  - Monitor for opioid side effects  - Notify MD/LIP if interventions unsuccessful or patient reports new pain  - Anticipate increased pain with activity and pre-medicate as appropriate  8/19/2024 1259 by Pura Gonzalez RN  Outcome: Progressing  8/19/2024 1258 by Pura Gonzalez  RN  Outcome: Progressing     Problem: RISK FOR INFECTION - ADULT  Goal: Absence of fever/infection during anticipated neutropenic period  Description: INTERVENTIONS  - Monitor WBC  - Administer growth factors as ordered  - Implement neutropenic guidelines  8/19/2024 1259 by Pura Gonzalez RN  Outcome: Progressing  8/19/2024 1258 by Pura Gonzalez RN  Outcome: Progressing     Problem: SAFETY ADULT - FALL  Goal: Free from fall injury  Description: INTERVENTIONS:  - Assess pt frequently for physical needs  - Identify cognitive and physical deficits and behaviors that affect risk of falls.  - [place] fall precautions as indicated by assessment.  - Educate pt/family on patient safety including physical limitations  - Instruct pt to call for assistance with activity based on assessment  - Modify environment to reduce risk of injury  - Provide assistive devices as appropriate  - Consider OT/PT consult to assist with strengthening/mobility  - Encourage toileting schedule  8/19/2024 1259 by Pura Gonzalez RN  Outcome: Progressing  8/19/2024 1258 by Pura Gonzalez RN  Outcome: Progressing     Problem: DISCHARGE PLANNING  Goal: Discharge to home or other facility with appropriate resources  Description: INTERVENTIONS:  - Identify barriers to discharge w/pt and caregiver  - Include patient/family/discharge partner in discharge planning  - Arrange for needed discharge resources and transportation as appropriate  - Identify discharge learning needs (meds, wound care, etc)  - Arrange for interpreters to assist at discharge as needed  - Consider post-discharge preferences of patient/family/discharge partner  - Complete POLST form as appropriate  - Assess patient's ability to be responsible for managing their own health  - Refer to Case Management Department for coordinating discharge planning if the patient needs post-hospital services based on physician/LIP order or complex needs related to functional status, cognitive  ability or social support system  8/19/2024 1259 by Pura Gonzalez, RN  Outcome: Progressing  8/19/2024 1258 by Pura Gonzalez, RN  Outcome: Progressing

## 2024-08-19 NOTE — PROGRESS NOTES
INFECTIOUS DISEASE PROGRESS NOTE  [facility]  part of [facility] ID PROGRESS NOTE    Sheri Garzon Patient Status:  Inpatient    1960 MRN [id number]   Location [facility] 5SW/SE Attending Derick Hsieh MD   Hosp Day # 7 PCP TERI MCKENNA MD     Subjective:  ROS reviewed. Feels better but notes swelling in both legs.    ASSESSMENT:    Antibiotics: Vancomycin -     # Acute RLE cellulitis with posterior leg wound in immunocompromised patient   -s/p bedside debridement 8/15  # Rheumatoid arthritis, on 20 mg prednisone daily, methotrexate, HCQ     PLAN:  -  Currently on vancomycin, day 8. Will DC and start linezolid.  -  Follow fever curve, wbc.  -  Reviewed labs, micro, imaging reports, available old records.  -  Case d/w patient, RN.     History of Present Illness:  Sheri Garzon is a 64 year old female with a history of rheumatoid arthritis on chronic 20 mg prednisone daily, methotrexate, HCQ, with recent [facility] admission - with vomiting s/p EGD with small hiatal hernia, who presented to [facility] ED on  with worsening pain in her right foot. Has had a wound for the last month and a half that has not healed. Was on ciprofloxacin last month. No fevers or chills at home. On arrival, afebrile, wbc 4.5, blood cx obtained, XR R foot with soft tissue swelling, started on vancomycin. ID consulted.    Physical Exam:  /69 (BP Location: Right arm)   Pulse 96   Temp 98 °F (36.7 °C) (Oral)   Resp 18   Ht 5' 2\" (1.575 m)   Wt 197 lb 11.2 oz (89.7 kg)   SpO2 98%   BMI 36.16 kg/m²     Gen:   Awake, in bed  HEENT:  EOMI, neck supple  CV/lungs:  RRR, CTAB  Abdom:  Soft, NT/ND, +BS  Skin/extrem:  Posterior R leg wound stable, R foot edema with stable blisters, LLE edema  Lines:  PIV+    Laboratory Data: Reviewed    Microbiology: Reviewed    Radiology: Reviewed      [facility]  [phone]  2024

## 2024-08-19 NOTE — PROGRESS NOTES
[facility]  part of formerly [facility]    Progress Note    Sheri Garzon Patient Status:  Inpatient    1960 MRN [id number]   Location [facility] 5SW/SE Attending Derick Hsieh MD   Hosp Day # 7 PCP TEIR MCKENNA MD     Subjective:  She is feeling well this AM.  Overall feeling better since hospitalization.  Electrolytes improving.     A comprehensive 10 point review of systems was completed.  Pertinent positives and negatives noted in the the HPI.      Objective/Physical Exam:  Physical Exam:   General: Alert, orientated x3.  Cooperative.  No apparent distress.  Vital Signs:  Blood pressure 97/71, pulse 98, temperature 97.7 °F (36.5 °C), temperature source Oral, resp. rate 18, height 5' 2\" (1.575 m), weight 197 lb 11.2 oz (89.7 kg), SpO2 98%.  HEENT: Exam is unremarkable.  Neck: Supple.  Lungs: Clear bilaterally.  Cardiac: Regular rate and rhythm.  Abdomen:  Bowel sounds present, normoactive.  Nontender.  Extremities:  No lower extremity edema noted.  Skin: Normal texture and turgor.  Lymphatic:  No cervical lymphadenopathy.    Labs:  Recent Labs   Lab 24  0601 24  1755 24  0529 24  1742 24  0536   GLU 95  --  96  --  102*   BUN 9  --  6*  --  7*   CREATSERUM 0.56  --  0.52*  --  0.51*   EGFRCR 102  --  104  --  104   CA 7.6*  --  7.1*  --  7.9*   *  --  146*  --  146*   K 2.8*  2.8*   < > 3.4* 3.2* 4.4  4.4   *  --  120*  --  122*   CO2 15.0*  --  17.0*  --  18.0*    < > = values in this interval not displayed.         Assessment/Plan:  Patient Active Problem List   Diagnosis    Dehydration    Metabolic acidosis    Bilious vomiting with nausea    Difficult intravenous access    Hypokalemia    Hypomagnesemia    Hiatal hernia    Cellulitis of right lower extremity    Cellulitis    RTA (renal tubular acidosis)       Adrenal Insufficiency  - She is feeling improved  - Electrolytes normalized, some contributing factor from RTA  - Resume  home dose of glucocorticoid  - Continue Prednisone 10mg PO daily   - Discharge home on above dose    Endocrine service will sign off and please call back with questions.     Meredith Powell MD  8/19/2024  8:37 AM

## 2024-08-19 NOTE — PROGRESS NOTES
[facility]  part of [facility]    Progress Note    Sheri Garzon Patient Status:  Inpatient    1960 MRN [id number]   Location [facility] 5SW/SE Attending Derick Hsieh MD   Hosp Day # 7 PCP TERI MCKENNA MD       Subjective:   Sheri Garzon is a(n) 64 year old female who I am seeing for electrolyte issues      Patient states she is moving her bowels    Objective:   /69 (BP Location: Right arm)   Pulse 96   Temp 98 °F (36.7 °C) (Oral)   Resp 18   Ht 5' 2\" (1.575 m)   Wt 197 lb 11.2 oz (89.7 kg)   SpO2 98%   BMI 36.16 kg/m²      Intake/Output Summary (Last 24 hours) at 2024 1438  Last data filed at 2024 1200  Gross per 24 hour   Intake 660 ml   Output --   Net 660 ml     Wt Readings from Last 1 Encounters:   24 197 lb 11.2 oz (89.7 kg)       Exam  Gen: No acute distress, Heent: NC AT, mucous memb clear, neck supple  Pulm: Lungs clear, normal respiratory effort  CV: Heart with regular rate and rhythm, no edema  Abd: Abdomen soft, nontender, nondistended, no organomegaly, bowel sounds present  Skin: no symptoms reported  Psych: alert and oriented    Assessment and Plan:     1 -electrolyte abnormalities  She has a nonanion gap metabolic acidosis.  Her potassium has normalized as has her magnesium    Continue potassium chloride.    She appears to have some sort of an RTA.    For now she continues on sodium bicarbonate as well.    2 -cellulitis  Status post debridement, she is on vancomycin which will be stopped.  She will begin linezolid    3 -rheumatoid arthritis  She is on 20 mg of prednisone daily as well as methotrexate and h Plaquenil      Results:     Recent Labs   Lab 24  0601 24  0529 24  0536   RBC 2.73* 2.64* 2.74*   HGB 7.7* 7.5* 7.9*   HCT 22.9* 22.2* 23.3*   MCV 83.9 84.1 85.0   MCH 28.2 28.4 28.8   MCHC 33.6 33.8 33.9   RDW 17.8* 17.6* 19.3*   NEPRELIM 2.30 1.37* 0.69*   WBC 3.4* 2.7* 2.9*   PLT 73.0* 106.0* 153.0          Recent Labs   Lab 08/17/24  0601 08/17/24  1755 08/18/24  0529 08/18/24  1742 08/19/24  0536   GLU 95  --  96  --  102*   BUN 9  --  6*  --  7*   CREATSERUM 0.56  --  0.52*  --  0.51*   CA 7.6*  --  7.1*  --  7.9*   *  --  146*  --  146*   K 2.8*  2.8*   < > 3.4* 3.2* 4.4  4.4   *  --  120*  --  122*   CO2 15.0*  --  17.0*  --  18.0*    < > = values in this interval not displayed.          No results found.        BRODY LAKHANI MD  8/19/2024

## 2024-08-19 NOTE — PROGRESS NOTES
[facility]  part of [place] Hospitalist Progress Note     Sheri Garzon Patient Status:  Inpatient    1960 MRN [id number]   Location [facility] 5SW/SE Attending Derick Hsieh MD   Hosp Day # 7 PCP TERI MCKENNA MD     Subjective:     Pt was seen and examined  RLE pain controlled  No cp, sob, f,c, abd pain or HA  No BM yet    Objective:    Review of Systems:   ROS completed; pertinent positive and negatives stated in subjective.      Vital signs:  Temp:  [97.7 °F (36.5 °C)-98.3 °F (36.8 °C)] 98 °F (36.7 °C)  Pulse:  [] 96  Resp:  [18] 18  BP: ()/(45-75) 108/69  SpO2:  [98 %-100 %] 98 %      Physical Exam:    Gen: NAD AO x3  Chest: good air entry CTABL  CVS: normal s1 and s2 RR  Abd: NABS soft NT ND  Neuro: CN 2-12 grossly intact  Ext: RLE surgical site CDI      Diagnostic Data:    Labs:  Recent Labs   Lab 08/15/24  0548 24  0606 24  0601 24  0529 24  0536   WBC 4.8 4.6 3.4* 2.7* 2.9*   HGB 8.5* 7.7* 7.7* 7.5* 7.9*   MCV 85.3 85.4 83.9 84.1 85.0   PLT 71.0* 68.0* 73.0* 106.0* 153.0       Recent Labs   Lab 24  0601 24  1755 24  0529 24  1742 24  0536   GLU 95  --  96  --  102*   BUN 9  --  6*  --  7*   CREATSERUM 0.56  --  0.52*  --  0.51*   CA 7.6*  --  7.1*  --  7.9*   ALB 2.6*  2.7*  --  2.7*  --  2.7*   *  --  146*  --  146*   K 2.8*  2.8*   < > 3.4* 3.2* 4.4  4.4   *  --  120*  --  122*   CO2 15.0*  --  17.0*  --  18.0*   ALKPHO 66  --   --   --   --    AST 20  --   --   --   --    ALT 15  --   --   --   --    BILT 0.3  --   --   --   --    TP 4.4*  --   --   --   --     < > = values in this interval not displayed.       Estimated Creatinine Clearance: 88.1 mL/min (A) (based on SCr of 0.51 mg/dL (L)).    No results for input(s): \"PTP\", \"INR\" in the last 168 hours.           Imaging: Imaging data reviewed in Epic.    Medications:    sodium phosphate  15 mmol Intravenous Once     predniSONE  10 mg Oral Daily with breakfast    sodium bicarbonate  650 mg Oral TID    potassium chloride  40 mEq Oral BID    vancomycin  1,000 mg Intravenous Q24H    collagenase   Topical Daily    amLODIPine  5 mg Oral Daily    aspirin  81 mg Oral Daily    atorvastatin  10 mg Oral Daily    clopidogrel  75 mg Oral Daily    hydroxychloroquine  200 mg Oral BID    losartan  50 mg Oral Daily    oxybutynin ER  10 mg Oral Daily    enoxaparin  40 mg Subcutaneous Daily       Assessment & Plan:     RLE cellulitis -> improved  Imaging reviewed  Started on IV abx, cont for now  ID on consult  Appreciate recs  Gsx on consult  Underwent I+D of RLE on 8/15/24  Rheumatoid arthritis  Continue home meds  CAD  HTN  HLD  Continue ASA, Statin, Plavix  Continue home antihypertensives  6.   Multiple electrolyte abnormalities and NAGMA   1.    Due to Distal RTA   2.    Cont electrolyte replacement  7.   Hypernatremia   1.    Improved      Plan of care discussed with patient at bedside.      Supplementary Documentation:     Quality:  DVT Prophylaxis: Lovenox s/c  CODE status: Full       Estimated date of discharge: TBD  Discharge is dependent on: clinical stability  At this point Ms. Garzon is expected to be discharge to: home    MDM: High

## 2024-08-19 NOTE — SPIRITUAL CARE NOTE
Spiritual Care Visit Note    Patient Name: Sheri Garzon Date of Spiritual Care Visit: 24   : 1960 Primary Dx: Cellulitis of right lower extremity       Referred By: Referral From:     Spiritual Care Taxonomy:    Intended Effects: Establish rapport and connectedness    Methods: Collaborate with care team member;Offer support    Interventions: Active listening;Ask guided questions;Silent prayer    Visit Type/Summary:     - Spiritual Care: Consulted with RN prior to visit. Offered empathic listening and emotional support. Patient declined a  visit at this time. Provided information regarding how to contact Spiritual Care and left a Spiritual Care information card.     ERIN Ray CAMII   [id number]     Spiritual Care support can be requested via an Epic consult. For urgent/immediate needs, please contact the On Call  at: [facility]: ext [phone]

## 2024-08-19 NOTE — PAYOR COMM NOTE
--------------  CONTINUED STAY REVIEW  8/18-8/19  Payor: [employer]  Subscriber #:  [id number]  Authorization Number: [id number]    Admit date: 8/12/24  Admit time:  8:49 PM    REVIEW DOCUMENTATION:  8/18    RLE pain controlled  No cp, sob, f,c, abd pain or HA  No nausea   Still has not had a BM           Objective:  Review of Systems:   ROS completed; pertinent positive and negatives stated in subjective.        Vital signs:  Temp:  [97.3 °F (36.3 °C)-98.2 °F (36.8 °C)] 98.1 °F (36.7 °C)  Pulse:  [] 103  Resp:  [18] 18  BP: (100-117)/(56-80) 104/62  SpO2:  [100 %] 100 %        Physical Exam:    Gen: NAD AO x3  Chest: good air entry CTABL  CVS: normal s1 and s2 RR  Abd: NABS soft NT ND  Neuro: CN 2-12 grossly intact  Ext: RLE surgical site CDI        Diagnostic Data:    Labs:          Recent Labs   Lab 08/13/24  0712 08/15/24  0548 08/16/24  0606 08/17/24  0601 08/18/24  0529   WBC 5.2 4.8 4.6 3.4* 2.7*   HGB 9.3* 8.5* 7.7* 7.7* 7.5*   MCV 91.2 85.3 85.4 83.9 84.1   .0* 71.0* 68.0* 73.0* 106.0*                 Recent Labs   Lab 08/16/24  0606 08/16/24 2058 08/17/24  0601 08/17/24  1755 08/18/24  0529   GLU 95  --  95  --  96   BUN 10  --  9  --  6*   CREATSERUM 0.57  --  0.56  --  0.52*   CA 7.4*  --  7.6*  --  7.1*   ALB 2.7*  --  2.6*  2.7*  --  2.7*   *  --  146*  --  146*   K 3.4*  3.4*   < > 2.8*  2.8* 3.5 3.4*   *  --  122*  --  120*   CO2 13.0*  --  15.0*  --  17.0*   ALKPHO  --   --  66  --   --    AST  --   --  20  --   --    ALT  --   --  15  --   --    BILT  --   --  0.3  --   --    TP  --   --  4.4*  --   --     < > = values in this interval not displayed.         Estimated Creatinine Clearance: 86.4 mL/min (A) (based on SCr of 0.52 mg/dL (L)).     No results for input(s): \"PTP\", \"INR\" in the last 168 hours.           Imaging: Imaging data reviewed in Epic.     Medications:   Scheduled Medications    potassium phosphate dibasic 15 mmol in sodium chloride 0.9%  250 mL IVPB  15 mmol Intravenous Once     Followed by    potassium chloride  20 mEq Intravenous Once    predniSONE  10 mg Oral Daily with breakfast    potassium citrate-citric acid  15 mL Oral TID CC    vancomycin  1,000 mg Intravenous Q24H    collagenase   Topical Daily    amLODIPine  5 mg Oral Daily    aspirin  81 mg Oral Daily    atorvastatin  10 mg Oral Daily    clopidogrel  75 mg Oral Daily    hydroxychloroquine  200 mg Oral BID    losartan  50 mg Oral Daily    oxybutynin ER  10 mg Oral Daily    enoxaparin  40 mg Subcutaneous Daily                  Assessment & Plan:  RLE cellulitis -> improved  Imaging reviewed  Started on IV abx, cont for now  ID on consult  Appreciate recs  Gsx on consult  Underwent I+D of RLE on 8/15/24  Rheumatoid arthritis  Continue home meds  CAD  HTN  HLD  Continue ASA, Statin, Plavix  Continue home antihypertensives  6.   Multiple electrolyte abnormalities and NAGMA                1.    Due to Distal RTA                2.    Cont electrolyte replacement  7.   Hypernatremia                1.    Improving s/p d5+0.45, cont            8/19    RLE pain controlled  No cp, sob, f,c, abd pain or HA  No BM yet           Objective:  Review of Systems:   ROS completed; pertinent positive and negatives stated in subjective.        Vital signs:  Temp:  [97.7 °F (36.5 °C)-98.3 °F (36.8 °C)] 98 °F (36.7 °C)  Pulse:  [] 96  Resp:  [18] 18  BP: ()/(45-75) 108/69  SpO2:  [98 %-100 %] 98 %        Physical Exam:    Gen: NAD AO x3  Chest: good air entry CTABL  CVS: normal s1 and s2 RR  Abd: NABS soft NT ND  Neuro: CN 2-12 grossly intact  Ext: RLE surgical site CDI        Diagnostic Data:    Labs:          Recent Labs   Lab 08/15/24  0548 08/16/24  0606 08/17/24  0601 08/18/24  0529 08/19/24  0536   WBC 4.8 4.6 3.4* 2.7* 2.9*   HGB 8.5* 7.7* 7.7* 7.5* 7.9*   MCV 85.3 85.4 83.9 84.1 85.0   PLT 71.0* 68.0* 73.0* 106.0* 153.0                 Recent Labs   Lab 08/17/24  0601 08/17/24  8886  08/18/24  0529 08/18/24  1742 08/19/24  0536   GLU 95  --  96  --  102*   BUN 9  --  6*  --  7*   CREATSERUM 0.56  --  0.52*  --  0.51*   CA 7.6*  --  7.1*  --  7.9*   ALB 2.6*  2.7*  --  2.7*  --  2.7*   *  --  146*  --  146*   K 2.8*  2.8*   < > 3.4* 3.2* 4.4  4.4   *  --  120*  --  122*   CO2 15.0*  --  17.0*  --  18.0*   ALKPHO 66  --   --   --   --    AST 20  --   --   --   --    ALT 15  --   --   --   --    BILT 0.3  --   --   --   --    TP 4.4*  --   --   --   --     < > = values in this interval not displayed.         Estimated Creatinine Clearance: 88.1 mL/min (A) (based on SCr of 0.51 mg/dL (L)).     No results for input(s): \"PTP\", \"INR\" in the last 168 hours.           Imaging: Imaging data reviewed in Epic.     Medications:   Scheduled Medications    sodium phosphate  15 mmol Intravenous Once    predniSONE  10 mg Oral Daily with breakfast    sodium bicarbonate  650 mg Oral TID    potassium chloride  40 mEq Oral BID    vancomycin  1,000 mg Intravenous Q24H    collagenase   Topical Daily    amLODIPine  5 mg Oral Daily    aspirin  81 mg Oral Daily    atorvastatin  10 mg Oral Daily    clopidogrel  75 mg Oral Daily    hydroxychloroquine  200 mg Oral BID    losartan  50 mg Oral Daily    oxybutynin ER  10 mg Oral Daily    enoxaparin  40 mg Subcutaneous Daily                  Assessment & Plan:  RLE cellulitis -> improved  Imaging reviewed  Started on IV abx, cont for now  ID on consult  Appreciate recs  Gsx on consult  Underwent I+D of RLE on 8/15/24  Rheumatoid arthritis  Continue home meds  CAD  HTN  HLD  Continue ASA, Statin, Plavix  Continue home antihypertensives  6.   Multiple electrolyte abnormalities and NAGMA                1.    Due to Distal RTA                2.    Cont electrolyte replacement  7.   Hypernatremia                1.    Improved      8/19 ID    ASSESSMENT:     Antibiotics: Vancomycin 8/12-     # Acute RLE cellulitis with posterior leg wound in immunocompromised patient                -s/p bedside debridement 8/15  # Rheumatoid arthritis, on 20 mg prednisone daily, methotrexate, HCQ     PLAN:  -  Currently on vancomycin, day 8. Will DC and start linezolid.        8/19 Nephrology    1 -electrolyte abnormalities  She has a nonanion gap metabolic acidosis.  Her potassium has normalized as has her magnesium     Continue potassium chloride.     She appears to have some sort of an RTA.     For now she continues on sodium bicarbonate as well.     2 -cellulitis  Status post debridement, she is on vancomycin which will be stopped.  She will begin linezolid     3 -rheumatoid arthritis  She is on 20 mg of prednisone daily as well as methotrexate and h Plaquenil              MEDICATIONS ADMINISTERED IN LAST 1 DAY:  acetaminophen (Tylenol) tab 650 mg       Date Action Dose Route User    8/19/2024 0945 Given 650 mg Oral Pura Gonzalez RN    8/19/2024 0535 Given 650 mg Oral Bernarda Bob RN          aspirin chewable tab 81 mg       Date Action Dose Route User    8/19/2024 0943 Given 81 mg Oral Pura Gonzalez RN          atorvastatin (Lipitor) tab 10 mg       Date Action Dose Route User    8/19/2024 0944 Given 10 mg Oral Pura Gonzalez RN          clopidogrel (Plavix) tab 75 mg       Date Action Dose Route User    8/19/2024 0946 Given 75 mg Oral Pura Gonzalez RN          collagenase (Santyl) 250 UNIT/GM ointment       Date Action Dose Route User    8/19/2024 0947 Given (none) Topical Pura Gonzalez RN          docusate sodium (Colace) cap 100 mg       Date Action Dose Route User    8/19/2024 0944 Given 100 mg Oral Pura Gonzalez RN          enoxaparin (Lovenox) 40 MG/0.4ML SUBQ injection 40 mg       Date Action Dose Route User    8/19/2024 0945 Given 40 mg Subcutaneous (Left Lower Abdomen) Pura Gonzalez RN          HYDROcodone-acetaminophen (Norco) 5-325 MG per tab 1 tablet       Date Action Dose Route User    8/19/2024 1348 Given 1 tablet Oral Pura Gonzalez RN           HYDROcodone-acetaminophen (Norco) 5-325 MG per tab 2 tablet       Date Action Dose Route User    8/18/2024 2124 Given 2 tablet Oral Bernarda Bob RN    8/18/2024 1539 Given 2 tablet Oral Shannan Alcaraz RN          hydroxychloroquine (Plaquenil) tab 200 mg       Date Action Dose Route User    8/19/2024 1005 Given 200 mg Oral Pura Gonzalez RN    8/18/2024 2123 Given 200 mg Oral Bernarda Bob RN          linezolid (Zyvox) tab 600 mg       Date Action Dose Route User    8/19/2024 1342 Given 600 mg Oral Pura Gonzalez RN          oxybutynin ER (Ditropan-XL) 24 hr tab 10 mg       Date Action Dose Route User    8/19/2024 0944 Given 10 mg Oral Pura Gonzalez RN          potassium chloride 40 mEq in 250mL sodium chloride 0.9% IVPB premix       Date Action Dose Route User    8/18/2024 2123 New Bag 40 mEq Intravenous Bernarda Bob RN          potassium chloride (Klor-Con M20) tab 40 mEq       Date Action Dose Route User    8/19/2024 0947 Given 40 mEq Oral Pura Gonzalez RN          predniSONE (Deltasone) tab 10 mg       Date Action Dose Route User    8/19/2024 0942 Given 10 mg Oral Pura Gonzalez RN          sodium bicarbonate tab 650 mg       Date Action Dose Route User    8/19/2024 0943 Given 650 mg Oral Pura Gonzalez RN    8/18/2024 2123 Given 650 mg Oral Bernarda Bob RN    8/18/2024 1747 Given 650 mg Oral Shannan Alcaraz RN          sodium phosphate 15 mmol in 0.9% NaCl 100mL IVPB premix       Date Action Dose Route User    8/19/2024 1302 Given 15 mmol Intravenous Pura Gonzalez RN          vancomycin (Vancocin) 1,000 mg in sodium chloride 0.9% 250 mL IVPB-ADDV       Date Action Dose Route User    8/19/2024 0956 New Bag 1,000 mg Intravenous Pura Gonzalez RN            Vitals (last day)       Date/Time Temp Pulse Resp BP SpO2 Weight O2 Device O2 Flow Rate (L/min) [facility]    08/19/24 1040 -- -- -- 108/69 -- -- -- --     08/19/24 0925 -- -- -- 75/45 -- -- -- --     08/19/24 0921  98 °F (36.7 °C) 96 -- 98/64 98 % -- None (Room air) -- AA    08/19/24 0520 97.7 °F (36.5 °C) 98 18 97/71 98 % 197 lb 11.2 oz (89.7 kg) None (Room air) -- EM    08/18/24 2122 98.1 °F (36.7 °C) 92 18 110/66 100 % -- None (Room air) -- EM    08/18/24 2000 -- 100 -- -- -- -- -- -- KD    08/18/24 1927 -- 150 -- -- -- -- -- -- KD    08/18/24 1501 98.3 °F (36.8 °C) 94 18 112/75 100 % -- None (Room air) -- YD    08/18/24 0954 98.1 °F (36.7 °C) 103 18 104/62 100 % -- None (Room air) -- YD    08/18/24 0519 97.3 °F (36.3 °C) 88 18 100/56 100 % -- None (Room air) -- EM    08/18/24 0500 -- -- -- -- -- 180 lb 14.4 oz (82.1 kg) -- -- CD          CIWA Scores (since admission)       None

## 2024-08-20 ENCOUNTER — APPOINTMENT (OUTPATIENT)
Dept: ULTRASOUND IMAGING | Facility: HOSPITAL | Age: 64
End: 2024-08-20
Attending: INTERNAL MEDICINE
Payer: COMMERCIAL

## 2024-08-20 LAB
ALBUMIN SERPL-MCNC: 2.4 G/DL (ref 3.2–4.8)
ANION GAP SERPL CALC-SCNC: 7 MMOL/L (ref 0–18)
BASOPHILS # BLD AUTO: 0 X10(3) UL (ref 0–0.2)
BASOPHILS # BLD AUTO: 0.01 X10(3) UL (ref 0–0.2)
BASOPHILS NFR BLD AUTO: 0 %
BASOPHILS NFR BLD AUTO: 0.3 %
BUN BLD-MCNC: <5 MG/DL (ref 9–23)
CALCIUM BLD-MCNC: 7.8 MG/DL (ref 8.7–10.4)
CHLORIDE SERPL-SCNC: 118 MMOL/L (ref 98–112)
CO2 SERPL-SCNC: 18 MMOL/L (ref 21–32)
CREAT BLD-MCNC: 0.5 MG/DL
DEPRECATED RDW RBC AUTO: 61.4 FL (ref 35.1–46.3)
DEPRECATED RDW RBC AUTO: 65.3 FL (ref 35.1–46.3)
EGFRCR SERPLBLD CKD-EPI 2021: 105 ML/MIN/1.73M2 (ref 60–?)
EOSINOPHIL # BLD AUTO: 0.11 X10(3) UL (ref 0–0.7)
EOSINOPHIL # BLD AUTO: 0.22 X10(3) UL (ref 0–0.7)
EOSINOPHIL NFR BLD AUTO: 3.3 %
EOSINOPHIL NFR BLD AUTO: 6.4 %
ERYTHROCYTE [DISTWIDTH] IN BLOOD BY AUTOMATED COUNT: 19.9 % (ref 11–15)
ERYTHROCYTE [DISTWIDTH] IN BLOOD BY AUTOMATED COUNT: 20.3 % (ref 11–15)
GLUCOSE BLD-MCNC: 66 MG/DL (ref 70–99)
GLUCOSE BLDC GLUCOMTR-MCNC: 116 MG/DL (ref 70–99)
GLUCOSE BLDC GLUCOMTR-MCNC: 135 MG/DL (ref 70–99)
GLUCOSE BLDC GLUCOMTR-MCNC: 157 MG/DL (ref 70–99)
GLUCOSE BLDC GLUCOMTR-MCNC: 58 MG/DL (ref 70–99)
GLUCOSE BLDC GLUCOMTR-MCNC: 69 MG/DL (ref 70–99)
GLUCOSE BLDC GLUCOMTR-MCNC: 69 MG/DL (ref 70–99)
GLUCOSE BLDC GLUCOMTR-MCNC: 72 MG/DL (ref 70–99)
GLUCOSE BLDC GLUCOMTR-MCNC: 81 MG/DL (ref 70–99)
HCT VFR BLD AUTO: 22.4 %
HCT VFR BLD AUTO: 25 %
HGB BLD-MCNC: 7.3 G/DL
HGB BLD-MCNC: 8.1 G/DL
IMM GRANULOCYTES # BLD AUTO: 0.02 X10(3) UL (ref 0–1)
IMM GRANULOCYTES # BLD AUTO: 0.03 X10(3) UL (ref 0–1)
IMM GRANULOCYTES NFR BLD: 0.6 %
IMM GRANULOCYTES NFR BLD: 0.9 %
LYMPHOCYTES # BLD AUTO: 1.96 X10(3) UL (ref 1–4)
LYMPHOCYTES # BLD AUTO: 2.25 X10(3) UL (ref 1–4)
LYMPHOCYTES NFR BLD AUTO: 57.1 %
LYMPHOCYTES NFR BLD AUTO: 68.2 %
MAGNESIUM SERPL-MCNC: 1.8 MG/DL (ref 1.6–2.6)
MCH RBC QN AUTO: 28.5 PG (ref 26–34)
MCH RBC QN AUTO: 29.6 PG (ref 26–34)
MCHC RBC AUTO-ENTMCNC: 32.4 G/DL (ref 31–37)
MCHC RBC AUTO-ENTMCNC: 32.6 G/DL (ref 31–37)
MCV RBC AUTO: 87.5 FL
MCV RBC AUTO: 91.2 FL
MONOCYTES # BLD AUTO: 0.64 X10(3) UL (ref 0.1–1)
MONOCYTES # BLD AUTO: 0.91 X10(3) UL (ref 0.1–1)
MONOCYTES NFR BLD AUTO: 19.4 %
MONOCYTES NFR BLD AUTO: 26.5 %
NEUTROPHILS # BLD AUTO: 0.27 X10 (3) UL (ref 1.5–7.7)
NEUTROPHILS # BLD AUTO: 0.27 X10(3) UL (ref 1.5–7.7)
NEUTROPHILS # BLD AUTO: 0.31 X10 (3) UL (ref 1.5–7.7)
NEUTROPHILS # BLD AUTO: 0.31 X10(3) UL (ref 1.5–7.7)
NEUTROPHILS NFR BLD AUTO: 8.2 %
NEUTROPHILS NFR BLD AUTO: 9.1 %
PHOSPHATE SERPL-MCNC: 2.6 MG/DL (ref 2.4–5.1)
PHOSPHATE SERPL-MCNC: 2.6 MG/DL (ref 2.4–5.1)
PLATELET # BLD AUTO: 106 10(3)UL (ref 150–450)
PLATELET # BLD AUTO: 224 10(3)UL (ref 150–450)
PLATELET MORPHOLOGY: NORMAL
POTASSIUM SERPL-SCNC: 3.9 MMOL/L (ref 3.5–5.1)
RBC # BLD AUTO: 2.56 X10(6)UL
RBC # BLD AUTO: 2.74 X10(6)UL
SODIUM SERPL-SCNC: 143 MMOL/L (ref 136–145)
WBC # BLD AUTO: 3.3 X10(3) UL (ref 4–11)
WBC # BLD AUTO: 3.4 X10(3) UL (ref 4–11)

## 2024-08-20 PROCEDURE — 93971 EXTREMITY STUDY: CPT | Performed by: INTERNAL MEDICINE

## 2024-08-20 PROCEDURE — 99232 SBSQ HOSP IP/OBS MODERATE 35: CPT | Performed by: INTERNAL MEDICINE

## 2024-08-20 PROCEDURE — 99233 SBSQ HOSP IP/OBS HIGH 50: CPT | Performed by: HOSPITALIST

## 2024-08-20 RX ORDER — DEXTROSE MONOHYDRATE 100 MG/ML
INJECTION, SOLUTION INTRAVENOUS CONTINUOUS
Status: DISCONTINUED | OUTPATIENT
Start: 2024-08-20 | End: 2024-08-21

## 2024-08-20 RX ORDER — MAGNESIUM HYDROXIDE/ALUMINUM HYDROXICE/SIMETHICONE 120; 1200; 1200 MG/30ML; MG/30ML; MG/30ML
30 SUSPENSION ORAL 4 TIMES DAILY PRN
Status: DISCONTINUED | OUTPATIENT
Start: 2024-08-20 | End: 2024-08-24

## 2024-08-20 RX ORDER — PREDNISONE 5 MG/1
5 TABLET ORAL
Status: DISCONTINUED | OUTPATIENT
Start: 2024-08-20 | End: 2024-08-20

## 2024-08-20 RX ORDER — DEXTROSE MONOHYDRATE 100 MG/ML
INJECTION, SOLUTION INTRAVENOUS CONTINUOUS
Status: DISCONTINUED | OUTPATIENT
Start: 2024-08-20 | End: 2024-08-20

## 2024-08-20 RX ORDER — DEXTROSE MONOHYDRATE 25 G/50ML
INJECTION, SOLUTION INTRAVENOUS
Status: COMPLETED
Start: 2024-08-20 | End: 2024-08-20

## 2024-08-20 NOTE — PROGRESS NOTES
INFECTIOUS DISEASE PROGRESS NOTE  [facility]  part of [facility] ID PROGRESS NOTE    Sheri Garzon Patient Status:  Inpatient    1960 MRN [id number]   Location [facility] 5SW/SE Attending Derick Hsieh MD   Hosp Day # 8 PCP TERI MCKENNA MD     Subjective:  ROS reviewed. Feels more swollen. Wants to go home. Having trouble swallowing pills.    ASSESSMENT:    Antibiotics: Linezolid -  Vancomycin -     # Acute RLE cellulitis with posterior leg wound in immunocompromised patient   -s/p bedside debridement 8/15  # Rheumatoid arthritis, on 20 mg prednisone daily, methotrexate, HCQ     PLAN:  -  Continue on linezolid for two week course.  -  Follow fever curve, wbc.  -  Reviewed labs, micro, imaging reports, available old records.  -  Case d/w patient, RN.     History of Present Illness:  Sheri Garzon is a 64 year old female with a history of rheumatoid arthritis on chronic 20 mg prednisone daily, methotrexate, HCQ, with recent [facility] admission - with vomiting s/p EGD with small hiatal hernia, who presented to [facility] ED on  with worsening pain in her right foot. Has had a wound for the last month and a half that has not healed. Was on ciprofloxacin last month. No fevers or chills at home. On arrival, afebrile, wbc 4.5, blood cx obtained, XR R foot with soft tissue swelling, started on vancomycin. ID consulted.    Physical Exam:  /73 (BP Location: Right arm)   Pulse 106   Temp 98.2 °F (36.8 °C) (Oral)   Resp 18   Ht 5' 2\" (1.575 m)   Wt 197 lb 11.2 oz (89.7 kg)   SpO2 100%   BMI 36.16 kg/m²     Gen:   Awake, in bed  HEENT:  EOMI, neck supple  CV/lungs:  RRR, CTAB  Abdom:  Soft, NT/ND, +BS  Skin/extrem:  Posterior R leg wound stable, B foot edema  Lines:  PIV+    Laboratory Data: Reviewed    Microbiology: Reviewed    Radiology: Reviewed      [facility]  [phone]  2024

## 2024-08-20 NOTE — DIETARY NOTE
ADULT NUTRITION REASSESSMENT    Pt is at moderate nutrition risk.  Pt does not meet malnutrition criteria.      RECOMMENDATIONS TO MD: See Nutrition Intervention    ADMITTING DIAGNOSIS:  Cellulitis of right lower extremity [L03.115]  PERTINENT PAST MEDICAL HISTORY:   Past Medical History:    Allergic rhinitis    Anxiety    Arthritis    RA and Osteoarthritis    Asthma (HCC)    Depression    Not officially diagnosed    Esophageal reflux    Essential hypertension    High blood pressure    High cholesterol    Hyperlipidemia    Myocardial infarction (HCC)    Cardiac event    Osteoarthritis    Pancreatitis (HCC)    Problems with swallowing    RA (rheumatoid arthritis) (HCC)    Sleep apnea     PATIENT STATUS: Initial 08/14/24: Pt admit for cellulitis of RLE. PMH seen above. Pt assessed due to screening at risk for MST of 4 for weight loss and declined PO intake. Pt with h/o rheumatoid arthritis and chronic prednisone use which causes her some skin issues. Pt has a wound on the back of her right leg (necrosis present) with possible bedside debridement planned for tomorrow. Visited pt at bedside. Pt reports poor poor appetite at home which comes and goes. Reports past history of significant weight loss, but feels she has been relatively stable recently which is consistent with EMR wt hx findings. Pt does drink Ensure original at home at least once daily when she finds her appetite is reduced. Pt agreeable to Ensure Enlive daily (strawberry) for now. Allowed pt to order more than once daily if she desires, but she would like to start with one for now.         8/20/2024 Update:  Re-visited. Pt laying in bed, reports appetite improved well. Eating well ( po documentation incomplete) and states family are bringing in foods from outside and enjoys them very well. Drinking Ensure 100% daily. Wt up 17# from admit wt of 180# to current 197# ( 8/19), likely d/t fluids ( post op, edema), I&Os Net + 8L.  8/15 s/p debridement of Right  lower (calf) leg. On steroid therapy, K replacing, Bicarb po 3x/day.    Discussed with pt that while we allow foods from outside it should continue to follow heart healthy diet. Will continue present nutrition management.       FOOD/NUTRITION RELATED HISTORY:  Appetite: Good and Improved  Intake:  markedly improved po intake, 50-70% + food from outside brought in by family and 100% intake of Ensure ONS.   Intake Meeting Needs: Yes, and oral nutrition supplements (ONS) to maximize  Percent Meals Eaten (last 6 days)       Date/Time Percent Meals Eaten (%)    08/14/24 1021 25 %    08/15/24 0828 70 %    08/19/24 1200 75 %    08/20/24 1405 50 %            Food Allergies: No Known Food Allergies (NKFA)  Cultural/Ethnic/[place]beterian Preferences: None    GASTROINTESTINAL: +BM 8/19 Formed Brown large stool x 1  .  Constipation appears resolved.     MEDICATIONS: reviewed   hydrocortisone sodium succinate  100 mg Intravenous Q8H    linezolid  600 mg Oral Q12H    sodium bicarbonate  650 mg Oral TID    potassium chloride  40 mEq Oral BID    collagenase   Topical Daily    amLODIPine  5 mg Oral Daily    aspirin  81 mg Oral Daily    atorvastatin  10 mg Oral Daily    clopidogrel  75 mg Oral Daily    hydroxychloroquine  200 mg Oral BID    losartan  50 mg Oral Daily    oxybutynin ER  10 mg Oral Daily    enoxaparin  40 mg Subcutaneous Daily     LABS: reviewed; bicarb deficit--met acidosis. Pt receiving Na bicarb 3x/day po. Hypokalemia resolved.   Recent Labs     08/17/24  0601 08/17/24  1755 08/18/24  0529 08/18/24  1742 08/19/24  0536 08/20/24  0455   GLU 95  --  96  --  102* 66*   BUN 9  --  6*  --  7* <5*   CREATSERUM 0.56  --  0.52*  --  0.51* 0.50*   CA 7.6*  --  7.1*  --  7.9* 7.8*   MG 1.2*  --  2.2  2.2  --  2.1 1.8   *  --  146*  --  146* 143   K 2.8*  2.8*   < > 3.4* 3.2* 4.4  4.4 3.9   *  --  120*  --  122* 118*   CO2 15.0*  --  17.0*  --  18.0* 18.0*   PHOS 2.7  2.7  --  2.0*  --  2.4  2.4 2.6  2.6   OSMOCALC  300*  --  299*  --  300*  --     < > = values in this interval not displayed.     NUTRITION RELATED PHYSICAL FINDINGS:  - Nutrition Focused Physical Exam (NFPE): well nourished  - Fluid Accumulation: +1 Bilateral Lower extremity and +2 Bilateral Feet ---> See RN documentation for details  - Skin Integrity:  necrotic wound to back of right leg.  8/15 s/p  Debridement   ---> See RN documentation for details    ANTHROPOMETRICS:  HT: 157.5 cm (5' 2\")  WT: 89.7 kg (197 lb 11.2 oz) Wt up 17# from admit wt of 180# to current 197# ( 8/19), likely d/t fluids ( post op, edema), I&Os Net + 8L.  BMI: Body mass index is 36.16 kg/m².  BMI CLASSIFICATION: 30-34.9 kg/m2 - obesity class I  IBW: 110 lbs        163% IBW  Usual Body Wt: ~170 lbs per pt last year      105% UBW    WEIGHT HISTORY:  Patient Weight(s) for the past 336 hrs:   Weight   08/19/24 0520 89.7 kg (197 lb 11.2 oz)   08/18/24 0500 82.1 kg (180 lb 14.4 oz)   08/12/24 2052 81.9 kg (180 lb 9.6 oz)   08/12/24 1302 82.1 kg (181 lb)     Wt Readings from Last 10 Encounters:   08/19/24 89.7 kg (197 lb 11.2 oz)   08/08/24 82.6 kg (182 lb)   07/22/24 83.4 kg (183 lb 12.8 oz)   06/28/24 87.1 kg (192 lb)   09/06/21 72.6 kg (160 lb)   08/11/21 77.1 kg (170 lb)     NUTRITION DIAGNOSIS/PROBLEM:   Inadequate oral intake related to Decreased ability to consume sufficient energy as evidenced by poor appetite and poor intakes per pt report    Nutrition Diagnosis Progress: Improvement (unresolved)    NUTRITION INTERVENTION:     NUTRITION PRESCRIPTION:   Estimated Nutrition needs: --dosing wt of 81.9 kg - wt taken on 8/12  Calories: 2978-0630 calories/day (18-22 calories per kg Dosing wt)  Protein: 60-80 g protein/day (1.2-1.6 g protein/kg Dosing wt)  - Diet:       Procedures    Cardiac diet Cardiac; Sodium Restriction: 2 GM NA; Is Patient on Accuchecks? Yes      - RD Care Plan: Encouraged increased PO intake and Initiated ONS (oral nutritional supplements)  - Medical Food Supplements-RD  will continue  Ensure Enlive (350 calories/ 20 g protein each) Daily Strawberry. Rational/use of oral supplements discussed.  - Vitamin and mineral supplements: none  - Feeding assistance: independent  - Nutrition education: Discussed importance of adequate energy and protein intake   - Coordination of nutrition care: collaboration with other providers  - Discharge and transfer of nutrition care to new setting or provider: monitor plans    MONITOR AND EVALUATE/NUTRITION GOALS:  - Food and Nutrient Intake:      Monitor: adequacy of PO intake and adequacy of supplement intake  - Food and Nutrient Administration:      Monitor: N/A  - Anthropometric Measurement:    Monitor weight  - Nutrition Goals:      allow wt loss due to fluid losses, PO and supplement greater than 75% of needs, labs within acceptable limits, support wound healing, and support body systems    DIETITIAN FOLLOW UP: RD to follow and monitor nutrition status       Pattie Rachel RD, LDN, [facility]  Clinical Dietitian  [phone]

## 2024-08-20 NOTE — SLP NOTE
ADULT SWALLOWING EVALUATION    ASSESSMENT    ASSESSMENT/OVERALL IMPRESSION:  PPE REQUIRED. THIS THERAPIST WORE GLOVES, DROPLET MASK, AND GOGGLES FOR DURATION OF EVALUATION. HANDS WASHED UPON ENTRANCE/EXIT.    SLP BSSE orders received and acknowledged. A swallow evaluation warranted as pt spitting out pills on 8/19/24. Pt afebrile with clear vocal quality, on room air, with oxygen saturation at 97. Pt with no prior hx of dysphagia at [facility]. 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 However, pt reports dry mouth with difficulty taking pills too quickly and gets anxious with staff watching her take meds. SLP provided education on importance of medications and verbalizing to RN's that she needs more time to take medications. SLP offered meds crushed and pt refused. Pt positioned upright in bed. Pt with complaints of 8/10 leg pain, RN aware. Pt with adequate oral acceptance and bilabial seal across all trials. Pt with intact bite, mastication of solids, and timely A-P transit. Pt's swallow response appears timely with adequate hyolaryngeal elevation/excursion. No clinical signs of aspiration (e.g., immediate/delayed throat clear, immediate/delayed cough, wet vocal quality, increased O2 effort) observed across all trials. Oxygen status remained >96 t/o the entire evaluation. Pt does not want any diet modifications or tx.    At this time, pt presents with function oral phase and probable pharyngeal dysfunction. Recommend a regular diet and thin liquids with strict adherence to safe swallowing compensatory strategies. Results and recommendations reviewed with RN and pt. Pt v/u to all results/recommendations. Recommendations remain written on whiteboard. SLP collaborated with RN for MD diet orders.     PLAN: SLP to sign off at this time secondary to pt tolerating least restrictive diet with no CSA.     RECOMMENDATIONS   Diet Recommendations -  Solids: Regular  Diet Recommendations - Liquids: Thin Liquids      Compensatory Strategies Recommended: Slow rate;Alternate consistencies;Small bites and sips;Extra sauce/gravy  Aspiration Precautions: Upright position;Slow rate;Small bites and sips  Medication Administration Recommendations: One pill at a time (halved with water)  Treatment Plan/Recommendations: No further inpatient SLP service warranted    HISTORY   MEDICAL HISTORY  Reason for Referral: R/O aspiration    Problem List  Principal Problem:    Cellulitis of right lower extremity  Active Problems:    Cellulitis    RTA (renal tubular acidosis)      Past Medical History  Past Medical History:    Allergic rhinitis    Anxiety    Arthritis    RA and Osteoarthritis    Asthma (HCC)    Depression    Not officially diagnosed    Esophageal reflux    Essential hypertension    High blood pressure    High cholesterol    Hyperlipidemia    Myocardial infarction (HCC)    Cardiac event    Osteoarthritis    Pancreatitis (HCC)    Problems with swallowing    RA (rheumatoid arthritis) ([facility])    Sleep apnea          Diet Prior to Admission: Regular;Thin liquids  Precautions: Aspiration    Patient/Family Goals: Dry mouth and difficulty taking pills too quick    SWALLOWING HISTORY  Current Diet Consistency: Regular;Thin liquids  Dysphagia History: None      OBJECTIVE   ORAL MOTOR EXAMINATION  Dentition: Natural;Functional  Symmetry: Within Functional Limits  Strength: Within Functional Limits     Range of Motion: Within Functional Limits  Rate of Motion: Within Functional Limits    Voice Quality: Clear  Respiratory Status: Unlabored  Consistencies Trialed: Thin liquids;Hard solid  Method of Presentation: Self presentation;Cup;Straw;Single sips  Patient Positioning: Upright;Midline    Oral Phase of Swallow: Within Functional Limits       Pharyngeal Phase of Swallow: Within Functional Limits           (Please note: Silent aspiration cannot be evaluated clinically.  Videofluoroscopic Swallow Study is required to rule-out silent aspiration.)    Esophageal Phase of Swallow: Complaints consistent with possible esophageal involvement    FOLLOW UP  Treatment Plan/Recommendations: No further inpatient SLP service warranted  Number of Visits to Meet Established Goals: 0  Follow Up Needed (Documentation Required): No       Thank you for your referral.   If you have any questions, please contact CHAVA Busby M.S. CCC-SLP  Speech Language Pathologist  Phone Number [phone]

## 2024-08-20 NOTE — PROGRESS NOTES
[facility]  part of [facility]    Progress Note    Sheri Garzon Patient Status:  Inpatient    1960 MRN [id number]   Location [facility] 5SW/SE Attending Derick Hsieh MD   Hosp Day # 8 PCP TERI MCKENNA MD     Subjective:  Recurrent hypoglycemia this AM.  She has not eaten breakfast and having trouble tolerating oral tablets so no AM prednisone dose.      A comprehensive 10 point review of systems was completed.  Pertinent positives and negatives noted in the the HPI.      Objective/Physical Exam:  Physical Exam:   General: Alert, orientated x3.  Cooperative.  No apparent distress.  Vital Signs:  Blood pressure 117/76, pulse 106, temperature 98.3 °F (36.8 °C), temperature source Oral, resp. rate 16, height 5' 2\" (1.575 m), weight 197 lb 11.2 oz (89.7 kg), SpO2 100%.  HEENT: Exam is unremarkable.  Neck: Supple.  Lungs: Clear bilaterally.  Cardiac: Regular rate and rhythm.  Abdomen:  Bowel sounds present, normoactive.  Nontender.  Extremities:  No lower extremity edema noted.  Skin: Normal texture and turgor.  Lymphatic:  No cervical lymphadenopathy.    Labs:  Recent Labs   Lab 24  0529 24  1742 24  0536 24  0455   GLU 96  --  102* 66*   BUN 6*  --  7* <5*   CREATSERUM 0.52*  --  0.51* 0.50*   EGFRCR 104  --  104 105   CA 7.1*  --  7.9* 7.8*   *  --  146* 143   K 3.4* 3.2* 4.4  4.4 3.9   *  --  122* 118*   CO2 17.0*  --  18.0* 18.0*         Assessment/Plan:  Patient Active Problem List   Diagnosis    Dehydration    Metabolic acidosis    Bilious vomiting with nausea    Difficult intravenous access    Hypokalemia    Hypomagnesemia    Hiatal hernia    Cellulitis of right lower extremity    Cellulitis    RTA (renal tubular acidosis)       Adrenal Insufficiency  - Now recurrent symptoms including hypoglycemia  - Electrolytes normalized, some contributing factor from RTA  - She has not been tolerating oral tablets  - Transition back to IV  hydrocortisone  - Start Hydrocortisone 100mg IV Q8 hours   - Will re-evaluate transition tomorrow     Meredith Powell MD  8/20/2024

## 2024-08-20 NOTE — PLAN OF CARE
Problem: Patient Centered Care  Goal: Patient preferences are identified and integrated in the patient's plan of care  Description: Interventions:  - What would you like us to know as we care for you?   - Provide timely, complete, and accurate information to patient/family  - Incorporate patient and family knowledge, values, beliefs, and cultural backgrounds into the planning and delivery of care  - Encourage patient/family to participate in care and decision-making at the level they choose  - Honor patient and family perspectives and choices  Outcome: Progressing     Problem: Patient/Family Goals  Goal: Patient/Family Long Term Goal  Description: Patient's Long Term Goal:     Interventions:  -   - See additional Care Plan goals for specific interventions  Outcome: Progressing  Goal: Patient/Family Short Term Goal  Description: Patient's Short Term Goal:     Interventions:   -   - See additional Care Plan goals for specific interventions  Outcome: Progressing     Problem: GASTROINTESTINAL - ADULT  Goal: Maintains or returns to baseline bowel function  Description: INTERVENTIONS:  - Assess bowel function  - Maintain adequate hydration with IV or PO as ordered and tolerated  - Evaluate effectiveness of GI medications  - Encourage mobilization and activity  - Obtain nutritional consult as needed  - Establish a toileting routine/schedule  - Consider collaborating with pharmacy to review patient's medication profile  Outcome: Progressing     Problem: SKIN/TISSUE INTEGRITY - ADULT  Goal: Skin integrity remains intact  Description: INTERVENTIONS  - Assess and document risk factors for pressure ulcer development  - Assess and document skin integrity  - Monitor for areas of redness and/or skin breakdown  - Initiate interventions, skin care algorithm/standards of care as needed  Outcome: Progressing  Goal: Incision(s), wounds(s) or drain site(s) healing without S/S of infection  Description: INTERVENTIONS:  - Assess and  document risk factors for pressure ulcer development  - Assess and document skin integrity  - Assess and document dressing/incision, wound bed, drain sites and surrounding tissue  - Implement wound care per orders  - Initiate isolation precautions as appropriate  - Initiate Pressure Ulcer prevention bundle as indicated  Outcome: Progressing     Problem: PAIN - ADULT  Goal: Verbalizes/displays adequate comfort level or patient's stated pain goal  Description: INTERVENTIONS:  - Encourage pt to monitor pain and request assistance  - Assess pain using appropriate pain scale  - Administer analgesics based on type and severity of pain and evaluate response  - Implement non-pharmacological measures as appropriate and evaluate response  - Consider cultural and social influences on pain and pain management  - Manage/alleviate anxiety  - Utilize distraction and/or relaxation techniques  - Monitor for opioid side effects  - Notify MD/LIP if interventions unsuccessful or patient reports new pain  - Anticipate increased pain with activity and pre-medicate as appropriate  Outcome: Progressing     Problem: RISK FOR INFECTION - ADULT  Goal: Absence of fever/infection during anticipated neutropenic period  Description: INTERVENTIONS  - Monitor WBC  - Administer growth factors as ordered  - Implement neutropenic guidelines  Outcome: Progressing     Problem: SAFETY ADULT - FALL  Goal: Free from fall injury  Description: INTERVENTIONS:  - Assess pt frequently for physical needs  - Identify cognitive and physical deficits and behaviors that affect risk of falls.  - [place] fall precautions as indicated by assessment.  - Educate pt/family on patient safety including physical limitations  - Instruct pt to call for assistance with activity based on assessment  - Modify environment to reduce risk of injury  - Provide assistive devices as appropriate  - Consider OT/PT consult to assist with strengthening/mobility  - Encourage toileting  schedule  Outcome: Progressing     Problem: DISCHARGE PLANNING  Goal: Discharge to home or other facility with appropriate resources  Description: INTERVENTIONS:  - Identify barriers to discharge w/pt and caregiver  - Include patient/family/discharge partner in discharge planning  - Arrange for needed discharge resources and transportation as appropriate  - Identify discharge learning needs (meds, wound care, etc)  - Arrange for interpreters to assist at discharge as needed  - Consider post-discharge preferences of patient/family/discharge partner  - Complete POLST form as appropriate  - Assess patient's ability to be responsible for managing their own health  - Refer to Case Management Department for coordinating discharge planning if the patient needs post-hospital services based on physician/LIP order or complex needs related to functional status, cognitive ability or social support system  Outcome: Progressing

## 2024-08-20 NOTE — CM/SW NOTE
Pt discussed in RN DC rounds. Patient will be discharge on PO ABX. Patient will discharge to home once medically stable.    Plan: Pending medical clearance, DC to Home.     SW/CM to remain available for support and/or discharge planning.     Quyen Hdz, MSW, LSW   x [phone]

## 2024-08-20 NOTE — PLAN OF CARE
Problem: Patient Centered Care  Goal: Patient preferences are identified and integrated in the patient's plan of care  Description: Interventions:  - What would you like us to know as we care for you? Pt having pain in right leg  - Provide timely, complete, and accurate information to patient/family  - Incorporate patient and family knowledge, values, beliefs, and cultural backgrounds into the planning and delivery of care  - Encourage patient/family to participate in care and decision-making at the level they choose  - Honor patient and family perspectives and choices  Outcome: Progressing     Problem: Patient/Family Goals  Goal: Patient/Family Long Term Goal  Description: Patient's Long Term Goal: Pt wants to return home    Interventions:  - Pt given prn pain medications     - See additional Care Plan goals for specific interventions  Outcome: Progressing  Goal: Patient/Family Short Term Goal  Description: Patient's Short Term Goal: Pt would like to have pills given individually    Interventions:   - Pt educated on medications and compliance  -Speech eval for swallowing pills  - See additional Care Plan goals for specific interventions  Outcome: Progressing     Problem: GASTROINTESTINAL - ADULT  Goal: Maintains or returns to baseline bowel function  Description: INTERVENTIONS:  - Assess bowel function  - Maintain adequate hydration with IV or PO as ordered and tolerated  - Evaluate effectiveness of GI medications  - Encourage mobilization and activity  - Obtain nutritional consult as needed  - Establish a toileting routine/schedule  - Consider collaborating with pharmacy to review patient's medication profile  Outcome: Progressing     Problem: SKIN/TISSUE INTEGRITY - ADULT  Goal: Skin integrity remains intact  Description: INTERVENTIONS  - Assess and document risk factors for pressure ulcer development  - Assess and document skin integrity  - Monitor for areas of redness and/or skin breakdown  - Initiate  interventions, skin care algorithm/standards of care as needed  Outcome: Progressing  Goal: Incision(s), wounds(s) or drain site(s) healing without S/S of infection  Description: INTERVENTIONS:  - Assess and document risk factors for pressure ulcer development  - Assess and document skin integrity  - Assess and document dressing/incision, wound bed, drain sites and surrounding tissue  - Implement wound care per orders  - Initiate isolation precautions as appropriate  - Initiate Pressure Ulcer prevention bundle as indicated  Outcome: Progressing     Problem: PAIN - ADULT  Goal: Verbalizes/displays adequate comfort level or patient's stated pain goal  Description: INTERVENTIONS:  - Encourage pt to monitor pain and request assistance  - Assess pain using appropriate pain scale  - Administer analgesics based on type and severity of pain and evaluate response  - Implement non-pharmacological measures as appropriate and evaluate response  - Consider cultural and social influences on pain and pain management  - Manage/alleviate anxiety  - Utilize distraction and/or relaxation techniques  - Monitor for opioid side effects  - Notify MD/LIP if interventions unsuccessful or patient reports new pain  - Anticipate increased pain with activity and pre-medicate as appropriate  Outcome: Progressing     Problem: RISK FOR INFECTION - ADULT  Goal: Absence of fever/infection during anticipated neutropenic period  Description: INTERVENTIONS  - Monitor WBC  - Administer growth factors as ordered  - Implement neutropenic guidelines  Outcome: Progressing     Problem: SAFETY ADULT - FALL  Goal: Free from fall injury  Description: INTERVENTIONS:  - Assess pt frequently for physical needs  - Identify cognitive and physical deficits and behaviors that affect risk of falls.  - [place] fall precautions as indicated by assessment.  - Educate pt/family on patient safety including physical limitations  - Instruct pt to call for assistance with  activity based on assessment  - Modify environment to reduce risk of injury  - Provide assistive devices as appropriate  - Consider OT/PT consult to assist with strengthening/mobility  - Encourage toileting schedule  Outcome: Progressing     Problem: DISCHARGE PLANNING  Goal: Discharge to home or other facility with appropriate resources  Description: INTERVENTIONS:  - Identify barriers to discharge w/pt and caregiver  - Include patient/family/discharge partner in discharge planning  - Arrange for needed discharge resources and transportation as appropriate  - Identify discharge learning needs (meds, wound care, etc)  - Arrange for interpreters to assist at discharge as needed  - Consider post-discharge preferences of patient/family/discharge partner  - Complete POLST form as appropriate  - Assess patient's ability to be responsible for managing their own health  - Refer to Case Management Department for coordinating discharge planning if the patient needs post-hospital services based on physician/LIP order or complex needs related to functional status, cognitive ability or social support system  Outcome: Progressing

## 2024-08-20 NOTE — PROGRESS NOTES
08/20/24 1129   Wound 08/12/24 Leg Lower;Posterior;Right   Date First Assessed/Time First Assessed: 08/12/24 0030   Present on Original Admission: Yes  Primary Wound Type: (c) Other (comment)  Location: Leg  Wound Location Orientation: Lower;Posterior;Right  Wound Description (Comments): R posterior calf unst...   Wound Image    Site Assessment Fragile;Granulation tissue;Moist;Pink;Painful;Tan   Closure Not approximated   Drainage Amount Scant   Drainage Description Yellow;Serosanguineous   Treatments Cleansed;Saline;Santyl   Dressing 2x2s;Aquacel Foam   Dressing Changed Changed   Dressing Status Dressing Changed;Removed;Old drainage   Wound Length (cm) 3.3 cm   Wound Width (cm) 4 cm   Wound Surface Area (cm^2) 13.2 cm^2   Non-staged Wound Description Full thickness   Norma-wound Assessment Clean;Dry;Intact   Wound Granulation Tissue Pink   Wound Bed Granulation (%) 10 %   Wound Bed Slough (%) 90 %   State of Healing Early/partial granulation   Wound Odor None   Wound 08/20/24 Foot Dorsal;Right   Date First Assessed/Time First Assessed: 08/20/24 1128   Present on Original Admission: No  Primary Wound Type: Other (comment)  Location: Foot  Wound Location Orientation: Dorsal;Right  Wound Description (Comments): popped blister   Wound Image    Site Assessment Clean;Fragile;Granulation tissue;Moist;Red   Closure Not approximated   Drainage Amount Scant   Drainage Description Serosanguineous   Treatments Cleansed;Saline   Dressing Aquacel Foam;Xeroform   Dressing Changed Changed   Dressing Status Dressing Changed;Removed;Old drainage   Wound Length (cm) 0.4 cm   Wound Width (cm) 1.5 cm   Wound Surface Area (cm^2) 0.6 cm^2   Wound Depth (cm) 0.1 cm   Wound Volume (cm^3) 0.06 cm^3   Non-staged Wound Description Partial thickness   Norma-wound Assessment Clean;Dry;Intact;Edema   Wound Granulation Tissue Red   Wound Bed Granulation (%) 100 %   State of Healing Fully granulated   Wound Odor None   Wound 08/20/24 Buttocks  Left;Inner   Date First Assessed/Time First Assessed: 08/20/24 1131   Present on Original Admission: No  Primary Wound Type: Friction/Shear  Location: Buttocks  Wound Location Orientation: Left;Inner  Wound Description (Comments): scatterd   Wound Image     Site Assessment Clean;Fragile;Granulation tissue;Moist;Painful;Pink   Closure Not approximated   Drainage Amount None   Treatments Cleansed;Topical (Barrier/Moisturizer/Ointment)   Dressing Aquacel Foam   Dressing Changed Changed   Dressing Status Dressing Changed;Removed   Non-staged Wound Description Partial thickness   Norma-wound Assessment Clean;Dry;Intact   Wound Granulation Tissue Secretary   State of Healing Fully granulated   Wound Odor None   Wound Follow Up   Follow up needed Yes     Pt seen with bedside RN for wound follow up on the right leg wound. Recommend to continue application of santyl for enzymatic debridement of non viable slough in the wound base. There is a popped blister to the right dorsal foot, dressed with xeroform and bordered foam. Gluteal fold and buttocks have scattered open areas of friction and shear, zinc applied as was a new sacral foam. Pt is able to turn self, pillows used for offloading.

## 2024-08-20 NOTE — PROGRESS NOTES
[facility]  part of [facility]     Hospitalist Progress Note     Sheri Garzon Patient Status:  Inpatient    1960 MRN [id number]   Location [facility] 5SW/SE Attending Derick Hsieh MD   Hosp Day # 8 PCP TERI MCKENNA MD     Subjective:     Pt was seen and examined  Pain controlled  Hypoglycemic this AM  No cp, sob, f,c, abd pain or HA  Had BM yesterday    Objective:    Review of Systems:   ROS completed; pertinent positive and negatives stated in subjective.      Vital signs:  Temp:  [97.9 °F (36.6 °C)-98.5 °F (36.9 °C)] 98.2 °F (36.8 °C)  Pulse:  [] 106  Resp:  [16-18] 18  BP: (102-117)/(65-76) 108/73  SpO2:  [100 %] 100 %      Physical Exam:    Gen: NAD AO x3  Chest: good air entry CTABL  CVS: normal s1 and s2 RR  Abd: NABS soft NT ND  Neuro: CN 2-12 grossly intact  Ext: RLE surgical site CDI      Diagnostic Data:    Labs:  Recent Labs   Lab 24  0601 24  0529 24  0536 24  0455 24  1205   WBC 3.4* 2.7* 2.9* 3.3* 3.4*   HGB 7.7* 7.5* 7.9* 7.3* 8.1*   MCV 83.9 84.1 85.0 87.5 91.2   PLT 73.0* 106.0* 153.0 106.0* 224.0       Recent Labs   Lab 24  0601 24  1755 24  0529 24  1742 24  0536 24  0455   GLU 95  --  96  --  102* 66*   BUN 9  --  6*  --  7* <5*   CREATSERUM 0.56  --  0.52*  --  0.51* 0.50*   CA 7.6*  --  7.1*  --  7.9* 7.8*   ALB 2.6*  2.7*  --  2.7*  --  2.7* 2.4*   *  --  146*  --  146* 143   K 2.8*  2.8*   < > 3.4* 3.2* 4.4  4.4 3.9   *  --  120*  --  122* 118*   CO2 15.0*  --  17.0*  --  18.0* 18.0*   ALKPHO 66  --   --   --   --   --    AST 20  --   --   --   --   --    ALT 15  --   --   --   --   --    BILT 0.3  --   --   --   --   --    TP 4.4*  --   --   --   --   --     < > = values in this interval not displayed.       Estimated Creatinine Clearance: 89.9 mL/min (A) (based on SCr of 0.5 mg/dL (L)).    No results for input(s): \"PTP\", \"INR\" in the last 168 hours.            Imaging: Imaging data reviewed in Epic.    Medications:    hydrocortisone sodium succinate  100 mg Intravenous Q8H    linezolid  600 mg Oral Q12H    sodium bicarbonate  650 mg Oral TID    potassium chloride  40 mEq Oral BID    collagenase   Topical Daily    amLODIPine  5 mg Oral Daily    aspirin  81 mg Oral Daily    atorvastatin  10 mg Oral Daily    clopidogrel  75 mg Oral Daily    hydroxychloroquine  200 mg Oral BID    losartan  50 mg Oral Daily    oxybutynin ER  10 mg Oral Daily    enoxaparin  40 mg Subcutaneous Daily       Assessment & Plan:     RLE cellulitis -> improved  Imaging reviewed  Started on IV abx, cont for now  ID on consult  Appreciate recs  Gsx on consult  Underwent I+D of RLE on 8/15/24  Rheumatoid arthritis  Continue home meds  CAD  HTN  HLD  Continue ASA, Statin, Plavix  Continue home antihypertensives  6.   Multiple electrolyte abnormalities and NAGMA   1.    Due to Distal RTA   2.    Cont electrolyte replacement   3.    Improving   7.   Hypernatremia   1.    Improved  8.   Adrenal Insufficiency   1.    Endocrinology following, this AM hypoglycemic   2.    Restarted IV steroids   3.    Cont to monitor    Plan of care discussed with patient at bedside.      Supplementary Documentation:     Quality:  DVT Prophylaxis: Lovenox s/c  CODE status: Full       Estimated date of discharge: TBD  Discharge is dependent on: clinical stability  At this point Ms. Garzon is expected to be discharge to: home    MDM: High   no

## 2024-08-20 NOTE — PROGRESS NOTES
[facility]  part of [facility]    Progress Note    Sheri Garzon Patient Status:  Inpatient    1960 MRN [id number]   Location [facility] 5SW/SE Attending Derick Hsieh MD   Hosp Day # 8 PCP TERI MCKENNA MD       Subjective:   Sheri Garzon is a(n) 64 year old female who I am seeing for  electrolyte problems      Low blood sugars this morning    Objective:   /73 (BP Location: Right arm)   Pulse 106   Temp 98.2 °F (36.8 °C) (Oral)   Resp 18   Ht 5' 2\" (1.575 m)   Wt 197 lb 11.2 oz (89.7 kg)   SpO2 100%   BMI 36.16 kg/m²      Intake/Output Summary (Last 24 hours) at 2024 1327  Last data filed at 2024 0600  Gross per 24 hour   Intake 400 ml   Output --   Net 400 ml     Wt Readings from Last 1 Encounters:   24 197 lb 11.2 oz (89.7 kg)       Exam  Gen: No acute distress, Heent: NC AT, mucous memb clear, neck supple  Pulm: Lungs clear, normal respiratory effort  CV: Heart with regular rate and rhythm, no edema  Abd: Abdomen soft, nontender, nondistended, no organomegaly, bowel sounds present  Skin: no symptoms reported  Psych: alert and oriented    Assessment and Plan:   1 -electrolyte abnormalities  She has a nonanion gap metabolic acidosis.  Her potassium has normalized as has her magnesium     Continue potassium chloride.     She appears to have some sort of an RTA.     For now she continues on sodium bicarbonate as well.     2 -cellulitis  Status post debridement, she is on vancomycin which will be stopped.  She will begin linezolid     3 -rheumatoid arthritis  She is on 20 mg of prednisone daily as well as methotrexate and h Plaquenil     I will sign off active follow-up.  Please call if any other questions arise      Results:     Recent Labs   Lab 24  0536 24  0455 24  1205   RBC 2.74* 2.56* 2.74*   HGB 7.9* 7.3* 8.1*   HCT 23.3* 22.4* 25.0*   MCV 85.0 87.5 91.2   MCH 28.8 28.5 29.6   MCHC 33.9 32.6 32.4   RDW 19.3* 19.9*  20.3*   NEPRELIM 0.69* 0.27* 0.31*   WBC 2.9* 3.3* 3.4*   .0 106.0* 224.0         Recent Labs   Lab 08/18/24  0529 08/18/24  1742 08/19/24  0536 08/20/24  0455   GLU 96  --  102* 66*   BUN 6*  --  7* <5*   CREATSERUM 0.52*  --  0.51* 0.50*   CA 7.1*  --  7.9* 7.8*   *  --  146* 143   K 3.4* 3.2* 4.4  4.4 3.9   *  --  122* 118*   CO2 17.0*  --  18.0* 18.0*          No results found.        BRODY LAKHANI MD  8/20/2024

## 2024-08-21 LAB
ALBUMIN SERPL-MCNC: 2.6 G/DL (ref 3.2–4.8)
ANION GAP SERPL CALC-SCNC: 4 MMOL/L (ref 0–18)
BASOPHILS # BLD AUTO: 0.01 X10(3) UL (ref 0–0.2)
BASOPHILS NFR BLD AUTO: 0.5 %
BUN BLD-MCNC: <5 MG/DL (ref 9–23)
CALCIUM BLD-MCNC: 8 MG/DL (ref 8.7–10.4)
CHLORIDE SERPL-SCNC: 113 MMOL/L (ref 98–112)
CO2 SERPL-SCNC: 20 MMOL/L (ref 21–32)
CREAT BLD-MCNC: 0.56 MG/DL
DEPRECATED HBV CORE AB SER IA-ACNC: 208.6 NG/ML
DEPRECATED RDW RBC AUTO: 63.5 FL (ref 35.1–46.3)
EGFRCR SERPLBLD CKD-EPI 2021: 102 ML/MIN/1.73M2 (ref 60–?)
EOSINOPHIL # BLD AUTO: 0 X10(3) UL (ref 0–0.7)
EOSINOPHIL NFR BLD AUTO: 0 %
ERYTHROCYTE [DISTWIDTH] IN BLOOD BY AUTOMATED COUNT: 19.7 % (ref 11–15)
FOLATE SERPL-MCNC: 6.1 NG/ML (ref 5.4–?)
GLUCOSE BLD-MCNC: 118 MG/DL (ref 70–99)
HCT VFR BLD AUTO: 25.7 %
HGB BLD-MCNC: 8.2 G/DL
HGB RETIC QN AUTO: 30 PG (ref 28.2–36.6)
IMM GRANULOCYTES # BLD AUTO: 0.03 X10(3) UL (ref 0–1)
IMM GRANULOCYTES NFR BLD: 1.4 %
IMM RETICS NFR: 0.28 RATIO (ref 0.1–0.3)
IRON SATN MFR SERPL: 10 %
IRON SERPL-MCNC: 13 UG/DL
LDH SERPL L TO P-CCNC: 320 U/L
LYMPHOCYTES # BLD AUTO: 1.4 X10(3) UL (ref 1–4)
LYMPHOCYTES NFR BLD AUTO: 63.9 %
MAGNESIUM SERPL-MCNC: 1.8 MG/DL (ref 1.6–2.6)
MCH RBC QN AUTO: 28.9 PG (ref 26–34)
MCHC RBC AUTO-ENTMCNC: 31.9 G/DL (ref 31–37)
MCV RBC AUTO: 90.5 FL
MONOCYTES # BLD AUTO: 0.52 X10(3) UL (ref 0.1–1)
MONOCYTES NFR BLD AUTO: 23.7 %
NEUTROPHILS # BLD AUTO: 0.23 X10 (3) UL (ref 1.5–7.7)
NEUTROPHILS # BLD AUTO: 0.23 X10(3) UL (ref 1.5–7.7)
NEUTROPHILS NFR BLD AUTO: 10.5 %
PHOSPHATE SERPL-MCNC: 2.9 MG/DL (ref 2.4–5.1)
PLATELET # BLD AUTO: 247 10(3)UL (ref 150–450)
POTASSIUM SERPL-SCNC: 4.4 MMOL/L (ref 3.5–5.1)
RBC # BLD AUTO: 2.84 X10(6)UL
RETICS # AUTO: 115.8 X10(3) UL (ref 22.5–147.5)
RETICS/RBC NFR AUTO: 4.3 %
SODIUM SERPL-SCNC: 137 MMOL/L (ref 136–145)
TIBC SERPL-MCNC: 124 UG/DL (ref 250–425)
TRANSFERRIN SERPL-MCNC: 83 MG/DL (ref 250–380)
VIT B12 SERPL-MCNC: 748 PG/ML (ref 211–911)
WBC # BLD AUTO: 2.2 X10(3) UL (ref 4–11)

## 2024-08-21 PROCEDURE — 99233 SBSQ HOSP IP/OBS HIGH 50: CPT | Performed by: INTERNAL MEDICINE

## 2024-08-21 PROCEDURE — 99232 SBSQ HOSP IP/OBS MODERATE 35: CPT | Performed by: INTERNAL MEDICINE

## 2024-08-21 RX ORDER — FOLIC ACID 1 MG/1
1 TABLET ORAL DAILY
Status: DISCONTINUED | OUTPATIENT
Start: 2024-08-21 | End: 2024-08-24

## 2024-08-21 RX ORDER — MAGNESIUM OXIDE 400 MG/1
400 TABLET ORAL ONCE
Status: COMPLETED | OUTPATIENT
Start: 2024-08-21 | End: 2024-08-21

## 2024-08-21 RX ORDER — PANTOPRAZOLE SODIUM 40 MG/1
40 TABLET, DELAYED RELEASE ORAL
Status: DISCONTINUED | OUTPATIENT
Start: 2024-08-21 | End: 2024-08-24

## 2024-08-21 NOTE — PROGRESS NOTES
[facility]  part of [facility]     Hospitalist Progress Note     Sheri Garzon Patient Status:  Inpatient    1960 MRN [id number]   Location [facility] 5SW/SE Attending Derick Hsieh MD   Hosp Day # 9 PCP TERI MCKENNA MD     Subjective:     Pt was seen and examined. Sitting up comfortably in bed and in NAD. Reported heartburn.   Denied any other active complaints at the time of interview. All questions and concerns addressed.     Objective:    Review of Systems:   ROS completed; pertinent positive and negatives stated in subjective.      Vital signs:  Temp:  [97.6 °F (36.4 °C)-98.2 °F (36.8 °C)] 98 °F (36.7 °C)  Pulse:  [] 100  Resp:  [18] 18  BP: (101-123)/(71-88) 123/88  SpO2:  [100 %] 100 %      Physical Exam:    Gen: NAD AO x3  Chest: good air entry CTABL  CVS: normal s1 and s2 RR  Abd: NABS soft NT ND  Neuro: CN 2-12 grossly intact  Ext: RLE surgical site CDI      Diagnostic Data:    Labs:  Recent Labs   Lab 24  0601 24  0529 24  0536 24  0455 24  1205   WBC 3.4* 2.7* 2.9* 3.3* 3.4*   HGB 7.7* 7.5* 7.9* 7.3* 8.1*   MCV 83.9 84.1 85.0 87.5 91.2   PLT 73.0* 106.0* 153.0 106.0* 224.0       Recent Labs   Lab 24  0601 24  1755 24  0536 24  0455 24  0625   GLU 95   < > 102* 66* 118*   BUN 9   < > 7* <5* <5*   CREATSERUM 0.56   < > 0.51* 0.50* 0.56   CA 7.6*   < > 7.9* 7.8* 8.0*   ALB 2.6*  2.7*   < > 2.7* 2.4* 2.6*   *   < > 146* 143 137   K 2.8*  2.8*   < > 4.4  4.4 3.9 4.4   *   < > 122* 118* 113*   CO2 15.0*   < > 18.0* 18.0* 20.0*   ALKPHO 66  --   --   --   --    AST 20  --   --   --   --    ALT 15  --   --   --   --    BILT 0.3  --   --   --   --    TP 4.4*  --   --   --   --     < > = values in this interval not displayed.       Estimated Creatinine Clearance: 80.3 mL/min (based on SCr of 0.56 mg/dL).    No results for input(s): \"PTP\", \"INR\" in the last 168 hours.           Imaging:  Imaging data reviewed in Epic.    Medications:    pantoprazole  40 mg Oral QAM AC    magnesium oxide  400 mg Oral Once    hydrocortisone sodium succinate  100 mg Intravenous Q8H    linezolid  600 mg Oral Q12H    sodium bicarbonate  650 mg Oral TID    potassium chloride  40 mEq Oral BID    collagenase   Topical Daily    amLODIPine  5 mg Oral Daily    aspirin  81 mg Oral Daily    atorvastatin  10 mg Oral Daily    clopidogrel  75 mg Oral Daily    hydroxychloroquine  200 mg Oral BID    losartan  50 mg Oral Daily    oxybutynin ER  10 mg Oral Daily    enoxaparin  40 mg Subcutaneous Daily       Assessment & Plan:     RLE cellulitis -> improved  Imaging reviewed  Started on IV abx, cont for now  ID on consult  Continue Linezolid for a 2 week course  Gsx on consult  Underwent I+D of RLE on 8/15/24  Rheumatoid arthritis  Continue home Hydroxychloroquine  Steroids per Endo/Rheum  CAD  HTN  HLD  Continue ASA, Statin, Plavix  Continue home antihypertensives  6.   Multiple electrolyte abnormalities and NAGMA  7.   ? Distal RTA   1.    Cont electrolyte replacement   2.    Improving   7.   Hypernatremia   1.    Improved  8.   Adrenal Insufficiency   1.    Endocrinology following   2.    Restarted IV steroids - continue IV Hydrocortisone 100 mg IV q8hrs. Taper steroids   3.    Cont to monitor  9.   Leukocytosis, Anemia  10. Fluctuating Platelets   1.    Monitor labs   2.    Transfuse as indicated   3.    Hematology on consult - appreciate recs    Plan of care discussed with patient at bedside.      Supplementary Documentation:     Quality:  DVT Prophylaxis: Lovenox s/c  CODE status: Full       Estimated date of discharge: TBD  Discharge is dependent on: clinical stability  At this point Ms. Garzon is expected to be discharge to: home    MDM: High

## 2024-08-21 NOTE — PROGRESS NOTES
INFECTIOUS DISEASE PROGRESS NOTE  [facility]  part of [facility] ID PROGRESS NOTE    Sheri Garzon Patient Status:  Inpatient    1960 MRN [id number]   Location [facility] 5SW/SE Attending Derick Hsieh MD   Hosp Day # 9 PCP TERI MCKENNA MD     Subjective:  ROS reviewed. Having acid reflux symptoms. Swelling improved. Remains on hydrocortisone.    ASSESSMENT:    Antibiotics: Linezolid -  Vancomycin -     # Acute RLE cellulitis with posterior leg wound in immunocompromised patient   -s/p bedside debridement 8/15  # Rheumatoid arthritis, on 20 mg prednisone daily, methotrexate, HCQ     PLAN:  -  Continue on linezolid for two week course.  -  Follow fever curve, wbc.  -  Reviewed labs, micro, imaging reports, available old records.  -  Case d/w patient, RN.     History of Present Illness:  Sheri Garzon is a 64 year old female with a history of rheumatoid arthritis on chronic 20 mg prednisone daily, methotrexate, HCQ, with recent [facility] admission - with vomiting s/p EGD with small hiatal hernia, who presented to [facility] ED on  with worsening pain in her right foot. Has had a wound for the last month and a half that has not healed. Was on ciprofloxacin last month. No fevers or chills at home. On arrival, afebrile, wbc 4.5, blood cx obtained, XR R foot with soft tissue swelling, started on vancomycin. ID consulted.    Physical Exam:  /89 (BP Location: Right arm)   Pulse 99   Temp 98.1 °F (36.7 °C) (Oral)   Resp 18   Ht 5' 2\" (1.575 m)   Wt 198 lb (89.8 kg)   SpO2 99%   BMI 36.21 kg/m²     Gen:   Awake, in bed  HEENT:  EOMI, neck supple  CV/lungs:  RRR, CTAB  Abdom:  Soft, no TTP  Skin/extrem:  Posterior R leg wound stable, B foot edema improved  Lines:  PIV+    Laboratory Data: Reviewed    Microbiology: Reviewed    Radiology: Reviewed      [facility]  [phone]  2024

## 2024-08-21 NOTE — PLAN OF CARE
No acute changes. Prn pain meds given. Daughter at bedside. All safety measures in place.    Problem: Patient Centered Care  Goal: Patient preferences are identified and integrated in the patient's plan of care  Description: Interventions:  - What would you like us to know as we care for you? Home with daughter  - Provide timely, complete, and accurate information to patient/family  - Incorporate patient and family knowledge, values, beliefs, and cultural backgrounds into the planning and delivery of care  - Encourage patient/family to participate in care and decision-making at the level they choose  - Honor patient and family perspectives and choices  Outcome: Progressing     Problem: GASTROINTESTINAL - ADULT  Goal: Maintains or returns to baseline bowel function  Description: INTERVENTIONS:  - Assess bowel function  - Maintain adequate hydration with IV or PO as ordered and tolerated  - Evaluate effectiveness of GI medications  - Encourage mobilization and activity  - Obtain nutritional consult as needed  - Establish a toileting routine/schedule  - Consider collaborating with pharmacy to review patient's medication profile  Outcome: Progressing     Problem: SKIN/TISSUE INTEGRITY - ADULT  Goal: Skin integrity remains intact  Description: INTERVENTIONS  - Assess and document risk factors for pressure ulcer development  - Assess and document skin integrity  - Monitor for areas of redness and/or skin breakdown  - Initiate interventions, skin care algorithm/standards of care as needed  Outcome: Progressing  Goal: Incision(s), wounds(s) or drain site(s) healing without S/S of infection  Description: INTERVENTIONS:  - Assess and document risk factors for pressure ulcer development  - Assess and document skin integrity  - Assess and document dressing/incision, wound bed, drain sites and surrounding tissue  - Implement wound care per orders  - Initiate isolation precautions as appropriate  - Initiate Pressure Ulcer  prevention bundle as indicated  Outcome: Progressing     Problem: PAIN - ADULT  Goal: Verbalizes/displays adequate comfort level or patient's stated pain goal  Description: INTERVENTIONS:  - Encourage pt to monitor pain and request assistance  - Assess pain using appropriate pain scale  - Administer analgesics based on type and severity of pain and evaluate response  - Implement non-pharmacological measures as appropriate and evaluate response  - Consider cultural and social influences on pain and pain management  - Manage/alleviate anxiety  - Utilize distraction and/or relaxation techniques  - Monitor for opioid side effects  - Notify MD/LIP if interventions unsuccessful or patient reports new pain  - Anticipate increased pain with activity and pre-medicate as appropriate  Outcome: Progressing     Problem: RISK FOR INFECTION - ADULT  Goal: Absence of fever/infection during anticipated neutropenic period  Description: INTERVENTIONS  - Monitor WBC  - Administer growth factors as ordered  - Implement neutropenic guidelines  Outcome: Progressing     Problem: SAFETY ADULT - FALL  Goal: Free from fall injury  Description: INTERVENTIONS:  - Assess pt frequently for physical needs  - Identify cognitive and physical deficits and behaviors that affect risk of falls.  - [place] fall precautions as indicated by assessment.  - Educate pt/family on patient safety including physical limitations  - Instruct pt to call for assistance with activity based on assessment  - Modify environment to reduce risk of injury  - Provide assistive devices as appropriate  - Consider OT/PT consult to assist with strengthening/mobility  - Encourage toileting schedule  Outcome: Progressing     Problem: DISCHARGE PLANNING  Goal: Discharge to home or other facility with appropriate resources  Description: INTERVENTIONS:  - Identify barriers to discharge w/pt and caregiver  - Include patient/family/discharge partner in discharge planning  - Arrange  for needed discharge resources and transportation as appropriate  - Identify discharge learning needs (meds, wound care, etc)  - Arrange for interpreters to assist at discharge as needed  - Consider post-discharge preferences of patient/family/discharge partner  - Complete POLST form as appropriate  - Assess patient's ability to be responsible for managing their own health  - Refer to Case Management Department for coordinating discharge planning if the patient needs post-hospital services based on physician/LIP order or complex needs related to functional status, cognitive ability or social support system  Outcome: Progressing

## 2024-08-21 NOTE — PROGRESS NOTES
[facility]  part of [facility]    Progress Note    Sheri Garzon Patient Status:  Inpatient    1960 MRN [id number]   Location [facility] 5SW/SE Attending Derick Hsieh MD   Hosp Day # 9 PCP TERI MCKENNA MD     Subjective:  Hypoglycemia resolved with steroids and D10 infusion yesterday.  She is feeling better, tolerating some PO intake.  Stable electrolytes.     A comprehensive 10 point review of systems was completed.  Pertinent positives and negatives noted in the the HPI.      Objective/Physical Exam:  Physical Exam:   General: Alert, orientated x3.  Cooperative.  No apparent distress.  Vital Signs:  Blood pressure 123/88, pulse 100, temperature 98 °F (36.7 °C), temperature source Oral, resp. rate 18, height 5' 2\" (1.575 m), weight 198 lb (89.8 kg), SpO2 100%.  HEENT: Exam is unremarkable.  Neck: Supple.  Lungs: Clear bilaterally.  Cardiac: Regular rate and rhythm.  Abdomen:  Bowel sounds present, normoactive.  Nontender.  Extremities:  No lower extremity edema noted.  Skin: Normal texture and turgor.  Lymphatic:  No cervical lymphadenopathy.    Labs:  Recent Labs   Lab 24  0529 24  1742 24  0536 24  0455   GLU 96  --  102* 66*   BUN 6*  --  7* <5*   CREATSERUM 0.52*  --  0.51* 0.50*   EGFRCR 104  --  104 105   CA 7.1*  --  7.9* 7.8*   *  --  146* 143   K 3.4* 3.2* 4.4  4.4 3.9   *  --  122* 118*   CO2 17.0*  --  18.0* 18.0*         Assessment/Plan:  Patient Active Problem List   Diagnosis    Dehydration    Metabolic acidosis    Bilious vomiting with nausea    Difficult intravenous access    Hypokalemia    Hypomagnesemia    Hiatal hernia    Cellulitis of right lower extremity    Cellulitis    RTA (renal tubular acidosis)       Adrenal Insufficiency  - Now recurrent symptoms including hypoglycemia  - Electrolytes normalized, some contributing factor from RTA  - She has not been tolerating oral tablets  - Transition back to IV  hydrocortisone  - Continue Hydrocortisone 100mg IV Q8 hours   - Discontinue D10  - Will decrease steroid dose tomorrow     Meredith Powell MD  8/21/2024

## 2024-08-21 NOTE — CONSULTS
Hematology/Oncology Initial Consultation Note    Patient Name: Sheri Garzon  Medical Record Number: [id number]    YOB: 1960   Date of Consultation: 8/21/2024   Physician requesting consultation: Dr. All Arroyo    Reason for Consultation:  Sheri Garzon was seen today for the diagnosis of Neutropenia      64 year old female with a history of rheumatoid arthritis on chronic 20 mg prednisone daily, methotrexate, HCQ, CAD, metabolic acidosis, presented to [facility] ED on 8/12 with worsening pain in her right foot. Has had a wound for the last month and a half that has not healed. Was on ciprofloxacin last month. No fevers or chills at home. On arrival, was noted to have WBC of 4.6k with HB of 8.2.   Bedside debridement on 815.    She was treated with vancomycin initially and switched to linezolid         Also noted to be progressively anemic. Platelets are stable.       Past Medical History:  Past Medical History:    Allergic rhinitis    Anxiety    Arthritis    RA and Osteoarthritis    Asthma (HCC)    Depression    Not officially diagnosed    Esophageal reflux    Essential hypertension    High blood pressure    High cholesterol    Hyperlipidemia    Myocardial infarction (HCC)    Cardiac event    Osteoarthritis    Pancreatitis (HCC)    Problems with swallowing    RA (rheumatoid arthritis) (HCC)    Sleep apnea       Past Surgical History:   Procedure Laterality Date    Colonoscopy      Other surgical history      Revision of uterine anomaly      Rotator cuff repair      Wrist fracture surgery         Home Medications:  Current Facility-Administered Medications on File Prior to Encounter   Medication Dose Route Frequency Provider Last Rate Last Admin    [COMPLETED] acetaminophen (Tylenol Extra Strength) tab 1,000 mg  1,000 mg Oral Once Shorty Roman MD   1,000 mg at 07/30/24 1450    [COMPLETED] LORazepam (Ativan) tab 1 mg  1 mg Oral Once Shorty Roman MD   1 mg at 07/30/24 8825     [COMPLETED] potassium chloride (Klor-Con M20) tab 40 mEq  40 mEq Oral Once Shorty Roman MD   40 mEq at 07/30/24 1752    [COMPLETED] sodium phosphate 15 mmol in 0.9% NaCl 100mL IVPB premix  15 mmol Intravenous Once Shorty Couch MD   15 mmol at 07/27/24 1113    [COMPLETED] sodium phosphate 15 mmol in 0.9% NaCl 100mL IVPB premix  15 mmol Intravenous Once Shorty Couch MD   15 mmol at 07/26/24 0839    [COMPLETED] sodium phosphate 15 mmol in 0.9% NaCl 100mL IVPB premix  15 mmol Intravenous Once Shorty Couch MD   15 mmol at 07/25/24 0754    [COMPLETED] potassium chloride 40 mEq in 250mL sodium chloride 0.9% IVPB premix  40 mEq Intravenous Once Jarvis Fleming MD 62.5 mL/hr at 07/25/24 1542 40 mEq at 07/25/24 1542    [COMPLETED] magnesium sulfate 4 g/100mL IVPB premix 4 g  4 g Intravenous Once Jarvis Fleming MD 50 mL/hr at 07/24/24 1453 4 g at 07/24/24 1453    [COMPLETED] iopamidol 76% (ISOVUE-370) injection for power injector  80 mL Intravenous ONCE PRN Shorty Couch MD   80 mL at 07/24/24 2019    [COMPLETED] sodium chloride 0.9 % IV bolus 500 mL  500 mL Intravenous Once Stephanie Alvarez  mL/hr at 07/23/24 0523 500 mL at 07/23/24 0523    [COMPLETED] potassium phosphate dibasic 15 mmol in sodium chloride 0.9% 250 mL IVPB  15 mmol Intravenous Once Leroy Hsieh MD 62.5 mL/hr at 07/23/24 0817 15 mmol at 07/23/24 0817    Followed by    [COMPLETED] potassium chloride 40 mEq in 250mL sodium chloride 0.9% IVPB premix  40 mEq Intravenous Once Leroy Hsieh MD 62.5 mL/hr at 07/23/24 1430 40 mEq at 07/23/24 1430    [COMPLETED] magnesium sulfate in sterile water for injection 2 g/50mL IVPB premix 2 g  2 g Intravenous Once Leroy Hsieh MD 50 mL/hr at 07/23/24 1747 2 g at 07/23/24 1747    [COMPLETED] cosyntropin (Cortrosyn) injection 0.25 mg  0.25 mg Intravenous Once Meredith Powell MD   0.25 mg at 07/24/24 0901    [COMPLETED] iopamidol 76% (ISOVUE-370) injection for  power injector  80 mL Intravenous ONCE PRN Leroy Hsieh MD   80 mL at 07/23/24 1937     Current Outpatient Medications on File Prior to Encounter   Medication Sig Dispense Refill    albuterol 108 (90 Base) MCG/ACT Inhalation Aero Soln Inhale 2 puffs into the lungs every 4 to 6 hours as needed for Wheezing.      predniSONE 20 MG Oral Tab Take 2 tablets (40 mg total) by mouth daily. Pt is taking different than order:  takes 2 - 10 mg tablets by mouth daily      furosemide 40 MG Oral Tab Take 1 tablet (40 mg total) by mouth daily.      losartan 50 MG Oral Tab Take 1 tablet (50 mg total) by mouth daily.      hydroxychloroquine 200 MG Oral Tab Take 1 tablet (200 mg total) by mouth 2 (two) times daily. 180 tablet 0    HYDROcodone-acetaminophen (NORCO)  MG Oral Tab Take 1 tablet by mouth in the morning and 1 tablet at noon and 1 tablet in the evening. 30 tablet 0    pantoprazole 40 MG Oral Tab EC Take 1 tablet (40 mg total) by mouth 2 (two) times daily before meals. 60 tablet 0    sodium bicarbonate 650 MG Oral Tab Take 1 tablet (650 mg total) by mouth 2 (two) times daily. 60 tablet 0    amLODIPine 5 MG Oral Tab Take 1 tablet (5 mg total) by mouth daily.      Aspirin 81 MG Oral Cap 81 mg.      atorvastatin 10 MG Oral Tab Take 1 tablet (10 mg total) by mouth daily.      clopidogrel 75 MG Oral Tab Take 1 tablet (75 mg total) by mouth daily.      ergocalciferol 1.25 MG (04220 UT) Oral Cap Take 1 capsule (50,000 Units total) by mouth once a week.      methotrexate 2.5 MG Oral Tab Take 1 tablet (2.5 mg total) by mouth once a week.      oxybutynin ER 10 MG Oral Tablet 24 Hr Take 1 tablet (10 mg total) by mouth daily.      Potassium Chloride ER 10 MEQ Oral Tab CR Take 1 tablet (10 mEq total) by mouth 2 (two) times daily.      folic acid 1 MG Oral Tab Take 1 tablet (1 mg total) by mouth daily. 90 tablet 0       Current Inpatient Medications:  Inpatient Meds:   pantoprazole  40 mg Oral QAM AC    hydrocortisone sodium  succinate  100 mg Intravenous Q8H    linezolid  600 mg Oral Q12H    sodium bicarbonate  650 mg Oral TID    potassium chloride  40 mEq Oral BID    collagenase   Topical Daily    amLODIPine  5 mg Oral Daily    aspirin  81 mg Oral Daily    atorvastatin  10 mg Oral Daily    clopidogrel  75 mg Oral Daily    hydroxychloroquine  200 mg Oral BID    losartan  50 mg Oral Daily    oxybutynin ER  10 mg Oral Daily    enoxaparin  40 mg Subcutaneous Daily         PRN Meds:    alum-mag hydroxide-simethicone    ibuprofen    magnesium hydroxide    polyethylene glycol (PEG 3350)    bisacodyl    albuterol    docusate sodium    artificial saliva substitute    glucose    acetaminophen    ondansetron    prochlorperazine    acetaminophen **OR** HYDROcodone-acetaminophen **OR** HYDROcodone-acetaminophen    Allergies:   Allergies   Allergen Reactions    Cephalosporins ANAPHYLAXIS, NAUSEA AND VOMITING and OTHER (SEE COMMENTS)     Other reaction(s): Fever  Pt received multiple doses of ceftriaxone on 7/2024 without any complcations    Gadolinium Derivatives FEVER    Penicillins OTHER (SEE COMMENTS)     esophagitis    Sulfa Antibiotics OTHER (SEE COMMENTS)     esophagitis       Psychosocial History:  Social History     Social History Narrative    Not on file     Social History     Socioeconomic History    Marital status: Single   Tobacco Use    Smoking status: Never    Smokeless tobacco: Never   Vaping Use    Vaping status: Never Used   Substance and Sexual Activity    Alcohol use: Not Currently    Drug use: Never     Social Determinants of Health     Food Insecurity: No Food Insecurity (8/12/2024)    Food Insecurity     Food Insecurity: Never true   Transportation Needs: No Transportation Needs (8/12/2024)    Transportation Needs     Lack of Transportation: No   Housing Stability: Low Risk  (8/12/2024)    Housing Stability     Housing Instability: No       Family Medical History:  Family History   Problem Relation Age of Onset    Diabetes  Mother     Genetic Disease Mother     Heart Disorder Mother     Hypertension Mother     Obesity Mother     Diabetes Father     Hypertension Father     Depression Daughter     Psychiatric Daughter        Review of Systems:  A 10-point ROS was done with pertinent positives and negative per the HPI    Vital Signs:  Height: --  Weight: 89.8 kg (198 lb) (08/21 0531)  BSA (Calculated - sq m): --  Pulse: 99 (08/21 0940)  BP: 121/89 (08/21 0940)  Temp: 98.1 °F (36.7 °C) (08/21 0940)  Do Not Use - Resp Rate: --  SpO2: 99 % (08/21 0940)      Wt Readings from Last 6 Encounters:   08/21/24 89.8 kg (198 lb)   08/08/24 82.6 kg (182 lb)   07/22/24 83.4 kg (183 lb 12.8 oz)   06/28/24 87.1 kg (192 lb)   09/06/21 72.6 kg (160 lb)   08/11/21 77.1 kg (170 lb)       ECOG PS: 0    Physical Examination:  General: Patient is alert and oriented, not in acute distress  Psych: Mood and affect are appropriate  Eyes: EOMI, PERRL  ENT: Oropharynx is clear, no adenopathy  CV: Regular rate and rhythm, normal S1S2, no murmurs, no LE edema  Respiratory: Lungs clear to auscultation bilaterally  GI/Abd: Soft, non-tender with normoactive bowel sounds, no hepatosplenomegaly  Neurological: Grossly intact   Lymphatics: No palpable cervical, supraclavicular, axillary, or inguinal lymphadenopathy  Skin: no rashes or petechiae      Laboratory:  Recent Labs   Lab 08/20/24  0455 08/20/24  1205 08/21/24  0625   WBC 3.3* 3.4* 2.2*   HGB 7.3* 8.1* 8.2*   HCT 22.4* 25.0* 25.7*   .0* 224.0 247.0   MCV 87.5 91.2 90.5   RDW 19.9* 20.3* 19.7*   NEPRELIM 0.27* 0.31* 0.23*       Recent Labs   Lab 08/17/24  0601 08/17/24  1755 08/19/24  0536 08/20/24  0455 08/21/24  0625   *   < > 146* 143 137   K 2.8*  2.8*   < > 4.4  4.4 3.9 4.4   *   < > 122* 118* 113*   CO2 15.0*   < > 18.0* 18.0* 20.0*   BUN 9   < > 7* <5* <5*   CREATSERUM 0.56   < > 0.51* 0.50* 0.56   GLU 95   < > 102* 66* 118*   CA 7.6*   < > 7.9* 7.8* 8.0*   PHOS 2.7  2.7   < > 2.4  2.4 2.6   2.6 2.9   TP 4.4*  --   --   --   --    ALB 2.6*  2.7*   < > 2.7* 2.4* 2.6*   ALKPHO 66  --   --   --   --    AST 20  --   --   --   --    ALT 15  --   --   --   --    BILT 0.3  --   --   --   --     < > = values in this interval not displayed.       No results for input(s): \"PT\", \"INR\", \"PTT\", \"FIB\" in the last 168 hours.    Imaging:        US VENOUS DOPPLER LEG RIGHT - DIAG IMG (CPT=93971)    Result Date: 8/20/2024  CONCLUSION: No evidence of acute deep venous thrombosis in the imaged veins of the right lower extremity.    Dictated by (CST): Ti Zhou MD on 8/20/2024 at 5:50 PM     Finalized by (CST): Ti Zhou MD on 8/20/2024 at 5:51 PM          CTA LOWER EXTREMITY BILATERAL (W+WO) (CPT=73706-50)    Result Date: 8/12/2024  CONCLUSION:   1. RIGHT lower extremity:  Stable findings since recent prior July, 2024 CT angiogram.  Iliac, femoral, and popliteal arteries demonstrate mild-to-moderate scattered atherosclerosis, but no high-grade stenosis or occlusion.  There is a three-vessel runoff with mild-to-moderate multifocal stenoses throughout the runoff vessels related to atherosclerotic disease.  Plantar artery again demonstrates thready flow and/or partial occlusion.  2. LEFT lower extremity:  Mild scattered atherosclerotic disease throughout the iliac, femoral, and popliteal vessels, but with no high-grade stenosis or occlusion.  Note that on arterial phase, there is no significant opacification of the runoff vessels  or distal popliteal artery.  This does, however, improved on delayed phase with opacification of the distal popliteal and proximal run-off arteries.  There is a three-vessel runoff proximally.  On delayed phase, there is thready flow throughout the distal run-off arteries.  This finding is favored to represent sequelae of patient hemodynamics and lower extremity edema.  Nonetheless, please correlate with left lower extremity arterial Dopplers given asymmetry.  3. Marked subcutaneous edema  and soft tissue swelling throughout the right greater than left lower extremities.  No definite associated destructive/erosive osseous changes to suggest osteomyelitis by CT. 4. Advanced bilateral knee osteoarthritis with bilateral suprapatellar joint effusions and left knee chondrocalcinosis. 5. Colonic diverticulosis.   Dictated by (CST): Bandar Roy MD on 8/12/2024 at 5:59 PM     Finalized by (CST): Bandar Roy MD on 8/12/2024 at 6:18 PM          XR FOOT, COMPLETE (MIN 3 VIEWS), RIGHT (CPT=73630)    Result Date: 8/12/2024  CONCLUSION:  1. Marked dorsal midfoot soft tissue swelling.  Small nonmetallic polygonal morphology radiodense foci within the dorsal soft tissues, which could relate to debris, packing material, or less likely foreign bodies. 2. No acute fracture or dislocation of the right foot.  No destructive/erosive osseous changes are seen to suggest osteomyelitis radiographically.   Dictated by (CST): Bandar Roy MD on 8/12/2024 at 4:05 PM     Finalized by (CST): Bandar Roy MD on 8/12/2024 at 4:07 PM          MRI BRAIN WO ACUTE (3) SEQUENCE (CPT=70551)    Result Date: 7/30/2024  CONCLUSION: No acute intracranial process.  No evidence of acute or subacute infarct.   Dictated by (CST): Félix Maddox MD on 7/30/2024 at 5:37 PM     Finalized by (CST): Félix Maddox MD on 7/30/2024 at 5:38 PM          CTA ABDOMEN/PELVIS LOWER EXT BILAT W RUNOFF (MMZ=16193)    Result Date: 7/24/2024  CONCLUSION:   1. Symmetrical loss of opacification in the bilateral plantar arteries likely relating to atherosclerosis.  Otherwise, no  hemodynamically significant stenosis or evidence of arterial embolism in the major arteries of the bilateral lower extremities.   2. Subcutaneous edema in the right lower posterior calf.  No discrete fluid collection.  3. Advanced degenerative joint disease of the knees with bilateral joint effusions.  4. Right middle lobe pulmonary nodule measuring 2 mm.  If there are risk factors  for lung cancer than optional follow-up CT in 1 year could be obtained.  5. Additional chronic or incidental findings are described in the body of this report.    elm-remote     Dictated by (CST): Ti Zhou MD on 7/24/2024 at 8:48 PM     Finalized by (CST): Ti Zhou MD on 7/24/2024 at 9:01 PM          US VENOUS DOPPLER LEG BILAT - DIAG IMG (CPT=93970)    Result Date: 7/24/2024  CONCLUSION:   No evidence of deep venous thrombosis.     Dictated by (CST): Ron Whitman MD on 7/24/2024 at 10:06 AM     Finalized by (CST): Ron Whitman MD on 7/24/2024 at 10:06 AM          CT CHEST PE AORTA (IV ONLY) (CPT=71260)    Result Date: 7/23/2024  CONCLUSION:   No evidence of acute pulmonary embolism to the 1st subsegmental pulmonary artery level.  Enlargement of the main pulmonary artery suggests pulmonary hypertension.  No aneurysm or dissection of the thoracic aorta.  No acute pulmonary consolidation.  Additional chronic or incidental findings are described in the body of this report.    elm-remote    Dictated by (CST): Ti Zhou MD on 7/23/2024 at 7:45 PM     Finalized by (CST): Ti Zhou MD on 7/23/2024 at 7:48 PM          CT ABDOMEN+PELVIS(CONTRAST ONLY)(CPT=74177)    Result Date: 7/21/2024  CONCLUSION:  1. Limited exam secondary to patient respiratory motion artifact. 2. Mural thickening of distal gastric body and antrum suggests gastritis.  Correlation with endoscopy recommended if not recently performed.    Dictated by (CST): Cristóbal Banks MD on 7/21/2024 at 5:32 PM     Finalized by (CST): Cristóbal Banks MD on 7/21/2024 at 5:39 PM          Impression & Plan:       64-year-old lady with rheumatoid arthritis was on low-dose oral methotrexate supplemented with folic acid, hydroxychloroquine presents to the hospital with a wound on her leg.  Patient underwent debridement and is currently on linezolid.    Patient also developed adrenal insufficiency and non anion gap metabolic acidosis.     Patient  initially presented with normal ANC( inspite of having infection) along with mild anemia. Her neutropenia continued to worsen during her hospital stay No splenomegaly noted, making Felty syndrome less likely. . She is currently off her oral methotrexate. She was on vancomycin and switched to Linezolid    Patient was also noted to have recently worsening anemia, normocytic. ? Anemia of inflammation or chronic disease.     The neutropenia could likely be driven by her infection vs medication( vancomycin)    Peripheral smear   moderate normochromic normocytic anemia with decreased absolute RBC count and leukopenia with severe/critical neutropenia.     Neutrophils consist predominantly of mature forms and scattered band forms with scattered cells showing degenerative changes        Plan   - Will check for Iron studies, Vit B12, Folate levels, LORRAINE, Copper levels.   = Reticulocyte count  - continue to not give vancomycin. Treat underlying infection.   - repeat CBC with differential every day.   - continue steroid supplementation for adrenal insufficiency      We will follow along.     Mercy Khan MD  Hematology/Medical Oncology

## 2024-08-21 NOTE — PLAN OF CARE
Problem: Patient Centered Care  Goal: Patient preferences are identified and integrated in the patient's plan of care  Description: Interventions:  - What would you like us to know as we care for you?   - Provide timely, complete, and accurate information to patient/family  - Incorporate patient and family knowledge, values, beliefs, and cultural backgrounds into the planning and delivery of care  - Encourage patient/family to participate in care and decision-making at the level they choose  - Honor patient and family perspectives and choices  Outcome: Progressing     Problem: GASTROINTESTINAL - ADULT  Goal: Maintains or returns to baseline bowel function  Description: INTERVENTIONS:  - Assess bowel function  - Maintain adequate hydration with IV or PO as ordered and tolerated  - Evaluate effectiveness of GI medications  - Encourage mobilization and activity  - Obtain nutritional consult as needed  - Establish a toileting routine/schedule  - Consider collaborating with pharmacy to review patient's medication profile  Outcome: Progressing     Problem: SKIN/TISSUE INTEGRITY - ADULT  Goal: Skin integrity remains intact  Description: INTERVENTIONS  - Assess and document risk factors for pressure ulcer development  - Assess and document skin integrity  - Monitor for areas of redness and/or skin breakdown  - Initiate interventions, skin care algorithm/standards of care as needed  Outcome: Progressing  Goal: Incision(s), wounds(s) or drain site(s) healing without S/S of infection  Description: INTERVENTIONS:  - Assess and document risk factors for pressure ulcer development  - Assess and document skin integrity  - Assess and document dressing/incision, wound bed, drain sites and surrounding tissue  - Implement wound care per orders  - Initiate isolation precautions as appropriate  - Initiate Pressure Ulcer prevention bundle as indicated  Outcome: Progressing     Problem: PAIN - ADULT  Goal: Verbalizes/displays adequate  comfort level or patient's stated pain goal  Description: INTERVENTIONS:  - Encourage pt to monitor pain and request assistance  - Assess pain using appropriate pain scale  - Administer analgesics based on type and severity of pain and evaluate response  - Implement non-pharmacological measures as appropriate and evaluate response  - Consider cultural and social influences on pain and pain management  - Manage/alleviate anxiety  - Utilize distraction and/or relaxation techniques  - Monitor for opioid side effects  - Notify MD/LIP if interventions unsuccessful or patient reports new pain  - Anticipate increased pain with activity and pre-medicate as appropriate  Outcome: Progressing     Problem: RISK FOR INFECTION - ADULT  Goal: Absence of fever/infection during anticipated neutropenic period  Description: INTERVENTIONS  - Monitor WBC  - Administer growth factors as ordered  - Implement neutropenic guidelines  Outcome: Progressing     Problem: SAFETY ADULT - FALL  Goal: Free from fall injury  Description: INTERVENTIONS:  - Assess pt frequently for physical needs  - Identify cognitive and physical deficits and behaviors that affect risk of falls.  - Christine fall precautions as indicated by assessment.  - Educate pt/family on patient safety including physical limitations  - Instruct pt to call for assistance with activity based on assessment  - Modify environment to reduce risk of injury  - Provide assistive devices as appropriate  - Consider OT/PT consult to assist with strengthening/mobility  - Encourage toileting schedule  Outcome: Progressing     Problem: DISCHARGE PLANNING  Goal: Discharge to home or other facility with appropriate resources  Description: INTERVENTIONS:  - Identify barriers to discharge w/pt and caregiver  - Include patient/family/discharge partner in discharge planning  - Arrange for needed discharge resources and transportation as appropriate  - Identify discharge learning needs (meds, wound  care, etc)  - Arrange for interpreters to assist at discharge as needed  - Consider post-discharge preferences of patient/family/discharge partner  - Complete POLST form as appropriate  - Assess patient's ability to be responsible for managing their own health  - Refer to Case Management Department for coordinating discharge planning if the patient needs post-hospital services based on physician/LIP order or complex needs related to functional status, cognitive ability or social support system  Outcome: Progressing   No acute changes, patient in no acute distress, verbalizes pain is adequately controlled. No shortness of breath, unlabored respirations. Safety precautions in place

## 2024-08-22 LAB
ALBUMIN SERPL-MCNC: 2.8 G/DL (ref 3.2–4.8)
ALBUMIN/GLOB SERPL: 1.3 {RATIO} (ref 1–2)
ALP LIVER SERPL-CCNC: 82 U/L
ALT SERPL-CCNC: 14 U/L
ANION GAP SERPL CALC-SCNC: 8 MMOL/L (ref 0–18)
AST SERPL-CCNC: 15 U/L (ref ?–34)
BASOPHILS # BLD AUTO: 0.02 X10(3) UL (ref 0–0.2)
BASOPHILS NFR BLD AUTO: 0.5 %
BILIRUB SERPL-MCNC: 0.3 MG/DL (ref 0.2–1.1)
BUN BLD-MCNC: 9 MG/DL (ref 9–23)
BUN/CREAT SERPL: 11.8 (ref 10–20)
CALCIUM BLD-MCNC: 8.3 MG/DL (ref 8.7–10.4)
CHLORIDE SERPL-SCNC: 112 MMOL/L (ref 98–112)
CO2 SERPL-SCNC: 21 MMOL/L (ref 21–32)
CREAT BLD-MCNC: 0.76 MG/DL
DEPRECATED RDW RBC AUTO: 61.9 FL (ref 35.1–46.3)
EGFRCR SERPLBLD CKD-EPI 2021: 87 ML/MIN/1.73M2 (ref 60–?)
EOSINOPHIL # BLD AUTO: 0 X10(3) UL (ref 0–0.7)
EOSINOPHIL NFR BLD AUTO: 0 %
ERYTHROCYTE [DISTWIDTH] IN BLOOD BY AUTOMATED COUNT: 19.3 % (ref 11–15)
GLOBULIN PLAS-MCNC: 2.1 G/DL (ref 2–3.5)
GLUCOSE BLD-MCNC: 96 MG/DL (ref 70–99)
GLUCOSE BLDC GLUCOMTR-MCNC: 135 MG/DL (ref 70–99)
GLUCOSE BLDC GLUCOMTR-MCNC: 96 MG/DL (ref 70–99)
HAPTOGLOB SERPL-MCNC: 248 MG/DL (ref 30–200)
HCT VFR BLD AUTO: 25.1 %
HGB BLD-MCNC: 8.2 G/DL
IMM GRANULOCYTES # BLD AUTO: 0.19 X10(3) UL (ref 0–1)
IMM GRANULOCYTES NFR BLD: 4.9 %
LYMPHOCYTES # BLD AUTO: 1.4 X10(3) UL (ref 1–4)
LYMPHOCYTES NFR BLD AUTO: 36.3 %
MAGNESIUM SERPL-MCNC: 1.9 MG/DL (ref 1.6–2.6)
MCH RBC QN AUTO: 28.9 PG (ref 26–34)
MCHC RBC AUTO-ENTMCNC: 32.7 G/DL (ref 31–37)
MCV RBC AUTO: 88.4 FL
MONOCYTES # BLD AUTO: 1.49 X10(3) UL (ref 0.1–1)
MONOCYTES NFR BLD AUTO: 38.6 %
NEUTROPHILS # BLD AUTO: 0.76 X10 (3) UL (ref 1.5–7.7)
NEUTROPHILS # BLD AUTO: 0.76 X10(3) UL (ref 1.5–7.7)
NEUTROPHILS NFR BLD AUTO: 19.7 %
OSMOLALITY SERPL CALC.SUM OF ELEC: 291 MOSM/KG (ref 275–295)
PLATELET # BLD AUTO: 320 10(3)UL (ref 150–450)
POTASSIUM SERPL-SCNC: 4.5 MMOL/L (ref 3.5–5.1)
PROT SERPL-MCNC: 4.9 G/DL (ref 5.7–8.2)
RBC # BLD AUTO: 2.84 X10(6)UL
SODIUM SERPL-SCNC: 141 MMOL/L (ref 136–145)
WBC # BLD AUTO: 3.9 X10(3) UL (ref 4–11)

## 2024-08-22 PROCEDURE — 99233 SBSQ HOSP IP/OBS HIGH 50: CPT | Performed by: INTERNAL MEDICINE

## 2024-08-22 PROCEDURE — 99232 SBSQ HOSP IP/OBS MODERATE 35: CPT | Performed by: INTERNAL MEDICINE

## 2024-08-22 RX ORDER — PREDNISONE 10 MG/1
10 TABLET ORAL 2 TIMES DAILY WITH MEALS
Status: DISCONTINUED | OUTPATIENT
Start: 2024-08-22 | End: 2024-08-24

## 2024-08-22 NOTE — PROGRESS NOTES
[facility]  part of [facility]    Progress Note    Sheri Garzon Patient Status:  Inpatient    1960 MRN [id number]   Location [facility] 5SW/SE Attending Derick Hsieh MD   Hosp Day # 10 PCP TERI MCKENNA MD     Subjective:  Hypoglycemia resolved. She is feeling better, tolerating some PO intake.  Stable electrolytes.     A comprehensive 10 point review of systems was completed.  Pertinent positives and negatives noted in the the HPI.      Objective/Physical Exam:  Physical Exam:   General: Alert, orientated x3.  Cooperative.  No apparent distress.  Vital Signs:  Blood pressure 111/83, pulse 98, temperature 98.2 °F (36.8 °C), temperature source Oral, resp. rate 18, height 5' 2\" (1.575 m), weight 198 lb (89.8 kg), SpO2 100%.  HEENT: Exam is unremarkable.  Neck: Supple.  Lungs: Clear bilaterally.  Cardiac: Regular rate and rhythm.  Abdomen:  Bowel sounds present, normoactive.  Nontender.  Extremities:  No lower extremity edema noted.  Skin: Normal texture and turgor.  Lymphatic:  No cervical lymphadenopathy.    Labs:  Recent Labs   Lab 24  0455 24  0625 24  0824   GLU 66* 118* 96   BUN <5* <5* 9   CREATSERUM 0.50* 0.56 0.76   EGFRCR 105 102 87   CA 7.8* 8.0* 8.3*    137 141   K 3.9 4.4 4.5   * 113* 112   CO2 18.0* 20.0* 21.0         Assessment/Plan:  Patient Active Problem List   Diagnosis    Dehydration    Metabolic acidosis    Bilious vomiting with nausea    Difficult intravenous access    Hypokalemia    Hypomagnesemia    Hiatal hernia    Cellulitis of right lower extremity    Cellulitis    RTA (renal tubular acidosis)       Adrenal Insufficiency  - Now recurrent symptoms including hypoglycemia  - Electrolytes normalized, some contributing factor from RTA  - She is not tolerating oral therapy and will try to transition back to prednisone  - Discontinue Hydrocortisone   - Start Prednisone 10mg PO BID     Will follow     Meredith Powell,  MD  8/22/2024

## 2024-08-22 NOTE — PROGRESS NOTES
[facility]  part of [facility]     Hospitalist Progress Note     Sheri Garzon Patient Status:  Inpatient    1960 MRN [id number]   Location [facility] 5SW/SE Attending Derick Hsieh MD   Hosp Day # 10 PCP TERI MCKENNA MD     Subjective:     Pt was seen and examined. Sitting up comfortably in bed and in NAD.   Denied any other active complaints at the time of interview. All questions and concerns addressed.   Looking forward to discharging soon.     Objective:    Review of Systems:   ROS completed; pertinent positive and negatives stated in subjective.      Vital signs:  Temp:  [98 °F (36.7 °C)-98.3 °F (36.8 °C)] 98.2 °F (36.8 °C)  Pulse:  [86-98] 98  Resp:  [18] 18  BP: (106-114)/(75-85) 111/83  SpO2:  [99 %-100 %] 100 %      Physical Exam:    Gen: NAD AO x3  Chest: good air entry CTABL  CVS: normal s1 and s2 RR  Abd: NABS soft NT ND  Neuro: CN 2-12 grossly intact  Ext: RLE surgical site CDI      Diagnostic Data:    Labs:  Recent Labs   Lab 24  0536 24  0455 24  1205 24  0625 24  0824   WBC 2.9* 3.3* 3.4* 2.2* 3.9*   HGB 7.9* 7.3* 8.1* 8.2* 8.2*   MCV 85.0 87.5 91.2 90.5 88.4   .0 106.0* 224.0 247.0 320.0       Recent Labs   Lab 24  0601 24  1755 24  0455 24  0625 24  0824   GLU 95   < > 66* 118* 96   BUN 9   < > <5* <5* 9   CREATSERUM 0.56   < > 0.50* 0.56 0.76   CA 7.6*   < > 7.8* 8.0* 8.3*   ALB 2.6*  2.7*   < > 2.4* 2.6* 2.8*   *   < > 143 137 141   K 2.8*  2.8*   < > 3.9 4.4 4.5   *   < > 118* 113* 112   CO2 15.0*   < > 18.0* 20.0* 21.0   ALKPHO 66  --   --   --  82   AST 20  --   --   --  15   ALT 15  --   --   --  14   BILT 0.3  --   --   --  0.3   TP 4.4*  --   --   --  4.9*    < > = values in this interval not displayed.       Estimated Creatinine Clearance: 59.1 mL/min (based on SCr of 0.76 mg/dL).    No results for input(s): \"PTP\", \"INR\" in the last 168 hours.           Imaging:  Imaging data reviewed in Epic.    Medications:    pantoprazole  40 mg Oral QAM AC    folic acid  1 mg Oral Daily    hydrocortisone sodium succinate  100 mg Intravenous Q8H    linezolid  600 mg Oral Q12H    sodium bicarbonate  650 mg Oral TID    potassium chloride  40 mEq Oral BID    collagenase   Topical Daily    amLODIPine  5 mg Oral Daily    aspirin  81 mg Oral Daily    atorvastatin  10 mg Oral Daily    clopidogrel  75 mg Oral Daily    hydroxychloroquine  200 mg Oral BID    losartan  50 mg Oral Daily    oxybutynin ER  10 mg Oral Daily    enoxaparin  40 mg Subcutaneous Daily       Assessment & Plan:     RLE cellulitis -> improved  Imaging reviewed  Started on IV abx, cont for now  ID on consult  Continue Linezolid for a 2 week course  Gsx on consult  Underwent I+D of RLE on 8/15/24  Rheumatoid arthritis  Continue home Hydroxychloroquine  Steroids per Endo/Rheum  Changed from IV to PO  Monitor accuchecks  CAD  HTN  HLD  Continue ASA, Statin, Plavix  Continue home antihypertensives  6.   Multiple electrolyte abnormalities and NAGMA  7.   ? Distal RTA   1.    Cont electrolyte replacement   2.    Improving   7.   Hypernatremia   1.    Improved  8.   Adrenal Insufficiency   1.    Endocrinology following   2.    Restarted IV steroids - continue IV Hydrocortisone 100 mg IV q8hrs. Taper steroids   3.    Cont to monitor  9.   Leukocytosis, Anemia  10. Fluctuating Platelets   1.    Monitor labs   2.    Transfuse as indicated   3.    Hematology on consult   1.    Iron studies with iron deficiency anemia. B12, folate WNL. Check LORRAINE, copper, retic count    2.    Continue steroids  3     Monitor CBC    Plan of care discussed with patient at bedside.      Supplementary Documentation:     Quality:  DVT Prophylaxis: Lovenox s/c  CODE status: Full       Estimated date of discharge: TBD  Discharge is dependent on: clinical stability  At this point Ms. Garzon is expected to be discharge to: home    MDM: High

## 2024-08-22 NOTE — PLAN OF CARE
Problem: Patient Centered Care  Goal: Patient preferences are identified and integrated in the patient's plan of care  Description: Interventions:  - What would you like us to know as we care for you?   - Provide timely, complete, and accurate information to patient/family  - Incorporate patient and family knowledge, values, beliefs, and cultural backgrounds into the planning and delivery of care  - Encourage patient/family to participate in care and decision-making at the level they choose  - Honor patient and family perspectives and choices  Outcome: Progressing       Problem: GASTROINTESTINAL - ADULT  Goal: Maintains or returns to baseline bowel function  Description: INTERVENTIONS:  - Assess bowel function  - Maintain adequate hydration with IV or PO as ordered and tolerated  - Evaluate effectiveness of GI medications  - Encourage mobilization and activity  - Obtain nutritional consult as needed  - Establish a toileting routine/schedule  - Consider collaborating with pharmacy to review patient's medication profile  Outcome: Progressing     Problem: SKIN/TISSUE INTEGRITY - ADULT  Goal: Skin integrity remains intact  Description: INTERVENTIONS  - Assess and document risk factors for pressure ulcer development  - Assess and document skin integrity  - Monitor for areas of redness and/or skin breakdown  - Initiate interventions, skin care algorithm/standards of care as needed  Outcome: Progressing  Goal: Incision(s), wounds(s) or drain site(s) healing without S/S of infection  Description: INTERVENTIONS:  - Assess and document risk factors for pressure ulcer development  - Assess and document skin integrity  - Assess and document dressing/incision, wound bed, drain sites and surrounding tissue  - Implement wound care per orders  - Initiate isolation precautions as appropriate  - Initiate Pressure Ulcer prevention bundle as indicated  Outcome: Progressing     Problem: PAIN - ADULT  Goal: Verbalizes/displays adequate  comfort level or patient's stated pain goal  Description: INTERVENTIONS:  - Encourage pt to monitor pain and request assistance  - Assess pain using appropriate pain scale  - Administer analgesics based on type and severity of pain and evaluate response  - Implement non-pharmacological measures as appropriate and evaluate response  - Consider cultural and social influences on pain and pain management  - Manage/alleviate anxiety  - Utilize distraction and/or relaxation techniques  - Monitor for opioid side effects  - Notify MD/LIP if interventions unsuccessful or patient reports new pain  - Anticipate increased pain with activity and pre-medicate as appropriate  Outcome: Progressing     Problem: RISK FOR INFECTION - ADULT  Goal: Absence of fever/infection during anticipated neutropenic period  Description: INTERVENTIONS  - Monitor WBC  - Administer growth factors as ordered  - Implement neutropenic guidelines  Outcome: Progressing     Problem: SAFETY ADULT - FALL  Goal: Free from fall injury  Description: INTERVENTIONS:  - Assess pt frequently for physical needs  - Identify cognitive and physical deficits and behaviors that affect risk of falls.  - [place] fall precautions as indicated by assessment.  - Educate pt/family on patient safety including physical limitations  - Instruct pt to call for assistance with activity based on assessment  - Modify environment to reduce risk of injury  - Provide assistive devices as appropriate  - Consider OT/PT consult to assist with strengthening/mobility  - Encourage toileting schedule  Outcome: Progressing     Problem: DISCHARGE PLANNING  Goal: Discharge to home or other facility with appropriate resources  Description: INTERVENTIONS:  - Identify barriers to discharge w/pt and caregiver  - Include patient/family/discharge partner in discharge planning  - Arrange for needed discharge resources and transportation as appropriate  - Identify discharge learning needs (meds, wound  care, etc)  - Arrange for interpreters to assist at discharge as needed  - Consider post-discharge preferences of patient/family/discharge partner  - Complete POLST form as appropriate  - Assess patient's ability to be responsible for managing their own health  - Refer to Case Management Department for coordinating discharge planning if the patient needs post-hospital services based on physician/LIP order or complex needs related to functional status, cognitive ability or social support system  Outcome: Progressing   No acute changes, patient in no acute distress, verbalizes pain is adequately controlled. Safety precautions in place

## 2024-08-22 NOTE — PLAN OF CARE
NO acute changes noted throughout shift. Switched to PO steroids. Is now ACHS. Safety measures in place  Problem: Patient Centered Care  Goal: Patient preferences are identified and integrated in the patient's plan of care  Description: Interventions:  - What would you like us to know as we care for you?   - Provide timely, complete, and accurate information to patient/family  - Incorporate patient and family knowledge, values, beliefs, and cultural backgrounds into the planning and delivery of care  - Encourage patient/family to participate in care and decision-making at the level they choose  - Honor patient and family perspectives and choices  Outcome: Progressing     Problem: SKIN/TISSUE INTEGRITY - ADULT  Goal: Skin integrity remains intact  Description: INTERVENTIONS  - Assess and document risk factors for pressure ulcer development  - Assess and document skin integrity  - Monitor for areas of redness and/or skin breakdown  - Initiate interventions, skin care algorithm/standards of care as needed  Outcome: Progressing  Goal: Incision(s), wounds(s) or drain site(s) healing without S/S of infection  Description: INTERVENTIONS:  - Assess and document risk factors for pressure ulcer development  - Assess and document skin integrity  - Assess and document dressing/incision, wound bed, drain sites and surrounding tissue  - Implement wound care per orders  - Initiate isolation precautions as appropriate  - Initiate Pressure Ulcer prevention bundle as indicated  Outcome: Progressing     Problem: GASTROINTESTINAL - ADULT  Goal: Maintains or returns to baseline bowel function  Description: INTERVENTIONS:  - Assess bowel function  - Maintain adequate hydration with IV or PO as ordered and tolerated  - Evaluate effectiveness of GI medications  - Encourage mobilization and activity  - Obtain nutritional consult as needed  - Establish a toileting routine/schedule  - Consider collaborating with pharmacy to review patient's  medication profile  Outcome: Progressing     Problem: PAIN - ADULT  Goal: Verbalizes/displays adequate comfort level or patient's stated pain goal  Description: INTERVENTIONS:  - Encourage pt to monitor pain and request assistance  - Assess pain using appropriate pain scale  - Administer analgesics based on type and severity of pain and evaluate response  - Implement non-pharmacological measures as appropriate and evaluate response  - Consider cultural and social influences on pain and pain management  - Manage/alleviate anxiety  - Utilize distraction and/or relaxation techniques  - Monitor for opioid side effects  - Notify MD/LIP if interventions unsuccessful or patient reports new pain  - Anticipate increased pain with activity and pre-medicate as appropriate  Outcome: Progressing     Problem: RISK FOR INFECTION - ADULT  Goal: Absence of fever/infection during anticipated neutropenic period  Description: INTERVENTIONS  - Monitor WBC  - Administer growth factors as ordered  - Implement neutropenic guidelines  Outcome: Progressing     Problem: SAFETY ADULT - FALL  Goal: Free from fall injury  Description: INTERVENTIONS:  - Assess pt frequently for physical needs  - Identify cognitive and physical deficits and behaviors that affect risk of falls.  - [place] fall precautions as indicated by assessment.  - Educate pt/family on patient safety including physical limitations  - Instruct pt to call for assistance with activity based on assessment  - Modify environment to reduce risk of injury  - Provide assistive devices as appropriate  - Consider OT/PT consult to assist with strengthening/mobility  - Encourage toileting schedule  Outcome: Progressing     Problem: DISCHARGE PLANNING  Goal: Discharge to home or other facility with appropriate resources  Description: INTERVENTIONS:  - Identify barriers to discharge w/pt and caregiver  - Include patient/family/discharge partner in discharge planning  - Arrange for needed  discharge resources and transportation as appropriate  - Identify discharge learning needs (meds, wound care, etc)  - Arrange for interpreters to assist at discharge as needed  - Consider post-discharge preferences of patient/family/discharge partner  - Complete POLST form as appropriate  - Assess patient's ability to be responsible for managing their own health  - Refer to Case Management Department for coordinating discharge planning if the patient needs post-hospital services based on physician/LIP order or complex needs related to functional status, cognitive ability or social support system  Outcome: Progressing

## 2024-08-23 PROBLEM — D70.9 NEUTROPENIA (HCC): Status: ACTIVE | Noted: 2024-01-01

## 2024-08-23 PROBLEM — E27.1 ADRENAL INSUFFICIENCY (ADDISON'S DISEASE) (HCC): Status: ACTIVE | Noted: 2024-08-23

## 2024-08-23 PROBLEM — Z79.52 CURRENT CHRONIC USE OF SYSTEMIC STEROIDS: Status: ACTIVE | Noted: 2024-01-01

## 2024-08-23 PROBLEM — Z79.52 CURRENT CHRONIC USE OF SYSTEMIC STEROIDS: Status: ACTIVE | Noted: 2024-08-23

## 2024-08-23 PROBLEM — D64.9 ANEMIA OF INFECTION: Status: ACTIVE | Noted: 2024-08-23

## 2024-08-23 PROBLEM — D64.9 ANEMIA OF INFECTION: Status: ACTIVE | Noted: 2024-01-01

## 2024-08-23 PROBLEM — B99.9 ANEMIA OF INFECTION: Status: ACTIVE | Noted: 2024-01-01

## 2024-08-23 PROBLEM — D70.9 NEUTROPENIA (HCC): Status: ACTIVE | Noted: 2024-08-23

## 2024-08-23 PROBLEM — B99.9 ANEMIA OF INFECTION: Status: ACTIVE | Noted: 2024-08-23

## 2024-08-23 PROBLEM — E27.1 ADRENAL INSUFFICIENCY (ADDISON'S DISEASE) (HCC): Status: ACTIVE | Noted: 2024-01-01

## 2024-08-23 LAB
ALBUMIN SERPL-MCNC: 2.8 G/DL (ref 3.2–4.8)
ALBUMIN/GLOB SERPL: 1.3 {RATIO} (ref 1–2)
ALP LIVER SERPL-CCNC: 82 U/L
ALT SERPL-CCNC: 12 U/L
ANION GAP SERPL CALC-SCNC: 6 MMOL/L (ref 0–18)
AST SERPL-CCNC: 16 U/L (ref ?–34)
BASOPHILS # BLD: 0 X10(3) UL (ref 0–0.2)
BASOPHILS NFR BLD: 0 %
BILIRUB SERPL-MCNC: 0.3 MG/DL (ref 0.2–1.1)
BUN BLD-MCNC: 12 MG/DL (ref 9–23)
BUN/CREAT SERPL: 13 (ref 10–20)
CALCIUM BLD-MCNC: 8.8 MG/DL (ref 8.7–10.4)
CHLORIDE SERPL-SCNC: 112 MMOL/L (ref 98–112)
CO2 SERPL-SCNC: 21 MMOL/L (ref 21–32)
CREAT BLD-MCNC: 0.92 MG/DL
DEPRECATED RDW RBC AUTO: 63.6 FL (ref 35.1–46.3)
EGFRCR SERPLBLD CKD-EPI 2021: 70 ML/MIN/1.73M2 (ref 60–?)
EOSINOPHIL # BLD: 0 X10(3) UL (ref 0–0.7)
EOSINOPHIL NFR BLD: 0 %
ERYTHROCYTE [DISTWIDTH] IN BLOOD BY AUTOMATED COUNT: 19.2 % (ref 11–15)
GLOBULIN PLAS-MCNC: 2.1 G/DL (ref 2–3.5)
GLUCOSE BLD-MCNC: 85 MG/DL (ref 70–99)
GLUCOSE BLDC GLUCOMTR-MCNC: 101 MG/DL (ref 70–99)
GLUCOSE BLDC GLUCOMTR-MCNC: 105 MG/DL (ref 70–99)
GLUCOSE BLDC GLUCOMTR-MCNC: 76 MG/DL (ref 70–99)
GLUCOSE BLDC GLUCOMTR-MCNC: 78 MG/DL (ref 70–99)
HCT VFR BLD AUTO: 27 %
HGB BLD-MCNC: 8.4 G/DL
LYMPHOCYTES NFR BLD: 1.16 X10(3) UL (ref 1–4)
LYMPHOCYTES NFR BLD: 20 %
MCH RBC QN AUTO: 28.5 PG (ref 26–34)
MCHC RBC AUTO-ENTMCNC: 31.1 G/DL (ref 31–37)
MCV RBC AUTO: 91.5 FL
METAMYELOCYTES # BLD: 0.17 X10(3) UL
METAMYELOCYTES NFR BLD: 3 %
MONOCYTES # BLD: 1.45 X10(3) UL (ref 0.1–1)
MONOCYTES NFR BLD: 25 %
MYELOCYTES # BLD: 0.17 X10(3) UL
MYELOCYTES NFR BLD: 3 %
NEUTROPHILS # BLD AUTO: 1.36 X10 (3) UL (ref 1.5–7.7)
NEUTROPHILS NFR BLD: 47 %
NEUTS BAND NFR BLD: 1 %
NEUTS HYPERSEG # BLD: 2.78 X10(3) UL (ref 1.5–7.7)
OSMOLALITY SERPL CALC.SUM OF ELEC: 287 MOSM/KG (ref 275–295)
PLATELET # BLD AUTO: 362 10(3)UL (ref 150–450)
PLATELET MORPHOLOGY: NORMAL
POTASSIUM SERPL-SCNC: 4.7 MMOL/L (ref 3.5–5.1)
PROMYELOCYTES # BLD: 0.06 X10(3) UL
PROMYELOCYTES NFR BLD: 1 %
PROT SERPL-MCNC: 4.9 G/DL (ref 5.7–8.2)
RBC # BLD AUTO: 2.95 X10(6)UL
SODIUM SERPL-SCNC: 139 MMOL/L (ref 136–145)
TOTAL CELLS COUNTED BLD: 100
WBC # BLD AUTO: 5.8 X10(3) UL (ref 4–11)

## 2024-08-23 PROCEDURE — 99232 SBSQ HOSP IP/OBS MODERATE 35: CPT | Performed by: STUDENT IN AN ORGANIZED HEALTH CARE EDUCATION/TRAINING PROGRAM

## 2024-08-23 PROCEDURE — 99233 SBSQ HOSP IP/OBS HIGH 50: CPT | Performed by: INTERNAL MEDICINE

## 2024-08-23 RX ORDER — LINEZOLID 600 MG/1
600 TABLET, FILM COATED ORAL 2 TIMES DAILY
Qty: 14 TABLET | Refills: 0 | Status: SHIPPED | OUTPATIENT
Start: 2024-08-23 | End: 2024-08-26 | Stop reason: CLARIF

## 2024-08-23 RX ORDER — PREDNISONE 10 MG/1
TABLET ORAL
Qty: 40 TABLET | Refills: 0 | Status: SHIPPED | OUTPATIENT
Start: 2024-08-23 | End: 2024-09-01

## 2024-08-23 NOTE — DISCHARGE SUMMARY
[facility]    DISCHARGE SUMMARY     Sheri Garzon Patient Status:  Inpatient    1960 MRN [id number]   Location [facility] 5SW/SE Attending All Arroyo MD   Hosp Day # 11 PCP TERI MCKENNA MD     Date of Admission: 2024  Date of Discharge:  2024    Discharge Disposition: Home or Self Care    Discharge Diagnosis:     RLE cellulitis -> improved  Rheumatoid arthritis  CAD  HTN  HLD  6.   Multiple electrolyte abnormalities and NAGMA  7.   ? Distal RTA  7.   Hypernatremia  8.   Adrenal Insufficiency  9.   Leukocytosis, Anemia  10. Fluctuating Platelets      History of Present Illness:     The patient is a 64-year-old  female, debilitated with severe rheumatoid arthritis. She does have chronic skin striae from chronic prednisone exposure. She had chronic ulceration on the posterior aspects of her right leg. Around 3 to 4 days ago, started developing erythema extending from her right posterior leg to the anterior aspect up to the knee and to the dorsum of the right foot with some right foot blistering unroofed. Today came into the emergency department for evaluation for fatigue and subjective fevers. CBC showed white blood cell count of 4.5. No left shift. Platelet count 117, hemoglobin 10.5. Chemistry showed potassium 3.2, chloride 113, and bicarbonate 12. Numbers are chronic. She was slightly hypoglycemic at 59. She was started on IV Solu-Cortef, IV vancomycin. X-ray of the foot showed marked dorsum mid foot soft tissue swelling, small nonmetallic polygonal morphology, radiopaque foci within the dorsal soft tissue which could relate to debris packing material or less likely foreign body. CT angiogram of the lower extremities bilaterally with and without contrast still pending.     Brief Synopsis:     RLE cellulitis -> improved  Imaging reviewed  Started on IV abx, cont for now  ID on consult  Continue Linezolid for a 2 week course  Gsx  on consult  Underwent I+D of RLE on 8/15/24  Rheumatoid arthritis  Continue home Hydroxychloroquine  Steroids per Endo/Rheum  Changed from IV to PO - Prednisone 10 mg PO BID for 3 days followed by home 10 mg PO daily dosing.  Monitor accuchecks  CAD  HTN  HLD  Continue ASA, Statin, Plavix  Continue home antihypertensives  6.   Multiple electrolyte abnormalities and NAGMA  7.   ? Distal RTA                1.    Cont electrolyte replacement                2.    Improving   7.   Hypernatremia                1.    Improved  8.   Adrenal Insufficiency                1.    Endocrinology following                              1.    Prednisone 10 mg PO BID                              2.    Monitor accuchecks                2.    Cont to monitor  9.   Leukocytosis, Anemia  10. Fluctuating Platelets                1.    Monitor labs                2.    Transfuse as indicated                3.    Hematology on consult   1.    Iron studies with iron deficiency anemia. B12, folate WNL. Elevated Retic count. Check LORRAINE, copper pending                              2.    Continue steroids  3     Monitor CBC  Patient has been cleared for discharge by consulting physicians. She will follow up with ID, hematology, Endo as opt for further care.   Discharge medications ordered per Endo and ID recs.   Patient is to remain compliant with all discharge medications and instructions and to follow up as advised.   Patient encouraged to make healthy lifestyle and dietary changes.    Lace+ Score: 57  59-90 High Risk  29-58 Medium Risk  0-28   Low Risk       TCM Follow-Up Recommendation:  LACE 29-58: Moderate Risk of readmission after discharge from the hospital.      Procedures during hospitalization:   I&D    Incidental or significant findings and recommendations (brief descriptions):  None    Lab/Test results pending at Discharge:   None    Consultants:  Chantal NAVARRO  Endo  Nephro  HemOnc    Discharge Medication List:     Discharge Medications         START taking these medications        Instructions Prescription details   linezolid 600 MG Tabs  Commonly known as: Zyvox      Take 1 tablet (600 mg total) by mouth 2 (two) times daily for 7 days.   Stop taking on: August 30, 2024  Quantity: 14 tablet  Refills: 0            CHANGE how you take these medications        Instructions Prescription details   predniSONE 10 MG Tabs  Commonly known as: Deltasone  What changed:   medication strength  how much to take  how to take this  when to take this  additional instructions      10 mg po two times a day for 3 days followed by 10 mg PO daily therafter.   Quantity: 40 tablet  Refills: 0            CONTINUE taking these medications        Instructions Prescription details   albuterol 108 (90 Base) MCG/ACT Aers  Commonly known as: Ventolin HFA      Inhale 2 puffs into the lungs every 4 to 6 hours as needed for Wheezing.   Refills: 0     amLODIPine 5 MG Tabs  Commonly known as: Norvasc      Take 1 tablet (5 mg total) by mouth daily.   Refills: 0     Aspirin 81 MG Caps      81 mg.   Refills: 0     atorvastatin 10 MG Tabs  Commonly known as: Lipitor      Take 1 tablet (10 mg total) by mouth daily.   Refills: 0     clopidogrel 75 MG Tabs  Commonly known as: Plavix      Take 1 tablet (75 mg total) by mouth daily.   Refills: 0     ergocalciferol 1.25 MG (75264 UT) Caps  Commonly known as: Vitamin D2      Take 1 capsule (50,000 Units total) by mouth once a week.   Refills: 0     folic acid 1 MG Tabs  Commonly known as: Folvite      Take 1 tablet (1 mg total) by mouth daily.   Quantity: 90 tablet  Refills: 0     hydroxychloroquine 200 MG Tabs  Commonly known as: Plaquenil      Take 1 tablet (200 mg total) by mouth 2 (two) times daily.   Quantity: 180 tablet  Refills: 0     losartan 50 MG Tabs  Commonly known as: Cozaar      Take 1 tablet (50 mg total) by mouth daily.   Refills: 0     methotrexate 2.5 MG Tabs  Commonly known as: Rheumatrex      Take 1 tablet (2.5 mg total) by  mouth once a week.   Refills: 0     oxybutynin ER 10 MG Tb24  Commonly known as: Ditropan-XL      Take 1 tablet (10 mg total) by mouth daily.   Refills: 0     pantoprazole 40 MG Tbec  Commonly known as: Protonix      Take 1 tablet (40 mg total) by mouth 2 (two) times daily before meals.   Stop taking on: August 26, 2024  Quantity: 60 tablet  Refills: 0     Potassium Chloride ER 10 MEQ Tbcr      Take 1 tablet (10 mEq total) by mouth 2 (two) times daily.   Refills: 0     sodium bicarbonate 650 MG Tabs      Take 1 tablet (650 mg total) by mouth 2 (two) times daily.   Stop taking on: August 26, 2024  Quantity: 60 tablet  Refills: 0            STOP taking these medications      furosemide 40 MG Tabs  Commonly known as: Lasix        HYDROcodone-acetaminophen  MG Tabs  Commonly known as: Norco                  Where to Get Your Medications        These medications were sent to [id number] [place], [place] - [address] [facility], [phone], [phone]  [address] [place] [id number]      Phone: [phone]   linezolid 600 MG Tabs  predniSONE 10 MG Tabs         [place]    Follow-up appointment:   Yaw Yao MD  [address]  [facility] [address]  [facility] [address]  [phone]    Follow up      Meredith Powell MD  [address] [facility] [facility] [address]  [facility] [id number] [phone]    Follow up in 1 week(s)      Sulma Juarez MD  [address] [facility] [address]  [phone]    Follow up in 1 week(s)      Appointments for Next 30 Days 8/23/2024 - 9/22/2024        Date Arrival Time Visit Type Length Department Provider     8/26/2024  2:30 PM  [facility] SANKET EVAL 30 [3713] 30 min [facility] Martin Cerna MD    Patient Instructions:         Location Instructions:     Masks are optional for all patients and visitors, unless otherwise indicated.                      Vital signs:  Temp:  [98 °F (36.7 °C)-98.3 °F (36.8 °C)] 98.1  °F (36.7 °C)  Pulse:  [94] 94  Resp:  [18] 18  BP: ()/(60-79) 101/79  SpO2:  [98 %-100 %] 99 %    Physical Exam:    Gen: NAD AO x3  Chest: good air entry CTABL  CVS: normal s1 and s2 RR  Abd: NABS soft NT ND  Neuro: CN 2-12 grossly intact  Ext: no edema in bilateral LE    -----------------------------------------------------------------------------------------------  PATIENT DISCHARGE INSTRUCTIONS: See electronic chart    All Arroyo MD  Hospitalist    Time spent:  > 30 minutes    The 21st Century Cures Act makes medical notes like these available to patients in the interest of transparency. Please be advised this is a medical document. Medical documents are intended to carry relevant information, facts as evident, and the clinical opinion of the practitioner. The medical note is intended as peer to peer communication and may appear blunt or direct. It is written in medical language and may contain abbreviations or verbiage that are unfamiliar.

## 2024-08-23 NOTE — PROGRESS NOTES
Hematology/Oncology Progress Note    Patient Name: Sheri Garzon  Medical Record Number: [id number]    YOB: 1960     Reason for Consultation:  Sheri Garzon was seen today for the diagnosis of neutropenia and anemia.    Interval events:    Patient has been recovering well with the prednisone.  The wound is healing well.  Having any fevers or chills.  Switched over to oral prednisone.      Inpatient Meds:   predniSONE  10 mg Oral BID with meals    pantoprazole  40 mg Oral QAM AC    folic acid  1 mg Oral Daily    linezolid  600 mg Oral Q12H    sodium bicarbonate  650 mg Oral TID    [Held by provider] potassium chloride  40 mEq Oral BID    collagenase   Topical Daily    amLODIPine  5 mg Oral Daily    aspirin  81 mg Oral Daily    atorvastatin  10 mg Oral Daily    clopidogrel  75 mg Oral Daily    hydroxychloroquine  200 mg Oral BID    losartan  50 mg Oral Daily    oxybutynin ER  10 mg Oral Daily    enoxaparin  40 mg Subcutaneous Daily         PRN Meds:    alum-mag hydroxide-simethicone    ibuprofen    magnesium hydroxide    polyethylene glycol (PEG 3350)    bisacodyl    albuterol    docusate sodium    artificial saliva substitute    glucose    acetaminophen    ondansetron    prochlorperazine    acetaminophen **OR** HYDROcodone-acetaminophen **OR** HYDROcodone-acetaminophen    Allergies:   Allergies   Allergen Reactions    Cephalosporins ANAPHYLAXIS, NAUSEA AND VOMITING and OTHER (SEE COMMENTS)     Other reaction(s): Fever  Pt received multiple doses of ceftriaxone on 7/2024 without any complcations    Gadolinium Derivatives FEVER    Penicillins OTHER (SEE COMMENTS)     esophagitis    Sulfa Antibiotics OTHER (SEE COMMENTS)     esophagitis       Vital Signs:  Height: --  Weight: --  BSA (Calculated - sq m): --  Pulse: 94 (08/22 2003)  BP: 101/79 (08/23 0907)  Temp: 98.1 °F (36.7 °C) (08/23 0907)  Do Not Use - Resp Rate: --  SpO2: 99 % (08/23 0907)    Wt Readings from Last 6 Encounters:   08/21/24 89.8 kg  (198 lb)   08/08/24 82.6 kg (182 lb)   07/22/24 83.4 kg (183 lb 12.8 oz)   06/28/24 87.1 kg (192 lb)   09/06/21 72.6 kg (160 lb)   08/11/21 77.1 kg (170 lb)       Physical Examination:  General: Patient is alert and oriented, not in acute distress  Psych: Mood and affect are appropriate  Eyes: EOMI, PERRL  ENT: Oropharynx is clear, no adenopathy  CV: Regular rate and rhythm, normal S1S2, no murmurs, no LE edema  Respiratory: Lungs clear to auscultation bilaterally  GI/Abd: Soft, non-tender with normoactive bowel sounds, no hepatosplenomegaly  Neurological: Grossly intact   Lymphatics: No palpable cervical, supraclavicular, axillary, or inguinal lymphadenopathy  Skin: no rashes or petechiae  Lines:     Laboratory:  Recent Labs   Lab 08/21/24  0625 08/22/24  0824 08/23/24  0607   WBC 2.2* 3.9* 5.8   HGB 8.2* 8.2* 8.4*   HCT 25.7* 25.1* 27.0*   .0 320.0 362.0   MCV 90.5 88.4 91.5   RDW 19.7* 19.3* 19.2*   NEPRELIM 0.23* 0.76* 1.36*       Recent Labs   Lab 08/17/24  0601 08/17/24  1755 08/19/24  0536 08/20/24  0455 08/21/24  0625 08/22/24  0824 08/23/24  0607   *   < > 146* 143 137 141 139   K 2.8*  2.8*   < > 4.4  4.4 3.9 4.4 4.5 4.7   *   < > 122* 118* 113* 112 112   CO2 15.0*   < > 18.0* 18.0* 20.0* 21.0 21.0   BUN 9   < > 7* <5* <5* 9 12   CREATSERUM 0.56   < > 0.51* 0.50* 0.56 0.76 0.92   GLU 95   < > 102* 66* 118* 96 85   CA 7.6*   < > 7.9* 7.8* 8.0* 8.3* 8.8   PHOS 2.7  2.7   < > 2.4  2.4 2.6  2.6 2.9  --   --    TP 4.4*  --   --   --   --  4.9* 4.9*   ALB 2.6*  2.7*   < > 2.7* 2.4* 2.6* 2.8* 2.8*   ALKPHO 66  --   --   --   --  82 82   AST 20  --   --   --   --  15 16   ALT 15  --   --   --   --  14 12   BILT 0.3  --   --   --   --  0.3 0.3    < > = values in this interval not displayed.       No results for input(s): \"PT\", \"INR\", \"PTT\" in the last 168 hours.    Imaging:        Impression & Plan:       64-year-old lady with rheumatoid arthritis was on low-dose oral methotrexate  supplemented with folic acid, hydroxychloroquine presents to the hospital with a wound on her leg.  Patient underwent debridement and is currently on linezolid.   Patient also developed adrenal insufficiency and non anion gap metabolic acidosis.      Patient initially presented with normal ANC( inspite of having infection) along with mild anemia. Her neutropenia continued to worsen during her hospital stay. She was on vancomycin and switched to Linezolid  -   Anemia- Likely secondary to anemia of inflammation or chronic disease with superadded Iron deficiency    Her neutropenia was likely secondary to her acute bacterial infection.    PLAN  - continue folate 1mg daily  - Ferrous gluconate 325 mg daily   - copper levels are pending    We will schedule follow up in 1 month as outpatient with repeat labs.    Consider GI referral as outpatient for low iron levels.     Hematology will sign off. Please call with any questions or concerns.    Mercy Khan MD  Hematology Oncology

## 2024-08-23 NOTE — PROGRESS NOTES
[facility]     Sheri Garzon Patient Status:  Inpatient    1960 MRN [id number]   Location [facility] 5SW/SE Attending Derick Hsieh MD   Hosp Day # 11 PCP TERI MCKENNA MD     Subjective:     Pt was seen and examined. Sitting up comfortably in bed and in NAD.   In good spirits this a.m.  No overnight events reported by the nursing staff.    Objective:    Review of Systems:   ROS completed; pertinent positive and negatives stated in subjective.      Vital signs:  Temp:  [98 °F (36.7 °C)-98.3 °F (36.8 °C)] 98.1 °F (36.7 °C)  Pulse:  [94] 94  Resp:  [18] 18  BP: ()/(60-79) 101/79  SpO2:  [98 %-100 %] 99 %      Physical Exam:    Gen: NAD AO x3  Chest: good air entry CTABL  CVS: normal s1 and s2 RR  Abd: NABS soft NT ND  Neuro: CN 2-12 grossly intact  Ext: RLE surgical site CDI      Diagnostic Data:    Labs:  Recent Labs   Lab 24  0455 24  1205 24  0625 24  0824 24  0607   WBC 3.3* 3.4* 2.2* 3.9* 5.8   HGB 7.3* 8.1* 8.2* 8.2* 8.4*   MCV 87.5 91.2 90.5 88.4 91.5   .0* 224.0 247.0 320.0 362.0   BAND  --   --   --   --  1       Recent Labs   Lab 24  0601 24  1755 24  0625 24  0824 24  0607   GLU 95   < > 118* 96 85   BUN 9   < > <5* 9 12   CREATSERUM 0.56   < > 0.56 0.76 0.92   CA 7.6*   < > 8.0* 8.3* 8.8   ALB 2.6*  2.7*   < > 2.6* 2.8* 2.8*   *   < > 137 141 139   K 2.8*  2.8*   < > 4.4 4.5 4.7   *   < > 113* 112 112   CO2 15.0*   < > 20.0* 21.0 21.0   ALKPHO 66  --   --  82 82   AST 20  --   --  15 16   ALT 15  --   --  14 12   BILT 0.3  --   --  0.3 0.3   TP 4.4*  --   --  4.9* 4.9*    < > = values in this interval not displayed.       Estimated Creatinine Clearance: 48.9 mL/min (based on SCr of 0.92 mg/dL).    No results for input(s): \"PTP\", \"INR\" in the last 168 hours.           Imaging: Imaging data reviewed in Epic.    Medications:     predniSONE  10 mg Oral BID with meals    pantoprazole  40 mg Oral QAM AC    folic acid  1 mg Oral Daily    linezolid  600 mg Oral Q12H    sodium bicarbonate  650 mg Oral TID    potassium chloride  40 mEq Oral BID    collagenase   Topical Daily    amLODIPine  5 mg Oral Daily    aspirin  81 mg Oral Daily    atorvastatin  10 mg Oral Daily    clopidogrel  75 mg Oral Daily    hydroxychloroquine  200 mg Oral BID    losartan  50 mg Oral Daily    oxybutynin ER  10 mg Oral Daily    enoxaparin  40 mg Subcutaneous Daily       Assessment & Plan:     RLE cellulitis -> improved  Imaging reviewed  Started on IV abx, cont for now  ID on consult  Continue Linezolid for a 2 week course  Gsx on consult  Underwent I+D of RLE on 8/15/24  Rheumatoid arthritis  Continue home Hydroxychloroquine  Steroids per Endo/Rheum  Changed from IV to PO - Prednisone 10 mg PO BID  Monitor accuchecks  CAD  HTN  HLD  Continue ASA, Statin, Plavix  Continue home antihypertensives  6.   Multiple electrolyte abnormalities and NAGMA  7.   ? Distal RTA   1.    Cont electrolyte replacement   2.    Improving   7.   Hypernatremia   1.    Improved  8.   Adrenal Insufficiency   1.    Endocrinology following    1.    Prednisone 10 mg PO BID    2.    Monitor accuchecks   2.    Cont to monitor  9.   Leukocytosis, Anemia  10. Fluctuating Platelets   1.    Monitor labs   2.    Transfuse as indicated   3.    Hematology on consult   1.    Iron studies with iron deficiency anemia. B12, folate WNL. Elevated Retic count. Check LORRAINE, copper pending    2.    Continue steroids  3     Monitor CBC    Plan of care discussed with patient at bedside.      Supplementary Documentation:     Quality:  DVT Prophylaxis: Lovenox s/c  CODE status: Full       Estimated date of discharge: TBD  Discharge is dependent on: clinical stability  At this point Ms. Garzon is expected to be discharge to: home    MDM: High

## 2024-08-23 NOTE — PROGRESS NOTES
INFECTIOUS DISEASE PROGRESS NOTE  [facility]  part of [facility] ID PROGRESS NOTE    Sheri Garzon Patient Status:  Inpatient    1960 MRN [id number]   Location [facility] 5SW/SE Attending Derick Hsieh MD   Hosp Day # 11 PCP TERI MCKENNA MD     Subjective:  ROS reviewed. Feels better. Pain controlled.    ASSESSMENT:    Antibiotics: Linezolid -  Vancomycin -     # Acute RLE cellulitis with posterior leg wound in immunocompromised patient   -s/p bedside debridement 8/15  # Rheumatoid arthritis, on 20 mg prednisone daily, methotrexate, HCQ     PLAN:  -  Continue on linezolid x7 more days.  -  Follow fever curve, wbc.  -  Reviewed labs, micro, imaging reports, available old records.  -  Case d/w patient, RN.     History of Present Illness:  Sheri Garzon is a 64 year old female with a history of rheumatoid arthritis on chronic 20 mg prednisone daily, methotrexate, HCQ, with recent [facility] admission - with vomiting s/p EGD with small hiatal hernia, who presented to [facility] ED on  with worsening pain in her right foot. Has had a wound for the last month and a half that has not healed. Was on ciprofloxacin last month. No fevers or chills at home. On arrival, afebrile, wbc 4.5, blood cx obtained, XR R foot with soft tissue swelling, started on vancomycin. ID consulted.    Physical Exam:  /79 (BP Location: Right arm)   Pulse 94   Temp 98.1 °F (36.7 °C) (Oral)   Resp 18   Ht 5' 2\" (1.575 m)   Wt 198 lb (89.8 kg)   SpO2 99%   BMI 36.21 kg/m²     Gen:   Awake, in bed  HEENT:  EOMI, neck supple  CV/lungs:  RRR, lungs clear in all fields  Abdom:  Soft, no TTP  Skin/extrem:  Posterior R leg wound stable, B foot edema improved  Lines:  PIV+    Laboratory Data: Reviewed    Microbiology: Reviewed    Radiology: Reviewed      [facility]  [phone]  2024

## 2024-08-23 NOTE — PROGRESS NOTES
[facility]  part of [facility]    Progress Note    Sheri Garzon Patient Status:  Inpatient    1960 MRN [id number]   Location [facility] 5SW/SE Attending All Arroyo MD   Hosp Day # 11 PCP TERI MCKENNA MD     Subjective:   Sheri Garzon is a(n) 64 year old female      Comfortable in bed   She was on prednisone 10 mg once a day  for many yrs.     Objective:   Vital Signs:  Blood pressure 101/79, pulse 94, temperature 98.1 °F (36.7 °C), temperature source Oral, resp. rate 18, height 5' 2\" (1.575 m), weight 198 lb (89.8 kg), SpO2 99%.                    General: Awake and alert.    HENT: Eye: EOMI,    Neck/Thyroid: neck inspection: normal   Lungs:  Speaking full sentences  MSK: Moves extremities spontaneously.    Neuro:speech is clear.   Skin: Skin is dry       Assessment and Plan:     Patient Active Problem List   Diagnosis    Dehydration    Metabolic acidosis    Bilious vomiting with nausea    Difficult intravenous access    Hypokalemia    Hypomagnesemia    Hiatal hernia    Cellulitis of right lower extremity    Cellulitis    RTA (renal tubular acidosis)    Adrenal insufficiency ([place]'s disease) (HCC)    Current chronic use of systemic steroids     Iatrogenic Adrenal Insufficiency, d/t chronic steroid use for RA. Was started on HC IV q8  Tolerating po intake   No  hypoglycemia  Electrolytes improved   On Prednisone 10mg PO BID now       Stable for discharge from endocrine stand point.   D/w pt and Rn. She will be on prednisone 10 mg bid for 3 days then she can resume her home dose which is 10 mg once a day.   Pt understands her diagnosis and the need to continue prednisone , avoid abrupt stop and need for stress doses       Results:     Lab Results   Component Value Date    WBC 5.8 2024    HGB 8.4 (L) 2024    HCT 27.0 (L) 2024    .0 2024    CREATSERUM 0.92 2024    BUN 12 2024     2024    K 4.7 2024      08/23/2024    CO2 21.0 08/23/2024    GLU 85 08/23/2024    CA 8.8 08/23/2024    ALB 2.8 (L) 08/23/2024    ALKPHO 82 08/23/2024    BILT 0.3 08/23/2024    TP 4.9 (L) 08/23/2024    AST 16 08/23/2024    ALT 12 08/23/2024    LIP 34 07/21/2024    DDIMER 1.69 (H) 07/23/2024    MG 1.9 08/22/2024    PHOS 2.9 08/21/2024    B12 748 08/21/2024       No results found.              Mich Leigh MD  8/23/2024        DOS is the same date the note was signed

## 2024-08-23 NOTE — PLAN OF CARE
Sheri is A&Ox4. Room air. Tele monitor in place. Reported pain treated with PRN ibuprofen. Wound dressings to R foot in place, daily dressing change. Saline locked. Tolerating diet. Bed alarm on and call light within reach.      Problem: Patient Centered Care  Goal: Patient preferences are identified and integrated in the patient's plan of care  Description: Interventions:  - What would you like us to know as we care for you? From home with sister  - Provide timely, complete, and accurate information to patient/family  - Incorporate patient and family knowledge, values, beliefs, and cultural backgrounds into the planning and delivery of care  - Encourage patient/family to participate in care and decision-making at the level they choose  - Honor patient and family perspectives and choices  Outcome: Progressing     Problem: Patient/Family Goals  Goal: Patient/Family Long Term Goal  Description: Patient's Long Term Goal:     Interventions:  - See additional Care Plan goals for specific interventions  Outcome: Progressing  Goal: Patient/Family Short Term Goal  Description: Patient's Short Term Goal:     Interventions:   - See additional Care Plan goals for specific interventions  Outcome: Progressing     Problem: GASTROINTESTINAL - ADULT  Goal: Maintains or returns to baseline bowel function  Description: INTERVENTIONS:  - Assess bowel function  - Maintain adequate hydration with IV or PO as ordered and tolerated  - Evaluate effectiveness of GI medications  - Encourage mobilization and activity  - Obtain nutritional consult as needed  - Establish a toileting routine/schedule  - Consider collaborating with pharmacy to review patient's medication profile  Outcome: Progressing     Problem: SKIN/TISSUE INTEGRITY - ADULT  Goal: Skin integrity remains intact  Description: INTERVENTIONS  - Assess and document risk factors for pressure ulcer development  - Assess and document skin integrity  - Monitor for areas of redness  and/or skin breakdown  - Initiate interventions, skin care algorithm/standards of care as needed  Outcome: Progressing  Goal: Incision(s), wounds(s) or drain site(s) healing without S/S of infection  Description: INTERVENTIONS:  - Assess and document risk factors for pressure ulcer development  - Assess and document skin integrity  - Assess and document dressing/incision, wound bed, drain sites and surrounding tissue  - Implement wound care per orders  - Initiate isolation precautions as appropriate  - Initiate Pressure Ulcer prevention bundle as indicated  Outcome: Progressing     Problem: PAIN - ADULT  Goal: Verbalizes/displays adequate comfort level or patient's stated pain goal  Description: INTERVENTIONS:  - Encourage pt to monitor pain and request assistance  - Assess pain using appropriate pain scale  - Administer analgesics based on type and severity of pain and evaluate response  - Implement non-pharmacological measures as appropriate and evaluate response  - Consider cultural and social influences on pain and pain management  - Manage/alleviate anxiety  - Utilize distraction and/or relaxation techniques  - Monitor for opioid side effects  - Notify MD/LIP if interventions unsuccessful or patient reports new pain  - Anticipate increased pain with activity and pre-medicate as appropriate  Outcome: Progressing     Problem: RISK FOR INFECTION - ADULT  Goal: Absence of fever/infection during anticipated neutropenic period  Description: INTERVENTIONS  - Monitor WBC  - Administer growth factors as ordered  - Implement neutropenic guidelines  Outcome: Progressing     Problem: SAFETY ADULT - FALL  Goal: Free from fall injury  Description: INTERVENTIONS:  - Assess pt frequently for physical needs  - Identify cognitive and physical deficits and behaviors that affect risk of falls.  - [place] fall precautions as indicated by assessment.  - Educate pt/family on patient safety including physical limitations  - Instruct  pt to call for assistance with activity based on assessment  - Modify environment to reduce risk of injury  - Provide assistive devices as appropriate  - Consider OT/PT consult to assist with strengthening/mobility  - Encourage toileting schedule  Outcome: Progressing     Problem: DISCHARGE PLANNING  Goal: Discharge to home or other facility with appropriate resources  Description: INTERVENTIONS:  - Identify barriers to discharge w/pt and caregiver  - Include patient/family/discharge partner in discharge planning  - Arrange for needed discharge resources and transportation as appropriate  - Identify discharge learning needs (meds, wound care, etc)  - Arrange for interpreters to assist at discharge as needed  - Consider post-discharge preferences of patient/family/discharge partner  - Complete POLST form as appropriate  - Assess patient's ability to be responsible for managing their own health  - Refer to Case Management Department for coordinating discharge planning if the patient needs post-hospital services based on physician/LIP order or complex needs related to functional status, cognitive ability or social support system  Outcome: Progressing

## 2024-08-24 VITALS
TEMPERATURE: 98 F | HEART RATE: 104 BPM | DIASTOLIC BLOOD PRESSURE: 78 MMHG | WEIGHT: 198 LBS | OXYGEN SATURATION: 100 % | HEIGHT: 62 IN | RESPIRATION RATE: 18 BRPM | SYSTOLIC BLOOD PRESSURE: 115 MMHG | BODY MASS INDEX: 36.44 KG/M2

## 2024-08-24 LAB
ALBUMIN SERPL-MCNC: 2.9 G/DL (ref 3.2–4.8)
ALBUMIN/GLOB SERPL: 1.5 {RATIO} (ref 1–2)
ALP LIVER SERPL-CCNC: 86 U/L
ALT SERPL-CCNC: 14 U/L
ANION GAP SERPL CALC-SCNC: 5 MMOL/L (ref 0–18)
AST SERPL-CCNC: 20 U/L (ref ?–34)
BASOPHILS # BLD: 0 X10(3) UL (ref 0–0.2)
BASOPHILS NFR BLD: 0 %
BILIRUB SERPL-MCNC: 0.3 MG/DL (ref 0.2–1.1)
BUN BLD-MCNC: 15 MG/DL (ref 9–23)
BUN/CREAT SERPL: 15.6 (ref 10–20)
CALCIUM BLD-MCNC: 8.5 MG/DL (ref 8.7–10.4)
CHLORIDE SERPL-SCNC: 112 MMOL/L (ref 98–112)
CO2 SERPL-SCNC: 24 MMOL/L (ref 21–32)
CREAT BLD-MCNC: 0.96 MG/DL
DEPRECATED RDW RBC AUTO: 61.4 FL (ref 35.1–46.3)
EGFRCR SERPLBLD CKD-EPI 2021: 66 ML/MIN/1.73M2 (ref 60–?)
EOSINOPHIL # BLD: 0 X10(3) UL (ref 0–0.7)
EOSINOPHIL NFR BLD: 0 %
ERYTHROCYTE [DISTWIDTH] IN BLOOD BY AUTOMATED COUNT: 18.6 % (ref 11–15)
GLOBULIN PLAS-MCNC: 2 G/DL (ref 2–3.5)
GLUCOSE BLD-MCNC: 73 MG/DL (ref 70–99)
GLUCOSE BLDC GLUCOMTR-MCNC: 56 MG/DL (ref 70–99)
GLUCOSE BLDC GLUCOMTR-MCNC: 69 MG/DL (ref 70–99)
GLUCOSE BLDC GLUCOMTR-MCNC: 92 MG/DL (ref 70–99)
GLUCOSE BLDC GLUCOMTR-MCNC: 96 MG/DL (ref 70–99)
GLUCOSE BLDC GLUCOMTR-MCNC: 97 MG/DL (ref 70–99)
HCT VFR BLD AUTO: 27.6 %
HGB BLD-MCNC: 8.8 G/DL
LYMPHOCYTES NFR BLD: 2.38 X10(3) UL (ref 1–4)
LYMPHOCYTES NFR BLD: 29 %
MCH RBC QN AUTO: 28.9 PG (ref 26–34)
MCHC RBC AUTO-ENTMCNC: 31.9 G/DL (ref 31–37)
MCV RBC AUTO: 90.5 FL
MONOCYTES # BLD: 2.38 X10(3) UL (ref 0.1–1)
MONOCYTES NFR BLD: 29 %
MYELOCYTES # BLD: 0.16 X10(3) UL
MYELOCYTES NFR BLD: 2 %
NEUTROPHILS # BLD AUTO: 2.64 X10 (3) UL (ref 1.5–7.7)
NEUTROPHILS NFR BLD: 38 %
NEUTS BAND NFR BLD: 2 %
NEUTS HYPERSEG # BLD: 3.28 X10(3) UL (ref 1.5–7.7)
NRBC BLD MANUAL-RTO: 1 %
OSMOLALITY SERPL CALC.SUM OF ELEC: 291 MOSM/KG (ref 275–295)
PLATELET # BLD AUTO: 348 10(3)UL (ref 150–450)
POTASSIUM SERPL-SCNC: 4.7 MMOL/L (ref 3.5–5.1)
PROT SERPL-MCNC: 4.9 G/DL (ref 5.7–8.2)
RBC # BLD AUTO: 3.05 X10(6)UL
SODIUM SERPL-SCNC: 141 MMOL/L (ref 136–145)
TOTAL CELLS COUNTED BLD: 100
WBC # BLD AUTO: 8.2 X10(3) UL (ref 4–11)

## 2024-08-24 PROCEDURE — 99233 SBSQ HOSP IP/OBS HIGH 50: CPT | Performed by: INTERNAL MEDICINE

## 2024-08-24 PROCEDURE — 99239 HOSP IP/OBS DSCHRG MGMT >30: CPT | Performed by: INTERNAL MEDICINE

## 2024-08-24 RX ORDER — DOXYCYCLINE HYCLATE 50 MG/1
325 CAPSULE, GELATIN COATED ORAL
Qty: 30 TABLET | Refills: 0 | Status: SHIPPED | OUTPATIENT
Start: 2024-08-24 | End: 2024-09-23

## 2024-08-24 NOTE — DISCHARGE SUMMARY
[facility]  part of [facility]    DISCHARGE SUMMARY     Sheri Garzon Patient Status:  Inpatient    1960 MRN [id number]   Location [facility] 5SW/SE Attending All Arroyo MD   Hosp Day # 12 PCP TERI MCKENNA MD     Date of Admission: 2024  Date of Discharge:  2024    Discharge Disposition: Home or Self Care    Discharge Diagnosis:     RLE cellulitis -> improved  Rheumatoid arthritis  CAD  HTN  HLD  6.   Multiple electrolyte abnormalities and NAGMA  7.   ? Distal RTA  7.   Hypernatremia  8.   Adrenal Insufficiency  9.   Leukocytosis, Anemia  10. Fluctuating Platelets      History of Present Illness:     The patient is a 64-year-old  female, debilitated with severe rheumatoid arthritis. She does have chronic skin striae from chronic prednisone exposure. She had chronic ulceration on the posterior aspects of her right leg. Around 3 to 4 days ago, started developing erythema extending from her right posterior leg to the anterior aspect up to the knee and to the dorsum of the right foot with some right foot blistering unroofed. Today came into the emergency department for evaluation for fatigue and subjective fevers. CBC showed white blood cell count of 4.5. No left shift. Platelet count 117, hemoglobin 10.5. Chemistry showed potassium 3.2, chloride 113, and bicarbonate 12. Numbers are chronic. She was slightly hypoglycemic at 59. She was started on IV Solu-Cortef, IV vancomycin. X-ray of the foot showed marked dorsum mid foot soft tissue swelling, small nonmetallic polygonal morphology, radiopaque foci within the dorsal soft tissue which could relate to debris packing material or less likely foreign body. CT angiogram of the lower extremities bilaterally with and without contrast still pending.     Brief Synopsis:     RLE cellulitis -> improved  Imaging reviewed  Started on IV abx, cont for now  ID on consult  Continue Linezolid for a 2 week course  Gsx  on consult  Underwent I+D of RLE on 8/15/24  Rheumatoid arthritis  Continue home Hydroxychloroquine  Steroids per Endo/Rheum  Changed from IV to PO - Prednisone 10 mg PO BID for 3 days followed by home 10 mg PO daily dosing.  Monitor accuchecks  3.   Multiple electrolyte abnormalities and NAGMA  4.   Adrenal Insufficiency                1.    Endocrinology following                              1.    Discharge on PO prednisone as above  5.   Leukocytosis, Anemia  6.   Fluctuating Platelets                1.    Monitor labs                2.    Transfuse as indicated                3.    Hematology on consult   1.    Iron studies with iron deficiency anemia. B12, folate WNL. Elevated Retic count. Check LORRAINE, copper pending  2.    continue iron and folate supplementation at discharge  3.    close opt follow up    The patient has had hypoglycemic episodes in the mornings. She was counseled to stay in the hospital for workup but refused. She states she will monitor her blood glucose levels at home and follow up with her PCP.   Patient has been cleared for discharge by consulting physicians. She will follow up with ID, hematology, Endo as opt for further care.   Discharge medications ordered per Endo and ID recs.   Patient is to remain compliant with all discharge medications and instructions and to follow up as advised.   Patient encouraged to make healthy lifestyle and dietary changes.    Lace+ Score: 59  59-90 High Risk  29-58 Medium Risk  0-28   Low Risk       TCM Follow-Up Recommendation:  LACE 29-58: Moderate Risk of readmission after discharge from the hospital.      Procedures during hospitalization:   I&D    Incidental or significant findings and recommendations (brief descriptions):  None    Lab/Test results pending at Discharge:   None    Consultants:  Chantal NAVARRO  Endo  Nephro  HemOnc    Discharge Medication List:     Discharge Medications        START taking these medications        Instructions Prescription  details   Ferrous Gluconate 324 (38 Fe) MG Tabs      Take 1 tablet (325 mg total) by mouth daily with breakfast.   Stop taking on: September 23, 2024  Quantity: 30 tablet  Refills: 0     linezolid 600 MG Tabs  Commonly known as: Zyvox      Take 1 tablet (600 mg total) by mouth 2 (two) times daily for 7 days.   Stop taking on: August 30, 2024  Quantity: 14 tablet  Refills: 0            CHANGE how you take these medications        Instructions Prescription details   predniSONE 10 MG Tabs  Commonly known as: Deltasone  What changed:   medication strength  how much to take  how to take this  when to take this  additional instructions      10 mg po two times a day for 3 days followed by 10 mg PO daily therafter.   Quantity: 40 tablet  Refills: 0            CONTINUE taking these medications        Instructions Prescription details   albuterol 108 (90 Base) MCG/ACT Aers  Commonly known as: Ventolin HFA      Inhale 2 puffs into the lungs every 4 to 6 hours as needed for Wheezing.   Refills: 0     amLODIPine 5 MG Tabs  Commonly known as: Norvasc      Take 1 tablet (5 mg total) by mouth daily.   Refills: 0     Aspirin 81 MG Caps      81 mg.   Refills: 0     atorvastatin 10 MG Tabs  Commonly known as: Lipitor      Take 1 tablet (10 mg total) by mouth daily.   Refills: 0     clopidogrel 75 MG Tabs  Commonly known as: Plavix      Take 1 tablet (75 mg total) by mouth daily.   Refills: 0     ergocalciferol 1.25 MG (11434 UT) Caps  Commonly known as: Vitamin D2      Take 1 capsule (50,000 Units total) by mouth once a week.   Refills: 0     folic acid 1 MG Tabs  Commonly known as: Folvite      Take 1 tablet (1 mg total) by mouth daily.   Quantity: 90 tablet  Refills: 0     hydroxychloroquine 200 MG Tabs  Commonly known as: Plaquenil      Take 1 tablet (200 mg total) by mouth 2 (two) times daily.   Quantity: 180 tablet  Refills: 0     losartan 50 MG Tabs  Commonly known as: Cozaar      Take 1 tablet (50 mg total) by mouth daily.    Refills: 0     methotrexate 2.5 MG Tabs  Commonly known as: Rheumatrex      Take 1 tablet (2.5 mg total) by mouth once a week.   Refills: 0     oxybutynin ER 10 MG Tb24  Commonly known as: Ditropan-XL      Take 1 tablet (10 mg total) by mouth daily.   Refills: 0     pantoprazole 40 MG Tbec  Commonly known as: Protonix      Take 1 tablet (40 mg total) by mouth 2 (two) times daily before meals.   Stop taking on: August 26, 2024  Quantity: 60 tablet  Refills: 0     Potassium Chloride ER 10 MEQ Tbcr      Take 1 tablet (10 mEq total) by mouth 2 (two) times daily.   Refills: 0     sodium bicarbonate 650 MG Tabs      Take 1 tablet (650 mg total) by mouth 2 (two) times daily.   Stop taking on: August 26, 2024  Quantity: 60 tablet  Refills: 0            STOP taking these medications      furosemide 40 MG Tabs  Commonly known as: Lasix        HYDROcodone-acetaminophen  MG Tabs  Commonly known as: Norco                  Where to Get Your Medications        These medications were sent to [id number] [place], [place] - [address] [facility], [phone], [phone] [address] [address]      Phone: [phone]   Ferrous Gluconate 324 (38 Fe) MG Tabs  linezolid 600 MG Tabs  predniSONE 10 MG Tabs         [place]    Follow-up appointment:   Yaw Yao MD  [address]  [facility] [address]  [facility] [address]  [phone]    Follow up      Meredith Powell MD  [address] [facility] [address]  [facility] [id number] [phone]    Follow up in 1 week(s)      Sulma Juarez MD  [address] [facility] [address]  [phone]    Follow up in 1 week(s)      Mercy Khan MD  [address] [facility] [id number] [phone]    Follow up in 1 week(s)      Appointments for Next 30 Days 8/24/2024 - 9/23/2024        Date Arrival Time Visit Type Length Department Provider     8/26/2024  2:30 PM  [facility] EVAL 30 [[id number]] 30 min [facility]  [facility] Martin Cerna MD    Patient Instructions:         Location Instructions:     Masks are optional for all patients and visitors, unless otherwise indicated.               9/20/2024  9:00 AM  LAB - EM CC [[id number]] 15 min [facility] Hematology Oncology [facility] RESOURCE    Patient Instructions:         Location Instructions:     Your appointment is at the [facility]. The address is [address] [place] [address]. The entrance is located on the East side of the [place] parking lot.  Masks are optional for all patients and visitors, unless otherwise indicated.               9/20/2024 10:00 AM  FOLLOW UP-HEM/ONC [[id number]] 30 min [facility] Hematology Oncology Mercy Khan MD    Patient Instructions:         Location Instructions:     Your appointment is at the [facility]. The address is [address] [place] [address]. The entrance is located on the East side of the [place] parking lot.  Masks are optional for all patients and visitors, unless otherwise indicated.               9/23/2024  2:00 PM  CONSULT [[id number]] 40 min [facility] Doreen Brooke MD    Patient Instructions:     Pleaese arrive 15 min. prior to your appointment to complete your registration.   Bring your ID, Insuance card, co-pay amount and test results.   **If patient has an HMO insurance: Please bring your referral**        Location Instructions:     Your appointment is located at [address] [facility] in [place], [place].&nbsp; Please park in the Yellow lot and enter through the [facility] entrance.&nbsp; Then proceed to [address].  Masks are optional for all patients and visitors, unless otherwise indicated.                      Vital signs:  Temp:  [97.5 °F (36.4 °C)-98.4 °F (36.9 °C)] 97.9 °F (36.6 °C)  Pulse:  [] 104  Resp:  [18] 18  BP: ()/(65-82) 115/78  SpO2:  [99 %-100 %] 100 %    Physical Exam:    Gen: NAD AO  x3  Chest: good air entry CTABL  CVS: normal s1 and s2 RR  Abd: NABS soft NT ND  Neuro: CN 2-12 grossly intact  Ext: no edema in bilateral LE    -----------------------------------------------------------------------------------------------  PATIENT DISCHARGE INSTRUCTIONS: See electronic chart    All Arroyo MD  Hospitalist    Time spent:  > 30 minutes    The 21st Century Cures Act makes medical notes like these available to patients in the interest of transparency. Please be advised this is a medical document. Medical documents are intended to carry relevant information, facts as evident, and the clinical opinion of the practitioner. The medical note is intended as peer to peer communication and may appear blunt or direct. It is written in medical language and may contain abbreviations or verbiage that are unfamiliar.

## 2024-08-24 NOTE — PLAN OF CARE
Sheri is A&Ox4. Room air. Tele monitor in place. HS blood sugar was 101. Reported pain treated with PRN norco. Wound dressings to R foot in place, daily dressing change. Saline locked. Tolerating diet. Bed alarm on and call light within reach    Problem: Patient Centered Care  Goal: Patient preferences are identified and integrated in the patient's plan of care  Description: Interventions:  - What would you like us to know as we care for you? From home with sister  - Provide timely, complete, and accurate information to patient/family  - Incorporate patient and family knowledge, values, beliefs, and cultural backgrounds into the planning and delivery of care  - Encourage patient/family to participate in care and decision-making at the level they choose  - Honor patient and family perspectives and choices  Outcome: Progressing     Problem: Patient/Family Goals  Goal: Patient/Family Long Term Goal  Description: Patient's Long Term Goal:     Interventions:  - See additional Care Plan goals for specific interventions  Outcome: Progressing  Goal: Patient/Family Short Term Goal  Description: Patient's Short Term Goal:     Interventions:   - See additional Care Plan goals for specific interventions  Outcome: Progressing     Problem: GASTROINTESTINAL - ADULT  Goal: Maintains or returns to baseline bowel function  Description: INTERVENTIONS:  - Assess bowel function  - Maintain adequate hydration with IV or PO as ordered and tolerated  - Evaluate effectiveness of GI medications  - Encourage mobilization and activity  - Obtain nutritional consult as needed  - Establish a toileting routine/schedule  - Consider collaborating with pharmacy to review patient's medication profile  Outcome: Progressing     Problem: SKIN/TISSUE INTEGRITY - ADULT  Goal: Skin integrity remains intact  Description: INTERVENTIONS  - Assess and document risk factors for pressure ulcer development  - Assess and document skin integrity  - Monitor for  areas of redness and/or skin breakdown  - Initiate interventions, skin care algorithm/standards of care as needed  Outcome: Progressing  Goal: Incision(s), wounds(s) or drain site(s) healing without S/S of infection  Description: INTERVENTIONS:  - Assess and document risk factors for pressure ulcer development  - Assess and document skin integrity  - Assess and document dressing/incision, wound bed, drain sites and surrounding tissue  - Implement wound care per orders  - Initiate isolation precautions as appropriate  - Initiate Pressure Ulcer prevention bundle as indicated  Outcome: Progressing     Problem: PAIN - ADULT  Goal: Verbalizes/displays adequate comfort level or patient's stated pain goal  Description: INTERVENTIONS:  - Encourage pt to monitor pain and request assistance  - Assess pain using appropriate pain scale  - Administer analgesics based on type and severity of pain and evaluate response  - Implement non-pharmacological measures as appropriate and evaluate response  - Consider cultural and social influences on pain and pain management  - Manage/alleviate anxiety  - Utilize distraction and/or relaxation techniques  - Monitor for opioid side effects  - Notify MD/LIP if interventions unsuccessful or patient reports new pain  - Anticipate increased pain with activity and pre-medicate as appropriate  Outcome: Progressing     Problem: RISK FOR INFECTION - ADULT  Goal: Absence of fever/infection during anticipated neutropenic period  Description: INTERVENTIONS  - Monitor WBC  - Administer growth factors as ordered  - Implement neutropenic guidelines  Outcome: Progressing     Problem: SAFETY ADULT - FALL  Goal: Free from fall injury  Description: INTERVENTIONS:  - Assess pt frequently for physical needs  - Identify cognitive and physical deficits and behaviors that affect risk of falls.  - [place] fall precautions as indicated by assessment.  - Educate pt/family on patient safety including physical  limitations  - Instruct pt to call for assistance with activity based on assessment  - Modify environment to reduce risk of injury  - Provide assistive devices as appropriate  - Consider OT/PT consult to assist with strengthening/mobility  - Encourage toileting schedule  Outcome: Progressing     Problem: DISCHARGE PLANNING  Goal: Discharge to home or other facility with appropriate resources  Description: INTERVENTIONS:  - Identify barriers to discharge w/pt and caregiver  - Include patient/family/discharge partner in discharge planning  - Arrange for needed discharge resources and transportation as appropriate  - Identify discharge learning needs (meds, wound care, etc)  - Arrange for interpreters to assist at discharge as needed  - Consider post-discharge preferences of patient/family/discharge partner  - Complete POLST form as appropriate  - Assess patient's ability to be responsible for managing their own health  - Refer to Case Management Department for coordinating discharge planning if the patient needs post-hospital services based on physician/LIP order or complex needs related to functional status, cognitive ability or social support system  Outcome: Progressing

## 2024-08-24 NOTE — PLAN OF CARE
Patient discharge instructions given, all questions answered. IV taken out. Patient discharged home with family via car.   Problem: Patient Centered Care  Goal: Patient preferences are identified and integrated in the patient's plan of care  Description: Interventions:  - What would you like us to know as we care for you? From home with family  - Provide timely, complete, and accurate information to patient/family  - Incorporate patient and family knowledge, values, beliefs, and cultural backgrounds into the planning and delivery of care  - Encourage patient/family to participate in care and decision-making at the level they choose  - Honor patient and family perspectives and choices  Outcome: Adequate for Discharge     Problem: Patient/Family Goals  Goal: Patient/Family Long Term Goal  Description: Patient's Long Term Goal: Remain free of infection    Interventions:  - pains meds, follow up with doctor, would care  - See additional Care Plan goals for specific interventions  Outcome: Adequate for Discharge  Goal: Patient/Family Short Term Goal  Description: Patient's Short Term Goal: remain free of pain    Interventions:   - pain meds, vocalizing opinions   - See additional Care Plan goals for specific interventions  Outcome: Adequate for Discharge     Problem: GASTROINTESTINAL - ADULT  Goal: Maintains or returns to baseline bowel function  Description: INTERVENTIONS:  - Assess bowel function  - Maintain adequate hydration with IV or PO as ordered and tolerated  - Evaluate effectiveness of GI medications  - Encourage mobilization and activity  - Obtain nutritional consult as needed  - Establish a toileting routine/schedule  - Consider collaborating with pharmacy to review patient's medication profile  Outcome: Adequate for Discharge     Problem: SKIN/TISSUE INTEGRITY - ADULT  Goal: Skin integrity remains intact  Description: INTERVENTIONS  - Assess and document risk factors for pressure ulcer development  - Assess  and document skin integrity  - Monitor for areas of redness and/or skin breakdown  - Initiate interventions, skin care algorithm/standards of care as needed  Outcome: Adequate for Discharge  Goal: Incision(s), wounds(s) or drain site(s) healing without S/S of infection  Description: INTERVENTIONS:  - Assess and document risk factors for pressure ulcer development  - Assess and document skin integrity  - Assess and document dressing/incision, wound bed, drain sites and surrounding tissue  - Implement wound care per orders  - Initiate isolation precautions as appropriate  - Initiate Pressure Ulcer prevention bundle as indicated  Outcome: Adequate for Discharge     Problem: PAIN - ADULT  Goal: Verbalizes/displays adequate comfort level or patient's stated pain goal  Description: INTERVENTIONS:  - Encourage pt to monitor pain and request assistance  - Assess pain using appropriate pain scale  - Administer analgesics based on type and severity of pain and evaluate response  - Implement non-pharmacological measures as appropriate and evaluate response  - Consider cultural and social influences on pain and pain management  - Manage/alleviate anxiety  - Utilize distraction and/or relaxation techniques  - Monitor for opioid side effects  - Notify MD/LIP if interventions unsuccessful or patient reports new pain  - Anticipate increased pain with activity and pre-medicate as appropriate  Outcome: Adequate for Discharge     Problem: RISK FOR INFECTION - ADULT  Goal: Absence of fever/infection during anticipated neutropenic period  Description: INTERVENTIONS  - Monitor WBC  - Administer growth factors as ordered  - Implement neutropenic guidelines  Outcome: Adequate for Discharge     Problem: SAFETY ADULT - FALL  Goal: Free from fall injury  Description: INTERVENTIONS:  - Assess pt frequently for physical needs  - Identify cognitive and physical deficits and behaviors that affect risk of falls.  - [place] fall precautions as  indicated by assessment.  - Educate pt/family on patient safety including physical limitations  - Instruct pt to call for assistance with activity based on assessment  - Modify environment to reduce risk of injury  - Provide assistive devices as appropriate  - Consider OT/PT consult to assist with strengthening/mobility  - Encourage toileting schedule  Outcome: Adequate for Discharge     Problem: DISCHARGE PLANNING  Goal: Discharge to home or other facility with appropriate resources  Description: INTERVENTIONS:  - Identify barriers to discharge w/pt and caregiver  - Include patient/family/discharge partner in discharge planning  - Arrange for needed discharge resources and transportation as appropriate  - Identify discharge learning needs (meds, wound care, etc)  - Arrange for interpreters to assist at discharge as needed  - Consider post-discharge preferences of patient/family/discharge partner  - Complete POLST form as appropriate  - Assess patient's ability to be responsible for managing their own health  - Refer to Case Management Department for coordinating discharge planning if the patient needs post-hospital services based on physician/LIP order or complex needs related to functional status, cognitive ability or social support system  Outcome: Adequate for Discharge

## 2024-08-24 NOTE — PROGRESS NOTES
[facility]  part of [facility]    Progress Note    Sheri Garzon Patient Status:  Inpatient    1960 MRN [id number]   Location [facility] 5SW/SE Attending All Arroyo MD   Hosp Day # 12 PCP TERI MCKENNA MD     Subjective:   Sheri Garzon is a(n) 64 year old female    At bedside   No symptoms of hypoglycemia this AM when POC Glu was 50s and 60s.       Objective:   Vital Signs:  Blood pressure 115/78, pulse 104, temperature 97.9 °F (36.6 °C), temperature source Oral, resp. rate 18, height 5' 2\" (1.575 m), weight 198 lb (89.8 kg), SpO2 100%.                    General: Awake and alert.    HENT: Eye: EOMI,    Neck/Thyroid: neck inspection: normal   Lungs:  Speaking full sentences  MSK: Moves extremities spontaneously.    Neuro:speech is clear.   Skin: Skin is dry       Assessment and Plan:     Patient Active Problem List   Diagnosis    Dehydration    Metabolic acidosis    Bilious vomiting with nausea    Difficult intravenous access    Hypokalemia    Hypomagnesemia    Hiatal hernia    Cellulitis of right lower extremity    Cellulitis    RTA (renal tubular acidosis)    Adrenal insufficiency ([place]'s disease) (HCC)    Current chronic use of systemic steroids    Neutropenia (HCC)    Anemia of infection       Iatrogenic Adrenal Insufficiency, d/t chronic steroid use for RA. Was started on HC IV q8  Tolerating po intake     Electrolytes improved   On Prednisone 10mg PO BID now and will titrate to 10 mg/day at home after 2 days       Hypoglycemia on poc Glucose. D/w pt and Rn. She did not have symptoms and hands were cold. It can be falsely low.      Stable for discharge from endocrine stand point.   D/w pt and Rn. She will be on prednisone 10 mg bid for 2 days then she can resume her home dose which is 10 mg once a day.   Pt understands her diagnosis and the need to continue prednisone , avoid abrupt stop and need for stress doses   Will follow up with endocrine as outpt.    Steroids /prednisone 10 mg /day per PCP       Results:     Lab Results   Component Value Date    WBC 8.2 08/24/2024    HGB 8.8 (L) 08/24/2024    HCT 27.6 (L) 08/24/2024    .0 08/24/2024    CREATSERUM 0.96 08/24/2024    BUN 15 08/24/2024     08/24/2024    K 4.7 08/24/2024     08/24/2024    CO2 24.0 08/24/2024    GLU 73 08/24/2024    CA 8.5 (L) 08/24/2024    ALB 2.9 (L) 08/24/2024    ALKPHO 86 08/24/2024    BILT 0.3 08/24/2024    TP 4.9 (L) 08/24/2024    AST 20 08/24/2024    ALT 14 08/24/2024    LIP 34 07/21/2024    DDIMER 1.69 (H) 07/23/2024    MG 1.9 08/22/2024    PHOS 2.9 08/21/2024    B12 748 08/21/2024       No results found.              Mich Leigh MD  8/24/2024        DOS is the same date the note was signed

## 2024-08-25 NOTE — PAYOR COMM NOTE
--------------  DISCHARGE REVIEW    Payor: [facility] OUT OF STATE HMO  Subscriber #:  [id number]  Authorization Number: [id number]    Admit date: 24  Admit time:   8:49 PM  Discharge Date: 2024  4:28 PM       [facility]  [facility]    DISCHARGE SUMMARY     Sheri Garzon Patient Status:  Inpatient    1960 MRN [id number]   Location [facility] 5SW/SE Attending All Arroyo MD   Hosp Day # 12 PCP TERI MCKENNA MD     Date of Admission: 2024  Date of Discharge:  2024    Discharge Disposition: Home or Self Care    Discharge Diagnosis:     RLE cellulitis -> improved  Rheumatoid arthritis  CAD  HTN  HLD  6.   Multiple electrolyte abnormalities and NAGMA  7.   ? Distal RTA  7.   Hypernatremia  8.   Adrenal Insufficiency  9.   Leukocytosis, Anemia  10. Fluctuating Platelets      History of Present Illness:     The patient is a 64-year-old  female, debilitated with severe rheumatoid arthritis. She does have chronic skin striae from chronic prednisone exposure. She had chronic ulceration on the posterior aspects of her right leg. Around 3 to 4 days ago, started developing erythema extending from her right posterior leg to the anterior aspect up to the knee and to the dorsum of the right foot with some right foot blistering unroofed. Today came into the emergency department for evaluation for fatigue and subjective fevers. CBC showed white blood cell count of 4.5. No left shift. Platelet count 117, hemoglobin 10.5. Chemistry showed potassium 3.2, chloride 113, and bicarbonate 12. Numbers are chronic. She was slightly hypoglycemic at 59. She was started on IV Solu-Cortef, IV vancomycin. X-ray of the foot showed marked dorsum mid foot soft tissue swelling, small nonmetallic polygonal morphology, radiopaque foci within the dorsal soft tissue which could relate to debris packing material or less likely foreign body. CT angiogram of the lower  extremities bilaterally with and without contrast still pending.     Brief Synopsis:     RLE cellulitis -> improved  Imaging reviewed  Started on IV abx, cont for now  ID on consult  Continue Linezolid for a 2 week course  Gsx on consult  Underwent I+D of RLE on 8/15/24  Rheumatoid arthritis  Continue home Hydroxychloroquine  Steroids per Endo/Rheum  Changed from IV to PO - Prednisone 10 mg PO BID for 3 days followed by home 10 mg PO daily dosing.  Monitor accuchecks  3.   Multiple electrolyte abnormalities and NAGMA  4.   Adrenal Insufficiency                1.    Endocrinology following                              1.    Discharge on PO prednisone as above  5.   Leukocytosis, Anemia  6.   Fluctuating Platelets                1.    Monitor labs                2.    Transfuse as indicated                3.    Hematology on consult   1.    Iron studies with iron deficiency anemia. B12, folate WNL. Elevated Retic count. Check LORRAINE, copper pending  2.    continue iron and folate supplementation at discharge  3.    close opt follow up    The patient has had hypoglycemic episodes in the mornings. She was counseled to stay in the hospital for workup but refused. She states she will monitor her blood glucose levels at home and follow up with her PCP.   Patient has been cleared for discharge by consulting physicians. She will follow up with ID, hematology, Endo as opt for further care.   Discharge medications ordered per Endo and ID recs.   Patient is to remain compliant with all discharge medications and instructions and to follow up as advised.   Patient encouraged to make healthy lifestyle and dietary changes.    Lace+ Score: 59  59-90 High Risk  29-58 Medium Risk  0-28   Low Risk       TCM Follow-Up Recommendation:  LACE 29-58: Moderate Risk of readmission after discharge from the hospital.      Procedures during hospitalization:   I&D    Incidental or significant findings and recommendations (brief  descriptions):  None    Lab/Test results pending at Discharge:   None    Consultants:  Gsx  ID  Endo  Nephro  HemOnc    Discharge Medication List:     Discharge Medications        START taking these medications        Instructions Prescription details   Ferrous Gluconate 324 (38 Fe) MG Tabs      Take 1 tablet (325 mg total) by mouth daily with breakfast.   Stop taking on: September 23, 2024  Quantity: 30 tablet  Refills: 0     linezolid 600 MG Tabs  Commonly known as: Zyvox      Take 1 tablet (600 mg total) by mouth 2 (two) times daily for 7 days.   Stop taking on: August 30, 2024  Quantity: 14 tablet  Refills: 0            CHANGE how you take these medications        Instructions Prescription details   predniSONE 10 MG Tabs  Commonly known as: Deltasone  What changed:   medication strength  how much to take  how to take this  when to take this  additional instructions      10 mg po two times a day for 3 days followed by 10 mg PO daily therafter.   Quantity: 40 tablet  Refills: 0            CONTINUE taking these medications        Instructions Prescription details   albuterol 108 (90 Base) MCG/ACT Aers  Commonly known as: Ventolin HFA      Inhale 2 puffs into the lungs every 4 to 6 hours as needed for Wheezing.   Refills: 0     amLODIPine 5 MG Tabs  Commonly known as: Norvasc      Take 1 tablet (5 mg total) by mouth daily.   Refills: 0     Aspirin 81 MG Caps      81 mg.   Refills: 0     atorvastatin 10 MG Tabs  Commonly known as: Lipitor      Take 1 tablet (10 mg total) by mouth daily.   Refills: 0     clopidogrel 75 MG Tabs  Commonly known as: Plavix      Take 1 tablet (75 mg total) by mouth daily.   Refills: 0     ergocalciferol 1.25 MG (73066 UT) Caps  Commonly known as: Vitamin D2      Take 1 capsule (50,000 Units total) by mouth once a week.   Refills: 0     folic acid 1 MG Tabs  Commonly known as: Folvite      Take 1 tablet (1 mg total) by mouth daily.   Quantity: 90 tablet  Refills: 0     hydroxychloroquine  200 MG Tabs  Commonly known as: Plaquenil      Take 1 tablet (200 mg total) by mouth 2 (two) times daily.   Quantity: 180 tablet  Refills: 0     losartan 50 MG Tabs  Commonly known as: Cozaar      Take 1 tablet (50 mg total) by mouth daily.   Refills: 0     methotrexate 2.5 MG Tabs  Commonly known as: Rheumatrex      Take 1 tablet (2.5 mg total) by mouth once a week.   Refills: 0     oxybutynin ER 10 MG Tb24  Commonly known as: Ditropan-XL      Take 1 tablet (10 mg total) by mouth daily.   Refills: 0     pantoprazole 40 MG Tbec  Commonly known as: Protonix      Take 1 tablet (40 mg total) by mouth 2 (two) times daily before meals.   Stop taking on: August 26, 2024  Quantity: 60 tablet  Refills: 0     Potassium Chloride ER 10 MEQ Tbcr      Take 1 tablet (10 mEq total) by mouth 2 (two) times daily.   Refills: 0     sodium bicarbonate 650 MG Tabs      Take 1 tablet (650 mg total) by mouth 2 (two) times daily.   Stop taking on: August 26, 2024  Quantity: 60 tablet  Refills: 0            STOP taking these medications      furosemide 40 MG Tabs  Commonly known as: Lasix        HYDROcodone-acetaminophen  MG Tabs  Commonly known as: Norco                  Where to Get Your Medications        These medications were sent to [id number] [place], [place] - [address] AT [facility], [phone], [phone] [address] [address]      Phone: [phone]   Ferrous Gluconate 324 (38 Fe) MG Tabs  linezolid 600 MG Tabs  predniSONE 10 MG Tabs         [place]    Follow-up appointment:   Yaw Yao MD  [address]  [facility] [address] [phone]    Follow up      Meredith Powell MD  [address]  [facility] [address] [phone]    Follow up in 1 week(s)      Sulma Juarez MD  [address] [address]  [phone]    Follow up in 1 week(s)      Mercy Khan MD  [address] [facility]  [place] [address]  [phone]    Follow up in 1 week(s)      Appointments for Next 30 Days 8/24/2024 - 9/23/2024        Date Arrival Time Visit Type Length Department Provider     8/26/2024  2:30 PM  [facility] WC EVAL 30 [6313] 30 min [facility] Martin Cerna MD    Patient Instructions:         Location Instructions:     Masks are optional for all patients and visitors, unless otherwise indicated.               9/20/2024  9:00 AM  LAB - EM CC [[id number]] 15 min [facility] Hematology Oncology CMA RESOURCE    Patient Instructions:         Location Instructions:     Your appointment is at the [facility]. The address is [address] [place] [address]. The entrance is located on the East side of the [place] parking lot.  Masks are optional for all patients and visitors, unless otherwise indicated.               9/20/2024 10:00 AM  FOLLOW UP-HEM/ONC [5831] 30 min [facility] Hematology Oncology Mercy Khan MD    Patient Instructions:         Location Instructions:     Your appointment is at the [facility]. The address is [address] [place] [address]. The entrance is located on the East side of the [place] parking lot.  Masks are optional for all patients and visitors, unless otherwise indicated.               9/23/2024  2:00 PM  CONSULT [5200] 40 min [facility] Doreen Brooke MD    Patient Instructions:     Pleaese arrive 15 min. prior to your appointment to complete your registration.   Bring your ID, Insuance card, co-pay amount and test results.   **If patient has an HMO insurance: Please bring your referral**        Location Instructions:     Your appointment is located at [address] [facility] in [place], [place].&nbsp; Please park in the Yellow lot and enter through the [facility] entrance.&nbsp; Then proceed to [address].  Masks are optional for all patients and visitors, unless otherwise  indicated.                      Vital signs:  Temp:  [97.5 °F (36.4 °C)-98.4 °F (36.9 °C)] 97.9 °F (36.6 °C)  Pulse:  [] 104  Resp:  [18] 18  BP: ()/(65-82) 115/78  SpO2:  [99 %-100 %] 100 %    Physical Exam:    Gen: NAD AO x3  Chest: good air entry CTABL  CVS: normal s1 and s2 RR  Abd: NABS soft NT ND  Neuro: CN 2-12 grossly intact  Ext: no edema in bilateral LE    -----------------------------------------------------------------------------------------------  PATIENT DISCHARGE INSTRUCTIONS: See electronic chart    All Arroyo MD  Hospitalist    Time spent:  > 30 minutes    The 21st Century Cures Act makes medical notes like these available to patients in the interest of transparency. Please be advised this is a medical document. Medical documents are intended to carry relevant information, facts as evident, and the clinical opinion of the practitioner. The medical note is intended as peer to peer communication and may appear blunt or direct. It is written in medical language and may contain abbreviations or verbiage that are unfamiliar.     Electronically signed by All Arroyo MD on 8/24/2024  9:07 AM         REVIEWER COMMENTS

## 2024-08-26 ENCOUNTER — TELEPHONE (OUTPATIENT)
Dept: INTERNAL MEDICINE CLINIC | Facility: CLINIC | Age: 64
End: 2024-08-26

## 2024-08-26 ENCOUNTER — HOSPITAL ENCOUNTER (INPATIENT)
Facility: HOSPITAL | Age: 64
LOS: 3 days | Discharge: NURSING, REHAB OR LONG-TERM CARE | End: 2024-09-01
Attending: EMERGENCY MEDICINE | Admitting: HOSPITALIST
Payer: COMMERCIAL

## 2024-08-26 ENCOUNTER — TELEPHONE (OUTPATIENT)
Dept: ENDOCRINOLOGY CLINIC | Facility: CLINIC | Age: 64
End: 2024-08-26

## 2024-08-26 ENCOUNTER — APPOINTMENT (OUTPATIENT)
Dept: PICC SERVICES | Facility: HOSPITAL | Age: 64
End: 2024-08-26
Attending: EMERGENCY MEDICINE
Payer: COMMERCIAL

## 2024-08-26 DIAGNOSIS — R53.1 WEAKNESS GENERALIZED: Primary | ICD-10-CM

## 2024-08-26 LAB
ALBUMIN SERPL-MCNC: 3.1 G/DL (ref 3.2–4.8)
ALP LIVER SERPL-CCNC: 100 U/L
ALT SERPL-CCNC: 49 U/L
ANION GAP SERPL CALC-SCNC: 9 MMOL/L (ref 0–18)
AST SERPL-CCNC: 58 U/L (ref ?–34)
BASOPHILS # BLD: 0 X10(3) UL (ref 0–0.2)
BASOPHILS NFR BLD: 0 %
BILIRUB DIRECT SERPL-MCNC: 0.2 MG/DL (ref ?–0.3)
BILIRUB SERPL-MCNC: 0.4 MG/DL (ref 0.2–1.1)
BUN BLD-MCNC: 14 MG/DL (ref 9–23)
BUN/CREAT SERPL: 14.9 (ref 10–20)
CALCIUM BLD-MCNC: 8.4 MG/DL (ref 8.7–10.4)
CHLORIDE SERPL-SCNC: 109 MMOL/L (ref 98–112)
CO2 SERPL-SCNC: 19 MMOL/L (ref 21–32)
CREAT BLD-MCNC: 0.94 MG/DL
DEPRECATED RDW RBC AUTO: 61.1 FL (ref 35.1–46.3)
EGFRCR SERPLBLD CKD-EPI 2021: 68 ML/MIN/1.73M2 (ref 60–?)
EOSINOPHIL # BLD: 0 X10(3) UL (ref 0–0.7)
EOSINOPHIL NFR BLD: 0 %
ERYTHROCYTE [DISTWIDTH] IN BLOOD BY AUTOMATED COUNT: 18.7 % (ref 11–15)
GLUCOSE BLD-MCNC: 73 MG/DL (ref 70–99)
HCT VFR BLD AUTO: 29.7 %
HGB BLD-MCNC: 9.3 G/DL
LYMPHOCYTES NFR BLD: 0.15 X10(3) UL (ref 1–4)
LYMPHOCYTES NFR BLD: 1 %
MCH RBC QN AUTO: 28.4 PG (ref 26–34)
MCHC RBC AUTO-ENTMCNC: 31.3 G/DL (ref 31–37)
MCV RBC AUTO: 90.5 FL
METAMYELOCYTES # BLD: 0.15 X10(3) UL
METAMYELOCYTES NFR BLD: 1 %
MONOCYTES # BLD: 0.75 X10(3) UL (ref 0.1–1)
MONOCYTES NFR BLD: 5 %
NEUTROPHILS # BLD AUTO: 9.84 X10 (3) UL (ref 1.5–7.7)
NEUTROPHILS NFR BLD: 93 %
NEUTS HYPERSEG # BLD: 13.86 X10(3) UL (ref 1.5–7.7)
NRBC BLD MANUAL-RTO: 2 %
OSMOLALITY SERPL CALC.SUM OF ELEC: 283 MOSM/KG (ref 275–295)
PLATELET # BLD AUTO: 417 10(3)UL (ref 150–450)
PLATELET MORPHOLOGY: NORMAL
POTASSIUM SERPL-SCNC: 4.1 MMOL/L (ref 3.5–5.1)
PROT SERPL-MCNC: 5.3 G/DL (ref 5.7–8.2)
RBC # BLD AUTO: 3.28 X10(6)UL
SODIUM SERPL-SCNC: 137 MMOL/L (ref 136–145)
TOTAL CELLS COUNTED BLD: 100
WBC # BLD AUTO: 14.9 X10(3) UL (ref 4–11)

## 2024-08-26 PROCEDURE — 99222 1ST HOSP IP/OBS MODERATE 55: CPT | Performed by: HOSPITALIST

## 2024-08-26 RX ORDER — LIDOCAINE HYDROCHLORIDE 10 MG/ML
5 INJECTION, SOLUTION EPIDURAL; INFILTRATION; INTRACAUDAL; PERINEURAL
Status: COMPLETED | OUTPATIENT
Start: 2024-08-26 | End: 2024-08-26

## 2024-08-26 RX ORDER — SODIUM CHLORIDE 9 MG/ML
INJECTION, SOLUTION INTRAVENOUS CONTINUOUS
Status: DISCONTINUED | OUTPATIENT
Start: 2024-08-26 | End: 2024-08-27

## 2024-08-26 RX ORDER — METOPROLOL TARTRATE 50 MG
50 TABLET ORAL 2 TIMES DAILY
Status: ON HOLD | COMMUNITY
End: 2024-09-01

## 2024-08-26 RX ORDER — LINEZOLID 600 MG/1
600 TABLET, FILM COATED ORAL 2 TIMES DAILY
COMMUNITY
Start: 2024-08-25 | End: 2024-09-01

## 2024-08-26 RX ORDER — SENNOSIDES 8.6 MG
CAPSULE ORAL EVERY 8 HOURS PRN
Status: ON HOLD | COMMUNITY

## 2024-08-26 RX ORDER — IBUPROFEN 200 MG
600 TABLET ORAL EVERY 8 HOURS PRN
Status: ON HOLD | COMMUNITY

## 2024-08-26 RX ORDER — HYDROCODONE BITARTRATE AND ACETAMINOPHEN 5; 325 MG/1; MG/1
1 TABLET ORAL ONCE
Status: COMPLETED | OUTPATIENT
Start: 2024-08-26 | End: 2024-08-26

## 2024-08-26 NOTE — ED QUICK NOTES
Orders for admission, patient is aware of plan and ready to go upstairs. Any questions, please call ED RN Blanche/ Radha at extension [phone].     Patient Covid vaccination status: Fully vaccinated     COVID Test Ordered in ED: None    COVID Suspicion at Admission: N/A    Running Infusions:    sodium chloride      None    Mental Status/LOC at time of transport: A&Ox4     Other pertinent information: MIDLINE BEING PLACED BY VAT   CIWA score: N/A   NIH score:  N/A

## 2024-08-26 NOTE — H&P
[facility]    PATIENT'S NAME: CYNDI MORROW   ATTENDING PHYSICIAN: Karissa Ivan MD   PATIENT ACCOUNT#:   [id number]    LOCATION:  [place] [address]  MEDICAL RECORD #:   [id number]       YOB: 1960  ADMISSION DATE:       08/26/2024    HISTORY AND PHYSICAL EXAMINATION    CHIEF COMPLAINT:  Poor functional status at home.    HISTORY OF PRESENT ILLNESS:  The patient is a 64-year-old  female who was hospitalized for right foot cellulitis and right calf wound requiring debridement and antibiotic treatment, discharged home on August 24, 2 days ago, with oral Zyvox for 7 days.  Today came back with poor mobility and inability to ambulate on her own and weakness.  An attempt to place her in a rehab facility from emergency room failed.  The patient will be admitted to the hospital for further Social Work evaluation and rehabilitation placement.  CBC and chemistry are still pending.    PAST MEDICAL HISTORY:  Hypertension, rheumatoid arthritis, chronic adrenal insufficiency secondary to chronic corticosteroids use, asthma, pancreatitis, obstructive sleep apnea, obesity, hyperlipidemia, hypertension, anxiety, depression, anemia of chronic inflammatory disease, and nonocclusive coronary artery disease.  Recent hospitalization with peripheral arterial disease studies which were negative.    PAST SURGICAL HISTORY:  Right shoulder rotator cuff repair, wrist fracture open reduction and internal fixation, and recent right distal calf wound debridement.    MEDICATIONS:  Please see medication reconciliation list.    ALLERGIES:  Gadolinium and cephalosporins.  Penicillin and sulfa cause esophagitis.    FAMILY HISTORY:  Mother had diabetes mellitus type 2 and heart disease.  Father diabetes mellitus type 2.    SOCIAL HISTORY:  No tobacco, alcohol, or drug use.  Lives with her family.  Almost bedbound.  Uses a walker for a few steps at times but has not been able to ambulate since she was  discharged from the hospital.    REVIEW OF SYSTEMS:  The patient said she had generalized weakness, inability to ambulate on her own recently.  Her right foot and right distal calf wound have been stable.  Denies any fever or chills.  Other 12-point review of systems negative.      PHYSICAL EXAMINATION:    GENERAL:  Alert, oriented to time, place, and person, no acute distress.   VITAL SIGNS:  Temperature 97.9, pulse 73, respiratory rate 114, blood pressure 130/100, pulse ox 92% on room air.  HEENT:  Atraumatic.  Oropharynx clear.  Dry mucous membranes.  Normal hard and soft palate.  Eyes:  Anicteric sclerae.   NECK:  Supple.  No lymphadenopathy.  Trachea midline.  Full range of motion.  LUNGS:  Clear to auscultation bilaterally.  Normal respiratory effort.    HEART:  Regular rate and rhythm.  S1 and S2 auscultated.  No murmur.  ABDOMEN:  Soft, nondistended.  No tenderness.  Positive bowel sounds.  EXTREMITIES:  +2 edema both legs.  Right foot dorsum and right distal calf wounds with granulation tissue.  No evidence of infection.    ASSESSMENT:    1.   Musculoskeletal deconditioning.  2.   Leg edema.  3.   Rheumatoid arthritis.  4.   Hypertension.    PLAN:  The patient will be admitted to general medical floor.  IV Lasix.  Physical and occupational therapy, and  to evaluate the patient for rehab placement.  Also obtain Wound Service consult.  Continue her home medications including Zyvox.  Further recommendations to follow.     Dictated By Christianne Davis MD  d: 08/26/2024 16:21:37  t: 08/26/2024 16:36:39  Job [id number]  FB/

## 2024-08-26 NOTE — ED PROVIDER NOTES
Patient Seen in: [facility] Emergency Department      History     Chief Complaint   Patient presents with    Weakness     Stated Complaint:     Subjective:   HPI    64-year-old female presents for evaluation of weakness.  Patient recently discharged on 8/24/2024 after being treated for Cellulitis.  Patient reports since getting home she has had trouble performing ADLs and getting around due to generalized weakness.  Has not had much of an appetite.  No fever, chills, nausea, vomiting.  Feels that wounds are healing well.  Was set to see wound care as an outpatient but was unable to get into the car safely.    Objective:   Past Medical History:    Allergic rhinitis    Anxiety    Arthritis    RA and Osteoarthritis    Asthma (HCC)    Depression    Not officially diagnosed    Esophageal reflux    Essential hypertension    High blood pressure    High cholesterol    Hyperlipidemia    Myocardial infarction (HCC)    Cardiac event    Osteoarthritis    Pancreatitis (HCC)    Problems with swallowing    RA (rheumatoid arthritis) (HCC)    Sleep apnea              Past Surgical History:   Procedure Laterality Date    Colonoscopy      Other surgical history      Revision of uterine anomaly      Rotator cuff repair      Wrist fracture surgery                  Social History     Socioeconomic History    Marital status: Single   Tobacco Use    Smoking status: Never    Smokeless tobacco: Never   Vaping Use    Vaping status: Never Used   Substance and Sexual Activity    Alcohol use: Not Currently    Drug use: Never     Social Determinants of Health     Food Insecurity: No Food Insecurity (8/12/2024)    Food Insecurity     Food Insecurity: Never true   Transportation Needs: No Transportation Needs (8/12/2024)    Transportation Needs     Lack of Transportation: No   Housing Stability: Low Risk  (8/12/2024)    Housing Stability     Housing Instability: No              Review of Systems    Positive for stated Chief Complaint:  Weakness    Other systems are as noted in HPI.  Constitutional and vital signs reviewed.      All other systems reviewed and negative except as noted above.    Physical Exam     ED Triage Vitals [08/26/24 1338]   /76   Pulse 72   Resp 13   Temp 97.9 °F (36.6 °C)   Temp src Oral   SpO2 97 %   O2 Device None (Room air)       Current Vitals:   Vital Signs  BP: 102/66  Pulse: 68  Resp: 14  Temp: 97.9 °F (36.6 °C)  Temp src: Oral    Oxygen Therapy  SpO2: 97 %  O2 Device: None (Room air)            Physical Exam  Vitals and nursing note reviewed.   Constitutional:       General: She is not in acute distress.     Appearance: Normal appearance. She is not ill-appearing or toxic-appearing.   HENT:      Head: Normocephalic and atraumatic.      Mouth/Throat:      Mouth: Mucous membranes are dry.   Eyes:      Conjunctiva/sclera: Conjunctivae normal.   Cardiovascular:      Rate and Rhythm: Normal rate and regular rhythm.   Pulmonary:      Effort: Pulmonary effort is normal. No respiratory distress.   Musculoskeletal:         General: Normal range of motion.      Cervical back: Normal range of motion. No rigidity.   Neurological:      General: No focal deficit present.      Mental Status: She is alert.   Psychiatric:         Mood and Affect: Mood normal.               ED Course     Labs Reviewed   CBC WITH DIFFERENTIAL WITH PLATELET   BASIC METABOLIC PANEL (8)   HEPATIC FUNCTION PANEL (7)   RAINBOW DRAW LAVENDER   RAINBOW DRAW LIGHT GREEN   RAINBOW DRAW BLUE                      MDM                                         Medical Decision Making  Patient with deconditioning, unable to safely transfer at home or perform ADLs, will need rehab.  Unable to place from the emergency department, discussed with and seen by crisis who confirms this.  Admitted to hospitalist.    Problems Addressed:  Weakness generalized: acute illness or injury    Amount and/or Complexity of Data Reviewed  External Data Reviewed: notes.     Details:  Discharge summary from 8/24/2024 reviewed  Labs: ordered.        Disposition and Plan     Clinical Impression:  1. Weakness generalized         Disposition:  There is no disposition on file for this visit.  There is no disposition time on file for this visit.    Follow-up:  No follow-up provider specified.        Medications Prescribed:  Current Discharge Medication List

## 2024-08-26 NOTE — ED QUICK NOTES
Pt's family states pt fell today. Family states pt landed on leg while trying to get up, pt denies LOC.

## 2024-08-26 NOTE — CM/SW NOTE
CM met with patient daughter and patient sister at cart side    Per daughter \"my mom is so weak, she has been falling, she cannot even walk at home. Months ago I took her to a concert and now she cannot even walk. When she fell it took 4 people to get her up. My mom needs rehab it is not even safe for her to have home health care at this time because she is falling all the time\"    Spoke with BLANCA.  Physical therapy will need to evaluate patient and see if insurance will authorize rehab  Patient to be admitted observation

## 2024-08-26 NOTE — TELEPHONE ENCOUNTER
Patient's sister Gladys advised to call 911, to have paramedics assist patient off the floor and take patient to ER.     Patient's sister states patient was discharged in this same condition. states patient is currently sitting on the floor and may not make it to the wound care appointment today. Gladys states patient was discharged from the hospital 8/24/24 i This is her base line. Patient can get up from a chair position and walk with walker. Patient is now sitting on the floor. Not known if patient fell. Patient is alert and oriented and not in pain.       Sister asking if patient could get wound care and physical therapy for home care. Patient will need to get a new referral for Home Health Care, as [facility] is not covered. Gladys states she will assist patient with finding out which home health companies are in her network.    Message left on [facility] to see if they could assist with finding home lorenzo care covered on patient's plan.

## 2024-08-26 NOTE — ED INITIAL ASSESSMENT (HPI)
Pt arrives via EMS complaining of weakness. Pt states she hasn't been able to eat the last two days & cannot move herself.

## 2024-08-26 NOTE — TELEPHONE ENCOUNTER
Spoke to patient's sister Gladys and advised of 's note.     911 contacted with patient's sister on the telephone. Patient awake, breathing normally, alert. Pain to right lower extremity. Ambulance is on the way. Stayed on call until ambulance arrived.

## 2024-08-27 PROBLEM — E27.40 ADRENAL INSUFFICIENCY (HCC): Status: ACTIVE | Noted: 2024-01-01

## 2024-08-27 PROBLEM — R60.0 LEG EDEMA: Status: ACTIVE | Noted: 2024-08-27

## 2024-08-27 PROBLEM — R60.0 LEG EDEMA: Status: ACTIVE | Noted: 2024-01-01

## 2024-08-27 PROBLEM — E27.40 ADRENAL INSUFFICIENCY (HCC): Status: ACTIVE | Noted: 2024-08-27

## 2024-08-27 LAB
ANION GAP SERPL CALC-SCNC: 10 MMOL/L (ref 0–18)
BASOPHILS # BLD: 0 X10(3) UL (ref 0–0.2)
BASOPHILS NFR BLD: 0 %
BUN BLD-MCNC: 14 MG/DL (ref 9–23)
BUN/CREAT SERPL: 15.9 (ref 10–20)
CALCIUM BLD-MCNC: 8.3 MG/DL (ref 8.7–10.4)
CHLORIDE SERPL-SCNC: 113 MMOL/L (ref 98–112)
CK SERPL-CCNC: 50 U/L
CO2 SERPL-SCNC: 17 MMOL/L (ref 21–32)
CREAT BLD-MCNC: 0.88 MG/DL
DEPRECATED RDW RBC AUTO: 61.3 FL (ref 35.1–46.3)
EGFRCR SERPLBLD CKD-EPI 2021: 73 ML/MIN/1.73M2 (ref 60–?)
EOSINOPHIL # BLD: 0.14 X10(3) UL (ref 0–0.7)
EOSINOPHIL NFR BLD: 1 %
ERYTHROCYTE [DISTWIDTH] IN BLOOD BY AUTOMATED COUNT: 18.9 % (ref 11–15)
GLUCOSE BLD-MCNC: 62 MG/DL (ref 70–99)
GLUCOSE BLDC GLUCOMTR-MCNC: 39 MG/DL (ref 70–99)
GLUCOSE BLDC GLUCOMTR-MCNC: 77 MG/DL (ref 70–99)
GLUCOSE BLDC GLUCOMTR-MCNC: 77 MG/DL (ref 70–99)
GLUCOSE BLDC GLUCOMTR-MCNC: 78 MG/DL (ref 70–99)
GLUCOSE BLDC GLUCOMTR-MCNC: 83 MG/DL (ref 70–99)
GLUCOSE BLDC GLUCOMTR-MCNC: 89 MG/DL (ref 70–99)
HCT VFR BLD AUTO: 29.3 %
HGB BLD-MCNC: 9.3 G/DL
LACTATE SERPL-SCNC: 1.7 MMOL/L (ref 0.5–2)
LYMPHOCYTES NFR BLD: 1.3 X10(3) UL (ref 1–4)
LYMPHOCYTES NFR BLD: 9 %
MCH RBC QN AUTO: 28.6 PG (ref 26–34)
MCHC RBC AUTO-ENTMCNC: 31.7 G/DL (ref 31–37)
MCV RBC AUTO: 90.2 FL
METAMYELOCYTES # BLD: 0.14 X10(3) UL
METAMYELOCYTES NFR BLD: 1 %
MONOCYTES # BLD: 0.43 X10(3) UL (ref 0.1–1)
MONOCYTES NFR BLD: 3 %
NEUTROPHILS # BLD AUTO: 8.69 X10 (3) UL (ref 1.5–7.7)
NEUTROPHILS NFR BLD: 86 %
NEUTS HYPERSEG # BLD: 12.38 X10(3) UL (ref 1.5–7.7)
NRBC BLD MANUAL-RTO: 1 %
OSMOLALITY SERPL CALC.SUM OF ELEC: 288 MOSM/KG (ref 275–295)
PLATELET # BLD AUTO: 430 10(3)UL (ref 150–450)
PLATELET MORPHOLOGY: NORMAL
POTASSIUM SERPL-SCNC: 4 MMOL/L (ref 3.5–5.1)
RBC # BLD AUTO: 3.25 X10(6)UL
SODIUM SERPL-SCNC: 140 MMOL/L (ref 136–145)
TOTAL CELLS COUNTED BLD: 100
WBC # BLD AUTO: 14.4 X10(3) UL (ref 4–11)

## 2024-08-27 PROCEDURE — 99233 SBSQ HOSP IP/OBS HIGH 50: CPT | Performed by: HOSPITALIST

## 2024-08-27 PROCEDURE — 99223 1ST HOSP IP/OBS HIGH 75: CPT | Performed by: INTERNAL MEDICINE

## 2024-08-27 RX ORDER — LINEZOLID 600 MG/1
600 TABLET, FILM COATED ORAL 2 TIMES DAILY
Status: DISCONTINUED | OUTPATIENT
Start: 2024-08-27 | End: 2024-08-27

## 2024-08-27 RX ORDER — MAGNESIUM HYDROXIDE/ALUMINUM HYDROXICE/SIMETHICONE 120; 1200; 1200 MG/30ML; MG/30ML; MG/30ML
30 SUSPENSION ORAL 4 TIMES DAILY PRN
Status: DISCONTINUED | OUTPATIENT
Start: 2024-08-27 | End: 2024-09-01

## 2024-08-27 RX ORDER — ONDANSETRON 2 MG/ML
4 INJECTION INTRAMUSCULAR; INTRAVENOUS EVERY 6 HOURS PRN
Status: DISCONTINUED | OUTPATIENT
Start: 2024-08-27 | End: 2024-09-01

## 2024-08-27 RX ORDER — OXYBUTYNIN CHLORIDE 10 MG/1
10 TABLET, EXTENDED RELEASE ORAL DAILY
Status: DISCONTINUED | OUTPATIENT
Start: 2024-08-27 | End: 2024-09-01

## 2024-08-27 RX ORDER — DEXTROSE MONOHYDRATE 25 G/50ML
50 INJECTION, SOLUTION INTRAVENOUS
Status: DISCONTINUED | OUTPATIENT
Start: 2024-08-27 | End: 2024-09-01

## 2024-08-27 RX ORDER — PREDNISONE 10 MG/1
10 TABLET ORAL DAILY
Status: DISCONTINUED | OUTPATIENT
Start: 2024-08-27 | End: 2024-08-27

## 2024-08-27 RX ORDER — DEXTROSE MONOHYDRATE 25 G/50ML
50 INJECTION, SOLUTION INTRAVENOUS AS NEEDED
Status: DISCONTINUED | OUTPATIENT
Start: 2024-08-27 | End: 2024-09-01

## 2024-08-27 RX ORDER — PROCHLORPERAZINE EDISYLATE 5 MG/ML
5 INJECTION INTRAMUSCULAR; INTRAVENOUS EVERY 8 HOURS PRN
Status: DISCONTINUED | OUTPATIENT
Start: 2024-08-27 | End: 2024-09-01

## 2024-08-27 RX ORDER — LOSARTAN POTASSIUM 50 MG/1
50 TABLET ORAL DAILY
Status: DISCONTINUED | OUTPATIENT
Start: 2024-08-27 | End: 2024-09-01

## 2024-08-27 RX ORDER — DEXTROSE MONOHYDRATE 25 G/50ML
INJECTION, SOLUTION INTRAVENOUS
Status: COMPLETED
Start: 2024-08-27 | End: 2024-08-27

## 2024-08-27 RX ORDER — CLOPIDOGREL BISULFATE 75 MG/1
75 TABLET ORAL DAILY
Status: DISCONTINUED | OUTPATIENT
Start: 2024-08-27 | End: 2024-09-01

## 2024-08-27 RX ORDER — PREDNISONE 20 MG/1
20 TABLET ORAL DAILY
Status: DISCONTINUED | OUTPATIENT
Start: 2024-08-27 | End: 2024-08-28

## 2024-08-27 RX ORDER — TEMAZEPAM 15 MG/1
15 CAPSULE ORAL NIGHTLY PRN
Status: DISCONTINUED | OUTPATIENT
Start: 2024-08-27 | End: 2024-09-01

## 2024-08-27 RX ORDER — ACETAMINOPHEN 325 MG/1
650 TABLET ORAL EVERY 4 HOURS PRN
Status: DISCONTINUED | OUTPATIENT
Start: 2024-08-27 | End: 2024-09-01

## 2024-08-27 RX ORDER — FUROSEMIDE 10 MG/ML
40 INJECTION INTRAMUSCULAR; INTRAVENOUS
Status: DISCONTINUED | OUTPATIENT
Start: 2024-08-27 | End: 2024-08-30

## 2024-08-27 RX ORDER — HYDROCODONE BITARTRATE AND ACETAMINOPHEN 5; 325 MG/1; MG/1
1 TABLET ORAL EVERY 4 HOURS PRN
Status: DISCONTINUED | OUTPATIENT
Start: 2024-08-27 | End: 2024-09-01

## 2024-08-27 RX ORDER — NICOTINE POLACRILEX 4 MG
15 LOZENGE BUCCAL
Status: DISCONTINUED | OUTPATIENT
Start: 2024-08-27 | End: 2024-09-01

## 2024-08-27 RX ORDER — SODIUM BICARBONATE 650 MG/1
650 TABLET ORAL 2 TIMES DAILY
Status: DISCONTINUED | OUTPATIENT
Start: 2024-08-27 | End: 2024-09-01

## 2024-08-27 RX ORDER — HYDROXYCHLOROQUINE SULFATE 200 MG/1
200 TABLET, FILM COATED ORAL 2 TIMES DAILY
Status: DISCONTINUED | OUTPATIENT
Start: 2024-08-27 | End: 2024-09-01

## 2024-08-27 RX ORDER — METOPROLOL TARTRATE 50 MG
50 TABLET ORAL 2 TIMES DAILY
Status: DISCONTINUED | OUTPATIENT
Start: 2024-08-27 | End: 2024-09-01

## 2024-08-27 RX ORDER — FERROUS SULFATE 325(65) MG
325 TABLET, DELAYED RELEASE (ENTERIC COATED) ORAL
Status: DISCONTINUED | OUTPATIENT
Start: 2024-08-27 | End: 2024-09-01

## 2024-08-27 RX ORDER — ACETAMINOPHEN 500 MG
500 TABLET ORAL EVERY 4 HOURS PRN
Status: DISCONTINUED | OUTPATIENT
Start: 2024-08-27 | End: 2024-09-01

## 2024-08-27 RX ORDER — NICOTINE POLACRILEX 4 MG
30 LOZENGE BUCCAL
Status: DISCONTINUED | OUTPATIENT
Start: 2024-08-27 | End: 2024-09-01

## 2024-08-27 RX ORDER — FOLIC ACID 1 MG/1
1 TABLET ORAL DAILY
Status: DISCONTINUED | OUTPATIENT
Start: 2024-08-27 | End: 2024-09-01

## 2024-08-27 RX ORDER — HEPARIN SODIUM 5000 [USP'U]/ML
5000 INJECTION, SOLUTION INTRAVENOUS; SUBCUTANEOUS EVERY 12 HOURS SCHEDULED
Status: DISCONTINUED | OUTPATIENT
Start: 2024-08-27 | End: 2024-09-01

## 2024-08-27 RX ORDER — HYDROCODONE BITARTRATE AND ACETAMINOPHEN 5; 325 MG/1; MG/1
2 TABLET ORAL EVERY 4 HOURS PRN
Status: DISCONTINUED | OUTPATIENT
Start: 2024-08-27 | End: 2024-09-01

## 2024-08-27 RX ORDER — ASPIRIN 81 MG/1
81 TABLET, CHEWABLE ORAL DAILY
Status: DISCONTINUED | OUTPATIENT
Start: 2024-08-27 | End: 2024-09-01

## 2024-08-27 NOTE — PROGRESS NOTES
[facility]  Progress Note     Sheri Garzon  : 1960    Status: Inpatient  Day #: 1    Attending: Michael Bhatt MD  PCP: TERI MCKENNA MD     Assessment and Plan:    Musculoskeletal deconditioning related to recent hospitalization  -PT/OT eval  -will likely need rehab    RLE cellulitis  Leukocytosis  -s/p I&D on 8/15  -on linezolid per ID    RA  -cont hydroxychloroquine  -cont prednisone 10mg daily    H/o adrenal insufficiency  -monitor BP  -cont prednisone    Metabolic acidosis, non-ion gap  -was on sodium bicarb PO previously, will resume    Anemia of chronic disease  -monitor  -no overt bleed  -cont iron PO    LE edema  -cont lasix IV  -recent echo in July - normal EF, G1DD    Sinus tachycardia  -has not received her metoprolol yet this morning  -cont metoprolol and monitor tele        DVT Mechanical Prophylaxis:        DVT Pharmacologic Prophylaxis   Medication    heparin (Porcine) 5000 UNIT/ML injection 5,000 Units      DVT Pharmacologic prophylaxis: Aspirin 162 mg        Subjective:      Initial Chief Complaint:  weakness    Feels weak, no focal weakness; no CP, no SOB    10 point ROS completed and was negative, except for pertinent positive and negatives stated in subjective.      Objective:      Temp:  [97.9 °F (36.6 °C)-98.6 °F (37 °C)] 98.6 °F (37 °C)  Pulse:  [] 132  Resp:  [7-22] 16  BP: (102-148)/() 143/99  SpO2:  [90 %-100 %] 96 %  General:  Alert, no distress  HEENT:  Normocephalic, atraumatic  Cardiac:  Regular rate, regular rhythm  Pulmonary:  Clear to auscultation bilaterally, respirations unlabored  Gastrointestinal:  Soft, non-tender, normal bowel sounds  Musculoskeletal:  No joint swelling  Extremities:  +b/l LE edema  Neurologic:  nonfocal  Psychiatric:  Normal affect, calm and appropriate    Intake/Output Summary (Last 24 hours) at 2024 1018  Last data filed at 2024 0512  Gross per 24 hour   Intake 1500 ml   Output 750 ml   Net 750 ml         Recent  Labs   Lab 08/24/24  0625 08/26/24  1604 08/27/24  0932   WBC 8.2 14.9* 14.4*   HGB 8.8* 9.3* 9.3*   HCT 27.6* 29.7* 29.3*   .0 417.0 430.0   RBC 3.05* 3.28* 3.25*   MCV 90.5 90.5 90.2   MCH 28.9 28.4 28.6   MCHC 31.9 31.3 31.7   RDW 18.6* 18.7* 18.9*   NEPRELIM 2.64 9.84* 8.69*     Recent Labs   Lab 08/21/24  0625 08/21/24  0625 08/21/24  1711 08/22/24  0824 08/23/24  0607 08/24/24  0625 08/26/24  1604   BUN <5*  --   --  9 12 15 14   CREATSERUM 0.56  --   --  0.76 0.92 0.96 0.94   CA 8.0*  --   --  8.3* 8.8 8.5* 8.4*   ALB 2.6*  --   --  2.8* 2.8* 2.9* 3.1*     --   --  141 139 141 137   K 4.4  --   --  4.5 4.7 4.7 4.1   *  --   --  112 112 112 109   CO2 20.0*  --   --  21.0 21.0 24.0 19.0*   *  --   --  96 85 73 73   MG 1.8  --   --  1.9  --   --   --    PHOS 2.9  --   --   --   --   --   --    BILT  --    < >  --  0.3 0.3 0.3 0.4   AST  --    < >  --  15 16 20 58*   ALT  --    < >  --  14 12 14 49   ALKPHO  --    < >  --  82 82 86 100   TP  --    < >  --  4.9* 4.9* 4.9* 5.3*   B12  --   --  748  --   --   --   --     < > = values in this interval not displayed.       No results found.      Medications:   heparin  5,000 Units Subcutaneous 2 times per day    furosemide  40 mg Intravenous BID (Diuretic)    linezolid  600 mg Oral BID    clopidogrel  75 mg Oral Daily    folic acid  1 mg Oral Daily    ferrous sulfate  325 mg Oral Daily with breakfast    losartan  50 mg Oral Daily    predniSONE  10 mg Oral Daily    oxybutynin ER  10 mg Oral Daily    metoprolol tartrate  50 mg Oral BID    hydroxychloroquine  200 mg Oral BID    aspirin  81 mg Oral Daily    collagenase   Topical Daily      PRN Meds:   acetaminophen    ondansetron    prochlorperazine    temazepam    acetaminophen **OR** HYDROcodone-acetaminophen **OR** HYDROcodone-acetaminophen    Supplementary Documentation:        MDM High. Time spent on chart/note review, review of labs/imaging, discussion with patient, physical exam,  discussion with staff, consultants, coordinating care, writing progress note, discussion of plan of care.      Michael Bhatt MD

## 2024-08-27 NOTE — CM/SW NOTE
08/27/24 1200   CM/SW Referral Data   Referral Source Physician   Reason for Referral Discharge planning   Informant Patient;Sibling   Readmission Assessment   Factors that patient feels contributed to this readmission Acute/Chronic Clinical Presentation   Pt's living situation prior to admission? Home with family   Pt's level of independence at discharge? No assist/independent (minimal)   Pt. received education on diagnoses at time of discharge? Yes   Did you know who and how to call someone if you felt worse? Yes   Did any new symptoms or issues develop after you were discharged? Yes   ----Post D/C symptoms: Symptoms/issue related to previous hospitalization No   Did you understand your discharge instructions? Yes   Were medications taken as indicated on discharge instructions? Yes   Was full assessment completed by SW/CM on prior admission? No (comment)  (dc plans addressed, full assessment not completed)   Was the recommended discharge plan achieved? Yes   Medical Hx   Does patient have an established PCP? Yes  (Sulma Juarez MD)   Significant Past Medical/Mental Health Hx Hypertension, rheumatoid arthritis, chronic adrenal insufficiency secondary to chronic corticosteroids use, asthma, pancreatitis, obstructive sleep apnea, obesity, hyperlipidemia, hypertension, anxiety, depression, anemia of chronic inflammatory disease, and nonocclusive coronary artery disease.  Recent hospitalization with peripheral arterial disease studies which were negative.   Patient Info   Patient's Current Mental Status at Time of Assessment Alert;Oriented   Patient's Home Environment House   Number of Levels in Home 3   Patient lives with Other  (sister, Gladys)   Patient Status Prior to Admission   Independent with ADLs and Mobility No   Pt. requires assistance with Driving;Ambulating   Services in place prior to admission DME/Supplies at home   Type of DME/Supplies Straight Cane;Wheeled Walker;Raised Toilet Seat   Discharge Needs    Anticipated D/C needs Subacute rehab   Services Requested   Submitted to [facility] Yes   PASRR Level 1 Submitted Yes     CM met with patient and patient's sister Gladys at bedside to discuss discharge planning.    Patient lives with her sister Gladys in a multi-level home.  Per patient, she began having increased weakness a few months ago - which has since progressed and has contributed to multiple falls at home.  Patient owns the DME above at home, and ambulates primarily with a cane prior to admission. Patient no longer drives, but family support is able to provide assistance with transportation needs.    Anticipated therapy need: Gradual Rehabilitative Therapy    CM discussed anticipated therapy need above - patient/family are agreeable to [facility].    CM sent [facility] to evaluate patient appropriateness for [facility].  CM sent [facility] referrals via [employer] - PASRR completed and uploaded to referral.    Patient will require insurance authorization for [facility].    / to remain available for support and/or discharge planning.     Plan: [facility], pending [facility], list/choice facility, insurance authorization, medical clearance    Natalie Liriano RN, BSN  Nurse   [phone]

## 2024-08-27 NOTE — PLAN OF CARE
Pt is a&Ox4 . Vitals signs stable. Remote tele  Pain controlled with meds. Frequent checks on rounds. Call light within reach, bed at lowest position and bed alarm in place. Non skid socks on while ambulating off while in bed per pt request.       Problem: Patient Centered Care  Goal: Patient preferences are identified and integrated in the patient's plan of care  Description: Interventions:  - What would you like us to know as we care for you? Home with family  - Provide timely, complete, and accurate information to patient/family  - Incorporate patient and family knowledge, values, beliefs, and cultural backgrounds into the planning and delivery of care  - Encourage patient/family to participate in care and decision-making at the level they choose  - Honor patient and family perspectives and choices  8/27/2024 0744 by Edelmira Bray, RN  Outcome: Progressing  8/27/2024 0743 by Edelmira Bray, RN  Outcome: Progressing     Problem: CARDIOVASCULAR - ADULT  Goal: Maintains optimal cardiac output and hemodynamic stability  Description: INTERVENTIONS:  - Monitor vital signs, rhythm, and trends  - Monitor for bleeding, hypotension and signs of decreased cardiac output  - Evaluate effectiveness of vasoactive medications to optimize hemodynamic stability  - Monitor arterial and/or venous puncture sites for bleeding and/or hematoma  - Assess quality of pulses, skin color and temperature  - Assess for signs of decreased coronary artery perfusion - ex. Angina  - Evaluate fluid balance, assess for edema, trend weights  Outcome: Progressing  Goal: Absence of cardiac arrhythmias or at baseline  Description: INTERVENTIONS:  - Continuous cardiac monitoring, monitor vital signs, obtain 12 lead EKG if indicated  - Evaluate effectiveness of antiarrhythmic and heart rate control medications as ordered  - Initiate emergency measures for life threatening arrhythmias  - Monitor electrolytes and administer replacement therapy as  ordered  Outcome: Progressing     Problem: RESPIRATORY - ADULT  Goal: Achieves optimal ventilation and oxygenation  Description: INTERVENTIONS:  - Assess for changes in respiratory status  - Assess for changes in mentation and behavior  - Position to facilitate oxygenation and minimize respiratory effort  - Oxygen supplementation based on oxygen saturation or ABGs  - Provide Smoking Cessation handout, if applicable  - Encourage broncho-pulmonary hygiene including cough, deep breathe, Incentive Spirometry  - Assess the need for suctioning and perform as needed  - Assess and instruct to report SOB or any respiratory difficulty  - Respiratory Therapy support as indicated  - Manage/alleviate anxiety  - Monitor for signs/symptoms of CO2 retention  Outcome: Progressing     Problem: METABOLIC/FLUID AND ELECTROLYTES - ADULT  Goal: Glucose maintained within prescribed range  Description: INTERVENTIONS:  - Monitor Blood Glucose as ordered  - Assess for signs and symptoms of hyperglycemia and hypoglycemia  - Administer ordered medications to maintain glucose within target range  - Assess barriers to adequate nutritional intake and initiate nutrition consult as needed  - Instruct patient on self management of diabetes  Outcome: Progressing  Goal: Electrolytes maintained within normal limits  Description: INTERVENTIONS:  - Monitor labs and rhythm and assess patient for signs and symptoms of electrolyte imbalances  - Administer electrolyte replacement as ordered  - Monitor response to electrolyte replacements, including rhythm and repeat lab results as appropriate  - Fluid restriction as ordered  - Instruct patient on fluid and nutrition restrictions as appropriate  Outcome: Progressing  Goal: Hemodynamic stability and optimal renal function maintained  Description: INTERVENTIONS:  - Monitor labs and assess for signs and symptoms of volume excess or deficit  - Monitor intake, output and patient weight  - Monitor urine specific  gravity, serum osmolarity and serum sodium as indicated or ordered  - Monitor response to interventions for patient's volume status, including labs, urine output, blood pressure (other measures as available)  - Encourage oral intake as appropriate  - Instruct patient on fluid and nutrition restrictions as appropriate  Outcome: Progressing     Problem: SKIN/TISSUE INTEGRITY - ADULT  Goal: Skin integrity remains intact  Description: INTERVENTIONS  - Assess and document risk factors for pressure ulcer development  - Assess and document skin integrity  - Monitor for areas of redness and/or skin breakdown  - Initiate interventions, skin care algorithm/standards of care as needed  Outcome: Not Progressing  Goal: Oral mucous membranes remain intact  Description: INTERVENTIONS  - Assess oral mucosa and hygiene practices  - Implement preventative oral hygiene regimen  - Implement oral medicated treatments as ordered  Outcome: Not Progressing     Problem: MUSCULOSKELETAL - ADULT  Goal: Return mobility to safest level of function  Description: INTERVENTIONS:  - Assess patient stability and activity tolerance for standing, transferring and ambulating w/ or w/o assistive devices  - Assist with transfers and ambulation using safe patient handling equipment as needed  - Ensure adequate protection for wounds/incisions during mobilization  - Obtain PT/OT consults as needed  - Advance activity as appropriate  - Communicate ordered activity level and limitations with patient/family  Outcome: Not Progressing  Goal: Maintain proper alignment of affected body part  Description: INTERVENTIONS:  - Support and protect limb and body alignment per provider's orders  - Instruct and reinforce with patient and family use of appropriate assistive device and precautions (e.g. spinal or hip dislocation precautions)  Outcome: Not Progressing     Problem: Impaired Functional Mobility  Goal: Achieve highest/safest level of mobility/gait  Description:  Interventions:  - Assess patient's functional ability and stability  - Promote increasing activity/tolerance for mobility and gait  - Educate and engage patient/family in tolerated activity level and precautions    Outcome: not Progressing     Problem: Impaired Activities of Daily Living  Goal: Achieve highest/safest level of independence in self care  Description: Interventions:  - Assess ability and encourage patient to participate in ADLs to maximize function  - Promote sitting position while performing ADLs such as feeding, grooming, and bathing  - Educate and encourage patient/family in tolerated functional activity level and precautions during self-care    Outcome: not Progressing

## 2024-08-27 NOTE — PROGRESS NOTES
08/27/24 1148   Wound 08/27/24 Buttocks Right;Left   Date First Assessed/Time First Assessed: 08/27/24 0030   Present on Original Admission: Yes  Primary Wound Type: Friction/Shear  Location: Buttocks  Wound Location Orientation: Right;Left   Wound Image    Site Assessment Clean;Fragile;Excoriated;Moist;Painful;Pink   Closure Not approximated   Drainage Amount None   Treatments Cleansed;Topical (Barrier/Moisturizer/Ointment)   Dressing Aquacel Foam   Dressing Changed Changed   Dressing Status Dressing Changed;Removed   Wound Depth (cm) 0.1 cm   Non-staged Wound Description Partial thickness   Norma-wound Assessment Clean;Dry;Intact   Wound Granulation Tissue Red;Pink   Wound Bed Granulation (%) 100 %   State of Healing Fully granulated   Wound Odor None   Wound 08/27/24 Ischium Left;Posterior;Proximal;Upper   Date First Assessed/Time First Assessed: 08/27/24 0030   Present on Original Admission: Yes  Primary Wound Type: Friction/Shear  Location: Ischium  Wound Location Orientation: Left;Posterior;Proximal;Upper   Wound Image    Site Assessment Clean;Fragile;Excoriated;Moist;Painful;Red   Closure Not approximated   Drainage Amount Scant   Drainage Description Serosanguineous   Treatments Cleansed;Topical (Barrier/Moisturizer/Ointment)   Dressing Aquacel Foam   Dressing Changed Changed   Dressing Status Dressing Changed;Removed;Old drainage   Non-staged Wound Description Partial thickness   Norma-wound Assessment Clean;Dry;Intact   Wound Granulation Tissue Red   State of Healing Fully granulated   Wound Odor None   [REMOVED] Wound 08/27/24 Foot Dorsal;Right   Final Assessment Date/Final Assessment Time: 08/27/24 1155  Date First Assessed/Time First Assessed: 08/27/24 1128   Present on Original Admission: Yes  Primary Wound Type: Other (comment)  Location: Foot  Wound Location Orientation: Dorsal;Right  Wou...   Wound Image    Site Assessment Clean;Dry;Intact;Epithelialization;Fragile;Pink   Drainage Amount None    Dressing Aquacel Foam   Dressing Changed Changed   Dressing Status Dressing Changed;Removed   Non-staged Wound Description Not applicable   Norma-wound Assessment Clean;Dry;Intact   Wound Granulation Tissue Red;Pink   Wound Bed Epithelium (%) 100 %   State of Healing Epithelialized   Wound Odor None   Wound 08/12/24 Leg Lower;Posterior;Right   Date First Assessed/Time First Assessed: 08/12/24 0030   Present on Original Admission: Yes  Primary Wound Type: (c) Other (comment)  Location: Leg  Wound Location Orientation: Lower;Posterior;Right  Wound Description (Comments): R posterior calf unst...   Wound Image    Site Assessment Moist;Painful;Tan   Closure Not approximated   Drainage Amount Scant   Drainage Description Yellow   Treatments Cleansed;Saline;Santyl   Dressing 2x2s;Aquacel Foam   Dressing Changed Changed   Dressing Status Dressing Changed;Removed;Old drainage   Non-staged Wound Description Full thickness   Norma-wound Assessment Clean;Dry;Intact   Wound Bed Slough (%) 100 %   State of Healing Non-healing   Wound Odor None   Wound Follow Up   Follow up needed Yes       WOUND CARE NOTE    History:  Past Medical History:    Allergic rhinitis    Anxiety    Arthritis    RA and Osteoarthritis    Asthma (HCC)    Depression    Not officially diagnosed    Esophageal reflux    Essential hypertension    High blood pressure    High cholesterol    Hyperlipidemia    Myocardial infarction (HCC)    Cardiac event    Osteoarthritis    Pancreatitis (HCC)    Problems with swallowing    RA (rheumatoid arthritis) (HCC)    Sleep apnea     Past Surgical History:   Procedure Laterality Date    Colonoscopy      Other surgical history      Revision of uterine anomaly      Rotator cuff repair      Wrist fracture surgery        Social History     Socioeconomic History    Marital status: Single   Tobacco Use    Smoking status: Never    Smokeless tobacco: Never   Vaping Use    Vaping status: Never Used   Substance and Sexual Activity     Alcohol use: Not Currently    Drug use: Never     Social Determinants of Health     Food Insecurity: No Food Insecurity (8/27/2024)    Food Insecurity     Food Insecurity: Never true   Transportation Needs: No Transportation Needs (8/27/2024)    Transportation Needs     Lack of Transportation: No   Housing Stability: Low Risk  (8/27/2024)    Housing Stability     Housing Instability: No       PLAN   Recommendations:  Dr. Yao currently on consult  Dietary consult for recommendations for nutrition to optimize wound healing  Heels elevated using pillows, heel wedge or heel boots to offload heels  Prompt incontinence care  Moisture barrier for incontinence. May consider zinc    Wound(s)  Location: bilateral buttocks, left ischium  Cleansing  Pamper wipes  Topical zinc  Dressings bordered foams  Frequency bid and prn   Location: right posterior calf  Cleansing  Saline   Topical santyl  Dressings saline moist gauze 2x2  Secure with bordered foam  Frequency daily     OBJECTIVE   MD Consult new  Reason for Consult right leg      ASSESSMENT   Omid Score:  Omid Scale Score: 13    Chart Reviewed: yes    Wound(s):  Pt seen for above wounds. Pt able to turn self. There are scattered friction and shear areas to the bilateral buttocks and left ischium. Zinc was applied as were new bordered foams. The dorsal foot wound has healed, foam applied to protect newly epithelialized skin. The posterior right calf wound continues to have moist slough since last admit. Pt has not been using Santyl at home. Recommend to apply santyl daily for enzymatic debridement purposes. I discussed why and how the santyl worked and Instructed pt and her sister how it is to be applied and they verbalized understanding. Bedside RN updated.        Allergies: Cephalosporins, Gadolinium derivatives, Penicillins, and Sulfa antibiotics    Labs:   Lab Results   Component Value Date    WBC 14.4 (H) 08/27/2024    HGB 9.3 (L) 08/27/2024    HCT 29.3 (L)  08/27/2024    .0 08/27/2024    CREATSERUM 0.88 08/27/2024    BUN 14 08/27/2024     08/27/2024    K 4.0 08/27/2024     (H) 08/27/2024    CO2 17.0 (L) 08/27/2024    GLU 62 (L) 08/27/2024    CA 8.3 (L) 08/27/2024    ALB 3.1 (L) 08/26/2024    ALKPHO 100 08/26/2024    BILT 0.4 08/26/2024    TP 5.3 (L) 08/26/2024    AST 58 (H) 08/26/2024    ALT 49 08/26/2024    LIP 34 07/21/2024    MG 1.9 08/22/2024    PHOS 2.9 08/21/2024     No results found for: \"PREALBUMIN\"      Time Spent 30 Minutes.    Natalie Jones, RN, BSN, [facility]  [facility]  [phone]

## 2024-08-27 NOTE — OCCUPATIONAL THERAPY NOTE
OCCUPATIONAL THERAPY EVALUATION - INPATIENT     Room Number: 521/521-A  Evaluation Date: 8/27/2024  Type of Evaluation: Initial       Physician Order: IP Consult to Occupational Therapy  Reason for Therapy: ADL/IADL Dysfunction and Discharge Planning    OCCUPATIONAL THERAPY ASSESSMENT     Patient is a 64 year old female admitted 8/26/2024 for generalized weakness for 3 weeks.  Prior to admission, it was becoming more difficult to mobilize.  Patient is currently requiring up to max A for ADLs overall along with min A for supine to sit, CGA for sitting at EOB, min A for STS, and min A for functional transfer at RW level. Pt tolerated about <1 minutes of standing in supported standing.  Pt has the following impairments: decreased functional reach, decreased endurance, pain, and impaired standing balance.    RN cleared pt for participation in OT session, which was completed in collaboration with PT. Upon arrival, pt was supine in bed and agreeable to activity. No visitors present during session. Gait belt used. Pt was left in chair and alarm was activated. Call light and all needs left in reach. Handoff given to RN.    Education provided  Educated pt about role of OT and hospital therapy process as well as proper safety techniques. Pt verbalized/demonstrated fair carryover.    Patient will benefit from continued skilled OT Services to promote return to prior level of function and safety with continuous assistance and gradual rehabilitative therapy    PLAN  OT Treatment Plan: Balance activities;Energy conservation/work simplification techniques;Continued evaluation;Compensatory technique education;Fine motor coordination activities;Neuromuscluar reeducation;Equipment eval/education;Patient/Family training;Patient/Family education;Cognitive reorientation;Endurance training;UE strengthening/ROM;Functional transfer training;Visual perceptual training;IADL training;ADL training      OCCUPATIONAL THERAPY MEDICAL/SOCIAL  HISTORY     Problem List  Principal Problem:    Weakness generalized  Active Problems:    Leg edema      Past Medical History  Past Medical History:    Allergic rhinitis    Anxiety    Arthritis    RA and Osteoarthritis    Asthma (HCC)    Depression    Not officially diagnosed    Esophageal reflux    Essential hypertension    High blood pressure    High cholesterol    Hyperlipidemia    Myocardial infarction (HCC)    Cardiac event    Osteoarthritis    Pancreatitis (HCC)    Problems with swallowing    RA (rheumatoid arthritis) (HCC)    Sleep apnea       Past Surgical History  Past Surgical History:   Procedure Laterality Date    Colonoscopy      Other surgical history      Revision of uterine anomaly      Rotator cuff repair      Wrist fracture surgery         HOME SITUATION  Type of Home: House  Home Layout: Multi-level  Lives With: Daughter;Family (sister)                      Drives: Yes  Patient Regularly Uses: Reading glasses    SUBJECTIVE  \"I went to my knees at home.\"    OCCUPATIONAL THERAPY EXAMINATION      OBJECTIVE  Precautions: Bed/chair alarm  Fall Risk: High fall risk    PAIN ASSESSMENT  Ratin             RANGE OF MOTION   Upper extremity ROM is within functional limits     STRENGTH ASSESSMENT  Upper extremity strength is within functional limits     COORDINATION  Gross Motor: WFL   Fine Motor: WFL     ACTIVITIES OF DAILY LIVING ASSESSMENT  AM-PAC ‘6-Clicks’ Inpatient Daily Activity Short Form  How much help from another person does the patient currently need…  -   Putting on and taking off regular lower body clothing?: A Lot  -   Bathing (including washing, rinsing, drying)?: A Lot  -   Toileting, which includes using toilet, bedpan or urinal? : A Lot  -   Putting on and taking off regular upper body clothing?: A Little  -   Taking care of personal grooming such as brushing teeth?: A Little  -   Eating meals?: A Little    AM-PAC Score:  Score: 15  Approx Degree of Impairment: 56.46%  Standardized  Score (AM-PAC Scale): 34.69  CMS Modifier (G-Code): CK      BED MOBILITY  Supine to Sit : Minimal Assist      FUNCTIONAL TRANSFER ASSESSMENT  Supine to Sit : Minimal Assist     Sit to stand: min A  Toilet Transfer: min A  Chair Transfer: min A    FUNCTIONAL ADL ASSESSMENT  Overall max A    The patient's Approx Degree of Impairment: 56.46% has been calculated based on documentation in the [facility] '6 clicks' Inpatient Daily Activity Short Form.  Research supports that patients with this level of impairment may benefit from SULTANA. Final disposition will be made by interdisciplinary medical team.    OT Goals  Patients self stated goal is: to get stronger     Patient will complete functional transfer with SBA  Comment:     Patient will complete toileting with min A  Comment:     Patient will tolerate standing for 3 minutes in prep for adls with SBA   Comment:    Patient will complete item retrieval with SBA  Comment:          Goals  on:   Frequency: 3-5x/wk    Patient Evaluation Complexity Level:   Occupational Profile/Medical History MODERATE - Expanded review of history including review of medical or therapy record   Specific performance deficits impacting engagement in ADL/IADL MODERATE  3 - 5 performance deficits   Client Assessment/Performance Deficits MODERATE - Comorbidities and min to mod modifications of tasks    Clinical Decision Making MODERATE - Analysis of occupational profile, detailed assessments, several treatment options    Overall Complexity MODERATE     OT Session Time: 20 minutes  Self-Care Home Management: 10 minutes           Mary Alejandra OT  [facility]  Inpatient Rehabilitation  Occupational Therapy  [phone]

## 2024-08-27 NOTE — CONSULTS
[facility]  part of [facility] ID CONSULT NOTE    Sheri Garzon Patient Status:  Inpatient    1960 MRN [id number]   Location [facility]5W Attending Michael Bhatt MD   Hosp Day # 1 PCP TERI MCKENNA MD       Reason for Consultation:  Leukocytosis    ASSESSMENT:    Antibiotics: IV Daptomycin; (Linezolid)    # Leukocytosis - possible reactive to hypoglycemia, BM recovery; r/o bacteremia    # RLE cellulitis with posterior leg wound in immunocompromised patient - improved               -s/p bedside debridement 8/15    # Rheumatoid arthritis, on 20 mg prednisone daily, methotrexate, HCQ    # Adrenal insufficiency - Endocrine following; steroids  # Neutropenia - resolved      PLAN:    -  discontinue linezolid  -  start IV daptomycin  -  BCx  -  Follow fever curve, wbc - on Prednisone  -  Reviewed labs, micro, imaging reports, available old records  -  d/w patient, family, staff    History of Present Illness:  Sheri Garzon is a a(n) 64 year old female. 64 year old female with a history of rheumatoid arthritis on chronic 20 mg prednisone daily, methotrexate, HCQ, with recent [facility] admission - with vomiting s/p EGD with small hiatal hernia, who presented to [facility] ED on  with worsening pain in her right foot. Has had a wound for the last month and a half that has not healed. Was on ciprofloxacin last month. No fevers or chills at home. On arrival, afebrile, wbc 4.5, blood cx obtained, XR R foot with soft tissue swelling, started on vancomycin. Underwent bedside I&D during admission. Discharged on linezolid. Now back with weakness and difficulty walking because of this. Denies fevers, chills, n/v/d, rash.    History:  Past Medical History:    Allergic rhinitis    Anxiety    Arthritis    RA and Osteoarthritis    Asthma (HCC)    Depression    Not officially diagnosed    Esophageal reflux    Essential hypertension    High blood pressure    High cholesterol    Hyperlipidemia     Myocardial infarction (HCC)    Cardiac event    Osteoarthritis    Pancreatitis (HCC)    Problems with swallowing    RA (rheumatoid arthritis) (HCC)    Sleep apnea     Past Surgical History:   Procedure Laterality Date    Colonoscopy      Other surgical history      Revision of uterine anomaly      Rotator cuff repair      Wrist fracture surgery       Family History   Problem Relation Age of Onset    Diabetes Mother     Genetic Disease Mother     Heart Disorder Mother     Hypertension Mother     Obesity Mother     Diabetes Father     Hypertension Father     Depression Daughter     Psychiatric Daughter       reports that she has never smoked. She has never used smokeless tobacco. She reports that she does not currently use alcohol. She reports that she does not use drugs.    Allergies:  Allergies   Allergen Reactions    Cephalosporins ANAPHYLAXIS, NAUSEA AND VOMITING and OTHER (SEE COMMENTS)     Other reaction(s): Fever  Pt received multiple doses of ceftriaxone on 7/2024 without any complcations    Gadolinium Derivatives FEVER    Penicillins OTHER (SEE COMMENTS)     esophagitis    Sulfa Antibiotics OTHER (SEE COMMENTS)     esophagitis       Medications:    Current Facility-Administered Medications:     heparin (Porcine) 5000 UNIT/ML injection 5,000 Units, 5,000 Units, Subcutaneous, 2 times per day    acetaminophen (Tylenol Extra Strength) tab 500 mg, 500 mg, Oral, Q4H PRN    ondansetron (Zofran) 4 MG/2ML injection 4 mg, 4 mg, Intravenous, Q6H PRN    prochlorperazine (Compazine) 10 MG/2ML injection 5 mg, 5 mg, Intravenous, Q8H PRN    temazepam (Restoril) cap 15 mg, 15 mg, Oral, Nightly PRN    acetaminophen (Tylenol) tab 650 mg, 650 mg, Oral, Q4H PRN **OR** HYDROcodone-acetaminophen (Norco) 5-325 MG per tab 1 tablet, 1 tablet, Oral, Q4H PRN **OR** HYDROcodone-acetaminophen (Norco) 5-325 MG per tab 2 tablet, 2 tablet, Oral, Q4H PRN    furosemide (Lasix) 10 mg/mL injection 40 mg, 40 mg, Intravenous, BID (Diuretic)     linezolid (Zyvox) tab 600 mg, 600 mg, Oral, BID    clopidogrel (Plavix) tab 75 mg, 75 mg, Oral, Daily    folic acid (Folvite) tab 1 mg, 1 mg, Oral, Daily    ferrous sulfate DR tab 325 mg, 325 mg, Oral, Daily with breakfast    losartan (Cozaar) tab 50 mg, 50 mg, Oral, Daily    oxybutynin ER (Ditropan-XL) 24 hr tab 10 mg, 10 mg, Oral, Daily    metoprolol tartrate (Lopressor) tab 50 mg, 50 mg, Oral, BID    hydroxychloroquine (Plaquenil) tab 200 mg, 200 mg, Oral, BID    aspirin chewable tab 81 mg, 81 mg, Oral, Daily    collagenase (Santyl) 250 UNIT/GM ointment, , Topical, Daily    sodium bicarbonate tab 650 mg, 650 mg, Oral, BID    dextrose 50% injection 50 mL, 50 mL, Intravenous, PRN    predniSONE (Deltasone) tab 20 mg, 20 mg, Oral, Daily    glucose (Dex4) 15 GM/59ML oral liquid 15 g, 15 g, Oral, Q15 Min PRN **OR** glucose (Glutose) 40% oral gel 15 g, 15 g, Oral, Q15 Min PRN **OR** glucose-vitamin C (Dex-4) chewable tab 4 tablet, 4 tablet, Oral, Q15 Min PRN **OR** dextrose 50% injection 50 mL, 50 mL, Intravenous, Q15 Min PRN **OR** glucose (Dex4) 15 GM/59ML oral liquid 30 g, 30 g, Oral, Q15 Min PRN **OR** glucose (Glutose) 40% oral gel 30 g, 30 g, Oral, Q15 Min PRN **OR** glucose-vitamin C (Dex-4) chewable tab 8 tablet, 8 tablet, Oral, Q15 Min PRN    alum-mag hydroxide-simethicone (Maalox) 200-200-20 MG/5ML oral suspension 30 mL, 30 mL, Oral, QID PRN    Review of Systems:  Per HPI    Physical Exam:  Vital signs: Blood pressure 143/88, pulse 113, temperature 98 °F (36.7 °C), temperature source Oral, resp. rate 18, weight 193 lb 5 oz (87.7 kg), SpO2 98%.    General: awake, in chair, nad  HEENT: Moist mucous membranes. EOMI  Neck: No lymphadenopathy.  Supple.  Cardiovascular: No chest wall tenderness  Respiratory: Symmetric expansion  Abdomen: Soft, nontender, nondistended.   Musculoskeletal: +BLE edema  Integument: R calf wound c/d/I; no erythema, warmth. Minimal ttp    Laboratory Data: Reviewed in EMR    Microbiology:  Reviewed in EMR    Radiology: Reviewed    Thank you for allowing us to participate in the care of this patient. Please do not hesitate to call if you have any questions.     We will continue to follow with you and will make further recommendations based on his progress.    Yaw Yao MD   [facility]  [phone]  8/27/2024

## 2024-08-27 NOTE — CONSULTS
[facility]  part of [facility]    Report of Consultation    Sheri Garzon Patient Status:  Inpatient    1960 MRN [id number]   Location [facility]5W Attending Michael Bhatt MD   Hosp Day # 0 PCP TERI MCKENNA MD     Date of Admission:  2024   DOS is the same date the note was signed   Consulted by Michael Bhatt MD  Reason for Consultation:   Adrenal insufficiency , iatrogenic, chronic steroid use     History of Present Illness:   Patient is a 64 year old female who was admitted to the hospital for Weakness generalized:     She was admitted for weakness . No fall or syncope  She has adrenal insufficiency , iatrogenic, d/t chronic steroid use   She denied missing prednisone doses.   Denied hypoglycemia at home     She was on prednisone 20 mg QAM and 10 mg QPM after discharge few days ago.     Pt was on prednisone 10 mg for yrs.         Past Medical History  Past Medical History:    Allergic rhinitis    Anxiety    Arthritis    RA and Osteoarthritis    Asthma (HCC)    Depression    Not officially diagnosed    Esophageal reflux    Essential hypertension    High blood pressure    High cholesterol    Hyperlipidemia    Myocardial infarction (HCC)    Cardiac event    Osteoarthritis    Pancreatitis (HCC)    Problems with swallowing    RA (rheumatoid arthritis) (HCC)    Sleep apnea       Past Surgical History  Past Surgical History:   Procedure Laterality Date    Colonoscopy      Other surgical history      Revision of uterine anomaly      Rotator cuff repair      Wrist fracture surgery         Family History  Family History   Problem Relation Age of Onset    Diabetes Mother     Genetic Disease Mother     Heart Disorder Mother     Hypertension Mother     Obesity Mother     Diabetes Father     Hypertension Father     Depression Daughter     Psychiatric Daughter        Social History  Social History     Socioeconomic History    Marital status: Single   Tobacco Use    Smoking status: Never     Smokeless tobacco: Never   Vaping Use    Vaping status: Never Used   Substance and Sexual Activity    Alcohol use: Not Currently    Drug use: Never     Social Determinants of Health     Food Insecurity: No Food Insecurity (8/27/2024)    Food Insecurity     Food Insecurity: Never true   Transportation Needs: No Transportation Needs (8/27/2024)    Transportation Needs     Lack of Transportation: No   Housing Stability: Low Risk  (8/27/2024)    Housing Stability     Housing Instability: No           Current Medications:  Current Facility-Administered Medications   Medication Dose Route Frequency    heparin (Porcine) 5000 UNIT/ML injection 5,000 Units  5,000 Units Subcutaneous 2 times per day    acetaminophen (Tylenol Extra Strength) tab 500 mg  500 mg Oral Q4H PRN    ondansetron (Zofran) 4 MG/2ML injection 4 mg  4 mg Intravenous Q6H PRN    prochlorperazine (Compazine) 10 MG/2ML injection 5 mg  5 mg Intravenous Q8H PRN    temazepam (Restoril) cap 15 mg  15 mg Oral Nightly PRN    acetaminophen (Tylenol) tab 650 mg  650 mg Oral Q4H PRN    Or    HYDROcodone-acetaminophen (Norco) 5-325 MG per tab 1 tablet  1 tablet Oral Q4H PRN    Or    HYDROcodone-acetaminophen (Norco) 5-325 MG per tab 2 tablet  2 tablet Oral Q4H PRN    furosemide (Lasix) 10 mg/mL injection 40 mg  40 mg Intravenous BID (Diuretic)    clopidogrel (Plavix) tab 75 mg  75 mg Oral Daily    folic acid (Folvite) tab 1 mg  1 mg Oral Daily    ferrous sulfate DR tab 325 mg  325 mg Oral Daily with breakfast    losartan (Cozaar) tab 50 mg  50 mg Oral Daily    oxybutynin ER (Ditropan-XL) 24 hr tab 10 mg  10 mg Oral Daily    metoprolol tartrate (Lopressor) tab 50 mg  50 mg Oral BID    hydroxychloroquine (Plaquenil) tab 200 mg  200 mg Oral BID    aspirin chewable tab 81 mg  81 mg Oral Daily    collagenase (Santyl) 250 UNIT/GM ointment   Topical Daily    sodium bicarbonate tab 650 mg  650 mg Oral BID    dextrose 50% injection 50 mL  50 mL Intravenous PRN    predniSONE  (Deltasone) tab 20 mg  20 mg Oral Daily    glucose (Dex4) 15 GM/59ML oral liquid 15 g  15 g Oral Q15 Min PRN    Or    glucose (Glutose) 40% oral gel 15 g  15 g Oral Q15 Min PRN    Or    glucose-vitamin C (Dex-4) chewable tab 4 tablet  4 tablet Oral Q15 Min PRN    Or    dextrose 50% injection 50 mL  50 mL Intravenous Q15 Min PRN    Or    glucose (Dex4) 15 GM/59ML oral liquid 30 g  30 g Oral Q15 Min PRN    Or    glucose (Glutose) 40% oral gel 30 g  30 g Oral Q15 Min PRN    Or    glucose-vitamin C (Dex-4) chewable tab 8 tablet  8 tablet Oral Q15 Min PRN    alum-mag hydroxide-simethicone (Maalox) 200-200-20 MG/5ML oral suspension 30 mL  30 mL Oral QID PRN    DAPTOmycin (Cubicin) 400 mg in sodium chloride 0.9% PF IV syringe  6 mg/kg (Adjusted) Intravenous Q24H     Medications Prior to Admission   Medication Sig    linezolid 600 MG Oral Tab Take 1 tablet (600 mg total) by mouth 2 (two) times daily. For 14 days.    metoprolol tartrate 50 MG Oral Tab Take 1 tablet (50 mg total) by mouth 2 (two) times daily.    Acetaminophen ER (ACETAMINOPHEN 8 HOUR) 650 MG Oral Tab CR Take 2-3 tablets (1,300-1,950 mg total) by mouth every 8 (eight) hours as needed for Pain.    ibuprofen 200 MG Oral Tab Take 3 tablets (600 mg total) by mouth every 8 (eight) hours as needed for Pain.    Ferrous Gluconate 324 (38 Fe) MG Oral Tab Take 1 tablet (325 mg total) by mouth daily with breakfast.    predniSONE 10 MG Oral Tab 10 mg po two times a day for 3 days followed by 10 mg PO daily therafter.    albuterol 108 (90 Base) MCG/ACT Inhalation Aero Soln Inhale 2 puffs into the lungs every 4 to 6 hours as needed for Wheezing.    losartan 50 MG Oral Tab Take 1 tablet (50 mg total) by mouth daily.    hydroxychloroquine 200 MG Oral Tab Take 1 tablet (200 mg total) by mouth 2 (two) times daily.    amLODIPine 5 MG Oral Tab Take 1 tablet (5 mg total) by mouth daily.    Aspirin 81 MG Oral Cap Take 81 mg by mouth daily.    atorvastatin 10 MG Oral Tab Take 1  tablet (10 mg total) by mouth daily.    clopidogrel 75 MG Oral Tab Take 1 tablet (75 mg total) by mouth daily.    oxybutynin ER 10 MG Oral Tablet 24 Hr Take 1 tablet (10 mg total) by mouth daily.    Potassium Chloride ER 10 MEQ Oral Tab CR Take 1 tablet (10 mEq total) by mouth 2 (two) times daily.    folic acid 1 MG Oral Tab Take 1 tablet (1 mg total) by mouth daily.    ergocalciferol 1.25 MG (92184 UT) Oral Cap Take 1 capsule (50,000 Units total) by mouth once a week.    methotrexate 2.5 MG Oral Tab Take 1 tablet (2.5 mg total) by mouth once a week. (Patient not taking: Reported on 8/26/2024)       Allergies  Allergies   Allergen Reactions    Cephalosporins ANAPHYLAXIS, NAUSEA AND VOMITING and OTHER (SEE COMMENTS)     Other reaction(s): Fever  Pt received multiple doses of ceftriaxone on 7/2024 without any complcations    Gadolinium Derivatives FEVER    Penicillins OTHER (SEE COMMENTS)     esophagitis    Sulfa Antibiotics OTHER (SEE COMMENTS)     esophagitis                   Review of Systems:   All other systems are negative other than HPI    Physical Exam:   Vital Signs:  Blood pressure 126/81, pulse 76, temperature 98.2 °F (36.8 °C), temperature source Oral, resp. rate 18, weight 193 lb 5 oz (87.7 kg), SpO2 100%.     General: Awake and alert.    HENT: Eye: EOMI, normal lids, no discharge,     Neck: full range of motion  Neck/Thyroid: neck inspection: normal, No scar, No goiter   Lungs: No acute respiratory distress, non-labored respiration. Speaking full sentences  Abdomen:  nontender  MSK: Moves extremities spontaneously. full range of motion in all major joints  Neuro:speech is clear. Awake, alert, no aphasia, no facial asymmetry,   Psych: Orientated to time, place, person & situation,   Skin: Skin is dry, no obvious rashes or lesions      Results:     Laboratory Data:  No results found for: \"A1C\"     Lab Results   Component Value Date    WBC 14.4 (H) 08/27/2024    HGB 9.3 (L) 08/27/2024    HCT 29.3 (L)  08/27/2024    .0 08/27/2024    CREATSERUM 0.88 08/27/2024    BUN 14 08/27/2024     08/27/2024    K 4.0 08/27/2024     (H) 08/27/2024    CO2 17.0 (L) 08/27/2024    GLU 62 (L) 08/27/2024    CA 8.3 (L) 08/27/2024    ALB 3.1 (L) 08/26/2024    ALKPHO 100 08/26/2024    TP 5.3 (L) 08/26/2024    AST 58 (H) 08/26/2024    ALT 49 08/26/2024    LIP 34 07/21/2024    DDIMER 1.69 (H) 07/23/2024    MG 1.9 08/22/2024    PHOS 2.9 08/21/2024    CK 50 08/27/2024    B12 748 08/21/2024         Imaging:  No results found.       Impression:     Patient Active Problem List   Diagnosis    Dehydration    Metabolic acidosis    Bilious vomiting with nausea    Difficult intravenous access    Hypokalemia    Hypomagnesemia    Hiatal hernia    Cellulitis of right lower extremity    Cellulitis    RTA (renal tubular acidosis)    Adrenal insufficiency (Chili's disease) (HCC)    Current chronic use of systemic steroids    Neutropenia (HCC)    Anemia of infection    Weakness generalized    Leg edema       Adrenal insufficiency , iatrogenic, chronic steroid use   Pt was on prednisone 10 mg for yrs.   She will be on prednisone 20 mg a day starting 8/27/2024 and will taper as tolerated to her dose.  She understands the need for stress dose for acute illness or procedure if needed. She is aware of her dx of adrenal insufficiency      Thank you for allowing me to participate in the care of your patient.  D/w pt, Rn and primary team     Mich Leigh MD  8/27/2024     normal... Oriented - self; Oriented - place; Oriented - time

## 2024-08-27 NOTE — PHYSICAL THERAPY NOTE
PHYSICAL THERAPY EVALUATION - INPATIENT     Room Number: 521/521-A  Evaluation Date: 8/27/2024  Type of Evaluation: Initial   Physician Order: PT Eval and Treat    Presenting Problem: Weakness, fall  Co-Morbidities : HTN, rheumatoid arthritis, asthma, NIKOLAI, obesity, HTN, anxiety, depression, CAD, PAD, R rotator cuff repair, R distal calf wound debridement  Reason for Therapy: Mobility Dysfunction and Discharge Planning    PHYSICAL THERAPY ASSESSMENT   Patient is a 64 year old female admitted 8/26/2024 for weakness, fall.  Prior to admission, patient's baseline is Hyacinth using a cane, has been recently needing to use rolling walker with decline over past 3 weeks.  Patient is currently functioning below baseline with bed mobility, transfers, gait, and stair negotiation.  Patient is requiring minimal assist as a result of the following impairments: decreased functional strength, decreased endurance/aerobic capacity, impaired standing dynamic balance, impaired motor planning, decreased muscular endurance, cognitive deficits (decreased insight, decreased safety awareness), and medical status.  Physical Therapy will continue to follow for duration of hospitalization.    Patient will benefit from continued skilled PT Services to promote return to prior level of function and safety with continuous assistance and gradual rehabilitative therapy .    PLAN  PT Treatment Plan: Bed mobility;Body mechanics;Endurance;Energy conservation;Patient education;Family education;Gait training;Range of motion;Strengthening;Stair training;Transfer training;Balance training  Rehab Potential : Good  Frequency (Obs): 3-5x/week    PHYSICAL THERAPY MEDICAL/SOCIAL HISTORY   History related to current admission: Admitted 7/2024 and 8/2024 for flank pain and RLE cellulitis, PT not consulted, D/C'ed home on ABX     Problem List  Principal Problem:    Weakness generalized  Active Problems:    Leg edema      HOME SITUATION  Home Situation  Type of Home:  House  Home Layout: Multi-level  Stairs to Enter : 5  Railing: Yes  Stairs to Bedroom: 6  Railing: Yes  Lives With: Daughter;Family (sister)  Drives: Yes  Patient Owned Equipment: Rolling walker;Cane  Patient Regularly Uses: Reading glasses     Prior Level of [place]: Hyacinth with straight cane, decline in function over past 3 weeks requiring increased assist with mobility, using rolling walker     SUBJECTIVE  \"I can't push up to stand up, see?\"    PHYSICAL THERAPY EXAMINATION   OBJECTIVE  Precautions: Bed/chair alarm  Fall Risk: High fall risk    WEIGHT BEARING RESTRICTION  Weight Bearing Restriction: None                PAIN ASSESSMENT  Rating: Unable to rate  Location: buttocks, BLEs  Management Techniques: Body mechanics;Repositioning;Activity promotion    COGNITION  Orientation Level:  oriented x4  Memory:  decreased recall of biographical information and decreased recall of recent events  Motor Planning: impaired  Safety Judgement:  decreased awareness of need for safety    RANGE OF MOTION AND STRENGTH ASSESSMENT  Upper extremity ROM and strength are within functional limits  BUEs  Lower extremity ROM is within functional limits  BLEs  Lower extremity strength is functionally limited at hip extensors requiring increased assist for sit-to-stand transfers and maintaining stanidng    BALANCE  Static Sitting: Good  Dynamic Sitting: Fair +  Static Standing: Fair  Dynamic Standing: Fair -    ADDITIONAL TESTS                            Edema: Deep pitting, indentation remains for a short time  Edema Location: BLEs L>R    ACTIVITY TOLERANCE  Pulse: 115                      O2 WALK  Oxygen Therapy  SPO2% on Room Air at Rest:  (unable to get reading)    AM-PAC '6-Clicks' INPATIENT SHORT FORM - BASIC MOBILITY  How much difficulty does the patient currently have...  Patient Difficulty: Turning over in bed (including adjusting bedclothes, sheets and blankets)?: A Little   Patient Difficulty: Sitting down on and standing  up from a chair with arms (e.g., wheelchair, bedside commode, etc.): A Little   Patient Difficulty: Moving from lying on back to sitting on the side of the bed?: A Little   How much help from another person does the patient currently need...   Help from Another: Moving to and from a bed to a chair (including a wheelchair)?: A Little   Help from Another: Need to walk in hospital room?: A Lot   Help from Another: Climbing 3-5 steps with a railing?: Total     AM-PAC Score:  Raw Score: 15   Approx Degree of Impairment: 57.7%   Standardized Score (AM-PAC Scale): 39.45   CMS Modifier (G-Code): CK    FUNCTIONAL ABILITY STATUS  Functional Mobility/Gait Assessment  Gait Assistance: Minimum assistance  Distance (ft): 3' bed>chair  Assistive Device: Rolling walker  Pattern: Shuffle (difficulty advancing BLEs, decreased hip extension, assist for managing walker, cues to fully turn and align self with chair before sitting)  Supine to Sit: minimal assist - head of bed elevated, assist at BLEs, trunk  Sit to Stand: minimal assist - from edge of bed     Exercise/Education Provided:  Bed mobility  Body mechanics  Energy conservation  Functional activity tolerated  Gait training  Posture  Transfer training    Skilled Therapy Provided: Pt providing conflicting PLOF information, demonstrates decreased insight and self limiting, fearful of attempting to get up without assistance. Pt demonstrates increased fall risk 2/2 impaired cognition, impaired body mechanics, and limited activity tolerance with recent fall. Pt reporting increased fatigue with short distance bed>chair, declining to attempt ambulation further away from bed/chair.    Patient seen for evaluation in coordination with occupational therapy to maximize patient safety and function given recent fall and decreased activity tolerance.  Patient received semi-fowlers in bed, agreeable to physical therapy evaluation. Vital signs monitored as noted above, patient experiencing  dizziness with transition to sitting, recovering with rest, unable to obtain accurate BP . Education with patient provided verbally on physical therapy plan of care, physiological benefits of out of bed mobility, and fall prevention. Next session anticipate to progress bed mobility, transfers, and gait.    Patient history and/or personal factors that may impact the plan of care include home accessibility concerns, history of falls, cognitive deficits, co-morbidities (HTN, rheumatoid arthritis, asthma, NIKOLAI, obesity, HTN, anxiety, depression, CAD, PAD, R rotator cuff repair, R distal calf wound debridement) affecting pain levels, endurance, medical status, activity tolerance, and has history of recent hospitalization. Based on the physical therapy examination of the noted systems and functional activity/participation limitations, the patient presentation is evolving given the patient demonstrates worsening of previously stable condition, demonstrates worsening of co-morbidities, and demonstrates cognitive skills affecting safety.      The patient's Approx Degree of Impairment: 57.7% has been calculated based on documentation in the [facility] '6 clicks' Inpatient Basic Mobility Short Form.  Research supports that patients with this level of impairment may benefit from a rehab facility.  Final disposition will be made by interdisciplinary medical team.    Patient End of Session: Up in chair;Needs met;Call light within reach;RN aware of session/findings;All patient questions and concerns addressed;Alarm set    CURRENT GOALS  Goals to be met by: 9/17/24  Patient Goal Patient's self-stated goal is: fall prevention   Goal #1 Patient is able to demonstrate supine - sit EOB @ level: supervision     Goal #1   Current Status    Goal #2 Patient is able to demonstrate transfers EOB to/from [facility] at assistance level: contact guard assistance with walker - rolling     Goal #2  Current Status    Goal #3 Patient is able to ambulate 30 feet  with assist device: walker - rolling at assistance level: contact guard assistance   Goal #3   Current Status    Goal #4 Patient will negotiate 5 stairs/one curb w/ assistive device and minimum assistance   Goal #4   Current Status    Goal #5 Patient to demonstrate independence with home activity/exercise instructions provided to patient in preparation for discharge.   Goal #5   Current Status    Goal #6    Goal #6  Current Status      Patient Evaluation Complexity Level:  History High - 3 or more personal factors and/or co-morbidities   Examination of body systems Moderate - addressing a total of 3 or more elements   Clinical Presentation  Moderate - Evolving   Clinical Decision Making  Moderate Complexity       Therapeutic Activity:  15 minutes      Calli Waller, PT, DPT  [facility]  Rehab Services - Physical Therapy  [id number]

## 2024-08-27 NOTE — PLAN OF CARE
Problem: CARDIOVASCULAR - ADULT  Goal: Maintains optimal cardiac output and hemodynamic stability  Description: INTERVENTIONS:  - Monitor vital signs, rhythm, and trends  - Monitor for bleeding, hypotension and signs of decreased cardiac output  - Evaluate effectiveness of vasoactive medications to optimize hemodynamic stability  - Monitor arterial and/or venous puncture sites for bleeding and/or hematoma  - Assess quality of pulses, skin color and temperature  - Assess for signs of decreased coronary artery perfusion - ex. Angina  - Evaluate fluid balance, assess for edema, trend weights  Outcome: Progressing  Goal: Absence of cardiac arrhythmias or at baseline  Description: INTERVENTIONS:  - Continuous cardiac monitoring, monitor vital signs, obtain 12 lead EKG if indicated  - Evaluate effectiveness of antiarrhythmic and heart rate control medications as ordered  - Initiate emergency measures for life threatening arrhythmias  - Monitor electrolytes and administer replacement therapy as ordered  Outcome: Progressing     Problem: RESPIRATORY - ADULT  Goal: Achieves optimal ventilation and oxygenation  Description: INTERVENTIONS:  - Assess for changes in respiratory status  - Assess for changes in mentation and behavior  - Position to facilitate oxygenation and minimize respiratory effort  - Oxygen supplementation based on oxygen saturation or ABGs  - Provide Smoking Cessation handout, if applicable  - Encourage broncho-pulmonary hygiene including cough, deep breathe, Incentive Spirometry  - Assess the need for suctioning and perform as needed  - Assess and instruct to report SOB or any respiratory difficulty  - Respiratory Therapy support as indicated  - Manage/alleviate anxiety  - Monitor for signs/symptoms of CO2 retention  Outcome: Progressing     Problem: METABOLIC/FLUID AND ELECTROLYTES - ADULT  Goal: Electrolytes maintained within normal limits  Description: INTERVENTIONS:  - Monitor labs and rhythm and  assess patient for signs and symptoms of electrolyte imbalances  - Administer electrolyte replacement as ordered  - Monitor response to electrolyte replacements, including rhythm and repeat lab results as appropriate  - Fluid restriction as ordered  - Instruct patient on fluid and nutrition restrictions as appropriate  Outcome: Progressing     Problem: SKIN/TISSUE INTEGRITY - ADULT  Goal: Oral mucous membranes remain intact  Description: INTERVENTIONS  - Assess oral mucosa and hygiene practices  - Implement preventative oral hygiene regimen  - Implement oral medicated treatments as ordered  Outcome: Progressing     Problem: MUSCULOSKELETAL - ADULT  Goal: Return mobility to safest level of function  Description: INTERVENTIONS:  - Assess patient stability and activity tolerance for standing, transferring and ambulating w/ or w/o assistive devices  - Assist with transfers and ambulation using safe patient handling equipment as needed  - Ensure adequate protection for wounds/incisions during mobilization  - Obtain PT/OT consults as needed  - Advance activity as appropriate  - Communicate ordered activity level and limitations with patient/family  Outcome: Progressing     Problem: METABOLIC/FLUID AND ELECTROLYTES - ADULT  Goal: Glucose maintained within prescribed range  Description: INTERVENTIONS:  - Monitor Blood Glucose as ordered  - Assess for signs and symptoms of hyperglycemia and hypoglycemia  - Administer ordered medications to maintain glucose within target range  - Assess barriers to adequate nutritional intake and initiate nutrition consult as needed  - Instruct patient on self management of diabetes  Outcome: Not Progressing     Patient on room air and remote telemetry monitoring. PRN medications given per MAR for pain in right foot. Up to chair with PT and OT today.   Patient's blood sugar was 39 this am, repeat 89 after treatment. Dr. Bhatt notified and aware.   Safety precautions in place, frequent nursing  rounding completed, call light within reach. All questions answered and needs met.

## 2024-08-27 NOTE — RESPIRATORY THERAPY NOTE
NIKOLAI ASSESSMENT:    Pt does have a previous diagnosis of NIKOLAI. Pt does not routinely use a CPAP device at home.   CPAP INITIATION:    Pt to be placed on CPAP: no  Pt refused: yes    Patient uses cpap at home 10 years ago, now no cpap and no problem sleeping and refused to use at this admission

## 2024-08-28 LAB
ANION GAP SERPL CALC-SCNC: 10 MMOL/L (ref 0–18)
BASOPHILS # BLD AUTO: 0.02 X10(3) UL (ref 0–0.2)
BASOPHILS NFR BLD AUTO: 0.1 %
BUN BLD-MCNC: 13 MG/DL (ref 9–23)
BUN/CREAT SERPL: 16.7 (ref 10–20)
CALCIUM BLD-MCNC: 8.4 MG/DL (ref 8.7–10.4)
CHLORIDE SERPL-SCNC: 108 MMOL/L (ref 98–112)
CO2 SERPL-SCNC: 21 MMOL/L (ref 21–32)
COPPER: 79 UG/DL
CREAT BLD-MCNC: 0.78 MG/DL
DEPRECATED RDW RBC AUTO: 58.8 FL (ref 35.1–46.3)
EGFRCR SERPLBLD CKD-EPI 2021: 85 ML/MIN/1.73M2 (ref 60–?)
EOSINOPHIL # BLD AUTO: 0 X10(3) UL (ref 0–0.7)
EOSINOPHIL NFR BLD AUTO: 0 %
ERYTHROCYTE [DISTWIDTH] IN BLOOD BY AUTOMATED COUNT: 18.8 % (ref 11–15)
GLUCOSE BLD-MCNC: 101 MG/DL (ref 70–99)
GLUCOSE BLDC GLUCOMTR-MCNC: 103 MG/DL (ref 70–99)
GLUCOSE BLDC GLUCOMTR-MCNC: 44 MG/DL (ref 70–99)
GLUCOSE BLDC GLUCOMTR-MCNC: 56 MG/DL (ref 70–99)
HCT VFR BLD AUTO: 30.3 %
HGB BLD-MCNC: 9.8 G/DL
IMM GRANULOCYTES # BLD AUTO: 0.24 X10(3) UL (ref 0–1)
IMM GRANULOCYTES NFR BLD: 1.7 %
LYMPHOCYTES # BLD AUTO: 1.09 X10(3) UL (ref 1–4)
LYMPHOCYTES NFR BLD AUTO: 7.7 %
MCH RBC QN AUTO: 28.9 PG (ref 26–34)
MCHC RBC AUTO-ENTMCNC: 32.3 G/DL (ref 31–37)
MCV RBC AUTO: 89.4 FL
MONOCYTES # BLD AUTO: 0.57 X10(3) UL (ref 0.1–1)
MONOCYTES NFR BLD AUTO: 4 %
NEUTROPHILS # BLD AUTO: 12.28 X10 (3) UL (ref 1.5–7.7)
NEUTROPHILS # BLD AUTO: 12.28 X10(3) UL (ref 1.5–7.7)
NEUTROPHILS NFR BLD AUTO: 86.5 %
OSMOLALITY SERPL CALC.SUM OF ELEC: 288 MOSM/KG (ref 275–295)
PLATELET # BLD AUTO: 357 10(3)UL (ref 150–450)
POTASSIUM SERPL-SCNC: 3.6 MMOL/L (ref 3.5–5.1)
RBC # BLD AUTO: 3.39 X10(6)UL
SODIUM SERPL-SCNC: 139 MMOL/L (ref 136–145)
WBC # BLD AUTO: 14.2 X10(3) UL (ref 4–11)

## 2024-08-28 PROCEDURE — 99233 SBSQ HOSP IP/OBS HIGH 50: CPT | Performed by: INTERNAL MEDICINE

## 2024-08-28 PROCEDURE — 99233 SBSQ HOSP IP/OBS HIGH 50: CPT | Performed by: HOSPITALIST

## 2024-08-28 NOTE — PROGRESS NOTES
[facility]  part of [facility]    Progress Note    Sheri Garzon Patient Status:  Observation    1960 MRN [id number]   Location [facility]5W Attending Janes Whalen MD   Hosp Day # 0 PCP TERI MCKENNA MD       Subjective:     Pt hypoglycemic again this morning.    Objective:   Blood pressure 101/62, pulse 90, temperature 98 °F (36.7 °C), temperature source Oral, resp. rate 17, weight 193 lb 5 oz (87.7 kg), SpO2 96%.    Gen:   NAD.  A and O x 3  CV:   RRR, no m/g/r  Pulm:   CTA bilat  Abd:   +bs, soft, NT, ND  LE:   No c/c/e.  Leg wounds with bandages.  Neuro:   nonfocal    Results:     Lab Results   Component Value Date    WBC 14.4 (H) 2024    HGB 9.3 (L) 2024    HCT 29.3 (L) 2024    .0 2024    CREATSERUM 0.88 2024    BUN 14 2024     2024    K 4.0 2024     (H) 2024    CO2 17.0 (L) 2024    GLU 62 (L) 2024    CA 8.3 (L) 2024    ALB 3.1 (L) 2024    ALKPHO 100 2024    BILT 0.4 2024    TP 5.3 (L) 2024    AST 58 (H) 2024    ALT 49 2024    LIP 34 2024    DDIMER 1.69 (H) 2024    MG 1.9 2024    PHOS 2.9 2024    CK 50 2024    B12 748 2024       No results found.        Assessment and Plan:     Musculoskeletal deconditioning related to recent hospitalization  - PT/OT eval  - SW on consult for SNF placement    Hypoglycemia  - endocrine on consult     RLE cellulitis - improved  Leukocytosis  - s/p I&D on 8/15  - on linezolid per ID     RA  - cont hydroxychloroquine  - cont prednisone 10mg daily     H/o adrenal insufficiency  - monitor BP  - cont prednisone     Metabolic acidosis, non-ion gap  - was on sodium bicarb PO previously, continue this     Anemia of chronic disease  - monitor  - no overt bleed  - cont iron PO     LE edema  - cont lasix IV  - recent echo in July - normal EF, G1DD     Sinus tachycardia  - cont metoprolol  and monitor tele     dvt proph:    heparin    Code status:    Full    MDM:    High Janes Hernandez MD  8/28/2024

## 2024-08-28 NOTE — PROGRESS NOTES
INFECTIOUS DISEASE PROGRESS NOTE  [facility]  part of [facility] ID PROGRESS NOTE    Sheri Garzon Patient Status:  Observation    1960 MRN [id number]   Location [facility]5W Attending Janes Whalen MD   Hosp Day # 0 PCP TERI MCKENNA MD     Subjective:  ROS reviewed. Hypoglycemic again this AM.    ASSESSMENT:    Antibiotics: IV Daptomycin; (Linezolid)     # Leukocytosis - possible reactive to hypoglycemia, BM recovery; r/o bacteremia     # RLE cellulitis with posterior leg wound in immunocompromised patient - improved               -s/p bedside debridement 8/15     # Rheumatoid arthritis, on 20 mg prednisone daily, methotrexate, HCQ     # Adrenal insufficiency - Endocrine following; steroids  # Neutropenia - resolved        PLAN:  -  Continue on IV daptomycin until .  -  Follow fever curve, wbc - on Prednisone  -  Reviewed labs, micro, imaging reports, available old records  -  d/w patient, RN.     History of Present Illness:  Sheri Garzon is a a(n) 64 year old female. 64 year old female with a history of rheumatoid arthritis on chronic 20 mg prednisone daily, methotrexate, HCQ, with recent [facility] admission - with vomiting s/p EGD with small hiatal hernia, who presented to [facility] ED on  with worsening pain in her right foot. Has had a wound for the last month and a half that has not healed. Was on ciprofloxacin last month. No fevers or chills at home. On arrival, afebrile, wbc 4.5, blood cx obtained, XR R foot with soft tissue swelling, started on vancomycin. Underwent bedside I&D during admission. Discharged on linezolid. Now back with weakness and difficulty walking because of this. Denies fevers, chills, n/v/d, rash.       Physical Exam:  BP 90/64 (BP Location: Right arm)   Pulse 72   Temp 98.1 °F (36.7 °C) (Oral)   Resp 18   Wt 193 lb 5 oz (87.7 kg)   SpO2 99%   BMI 35.36 kg/m²     Gen:   Awake, in bed  HEENT:  EOMI, neck  supple  CV/lungs:  RRR, CTAB  Abdom:  Soft, NT/ND, +BS  Skin/extrem:  BLE edema, R posterior calf wound without surrounding erythema  Lines:  PIV+    Laboratory Data: Reviewed    Microbiology: Reviewed    Radiology: Reviewed      [facility]  [phone]  8/28/2024

## 2024-08-28 NOTE — PROGRESS NOTES
[facility]  part of [facility]    Progress Note    Sheri Garzon Patient Status:  Observation    1960 MRN [id number]   Location [facility]5W Attending Janes Whalen MD   Hosp Day # 0 PCP TERI MCKENNA MD     Subjective:   Sheri Garzon is a(n) 64 year old female      D/w pt and family at bedside   D/w wound Rn     Objective:   Vital Signs:  Blood pressure 101/62, pulse 90, temperature 98 °F (36.7 °C), temperature source Oral, resp. rate 17, weight 193 lb 5 oz (87.7 kg), SpO2 96%.                    General: Awake and alert.    HENT: Eye: EOMI,    Neck/Thyroid: neck inspection: normal   Lungs:  Speaking full sentences  MSK: Moves extremities spontaneously.    Neuro:speech is clear.   Skin: Skin is dry       Assessment and Plan:     Patient Active Problem List   Diagnosis    Dehydration    Metabolic acidosis    Bilious vomiting with nausea    Difficult intravenous access    Hypokalemia    Hypomagnesemia    Hiatal hernia    Cellulitis of right lower extremity    Cellulitis    RTA (renal tubular acidosis)    Adrenal insufficiency (Macon's disease) (HCC)    Current chronic use of systemic steroids    Neutropenia (HCC)    Anemia of infection    Weakness generalized    Leg edema    Adrenal insufficiency (HCC)        Adrenal insufficiency , iatrogenic d/t chronic steroid use   Pt was on prednisone 10 mg for years.   She will be on prednisone 20 mg a day starting 2024 and will taper as tolerated to her dose.  She understands the need for stress dose for acute illness or procedure if needed. She is aware of her dx of adrenal insufficiency       Thank you for allowing me to participate in the care of your patient.  D/w pt, Rn and pt's family     Results:     Lab Results   Component Value Date    WBC 14.4 (H) 2024    HGB 9.3 (L) 2024    HCT 29.3 (L) 2024    .0 2024    CREATSERUM 0.88 2024    BUN 14 2024     2024    K 4.0  08/27/2024     (H) 08/27/2024    CO2 17.0 (L) 08/27/2024    GLU 62 (L) 08/27/2024    CA 8.3 (L) 08/27/2024    ALB 3.1 (L) 08/26/2024    ALKPHO 100 08/26/2024    BILT 0.4 08/26/2024    TP 5.3 (L) 08/26/2024    AST 58 (H) 08/26/2024    ALT 49 08/26/2024    LIP 34 07/21/2024    DDIMER 1.69 (H) 07/23/2024    MG 1.9 08/22/2024    PHOS 2.9 08/21/2024    CK 50 08/27/2024    B12 748 08/21/2024       No results found.              Mich Leigh MD  8/28/2024        DOS is the same date the note was signed

## 2024-08-28 NOTE — PLAN OF CARE
Pt hypoglycemic, endo and attending notified. Followed protocol under verbal orders from endo MD to dc accucheck as pt is not symptomatic. Orders for accucheck prn. Pt BP low, MD aware no new orders. BC positive ID MD notified, IV abx starteed. Norco given for pain. Frequent rounding by staff    Problem: Patient Centered Care  Goal: Patient preferences are identified and integrated in the patient's plan of care  Description: Interventions:  - What would you like us to know as we care for you?   - Provide timely, complete, and accurate information to patient/family  - Incorporate patient and family knowledge, values, beliefs, and cultural backgrounds into the planning and delivery of care  - Encourage patient/family to participate in care and decision-making at the level they choose  - Honor patient and family perspectives and choices  Outcome: Progressing     Problem: Patient/Family Goals  Goal: Patient/Family Long Term Goal  Description: Patient's Long Term Goal:     Interventions:  - See additional Care Plan goals for specific interventions  Outcome: Progressing  Goal: Patient/Family Short Term Goal  Description: Patient's Short Term Goal:     Interventions:   - See additional Care Plan goals for specific interventions  Outcome: Progressing     Problem: CARDIOVASCULAR - ADULT  Goal: Maintains optimal cardiac output and hemodynamic stability  Description: INTERVENTIONS:  - Monitor vital signs, rhythm, and trends  - Monitor for bleeding, hypotension and signs of decreased cardiac output  - Evaluate effectiveness of vasoactive medications to optimize hemodynamic stability  - Monitor arterial and/or venous puncture sites for bleeding and/or hematoma  - Assess quality of pulses, skin color and temperature  - Assess for signs of decreased coronary artery perfusion - ex. Angina  - Evaluate fluid balance, assess for edema, trend weights  Outcome: Progressing  Goal: Absence of cardiac arrhythmias or at  baseline  Description: INTERVENTIONS:  - Continuous cardiac monitoring, monitor vital signs, obtain 12 lead EKG if indicated  - Evaluate effectiveness of antiarrhythmic and heart rate control medications as ordered  - Initiate emergency measures for life threatening arrhythmias  - Monitor electrolytes and administer replacement therapy as ordered  Outcome: Progressing     Problem: RESPIRATORY - ADULT  Goal: Achieves optimal ventilation and oxygenation  Description: INTERVENTIONS:  - Assess for changes in respiratory status  - Assess for changes in mentation and behavior  - Position to facilitate oxygenation and minimize respiratory effort  - Oxygen supplementation based on oxygen saturation or ABGs  - Provide Smoking Cessation handout, if applicable  - Encourage broncho-pulmonary hygiene including cough, deep breathe, Incentive Spirometry  - Assess the need for suctioning and perform as needed  - Assess and instruct to report SOB or any respiratory difficulty  - Respiratory Therapy support as indicated  - Manage/alleviate anxiety  - Monitor for signs/symptoms of CO2 retention  Outcome: Progressing     Problem: METABOLIC/FLUID AND ELECTROLYTES - ADULT  Goal: Electrolytes maintained within normal limits  Description: INTERVENTIONS:  - Monitor labs and rhythm and assess patient for signs and symptoms of electrolyte imbalances  - Administer electrolyte replacement as ordered  - Monitor response to electrolyte replacements, including rhythm and repeat lab results as appropriate  - Fluid restriction as ordered  - Instruct patient on fluid and nutrition restrictions as appropriate  Outcome: Progressing  Goal: Hemodynamic stability and optimal renal function maintained  Description: INTERVENTIONS:  - Monitor labs and assess for signs and symptoms of volume excess or deficit  - Monitor intake, output and patient weight  - Monitor urine specific gravity, serum osmolarity and serum sodium as indicated or ordered  - Monitor  response to interventions for patient's volume status, including labs, urine output, blood pressure (other measures as available)  - Encourage oral intake as appropriate  - Instruct patient on fluid and nutrition restrictions as appropriate  Outcome: Progressing     Problem: SKIN/TISSUE INTEGRITY - ADULT  Goal: Oral mucous membranes remain intact  Description: INTERVENTIONS  - Assess oral mucosa and hygiene practices  - Implement preventative oral hygiene regimen  - Implement oral medicated treatments as ordered  Outcome: Progressing     Problem: MUSCULOSKELETAL - ADULT  Goal: Return mobility to safest level of function  Description: INTERVENTIONS:  - Assess patient stability and activity tolerance for standing, transferring and ambulating w/ or w/o assistive devices  - Assist with transfers and ambulation using safe patient handling equipment as needed  - Ensure adequate protection for wounds/incisions during mobilization  - Obtain PT/OT consults as needed  - Advance activity as appropriate  - Communicate ordered activity level and limitations with patient/family  Outcome: Progressing  Goal: Maintain proper alignment of affected body part  Description: INTERVENTIONS:  - Support and protect limb and body alignment per provider's orders  - Instruct and reinforce with patient and family use of appropriate assistive device and precautions (e.g. spinal or hip dislocation precautions)  Outcome: Progressing     Problem: Impaired Functional Mobility  Goal: Achieve highest/safest level of mobility/gait  Description: Interventions:  - Assess patient's functional ability and stability  - Promote increasing activity/tolerance for mobility and gait  - Educate and engage patient/family in tolerated activity level and precautions  - Recommend use of sit-stand lift for transfers  Outcome: Progressing     Problem: Impaired Activities of Daily Living  Goal: Achieve highest/safest level of independence in self care  Description:  Interventions:  - Assess ability and encourage patient to participate in ADLs to maximize function  - Promote sitting position while performing ADLs such as feeding, grooming, and bathing  - Educate and encourage patient/family in tolerated functional activity level and precautions during self-care  - Provide support under elbow of weak side to prevent shoulder subluxation  Outcome: Progressing     Problem: METABOLIC/FLUID AND ELECTROLYTES - ADULT  Goal: Glucose maintained within prescribed range  Description: INTERVENTIONS:  - Monitor Blood Glucose as ordered  - Assess for signs and symptoms of hyperglycemia and hypoglycemia  - Administer ordered medications to maintain glucose within target range  - Assess barriers to adequate nutritional intake and initiate nutrition consult as needed  - Instruct patient on self management of diabetes  Outcome: Not Progressing     Problem: SKIN/TISSUE INTEGRITY - ADULT  Goal: Skin integrity remains intact  Description: INTERVENTIONS  - Assess and document risk factors for pressure ulcer development  - Assess and document skin integrity  - Monitor for areas of redness and/or skin breakdown  - Initiate interventions, skin care algorithm/standards of care as needed  Outcome: Not Progressing

## 2024-08-28 NOTE — CM/SW NOTE
08/28/24 1100   Choice of Post-Acute Provider   Informed patient of right to choose their preferred provider Yes   List of appropriate post-acute services provided to patient/family with quality data Yes   Patient/family choice [facility]   Information given to Patient;Daughter;Other (comment)     [facility] approved [facility].    CM provided S[facility] list to patient and daughter Rod at bedside - patient/family initial [facility] choice [facility].  CM received messaged from [facility] liaison stating facility is [facility] and patient would be responsible for 50% co-pay for services.    CM met with patient/family at bedside and explained above. Patient/family confirmed their second choice facility is [facility].  CM reserved [facility] via [facility], and notified liaison Graciela of choice.  Per Graciela, accepting facility will submit insurance authorization for SULTANA.    / to remain available for support and/or discharge planning.     [facility], pending insurance authorization, medical clearance    Natalie Liriano RN, BSN  Nurse   [phone]

## 2024-08-28 NOTE — CONGREGATE LIVING REVIEW
[facility] Living Authorization    The [facility] Review Committee has reviewed this case and the patient IS APPROVED for discharge to a facility for Short Term Skilled once the following procedure is followed:     - The physician discharge instructions (contained within the JUAN MIGUEL note for SNF) must inlcude the below appropriate and approved COVID instructions to the facility    For questions regarding [facility] approval process, please contact the CM assigned to the case.  For questions regarding RN discharge workflow, please contact the unit Clinical Leader.

## 2024-08-28 NOTE — PLAN OF CARE
Pt is a&Ox4 and q2turn SB pivot . Vitals signs stable. Remote tele  Pain controlled with meds. Frequent checks on rounds. Call light within reach, bed at lowest position and bed alarm in place. Non skid socks on.     Problem: Patient Centered Care  Goal: Patient preferences are identified and integrated in the patient's plan of care  Description: Interventions:  - What would you like us to know as we care for you? Home with fam  - Provide timely, complete, and accurate information to patient/family  - Incorporate patient and family knowledge, values, beliefs, and cultural backgrounds into the planning and delivery of care  - Encourage patient/family to participate in care and decision-making at the level they choose  - Honor patient and family perspectives and choices  Outcome: Progressing       Problem: RESPIRATORY - ADULT  Goal: Achieves optimal ventilation and oxygenation  Description: INTERVENTIONS:  - Assess for changes in respiratory status  - Assess for changes in mentation and behavior  - Position to facilitate oxygenation and minimize respiratory effort  - Oxygen supplementation based on oxygen saturation or ABGs  - Provide Smoking Cessation handout, if applicable  - Encourage broncho-pulmonary hygiene including cough, deep breathe, Incentive Spirometry  - Assess the need for suctioning and perform as needed  - Assess and instruct to report SOB or any respiratory difficulty  - Respiratory Therapy support as indicated  - Manage/alleviate anxiety  - Monitor for signs/symptoms of CO2 retention  Outcome: Progressing     Problem: METABOLIC/FLUID AND ELECTROLYTES - ADULT  Goal: Glucose maintained within prescribed range  Description: INTERVENTIONS:  - Monitor Blood Glucose as ordered  - Assess for signs and symptoms of hyperglycemia and hypoglycemia  - Administer ordered medications to maintain glucose within target range  - Assess barriers to adequate nutritional intake and initiate nutrition consult as  needed  - Instruct patient on self management of diabetes  Outcome: Progressing  Goal: Electrolytes maintained within normal limits  Description: INTERVENTIONS:  - Monitor labs and rhythm and assess patient for signs and symptoms of electrolyte imbalances  - Administer electrolyte replacement as ordered  - Monitor response to electrolyte replacements, including rhythm and repeat lab results as appropriate  - Fluid restriction as ordered  - Instruct patient on fluid and nutrition restrictions as appropriate  Outcome: Progressing  Goal: Hemodynamic stability and optimal renal function maintained  Description: INTERVENTIONS:  - Monitor labs and assess for signs and symptoms of volume excess or deficit  - Monitor intake, output and patient weight  - Monitor urine specific gravity, serum osmolarity and serum sodium as indicated or ordered  - Monitor response to interventions for patient's volume status, including labs, urine output, blood pressure (other measures as available)  - Encourage oral intake as appropriate  - Instruct patient on fluid and nutrition restrictions as appropriate  Outcome: Progressing     Problem: MUSCULOSKELETAL - ADULT  Goal: Return mobility to safest level of function  Description: INTERVENTIONS:  - Assess patient stability and activity tolerance for standing, transferring and ambulating w/ or w/o assistive devices  - Assist with transfers and ambulation using safe patient handling equipment as needed  - Ensure adequate protection for wounds/incisions during mobilization  - Obtain PT/OT consults as needed  - Advance activity as appropriate  - Communicate ordered activity level and limitations with patient/family  Outcome: Progressing  Goal: Maintain proper alignment of affected body part  Description: INTERVENTIONS:  - Support and protect limb and body alignment per provider's orders  - Instruct and reinforce with patient and family use of appropriate assistive device and precautions (e.g.  spinal or hip dislocation precautions)  Outcome: Progressing     Problem: SKIN/TISSUE INTEGRITY - ADULT  Goal: Skin integrity remains intact  Description: INTERVENTIONS  - Assess and document risk factors for pressure ulcer development  - Assess and document skin integrity  - Monitor for areas of redness and/or skin breakdown  - Initiate interventions, skin care algorithm/standards of care as needed  Outcome: Progressing  Goal: Oral mucous membranes remain intact  Description: INTERVENTIONS  - Assess oral mucosa and hygiene practices  - Implement preventative oral hygiene regimen  - Implement oral medicated treatments as ordered  Outcome: Progressing     Problem: Impaired Functional Mobility  Goal: Achieve highest/safest level of mobility/gait  Description: Interventions:  - Assess patient's functional ability and stability  - Promote increasing activity/tolerance for mobility and gait  - Educate and engage patient/family in tolerated activity level and precautions    Outcome: Progressing     Problem: Impaired Activities of Daily Living  Goal: Achieve highest/safest level of independence in self care  Description: Interventions:  - Assess ability and encourage patient to participate in ADLs to maximize function  - Promote sitting position while performing ADLs such as feeding, grooming, and bathing  - Educate and encourage patient/family in tolerated functional activity level and precautions during self-care    Outcome: Progressing

## 2024-08-29 LAB
ANION GAP SERPL CALC-SCNC: 9 MMOL/L (ref 0–18)
BASOPHILS # BLD AUTO: 0.05 X10(3) UL (ref 0–0.2)
BASOPHILS NFR BLD AUTO: 0.4 %
BUN BLD-MCNC: 14 MG/DL (ref 9–23)
BUN/CREAT SERPL: 17.9 (ref 10–20)
CALCIUM BLD-MCNC: 7.9 MG/DL (ref 8.7–10.4)
CHLORIDE SERPL-SCNC: 110 MMOL/L (ref 98–112)
CO2 SERPL-SCNC: 22 MMOL/L (ref 21–32)
CREAT BLD-MCNC: 0.78 MG/DL
DEPRECATED RDW RBC AUTO: 61.7 FL (ref 35.1–46.3)
EGFRCR SERPLBLD CKD-EPI 2021: 85 ML/MIN/1.73M2 (ref 60–?)
EOSINOPHIL # BLD AUTO: 0.03 X10(3) UL (ref 0–0.7)
EOSINOPHIL NFR BLD AUTO: 0.2 %
ERYTHROCYTE [DISTWIDTH] IN BLOOD BY AUTOMATED COUNT: 19.2 % (ref 11–15)
GLUCOSE BLD-MCNC: 73 MG/DL (ref 70–99)
HCT VFR BLD AUTO: 28.8 %
HGB BLD-MCNC: 9 G/DL
IMM GRANULOCYTES # BLD AUTO: 0.27 X10(3) UL (ref 0–1)
IMM GRANULOCYTES NFR BLD: 2.2 %
LYMPHOCYTES # BLD AUTO: 2.11 X10(3) UL (ref 1–4)
LYMPHOCYTES NFR BLD AUTO: 17.4 %
MCH RBC QN AUTO: 28.6 PG (ref 26–34)
MCHC RBC AUTO-ENTMCNC: 31.3 G/DL (ref 31–37)
MCV RBC AUTO: 91.4 FL
MONOCYTES # BLD AUTO: 1.43 X10(3) UL (ref 0.1–1)
MONOCYTES NFR BLD AUTO: 11.8 %
NEUTROPHILS # BLD AUTO: 8.26 X10 (3) UL (ref 1.5–7.7)
NEUTROPHILS # BLD AUTO: 8.26 X10(3) UL (ref 1.5–7.7)
NEUTROPHILS NFR BLD AUTO: 68 %
OSMOLALITY SERPL CALC.SUM OF ELEC: 291 MOSM/KG (ref 275–295)
PLATELET # BLD AUTO: 315 10(3)UL (ref 150–450)
POTASSIUM SERPL-SCNC: 3.5 MMOL/L (ref 3.5–5.1)
RBC # BLD AUTO: 3.15 X10(6)UL
SODIUM SERPL-SCNC: 141 MMOL/L (ref 136–145)
WBC # BLD AUTO: 12.2 X10(3) UL (ref 4–11)

## 2024-08-29 PROCEDURE — 99233 SBSQ HOSP IP/OBS HIGH 50: CPT | Performed by: HOSPITALIST

## 2024-08-29 PROCEDURE — 99233 SBSQ HOSP IP/OBS HIGH 50: CPT | Performed by: INTERNAL MEDICINE

## 2024-08-29 RX ORDER — PREDNISONE 10 MG/1
10 TABLET ORAL
Status: DISCONTINUED | OUTPATIENT
Start: 2024-09-01 | End: 2024-09-01

## 2024-08-29 NOTE — PROGRESS NOTES
[facility]  part of [facility]    Progress Note    Sheri Garzon Patient Status:  Inpatient    1960 MRN [id number]   Location [facility]5W Attending Janes Whalen MD   Hosp Day # 0 PCP TERI MCKENNA MD       Subjective:     Pt on phone making insurance payment.    Objective:   Blood pressure (!) 130/92, pulse 92, temperature 98.2 °F (36.8 °C), temperature source Oral, resp. rate 19, weight 193 lb 5 oz (87.7 kg), SpO2 99%.    Gen:   NAD.  A and O x 3  CV:   RRR, no m/g/r  Pulm:   CTA bilat  Abd:   +bs, soft, NT, ND  LE:   No c/c/e.  Leg wounds with bandages.  Neuro:   nonfocal    Results:     Lab Results   Component Value Date    WBC 12.2 (H) 2024    HGB 9.0 (L) 2024    HCT 28.8 (L) 2024    .0 2024    CREATSERUM 0.78 2024    BUN 14 2024     2024    K 3.5 2024     2024    CO2 22.0 2024    GLU 73 2024    CA 7.9 (L) 2024    ALB 3.1 (L) 2024    ALKPHO 100 2024    BILT 0.4 2024    TP 5.3 (L) 2024    AST 58 (H) 2024    ALT 49 2024    LIP 34 2024    DDIMER 1.69 (H) 2024    MG 1.9 2024    PHOS 2.9 2024    CK 50 2024    B12 748 2024       No results found.        Assessment and Plan:     Musculoskeletal deconditioning related to recent hospitalization  - PT/OT eval  - SW on consult for SNF placement     Hypoglycemia  - possible falsley low readings.  - endocrine on consult     RLE cellulitis - improved  Leukocytosis  - s/p I&D on 8/15  - on linezolid per ID, last day is tomorrow     RA  - cont hydroxychloroquine  - cont prednisone 10mg daily     H/o adrenal insufficiency  - monitor BP  - reduce prednisone     Metabolic acidosis, non-ion gap  - was on sodium bicarb PO previously, continue this     Anemia of chronic disease  - monitor  - no overt bleed  - cont iron PO     LE edema  - cont lasix IV  - recent echo in July -  normal EF, G1DD     Sinus tachycardia  - cont metoprolol and monitor tele     dvt proph:    heparin     Code status:    Full    MDM:    High Janes Hernandez MD  8/29/2024

## 2024-08-29 NOTE — PROGRESS NOTES
INFECTIOUS DISEASE PROGRESS NOTE  [facility]  part of [facility] ID PROGRESS NOTE    Sheri Garzon Patient Status:  Observation    1960 MRN [id number]   Location [facility]5W Attending Janes Whalen MD   Hosp Day # 0 PCP TERI MCKENNA MD     Subjective:  ROS reviewed. Feels better. Pain controlled. Plans for rehab on DC.    ASSESSMENT:    Antibiotics: IV Daptomycin, cefepime; (Linezolid)     # Neisseria species bacteremia  # Leukocytosis - possible reactive to hypoglycemia, BM recovery; r/o bacteremia     # RLE cellulitis with posterior leg wound in immunocompromised patient - improved               -s/p bedside debridement 8/15     # Rheumatoid arthritis, on 20 mg prednisone daily, methotrexate, HCQ     # Adrenal insufficiency - Endocrine following; steroids  # Neutropenia - resolved     PLAN:  -  Continue on IV daptomycin until . Continue on cefepime.  -  FU repeat blood cx.  -  Follow fever curve, wbc - on Prednisone  -  Reviewed labs, micro, imaging reports, available old records  -  d/w patient, pharmacy, RN.     History of Present Illness:  Sheri Garzon is a a(n) 64 year old female. 64 year old female with a history of rheumatoid arthritis on chronic 20 mg prednisone daily, methotrexate, HCQ, with recent [facility] admission - with vomiting s/p EGD with small hiatal hernia, who presented to [facility] ED on  with worsening pain in her right foot. Has had a wound for the last month and a half that has not healed. Was on ciprofloxacin last month. No fevers or chills at home. On arrival, afebrile, wbc 4.5, blood cx obtained, XR R foot with soft tissue swelling, started on vancomycin. Underwent bedside I&D during admission. Discharged on linezolid. Now back with weakness and difficulty walking because of this. Denies fevers, chills, n/v/d, rash.       Physical Exam:  BP (!) 130/92 (BP Location: Right arm)   Pulse 92   Temp 98.2 °F (36.8 °C) (Oral)    Resp 19   Wt 193 lb 5 oz (87.7 kg)   SpO2 99%   BMI 35.36 kg/m²     Gen:   Awake, in bed  HEENT:  EOMI, neck supple  CV/lungs:  RRR, CTAB  Abdom:  Soft, NT/ND, +BS  Skin/extrem:  BLE edema, R posterior calf wound without surrounding erythema  Lines:  PIV+    Laboratory Data: Reviewed    Microbiology: Reviewed    Radiology: Reviewed      [facility]  [phone]  8/29/2024

## 2024-08-29 NOTE — OCCUPATIONAL THERAPY NOTE
OCCUPATIONAL THERAPY TREATMENT NOTE - INPATIENT        Room Number: 521/521-A          Problem List  Principal Problem:    Weakness generalized  Active Problems:    Leg edema    Adrenal insufficiency (HCC)      OCCUPATIONAL THERAPY ASSESSMENT   Patient demonstrates fair progress this session, goals remain in progress.    Patient is requiring maximum assist as a result of the following impairments: decreased functional strength, decreased functional reach, decreased endurance, and decreased muscular endurance.    Patient continues to function below baseline with ADLs and functional mobility.  Patient continues to benefit from continued skilled OT services: to promote return to prior level of function and safety with continuous assistance and gradual rehabilitative therapy.       SUBJECTIVE  \"I just can't power up from here\"    OBJECTIVE  Precautions: Bed/chair alarm    WEIGHT BEARING RESTRICTION     PAIN ASSESSMENT  Ratin    ACTIVITY TOLERANCE  Fair for bed level/bed side activity with rest breaks      ACTIVITIES OF DAILY LIVING ASSESSMENT  AM-PAC ‘6-Clicks’ Inpatient Daily Activity Short Form  How much help from another person does the patient currently need…  -   Putting on and taking off regular lower body clothing?: A Lot  -   Bathing (including washing, rinsing, drying)?: A Lot  -   Toileting, which includes using toilet, bedpan or urinal? : Total  -   Putting on and taking off regular upper body clothing?: A Little  -   Taking care of personal grooming such as brushing teeth?: A Little  -   Eating meals?: None    AM-PAC Score:  Score: 15  Approx Degree of Impairment: 56.46%  Standardized Score (AM-PAC Scale): 34.69  CMS Modifier (G-Code): CK    FUNCTIONAL TRANSFER ASSESSMENT  Sit to Stand: Edge of Bed; Chair  Edge of Bed: Moderate Assist  Chair: Maximum Assist  Toilet Transfer: Minimal Assist    BED MOBILITY  Supine to Sit : Moderate Assist    FUNCTIONAL ADL ASSESSMENT  Provide total A for toielting hygiene  after incontinence, Max A to don clean pull up brief    Skilled Therapy Provided: Pt received supine in bed, no visitors present. Pt is cooperative with some encouragement and reassurance during activity. Pt's speech is difficult to understand at times. Pt demo weakness SALTY wrists and therefore leans onto walker handles with forearms rather than holding . Pt demo significant weakness in LE requiring Max A 1-2 to stand from (low) bed side chair)    EDUCATION PROVIDED  Patient: Plan of Care; Functional Transfer Techniques; Fall Prevention; Compensatory ADL Techniques; Posture/Positioning  Patient's Response to Education: Verbalized Understanding; Requires Further Education    The patient's Approx Degree of Impairment: 56.46% has been calculated [facility] Daily [facility] Form.  Research supports that patients with this level of impairment may benefit from SULTANA.  Final disposition will be made by interdisciplinary medical team.    Patient End of Session: Up in chair;Needs met;Call light within reach;With  staff;Alarm set;All patient questions and concerns addressed    OT Goals:     Patient will complete functional transfer with SBA  Comment: Not met    Patient will complete toileting with min A  Comment: Not met    Patient will tolerate standing for 3 minutes in prep for adls with SBA   Comment: Not met    Patient will complete item retrieval with SBA  Comment:Not appropriate this date          Goals  on:   Frequency: 3-5x/wk    Self-Care Home Management: 30 minutes

## 2024-08-29 NOTE — PLAN OF CARE
IV abx continued, complaints of pain relieved with prn. Pt up to chair for meals, pt still with poor appetite, requesting more ensures. IV lasix continued. Pt complaints of being very cold all day. Ticket to maintenance put in. Frequent rounding by staff      Problem: Patient Centered Care  Goal: Patient preferences are identified and integrated in the patient's plan of care  Description: Interventions:  - What would you like us to know as we care for you?   - Provide timely, complete, and accurate information to patient/family  - Incorporate patient and family knowledge, values, beliefs, and cultural backgrounds into the planning and delivery of care  - Encourage patient/family to participate in care and decision-making at the level they choose  - Honor patient and family perspectives and choices  Outcome: Progressing     Problem: Patient/Family Goals  Goal: Patient/Family Long Term Goal  Description: Patient's Long Term Goal:     Interventions:  - See additional Care Plan goals for specific interventions  Outcome: Progressing  Goal: Patient/Family Short Term Goal  Description: Patient's Short Term Goal:     Interventions:   - See additional Care Plan goals for specific interventions  Outcome: Progressing     Problem: CARDIOVASCULAR - ADULT  Goal: Maintains optimal cardiac output and hemodynamic stability  Description: INTERVENTIONS:  - Monitor vital signs, rhythm, and trends  - Monitor for bleeding, hypotension and signs of decreased cardiac output  - Evaluate effectiveness of vasoactive medications to optimize hemodynamic stability  - Monitor arterial and/or venous puncture sites for bleeding and/or hematoma  - Assess quality of pulses, skin color and temperature  - Assess for signs of decreased coronary artery perfusion - ex. Angina  - Evaluate fluid balance, assess for edema, trend weights  Outcome: Progressing  Goal: Absence of cardiac arrhythmias or at baseline  Description: INTERVENTIONS:  - Continuous  cardiac monitoring, monitor vital signs, obtain 12 lead EKG if indicated  - Evaluate effectiveness of antiarrhythmic and heart rate control medications as ordered  - Initiate emergency measures for life threatening arrhythmias  - Monitor electrolytes and administer replacement therapy as ordered  Outcome: Progressing     Problem: RESPIRATORY - ADULT  Goal: Achieves optimal ventilation and oxygenation  Description: INTERVENTIONS:  - Assess for changes in respiratory status  - Assess for changes in mentation and behavior  - Position to facilitate oxygenation and minimize respiratory effort  - Oxygen supplementation based on oxygen saturation or ABGs  - Provide Smoking Cessation handout, if applicable  - Encourage broncho-pulmonary hygiene including cough, deep breathe, Incentive Spirometry  - Assess the need for suctioning and perform as needed  - Assess and instruct to report SOB or any respiratory difficulty  - Respiratory Therapy support as indicated  - Manage/alleviate anxiety  - Monitor for signs/symptoms of CO2 retention  Outcome: Progressing     Problem: METABOLIC/FLUID AND ELECTROLYTES - ADULT  Goal: Glucose maintained within prescribed range  Description: INTERVENTIONS:  - Monitor Blood Glucose as ordered  - Assess for signs and symptoms of hyperglycemia and hypoglycemia  - Administer ordered medications to maintain glucose within target range  - Assess barriers to adequate nutritional intake and initiate nutrition consult as needed  - Instruct patient on self management of diabetes  Outcome: Progressing  Goal: Electrolytes maintained within normal limits  Description: INTERVENTIONS:  - Monitor labs and rhythm and assess patient for signs and symptoms of electrolyte imbalances  - Administer electrolyte replacement as ordered  - Monitor response to electrolyte replacements, including rhythm and repeat lab results as appropriate  - Fluid restriction as ordered  - Instruct patient on fluid and nutrition  restrictions as appropriate  Outcome: Progressing  Goal: Hemodynamic stability and optimal renal function maintained  Description: INTERVENTIONS:  - Monitor labs and assess for signs and symptoms of volume excess or deficit  - Monitor intake, output and patient weight  - Monitor urine specific gravity, serum osmolarity and serum sodium as indicated or ordered  - Monitor response to interventions for patient's volume status, including labs, urine output, blood pressure (other measures as available)  - Encourage oral intake as appropriate  - Instruct patient on fluid and nutrition restrictions as appropriate  Outcome: Progressing     Problem: SKIN/TISSUE INTEGRITY - ADULT  Goal: Skin integrity remains intact  Description: INTERVENTIONS  - Assess and document risk factors for pressure ulcer development  - Assess and document skin integrity  - Monitor for areas of redness and/or skin breakdown  - Initiate interventions, skin care algorithm/standards of care as needed  Outcome: Progressing  Goal: Oral mucous membranes remain intact  Description: INTERVENTIONS  - Assess oral mucosa and hygiene practices  - Implement preventative oral hygiene regimen  - Implement oral medicated treatments as ordered  Outcome: Progressing     Problem: MUSCULOSKELETAL - ADULT  Goal: Return mobility to safest level of function  Description: INTERVENTIONS:  - Assess patient stability and activity tolerance for standing, transferring and ambulating w/ or w/o assistive devices  - Assist with transfers and ambulation using safe patient handling equipment as needed  - Ensure adequate protection for wounds/incisions during mobilization  - Obtain PT/OT consults as needed  - Advance activity as appropriate  - Communicate ordered activity level and limitations with patient/family  Outcome: Progressing  Goal: Maintain proper alignment of affected body part  Description: INTERVENTIONS:  - Support and protect limb and body alignment per provider's  orders  - Instruct and reinforce with patient and family use of appropriate assistive device and precautions (e.g. spinal or hip dislocation precautions)  Outcome: Progressing     Problem: Impaired Functional Mobility  Goal: Achieve highest/safest level of mobility/gait  Description: Interventions:  - Assess patient's functional ability and stability  - Promote increasing activity/tolerance for mobility and gait  - Educate and engage patient/family in tolerated activity level and precautions  - Recommend use of  RW for transfers and ambulation  Outcome: Progressing     Problem: Impaired Activities of Daily Living  Goal: Achieve highest/safest level of independence in self care  Description: Interventions:  - Assess ability and encourage patient to participate in ADLs to maximize function  - Promote sitting position while performing ADLs such as feeding, grooming, and bathing  - Educate and encourage patient/family in tolerated functional activity level and precautions during self-care  - Provide support under elbow of weak side to prevent shoulder subluxation  Outcome: Progressing

## 2024-08-29 NOTE — CM/SW NOTE
[facility]'s [facility] will  on  if premium payment is not made by . Julianne confirmed ins auth has been submitted but facility will not accept if insurance expires on . Pt confirmed she will make insurance payment today.    Plan: [facility] for [facility] pending ins auth and medical clearance.    ALEJANDRO Villegas    [phone]

## 2024-08-29 NOTE — PLAN OF CARE
Plan is to discharge to [facility] in [place] once medically cleared, continued IV ABX at this time. Pt calls appropriately. Monitoring BP overnight. No new episodes of hypotension overnight. Hypoglycemic episodes need to have an MD order if no treatment is to be administered. Verify with ENDO. Pt turns self, hourly rounding overnight.       Problem: Patient Centered Care  Goal: Patient preferences are identified and integrated in the patient's plan of care  Description: Interventions:  - What would you like us to know as we care for you?   - Provide timely, complete, and accurate information to patient/family  - Incorporate patient and family knowledge, values, beliefs, and cultural backgrounds into the planning and delivery of care  - Encourage patient/family to participate in care and decision-making at the level they choose  - Honor patient and family perspectives and choices  Outcome: Progressing     Problem: Patient/Family Goals  Goal: Patient/Family Long Term Goal  Description: Patient's Long Term Goal:     Interventions:  -   - See additional Care Plan goals for specific interventions  Outcome: Progressing  Goal: Patient/Family Short Term Goal  Description: Patient's Short Term Goal:     Interventions:   -   - See additional Care Plan goals for specific interventions  Outcome: Progressing     Problem: CARDIOVASCULAR - ADULT  Goal: Maintains optimal cardiac output and hemodynamic stability  Description: INTERVENTIONS:  - Monitor vital signs, rhythm, and trends  - Monitor for bleeding, hypotension and signs of decreased cardiac output  - Evaluate effectiveness of vasoactive medications to optimize hemodynamic stability  - Monitor arterial and/or venous puncture sites for bleeding and/or hematoma  - Assess quality of pulses, skin color and temperature  - Assess for signs of decreased coronary artery perfusion - ex. Angina  - Evaluate fluid balance, assess for edema, trend weights  Outcome:  Progressing  Goal: Absence of cardiac arrhythmias or at baseline  Description: INTERVENTIONS:  - Continuous cardiac monitoring, monitor vital signs, obtain 12 lead EKG if indicated  - Evaluate effectiveness of antiarrhythmic and heart rate control medications as ordered  - Initiate emergency measures for life threatening arrhythmias  - Monitor electrolytes and administer replacement therapy as ordered  Outcome: Progressing     Problem: RESPIRATORY - ADULT  Goal: Achieves optimal ventilation and oxygenation  Description: INTERVENTIONS:  - Assess for changes in respiratory status  - Assess for changes in mentation and behavior  - Position to facilitate oxygenation and minimize respiratory effort  - Oxygen supplementation based on oxygen saturation or ABGs  - Provide Smoking Cessation handout, if applicable  - Encourage broncho-pulmonary hygiene including cough, deep breathe, Incentive Spirometry  - Assess the need for suctioning and perform as needed  - Assess and instruct to report SOB or any respiratory difficulty  - Respiratory Therapy support as indicated  - Manage/alleviate anxiety  - Monitor for signs/symptoms of CO2 retention  Outcome: Progressing     Problem: METABOLIC/FLUID AND ELECTROLYTES - ADULT  Goal: Glucose maintained within prescribed range  Description: INTERVENTIONS:  - Monitor Blood Glucose as ordered  - Assess for signs and symptoms of hyperglycemia and hypoglycemia  - Administer ordered medications to maintain glucose within target range  - Assess barriers to adequate nutritional intake and initiate nutrition consult as needed  - Instruct patient on self management of diabetes  Outcome: Progressing  Goal: Electrolytes maintained within normal limits  Description: INTERVENTIONS:  - Monitor labs and rhythm and assess patient for signs and symptoms of electrolyte imbalances  - Administer electrolyte replacement as ordered  - Monitor response to electrolyte replacements, including rhythm and repeat  lab results as appropriate  - Fluid restriction as ordered  - Instruct patient on fluid and nutrition restrictions as appropriate  Outcome: Progressing  Goal: Hemodynamic stability and optimal renal function maintained  Description: INTERVENTIONS:  - Monitor labs and assess for signs and symptoms of volume excess or deficit  - Monitor intake, output and patient weight  - Monitor urine specific gravity, serum osmolarity and serum sodium as indicated or ordered  - Monitor response to interventions for patient's volume status, including labs, urine output, blood pressure (other measures as available)  - Encourage oral intake as appropriate  - Instruct patient on fluid and nutrition restrictions as appropriate  Outcome: Progressing     Problem: SKIN/TISSUE INTEGRITY - ADULT  Goal: Skin integrity remains intact  Description: INTERVENTIONS  - Assess and document risk factors for pressure ulcer development  - Assess and document skin integrity  - Monitor for areas of redness and/or skin breakdown  - Initiate interventions, skin care algorithm/standards of care as needed  Outcome: Progressing  Goal: Oral mucous membranes remain intact  Description: INTERVENTIONS  - Assess oral mucosa and hygiene practices  - Implement preventative oral hygiene regimen  - Implement oral medicated treatments as ordered  Outcome: Progressing     Problem: MUSCULOSKELETAL - ADULT  Goal: Return mobility to safest level of function  Description: INTERVENTIONS:  - Assess patient stability and activity tolerance for standing, transferring and ambulating w/ or w/o assistive devices  - Assist with transfers and ambulation using safe patient handling equipment as needed  - Ensure adequate protection for wounds/incisions during mobilization  - Obtain PT/OT consults as needed  - Advance activity as appropriate  - Communicate ordered activity level and limitations with patient/family  Outcome: Progressing  Goal: Maintain proper alignment of affected body  part  Description: INTERVENTIONS:  - Support and protect limb and body alignment per provider's orders  - Instruct and reinforce with patient and family use of appropriate assistive device and precautions (e.g. spinal or hip dislocation precautions)  Outcome: Progressing     Problem: Impaired Functional Mobility  Goal: Achieve highest/safest level of mobility/gait  Description: Interventions:  - Assess patient's functional ability and stability  - Promote increasing activity/tolerance for mobility and gait  - Educate and engage patient/family in tolerated activity level and precautions  - Recommend use of chair position in bed 3 times per day  Outcome: Progressing     Problem: Impaired Activities of Daily Living  Goal: Achieve highest/safest level of independence in self care  Description: Interventions:  - Assess ability and encourage patient to participate in ADLs to maximize function  - Promote sitting position while performing ADLs such as feeding, grooming, and bathing  - Educate and encourage patient/family in tolerated functional activity level and precautions during self-care  - Encourage patient to incorporate impaired side during daily activities to promote function  Outcome: Progressing

## 2024-08-29 NOTE — PROGRESS NOTES
[facility]  part of [facility]    Progress Note    Sheri Garzon Patient Status:  Observation    1960 MRN [id number]   Location [facility]5W Attending Janes Whalen MD   Hosp Day # 0 PCP TERI MCKENNA MD     Subjective:   Sheri Garzon is a(n) 64 year old female    Discussed with primary team and RN.  Discussed with pt and family at bedside. BG was falsely low.     Objective:   Vital Signs:  Blood pressure (!) 130/92, pulse 92, temperature 98.2 °F (36.8 °C), temperature source Oral, resp. rate 19, weight 193 lb 5 oz (87.7 kg), SpO2 99%.                    General: Awake and alert.    HENT: Eye: EOMI,    Neck/Thyroid: neck inspection: normal   Lungs:  Speaking full sentences  MSK: Moves extremities spontaneously.    Neuro:speech is clear.   Skin: Skin is dry       Assessment and Plan:     Patient Active Problem List   Diagnosis    Dehydration    Metabolic acidosis    Bilious vomiting with nausea    Difficult intravenous access    Hypokalemia    Hypomagnesemia    Hiatal hernia    Cellulitis of right lower extremity    Cellulitis    RTA (renal tubular acidosis)    Adrenal insufficiency ([place]'s disease) (HCC)    Current chronic use of systemic steroids    Neutropenia (HCC)    Anemia of infection    Weakness generalized    Leg edema    Adrenal insufficiency (HCC)       BG was falsely low.     Adrenal insufficiency , iatrogenic d/t chronic steroid use   Pt was on prednisone 10 mg for years.     She was on prednisone 20  mg a day starting 2024 and will taper as tolerated to her dose.   2024  will decrease prednisone to ->15 mg a day for 3 days then -> 10 mg/day and she can continue this dose   Please contact endocrine if there is change in clinical status or there is any concern.      She understands the need for stress dose for acute illness or procedure if needed. She is aware of her dx of adrenal insufficiency       Thank you for allowing me to participate in the  care of your patient.    Discussed with primary team and RN.  Discussed with pt and family at bedside.     Results:     Lab Results   Component Value Date    WBC 12.2 (H) 08/29/2024    HGB 9.0 (L) 08/29/2024    HCT 28.8 (L) 08/29/2024    .0 08/29/2024    CREATSERUM 0.78 08/29/2024    BUN 14 08/29/2024     08/29/2024    K 3.5 08/29/2024     08/29/2024    CO2 22.0 08/29/2024    GLU 73 08/29/2024    CA 7.9 (L) 08/29/2024    ALB 3.1 (L) 08/26/2024    ALKPHO 100 08/26/2024    BILT 0.4 08/26/2024    TP 5.3 (L) 08/26/2024    AST 58 (H) 08/26/2024    ALT 49 08/26/2024    LIP 34 07/21/2024    DDIMER 1.69 (H) 07/23/2024    MG 1.9 08/22/2024    PHOS 2.9 08/21/2024    CK 50 08/27/2024    B12 748 08/21/2024       No results found.              Mich Leigh MD  8/29/2024        DOS is the same date the note was signed

## 2024-08-30 PROCEDURE — 99233 SBSQ HOSP IP/OBS HIGH 50: CPT | Performed by: HOSPITALIST

## 2024-08-30 RX ORDER — FUROSEMIDE 10 MG/ML
40 INJECTION INTRAMUSCULAR; INTRAVENOUS DAILY
Status: DISCONTINUED | OUTPATIENT
Start: 2024-08-31 | End: 2024-09-01

## 2024-08-30 RX ORDER — SODIUM BICARBONATE 650 MG/1
650 TABLET ORAL 2 TIMES DAILY
Qty: 60 TABLET | Refills: 0 | Status: ON HOLD | OUTPATIENT
Start: 2024-08-30 | End: 2024-09-29

## 2024-08-30 RX ORDER — PREDNISONE 10 MG/1
10 TABLET ORAL
Status: ON HOLD | COMMUNITY
Start: 2024-09-01

## 2024-08-30 NOTE — PLAN OF CARE
Problem: Patient Centered Care  Goal: Patient preferences are identified and integrated in the patient's plan of care  Description: Interventions:  - What would you like us to know as we care for you?   - Provide timely, complete, and accurate information to patient/family  - Incorporate patient and family knowledge, values, beliefs, and cultural backgrounds into the planning and delivery of care  - Encourage patient/family to participate in care and decision-making at the level they choose  - Honor patient and family perspectives and choices  Outcome: Progressing     Problem: Patient/Family Goals  Goal: Patient/Family Long Term Goal  Description: Patient's Long Term Goal: To be discharged home.     Interventions:  - IV antibiotics, Lasix, and pain management.   - See additional Care Plan goals for specific interventions  Outcome: Progressing  Goal: Patient/Family Short Term Goal  Description: Patient's Short Term Goal: to have no pain.     Interventions:   - IV antibiotics, pain management, and consult with ID and general surgery.   - See additional Care Plan goals for specific interventions  Outcome: Progressing     Problem: CARDIOVASCULAR - ADULT  Goal: Maintains optimal cardiac output and hemodynamic stability  Description: INTERVENTIONS:  - Monitor vital signs, rhythm, and trends  - Monitor for bleeding, hypotension and signs of decreased cardiac output  - Evaluate effectiveness of vasoactive medications to optimize hemodynamic stability  - Monitor arterial and/or venous puncture sites for bleeding and/or hematoma  - Assess quality of pulses, skin color and temperature  - Assess for signs of decreased coronary artery perfusion - ex. Angina  - Evaluate fluid balance, assess for edema, trend weights  Outcome: Progressing  Goal: Absence of cardiac arrhythmias or at baseline  Description: INTERVENTIONS:  - Continuous cardiac monitoring, monitor vital signs, obtain 12 lead EKG if indicated  - Evaluate  effectiveness of antiarrhythmic and heart rate control medications as ordered  - Initiate emergency measures for life threatening arrhythmias  - Monitor electrolytes and administer replacement therapy as ordered  Outcome: Progressing     Problem: RESPIRATORY - ADULT  Goal: Achieves optimal ventilation and oxygenation  Description: INTERVENTIONS:  - Assess for changes in respiratory status  - Assess for changes in mentation and behavior  - Position to facilitate oxygenation and minimize respiratory effort  - Oxygen supplementation based on oxygen saturation or ABGs  - Provide Smoking Cessation handout, if applicable  - Encourage broncho-pulmonary hygiene including cough, deep breathe, Incentive Spirometry  - Assess the need for suctioning and perform as needed  - Assess and instruct to report SOB or any respiratory difficulty  - Respiratory Therapy support as indicated  - Manage/alleviate anxiety  - Monitor for signs/symptoms of CO2 retention  Outcome: Progressing     Problem: METABOLIC/FLUID AND ELECTROLYTES - ADULT  Goal: Glucose maintained within prescribed range  Description: INTERVENTIONS:  - Monitor Blood Glucose as ordered  - Assess for signs and symptoms of hyperglycemia and hypoglycemia  - Administer ordered medications to maintain glucose within target range  - Assess barriers to adequate nutritional intake and initiate nutrition consult as needed  - Instruct patient on self management of diabetes  Outcome: Progressing  Goal: Electrolytes maintained within normal limits  Description: INTERVENTIONS:  - Monitor labs and rhythm and assess patient for signs and symptoms of electrolyte imbalances  - Administer electrolyte replacement as ordered  - Monitor response to electrolyte replacements, including rhythm and repeat lab results as appropriate  - Fluid restriction as ordered  - Instruct patient on fluid and nutrition restrictions as appropriate  Outcome: Progressing  Goal: Hemodynamic stability and optimal  renal function maintained  Description: INTERVENTIONS:  - Monitor labs and assess for signs and symptoms of volume excess or deficit  - Monitor intake, output and patient weight  - Monitor urine specific gravity, serum osmolarity and serum sodium as indicated or ordered  - Monitor response to interventions for patient's volume status, including labs, urine output, blood pressure (other measures as available)  - Encourage oral intake as appropriate  - Instruct patient on fluid and nutrition restrictions as appropriate  Outcome: Progressing     Problem: SKIN/TISSUE INTEGRITY - ADULT  Goal: Skin integrity remains intact  Description: INTERVENTIONS  - Assess and document risk factors for pressure ulcer development  - Assess and document skin integrity  - Monitor for areas of redness and/or skin breakdown  - Initiate interventions, skin care algorithm/standards of care as needed  Outcome: Progressing  Goal: Oral mucous membranes remain intact  Description: INTERVENTIONS  - Assess oral mucosa and hygiene practices  - Implement preventative oral hygiene regimen  - Implement oral medicated treatments as ordered  Outcome: Progressing     Problem: Impaired Functional Mobility  Goal: Achieve highest/safest level of mobility/gait  Description: Interventions:  - Assess patient's functional ability and stability  - Promote increasing activity/tolerance for mobility and gait  - Educate and engage patient/family in tolerated activity level and precautions  - Recommend use of chair position in bed 3 times per day  Outcome: Progressing     Problem: Impaired Activities of Daily Living  Goal: Achieve highest/safest level of independence in self care  Description: Interventions:  - Assess ability and encourage patient to participate in ADLs to maximize function  - Promote sitting position while performing ADLs such as feeding, grooming, and bathing  - Educate and encourage patient/family in tolerated functional activity level and  precautions during self-care    Pt is A&Ox 4 on RA. Monitor by remote tele and has a Purewick. IV antibiotics are continued. Pt pain is managed with Norco. No acute changes. Vitals stable. Safety precaution in place with increased rounding by staff.

## 2024-08-30 NOTE — PROGRESS NOTES
INFECTIOUS DISEASE PROGRESS NOTE  [facility]  part of [facility] ID PROGRESS NOTE    Sheri Garzon Patient Status:  Observation    1960 MRN [id number]   Location [facility]5W Attending Janes Whalen MD   Hosp Day # 1 PCP TERI MCKENNA MD     Subjective:  ROS reviewed. Feels better.     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 Continue on cefepime. FU repeat blood cx.  -  Follow fever curve, wbc - on Prednisone  -  Reviewed labs, micro, imaging reports, available old records  -  d/w patient, pharmacy, RN.     History of Present Illness:  64 year old female with a history of rheumatoid arthritis on chronic 20 mg prednisone daily, methotrexate, HCQ, with recent [facility] admission - with vomiting s/p EGD with small hiatal hernia, who presented to [facility] ED on  with worsening pain in her right foot. Has had a wound for the last month and a half that has not healed. Was on ciprofloxacin last month. No fevers or chills at home. On arrival, afebrile, wbc 4.5, blood cx obtained, XR R foot with soft tissue swelling, started on vancomycin. Underwent bedside I&D during admission. Discharged on linezolid. Now back with weakness and difficulty walking because of this. Denies fevers, chills, n/v/d, rash.       Physical Exam:  BP 97/76 (BP Location: Radial)   Pulse 85   Temp 98.3 °F (36.8 °C) (Oral)   Resp 18   Wt 193 lb 5 oz (87.7 kg)   SpO2 100%   BMI 35.36 kg/m²     Gen:   Awake,  in bed  HEENT:  EOMI, neck supple  CV/lungs:  RRR, CTAB  Abdom:  Soft, NT/ND, +BS  Skin/extrem:  BLE edema improved, R posterior calf wound without surrounding erythema  Lines:  PIV+    Laboratory Data: Reviewed    Microbiology: Reviewed    Radiology: Reviewed      [facility]  [phone]  8/30/2024

## 2024-08-30 NOTE — PROGRESS NOTES
[facility]  part of [facility]    Progress Note    Sheri Garzon Patient Status:  Inpatient    1960 MRN [id number]   Location [facility]5W Attending Janes Whalen MD   Hosp Day # 1 PCP TERI MCKENNA MD       Subjective:     Pt feels well.  Feels ready for dc.    Objective:   Blood pressure 97/76, pulse 85, temperature 98.3 °F (36.8 °C), temperature source Oral, resp. rate 18, weight 193 lb 5 oz (87.7 kg), SpO2 100%.    Gen:   NAD.  A and O x 3  CV:   RRR, no m/g/r  Pulm:   CTA bilat  Abd:   +bs, soft, NT, ND  LE:   No c/c/e.  Leg wounds with bandages.  Neuro:   nonfocal    Results:     Lab Results   Component Value Date    WBC 12.2 (H) 2024    HGB 9.0 (L) 2024    HCT 28.8 (L) 2024    .0 2024    CREATSERUM 0.78 2024    BUN 14 2024     2024    K 3.5 2024     2024    CO2 22.0 2024    GLU 73 2024    CA 7.9 (L) 2024    ALB 3.1 (L) 2024    ALKPHO 100 2024    BILT 0.4 2024    TP 5.3 (L) 2024    AST 58 (H) 2024    ALT 49 2024    LIP 34 2024    DDIMER 1.69 (H) 2024    MG 1.9 2024    PHOS 2.9 2024    CK 50 2024    B12 748 2024       No results found.        Assessment and Plan:     Musculoskeletal deconditioning related to recent hospitalization  - PT/OT    - SW on consult for SNF placement     Hypoglycemia - improved  - possible falsley low readings.  - endocrine on consult     RLE cellulitis - improved  Leukocytosis  - s/p I&D on 8/15  - last day of daptomycin is tonight.  - cont cefepime  - f/u cultures     RA  - cont hydroxychloroquine  - cont prednisone 10mg daily     H/o adrenal insufficiency  - monitor BP  - reduced prednisone     Metabolic acidosis, non-ion gap  - was on sodium bicarb PO previously, continue this     Anemia of chronic disease  - monitor  - no overt bleed  - cont iron PO     LE edema  - cont lasix  IV  - recent echo in July - normal EF, G1DD     Sinus tachycardia  - cont metoprolol and monitor tele     dvt proph:    heparin     Code status:    Full    MDM:    High Janes Hernandez MD  8/30/2024

## 2024-08-30 NOTE — PLAN OF CARE
A&Ox4 . Pt sits at the edge of the bed, voiding via purewick, tolerating cardiac diet, on room air , norco provided as needed for pain. Frequent rounding by nursing staff. Safety precautions maintained/call light within reach. Plan to follow up with ID for update on discharge.    Problem: Patient Centered Care  Goal: Patient preferences are identified and integrated in the patient's plan of care  Description: Interventions:  - What would you like us to know as we care for you? I'm from home with my sister  - Provide timely, complete, and accurate information to patient/family  - Incorporate patient and family knowledge, values, beliefs, and cultural backgrounds into the planning and delivery of care  - Encourage patient/family to participate in care and decision-making at the level they choose  - Honor patient and family perspectives and choices  Outcome: Progressing     Problem: Patient/Family Goals  Goal: Patient/Family Long Term Goal  Description: Patient's Long Term Goal: discharge    Interventions:  - abx as ordered  - See additional Care Plan goals for specific interventions  Outcome: Progressing  Goal: Patient/Family Short Term Goal  Description: Patient's Short Term Goal: feel better    Interventions:   - prn meds  as needed; manage pain  - See additional Care Plan goals for specific interventions  Outcome: Progressing     Problem: CARDIOVASCULAR - ADULT  Goal: Maintains optimal cardiac output and hemodynamic stability  Description: INTERVENTIONS:  - Monitor vital signs, rhythm, and trends  - Monitor for bleeding, hypotension and signs of decreased cardiac output  - Evaluate effectiveness of vasoactive medications to optimize hemodynamic stability  - Monitor arterial and/or venous puncture sites for bleeding and/or hematoma  - Assess quality of pulses, skin color and temperature  - Assess for signs of decreased coronary artery perfusion - ex. Angina  - Evaluate fluid balance, assess for edema, trend  weights  Outcome: Progressing  Goal: Absence of cardiac arrhythmias or at baseline  Description: INTERVENTIONS:  - Continuous cardiac monitoring, monitor vital signs, obtain 12 lead EKG if indicated  - Evaluate effectiveness of antiarrhythmic and heart rate control medications as ordered  - Initiate emergency measures for life threatening arrhythmias  - Monitor electrolytes and administer replacement therapy as ordered  Outcome: Progressing     Problem: RESPIRATORY - ADULT  Goal: Achieves optimal ventilation and oxygenation  Description: INTERVENTIONS:  - Assess for changes in respiratory status  - Assess for changes in mentation and behavior  - Position to facilitate oxygenation and minimize respiratory effort  - Oxygen supplementation based on oxygen saturation or ABGs  - Provide Smoking Cessation handout, if applicable  - Encourage broncho-pulmonary hygiene including cough, deep breathe, Incentive Spirometry  - Assess the need for suctioning and perform as needed  - Assess and instruct to report SOB or any respiratory difficulty  - Respiratory Therapy support as indicated  - Manage/alleviate anxiety  - Monitor for signs/symptoms of CO2 retention  Outcome: Progressing     Problem: METABOLIC/FLUID AND ELECTROLYTES - ADULT  Goal: Glucose maintained within prescribed range  Description: INTERVENTIONS:  - Monitor Blood Glucose as ordered  - Assess for signs and symptoms of hyperglycemia and hypoglycemia  - Administer ordered medications to maintain glucose within target range  - Assess barriers to adequate nutritional intake and initiate nutrition consult as needed  - Instruct patient on self management of diabetes  Outcome: Progressing  Goal: Electrolytes maintained within normal limits  Description: INTERVENTIONS:  - Monitor labs and rhythm and assess patient for signs and symptoms of electrolyte imbalances  - Administer electrolyte replacement as ordered  - Monitor response to electrolyte replacements, including  rhythm and repeat lab results as appropriate  - Fluid restriction as ordered  - Instruct patient on fluid and nutrition restrictions as appropriate  Outcome: Progressing  Goal: Hemodynamic stability and optimal renal function maintained  Description: INTERVENTIONS:  - Monitor labs and assess for signs and symptoms of volume excess or deficit  - Monitor intake, output and patient weight  - Monitor urine specific gravity, serum osmolarity and serum sodium as indicated or ordered  - Monitor response to interventions for patient's volume status, including labs, urine output, blood pressure (other measures as available)  - Encourage oral intake as appropriate  - Instruct patient on fluid and nutrition restrictions as appropriate  Outcome: Progressing     Problem: SKIN/TISSUE INTEGRITY - ADULT  Goal: Oral mucous membranes remain intact  Description: INTERVENTIONS  - Assess oral mucosa and hygiene practices  - Implement preventative oral hygiene regimen  - Implement oral medicated treatments as ordered  Outcome: Progressing

## 2024-08-30 NOTE — CM/SW NOTE
08/30/24 1403   Discharge disposition   Expected discharge disposition subacute   Post Acute Care Provider [place]   Discharge transportation [facility]     The patient received a MDO for discharge.    The patient will be transported to [facility] via [facility] at 4:30pm.  PCS complete.  The quote for the [employer] is $70 and the patient's daughter is agreeable.    The number to call report is [phone].  The [place] liaison is aware of transport time.    Social work informed patient's daughter.  RN to inform patient.    SW/CM to remain available for support and/or discharge planning.     Sindy Fontana MSW, LSW  Discharge Planner [id number]

## 2024-08-30 NOTE — CM/SW NOTE
Auth approved to admit auth # [id number],   8/30 - 9/9 per facility.  OOP cost for medicar is $70.00 will need to inform pt of cost.    SW/CM to remain available for support and/or discharge planning.      Cherise LINK, LSW  Social Work/Case Management

## 2024-08-30 NOTE — PAYOR COMM NOTE
--------------  ADMISSION REVIEW     Payor: [employer]  Subscriber #:  [id number]  Authorization Number: [id number]    ADMIT TO INPT STATUS 8/29/24  ADMIT TO OBSERVATION 8/27/24 8/26 Patient Seen in: [facility] Emergency Department    History     Chief Complaint   Patient presents with    Weakness     64-year-old female presents for evaluation of weakness.  Patient recently discharged on 8/24/2024 after being treated for Cellulitis.  Patient reports since getting home she has had trouble performing ADLs and getting around due to generalized weakness.  Has not had much of an appetite.  No fever, chills, nausea, vomiting.  Feels that wounds are healing well.  Was set to see wound care as an outpatient but was unable to get into the car safely.    Objective:   Past Medical History:    Allergic rhinitis    Anxiety    Arthritis    RA and Osteoarthritis    Asthma (HCC)    Depression    Not officially diagnosed    Esophageal reflux    Essential hypertension    High blood pressure    High cholesterol    Hyperlipidemia    Myocardial infarction (HCC)    Cardiac event    Osteoarthritis    Pancreatitis (HCC)    Problems with swallowing    RA (rheumatoid arthritis) (HCC)    Sleep apnea     Past Surgical History:   Procedure Laterality Date    Colonoscopy      Other surgical history      Revision of uterine anomaly      Rotator cuff repair      Wrist fracture surgery       Physical Exam     ED Triage Vitals [08/26/24 1338]   /76   Pulse 72   Resp 13   Temp 97.9 °F (36.6 °C)   Temp src Oral   SpO2 97 %   O2 Device None (Room air)     Current Vitals:   Vital Signs  BP: 102/66  Pulse: 68  Resp: 14  Temp: 97.9 °F (36.6 °C)  Temp src: Oral    Physical Exam  Vitals and nursing note reviewed.   Constitutional:       General: She is not in acute distress.     Appearance: Normal appearance. She is not ill-appearing or toxic-appearing.   HENT:      Head: Normocephalic and atraumatic.      Mouth/Throat:       Mouth: Mucous membranes are dry.   Eyes:      Conjunctiva/sclera: Conjunctivae normal.   Cardiovascular:      Rate and Rhythm: Normal rate and regular rhythm.   Pulmonary:      Effort: Pulmonary effort is normal. No respiratory distress.   Musculoskeletal:         General: Normal range of motion.      Cervical back: Normal range of motion. No rigidity.   Neurological:      General: No focal deficit present.      Mental Status: She is alert.     Labs Reviewed   CBC WITH DIFFERENTIAL WITH PLATELET   BASIC METABOLIC PANEL (8)   HEPATIC FUNCTION PANEL (7)     Medical Decision Making  Patient with deconditioning, unable to safely transfer at home or perform ADLs, will need rehab.  Unable to place from the emergency department, discussed with and seen by crisis who confirms this.  Admitted to hospitalist.    Problems Addressed:  Weakness generalized: acute illness or injury    Amount and/or Complexity of Data Reviewed  External Data Reviewed: notes.     Details: Discharge summary from 8/24/2024 reviewed  Labs: ordered.    Disposition and Plan     Clinical Impression:  1. Weakness generalized       HISTORY AND PHYSICAL EXAMINATION     CHIEF COMPLAINT:  Poor functional status at home.     HISTORY OF PRESENT ILLNESS:  The patient is a 64-year-old  female who was hospitalized for right foot cellulitis and right calf wound requiring debridement and antibiotic treatment, discharged home on August 24, 2 days ago, with oral Zyvox for 7 days.  Today came back with poor mobility and inability to ambulate on her own and weakness.  An attempt to place her in a rehab facility from emergency room failed.  The patient will be admitted to the hospital for further Social Work evaluation and rehabilitation placement.  CBC and chemistry are still pending.     PAST MEDICAL HISTORY:  Hypertension, rheumatoid arthritis, chronic adrenal insufficiency secondary to chronic corticosteroids use, asthma, pancreatitis,  obstructive sleep apnea, obesity, hyperlipidemia, hypertension, anxiety, depression, anemia of chronic inflammatory disease, and nonocclusive coronary artery disease.  Recent hospitalization with peripheral arterial disease studies which were negative.     PAST SURGICAL HISTORY:  Right shoulder rotator cuff repair, wrist fracture open reduction and internal fixation, and recent right distal calf wound debridement.     MEDICATIONS:  Please see medication reconciliation list.     ALLERGIES:  Gadolinium and cephalosporins.  Penicillin and sulfa cause esophagitis.     FAMILY HISTORY:  Mother had diabetes mellitus type 2 and heart disease.  Father diabetes mellitus type 2.     SOCIAL HISTORY:  No tobacco, alcohol, or drug use.  Lives with her family.  Almost bedbound.  Uses a walker for a few steps at times but has not been able to ambulate since she was discharged from the hospital.     REVIEW OF SYSTEMS:  The patient said she had generalized weakness, inability to ambulate on her own recently.  Her right foot and right distal calf wound have been stable.  Denies any fever or chills.  Other 12-point review of systems negative.       PHYSICAL EXAMINATION:    GENERAL:  Alert, oriented to time, place, and person, no acute distress.   VITAL SIGNS:  Temperature 97.9, pulse 73, respiratory rate 114, blood pressure 130/100, pulse ox 92% on room air.  HEENT:  Atraumatic.  Oropharynx clear.  Dry mucous membranes.  Normal hard and soft palate.  Eyes:  Anicteric sclerae.   NECK:  Supple.  No lymphadenopathy.  Trachea midline.  Full range of motion.  LUNGS:  Clear to auscultation bilaterally.  Normal respiratory effort.    HEART:  Regular rate and rhythm.  S1 and S2 auscultated.  No murmur.  ABDOMEN:  Soft, nondistended.  No tenderness.  Positive bowel sounds.  EXTREMITIES:  +2 edema both legs.  Right foot dorsum and right distal calf wounds with granulation tissue.  No evidence of infection.     ASSESSMENT:    1.        Musculoskeletal deconditioning.  2.       Leg edema.  3.       Rheumatoid arthritis.  4.       Hypertension.     PLAN:  The patient will be admitted to general medical floor.  IV Lasix.  Physical and occupational therapy, and  to evaluate the patient for rehab placement.  Also obtain Wound Service consult.  Continue her home medications including Zyvox.  Further recommendations to follow.       8/27:    ENDO:     Adrenal insufficiency , iatrogenic, chronic steroid use   Pt was on prednisone 10 mg for yrs.   She will be on prednisone 20 mg a day starting 8/27/2024 and will taper as tolerated to her dose.  She understands the need for stress dose for acute illness or procedure if needed. She is aware of her dx of adrenal insufficiency      ID:    Reason for Consultation:  Leukocytosis     ASSESSMENT:     Antibiotics: IV Daptomycin; (Linezolid)     # Leukocytosis - possible reactive to hypoglycemia, BM recovery; r/o bacteremia     # RLE cellulitis with posterior leg wound in immunocompromised patient - improved               -s/p bedside debridement 8/15     # Rheumatoid arthritis, on 20 mg prednisone daily, methotrexate, HCQ     # Adrenal insufficiency - Endocrine following; steroids  # Neutropenia - resolved        PLAN:     -  discontinue linezolid  -  start IV daptomycin  -  BCx  -  Follow fever curve, wbc - on Prednisone  -  Reviewed labs, micro, imaging reports, available old records  -  d/w patient, family, staff     History of Present Illness:  Sheri Garzon is a a(n) 64 year old female. 64 year old female with a history of rheumatoid arthritis on chronic 20 mg prednisone daily, methotrexate, HCQ, with recent [facility] admission 7/21-7/27 with vomiting s/p EGD with small hiatal hernia, who presented to [facility] ED on 8/12 with worsening pain in her right foot. Has had a wound for the last month and a half that has not healed. Was on ciprofloxacin last month. No fevers or chills at home. On arrival,  afebrile, wbc 4.5, blood cx obtained, XR R foot with soft tissue swelling, started on vancomycin. Underwent bedside I&D during admission. Discharged on linezolid. Now back with weakness and difficulty walking because of this. Denies fevers, chills, n/v/d, rash.    Physical Exam:  Vital signs: Blood pressure 143/88, pulse 113, temperature 98 °F (36.7 °C), temperature source Oral, resp. rate 18, weight 193 lb 5 oz (87.7 kg), SpO2 98%.     General: awake, in chair, nad  HEENT: Moist mucous membranes. EOMI  Neck: No lymphadenopathy.  Supple.  Cardiovascular: No chest wall tenderness  Respiratory: Symmetric expansion  Abdomen: Soft, nontender, nondistended.   Musculoskeletal: +BLE edema  Integument: R calf wound c/d/I; no erythema, warmth. Minimal ttp     NURSING:    Patient's blood sugar was 39 this am, repeat 89 after treatment.     8/28:    ID:      Subjective:  ROS reviewed. Hypoglycemic again this AM.     ASSESSMENT:     Antibiotics: IV Daptomycin; (Linezolid)     # Leukocytosis - possible reactive to hypoglycemia, BM recovery; r/o bacteremia     # RLE cellulitis with posterior leg wound in immunocompromised patient - improved               -s/p bedside debridement 8/15     # Rheumatoid arthritis, on 20 mg prednisone daily, methotrexate, HCQ     # Adrenal insufficiency - Endocrine following; steroids  # Neutropenia - resolved      PLAN:  -  Continue on IV daptomycin until 8/30.  -  Follow fever curve, wbc - on Prednisone  -  Reviewed labs, micro, imaging reports, available old records  -  d/w patient, RN.    Physical Exam:  BP 90/64 (BP Location: Right arm)   Pulse 72   Temp 98.1 °F (36.7 °C) (Oral)   Resp 18   Wt 193 lb 5 oz (87.7 kg)   SpO2 99%   BMI 35.36 kg/m²      Gen:                   Awake, in bed  HEENT:             EOMI, neck supple  CV/lungs:           RRR, CTAB  Abdom:              Soft, NT/ND, +BS  Skin/extrem:      BLE edema, R posterior calf wound without surrounding erythema  Lines:                  PIV+    HOSPITALIST:    Assessment and Plan:      Musculoskeletal deconditioning related to recent hospitalization  - PT/OT eval  - SW on consult for SNF placement     Hypoglycemia  - endocrine on consult     RLE cellulitis - improved  Leukocytosis  - s/p I&D on 8/15  - on linezolid per ID     RA  - cont hydroxychloroquine  - cont prednisone 10mg daily     H/o adrenal insufficiency  - monitor BP  - cont prednisone     Metabolic acidosis, non-ion gap  - was on sodium bicarb PO previously, continue this     Anemia of chronic disease  - monitor  - no overt bleed  - cont iron PO     LE edema  - cont lasix IV  - recent echo in July - normal EF, G1DD     Sinus tachycardia  - cont metoprolol and monitor tele     dvt proph:    heparin     Code status:    Full    NURSING:    BC positive ID MD notified, IV abx starteed.       8/29:    ID:    Feels better. Pain controlled. Plans for rehab on DC.     ASSESSMENT:     Antibiotics: IV Daptomycin, cefepime; (Linezolid)     # Neisseria species bacteremia  # Leukocytosis - possible reactive to hypoglycemia, BM recovery; r/o bacteremia     # RLE cellulitis with posterior leg wound in immunocompromised patient - improved               -s/p bedside debridement 8/15     # Rheumatoid arthritis, on 20 mg prednisone daily, methotrexate, HCQ     # Adrenal insufficiency - Endocrine following; steroids  # Neutropenia - resolved     PLAN:  -  Continue on IV daptomycin until 8/30. Continue on cefepime.  -  FU repeat blood cx.  -  Follow fever curve, wbc - on Prednisone    Physical Exam:  BP (!) 130/92 (BP Location: Right arm)   Pulse 92   Temp 98.2 °F (36.8 °C) (Oral)   Resp 19   Wt 193 lb 5 oz (87.7 kg)   SpO2 99%   BMI 35.36 kg/m²      Gen:                   Awake, in bed  HEENT:             EOMI, neck supple  CV/lungs:           RRR, CTAB  Abdom:              Soft, NT/ND, +BS  Skin/extrem:      BLE edema, R posterior calf wound without surrounding erythema  Lines:                  PIV+    HOSPITALIST:    Pt on phone making insurance payment.     Objective:   Blood pressure (!) 130/92, pulse 92, temperature 98.2 °F (36.8 °C), temperature source Oral, resp. rate 19, weight 193 lb 5 oz (87.7 kg), SpO2 99%.     Gen:   NAD.  A and O x 3  CV:   RRR, no m/g/r  Pulm:   CTA bilat  Abd:   +bs, soft, NT, ND  LE:   No c/c/e.  Leg wounds with bandages.  Neuro:   nonfocal     Results:            Lab Results   Component Value Date     WBC 12.2 (H) 08/29/2024     HGB 9.0 (L) 08/29/2024     HCT 28.8 (L) 08/29/2024     .0 08/29/2024     CREATSERUM 0.78 08/29/2024     BUN 14 08/29/2024      08/29/2024     K 3.5 08/29/2024      08/29/2024     CO2 22.0 08/29/2024     GLU 73 08/29/2024     CA 7.9 (L) 08/29/2024      Assessment and Plan:      Musculoskeletal deconditioning related to recent hospitalization  - PT/OT eval  - SW on consult for SNF placement     Hypoglycemia  - possible falsley low readings.  - endocrine on consult     RLE cellulitis - improved  Leukocytosis  - s/p I&D on 8/15  - on linezolid per ID, last day is tomorrow     RA  - cont hydroxychloroquine  - cont prednisone 10mg daily     H/o adrenal insufficiency  - monitor BP  - reduce prednisone     Metabolic acidosis, non-ion gap  - was on sodium bicarb PO previously, continue this     Anemia of chronic disease  - monitor  - no overt bleed  - cont iron PO     LE edema  - cont lasix IV  - recent echo in July - normal EF, G1DD     Sinus tachycardia  - cont metoprolol and monitor tele     dvt proph:    heparin     Code status:    Full    MEDICATIONS ADMINISTERED IN LAST 1 DAY:    ceFEPIme (Maxpime) 2 g in sodium chloride 0.9% 100 mL IVPB-MBP       Date Action Dose Route User    8/30/2024 0148 New Bag 2 g Intravenous Ella Temple RN    8/29/2024 1244 New Bag 2 g Intravenous Grace Regalado RN       DAPTOmycin (Cubicin) 400 mg in sodium chloride 0.9% PF IV syringe       Date Action Dose Route User    8/29/2024 1603 New Bag 400  mg Intravenous Grace Regalado RN     furosemide (Lasix) 10 mg/mL injection 40 mg       Date Action Dose Route User    8/30/2024 0856 Given 40 mg Intravenous Cris Quinteros RN    8/29/2024 1603 Given 40 mg Intravenous Grace Regalado RN          heparin (Porcine) 5000 UNIT/ML injection 5,000 Units       Date Action Dose Route User    8/30/2024 0856 Given 5,000 Units Subcutaneous (Left Lower Abdomen) Cris Quinteros RN    8/29/2024 2025 Given 5,000 Units Subcutaneous (Right Upper Abdomen) Ella Temple RN          HYDROcodone-acetaminophen (Norco) 5-325 MG per tab 1 tablet       Date Action Dose Route User    8/30/2024 0157 Given 1 tablet Oral Ella Temple RN    8/29/2024 1603 Given 1 tablet Oral Grace Regalado RN    8/29/2024 1135 Given 1 tablet Oral Grace Regalado RN          HYDROcodone-acetaminophen (Norco) 5-325 MG per tab 2 tablet       Date Action Dose Route User    8/30/2024 0531 Given 2 tablet Oral Ella Temple RN          hydroxychloroquine (Plaquenil) tab 200 mg       Date Action Dose Route User    8/30/2024 0855 Given 200 mg Oral Cris Quinteros RN    8/29/2024 2023 Given 200 mg Oral Ella Temple RN     Vitals (last day)       Date/Time Temp Pulse Resp BP SpO2 Weight O2 Device O2 Flow Rate (L/min) Who    08/30/24 0845 98.3 °F (36.8 °C) 85 18 118/80 -- -- -- --     08/30/24 0525 97.6 °F (36.4 °C) 71 18 103/67 100 % -- None (Room air) -- NA    08/29/24 2012 99.1 °F (37.3 °C) 95 18 110/64 100 % -- None (Room air) -- NA    08/29/24 1602 98.1 °F (36.7 °C) 86 20 117/81 99 % -- None (Room air) --     08/29/24 1135 98.2 °F (36.8 °C) 92 19 130/92 99 % -- None (Room air) --     08/29/24 0744 98.4 °F (36.9 °C) 81 16 100/64 99 % -- None (Room air) --     08/29/24 0523 97.5 °F (36.4 °C) 75 16 102/70 100 % -- None (Room air) --     08/29/24 0131 -- 80 -- 96/63 99 % -- -- --     08/29/24 0129 -- 69 -- -- -- -- -- -- TK

## 2024-08-31 PROCEDURE — 99233 SBSQ HOSP IP/OBS HIGH 50: CPT | Performed by: HOSPITALIST

## 2024-08-31 NOTE — CM/SW NOTE
CM notified Oz Temple liaaman Owens of potential discharge tomorrow, 9/1.  Per Dr. Whalen, ID is awaiting final culture results prior to discharge.    / to remain available for support and/or discharge planning.     Plan: Oz WEINBERG, pending medical clearance    Natalie Liriano RN, BSN  Nurse   [phone]

## 2024-08-31 NOTE — PROGRESS NOTES
[facility]  part of [facility]    Progress Note    Sheri Garzon Patient Status:  Inpatient    1960 MRN [id number]   Location [facility]5W Attending Janes Whalen MD   Hosp Day # 2 PCP TERI MCKENNA MD       Subjective:     No complaints.   Wants to dc.    Objective:   Blood pressure 113/77, pulse 110, temperature 99.2 °F (37.3 °C), temperature source Oral, resp. rate 18, weight 193 lb 5 oz (87.7 kg), SpO2 99%.    Gen:   NAD.  A and O x 3  CV:   RRR, no m/g/r  Pulm:   CTA bilat  Abd:   +bs, soft, NT, ND  LE:   No c/c/e.  Leg wounds with bandages.  Neuro:   nonfocal    Results:     Lab Results   Component Value Date    WBC 12.2 (H) 2024    HGB 9.0 (L) 2024    HCT 28.8 (L) 2024    .0 2024    CREATSERUM 0.78 2024    BUN 14 2024     2024    K 3.5 2024     2024    CO2 22.0 2024    GLU 73 2024    CA 7.9 (L) 2024    ALB 3.1 (L) 2024    ALKPHO 100 2024    BILT 0.4 2024    TP 5.3 (L) 2024    AST 58 (H) 2024    ALT 49 2024    LIP 34 2024    DDIMER 1.69 (H) 2024    MG 1.9 2024    PHOS 2.9 2024    CK 50 2024    B12 748 2024       No results found.        Assessment and Plan:     Musculoskeletal deconditioning related to recent hospitalization  - PT/OT    - SW on consult for SNF placement     Hypoglycemia - improved  - possible falsley low readings.  - endocrine on consult     RLE cellulitis - improved  Neisseria bacteremia.  Leukocytosis - better  - s/p I&D on 8/15  - cont abx per ID.  F/u cultures.   F/I ID and sensitivities of the Neisseria.  - f/u repeat blood cx  - cont cefepime  - f/u cultures     RA  - cont hydroxychloroquine  - cont prednisone 10mg daily     H/o adrenal insufficiency  - monitor BP  - reduced prednisone     Metabolic acidosis, non-ion gap  - was on sodium bicarb PO previously, continue this     Anemia of  chronic disease  - monitor  - no overt bleed  - cont iron PO     LE edema  - cont lasix IV  - recent echo in July - normal EF, G1DD     Sinus tachycardia  - cont metoprolol and monitor tele     dvt proph:    heparin     Code status:    Full    MDM:    High Janes Hernandez MD  8/31/2024

## 2024-08-31 NOTE — PROGRESS NOTES
INFECTIOUS DISEASE PROGRESS NOTE  [facility]  part of formerly [facility] ID PROGRESS NOTE    Sheri Garzon Patient Status:  Observation    1960 MRN [id number]   Location [facility]5W Attending Janes Whalen MD   Hosp Day # 2 PCP TERI MCKENNA MD     Subjective:  ROS reviewed. Tm 99.2. HDS. Awake and sitting comfortably in bed. Feeling better. Asking about discharge.    ASSESSMENT:    Antibiotics: IV Cefepime; (Linezolid, daptomycin)     # Neisseria species bacteremia ?source  # Leukocytosis - possible reactive to hypoglycemia, BM recovery; r/o bacteremia     # RLE cellulitis with posterior leg wound in immunocompromised patient - improved               -s/p bedside debridement 8/15   -s/p linezolid -> daptomycin course, dc      # Rheumatoid arthritis, on 20 mg prednisone daily, methotrexate, HCQ     # Adrenal insufficiency - Endocrine following; steroids  # Neutropenia - resolved     PLAN:  -  Continue on cefepime.   -  FU Neisseria susceptibilities and repeat blood cx.  -  Follow fever curve, wbc - on Prednisone  -  Reviewed labs, micro, imaging reports  -  D/w patient, RN.     History of Present Illness:  64 year old female with a history of rheumatoid arthritis on chronic 20 mg prednisone daily, methotrexate, HCQ, with recent [facility] admission - with vomiting s/p EGD with small hiatal hernia, who presented to [facility] ED on  with worsening pain in her right foot. Has had a wound for the last month and a half that has not healed. Was on ciprofloxacin last month. No fevers or chills at home. On arrival, afebrile, wbc 4.5, blood cx obtained, XR R foot with soft tissue swelling, started on vancomycin. Underwent bedside I&D during admission. Discharged on linezolid. Now back with weakness and difficulty walking because of this. Denies fevers, chills, n/v/d, rash.     Physical Exam:  /74 (BP Location: Radial)   Pulse 100   Temp 99 °F (37.2 °C) (Oral)   Resp  14   Wt 193 lb 5 oz (87.7 kg)   SpO2 97%   BMI 35.36 kg/m²     Gen:   Awake, in bed  HEENT:  EOMI, neck supple  CV/lungs:  RRR, CTAB  Abdom:  Soft, NT/ND  Skin/extrem:  BLE edema w/wound dressed  Lines:  PIV+    Laboratory Data: Reviewed    Microbiology: Reviewed    Radiology: Reviewed    [facility]  [phone]

## 2024-08-31 NOTE — PLAN OF CARE
Problem: Patient Centered Care  Goal: Patient preferences are identified and integrated in the patient's plan of care  Description: Interventions:  - What would you like us to know as we care for you? Home with sister and daughter. Originally from [place].   - Provide timely, complete, and accurate information to patient/family  - Incorporate patient and family knowledge, values, beliefs, and cultural backgrounds into the planning and delivery of care  - Encourage patient/family to participate in care and decision-making at the level they choose  - Honor patient and family perspectives and choices  Outcome: Progressing     Problem: Patient/Family Goals  Goal: Patient/Family Long Term Goal  Description: Patient's Long Term Goal: To go home.     Interventions:  -Monitor vitals  -Monitor labs  -Follow MD orders  -Administer as needed medications per order  -Diagnostic scans/interventions as recommended   - See additional Care Plan goals for specific interventions  Outcome: Progressing  Goal: Patient/Family Short Term Goal  Description: Patient's Short Term Goal: To feel better.     Interventions:   -Monitor vitals  -Monitor labs  -Follow MD orders  -Administer as needed medications per order  -Diagnostic scans/interventions as recommended   - See additional Care Plan goals for specific interventions  Outcome: Progressing     Problem: CARDIOVASCULAR - ADULT  Goal: Maintains optimal cardiac output and hemodynamic stability  Description: INTERVENTIONS:  - Monitor vital signs, rhythm, and trends  - Monitor for bleeding, hypotension and signs of decreased cardiac output  - Evaluate effectiveness of vasoactive medications to optimize hemodynamic stability  - Monitor arterial and/or venous puncture sites for bleeding and/or hematoma  - Assess quality of pulses, skin color and temperature  - Assess for signs of decreased coronary artery perfusion - ex. Angina  - Evaluate fluid balance, assess for edema, trend weights  Outcome:  Progressing  Goal: Absence of cardiac arrhythmias or at baseline  Description: INTERVENTIONS:  - Continuous cardiac monitoring, monitor vital signs, obtain 12 lead EKG if indicated  - Evaluate effectiveness of antiarrhythmic and heart rate control medications as ordered  - Initiate emergency measures for life threatening arrhythmias  - Monitor electrolytes and administer replacement therapy as ordered  Outcome: Progressing     Problem: RESPIRATORY - ADULT  Goal: Achieves optimal ventilation and oxygenation  Description: INTERVENTIONS:  - Assess for changes in respiratory status  - Assess for changes in mentation and behavior  - Position to facilitate oxygenation and minimize respiratory effort  - Oxygen supplementation based on oxygen saturation or ABGs  - Provide Smoking Cessation handout, if applicable  - Encourage broncho-pulmonary hygiene including cough, deep breathe, Incentive Spirometry  - Assess the need for suctioning and perform as needed  - Assess and instruct to report SOB or any respiratory difficulty  - Respiratory Therapy support as indicated  - Manage/alleviate anxiety  - Monitor for signs/symptoms of CO2 retention  Outcome: Progressing     Problem: METABOLIC/FLUID AND ELECTROLYTES - ADULT  Goal: Glucose maintained within prescribed range  Description: INTERVENTIONS:  - Monitor Blood Glucose as ordered  - Assess for signs and symptoms of hyperglycemia and hypoglycemia  - Administer ordered medications to maintain glucose within target range  - Assess barriers to adequate nutritional intake and initiate nutrition consult as needed  - Instruct patient on self management of diabetes  Outcome: Progressing  Goal: Electrolytes maintained within normal limits  Description: INTERVENTIONS:  - Monitor labs and rhythm and assess patient for signs and symptoms of electrolyte imbalances  - Administer electrolyte replacement as ordered  - Monitor response to electrolyte replacements, including rhythm and repeat  lab results as appropriate  - Fluid restriction as ordered  - Instruct patient on fluid and nutrition restrictions as appropriate  Outcome: Progressing  Goal: Hemodynamic stability and optimal renal function maintained  Description: INTERVENTIONS:  - Monitor labs and assess for signs and symptoms of volume excess or deficit  - Monitor intake, output and patient weight  - Monitor urine specific gravity, serum osmolarity and serum sodium as indicated or ordered  - Monitor response to interventions for patient's volume status, including labs, urine output, blood pressure (other measures as available)  - Encourage oral intake as appropriate  - Instruct patient on fluid and nutrition restrictions as appropriate  Outcome: Progressing     Problem: SKIN/TISSUE INTEGRITY - ADULT  Goal: Skin integrity remains intact  Description: INTERVENTIONS  - Assess and document risk factors for pressure ulcer development  - Assess and document skin integrity  - Monitor for areas of redness and/or skin breakdown  - Initiate interventions, skin care algorithm/standards of care as needed  Outcome: Progressing  Goal: Oral mucous membranes remain intact  Description: INTERVENTIONS  - Assess oral mucosa and hygiene practices  - Implement preventative oral hygiene regimen  - Implement oral medicated treatments as ordered  Outcome: Progressing     Problem: MUSCULOSKELETAL - ADULT  Goal: Return mobility to safest level of function  Description: INTERVENTIONS:  - Assess patient stability and activity tolerance for standing, transferring and ambulating w/ or w/o assistive devices  - Assist with transfers and ambulation using safe patient handling equipment as needed  - Ensure adequate protection for wounds/incisions during mobilization  - Obtain PT/OT consults as needed  - Advance activity as appropriate  - Communicate ordered activity level and limitations with patient/family  Outcome: Progressing  Goal: Maintain proper alignment of affected body  part  Description: INTERVENTIONS:  - Support and protect limb and body alignment per provider's orders  - Instruct and reinforce with patient and family use of appropriate assistive device and precautions (e.g. spinal or hip dislocation precautions)  Outcome: Progressing     Problem: Impaired Functional Mobility  Goal: Achieve highest/safest level of mobility/gait  Description: Interventions:  - Assess patient's functional ability and stability  - Promote increasing activity/tolerance for mobility and gait  - Educate and engage patient/family in tolerated activity level and precautions  Outcome: Progressing     Problem: Impaired Activities of Daily Living  Goal: Achieve highest/safest level of independence in self care  Description: Interventions:  - Assess ability and encourage patient to participate in ADLs to maximize function  - Promote sitting position while performing ADLs such as feeding, grooming, and bathing  - Educate and encourage patient/family in tolerated functional activity level and precautions during self-care  Outcome: Progressing     A&O x 4. On room air, saturating %. Complained of pain in her legs bilaterally, PRN Norco tablet x 2 given with some relief. IV antibiotics administered per orders. Frequent rounding preformed to ensure patient's needs are attended to. Call light and personal items within reach.

## 2024-08-31 NOTE — PLAN OF CARE
Fall precautions in place, safety precautions in place. Bed in lowest setting, locked with bed alarm on. Patient educated and encouraged to use call light as needed overnight. Frequent rounding performed overnight by nursing staff.      Problem: Patient Centered Care  Goal: Patient preferences are identified and integrated in the patient's plan of care  Description: Interventions:  - What would you like us to know as we care for you? I'm originally from [place]   - Provide timely, complete, and accurate information to patient/family  - Incorporate patient and family knowledge, values, beliefs, and cultural backgrounds into the planning and delivery of care  - Encourage patient/family to participate in care and decision-making at the level they choose  - Honor patient and family perspectives and choices  Outcome: Progressing     Problem: Patient/Family Goals  Goal: Patient/Family Long Term Goal  Description: Patient's Long Term Goal: To go home    Interventions:  -Monitor vitals  -Monitor labs  -Follow MD orders  -Administer as needed medications per order  -Diagnostic scans/interventions as recommended   - See additional Care Plan goals for specific interventions  Outcome: Progressing  Goal: Patient/Family Short Term Goal  Description: Patient's Short Term Goal: To feel better    Interventions:   -Assess for pain  -Administer prn medication as ordered  -Decrease external stimuli  -Reposition as needed  -Frequent rounding   - See additional Care Plan goals for specific interventions  Outcome: Progressing     Problem: CARDIOVASCULAR - ADULT  Goal: Maintains optimal cardiac output and hemodynamic stability  Description: INTERVENTIONS:  - Monitor vital signs, rhythm, and trends  - Monitor for bleeding, hypotension and signs of decreased cardiac output  - Evaluate effectiveness of vasoactive medications to optimize hemodynamic stability  - Monitor arterial and/or venous puncture sites for bleeding and/or hematoma  -  Assess quality of pulses, skin color and temperature  - Assess for signs of decreased coronary artery perfusion - ex. Angina  - Evaluate fluid balance, assess for edema, trend weights  Outcome: Progressing  Goal: Absence of cardiac arrhythmias or at baseline  Description: INTERVENTIONS:  - Continuous cardiac monitoring, monitor vital signs, obtain 12 lead EKG if indicated  - Evaluate effectiveness of antiarrhythmic and heart rate control medications as ordered  - Initiate emergency measures for life threatening arrhythmias  - Monitor electrolytes and administer replacement therapy as ordered  Outcome: Progressing     Problem: RESPIRATORY - ADULT  Goal: Achieves optimal ventilation and oxygenation  Description: INTERVENTIONS:  - Assess for changes in respiratory status  - Assess for changes in mentation and behavior  - Position to facilitate oxygenation and minimize respiratory effort  - Oxygen supplementation based on oxygen saturation or ABGs  - Provide Smoking Cessation handout, if applicable  - Encourage broncho-pulmonary hygiene including cough, deep breathe, Incentive Spirometry  - Assess the need for suctioning and perform as needed  - Assess and instruct to report SOB or any respiratory difficulty  - Respiratory Therapy support as indicated  - Manage/alleviate anxiety  - Monitor for signs/symptoms of CO2 retention  Outcome: Progressing     Problem: METABOLIC/FLUID AND ELECTROLYTES - ADULT  Goal: Glucose maintained within prescribed range  Description: INTERVENTIONS:  - Monitor Blood Glucose as ordered  - Assess for signs and symptoms of hyperglycemia and hypoglycemia  - Administer ordered medications to maintain glucose within target range  - Assess barriers to adequate nutritional intake and initiate nutrition consult as needed  - Instruct patient on self management of diabetes  Outcome: Progressing  Goal: Electrolytes maintained within normal limits  Description: INTERVENTIONS:  - Monitor labs and rhythm  and assess patient for signs and symptoms of electrolyte imbalances  - Administer electrolyte replacement as ordered  - Monitor response to electrolyte replacements, including rhythm and repeat lab results as appropriate  - Fluid restriction as ordered  - Instruct patient on fluid and nutrition restrictions as appropriate  Outcome: Progressing  Goal: Hemodynamic stability and optimal renal function maintained  Description: INTERVENTIONS:  - Monitor labs and assess for signs and symptoms of volume excess or deficit  - Monitor intake, output and patient weight  - Monitor urine specific gravity, serum osmolarity and serum sodium as indicated or ordered  - Monitor response to interventions for patient's volume status, including labs, urine output, blood pressure (other measures as available)  - Encourage oral intake as appropriate  - Instruct patient on fluid and nutrition restrictions as appropriate  Outcome: Progressing     Problem: SKIN/TISSUE INTEGRITY - ADULT  Goal: Skin integrity remains intact  Description: INTERVENTIONS  - Assess and document risk factors for pressure ulcer development  - Assess and document skin integrity  - Monitor for areas of redness and/or skin breakdown  - Initiate interventions, skin care algorithm/standards of care as needed  Outcome: Progressing  Goal: Oral mucous membranes remain intact  Description: INTERVENTIONS  - Assess oral mucosa and hygiene practices  - Implement preventative oral hygiene regimen  - Implement oral medicated treatments as ordered  Outcome: Progressing     Problem: MUSCULOSKELETAL - ADULT  Goal: Return mobility to safest level of function  Description: INTERVENTIONS:  - Assess patient stability and activity tolerance for standing, transferring and ambulating w/ or w/o assistive devices  - Assist with transfers and ambulation using safe patient handling equipment as needed  - Ensure adequate protection for wounds/incisions during mobilization  - Obtain PT/OT  consults as needed  - Advance activity as appropriate  - Communicate ordered activity level and limitations with patient/family  Outcome: Progressing  Goal: Maintain proper alignment of affected body part  Description: INTERVENTIONS:  - Support and protect limb and body alignment per provider's orders  - Instruct and reinforce with patient and family use of appropriate assistive device and precautions (e.g. spinal or hip dislocation precautions)  Outcome: Progressing     Problem: Impaired Functional Mobility  Goal: Achieve highest/safest level of mobility/gait  Description: Interventions:  - Assess patient's functional ability and stability  - Promote increasing activity/tolerance for mobility and gait  - Educate and engage patient/family in tolerated activity level and precautions  Outcome: Progressing     Problem: Impaired Activities of Daily Living  Goal: Achieve highest/safest level of independence in self care  Description: Interventions:  - Assess ability and encourage patient to participate in ADLs to maximize function  - Promote sitting position while performing ADLs such as feeding, grooming, and bathing  - Educate and encourage patient/family in tolerated functional activity level and precautions during self-care  Outcome: Progressing

## 2024-09-01 VITALS
SYSTOLIC BLOOD PRESSURE: 123 MMHG | DIASTOLIC BLOOD PRESSURE: 79 MMHG | RESPIRATION RATE: 17 BRPM | WEIGHT: 193.31 LBS | TEMPERATURE: 99 F | BODY MASS INDEX: 35 KG/M2 | OXYGEN SATURATION: 99 % | HEART RATE: 106 BPM

## 2024-09-01 LAB — BACTERIA BLD CULT: POSITIVE

## 2024-09-01 PROCEDURE — 99239 HOSP IP/OBS DSCHRG MGMT >30: CPT | Performed by: HOSPITALIST

## 2024-09-01 RX ORDER — CEFPODOXIME PROXETIL 200 MG/1
400 TABLET, FILM COATED ORAL 2 TIMES DAILY
Qty: 40 TABLET | Refills: 0 | Status: ON HOLD | OUTPATIENT
Start: 2024-09-01 | End: 2024-09-11

## 2024-09-01 RX ORDER — METOPROLOL TARTRATE 50 MG
25 TABLET ORAL 2 TIMES DAILY
Status: ON HOLD | COMMUNITY
Start: 2024-09-01

## 2024-09-01 RX ORDER — LOSARTAN POTASSIUM 50 MG/1
25 TABLET ORAL DAILY
Status: ON HOLD | COMMUNITY
Start: 2024-09-01

## 2024-09-01 NOTE — PLAN OF CARE
Problem: Patient Centered Care  Goal: Patient preferences are identified and integrated in the patient's plan of care  Description: Interventions:  - What would you like us to know as we care for you? From home with sister and daughter.   - Provide timely, complete, and accurate information to patient/family  - Incorporate patient and family knowledge, values, beliefs, and cultural backgrounds into the planning and delivery of care  - Encourage patient/family to participate in care and decision-making at the level they choose  - Honor patient and family perspectives and choices  Outcome: Adequate for Discharge     Problem: Patient/Family Goals  Goal: Patient/Family Long Term Goal  Description: Patient's Long Term Goal: To discharge    Interventions:  -Monitor vitals  -Monitor labs  -Follow MD orders  -Administer as needed medications per order  -Diagnostic scans/interventions as recommended   - See additional Care Plan goals for specific interventions  Outcome: Adequate for Discharge  Goal: Patient/Family Short Term Goal  Description: Patient's Short Term Goal: To feel better    Interventions:   -Assess for pain  -Administer prn medication as ordered  -Decrease external stimuli  -Reposition as needed  -Frequent rounding  -Wound care   - See additional Care Plan goals for specific interventions  Outcome: Adequate for Discharge     Problem: CARDIOVASCULAR - ADULT  Goal: Maintains optimal cardiac output and hemodynamic stability  Description: INTERVENTIONS:  - Monitor vital signs, rhythm, and trends  - Monitor for bleeding, hypotension and signs of decreased cardiac output  - Evaluate effectiveness of vasoactive medications to optimize hemodynamic stability  - Monitor arterial and/or venous puncture sites for bleeding and/or hematoma  - Assess quality of pulses, skin color and temperature  - Assess for signs of decreased coronary artery perfusion - ex. Angina  - Evaluate fluid balance, assess for edema, trend  weights  Outcome: Adequate for Discharge  Goal: Absence of cardiac arrhythmias or at baseline  Description: INTERVENTIONS:  - Continuous cardiac monitoring, monitor vital signs, obtain 12 lead EKG if indicated  - Evaluate effectiveness of antiarrhythmic and heart rate control medications as ordered  - Initiate emergency measures for life threatening arrhythmias  - Monitor electrolytes and administer replacement therapy as ordered  Outcome: Adequate for Discharge     Problem: RESPIRATORY - ADULT  Goal: Achieves optimal ventilation and oxygenation  Description: INTERVENTIONS:  - Assess for changes in respiratory status  - Assess for changes in mentation and behavior  - Position to facilitate oxygenation and minimize respiratory effort  - Oxygen supplementation based on oxygen saturation or ABGs  - Provide Smoking Cessation handout, if applicable  - Encourage broncho-pulmonary hygiene including cough, deep breathe, Incentive Spirometry  - Assess the need for suctioning and perform as needed  - Assess and instruct to report SOB or any respiratory difficulty  - Respiratory Therapy support as indicated  - Manage/alleviate anxiety  - Monitor for signs/symptoms of CO2 retention  Outcome: Adequate for Discharge     Problem: METABOLIC/FLUID AND ELECTROLYTES - ADULT  Goal: Glucose maintained within prescribed range  Description: INTERVENTIONS:  - Monitor Blood Glucose as ordered  - Assess for signs and symptoms of hyperglycemia and hypoglycemia  - Administer ordered medications to maintain glucose within target range  - Assess barriers to adequate nutritional intake and initiate nutrition consult as needed  - Instruct patient on self management of diabetes  Outcome: Adequate for Discharge  Goal: Electrolytes maintained within normal limits  Description: INTERVENTIONS:  - Monitor labs and rhythm and assess patient for signs and symptoms of electrolyte imbalances  - Administer electrolyte replacement as ordered  - Monitor  response to electrolyte replacements, including rhythm and repeat lab results as appropriate  - Fluid restriction as ordered  - Instruct patient on fluid and nutrition restrictions as appropriate  Outcome: Adequate for Discharge  Goal: Hemodynamic stability and optimal renal function maintained  Description: INTERVENTIONS:  - Monitor labs and assess for signs and symptoms of volume excess or deficit  - Monitor intake, output and patient weight  - Monitor urine specific gravity, serum osmolarity and serum sodium as indicated or ordered  - Monitor response to interventions for patient's volume status, including labs, urine output, blood pressure (other measures as available)  - Encourage oral intake as appropriate  - Instruct patient on fluid and nutrition restrictions as appropriate  Outcome: Adequate for Discharge     Problem: SKIN/TISSUE INTEGRITY - ADULT  Goal: Skin integrity remains intact  Description: INTERVENTIONS  - Assess and document risk factors for pressure ulcer development  - Assess and document skin integrity  - Monitor for areas of redness and/or skin breakdown  - Initiate interventions, skin care algorithm/standards of care as needed  Outcome: Adequate for Discharge  Goal: Oral mucous membranes remain intact  Description: INTERVENTIONS  - Assess oral mucosa and hygiene practices  - Implement preventative oral hygiene regimen  - Implement oral medicated treatments as ordered  Outcome: Adequate for Discharge     Problem: MUSCULOSKELETAL - ADULT  Goal: Return mobility to safest level of function  Description: INTERVENTIONS:  - Assess patient stability and activity tolerance for standing, transferring and ambulating w/ or w/o assistive devices  - Assist with transfers and ambulation using safe patient handling equipment as needed  - Ensure adequate protection for wounds/incisions during mobilization  - Obtain PT/OT consults as needed  - Advance activity as appropriate  - Communicate ordered activity  level and limitations with patient/family  Outcome: Adequate for Discharge  Goal: Maintain proper alignment of affected body part  Description: INTERVENTIONS:  - Support and protect limb and body alignment per provider's orders  - Instruct and reinforce with patient and family use of appropriate assistive device and precautions (e.g. spinal or hip dislocation precautions)  Outcome: Adequate for Discharge     Problem: Impaired Functional Mobility  Goal: Achieve highest/safest level of mobility/gait  Description: Interventions:  - Assess patient's functional ability and stability  - Promote increasing activity/tolerance for mobility and gait  - Educate and engage patient/family in tolerated activity level and precautions  Outcome: Adequate for Discharge     Problem: Impaired Activities of Daily Living  Goal: Achieve highest/safest level of independence in self care  Description: Interventions:  - Assess ability and encourage patient to participate in ADLs to maximize function  - Promote sitting position while performing ADLs such as feeding, grooming, and bathing  - Educate and encourage patient/family in tolerated functional activity level and precautions during self-care    Outcome: Adequate for Discharge    A&O x 4. On room air, saturating 100%. Complained of pain in lower legs, PRN Norco and Tylenol administered with moderate relief.  Pt tolerating her diet. Up with assistance x 1 , stand and pivot to chair. Rounding preformed, call light and personal items within reach.     Discharge order received. Patient discharging to [facility] with oral antibiotics. Midline removed with TL. Tolerated well. RN report called and given to JULIET Velasquez at [facility]. [facility] arranged for transportation, patient's sister Gladys notified of patient's discharge per patient's request.     All personal items sent with patient.

## 2024-09-01 NOTE — PLAN OF CARE
Fall precautions in place, safety precautions in place. Bed in lowest setting, locked with bed alarm on. Patient educated and encouraged to use call light as needed overnight. Frequent rounding performed overnight by nursing staff. Pain medications given overnight as ordered. Repositioning as needed.       Problem: Patient Centered Care  Goal: Patient preferences are identified and integrated in the patient's plan of care  Description: Interventions:  - What would you like us to know as we care for you? From home with sister  - Provide timely, complete, and accurate information to patient/family  - Incorporate patient and family knowledge, values, beliefs, and cultural backgrounds into the planning and delivery of care  - Encourage patient/family to participate in care and decision-making at the level they choose  - Honor patient and family perspectives and choices  Outcome: Progressing     Problem: Patient/Family Goals  Goal: Patient/Family Long Term Goal  Description: Patient's Long Term Goal: To discharge    Interventions:  -Monitor vitals  -Monitor labs  -Follow MD orders  -Administer as needed medications per order  -Diagnostic scans/interventions as recommended   - See additional Care Plan goals for specific interventions  Outcome: Progressing  Goal: Patient/Family Short Term Goal  Description: Patient's Short Term Goal: To feel better    Interventions:   -Assess for pain  -Administer prn medication as ordered  -Decrease external stimuli  -Reposition as needed  -Frequent rounding  -Wound care     - See additional Care Plan goals for specific interventions  Outcome: Progressing     Problem: CARDIOVASCULAR - ADULT  Goal: Maintains optimal cardiac output and hemodynamic stability  Description: INTERVENTIONS:  - Monitor vital signs, rhythm, and trends  - Monitor for bleeding, hypotension and signs of decreased cardiac output  - Evaluate effectiveness of vasoactive medications to optimize hemodynamic stability  -  Monitor arterial and/or venous puncture sites for bleeding and/or hematoma  - Assess quality of pulses, skin color and temperature  - Assess for signs of decreased coronary artery perfusion - ex. Angina  - Evaluate fluid balance, assess for edema, trend weights  Outcome: Progressing  Goal: Absence of cardiac arrhythmias or at baseline  Description: INTERVENTIONS:  - Continuous cardiac monitoring, monitor vital signs, obtain 12 lead EKG if indicated  - Evaluate effectiveness of antiarrhythmic and heart rate control medications as ordered  - Initiate emergency measures for life threatening arrhythmias  - Monitor electrolytes and administer replacement therapy as ordered  Outcome: Progressing     Problem: RESPIRATORY - ADULT  Goal: Achieves optimal ventilation and oxygenation  Description: INTERVENTIONS:  - Assess for changes in respiratory status  - Assess for changes in mentation and behavior  - Position to facilitate oxygenation and minimize respiratory effort  - Oxygen supplementation based on oxygen saturation or ABGs  - Provide Smoking Cessation handout, if applicable  - Encourage broncho-pulmonary hygiene including cough, deep breathe, Incentive Spirometry  - Assess the need for suctioning and perform as needed  - Assess and instruct to report SOB or any respiratory difficulty  - Respiratory Therapy support as indicated  - Manage/alleviate anxiety  - Monitor for signs/symptoms of CO2 retention  Outcome: Progressing     Problem: METABOLIC/FLUID AND ELECTROLYTES - ADULT  Goal: Glucose maintained within prescribed range  Description: INTERVENTIONS:  - Monitor Blood Glucose as ordered  - Assess for signs and symptoms of hyperglycemia and hypoglycemia  - Administer ordered medications to maintain glucose within target range  - Assess barriers to adequate nutritional intake and initiate nutrition consult as needed  - Instruct patient on self management of diabetes  Outcome: Progressing  Goal: Electrolytes maintained  within normal limits  Description: INTERVENTIONS:  - Monitor labs and rhythm and assess patient for signs and symptoms of electrolyte imbalances  - Administer electrolyte replacement as ordered  - Monitor response to electrolyte replacements, including rhythm and repeat lab results as appropriate  - Fluid restriction as ordered  - Instruct patient on fluid and nutrition restrictions as appropriate  Outcome: Progressing  Goal: Hemodynamic stability and optimal renal function maintained  Description: INTERVENTIONS:  - Monitor labs and assess for signs and symptoms of volume excess or deficit  - Monitor intake, output and patient weight  - Monitor urine specific gravity, serum osmolarity and serum sodium as indicated or ordered  - Monitor response to interventions for patient's volume status, including labs, urine output, blood pressure (other measures as available)  - Encourage oral intake as appropriate  - Instruct patient on fluid and nutrition restrictions as appropriate  Outcome: Progressing     Problem: SKIN/TISSUE INTEGRITY - ADULT  Goal: Skin integrity remains intact  Description: INTERVENTIONS  - Assess and document risk factors for pressure ulcer development  - Assess and document skin integrity  - Monitor for areas of redness and/or skin breakdown  - Initiate interventions, skin care algorithm/standards of care as needed  Outcome: Progressing  Goal: Oral mucous membranes remain intact  Description: INTERVENTIONS  - Assess oral mucosa and hygiene practices  - Implement preventative oral hygiene regimen  - Implement oral medicated treatments as ordered  Outcome: Progressing     Problem: MUSCULOSKELETAL - ADULT  Goal: Return mobility to safest level of function  Description: INTERVENTIONS:  - Assess patient stability and activity tolerance for standing, transferring and ambulating w/ or w/o assistive devices  - Assist with transfers and ambulation using safe patient handling equipment as needed  - Ensure  adequate protection for wounds/incisions during mobilization  - Obtain PT/OT consults as needed  - Advance activity as appropriate  - Communicate ordered activity level and limitations with patient/family  Outcome: Progressing  Goal: Maintain proper alignment of affected body part  Description: INTERVENTIONS:  - Support and protect limb and body alignment per provider's orders  - Instruct and reinforce with patient and family use of appropriate assistive device and precautions (e.g. spinal or hip dislocation precautions)  Outcome: Progressing     Problem: Impaired Functional Mobility  Goal: Achieve highest/safest level of mobility/gait  Description: Interventions:  - Assess patient's functional ability and stability  - Promote increasing activity/tolerance for mobility and gait  - Educate and engage patient/family in tolerated activity level and precautions  Outcome: Progressing     Problem: Impaired Activities of Daily Living  Goal: Achieve highest/safest level of independence in self care  Description: Interventions:  - Assess ability and encourage patient to participate in ADLs to maximize function  - Promote sitting position while performing ADLs such as feeding, grooming, and bathing  - Educate and encourage patient/family in tolerated functional activity level and precautions during self-care  Outcome: Progressing

## 2024-09-01 NOTE — DISCHARGE SUMMARY
[facility]  part of [facility]    Discharge Summary    Sheri Garzon Patient Status:  Inpatient    1960 MRN [id number]   Location [facility]5W Attending Janes Whalen MD   Hosp Day # 3 PCP TERI MCKENNA MD     Date of Admission: 2024 Disposition:   Home or Self Care     Date of Discharge:  2024     Admitting Diagnosis:   Weakness generalized [R53.1]    Hospital Discharge Diagnoses:    Musculoskeletal deconditioning    Hypoglycemia    RLE cellulitis   Neisseria bacteremia.  Leukocytosis - better  Hx of RA  H/o adrenal insufficiency  Metabolic acidosis, non-ion gap  Anemia of chronic disease  LE edema  Sinus tachycardia      Problem List:     Patient Active Problem List   Diagnosis    Dehydration    Metabolic acidosis    Bilious vomiting with nausea    Difficult intravenous access    Hypokalemia    Hypomagnesemia    Hiatal hernia    Cellulitis of right lower extremity    Cellulitis    RTA (renal tubular acidosis)    Adrenal insufficiency ([place] disease) (HCC)    Current chronic use of systemic steroids    Neutropenia (HCC)    Anemia of infection    Weakness generalized    Leg edema    Adrenal insufficiency (HCC)       Physical Exam:      BP 97/63 (BP Location: Radial)   Pulse 97   Temp 98.8 °F (37.1 °C) (Oral)   Resp 18   Wt 193 lb 5 oz (87.7 kg)   SpO2 100%   BMI 35.36 kg/m²     Gen:  NAD.  A and O x  3  CV:   RRR.  No m/g/r  Pulm:   CTA bilat  Abd:   +bs, soft, NT, ND  LE:  No c/c/e  Neuro:  nonfocal      Reason for Admission:   Poor functional status at home.     HISTORY OF PRESENT ILLNESS:  The patient is a 64-year-old  female who was hospitalized for right foot cellulitis and right calf wound requiring debridement and antibiotic treatment, discharged home on , 2 days ago, with oral Zyvox for 7 days.  Today came back with poor mobility and inability to ambulate on her own and weakness.  An attempt to place her in a rehab  facility from emergency room failed.  The patient will be admitted to the hospital for further Social Work evaluation and rehabilitation placement.  CBC and chemistry are still pending.      Hospital Course:     Musculoskeletal deconditioning related to recent hospitalization  - PT/OT    - SW on consult for SNF placement     Hypoglycemia - improved  - probably falsley low readings.  - endocrine on consult     RLE cellulitis - improved  Neisseria bacteremia. - possible contaminant   Leukocytosis - better  - s/p I&D on 8/15  - was on abx per ID.  Repeat cultures negative.    Pt was on iv cefepime and daptomycin.    To SNF on cefpodoxime.     RA  - cont hydroxychloroquine  - cont prednisone 10mg daily     H/o adrenal insufficiency  - monitor BP  - reduced prednisone per endo     Metabolic acidosis, non-ion gap  - was on sodium bicarb PO previously, continue this     Anemia of chronic disease  - monitor  - no overt bleed  - cont iron PO     LE edema  - was given lasix IV  - recent echo in July - normal EF, G1DD     Sinus tachycardia  - cont metoprolol and monitor tele     dvt proph:    heparin     Code status:    Full    Consultations:   ID, endocrinology     Discharge Condition:  Good    Discharge Medications:      Discharge Medications        START taking these medications        Instructions Prescription details   cefpodoxime 200 MG Tabs  Commonly known as: Vantin      Take 2 tablets (400 mg total) by mouth 2 (two) times daily for 10 days.   Stop taking on: September 11, 2024  Quantity: 40 tablet  Refills: 0     Coppermin 5 MG Tabs  Generic drug: Copper      Take 1 tablet by mouth daily.   Quantity: 30 tablet  Refills: 0            CHANGE how you take these medications        Instructions Prescription details   losartan 50 MG Tabs  Commonly known as: Cozaar  What changed: how much to take      Take 0.5 tablets (25 mg total) by mouth daily.   Refills: 0     metoprolol tartrate 50 MG Tabs  Commonly known as:  Lopressor  What changed: how much to take      Take 0.5 tablets (25 mg total) by mouth 2 (two) times daily.   Refills: 0     predniSONE 10 MG Tabs  Commonly known as: Deltasone  What changed:   how much to take  how to take this  when to take this  additional instructions      Take 1 tablet (10 mg total) by mouth daily with breakfast.   Refills: 0            CONTINUE taking these medications        Instructions Prescription details   Acetaminophen 8 Hour 650 MG Tbcr  Generic drug: Acetaminophen ER      Take 2-3 tablets (1,300-1,950 mg total) by mouth every 8 (eight) hours as needed for Pain.   Refills: 0     albuterol 108 (90 Base) MCG/ACT Aers  Commonly known as: Ventolin HFA      Inhale 2 puffs into the lungs every 4 to 6 hours as needed for Wheezing.   Refills: 0     Aspirin 81 MG Caps      Take 81 mg by mouth daily.   Refills: 0     atorvastatin 10 MG Tabs  Commonly known as: Lipitor      Take 1 tablet (10 mg total) by mouth daily.   Refills: 0     clopidogrel 75 MG Tabs  Commonly known as: Plavix      Take 1 tablet (75 mg total) by mouth daily.   Refills: 0     ergocalciferol 1.25 MG (38938 UT) Caps  Commonly known as: Vitamin D2      Take 1 capsule (50,000 Units total) by mouth once a week.   Refills: 0     Ferrous Gluconate 324 (38 Fe) MG Tabs      Take 1 tablet (325 mg total) by mouth daily with breakfast.   Stop taking on: September 23, 2024  Quantity: 30 tablet  Refills: 0     folic acid 1 MG Tabs  Commonly known as: Folvite      Take 1 tablet (1 mg total) by mouth daily.   Quantity: 90 tablet  Refills: 0     hydroxychloroquine 200 MG Tabs  Commonly known as: Plaquenil      Take 1 tablet (200 mg total) by mouth 2 (two) times daily.   Quantity: 180 tablet  Refills: 0     ibuprofen 200 MG Tabs  Commonly known as: Motrin      Take 3 tablets (600 mg total) by mouth every 8 (eight) hours as needed for Pain.   Refills: 0     oxybutynin ER 10 MG Tb24  Commonly known as: Ditropan-XL      Take 1 tablet (10 mg  total) by mouth daily.   Refills: 0     Potassium Chloride ER 10 MEQ Tbcr      Take 1 tablet (10 mEq total) by mouth 2 (two) times daily.   Refills: 0     sodium bicarbonate 650 MG Tabs      Take 1 tablet (650 mg total) by mouth 2 (two) times daily.   Stop taking on: September 29, 2024  Quantity: 60 tablet  Refills: 0            STOP taking these medications      amLODIPine 5 MG Tabs  Commonly known as: Norvasc        linezolid 600 MG Tabs  Commonly known as: Zyvox        pantoprazole 40 MG Tbec  Commonly known as: Protonix               ASK your doctor about these medications        Instructions Prescription details   methotrexate 2.5 MG Tabs  Commonly known as: Rheumatrex      Take 1 tablet (2.5 mg total) by mouth once a week.   Refills: 0               Where to Get Your Medications        These medications were sent to [id number] - [place], [place] - [address] AT [facility], [phone], [phone] [address], [facility] [address]      Phone: [phone]   cefpodoxime 200 MG Tabs  Coppermin 5 MG Tabs  sodium bicarbonate 650 MG Tabs         Follow up Visits:     Follow-up Information       Sulma Juarez MD. Schedule an appointment as soon as possible for a visit in 1 week(s).    Specialty: Internal Medicine  Contact information:  [address] [facility] [address]  [phone]               Meredith Powell MD. Schedule an appointment as soon as possible for a visit in 1 week(s).    Specialty: ENDOCRINOLOGY  Contact information:  [address] [facility] [address]  [facility] [address] [phone]                             Hospital Discharge Diagnoses:  Deconditioning    Lace+ Score: 56  59-90 High Risk  29-58 Medium Risk  0-28   Low Risk.    TCM Follow-Up Recommendation:  LACE > 58: High Risk of readmission after discharge from the hospital.    >35 minutes spent preparing this discharge.    Janes Hernandez MD  9/1/2024  4:09 PM

## 2024-09-01 NOTE — PROGRESS NOTES
INFECTIOUS DISEASE PROGRESS NOTE  [facility]  part of [facility] ID PROGRESS NOTE    Sheri Garzon Patient Status:  Observation    1960 MRN [id number]   Location [facility]5W Attending Janes Whalen MD   Hosp Day # 3 PCP TERI MCKENNA MD     Subjective:  ROS reviewed. No acute events. Happy to be going home today.     ASSESSMENT:    Antibiotics: IV Cefepime; (Linezolid, daptomycin)     # Neisseria species bacteremia ?source  # Leukocytosis - possible reactive to hypoglycemia, BM recovery; r/o bacteremia     # RLE cellulitis with posterior leg wound in immunocompromised patient - improved               -s/p bedside debridement 8/15   -s/p linezolid -> daptomycin course, dc      # Rheumatoid arthritis, on 20 mg prednisone daily, methotrexate, HCQ     # Adrenal insufficiency - Endocrine following; steroids  # Neutropenia - resolved     PLAN:  -  On cefepime. Per Micro Neisseria susceptibilities are not routinely done and needs to sent to outside lab. Pt clinically improving. Ok to discharge home oral cefpodoxime for 10 days  -  Follow fever curve, wbc - on Prednisone  -  Reviewed labs, micro, imaging reports  -  D/w patient, RN.     History of Present Illness:  64 year old female with a history of rheumatoid arthritis on chronic 20 mg prednisone daily, methotrexate, HCQ, with recent [facility] admission - with vomiting s/p EGD with small hiatal hernia, who presented to [facility] ED on  with worsening pain in her right foot. Has had a wound for the last month and a half that has not healed. Was on ciprofloxacin last month. No fevers or chills at home. On arrival, afebrile, wbc 4.5, blood cx obtained, XR R foot with soft tissue swelling, started on vancomycin. Underwent bedside I&D during admission. Discharged on linezolid. Now back with weakness and difficulty walking because of this. Denies fevers, chills, n/v/d, rash.     Physical Exam:  BP 97/63 (BP Location:  Radial)   Pulse 97   Temp 98.8 °F (37.1 °C) (Oral)   Resp 18   Wt 193 lb 5 oz (87.7 kg)   SpO2 100%   BMI 35.36 kg/m²     Gen:   Awake, in bed  HEENT:  EOMI, neck supple  CV/lungs:  RRR, CTAB  Abdom:  Soft, NT/ND  Skin/extrem:  BLE edema w/wound dressed  Lines:  PIV+    Laboratory Data: Reviewed    Microbiology: Reviewed    Radiology: Reviewed    [facility]  [phone]

## 2024-09-01 NOTE — CM/SW NOTE
09/01/24 1428   Discharge disposition   Expected discharge disposition subacute   Post Acute Care Provider [place]   Discharge transportation [facility]     Pt received MDO for discharge. LAURA confirmed min [facility]er[facility] at [facility] that they have abed avail for patient. LAURA updated RN. LAURA ordered transportation of a [facility] through [facility]. [facility] will transport the pt at 5:00 PM to [facility]  PCS form/ flow sheet completed. RN to attach and print out with After Visit Summary Packet. Patient/family notified transport is not covered by insurance. Pt/family are agreeable to the charge of 70.00. RN to call report to [facility]/Natasha Gutierrez at [phone]    [facility] [phone]      SW/AMANDA to remain available for support and/or discharge planning.     Quyen Hdz, MSW, LSW   x [phone]

## 2024-09-03 LAB — ZINC, RBC: 2279 UG/DL

## 2024-09-05 ENCOUNTER — APPOINTMENT (OUTPATIENT)
Dept: CT IMAGING | Facility: HOSPITAL | Age: 64
End: 2024-09-05
Attending: EMERGENCY MEDICINE
Payer: COMMERCIAL

## 2024-09-05 ENCOUNTER — HOSPITAL ENCOUNTER (INPATIENT)
Facility: HOSPITAL | Age: 64
LOS: 7 days | Discharge: NURSING, REHAB OR LONG-TERM CARE | End: 2024-09-12
Attending: EMERGENCY MEDICINE | Admitting: HOSPITALIST
Payer: COMMERCIAL

## 2024-09-05 ENCOUNTER — APPOINTMENT (OUTPATIENT)
Dept: GENERAL RADIOLOGY | Facility: HOSPITAL | Age: 64
End: 2024-09-05
Attending: EMERGENCY MEDICINE
Payer: COMMERCIAL

## 2024-09-05 DIAGNOSIS — R41.82 ALTERED MENTAL STATUS, UNSPECIFIED ALTERED MENTAL STATUS TYPE: Primary | ICD-10-CM

## 2024-09-05 DIAGNOSIS — L03.115 CELLULITIS OF RIGHT LOWER EXTREMITY: ICD-10-CM

## 2024-09-05 PROBLEM — F23 ACUTE PSYCHOSIS (HCC): Status: ACTIVE | Noted: 2024-09-05

## 2024-09-05 PROBLEM — F23 ACUTE PSYCHOSIS (HCC): Status: ACTIVE | Noted: 2024-01-01

## 2024-09-05 LAB
ALBUMIN SERPL-MCNC: 3.1 G/DL (ref 3.2–4.8)
ALP LIVER SERPL-CCNC: 140 U/L
ALT SERPL-CCNC: 33 U/L
AMPHET UR QL SCN: NEGATIVE
ANION GAP SERPL CALC-SCNC: 6 MMOL/L (ref 0–18)
AST SERPL-CCNC: 38 U/L (ref ?–34)
ATRIAL RATE: 101 BPM
BASOPHILS # BLD AUTO: 0.02 X10(3) UL (ref 0–0.2)
BASOPHILS NFR BLD AUTO: 0.1 %
BENZODIAZ UR QL SCN: NEGATIVE
BILIRUB DIRECT SERPL-MCNC: 0.3 MG/DL (ref ?–0.3)
BILIRUB SERPL-MCNC: 0.7 MG/DL (ref 0.2–1.1)
BILIRUB UR QL: NEGATIVE
BUN BLD-MCNC: 12 MG/DL (ref 9–23)
BUN/CREAT SERPL: 17.6 (ref 10–20)
CALCIUM BLD-MCNC: 8.6 MG/DL (ref 8.7–10.4)
CANNABINOIDS UR QL SCN: NEGATIVE
CHLORIDE SERPL-SCNC: 110 MMOL/L (ref 98–112)
CLARITY UR: CLEAR
CO2 SERPL-SCNC: 25 MMOL/L (ref 21–32)
COCAINE UR QL: NEGATIVE
COLOR UR: YELLOW
CREAT BLD-MCNC: 0.68 MG/DL
CREAT UR-SCNC: 108.7 MG/DL
DEPRECATED RDW RBC AUTO: 63.1 FL (ref 35.1–46.3)
EGFRCR SERPLBLD CKD-EPI 2021: 97 ML/MIN/1.73M2 (ref 60–?)
EOSINOPHIL # BLD AUTO: 0 X10(3) UL (ref 0–0.7)
EOSINOPHIL NFR BLD AUTO: 0 %
ERYTHROCYTE [DISTWIDTH] IN BLOOD BY AUTOMATED COUNT: 19.8 % (ref 11–15)
ETHANOL SERPL-MCNC: <3 MG/DL (ref ?–3)
FENTANYL UR QL SCN: NEGATIVE
GLUCOSE BLD-MCNC: 87 MG/DL (ref 70–99)
GLUCOSE UR-MCNC: NORMAL MG/DL
HCT VFR BLD AUTO: 27 %
HGB BLD-MCNC: 8.9 G/DL
HGB UR QL STRIP.AUTO: NEGATIVE
HYALINE CASTS #/AREA URNS AUTO: PRESENT /LPF
IMM GRANULOCYTES # BLD AUTO: 0.16 X10(3) UL (ref 0–1)
IMM GRANULOCYTES NFR BLD: 1 %
KETONES UR-MCNC: NEGATIVE MG/DL
LEUKOCYTE ESTERASE UR QL STRIP.AUTO: NEGATIVE
LIPASE SERPL-CCNC: 38 U/L (ref 12–53)
LYMPHOCYTES # BLD AUTO: 1.24 X10(3) UL (ref 1–4)
LYMPHOCYTES NFR BLD AUTO: 7.5 %
MAGNESIUM SERPL-MCNC: 1.7 MG/DL (ref 1.6–2.6)
MCH RBC QN AUTO: 29.7 PG (ref 26–34)
MCHC RBC AUTO-ENTMCNC: 33 G/DL (ref 31–37)
MCV RBC AUTO: 90 FL
MDMA UR QL SCN: NEGATIVE
MONOCYTES # BLD AUTO: 1.48 X10(3) UL (ref 0.1–1)
MONOCYTES NFR BLD AUTO: 8.9 %
NEUTROPHILS # BLD AUTO: 13.71 X10 (3) UL (ref 1.5–7.7)
NEUTROPHILS # BLD AUTO: 13.71 X10(3) UL (ref 1.5–7.7)
NEUTROPHILS NFR BLD AUTO: 82.5 %
NITRITE UR QL STRIP.AUTO: NEGATIVE
OSMOLALITY SERPL CALC.SUM OF ELEC: 291 MOSM/KG (ref 275–295)
OXYCODONE UR QL SCN: NEGATIVE
P AXIS: 43 DEGREES
P-R INTERVAL: 156 MS
PH UR: 7 [PH] (ref 5–8)
PLATELET # BLD AUTO: 309 10(3)UL (ref 150–450)
POTASSIUM SERPL-SCNC: 3.3 MMOL/L (ref 3.5–5.1)
PROT SERPL-MCNC: 5.5 G/DL (ref 5.7–8.2)
PROT UR-MCNC: 70 MG/DL
Q-T INTERVAL: 308 MS
QRS DURATION: 76 MS
QTC CALCULATION (BEZET): 399 MS
R AXIS: 10 DEGREES
RBC # BLD AUTO: 3 X10(6)UL
SARS-COV-2 RNA RESP QL NAA+PROBE: NOT DETECTED
SODIUM SERPL-SCNC: 141 MMOL/L (ref 136–145)
SP GR UR STRIP: 1.02 (ref 1–1.03)
T AXIS: 18 DEGREES
UROBILINOGEN UR STRIP-ACNC: NORMAL
VENTRICULAR RATE: 101 BPM
WBC # BLD AUTO: 16.6 X10(3) UL (ref 4–11)

## 2024-09-05 PROCEDURE — 71045 X-RAY EXAM CHEST 1 VIEW: CPT | Performed by: EMERGENCY MEDICINE

## 2024-09-05 PROCEDURE — 99223 1ST HOSP IP/OBS HIGH 75: CPT | Performed by: HOSPITALIST

## 2024-09-05 PROCEDURE — 70450 CT HEAD/BRAIN W/O DYE: CPT | Performed by: EMERGENCY MEDICINE

## 2024-09-05 RX ORDER — MAGNESIUM SULFATE HEPTAHYDRATE 40 MG/ML
2 INJECTION, SOLUTION INTRAVENOUS ONCE
Status: COMPLETED | OUTPATIENT
Start: 2024-09-05 | End: 2024-09-06

## 2024-09-05 RX ORDER — ONDANSETRON 2 MG/ML
4 INJECTION INTRAMUSCULAR; INTRAVENOUS EVERY 6 HOURS PRN
Status: DISCONTINUED | OUTPATIENT
Start: 2024-09-05 | End: 2024-09-12

## 2024-09-05 RX ORDER — HALOPERIDOL 5 MG/ML
5 INJECTION INTRAMUSCULAR ONCE
Status: COMPLETED | OUTPATIENT
Start: 2024-09-05 | End: 2024-09-05

## 2024-09-05 RX ORDER — HYDROCODONE BITARTRATE AND ACETAMINOPHEN 10; 325 MG/1; MG/1
1 TABLET ORAL EVERY 4 HOURS PRN
Status: DISCONTINUED | OUTPATIENT
Start: 2024-09-05 | End: 2024-09-12

## 2024-09-05 RX ORDER — ENOXAPARIN SODIUM 100 MG/ML
40 INJECTION SUBCUTANEOUS DAILY
Status: DISCONTINUED | OUTPATIENT
Start: 2024-09-06 | End: 2024-09-12

## 2024-09-05 RX ORDER — DROPERIDOL 2.5 MG/ML
2.5 INJECTION, SOLUTION INTRAMUSCULAR; INTRAVENOUS ONCE
Status: COMPLETED | OUTPATIENT
Start: 2024-09-05 | End: 2024-09-05

## 2024-09-05 RX ORDER — ACETAMINOPHEN 500 MG
500 TABLET ORAL EVERY 4 HOURS PRN
Status: DISCONTINUED | OUTPATIENT
Start: 2024-09-05 | End: 2024-09-12

## 2024-09-05 RX ORDER — CLOTRIMAZOLE AND BETAMETHASONE DIPROPIONATE 10; .64 MG/G; MG/G
1 CREAM TOPICAL 2 TIMES DAILY
COMMUNITY

## 2024-09-05 RX ORDER — LORAZEPAM 2 MG/ML
2 INJECTION INTRAMUSCULAR ONCE
Status: COMPLETED | OUTPATIENT
Start: 2024-09-05 | End: 2024-09-05
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
## 2024-09-05 NOTE — ED PROVIDER NOTES
Patient Seen in: [facility] Emergency Department    History     Chief Complaint   Patient presents with    Altered Mental Status       HPI    64-year-old female history of hypertension, hyperlipidemia who was sent from a nursing facility due to altered mental status.  Patient normally awake and alert oriented as for per the nursing facility.  Patient was her name date she is in hospital however cannot recall the president's name.  Denies any complaints at this time.    History reviewed.   Past Medical History:    Allergic rhinitis    Anxiety    Arthritis    RA and Osteoarthritis    Asthma (HCC)    Depression    Not officially diagnosed    Esophageal reflux    Essential hypertension    High blood pressure    High cholesterol    Hyperlipidemia    Myocardial infarction (HCC)    Cardiac event    Osteoarthritis    Pancreatitis (HCC)    Problems with swallowing    RA (rheumatoid arthritis) (HCC)    Sleep apnea       History reviewed.   Past Surgical History:   Procedure Laterality Date    Colonoscopy      Other surgical history      Revision of uterine anomaly      Rotator cuff repair      Wrist fracture surgery           Medications :  (Not in a hospital admission)       Family History   Problem Relation Age of Onset    Diabetes Mother     Genetic Disease Mother     Heart Disorder Mother     Hypertension Mother     Obesity Mother     Diabetes Father     Hypertension Father     Depression Daughter     Psychiatric Daughter        Smoking Status:   Social History     Socioeconomic History    Marital status: Single   Tobacco Use    Smoking status: Never    Smokeless tobacco: Never   Vaping Use    Vaping status: Never Used   Substance and Sexual Activity    Alcohol use: Not Currently    Drug use: Never       Constitutional and vital signs reviewed.      Social History and Family History elements reviewed from today, pertinent positives to the presenting problem noted.    Physical Exam     ED Triage Vitals   BP  09/05/24 1232 158/82   Pulse 09/05/24 1232 108   Resp 09/05/24 1232 18   Temp 09/05/24 1236 99 °F (37.2 °C)   Temp src 09/05/24 1236 Oral   SpO2 09/05/24 1232 98 %   O2 Device 09/05/24 1232 None (Room air)       All measures to prevent infection transmission during my interaction with the patient were taken. Handwashing was performed prior to and after the exam.  Stethoscope and any equipment used during my examination was cleaned with super sani-cloth germicidal wipes following the exam.     Physical Exam  Vitals and nursing note reviewed.   Eyes:      Pupils: Pupils are equal, round, and reactive to light.   Cardiovascular:      Rate and Rhythm: Normal rate.      Pulses: Normal pulses.   Pulmonary:      Effort: Pulmonary effort is normal.   Abdominal:      Palpations: Abdomen is soft.   Musculoskeletal:         General: No swelling. Normal range of motion.   Skin:     General: Skin is warm and dry.      Capillary Refill: Capillary refill takes less than 2 seconds.   Neurological:      General: No focal deficit present.      Mental Status: She is alert.         ED Course        Labs Reviewed   CBC WITH DIFFERENTIAL WITH PLATELET - Abnormal; Notable for the following components:       Result Value    WBC 16.6 (*)     RBC 3.00 (*)     HGB 8.9 (*)     HCT 27.0 (*)     RDW-SD 63.1 (*)     RDW 19.8 (*)     Neutrophil Absolute Prelim 13.71 (*)     Neutrophil Absolute 13.71 (*)     Monocyte Absolute 1.48 (*)     All other components within normal limits   BASIC METABOLIC PANEL (8) - Abnormal; Notable for the following components:    Potassium 3.3 (*)     Calcium, Total 8.6 (*)     All other components within normal limits   HEPATIC FUNCTION PANEL (7) - Abnormal; Notable for the following components:    AST 38 (*)     Alkaline Phosphatase 140 (*)     Total Protein 5.5 (*)     Albumin 3.1 (*)     All other components within normal limits   DRUG SCREEN 8 W/OUT CONFIRMATION, URINE - Abnormal; Notable for the following  components:    Opiate Urine Presumed Positive (*)     All other components within normal limits   URINALYSIS, ROUTINE - Abnormal; Notable for the following components:    Protein Urine 70 (*)     Squamous Epi. Cells Few (*)     Hyaline Casts Present (*)     All other components within normal limits   LIPASE - Normal   ETHYL ALCOHOL - Normal   MAGNESIUM - Normal   RAPID SARS-COV-2 BY PCR - Normal     EKG    Rate, intervals and axes as noted on EKG Report.  Rate: 101  Rhythm: Sinus Rhythm  Reading: Sinus tachycardia, heart rate 1 1, normal intervals, normal axis           As Interpreted by me    Imaging Results Available and Reviewed while in ED: CT BRAIN OR HEAD (CPT=70450)    Result Date: 9/5/2024  CONCLUSION:   No transcortical area of hypoattenuation to suggest an acute infarct.  No acute intracranial hemorrhage, midline shift, mass effect, or hydrocephalus.  Lesser incidental findings described above.    Dictated by (CST): Ron Whitman MD on 9/05/2024 at 2:25 PM     Finalized by (CST): Ron Whitman MD on 9/05/2024 at 2:27 PM          XR CHEST AP PORTABLE  (CPT=71045)    Result Date: 9/5/2024  CONCLUSION:  1. Mild cardiomegaly and normal pulmonary vascularity.  No acute airspace disease.    Dictated by (CST): Ronen Washington MD on 9/05/2024 at 2:15 PM     Finalized by (CST): Ronen Washington MD on 9/05/2024 at 2:16 PM         ED Medications Administered:   Medications   LORazepam (Ativan) 2 mg/mL injection 2 mg (2 mg Intramuscular Given 9/5/24 1322)   haloperidol lactate (Haldol) 5 MG/ML injection 5 mg (5 mg Intramuscular Given 9/5/24 1442)   droperidol (Inapsine) 2.5 mg/mL injection 2.5 mg (2.5 mg Intravenous Given 9/5/24 1548)         MDM     Vitals:    09/05/24 1247 09/05/24 1542 09/05/24 1600 09/05/24 1700   BP: 99/71 118/74 116/74 100/68   Pulse: 101 102 100 97   Resp: 24 26 19 21   Temp:       TempSrc:       SpO2: 100% 100% 99% 100%   Weight:       Height:         *I personally reviewed and interpreted  all ED vitals.    Pulse Ox: 98%, Room air, Normal     Monitor Interpretation:   normal sinus rhythm as interpreted by me.  The cardiac monitor was ordered to monitor cardiac rate and rhythm.    Differential Diagnosis/ Diagnostic Considerations: UTI, pneumonia, electrolyte abnormality    Complicating Factors: The patient already has does not have any pertinent problems on file. to contribute to the complexity of this ED evaluation.    Medical Decision Making  Amount and/or Complexity of Data Reviewed  Independent Historian:      Details: Nursing home records and family numbers at the bedside.  Labs: ordered. Decision-making details documented in ED Course.  Radiology: ordered and independent interpretation performed. Decision-making details documented in ED Course.  ECG/medicine tests: ordered and independent interpretation performed. Decision-making details documented in ED Course.    Risk  Decision regarding hospitalization.    Critical Care  Total time providing critical care: 30 minutes      Patient is altered not cooperative with staff.  Unable to fully assess patient, will need to chemically sedate in order to get  workup to rule out any type of emergencies    Reviewed results with patient's family over the bedside.  Chest x-ray I reviewed the images there is nothing acute.  Patient did have a slightly elevated WBC count but no signs of infection on the entire workup.  No pneumonia on x-ray no UTI.  CT head also did not show any acute findings.  Unsure what is causing her altered mental status but per the family ember at the bedside she has never had any psychiatric episodes in the past.  Will need to be admitted for altered mental status workup.  With also place psychiatry on consultation during admission.    Discussed case with [facility] who accepts admission.    Placed psychiatry on consult  Condition upon leaving the department: Stable    Disposition and Plan     Clinical Impression:  1. Altered  mental status, unspecified altered mental status type        Disposition:  Admit    Follow-up:  No follow-up provider specified.    Medications Prescribed:  Current Discharge Medication List          Hospital Problems       Present on Admission  Date Reviewed: 8/8/2024            ICD-10-CM Noted POA    * (Principal) Altered mental status, unspecified altered mental status type R41.82 9/5/2024 Unknown

## 2024-09-05 NOTE — ED QUICK NOTES
Unable to obtain labs and urine at this time. Patient is uncooperative with RN and staff. See MAR.    Will try to obtain labs when patient is calm and cooperative. ED MD aware

## 2024-09-05 NOTE — PAYOR COMM NOTE
--------------  8/30 - 31 CONTINUED STAY REVIEW    Payor: [employer] OF [employer]  Subscriber #:  [id number]  Authorization Number: [id number]    ID:    Subjective:  ROS reviewed. Feels better.      ASSESSMENT:     Antibiotics: IV Daptomycin, cefepime; (Linezolid)     # Neisseria species bacteremia ?source  # Leukocytosis - possible reactive to hypoglycemia, BM recovery; r/o bacteremia     # RLE cellulitis with posterior leg wound in immunocompromised patient - improved               -s/p bedside debridement 8/15     # Rheumatoid arthritis, on 20 mg prednisone daily, methotrexate, HCQ     # Adrenal insufficiency - Endocrine following; steroids  # Neutropenia - resolved     PLAN:  -  Will complete course of daptomycin tonight. Continue on cefepime. FU repeat blood cx.  -  Follow fever curve, wbc - on Predniso      Physical Exam:  BP 97/76 (BP Location: Radial)   Pulse 85   Temp 98.3 °F (36.8 °C) (Oral)   Resp 18   Wt 193 lb 5 oz (87.7 kg)   SpO2 100%   BMI 35.36 kg/m²      Gen:                   Awake, in bed  HEENT:             EOMI, neck supple  CV/lungs:           RRR, CTAB  Abdom:              Soft, NT/ND, +BS  Skin/extrem:      BLE edema improved, R posterior calf wound without surrounding erythema  Lines:                 PIV+      Attestation signed by Yaw Yao MD at 8/30/2024  6:25 PM     ID Attending:     Notes, labs, chart reviewed.  Agree with assessment and plan as discussed with PAMATT and documented in PALaurieC note.  RLE much improved. +edema. No pain. BCx noted. D/w Micro. Results to be available tomorrow. Hope to convert to PO.       8/31:    ID:    ROS reviewed. Tm 99.2. HDS. Awake and sitting comfortably in bed. Feeling better. Asking about discharge.     ASSESSMENT:     Antibiotics: IV Cefepime; (Linezolid, daptomycin)     # Neisseria species bacteremia ?source  # Leukocytosis - possible reactive to hypoglycemia, BM recovery; r/o bacteremia     # RLE cellulitis with posterior leg  wound in immunocompromised patient - improved               -s/p bedside debridement 8/15               -s/p linezolid -> daptomycin course, dc 8/30     # Rheumatoid arthritis, on 20 mg prednisone daily, methotrexate, HCQ     # Adrenal insufficiency - Endocrine following; steroids  # Neutropenia - resolved     PLAN:  -  Continue on cefepime.   -  FU Neisseria susceptibilities and repeat blood cx.  -  Follow fever curve, wbc - on Prednisone      Physical Exam:  /74 (BP Location: Radial)   Pulse 100   Temp 99 °F (37.2 °C) (Oral)   Resp 14   Wt 193 lb 5 oz (87.7 kg)   SpO2 97%   BMI 35.36 kg/m²      Gen:                   Awake, in bed  HEENT:             EOMI, neck supple  CV/lungs:           RRR, CTAB  Abdom:              Soft, NT/ND  Skin/extrem:      BLE edema w/wound dressed  Lines:                 PIV+    Attestation signed by Roc Mckee MD at 8/31/2024 12:02 PM     Patient examined and assessed. Agree with above and plan as discussed with PALaurieC and documented in PA-C note.     Notes, labs and chart reviewed.      Neisseria susceptibilities still pending. Plan for PO on dc based on final cx results.    HOSPITALIST:       Assessment and Plan:      Musculoskeletal deconditioning related to recent hospitalization  - PT/OT    - SW on consult for SNF placement     Hypoglycemia - improved  - possible falsley low readings.  - endocrine on consult     RLE cellulitis - improved  Neisseria bacteremia.  Leukocytosis - better  - s/p I&D on 8/15  - cont abx per ID.  F/u cultures.   F/I ID and sensitivities of the Neisseria.  - f/u repeat blood cx  - cont cefepime  - f/u cultures     RA  - cont hydroxychloroquine  - cont prednisone 10mg daily     H/o adrenal insufficiency  - monitor BP  - reduced prednisone     Metabolic acidosis, non-ion gap  - was on sodium bicarb PO previously, continue this     Anemia of chronic disease  - monitor  - no overt bleed  - cont iron PO     LE edema  - cont lasix IV  - recent  echo in July - normal EF, G1DD     Sinus tachycardia  - cont metoprolol and monitor tele     dvt proph:    heparin    MEDICATIONS ADMINISTERED IN LAST 1 DAY:  droperidol (Inapsine) 2.5 mg/mL injection 2.5 mg       Date Action Dose Route User    9/5/2024 1548 Given 2.5 mg Intravenous Mariluz De Leon RN          haloperidol lactate (Haldol) 5 MG/ML injection 5 mg       Date Action Dose Route User    9/5/2024 1442 Given 5 mg Intramuscular (Left Vastus Lateralis) Michaela Greer RN          LORazepam (Ativan) 2 mg/mL injection 2 mg       Date Action Dose Route User    9/5/2024 1322 Given 2 mg Intramuscular (Right Deltoid) Michaela Greer RN            Vitals (last day) before discharge       Date/Time Temp Pulse Resp BP SpO2 Weight O2 Device O2 Flow Rate (L/min) [facility]    09/01/24 1621 99.1 °F (37.3 °C) 106 17 123/79 99 % -- None (Room air) -- SC    09/01/24 0851 98.8 °F (37.1 °C) 97 18 97/63 100 % -- None (Room air) -- SM    09/01/24 0439 97.8 °F (36.6 °C) 75 18 111/69 100 % -- None (Room air) -- KJ    08/31/24 2339 -- 80 16 92/65 98 % -- None (Room air) -- KJ    08/31/24 2005 98.9 °F (37.2 °C) 88 16 101/73 100 % -- None (Room air) -- KJ    08/31/24 1900 -- 90 -- -- -- -- -- -- CY    08/31/24 1659 99.3 °F (37.4 °C) 88 18 91/65 99 % -- None (Room air) -- SM    08/31/24 0906 99.2 °F (37.3 °C) 110 18 113/77 99 % -- None (Room air) -- SM    08/31/24 0449 99 °F (37.2 °C) 100 14 109/74 97 % -- None (Room air) -- KJ    08/31/24 0343 -- 100 17 104/70 98 % -- None (Room air) -- KJ

## 2024-09-05 NOTE — PAYOR COMM NOTE
--------------  PLEASE FAX INPT DAYS AUTHORIZED -[phone]    THANK YOU      DISCHARGE REVIEW    Payor: [facility] OUT OF STATE HMO  Subscriber #:  [id number]  Authorization Number: [id number]    Admit date: 24  Admit time:   1:29 PM  Discharge Date: 2024  6:01 PM       [facility]  [facility] of [facility]    Discharge Summary    Sheri Garzon Patient Status:  Inpatient    1960 MRN [id number]   Location [facility] Attending Janes Whalen MD   Hosp Day # 3 PCP TERI MCKENNA MD     Date of Admission: 2024 Disposition:   Home or Self Care     Date of Discharge:  2024     Admitting Diagnosis:   Weakness generalized [R53.1]    Hospital Discharge Diagnoses:    Musculoskeletal deconditioning    Hypoglycemia    RLE cellulitis   Neisseria bacteremia.  Leukocytosis - better  Hx of RA  H/o adrenal insufficiency  Metabolic acidosis, non-ion gap  Anemia of chronic disease  LE edema  Sinus tachycardia      Problem List:     Patient Active Problem List   Diagnosis    Dehydration    Metabolic acidosis    Bilious vomiting with nausea    Difficult intravenous access    Hypokalemia    Hypomagnesemia    Hiatal hernia    Cellulitis of right lower extremity    Cellulitis    RTA (renal tubular acidosis)    Adrenal insufficiency (Dlyan's disease) (HCC)    Current chronic use of systemic steroids    Neutropenia (HCC)    Anemia of infection    Weakness generalized    Leg edema    Adrenal insufficiency (HCC)       Physical Exam:      BP 97/63 (BP Location: Radial)   Pulse 97   Temp 98.8 °F (37.1 °C) (Oral)   Resp 18   Wt 193 lb 5 oz (87.7 kg)   SpO2 100%   BMI 35.36 kg/m²     Gen:  NAD.  A and O x  3  CV:   RRR.  No m/g/r  Pulm:   CTA bilat  Abd:   +bs, soft, NT, ND  LE:  No c/c/e  Neuro:  nonfocal      Reason for Admission:   Poor functional status at home.     HISTORY OF PRESENT ILLNESS:  The patient is a 64-year-old  female who was hospitalized for right  foot cellulitis and right calf wound requiring debridement and antibiotic treatment, discharged home on August 24, 2 days ago, with oral Zyvox for 7 days.  Today came back with poor mobility and inability to ambulate on her own and weakness.  An attempt to place her in a rehab facility from emergency room failed.  The patient will be admitted to the hospital for further Social Work evaluation and rehabilitation placement.  CBC and chemistry are still pending.      Hospital Course:     Musculoskeletal deconditioning related to recent hospitalization  - PT/OT    - SW on consult for SNF placement     Hypoglycemia - improved  - probably falsley low readings.  - endocrine on consult     RLE cellulitis - improved  Neisseria bacteremia. - possible contaminant   Leukocytosis - better  - s/p I&D on 8/15  - was on abx per ID.  Repeat cultures negative.    Pt was on iv cefepime and daptomycin.    To SNF on cefpodoxime.     RA  - cont hydroxychloroquine  - cont prednisone 10mg daily     H/o adrenal insufficiency  - monitor BP  - reduced prednisone per endo     Metabolic acidosis, non-ion gap  - was on sodium bicarb PO previously, continue this     Anemia of chronic disease  - monitor  - no overt bleed  - cont iron PO     LE edema  - was given lasix IV  - recent echo in July - normal EF, G1DD     Sinus tachycardia  - cont metoprolol and monitor tele     dvt proph:    heparin     Code status:    Full    Consultations:   ID, endocrinology     Discharge Condition:  Good    Discharge Medications:      Discharge Medications        START taking these medications        Instructions Prescription details   cefpodoxime 200 MG Tabs  Commonly known as: Vantin      Take 2 tablets (400 mg total) by mouth 2 (two) times daily for 10 days.   Stop taking on: September 11, 2024  Quantity: 40 tablet  Refills: 0     Coppermin 5 MG Tabs  Generic drug: Copper      Take 1 tablet by mouth daily.   Quantity: 30 tablet  Refills: 0            CHANGE how  you take these medications        Instructions Prescription details   losartan 50 MG Tabs  Commonly known as: Cozaar  What changed: how much to take      Take 0.5 tablets (25 mg total) by mouth daily.   Refills: 0     metoprolol tartrate 50 MG Tabs  Commonly known as: Lopressor  What changed: how much to take      Take 0.5 tablets (25 mg total) by mouth 2 (two) times daily.   Refills: 0     predniSONE 10 MG Tabs  Commonly known as: Deltasone  What changed:   how much to take  how to take this  when to take this  additional instructions      Take 1 tablet (10 mg total) by mouth daily with breakfast.   Refills: 0            CONTINUE taking these medications        Instructions Prescription details   Acetaminophen 8 Hour 650 MG Tbcr  Generic drug: Acetaminophen ER      Take 2-3 tablets (1,300-1,950 mg total) by mouth every 8 (eight) hours as needed for Pain.   Refills: 0     albuterol 108 (90 Base) MCG/ACT Aers  Commonly known as: Ventolin HFA      Inhale 2 puffs into the lungs every 4 to 6 hours as needed for Wheezing.   Refills: 0     Aspirin 81 MG Caps      Take 81 mg by mouth daily.   Refills: 0     atorvastatin 10 MG Tabs  Commonly known as: Lipitor      Take 1 tablet (10 mg total) by mouth daily.   Refills: 0     clopidogrel 75 MG Tabs  Commonly known as: Plavix      Take 1 tablet (75 mg total) by mouth daily.   Refills: 0     ergocalciferol 1.25 MG (15598 UT) Caps  Commonly known as: Vitamin D2      Take 1 capsule (50,000 Units total) by mouth once a week.   Refills: 0     Ferrous Gluconate 324 (38 Fe) MG Tabs      Take 1 tablet (325 mg total) by mouth daily with breakfast.   Stop taking on: September 23, 2024  Quantity: 30 tablet  Refills: 0     folic acid 1 MG Tabs  Commonly known as: Folvite      Take 1 tablet (1 mg total) by mouth daily.   Quantity: 90 tablet  Refills: 0     hydroxychloroquine 200 MG Tabs  Commonly known as: Plaquenil      Take 1 tablet (200 mg total) by mouth 2 (two) times daily.    Quantity: 180 tablet  Refills: 0     ibuprofen 200 MG Tabs  Commonly known as: Motrin      Take 3 tablets (600 mg total) by mouth every 8 (eight) hours as needed for Pain.   Refills: 0     oxybutynin ER 10 MG Tb24  Commonly known as: Ditropan-XL      Take 1 tablet (10 mg total) by mouth daily.   Refills: 0     Potassium Chloride ER 10 MEQ Tbcr      Take 1 tablet (10 mEq total) by mouth 2 (two) times daily.   Refills: 0     sodium bicarbonate 650 MG Tabs      Take 1 tablet (650 mg total) by mouth 2 (two) times daily.   Stop taking on: September 29, 2024  Quantity: 60 tablet  Refills: 0            STOP taking these medications      amLODIPine 5 MG Tabs  Commonly known as: Norvasc        linezolid 600 MG Tabs  Commonly known as: Zyvox        pantoprazole 40 MG Tbec  Commonly known as: Protonix               ASK your doctor about these medications        Instructions Prescription details   methotrexate 2.5 MG Tabs  Commonly known as: Rheumatrex      Take 1 tablet (2.5 mg total) by mouth once a week.   Refills: 0               Where to Get Your Medications        These medications were sent to [id number] [address] AT [facility], [phone], [phone]  [address] [place] [address]      Phone: [phone]   cefpodoxime 200 MG Tabs  Coppermin 5 MG Tabs  sodium bicarbonate 650 MG Tabs         Follow up Visits:     Follow-up Information       Sulma Juarez MD. Schedule an appointment as soon as possible for a visit in 1 week(s).    Specialty: Internal Medicine  Contact information:  [address] [facility] [address]  [phone]               Meredith Powell MD. Schedule an appointment as soon as possible for a visit in 1 week(s).    Specialty: ENDOCRINOLOGY  Contact information:  [address]  [facility] [address]  [facility] [address] [phone]                             Hospital Discharge Diagnoses:  Deconditioning    Lace+ Score: 56  59-90  High Risk  29-58 Medium Risk  0-28   Low Risk.    TCM Follow-Up Recommendation:  LACE > 58: High Risk of readmission after discharge from the hospital.    >35 minutes spent preparing this discharge.    Janes Hernandez MD  9/1/2024  4:09 PM     Electronically signed by Janes Whalen MD on 9/1/2024  4:14 PM         REVIEWER COMMENTS

## 2024-09-05 NOTE — ED QUICK NOTES
Pt appears confused and attempting to pull out newly inserted ultrasound IV. Soft restraints placed ok per MD.

## 2024-09-05 NOTE — ED QUICK NOTES
Pt soiled bed changed and incontinence care provided. New sheets applied to bed. Attempted to place patient in position of comfort. Warm blankets applied.

## 2024-09-05 NOTE — ED INITIAL ASSESSMENT (HPI)
From [facility]. To ED via [facility] for new onset of hallucination and delusion. EMS states she was on facebook and states states someone is out to get her.     AOX4 at baseline.

## 2024-09-05 NOTE — ED QUICK NOTES
Still unable to obtain urine and blood from patient. See MAR.    Will try again when in a couple minutes

## 2024-09-06 ENCOUNTER — APPOINTMENT (OUTPATIENT)
Dept: PICC SERVICES | Facility: HOSPITAL | Age: 64
End: 2024-09-06
Attending: HOSPITALIST
Payer: COMMERCIAL

## 2024-09-06 PROBLEM — F31.5: Status: ACTIVE | Noted: 2024-01-01

## 2024-09-06 PROBLEM — F31.5: Status: ACTIVE | Noted: 2024-09-06

## 2024-09-06 LAB
ANION GAP SERPL CALC-SCNC: 11 MMOL/L (ref 0–18)
BASOPHILS # BLD AUTO: 0.03 X10(3) UL (ref 0–0.2)
BASOPHILS NFR BLD AUTO: 0.2 %
BUN BLD-MCNC: 6 MG/DL (ref 9–23)
BUN/CREAT SERPL: 11.3 (ref 10–20)
CALCIUM BLD-MCNC: 8.1 MG/DL (ref 8.7–10.4)
CHLORIDE SERPL-SCNC: 111 MMOL/L (ref 98–112)
CO2 SERPL-SCNC: 20 MMOL/L (ref 21–32)
CREAT BLD-MCNC: 0.53 MG/DL
DEPRECATED RDW RBC AUTO: 73.9 FL (ref 35.1–46.3)
EGFRCR SERPLBLD CKD-EPI 2021: 103 ML/MIN/1.73M2 (ref 60–?)
EOSINOPHIL # BLD AUTO: 0.01 X10(3) UL (ref 0–0.7)
EOSINOPHIL NFR BLD AUTO: 0.1 %
ERYTHROCYTE [DISTWIDTH] IN BLOOD BY AUTOMATED COUNT: 20.3 % (ref 11–15)
GLUCOSE BLD-MCNC: 63 MG/DL (ref 70–99)
GLUCOSE BLDC GLUCOMTR-MCNC: 106 MG/DL (ref 70–99)
GLUCOSE BLDC GLUCOMTR-MCNC: 51 MG/DL (ref 70–99)
GLUCOSE BLDC GLUCOMTR-MCNC: 51 MG/DL (ref 70–99)
GLUCOSE BLDC GLUCOMTR-MCNC: 52 MG/DL (ref 70–99)
GLUCOSE BLDC GLUCOMTR-MCNC: 53 MG/DL (ref 70–99)
GLUCOSE BLDC GLUCOMTR-MCNC: 60 MG/DL (ref 70–99)
GLUCOSE BLDC GLUCOMTR-MCNC: 61 MG/DL (ref 70–99)
GLUCOSE BLDC GLUCOMTR-MCNC: 67 MG/DL (ref 70–99)
HCT VFR BLD AUTO: 29 %
HGB BLD-MCNC: 8.2 G/DL
IMM GRANULOCYTES # BLD AUTO: 0.11 X10(3) UL (ref 0–1)
IMM GRANULOCYTES NFR BLD: 0.9 %
LYMPHOCYTES # BLD AUTO: 1.96 X10(3) UL (ref 1–4)
LYMPHOCYTES NFR BLD AUTO: 15.9 %
MAGNESIUM SERPL-MCNC: 1.9 MG/DL (ref 1.6–2.6)
MCH RBC QN AUTO: 29.1 PG (ref 26–34)
MCHC RBC AUTO-ENTMCNC: 28.3 G/DL (ref 31–37)
MCV RBC AUTO: 102.8 FL
MONOCYTES # BLD AUTO: 1.54 X10(3) UL (ref 0.1–1)
MONOCYTES NFR BLD AUTO: 12.5 %
NEUTROPHILS # BLD AUTO: 8.65 X10 (3) UL (ref 1.5–7.7)
NEUTROPHILS # BLD AUTO: 8.65 X10(3) UL (ref 1.5–7.7)
NEUTROPHILS NFR BLD AUTO: 70.4 %
OSMOLALITY SERPL CALC.SUM OF ELEC: 290 MOSM/KG (ref 275–295)
PLATELET # BLD AUTO: 302 10(3)UL (ref 150–450)
PLATELET MORPHOLOGY: NORMAL
PLATELETS.RETICULATED NFR BLD AUTO: 1.6 % (ref 0–7)
POTASSIUM SERPL-SCNC: 4 MMOL/L (ref 3.5–5.1)
RBC # BLD AUTO: 2.82 X10(6)UL
SODIUM SERPL-SCNC: 142 MMOL/L (ref 136–145)
WBC # BLD AUTO: 12.3 X10(3) UL (ref 4–11)

## 2024-09-06 PROCEDURE — 05HA33Z INSERTION OF INFUSION DEVICE INTO LEFT BRACHIAL VEIN, PERCUTANEOUS APPROACH: ICD-10-PCS | Performed by: HOSPITALIST

## 2024-09-06 PROCEDURE — 90792 PSYCH DIAG EVAL W/MED SRVCS: CPT | Performed by: OTHER

## 2024-09-06 PROCEDURE — 99233 SBSQ HOSP IP/OBS HIGH 50: CPT | Performed by: HOSPITALIST

## 2024-09-06 RX ORDER — DEXTROSE MONOHYDRATE AND SODIUM CHLORIDE 5; .45 G/100ML; G/100ML
INJECTION, SOLUTION INTRAVENOUS CONTINUOUS
Status: DISCONTINUED | OUTPATIENT
Start: 2024-09-06 | End: 2024-09-07

## 2024-09-06 RX ORDER — LAMOTRIGINE 25 MG/1
25 TABLET ORAL 2 TIMES DAILY
Status: DISCONTINUED | OUTPATIENT
Start: 2024-09-06 | End: 2024-09-10

## 2024-09-06 RX ORDER — HALOPERIDOL 5 MG/ML
2 INJECTION INTRAMUSCULAR EVERY 8 HOURS PRN
Status: DISCONTINUED | OUTPATIENT
Start: 2024-09-06 | End: 2024-09-12

## 2024-09-06 RX ORDER — LIDOCAINE HYDROCHLORIDE 10 MG/ML
5 INJECTION, SOLUTION EPIDURAL; INFILTRATION; INTRACAUDAL; PERINEURAL
Status: COMPLETED | OUTPATIENT
Start: 2024-09-06 | End: 2024-09-06

## 2024-09-06 RX ORDER — QUETIAPINE FUMARATE 25 MG/1
50 TABLET, FILM COATED ORAL NIGHTLY
Status: DISCONTINUED | OUTPATIENT
Start: 2024-09-06 | End: 2024-09-12

## 2024-09-06 RX ORDER — METOPROLOL TARTRATE 25 MG/1
25 TABLET, FILM COATED ORAL 2 TIMES DAILY
Status: DISCONTINUED | OUTPATIENT
Start: 2024-09-06 | End: 2024-09-12

## 2024-09-06 NOTE — CM/SW NOTE
Pt is from [facility], SW initiated return referral via aidin to [facility]. Pt will require insurance auth to return.     [facility], *auth    LUARA/AMANDA to remain available for support and/or discharge planning.     Quyen Hdz, MSW, LSW   x [phone]

## 2024-09-06 NOTE — DIETARY NOTE
ADULT NUTRITION INITIAL ASSESSMENT    Pt is at moderate nutrition risk.  Pt does not meet malnutrition criteria.        RECOMMENDATIONS TO MD: See Nutrition Intervention for ONS specifics     ADMITTING DIAGNOSIS:  Altered mental status, unspecified altered mental status type [R41.82]  PERTINENT PAST MEDICAL HISTORY:   Past Medical History:    Allergic rhinitis    Anxiety    Arthritis    RA and Osteoarthritis    Asthma (HCC)    Depression    Not officially diagnosed    Esophageal reflux    Essential hypertension    High blood pressure    High cholesterol    Hyperlipidemia    Myocardial infarction (HCC)    Cardiac event    Osteoarthritis    Pancreatitis (HCC)    Problems with swallowing    RA (rheumatoid arthritis) (HCC)    Sleep apnea       PATIENT STATUS: Initial 09/06/24: Pt admit for AMS. PMHx sig for HTN, RA, chronic adrenal insufficiency, NIKOLAI, and others as seen above. Pt known to nutrition department from multiple recent hospitalizations in July and August. Pt was in rehab facility in the last week PTA. Pt assessed due to screening at risk for MST and wound alert. Visited pt today, sister in the room. Diet Hx: Pt reported poor appetite, consuming 25% of meals per documentation. Endorsed poor appetite PTA. Stated  intakes have been significantly decreased in the last 3-6 months due to dysgeusia, loss of appetite, and recent hospitalizations (July, August). RD reviewed last admissions' documentation and suboptimal <75% po was noted. Weight Hx: pt denied weight loss at admission MST. Reported UBW: 187-192 over the last several months, all of 2024. #.  This 6-11# weight loss represents a 4-8% loss of body weight in 6 months, not significant. NFPE conducted, no muscle/fat loss noted. Pt does not meets criteria for PCM. Pt with unstageable wound to posterior right leg, increasing protein needs. Nutrition Plan:  recommended protein/calorie supplementation to help pt meet nutrient needs 2/2 poor po and due to  increased nutrient needs for wound healing. Pt verbalized understanding, agreeable to Ensure Enlive strawberry..    FOOD/NUTRITION RELATED HISTORY:  Appetite: Poor and poor appetite PTA x 3-6 months  Intake: ~25% x1 meals documented since admit, poor per review of recent admission documentation  Intake Meeting Needs: No, but oral nutrition supplements (ONS) to maximize  Percent Meals Eaten (last 3 days)       Date/Time Percent Meals Eaten (%)    09/06/24 1200 25 %     Percent Meals Eaten (%): per review of tray at 09/06/24 1200        Food Allergies:  bananas, itching reaction  Cultural/Ethnic/Islam Preferences: None    GASTROINTESTINAL: no GI issues reported per pt/chart, +BM 9/05 per pt, and Loss of appetite    MEDICATIONS: reviewed  IVF:   dextrose 5%-sodium chloride 0.45% 50 mL/hr at 09/06/24 0742     Scheduled:   collagenase   Topical Daily    enoxaparin  40 mg Subcutaneous Daily     LABS: reviewed, low BG, pt on D5w in half saline @50ml/hr  Recent Labs     09/05/24  1302 09/06/24  0615   GLU 87 63*   BUN 12 6*   CREATSERUM 0.68 0.53*   CA 8.6* 8.1*   MG 1.7 1.9    142   K 3.3* 4.0    111   CO2 25.0 20.0*   OSMOCALC 291 290       NUTRITION RELATED PHYSICAL FINDINGS:  - Nutrition Focused Physical Exam (NFPE): well nourished and no wasting noted  - Fluid Accumulation: +1 Bilateral Foot  See RN documentation for details  - Skin Integrity: Pressure Injury: unstageable on Posterior right leg and other wounds noted See RN documentation for details    ANTHROPOMETRICS:  HT: 157.5 cm (5' 2\")  WT: 82.2 kg (181 lb 3.2 oz)   BMI: Body mass index is 33.14 kg/m².  BMI CLASSIFICATION: 30-34.9 kg/m2 - obesity class I  IBW: 110 lbs        164% IBW  Usual Body Wt: 187-190 lbs   6 months to 1 yr   ~92-96% UBW    WEIGHT HISTORY:  Patient Weight(s) for the past 336 hrs:   Weight   09/05/24 2029 82.2 kg (181 lb 3.2 oz)   09/05/24 1232 80.7 kg (178 lb)     Wt Readings from Last 10 Encounters:   09/05/24 82.2 kg (181  lb 3.2 oz)   08/27/24 87.7 kg (193 lb 5 oz)   08/21/24 89.8 kg (198 lb)   08/08/24 82.6 kg (182 lb)   07/22/24 83.4 kg (183 lb 12.8 oz)   06/28/24 87.1 kg (192 lb)   09/06/21 72.6 kg (160 lb)   08/11/21 77.1 kg (170 lb)     NUTRITION DIAGNOSIS/PROBLEM:   Inadequate oral intake related to Physiological causes increasing nutrient needs as evidenced by increased needs for unstageable wound to posterior right leg and reported poor po intakes for several months, documented sub optimal intakes during previous recent admissions.    NUTRITION INTERVENTION:     NUTRITION PRESCRIPTION:   Estimated Nutrition needs: --dosing wt of 82 kg - wt taken on 09/05/24  Calories: 7424-6603 calories/day (20-22 calories per kg Dosing wt)  Protein:  g protein/day (1.25-2.0 g protein/kg IBW wt  [50 kg])  Fluid Needs: 2301-5924 ml/day   mL/day (1mL/kcal/day)    - Diet:       Procedures    Cardiac diet Cardiac; Is Patient on Accuchecks? No     - Meals and snacks: Encouraged small frequent meals, Encouraged adequate PO intake, and Encouraged increased PO intake  - Medical Food Supplements-RD added Ensure Enlive (350 calories/ 20 g protein each) BID Strawberry. Rational/use of oral supplements discussed.  - Vitamin and mineral supplements: none  - Feeding assistance: meal set up  - Nutrition education: Discussed importance of adequate energy and protein intake and ONS to support    - Coordination of nutrition care: collaboration with other providers  - Discharge and transfer of nutrition care to new setting or provider: monitor plans    MONITOR AND EVALUATE/NUTRITION GOALS:  - Food and Nutrient Intake:      Monitor: adequacy of PO intake and adequacy of supplement intake  - Food and Nutrient Administration:      Monitor: N/A  - Anthropometric Measurement:    Monitor weight  - Nutrition Goals:      maintain wt within 5%, PO and supplement greater than 75% of needs, euglycemia, support wound healing, and support body systems    DIETITIAN  FOLLOW UP: RD to follow and monitor nutrition status.      Jenny Martinez RD, LDN  Clinical Dietitian  Available via Epic securechat  Ext. [phone]

## 2024-09-06 NOTE — PLAN OF CARE
Problem: Safety Risk - Non-Violent Restraints  Goal: Patient will remain free from self-harm  Description: INTERVENTIONS:  - Apply the least restrictive restraint to prevent harm  - Notify patient and family of reasons restraints applied  - Assess for any contributing factors to confusion (electrolyte disturbances, delirium, medications)  - Discontinue any unnecessary medical devices as soon as possible  - Assess the patient's physical comfort, circulation, skin condition, hydration, nutrition and elimination needs   - Reorient and redirection as needed  - Assess for the need to continue restraints  Outcome: Progressing     Problem: Patient Centered Care  Goal: Patient preferences are identified and integrated in the patient's plan of care  Description: Interventions:  - What would you like us to know as we care for you? From [facility]  - Provide timely, complete, and accurate information to patient/family  - Incorporate patient and family knowledge, values, beliefs, and cultural backgrounds into the planning and delivery of care  - Encourage patient/family to participate in care and decision-making at the level they choose  - Honor patient and family perspectives and choices  Outcome: Progressing     Problem: CARDIOVASCULAR - ADULT  Goal: Maintains optimal cardiac output and hemodynamic stability  Description: INTERVENTIONS:  - Monitor vital signs, rhythm, and trends  - Monitor for bleeding, hypotension and signs of decreased cardiac output  - Evaluate effectiveness of vasoactive medications to optimize hemodynamic stability  - Monitor arterial and/or venous puncture sites for bleeding and/or hematoma  - Assess quality of pulses, skin color and temperature  - Assess for signs of decreased coronary artery perfusion - ex. Angina  - Evaluate fluid balance, assess for edema, trend weights  Outcome: Progressing  Goal: Absence of cardiac arrhythmias or at baseline  Description: INTERVENTIONS:  - Continuous  cardiac monitoring, monitor vital signs, obtain 12 lead EKG if indicated  - Evaluate effectiveness of antiarrhythmic and heart rate control medications as ordered  - Initiate emergency measures for life threatening arrhythmias  - Monitor electrolytes and administer replacement therapy as ordered  Outcome: Progressing     Problem: SKIN/TISSUE INTEGRITY - ADULT  Goal: Skin integrity remains intact  Description: INTERVENTIONS  - Assess and document risk factors for pressure ulcer development  - Assess and document skin integrity  - Monitor for areas of redness and/or skin breakdown  - Initiate interventions, skin care algorithm/standards of care as needed  Outcome: Progressing  Goal: Incision(s), wounds(s) or drain site(s) healing without S/S of infection  Description: INTERVENTIONS:  - Assess and document risk factors for pressure ulcer development  - Assess and document skin integrity  - Assess and document dressing/incision, wound bed, drain sites and surrounding tissue  - Implement wound care per orders  - Initiate isolation precautions as appropriate  - Initiate Pressure Ulcer prevention bundle as indicated  Outcome: Progressing  Goal: Oral mucous membranes remain intact  Description: INTERVENTIONS  - Assess oral mucosa and hygiene practices  - Implement preventative oral hygiene regimen  - Implement oral medicated treatments as ordered  Outcome: Progressing     Problem: CONFUSION  Goal: Confusion, delirium, dementia or psychosis is improved or at baseline  Description: INTERVENTIONS:  - Assess for possible contributors to thought disturbance, including medications, impaired vision or hearing, underlying metabolic abnormalities, dehydration, psychiatric diagnoses, and notify attending MD/LIP  - [place] high risk fall precautions, as indicated  - Provide frequent short contacts to provide reality reorientation, refocusing and direction  - Decrease environmental stimuli, including noise as appropriate  - Monitor  and intervene to maintain adequate nutrition, hydration, elimination, sleep and activity  - Consider the need for a sitter if unable to leave patient unattended  - Initiate Spiritual Care consult as indicated  - Consult Pharmacy as needed  Outcome: Progressing   Patient received from ED to room 565. Patient currently a/ox2, in no apparent distress, denies acute pain. Patient received with restraint and  1:1 sitter. Daughter at bedside. Plan of care reviewed, safety precautions in place    Restraint education provided to patient and daughter at bedside, patient is disoriented, daughter verbalizes understanding of indication and discontinuation criteria for soft wrist restraints.

## 2024-09-06 NOTE — PHYSICAL THERAPY NOTE
PHYSICAL THERAPY EVALUATION - INPATIENT     Room Number: 565/565-A  Evaluation Date: 9/6/2024  Type of Evaluation: Initial   Physician Order: PT Eval and Treat    Presenting Problem: AMS  Co-Morbidities : Dylan disease, cellulitis, acute psychosis, anemia, R shoulder DJD, obesity  Reason for Therapy: Mobility Dysfunction and Discharge Planning    PHYSICAL THERAPY ASSESSMENT   Patient is a 64 year old female admitted 9/5/2024 for Acute encephalopathy. Weakness, falls.  Prior to admission, patient's baseline is admitted from [facility] with assist for all mobility and ADL's, assist at home with  with sister.  Patient is currently functioning below baseline with bed mobility, transfers, gait, stair negotiation, maintaining seated position, standing prolonged periods, and performing household tasks.  Patient is requiring moderate assist as a result of the following impairments: decreased functional strength, decreased endurance/aerobic capacity, pain, impaired standing balance, impaired coordination, impaired motor planning, decreased muscular endurance, and medical status.  Physical Therapy will continue to follow for duration of hospitalization.    Patient will benefit from continued skilled PT Services to promote return to prior level of function and safety with continuous assistance and gradual rehabilitative therapy .    PLAN  PT Treatment Plan: Bed mobility;Body mechanics;Endurance;Energy conservation;Patient education;Gait training;Range of motion;Strengthening;Transfer training;Balance training  Rehab Potential : Good  Frequency (Obs): 3-5x/week    PHYSICAL THERAPY MEDICAL/SOCIAL HISTORY   History related to current admission: Acute encephalopathy. The patient is a 64-year-old  female who was recently placed in a rehab facility for poor appetite and poor musculoskeletal conditioning. She had chronic rheumatoid arthritis. Last night, family noted that she is posting bizarre statements on Facebook and  today nursing facility contacted her family notifying them that she is seeing things and hearing voices. Brought in today to the emergency department for evaluation. CBC showed white blood cell count of 16.6 with left shift, hemoglobin 8.9. She does have chronic leukocytosis. She is on prednisone chronically. Chemistry and liver function tests were unremarkable.     Problem List  Principal Problem:    Altered mental status, unspecified altered mental status type  Active Problems:    Acute psychosis (HCC)      HOME SITUATION  Home Situation  Type of Home:  (admitted from [facility])  Lives With: Staff 24 hours (SNF)  Patient Owned Equipment: Rolling walker     Prior Level of [place]: Pt lives with sister mod indep with a RW for all mobility and ADL's. Pt was most recently at [facility] with PT, OT with recent falls and weakness.     SUBJECTIVE  I feel limited with inability to walk but I can get up to the chair with help.     PHYSICAL THERAPY EXAMINATION   OBJECTIVE  Precautions: Bed/chair alarm  Fall Risk: High fall risk    WEIGHT BEARING RESTRICTION  Weight Bearing Restriction: None                PAIN ASSESSMENT  Ratin  Location: R should DJD pain with movement  Management Techniques: Activity promotion;Body mechanics;Breathing techniques;Relaxation;Repositioning    COGNITION  Overall Cognitive Status:  WFL - within functional limits    RANGE OF MOTION AND STRENGTH ASSESSMENT  Upper extremity ROM and strength are within functional limits   Lower extremity ROM is within functional limits   Lower extremity strength is within functional limits 3/5 with intermittent buckling with movement and transfers    BALANCE  Static Sitting: Fair +  Dynamic Sitting: Fair  Static Standing: Poor  Dynamic Standing: Poor -    ACTIVITY TOLERANCE  Pulse: 97  Heart Rate Source: Monitor  Resp: 18  BP: 102/50  BP Location: Left arm  BP Method: Automatic  Patient Position: Sitting    O2 WALK  Oxygen Therapy  SPO2% on Room Air at Rest: 94  SPO2%  Ambulation on Oxygen: 90    AM-PAC '6-Clicks' INPATIENT SHORT FORM - BASIC MOBILITY  How much difficulty does the patient currently have...  Patient Difficulty: Turning over in bed (including adjusting bedclothes, sheets and blankets)?: A Little   Patient Difficulty: Sitting down on and standing up from a chair with arms (e.g., wheelchair, bedside commode, etc.): A Little   Patient Difficulty: Moving from lying on back to sitting on the side of the bed?: A Little   How much help from another person does the patient currently need...   Help from Another: Moving to and from a bed to a chair (including a wheelchair)?: A Lot   Help from Another: Need to walk in hospital room?: A Lot   Help from Another: Climbing 3-5 steps with a railing?: Total     AM-PAC Score:  Raw Score: 14   Approx Degree of Impairment: 61.29%   Standardized Score (AM-PAC Scale): 38.1   CMS Modifier (G-Code): CL    FUNCTIONAL ABILITY STATUS  Functional Mobility/Gait Assessment  Gait Assistance: Moderate assistance  Distance (ft): 3  Assistive Device: Rolling walker  Pattern: Shuffle (unsteady gait decreased step lengthand geovanny)  Rolling: minimal assist  Supine to Sit: minimal assist  Sit to Supine: moderate assist  Sit to Stand: moderate assist    Exercise/Education Provided:  Bed mobility  Body mechanics  Energy conservation  Functional activity tolerated  Gait training  Posture  Strengthening  Lower therapeutic exercise:  Ankle pumps  Heel slides  LAQ  Transfer training    Skilled Therapy Provided: Pt ed with bed mobility and transfers with min A to EOB and max A to SPT to a chair with RW. Pt presents with LE buckling and weakness with attempts to amb and required mod a to sit in the chair. Pt ed with therex in the chair x 10 reps each. Pt will benefit from alyssa transfers from low surfaces and rehab will assist with transfers and gait progression.     The patient's Approx Degree of Impairment: 61.29% has been calculated based on documentation  in the [facility] '6 clicks' Inpatient Basic Mobility Short Form.  Research supports that patients with this level of impairment may benefit from IP rehab PT, OT.    Final disposition will be made by interdisciplinary medical team.    Patient End of Session: Up in chair;With  staff;Needs met;Call light within reach;RN aware of session/findings;All patient questions and concerns addressed;Alarm set;Ice applied;Family present    CURRENT GOALS  Goals to be met by: 9/20/2024  Patient Goal Patient's self-stated goal is: Return home    Goal #1 Patient is able to demonstrate supine - sit EOB @ level: independent     Goal #1   Current Status    Goal #2 Patient is able to demonstrate transfers Sit to/from Stand at assistance level: supervision with walker - rolling     Goal #2  Current Status    Goal #3 Patient is able to ambulate 50 feet with assist device: walker - rolling at assistance level: supervision   Goal #3   Current Status    Goal #4 Patient will negotiate 8 stairs/one curb w/ assistive device and supervision   Goal #4   Current Status    Goal #5 Patient to demonstrate independence with home activity/exercise instructions provided to patient in preparation for discharge.   Goal #5   Current Status    Goal #6    Goal #6  Current Status      Patient Evaluation Complexity Level:  History Low - no personal factors and/or co-morbidities   Examination of body systems Low -  addressing 1-2 elements   Clinical Presentation Low- Stable   Clinical Decision Making  Low Complexity     Gait Training: 10 minutes

## 2024-09-06 NOTE — OCCUPATIONAL THERAPY NOTE
OCCUPATIONAL THERAPY EVALUATION - INPATIENT     Room Number: 565/565-A  Evaluation Date: 9/6/2024  Type of Evaluation: Initial  Presenting Problem: AMS, acute psychosis    Physician Order: IP Consult to Occupational Therapy  Reason for Therapy: ADL/IADL Dysfunction and Discharge Planning    OCCUPATIONAL THERAPY ASSESSMENT   Patient is a 64 year old female admitted 9/5/2024 for AMS, acute psychosis. Pt recently admitted with RT LE cellulitis and discharged to [facility].  Prior to admission, patient was at [facility] requiring assist for ADls and functional xfers and limited functional mobility using R/W. Prior to hospitalization in August, pt was living in a multiple level home  with her sister and dtr; with 5-6 stairs between levels. Pt was I with BADLs and managing household mobility using cane.  Patient is currently functioning below baseline with ADLs and functional mobility.  Patient is requiring maximum assist as a result of the following impairments: decreased functional strength, decreased functional reach, decreased endurance, pain, decreased muscular endurance, limited SALTY shd ROM, and decreased safety awareness. Occupational Therapy will continue to follow for duration of hospitalization.    Patient will benefit from continued skilled OT Services to promote return to prior level of function and safety with continuous assistance and gradual rehabilitative therapy    PLAN  OT Treatment Plan: Balance activities;ADL training;Functional transfer training;Endurance training;Cognitive reorientation;Patient/Family education;Patient/Family training;Compensatory technique education     OCCUPATIONAL THERAPY MEDICAL/SOCIAL HISTORY   Problem List  Principal Problem:    Altered mental status, unspecified altered mental status type  Active Problems:    Acute psychosis (HCC)    HOME SITUATION  Type of Home: -- (admitted from [facility])      Prior Level of [place]:  Prior to admission, patient was at [facility] requiring assist for ADls and  functional xfers and limited functional mobility using R/W. Prior to hospitalization in August, pt was living in a multiple level home  with her sister and dtr; with 5-6 stairs between levels. Pt was I with BADLs and managing household mobility using cane.  Patient is currently functioning below baseline with ADLs and functional mobility.    SUBJECTIVE  \"I would like to sit up\"    OCCUPATIONAL THERAPY EXAMINATION    OBJECTIVE  Precautions: Bed/chair alarm  Fall Risk: High fall risk    PAIN ASSESSMENT  Rating: 10  Location: Legs  Management Techniques: Nurse notified; Repositioning    ACTIVITY TOLERANCE  Pt self limiting this session; declines to xfer OOB at this time  Pt tolerates bed level/EOB activity without SOB    O2 SATURATIONS  Oxygen Therapy  SPO2% on Room Air at Rest:  (difficult to attain reading- 90%?)    COGNITION  Pt awake and alert  Pt is able to state her name/, place, and date  Pt follows commands though is fidgety with impaired attention to task, tangential    VISION  Able to locate clock and read accurate self correcting several times      Communication: speech difficult to understand at times    Behavioral/Emotional/Social: pleasant, distractable    RANGE OF MOTION   Upper extremity ROM is within functional limits except SALTY shd limited ROM  Note significant crepitus RT shd    STRENGTH ASSESSMENT  Upper extremity strength is within functional limits     ACTIVITIES OF DAILY LIVING ASSESSMENT  AM-PAC ‘6-Clicks’ Inpatient Daily Activity Short Form  How much help from another person does the patient currently need…  -   Putting on and taking off regular lower body clothing?: A Lot  -   Bathing (including washing, rinsing, drying)?: A Lot  -   Toileting, which includes using toilet, bedpan or urinal? : A Lot  -   Putting on and taking off regular upper body clothing?: A Lot  -   Taking care of personal grooming such as brushing teeth?: A Little  -   Eating meals?: A Little    AM-PAC Score:  Score:  14  Approx Degree of Impairment: 59.67%  Standardized Score (AM-PAC Scale): 33.39  CMS Modifier (G-Code): CK    FUNCTIONAL TRANSFER ASSESSMENT  Sit to Stand: -- (Pt declined to perform sit to stand or xfer OOB)    BED MOBILITY  Rolling: Contact Guard Assist  Supine to Sit : Moderate Assist  Sit to Supine (OT): Moderate Assist    FUNCTIONAL ADL ASSESSMENT  Feeding: set up  Grooming: set up for simple OFH from bed level  UE self care: Max A with limited shd ROM  LE self care: Max A from bed level  Toileting: Max to dependent from bed level    Skilled Therapy Provided: pt received side lying on RT with head against bed rail, appearing in need of repositioning.   Provide Max A for supine to sit tolerating ~6-7 minutes with CGA.   Pt is pleasant and alert, cooperative though declines to stand or xfer OOB. Pt is O however tangential, fidgety, poor attention to task.     EDUCATION PROVIDED  Patient: Role of Occupational Therapy; Plan of Care (activity promotion, pressure relief)  Patient's Response to Education: Verbalized Understanding; Requires Further Education    The patient's Approx Degree of Impairment: 59.67% has been calculated based on documentation in the [facility] '6 clicks' Inpatient Daily Activity Short Form.  Research supports that patients with this level of impairment may benefit from SULTANA.  Final disposition will be made by interdisciplinary medical team.     Patient End of Session: In bed;Needs met;Call light within reach;Alarm set    OT Goals  Patients self stated goal is: get back and finish rehab      Patient will complete functional transfer with Mod A  Comment:     Patient will complete bed mobility from pressure relief and supine ADls with Min A  Comment:     Patient will tolerate standing for 2 minutes in prep for adls with CGA   Comment:     Comment:          Goals  on: 24  Frequency: 3-5x/week    Patient Evaluation Complexity Level:   Occupational Profile/Medical History MODERATE - Expanded  review of history including review of medical or therapy record   Specific performance deficits impacting engagement in ADL/IADL MODERATE  3 - 5 performance deficits   Client Assessment/Performance Deficits MODERATE - Comorbidities and min to mod modifications of tasks    Clinical Decision Making MODERATE - Analysis of occupational profile, detailed assessments, several treatment options    Overall Complexity MODERATE     Self-Care Home Management: 12 minutes

## 2024-09-06 NOTE — CONSULTS
[facility]  part of [facility]    Report of Consultation    Sheri Garzon Patient Status:  Inpatient    1960 MRN [id number]   Location [facility] 5SW/SE Attending Purnima Herrera DO   Hosp Day # 1 PCP TERI MCKENNA MD     Date of Admission:  2024  Date of Consult:  24   Reason for Consultation:   Patient presented with increased confusion, restlessness, agitation, Purnima Cash DO requested psychiatric consult for evaluation and advice.    Consult Duration     The patient seen for initial psychiatric consult evaluation.   Record reviewed, communication with attending, communication with RN and patient seen face to face evaluation.    History of Present Illness:   Patient is a 64 year old single  female retired  of 35 years with a past medical history of hypertension, rheumatoid arthritis, obesity, chronic adrenal insufficiency secondary to chronic corticosteroid use, asthma, pancreatitis, obstructive sleep apnea, obesity, hyperlipidemia, anemia of chronic kidney disease, nonocclusive coronary artery disease, and peripheral arterial disease who also has past mental lorenzo history of anxiety and depression was admitted to the hospital for Altered mental status, unspecified altered mental status type: The patient has been demonstrating continued confusion and irritability with visual and auditory hallucinations.   Psych consult requested for evaluation and advice.     Chart reviewed - CT scan, lab.     Patient seen in the presence of her sister, [place].     Patient was oriented to day, person, and time but is confused about the precipitating events. She was transferred to [facility] ED for altered mental status but states she came from her home with her daughter and sister. She endorses that she came to the hospital for her chronic wounds. Patient denies feeling confused. However she became more confused and combative while in the ED. She  was sedated and restrained for patient and staff safety at that time.     During assessment, the patient states she is misunderstood and has been ignored for hours while requesting pain medications. She complains of increased intermittent irritability that is getting worse over the past few weeks. She states she has history of anxiety and depression that is currently not being treated for. Most recently the patient states she has not been sleeping. The past month she cites an inability to get quality sleep. She denies any suicidal thought, self harm thoughts or behavior.    Patient is confused and is intermittently irritable with some violent tendencies. She continues to deny any confusion but remains altered. She states her sleep is not good and wants help with it.    Patient indicated for psych consult for evaluation and advise.    Per chart review, the patient presented to the hospital for altered mental status. Her family stated that she was posting bizarre statements on facebook and has been suffering from auditory and visual hallucinations.     The patient received the following psychotropic medications in the ED: lorazepam 2 mg IM, Haldol 5 mg IM, droperidol 2.5 mg IV,    Labs and imaging reviewed: CT head - There is unchanged mild global parenchymal volume loss with prominence of the ventricular system.  No hydrocephalus.  There is no midline shift or mass effect.  The basal cisterns are intact.  There is atherosclerotic calcification of the carotid siphons    and bilateral intracranial vertebral arteries.       There are no areas of abnormal parenchymal attenuation.  The gray-white differentiation is maintained.      No acute intracranial hemorrhage or extra-axial fluid collection.     Glucose - 63   POC GLU - 52,51,51,67,60,53,61  BUN - 6  CRE - 0.53  WBC - 12.3  Hgb - 8.2    The patient is not known to the psychiatry team.    The patient was seen today in inpatient room 565. She was laying bed with an  offloading pillow under the left hip.     The patient is alert and oriented to person, place, and time. Disoriented to situation. The patient has difficulty answering questions and engaging in appropriate conversation due to confused and disorganized thought process.    Patient is not able to cooperate with interview to due confusion, restlessness, agitation and response to internal stimuli.       The patient has been demonstrating delirium episode with alternation in mood and cognition with episodes of  increased confusion, restlessness, agitation and response to internal stimuli.     Collateral obtained from: Present during assessment was the patient's eldest sister, Gladys. The patient wanted her sister present during the assessment and verbally authorized staff to allow her to answer health care questions about the patient. Gladys stated that the patient has a very keen mind and is able to think through some topics well. She also stated that there are other topics that she is not understanding of. Gladys corroborates with the patient that, physically she has declined warranting her admission to [facility] to help care for her wounds and activity intolerance. Gladys also states that the patient has many highs and lows throughout the days and weeks. Adding to that, her highs and lows have always been present. Even when they were children, she was known to have these mood cycles.     Gladys states that she and the patients daughter are unable to lift her anymore at home.         Past Psychiatric/Medication History:  1. Prior diagnoses: Depression, Anxiety  2. Past psychiatric inpatient: Denies   3. Past outpatient history: Denies  4. Past suicide history: None  5. Medication history: Denies    Social History:   The patient is a 64 year old  female. She is a retired  of 35 years. She retired and moved to [place] with her daughter. Her health declined since retiring and moving to [place]  and moved back to [place] with her daughter in June 2024. She and her daughter moved back in with her eldest sister, Gladys. She was admitted to rehab for decreased mobility and chronic wound care on multiple occasions.     Family History:  Daughter: depression    Medical History:   Past Medical History  Past Medical History:    Allergic rhinitis    Anxiety    Arthritis    RA and Osteoarthritis    Asthma (HCC)    Depression    Not officially diagnosed    Esophageal reflux    Essential hypertension    High blood pressure    High cholesterol    Hyperlipidemia    Myocardial infarction (HCC)    Cardiac event    Osteoarthritis    Pancreatitis (HCC)    Problems with swallowing    RA (rheumatoid arthritis) (HCC)    Sleep apnea       Past Surgical History  Past Surgical History:   Procedure Laterality Date    Colonoscopy      Other surgical history      Revision of uterine anomaly      Rotator cuff repair      Wrist fracture surgery         Family History  Family History   Problem Relation Age of Onset    Diabetes Mother     Genetic Disease Mother     Heart Disorder Mother     Hypertension Mother     Obesity Mother     Diabetes Father     Hypertension Father     Depression Daughter     Psychiatric Daughter        Social History  Social History     Socioeconomic History    Marital status: Single   Tobacco Use    Smoking status: Never    Smokeless tobacco: Never   Vaping Use    Vaping status: Never Used   Substance and Sexual Activity    Alcohol use: Not Currently    Drug use: Never     Social Determinants of Health     Food Insecurity: No Food Insecurity (9/5/2024)    Food Insecurity     Food Insecurity: Never true   Transportation Needs: No Transportation Needs (9/5/2024)    Transportation Needs     Lack of Transportation: No   Housing Stability: Low Risk  (9/5/2024)    Housing Stability     Housing Instability: No           Current Medications:  Current Facility-Administered Medications   Medication Dose Route Frequency     collagenase (Santyl) 250 UNIT/GM ointment   Topical Daily    dextrose 5%-sodium chloride 0.45% infusion   Intravenous Continuous    enoxaparin (Lovenox) 40 MG/0.4ML SUBQ injection 40 mg  40 mg Subcutaneous Daily    acetaminophen (Tylenol Extra Strength) tab 500 mg  500 mg Oral Q4H PRN    ondansetron (Zofran) 4 MG/2ML injection 4 mg  4 mg Intravenous Q6H PRN    OLANZapine (Zyprexa) 10 mg in sterile water for injection (PF) IM injection  10 mg Intramuscular Q8H PRN    HYDROcodone-acetaminophen (Norco)  MG per tab 1 tablet  1 tablet Oral Q4H PRN     Medications Prior to Admission   Medication Sig    cefpodoxime 200 MG Oral Tab Take 2 tablets (400 mg total) by mouth 2 (two) times daily for 10 days.    metoprolol tartrate 50 MG Oral Tab Take 0.5 tablets (25 mg total) by mouth 2 (two) times daily.    losartan 50 MG Oral Tab Take 0.5 tablets (25 mg total) by mouth daily.    predniSONE 10 MG Oral Tab Take 1 tablet (10 mg total) by mouth daily with breakfast.    sodium bicarbonate 650 MG Oral Tab Take 1 tablet (650 mg total) by mouth 2 (two) times daily.    Copper (COPPERMIN) 5 MG Oral Tab Take 1 tablet by mouth daily.    Ferrous Gluconate 324 (38 Fe) MG Oral Tab Take 1 tablet (325 mg total) by mouth daily with breakfast.    hydroxychloroquine 200 MG Oral Tab Take 1 tablet (200 mg total) by mouth 2 (two) times daily.    Aspirin 81 MG Oral Cap Take 81 mg by mouth daily.    atorvastatin 10 MG Oral Tab Take 1 tablet (10 mg total) by mouth daily.    clopidogrel 75 MG Oral Tab Take 1 tablet (75 mg total) by mouth daily.    ergocalciferol 1.25 MG (04040 UT) Oral Cap Take 1 capsule (50,000 Units total) by mouth once a week.    oxybutynin ER 10 MG Oral Tablet 24 Hr Take 1 tablet (10 mg total) by mouth daily.    Potassium Chloride ER 10 MEQ Oral Tab CR Take 1 tablet (10 mEq total) by mouth 2 (two) times daily.    folic acid 1 MG Oral Tab Take 1 tablet (1 mg total) by mouth daily.    clotrimazole-betamethasone 1-0.05 %  External Cream Apply 1 Application topically 2 (two) times daily. Apply to groin and abdominal folds    Acetaminophen ER (ACETAMINOPHEN 8 HOUR) 650 MG Oral Tab CR Take 2-3 tablets (1,300-1,950 mg total) by mouth every 8 (eight) hours as needed for Pain.    ibuprofen 200 MG Oral Tab Take 3 tablets (600 mg total) by mouth every 8 (eight) hours as needed for Pain.    albuterol 108 (90 Base) MCG/ACT Inhalation Aero Soln Inhale 2 puffs into the lungs every 4 to 6 hours as needed for Wheezing.    [] pantoprazole 40 MG Oral Tab EC Take 1 tablet (40 mg total) by mouth 2 (two) times daily before meals.    methotrexate 2.5 MG Oral Tab Take 1 tablet (2.5 mg total) by mouth once a week. (Patient not taking: Reported on 2024)       Allergies  Allergies   Allergen Reactions    Cephalosporins ANAPHYLAXIS, NAUSEA AND VOMITING and OTHER (SEE COMMENTS)     Other reaction(s): Fever    - Tolerated multiple doses of ceftriaxone on 2024 without any complications  - Tolerated multiple doses of cefepime 2024 without complications    Gadolinium Derivatives FEVER    Penicillins OTHER (SEE COMMENTS)     esophagitis    Sulfa Antibiotics OTHER (SEE COMMENTS)     esophagitis    Bananas ITCHING       Review of Systems:   As by Admitting/Attending    Results:   Laboratory Data:  Lab Results   Component Value Date    WBC 12.3 (H) 2024    HGB 8.2 (L) 2024    HCT 29.0 (L) 2024    .0 2024    CREATSERUM 0.53 (L) 2024    BUN 6 (L) 2024     2024    K 4.0 2024     2024    CO2 20.0 (L) 2024    GLU 63 (L) 2024    CA 8.1 (L) 2024    ALB 3.1 (L) 2024    ALKPHO 140 (H) 2024    TP 5.5 (L) 2024    AST 38 (H) 2024    ALT 33 2024    LIP 38 2024    DDIMER 1.69 (H) 2024    MG 1.9 2024    PHOS 2.9 2024    CK 50 2024    B12 748 2024    ETOH <3 2024         Imaging:  CT BRAIN OR HEAD  (CPT=70450)    Result Date: 9/5/2024  CONCLUSION:   No transcortical area of hypoattenuation to suggest an acute infarct.  No acute intracranial hemorrhage, midline shift, mass effect, or hydrocephalus.  Lesser incidental findings described above.    Dictated by (CST): Ron Whitman MD on 9/05/2024 at 2:25 PM     Finalized by (CST): Ron Whitman MD on 9/05/2024 at 2:27 PM          XR CHEST AP PORTABLE  (CPT=71045)    Result Date: 9/5/2024  CONCLUSION:  1. Mild cardiomegaly and normal pulmonary vascularity.  No acute airspace disease.    Dictated by (CST): Ronen Washington MD on 9/05/2024 at 2:15 PM     Finalized by (CST): Ronen Washington MD on 9/05/2024 at 2:16 PM           Vital Signs:   Blood pressure 102/50, pulse 98, temperature 97.3 °F (36.3 °C), temperature source Oral, resp. rate 18, height 62\", weight 82.2 kg (181 lb 3.2 oz), SpO2 94%.    Mental Status Exam:   Appearance: Stated age 64, in hospital gown, laying down in hospital bed.  Psychomotor: Patient has been demonstrating restlessness and agitation.  Orientation: Alert and oriented to person. Patient confused.  Gait: Not evaluated.  Attitude/Coorperation: limited cooperation due to confusion, restlessness, agitation  Behavior: episodes of restlessness and agitation.  Speech: Regular rate and rhythm speech.  Mood: anxious  Affect: restricted  Thought process: Confused, disorganized  Thought content: No reports of  suicidal or homicidal ideation.  Perceptions: Patient has been demonstrating response to internal stimuli.  Concentration: Grossly impaired  Memory: Grossly impaired  Intellect: Average.  Judgment and Insight: Questionable.     Impression:     Delirium due another medical condition.  Moderate bipolar I disorder, current or most recent episode depressed, with psychotic features, with mixed features (HCC)         The patient is a 64 year old  female with a past psychiatric history of depression and anxiety.     The patient has  been demonstrating delirium episodes with alternation in mood and cognition with episodes of increased confusion, restlessness, agitation and response to internal stimuli. T  he patient is also demonstrating acute and hostile episodes of irritability. There has been need to use chemical and physical restraints to maintain patient and staff.     Per the patient and corrobarating information from the sister, the patient has been suffering from a lifetime of mood swings, irritability, and intermittent psychotic episodes. Currently the patient is suffering from acute delirium due to possible undiagnosed bipolar disease.     Discussed risk and benefit, acknowledging the current symptom and severity.  At this point, I would recommend the following approach:     Focus on safety  Focus on education and support.  Focus on insight orientation helping the patient understand diagnosis and treatment plan.  Start Seroquel 50 mg nightly.  Start Lamictal 25 mg twice daily.  Utilize Haldol 2 mg IV every 8 hour as needed for agitation  Processed with patient at length, the initiation of the above psychotropic medications I advised the patient of the risks, benefits, alternatives and potential side effects. The patient consents to administration of the medications and understands the right to refuse medications at any time. The patient verbalized understanding.   Coordinate plan with team    Orders This Visit:  Orders Placed This Encounter   Procedures    CBC With Differential With Platelet    Basic Metabolic Panel (8)    Hepatic Function Panel (7)    Lipase    Ethyl Alcohol    Drug screen 8 w/out confirmation, urine    Urinalysis, Routine    Magnesium    Basic Metabolic Panel (8)    CBC With Differential With Platelet    Magnesium    RBC Morphology Scan    Rapid SARS-CoV-2 by PCR       Meds This Visit:  Requested Prescriptions      No prescriptions requested or ordered in this encounter       Hudson Boles MD  9/6/2024    Note to  Patient: The 21st Century Cures Act makes medical notes like these available to patients in the interest of transparency. However, be advised this is a medical document. It is intended as peer to peer communication. It is written in medical language and may contain abbreviations or verbiage that are unfamiliar. It may appear blunt or direct. Medical documents are intended to carry relevant information, facts as evident, and the clinical opinion of the practitioner. This note may have been transcribed using a voice dictation system. Voice recognition errors may occur. This should not be taken to alter the content or meaning of this note.

## 2024-09-06 NOTE — ED QUICK NOTES
Called Elisha CHUNG to given update that patient is on her way upstairs. Soft restraints remain in  place because patient now restless and wants her IV out. Next restraint documentation due 1945.   Security called x2 for patient belongings. Pt daughter would like to take belongings home Elisha CHUNG aware.

## 2024-09-06 NOTE — PLAN OF CARE
Safety precautions in place. Call light within reach. Patient care needs addressed.    Problem: Patient Centered Care  Goal: Patient preferences are identified and integrated in the patient's plan of care  Description: Interventions:  - Provide timely, complete, and accurate information to patient/family  - Incorporate patient and family knowledge, values, beliefs, and cultural backgrounds into the planning and delivery of care  - Encourage patient/family to participate in care and decision-making at the level they choose  - Honor patient and family perspectives and choices  Outcome: Progressing      Problem: CARDIOVASCULAR - ADULT  Goal: Maintains optimal cardiac output and hemodynamic stability  Description: INTERVENTIONS:  - Monitor vital signs, rhythm, and trends  - Monitor for bleeding, hypotension and signs of decreased cardiac output  - Evaluate effectiveness of vasoactive medications to optimize hemodynamic stability  - Monitor arterial and/or venous puncture sites for bleeding and/or hematoma  - Assess quality of pulses, skin color and temperature  - Assess for signs of decreased coronary artery perfusion - ex. Angina  - Evaluate fluid balance, assess for edema, trend weights  Outcome: Progressing  Goal: Absence of cardiac arrhythmias or at baseline  Description: INTERVENTIONS:  - Continuous cardiac monitoring, monitor vital signs, obtain 12 lead EKG if indicated  - Evaluate effectiveness of antiarrhythmic and heart rate control medications as ordered  - Initiate emergency measures for life threatening arrhythmias  - Monitor electrolytes and administer replacement therapy as ordered  Outcome: Progressing     Problem: SKIN/TISSUE INTEGRITY - ADULT  Goal: Skin integrity remains intact  Description: INTERVENTIONS  - Assess and document risk factors for pressure ulcer development  - Assess and document skin integrity  - Monitor for areas of redness and/or skin breakdown  - Initiate interventions, skin care  algorithm/standards of care as needed  Outcome: Progressing  Goal: Incision(s), wounds(s) or drain site(s) healing without S/S of infection  Description: INTERVENTIONS:  - Assess and document risk factors for pressure ulcer development  - Assess and document skin integrity  - Assess and document dressing/incision, wound bed, drain sites and surrounding tissue  - Implement wound care per orders  - Initiate isolation precautions as appropriate  - Initiate Pressure Ulcer prevention bundle as indicated  Outcome: Progressing  Goal: Oral mucous membranes remain intact  Description: INTERVENTIONS  - Assess oral mucosa and hygiene practices  - Implement preventative oral hygiene regimen  - Implement oral medicated treatments as ordered  Outcome: Progressing     Problem: CONFUSION  Goal: Confusion, delirium, dementia or psychosis is improved or at baseline  Description: INTERVENTIONS:  - Assess for possible contributors to thought disturbance, including medications, impaired vision or hearing, underlying metabolic abnormalities, dehydration, psychiatric diagnoses, and notify attending MD/LIP  - [place] high risk fall precautions, as indicated  - Provide frequent short contacts to provide reality reorientation, refocusing and direction  - Decrease environmental stimuli, including noise as appropriate  - Monitor and intervene to maintain adequate nutrition, hydration, elimination, sleep and activity  - Consider the need for a sitter if unable to leave patient unattended  - Initiate Spiritual Care consult as indicated  - Consult Pharmacy as needed  Outcome: Progressing

## 2024-09-06 NOTE — PAYOR COMM NOTE
--------------  ADMISSION REVIEW     Payor: [employer]  Subscriber #:  [id number]  Authorization Number: [id number]    Admit date: 9/5/24  Admit time:  7:23 PM       Patient Seen in: [facility] Emergency Department    History     Chief Complaint   Patient presents with    Altered Mental Status     64-year-old female history of hypertension, hyperlipidemia who was sent from a nursing facility due to altered mental status.  Patient normally awake and alert oriented as for per the nursing facility.  Patient was her name date she is in hospital however cannot recall the president's name.  Denies any complaints at this time.    History reviewed.   Past Medical History:    Allergic rhinitis    Anxiety    Arthritis    RA and Osteoarthritis    Asthma (HCC)    Depression    Not officially diagnosed    Esophageal reflux    Essential hypertension    High blood pressure    High cholesterol    Hyperlipidemia    Myocardial infarction (HCC)    Cardiac event    Osteoarthritis    Pancreatitis (HCC)    Problems with swallowing    RA (rheumatoid arthritis) (HCC)    Sleep apnea   History reviewed.   Past Surgical History:   Procedure Laterality Date    Colonoscopy      Other surgical history      Revision of uterine anomaly      Rotator cuff repair      Wrist fracture surgery       Physical Exam     ED Triage Vitals   BP 09/05/24 1232 158/82   Pulse 09/05/24 1232 108   Resp 09/05/24 1232 18   Temp 09/05/24 1236 99 °F (37.2 °C)   Temp src 09/05/24 1236 Oral   SpO2 09/05/24 1232 98 %   O2 Device 09/05/24 1232 None (Room air)     Physical Exam  Vitals and nursing note reviewed.   Eyes:      Pupils: Pupils are equal, round, and reactive to light.   Cardiovascular:      Rate and Rhythm: Normal rate.      Pulses: Normal pulses.   Pulmonary:      Effort: Pulmonary effort is normal.   Abdominal:      Palpations: Abdomen is soft.   Musculoskeletal:         General: No swelling. Normal range of motion.   Skin:     General:  Skin is warm and dry.      Capillary Refill: Capillary refill takes less than 2 seconds.   Neurological:      General: No focal deficit present.      Mental Status: She is alert.     Labs Reviewed   CBC WITH DIFFERENTIAL WITH PLATELET - Abnormal; Notable for the following components:       Result Value    WBC 16.6 (*)     RBC 3.00 (*)     HGB 8.9 (*)     HCT 27.0 (*)     RDW-SD 63.1 (*)     RDW 19.8 (*)     Neutrophil Absolute Prelim 13.71 (*)     Neutrophil Absolute 13.71 (*)     Monocyte Absolute 1.48 (*)     All other components within normal limits   BASIC METABOLIC PANEL (8) - Abnormal; Notable for the following components:    Potassium 3.3 (*)     Calcium, Total 8.6 (*)     All other components within normal limits   HEPATIC FUNCTION PANEL (7) - Abnormal; Notable for the following components:    AST 38 (*)     Alkaline Phosphatase 140 (*)     Total Protein 5.5 (*)     Albumin 3.1 (*)     All other components within normal limits   DRUG SCREEN 8 W/OUT CONFIRMATION, URINE - Abnormal; Notable for the following components:    Opiate Urine Presumed Positive (*)     All other components within normal limits   URINALYSIS, ROUTINE - Abnormal; Notable for the following components:    Protein Urine 70 (*)     Squamous Epi. Cells Few (*)     Hyaline Casts Present (*)     All other components within normal limits   LIPASE - Normal   ETHYL ALCOHOL - Normal   MAGNESIUM - Normal   RAPID SARS-COV-2 BY PCR - Normal    101  Sinus tachycardia, heart rate 1 1, normal intervals, normal axis  CT BRAIN OR HEAD    No transcortical area of hypoattenuation to suggest an acute infarct.  No acute intracranial hemorrhage, midline shift, mass effect, or hydrocephalus.    XR CHEST AP PORTABLE     1. Mild cardiomegaly and normal pulmonary vascularity.  No acute airspace disease.      ED Medications Administered:   Medications   LORazepam (Ativan) 2 mg/mL injection 2 mg (2 mg Intramuscular Given 9/5/24 1322)   haloperidol lactate (Haldol) 5  MG/ML injection 5 mg (5 mg Intramuscular Given 9/5/24 1442)   droperidol (Inapsine) 2.5 mg/mL injection 2.5 mg (2.5 mg Intravenous Given 9/5/24 1548)       Differential Diagnosis/ Diagnostic Considerations: UTI, pneumonia, electrolyte abnormality    Patient is altered not cooperative with staff.  Unable to fully assess patient, will need to chemically sedate in order to get  workup to rule out any type of emergencies    Reviewed results with patient's family over the bedside.  Chest x-ray I reviewed the images there is nothing acute.  Patient did have a slightly elevated WBC count but no signs of infection on the entire workup.  No pneumonia on x-ray no UTI.  CT head also did not show any acute findings.  Unsure what is causing her altered mental status but per the family ember at the bedside she has never had any psychiatric episodes in the past.  Will need to be admitted for altered mental status workup.  With also place psychiatry on consultation during admission.    [facility]    Placed psychiatry on consult  Disposition and Plan     Clinical Impression:  1. Altered mental status, unspecified altered mental status type        History and Physical     CHIEF COMPLAINT:  Acute encephalopathy.     HISTORY OF PRESENT ILLNESS:  The patient is a 64-year-old  female who was recently placed in a rehab facility for poor appetite and poor musculoskeletal conditioning.  She had chronic rheumatoid arthritis.  Last night, family noted that she is posting bizarre statements on Facebook and today nursing facility contacted her family notifying them that she is seeing things and hearing voices.  Brought in today to the emergency department for evaluation.  CBC showed white blood cell count of 16.6 with left shift, hemoglobin 8.9.  She does have chronic leukocytosis.  She is on prednisone chronically.  Chemistry and liver function tests were unremarkable.  CT  scan of the brain, chest x-ray were unremarkable.  Patient was given lorazepam and Haldol in the emergency room in order to calm her down, and she was placed in non-violent soft restraints.     PAST MEDICAL HISTORY:  Hypertension, rheumatoid arthritis, obesity, chronic adrenal insufficiency secondary to chronic corticosteroid use, asthma, pancreatitis, obstructive sleep apnea, obesity, hyperlipidemia, anxiety, depression, anemia of chronic kidney disease, nonocclusive coronary artery disease, peripheral arterial disease.     PAST SURGICAL HISTORY:  Right shoulder rotator cuff repair, wrist fracture open reduction and internal fixation, and right distal calf wound debridement.     MEDICATIONS:  Please see medication reconciliation list.     ALLERGIES:  Gadolinium and cephalosporins, penicillin, and sulfa causes esophagitis.     FAMILY HISTORY:  Mother had diabetes mellitus type 2 and heart disease.  Father had diabetes mellitus type 2.     SOCIAL HISTORY:  No tobacco, alcohol, or drug use.  Currently resides in a nursing facility, almost bedbound, uses a walker.  Requires assistance in her basic activities of daily living.      REVIEW OF SYSTEMS:  Difficult to obtain from the patient at this point.  Per the family, she had a similar psychotic breakdown around 2 years ago.  She actually lived in [place] up until June when she moved in with her family in [place] and because of her poor functional status and poor appetite, she was brought into the hospital in late August and was transferred to a rehab facility.       PHYSICAL EXAMINATION:    GENERAL:  Alert, noncooperative, seemingly very confused.  VITAL SIGNS:  Temperature 99.0, pulse 97, respiratory rate 21, blood pressure 100/68, pulse ox 100% on room air.    HEENT:  Atraumatic.  Oropharynx clear.  Moist mucous membranes.  NECK:  Supple.  No lymphadenopathy.  LUNGS:  Clear to auscultation bilaterally.  Normal respiratory effort.  HEART:  Regular rate, rhythm.  S1  and S2 auscultated.  No murmur.  ABDOMEN:  Soft, nondistended.  No tenderness.  Positive bowel sounds.   EXTREMITIES:  No peripheral edema, clubbing, or cyanosis.  Superficial abrasion noted on the dorsum of the right foot, but no cellulitis.  NEUROLOGIC:  No clear focal defect.     ASSESSMENT:    1.       Acute encephalopathy, suspect acute psychosis considering visual and auditory hallucinations accompanied by bizarre behavior.  2.       Minimal low-grade fever with leukocytosis.  So far no clear indication of an infectious process.  Will continue to monitor her temperature curve.  3.       Rheumatoid arthritis.     PLAN:  Patient will be admitted to general medical floor.  Obtain psychiatry consult.  Continue her home medications use.  IM Zyprexa as needed.  Wound service consult to evaluate her right foot dorsum superficial abrasion.  Further recommendations to follow.       MEDICATIONS ADMINISTERED IN LAST 1 DAY:    dextrose 5%-sodium chloride 0.45% infusion       Date Action Dose Route User    9/6/2024 1409 Rate/Dose Change (none) Intravenous Joselin Blount RN    9/6/2024 0742 New Bag (none) Intravenous Cong Evans RN          droperidol (Inapsine) 2.5 mg/mL injection 2.5 mg       Date Action Dose Route User    9/5/2024 1548 Given 2.5 mg Intravenous Mariluz De Leon RN          enoxaparin (Lovenox) 40 MG/0.4ML SUBQ injection 40 mg       Date Action Dose Route User    9/6/2024 0835 Given 40 mg Subcutaneous (Left Lower Abdomen) Joselin Blount RN     hydrocortisone Na succinate PF (Solu-CORTEF) injection 50 mg       Date Action Dose Route User    9/6/2024 1449 Given by Other 50 mg Intravenous Joselin Blount RN          magnesium sulfate in sterile water for injection 2 g/50mL IVPB premix 2 g       Date Action Dose Route User    9/5/2024 2321 New Bag 2 g Intravenous Cong Evans RN          potassium chloride 40 mEq in 250mL sodium chloride 0.9% IVPB premix       Date Action Dose Route User    9/6/2024  0044 New Bag 40 mEq Intravenous Cong Evans, RN            Vitals (last day)       Date/Time Temp Pulse Resp BP SpO2 Weight O2 Device O2 Flow Rate (L/min) [facility]    09/06/24 1500 -- 125 -- -- -- -- -- --     09/06/24 1438 97.7 °F (36.5 °C) 122 18 96/66 99 % -- None (Room air) --     09/06/24 1400 -- 140 -- -- -- -- -- -- BS    09/06/24 1330 -- 97 18 102/50 -- -- -- -- DF    09/06/24 0950 97.3 °F (36.3 °C) 98 18 102/50 94 % -- None (Room air) --     09/06/24 0800 97.8 °F (36.6 °C) 99 18 90/71 94 % -- None (Room air) --     09/06/24 0632 98.7 °F (37.1 °C) 86 16 103/71 100 % -- None (Room air) --     09/05/24 2029 -- -- -- -- -- 181 lb 3.2 oz (82.2 kg) -- --     09/05/24 1920 98.9 °F (37.2 °C) -- -- 102/68 -- -- -- --     09/05/24 1920 -- -- 20 -- 96 % -- None (Room air) --     09/05/24 1815 -- 92 25 108/70 98 % -- None (Room air) --     09/05/24 1700 -- 97 21 100/68 100 % -- None (Room air) --     09/05/24 1600 -- 100 19 116/74 99 % -- None (Room air) --     09/05/24 1542 -- 102 26 118/74 100 % -- None (Room air) --     09/05/24 1247 -- 101 24 99/71 100 % -- None (Room air) --     09/05/24 1241 -- -- -- -- -- -- None (Room air) --     09/05/24 1236 99 °F (37.2 °C) -- -- -- -- -- -- -- TP    09/05/24 1232 -- 108 18 158/82 98 % 178 lb (80.7 kg) None (Room air) -- TP

## 2024-09-06 NOTE — PROGRESS NOTES
[facility]  part of [facility]    Progress Note    Sheri Garzon Patient Status:  Inpatient    1960 MRN [id number]   Location [facility] 5SW/SE Attending Purnima Herrera,    Hosp Day # 1 PCP TERI MCKENNA MD     Chief complaint delerium     Subjective:   Sheri Garzon is a(n) 64 year old female pt seen earlier today. Doing much better. Still hypoglycemic     ROS:   No cp, sob   No c/d   No n/v     Objective:   Blood pressure 119/77, pulse 100, temperature 97.7 °F (36.5 °C), temperature source Oral, resp. rate 18, height 5' 2\" (1.575 m), weight 181 lb 3.2 oz (82.2 kg), SpO2 99%.      Intake/Output Summary (Last 24 hours) at 2024 174  Last data filed at 2024 1733  Gross per 24 hour   Intake 492.5 ml   Output --   Net 492.5 ml       Patient Weight(s) for the past 336 hrs:   Weight   24 181 lb 3.2 oz (82.2 kg)   24 1232 178 lb (80.7 kg)           General appearance: alert, appears stated age and cooperative  Pulmonary:  clear to auscultation bilaterally  Cardiovascular: S1, S2 normal, no murmur, click, rub or gallop, regular rate and rhythm  Abdominal: soft, non-tender; bowel sounds normal; no masses,  no organomegaly  Extremities: extremities normal, atraumatic, no cyanosis or edema        Medicines:     Current Facility-Administered Medications   Medication Dose Route Frequency    collagenase (Santyl) 250 UNIT/GM ointment   Topical Daily    dextrose 5%-sodium chloride 0.45% infusion   Intravenous Continuous    hydrocortisone Na succinate PF (Solu-CORTEF) injection 50 mg  50 mg Intravenous Q8H    haloperidol lactate (Haldol) 5 MG/ML injection 2 mg  2 mg Intravenous Q8H PRN    QUEtiapine (SEROquel) tab 50 mg  50 mg Oral Nightly    lamoTRIgine (LaMICtal) tab 25 mg  25 mg Oral BID    enoxaparin (Lovenox) 40 MG/0.4ML SUBQ injection 40 mg  40 mg Subcutaneous Daily    acetaminophen (Tylenol Extra Strength) tab 500 mg  500 mg Oral Q4H PRN    ondansetron  (Zofran) 4 MG/2ML injection 4 mg  4 mg Intravenous Q6H PRN    OLANZapine (Zyprexa) 10 mg in sterile water for injection (PF) IM injection  10 mg Intramuscular Q8H PRN    HYDROcodone-acetaminophen (Norco)  MG per tab 1 tablet  1 tablet Oral Q4H PRN           Ant-Infective Medications            cefpodoxime 200 MG Oral Tab    hydroxychloroquine 200 MG Oral Tab                Blood Pressure and Cardiac Medications            metoprolol tartrate 50 MG Oral Tab    losartan 50 MG Oral Tab             dextrose 5%-sodium chloride 0.45% 100 mL/hr at 09/06/24 1633           Lab Results   Component Value Date    WBC 12.3 (H) 09/06/2024    HGB 8.2 (L) 09/06/2024    HCT 29.0 (L) 09/06/2024    .0 09/06/2024    CREATSERUM 0.53 (L) 09/06/2024    BUN 6 (L) 09/06/2024     09/06/2024    K 4.0 09/06/2024     09/06/2024    CO2 20.0 (L) 09/06/2024    GLU 63 (L) 09/06/2024    CA 8.1 (L) 09/06/2024    ALB 3.1 (L) 09/05/2024    ALKPHO 140 (H) 09/05/2024    BILT 0.7 09/05/2024    TP 5.5 (L) 09/05/2024    AST 38 (H) 09/05/2024    ALT 33 09/05/2024    LIP 38 09/05/2024    DDIMER 1.69 (H) 07/23/2024    MG 1.9 09/06/2024    PHOS 2.9 08/21/2024    CK 50 08/27/2024    B12 748 08/21/2024    ETOH <3 09/05/2024       CT BRAIN OR HEAD (CPT=70450)    Result Date: 9/5/2024  CONCLUSION:   No transcortical area of hypoattenuation to suggest an acute infarct.  No acute intracranial hemorrhage, midline shift, mass effect, or hydrocephalus.  Lesser incidental findings described above.    Dictated by (CST): Ron Whitman MD on 9/05/2024 at 2:25 PM     Finalized by (CST): Ron Whitman MD on 9/05/2024 at 2:27 PM          XR CHEST AP PORTABLE  (CPT=71045)    Result Date: 9/5/2024  CONCLUSION:  1. Mild cardiomegaly and normal pulmonary vascularity.  No acute airspace disease.    Dictated by (CST): Ronen Washington MD on 9/05/2024 at 2:15 PM     Finalized by (CST): Ronen Washington MD on 9/05/2024 at 2:16 PM         EKG    Result Date:  9/5/2024  Sinus tachycardia Poor R wave progression Abnormal ECG When compared with ECG of 12-AUG-2024 13:09, Criteria for Inferior infarct are no longer Present Confirmed by ITA CONNOR, DAVID ([id number]) on 9/5/2024 3:45:17 PM     Results:     CBC:    Lab Results   Component Value Date    WBC 12.3 (H) 09/06/2024    WBC 16.6 (H) 09/05/2024    WBC 12.2 (H) 08/29/2024     Lab Results   Component Value Date    HGB 8.2 (L) 09/06/2024    HGB 8.9 (L) 09/05/2024    HGB 9.0 (L) 08/29/2024      Lab Results   Component Value Date    .0 09/06/2024    .0 09/05/2024    .0 08/29/2024       Recent Labs   Lab 09/05/24  1302 09/06/24  0615   GLU 87 63*   BUN 12 6*   CREATSERUM 0.68 0.53*   CA 8.6* 8.1*    142   K 3.3* 4.0    111   CO2 25.0 20.0*         @[email or web address]@    Assessment and Plan:         ASSESSMENT:    1.       Acute encephalopathy, suspect acute psychosis considering visual and auditory hallucinations accompanied by bizarre behavior.  - apprec psych consult - discussed with dr whitaker - haldol and seroquel added . Likely bipolar     2.       Minimal low-grade fever with leukocytosis.  So far no clear indication of an infectious process.  Will continue to monitor her temperature curve.   Wound service consult to evaluate her right foot dorsum superficial abrasion.  Further recommendations to follow.     3.       Rheumatoid arthritis.  4. Adrenal insuff with hypoglycemia - pt 's prednisone stopped at rehab for last 5 days. Will start hydrocortisone for crisis and Dr Miller to see. Apprec input         Purnima Herrera DO         Chart reviewed, including current vitals, notes, labs and imaging  Labs ordered and medications adjusted as outlined above  Coordinate care with care team/consultants  Discussed with patient results of tests, management plan as outlined above, and the need for ongoing hospitalization  D/w RN     [facility]        9/6/2024

## 2024-09-06 NOTE — PROGRESS NOTES
09/06/24 0925   Wound 09/05/24 Leg Lower;Posterior;Right   Date First Assessed/Time First Assessed: 09/05/24 0030   Present on Original Admission: Yes  Primary Wound Type: (c) Pressure Injury  Location: Leg  Wound Location Orientation: Lower;Posterior;Right  Wound Description (Comments): R posterior calf unst...   Wound Image   WOUND CARE NOTE    History:  Past Medical History:    Allergic rhinitis    Anxiety    Arthritis    RA and Osteoarthritis    Asthma (HCC)    Depression    Not officially diagnosed    Esophageal reflux    Essential hypertension    High blood pressure    High cholesterol    Hyperlipidemia    Myocardial infarction (HCC)    Cardiac event    Osteoarthritis    Pancreatitis (HCC)    Problems with swallowing    RA (rheumatoid arthritis) (HCC)    Sleep apnea     Past Surgical History:   Procedure Laterality Date    Colonoscopy      Other surgical history      Revision of uterine anomaly      Rotator cuff repair      Wrist fracture surgery        Social History     Socioeconomic History    Marital status: Single   Tobacco Use    Smoking status: Never    Smokeless tobacco: Never   Vaping Use    Vaping status: Never Used   Substance and Sexual Activity    Alcohol use: Not Currently    Drug use: Never     Social Determinants of Health     Food Insecurity: No Food Insecurity (9/5/2024)    Food Insecurity     Food Insecurity: Never true   Transportation Needs: No Transportation Needs (9/5/2024)    Transportation Needs     Lack of Transportation: No   Housing Stability: Low Risk  (9/5/2024)    Housing Stability     Housing Instability: No       PLAN   Recommendations:  Psyche currently on consult  Dietary consult for recommendations for nutrition to optimize wound healing ordered  PT and OT are on consult  Turn schedules  Heels elevated using pillows,to offload heels  Use of lift equipment  To prevent sliding: decrease head of bed and elevate foot of bed as medical condition tolerates  Prompt incontinence  care  Moisture barrier for incontinence. May consider zinc skin barrier  Glucose control to help promote wound healing    Wound(s)  Location: Right posterior calf  Cleansing  Saline   Topical Santyl  Dressings Damp saline gauze  Secure with dry gauze and Border foam  Frequency Daily   Location: Bilateral Buttocks and left ischial  Cleansing  Pamper wipes  Topical Zinc skin barrier  Dressings Sacral foam  Frequency Q 12 hrs and prn   Discharge Recommendations:   The pt. came from [facility]      OBJECTIVE   MD Consult new right foot      ASSESSMENT   Omid Score:  Omid Scale Score: 13    Chart Reviewed: yes    Wound(s):  The pt. is resting in bed, she is awake and alert to person and place. She is agreeable for the wound assessments. The pt. is known to wound care from previous admission. Heels are dry and intact, right dorsal foot is healed, lotion and socks applied. Right posterior leg dressing changed, the pt. assisted with turning in bed. Bilateral buttocks and left ischium with friction and shear, clean, The pt. was incontinent of a large amt of urine. She was cleansed, dressings changed. Pct came in to assist with total bed change. Spoke with the nurse. Call light in reach.      Allergies: Cephalosporins, Gadolinium derivatives, Penicillins, Sulfa antibiotics, and Bananas    Labs:   Lab Results   Component Value Date    WBC 12.3 (H) 09/06/2024    HGB 8.2 (L) 09/06/2024    HCT 29.0 (L) 09/06/2024    .0 09/06/2024    CREATSERUM 0.53 (L) 09/06/2024    BUN 6 (L) 09/06/2024     09/06/2024    K 4.0 09/06/2024     09/06/2024    CO2 20.0 (L) 09/06/2024    GLU 63 (L) 09/06/2024    CA 8.1 (L) 09/06/2024    ALB 3.1 (L) 09/05/2024    ALKPHO 140 (H) 09/05/2024    BILT 0.7 09/05/2024    TP 5.5 (L) 09/05/2024    AST 38 (H) 09/05/2024    ALT 33 09/05/2024    LIP 38 09/05/2024    MG 1.9 09/06/2024    PHOS 2.9 08/21/2024     No results found for: \"PREALBUMIN\"      Time Spent 45 Minutes.    Candida Tracy,  RN  [facility]  [facility]  [phone]     Site Assessment Moist;Pale;Pink;Granulation tissue;Fragile;Yellow;Tan   Drainage Amount Small   Drainage Description Valdes;Serosanguineous   Treatments Cleansed;Saline;Site Care;Santyl   Dressing 4x4s;Aquacel Foam   Dressing Changed Changed   Dressing Status Clean;Dry;Intact;Dressing Changed   Wound Depth (cm)   (adherent slough tissue)   Norma-wound Assessment Dry;Intact;Fragile;Edema;Hemosiderin staining   Wound Granulation Tissue Pink   Wound Bed Granulation (%) 25 %   Wound Bed Slough (%) 75 %   State of Healing Non-healing   Wound Odor None   Pressure Injury Stage U   Wound 09/05/24 Buttocks Right;Left   Date First Assessed/Time First Assessed: 09/05/24 0030   Present on Original Admission: Yes  Primary Wound Type: Friction/Shear  Location: Buttocks  Wound Location Orientation: Right;Left   Wound Image    Site Assessment Moist;Fragile;Painful;Pink;Red   Drainage Amount Scant   Drainage Description Serosanguineous   Treatments Cleansed;Site Care;Topical (Barrier/Moisturizer/Ointment)   Dressing Aquacel Foam   Dressing Changed Changed   Dressing Status Clean;Dry;Intact;Dressing Changed   Wound Depth (cm) 0.1 cm   Non-staged Wound Description Partial thickness   Norma-wound Assessment Fragile;Excoriated   Wound Granulation Tissue Red;Pink   Wound Bed Granulation (%) 100 %   State of Healing Early/partial granulation   Wound Odor None   Wound 09/05/24 Ischium Left   Date First Assessed/Time First Assessed: 09/05/24 0030   Present on Original Admission: Yes  Primary Wound Type: Friction/Shear  Location: Ischium  Wound Location Orientation: Left   Wound Image    Site Assessment Moist;Painful;Pink;Fragile   Drainage Amount None   Treatments Cleansed;Site Care;Topical (Barrier/Moisturizer/Ointment)   Dressing Aquacel Foam   Dressing Changed Changed   Dressing Status Clean;Dry;Intact;Dressing Changed   Wound Depth (cm) 0.1 cm   Non-staged Wound  Description Partial thickness   Norma-wound Assessment Fragile;Pink;Excoriated   Wound Granulation Tissue Pink   Wound Bed Granulation (%) 100 %   State of Healing Early/partial granulation   Wound Odor None   Wound Follow Up   Follow up needed Yes

## 2024-09-06 NOTE — H&P
[facility]    PATIENT'S NAME: CYNDI MORROW   ATTENDING PHYSICIAN: Ashutosh Paulino DO   PATIENT ACCOUNT#:   [id number]    LOCATION:  [address]  MEDICAL RECORD #:   [id number]       YOB: 1960  ADMISSION DATE:       09/05/2024    HISTORY AND PHYSICAL EXAMINATION    CHIEF COMPLAINT:  Acute encephalopathy.    HISTORY OF PRESENT ILLNESS:  The patient is a 64-year-old  female who was recently placed in a rehab facility for poor appetite and poor musculoskeletal conditioning.  She had chronic rheumatoid arthritis.  Last night, family noted that she is posting bizarre statements on Facebook and today [facility] facility contacted her family notifying them that she is seeing things and hearing voices.  Brought in today to the emergency department for evaluation.  CBC showed white blood cell count of 16.6 with left shift, hemoglobin 8.9.  She does have chronic leukocytosis.  She is on prednisone chronically.  Chemistry and liver function tests were unremarkable.  CT scan of the brain, chest x-ray were unremarkable.  Patient was given lorazepam and Haldol in the emergency room in order to calm her down, and she was placed in non-violent soft restraints.    PAST MEDICAL HISTORY:  Hypertension, rheumatoid arthritis, obesity, chronic adrenal insufficiency secondary to chronic corticosteroid use, asthma, pancreatitis, obstructive sleep apnea, obesity, hyperlipidemia, anxiety, depression, anemia of chronic kidney disease, nonocclusive coronary artery disease, peripheral arterial disease.    PAST SURGICAL HISTORY:  Right shoulder rotator cuff repair, wrist fracture open reduction and internal fixation, and right distal calf wound debridement.    MEDICATIONS:  Please see medication reconciliation list.    ALLERGIES:  Gadolinium and cephalosporins, penicillin, and sulfa causes esophagitis.    FAMILY HISTORY:  Mother had diabetes mellitus type 2 and heart disease.  Father had  diabetes mellitus type 2.    SOCIAL HISTORY:  No tobacco, alcohol, or drug use.  Currently resides in a nursing facility, almost bedbound, uses a walker.  Requires assistance in her basic activities of daily living.     REVIEW OF SYSTEMS:  Difficult to obtain from the patient at this point.  Per the family, she had a similar psychotic breakdown around 2 years ago.  She actually lived in [place] up until June when she moved in with her family in [place] and because of her poor functional status and poor appetite, she was brought into the hospital in late August and was transferred to a rehab facility.      PHYSICAL EXAMINATION:    GENERAL:  Alert, noncooperative, seemingly very confused.  VITAL SIGNS:  Temperature 99.0, pulse 97, respiratory rate 21, blood pressure 100/68, pulse ox 100% on room air.    HEENT:  Atraumatic.  Oropharynx clear.  Moist mucous membranes.  NECK:  Supple.  No lymphadenopathy.  LUNGS:  Clear to auscultation bilaterally.  Normal respiratory effort.  HEART:  Regular rate, rhythm.  S1 and S2 auscultated.  No murmur.  ABDOMEN:  Soft, nondistended.  No tenderness.  Positive bowel sounds.   EXTREMITIES:  No peripheral edema, clubbing, or cyanosis.  Superficial abrasion noted on the dorsum of the right foot, but no cellulitis.  NEUROLOGIC:  No clear focal defect.    ASSESSMENT:    1.   Acute encephalopathy, suspect acute psychosis considering visual and auditory hallucinations accompanied by bizarre behavior.  2.   Minimal low-grade fever with leukocytosis.  So far no clear indication of an infectious process.  Will continue to monitor her temperature curve.  3.   Rheumatoid arthritis.    PLAN:  Patient will be admitted to general medical floor.  Obtain psychiatry consult.  Continue her home medications use.  IM Zyprexa as needed.  Wound service consult to evaluate her right foot dorsum superficial abrasion.  Further recommendations to follow.     Dictated By Christianne Davis MD  d: 09/05/2024  17:17:10  t: 09/05/2024 18:49:39  Job [id number]  FB/    cc: Ashutosh Paulino, DO

## 2024-09-06 NOTE — RESPIRATORY THERAPY NOTE
NIKOLAI ASSESSMENT:    Pt does have a previous diagnosis of NIKOLAI. Pt does not routinely use a CPAP device at home. This pt is suspected to be at high risk for NIKOLAI.    CPAP INITIATION:    Pt to be placed on CPAP: no  Pt refused: yes  CPAP settings: Auto Pap 5 min cm H2O. 20 max cm H2O. Mask Option:  Full face  Interface: Full face    TOLERANCE:    Pt tolerates CPAP  Refused

## 2024-09-07 LAB
ANION GAP SERPL CALC-SCNC: 9 MMOL/L (ref 0–18)
BASOPHILS # BLD AUTO: 0.01 X10(3) UL (ref 0–0.2)
BASOPHILS NFR BLD AUTO: 0.1 %
BUN BLD-MCNC: 8 MG/DL (ref 9–23)
BUN/CREAT SERPL: 14.5 (ref 10–20)
CALCIUM BLD-MCNC: 8.2 MG/DL (ref 8.7–10.4)
CHLORIDE SERPL-SCNC: 111 MMOL/L (ref 98–112)
CO2 SERPL-SCNC: 20 MMOL/L (ref 21–32)
CREAT BLD-MCNC: 0.55 MG/DL
DEPRECATED RDW RBC AUTO: 65.8 FL (ref 35.1–46.3)
EGFRCR SERPLBLD CKD-EPI 2021: 102 ML/MIN/1.73M2 (ref 60–?)
EOSINOPHIL # BLD AUTO: 0 X10(3) UL (ref 0–0.7)
EOSINOPHIL NFR BLD AUTO: 0 %
ERYTHROCYTE [DISTWIDTH] IN BLOOD BY AUTOMATED COUNT: 20 % (ref 11–15)
GLUCOSE BLD-MCNC: 112 MG/DL (ref 70–99)
GLUCOSE BLDC GLUCOMTR-MCNC: 111 MG/DL (ref 70–99)
GLUCOSE BLDC GLUCOMTR-MCNC: 123 MG/DL (ref 70–99)
GLUCOSE BLDC GLUCOMTR-MCNC: 130 MG/DL (ref 70–99)
GLUCOSE BLDC GLUCOMTR-MCNC: 153 MG/DL (ref 70–99)
HCT VFR BLD AUTO: 25 %
HGB BLD-MCNC: 7.9 G/DL
IMM GRANULOCYTES # BLD AUTO: 0.12 X10(3) UL (ref 0–1)
IMM GRANULOCYTES NFR BLD: 0.9 %
LYMPHOCYTES # BLD AUTO: 1.64 X10(3) UL (ref 1–4)
LYMPHOCYTES NFR BLD AUTO: 11.7 %
MCH RBC QN AUTO: 29.5 PG (ref 26–34)
MCHC RBC AUTO-ENTMCNC: 31.6 G/DL (ref 31–37)
MCV RBC AUTO: 93.3 FL
MONOCYTES # BLD AUTO: 0.62 X10(3) UL (ref 0.1–1)
MONOCYTES NFR BLD AUTO: 4.4 %
NEUTROPHILS # BLD AUTO: 11.63 X10 (3) UL (ref 1.5–7.7)
NEUTROPHILS # BLD AUTO: 11.63 X10(3) UL (ref 1.5–7.7)
NEUTROPHILS NFR BLD AUTO: 82.9 %
OSMOLALITY SERPL CALC.SUM OF ELEC: 289 MOSM/KG (ref 275–295)
PLATELET # BLD AUTO: 314 10(3)UL (ref 150–450)
POTASSIUM SERPL-SCNC: 4.1 MMOL/L (ref 3.5–5.1)
RBC # BLD AUTO: 2.68 X10(6)UL
SODIUM SERPL-SCNC: 140 MMOL/L (ref 136–145)
WBC # BLD AUTO: 14 X10(3) UL (ref 4–11)

## 2024-09-07 PROCEDURE — 99222 1ST HOSP IP/OBS MODERATE 55: CPT | Performed by: INTERNAL MEDICINE

## 2024-09-07 PROCEDURE — 99233 SBSQ HOSP IP/OBS HIGH 50: CPT | Performed by: HOSPITALIST

## 2024-09-07 RX ORDER — IBUPROFEN 600 MG/1
600 TABLET, FILM COATED ORAL EVERY 8 HOURS PRN
Status: DISCONTINUED | OUTPATIENT
Start: 2024-09-07 | End: 2024-09-12

## 2024-09-07 RX ORDER — LOSARTAN POTASSIUM 25 MG/1
25 TABLET ORAL DAILY
Status: DISCONTINUED | OUTPATIENT
Start: 2024-09-08 | End: 2024-09-12

## 2024-09-07 RX ORDER — PANTOPRAZOLE SODIUM 40 MG/1
40 TABLET, DELAYED RELEASE ORAL
Status: DISCONTINUED | OUTPATIENT
Start: 2024-09-07 | End: 2024-09-12

## 2024-09-07 RX ORDER — ATORVASTATIN CALCIUM 10 MG/1
10 TABLET, FILM COATED ORAL DAILY
Status: DISCONTINUED | OUTPATIENT
Start: 2024-09-07 | End: 2024-09-12

## 2024-09-07 RX ORDER — CLOPIDOGREL BISULFATE 75 MG/1
75 TABLET ORAL DAILY
Status: DISCONTINUED | OUTPATIENT
Start: 2024-09-07 | End: 2024-09-12

## 2024-09-07 RX ORDER — SODIUM BICARBONATE 650 MG/1
650 TABLET ORAL 2 TIMES DAILY
Status: DISCONTINUED | OUTPATIENT
Start: 2024-09-07 | End: 2024-09-12

## 2024-09-07 RX ORDER — ERGOCALCIFEROL 1.25 MG/1
50000 CAPSULE, LIQUID FILLED ORAL WEEKLY
Status: DISCONTINUED | OUTPATIENT
Start: 2024-09-09 | End: 2024-09-12

## 2024-09-07 RX ORDER — ASPIRIN 81 MG/1
81 TABLET ORAL DAILY
Status: DISCONTINUED | OUTPATIENT
Start: 2024-09-07 | End: 2024-09-12

## 2024-09-07 RX ORDER — CEFDINIR 300 MG/1
300 CAPSULE ORAL EVERY 12 HOURS SCHEDULED
Status: DISCONTINUED | OUTPATIENT
Start: 2024-09-07 | End: 2024-09-07

## 2024-09-07 RX ORDER — FERROUS SULFATE 325(65) MG
325 TABLET, DELAYED RELEASE (ENTERIC COATED) ORAL
Status: DISCONTINUED | OUTPATIENT
Start: 2024-09-08 | End: 2024-09-12

## 2024-09-07 RX ORDER — HYDROXYCHLOROQUINE SULFATE 200 MG/1
200 TABLET, FILM COATED ORAL 2 TIMES DAILY
Status: DISCONTINUED | OUTPATIENT
Start: 2024-09-07 | End: 2024-09-12

## 2024-09-07 RX ORDER — FOLIC ACID 1 MG/1
1 TABLET ORAL DAILY
Status: DISCONTINUED | OUTPATIENT
Start: 2024-09-07 | End: 2024-09-12

## 2024-09-07 RX ORDER — OXYBUTYNIN CHLORIDE 5 MG/1
10 TABLET, EXTENDED RELEASE ORAL DAILY
Status: DISCONTINUED | OUTPATIENT
Start: 2024-09-07 | End: 2024-09-12

## 2024-09-07 NOTE — CM/SW NOTE
Updates sent to [facility] in [place], including psych evaluation. CM requested that facility submit for ins auth.    Plan: [facility] for [facility] pending ins auth and medical clearance.    VINICIUS VillegasN    [phone]

## 2024-09-07 NOTE — PLAN OF CARE
Pt midline noted to not have blood return despite coughing on command and repositioning patient's arm. Pt changed to lab draw. Noted no urine output noted since approximately 9am, asked pt if she needed toileting pt denied. Bladder scanned pt, pt had approximately 454ml on scan. Educated Pt that she needs to try to use the bathroom, other wide she will need to be straight cathed.     Problem: Safety Risk - Non-Violent Restraints  Goal: Patient will remain free from self-harm  Description: INTERVENTIONS:  - Apply the least restrictive restraint to prevent harm  - Notify patient and family of reasons restraints applied  - Assess for any contributing factors to confusion (electrolyte disturbances, delirium, medications)  - Discontinue any unnecessary medical devices as soon as possible  - Assess the patient's physical comfort, circulation, skin condition, hydration, nutrition and elimination needs   - Reorient and redirection as needed  - Assess for the need to continue restraints  Outcome: Progressing     Problem: Patient Centered Care  Goal: Patient preferences are identified and integrated in the patient's plan of care  Description: Interventions:  - What would you like us to know as we care for you? Pain in legs, pain mgmt  - Provide timely, complete, and accurate information to patient/family  - Incorporate patient and family knowledge, values, beliefs, and cultural backgrounds into the planning and delivery of care  - Encourage patient/family to participate in care and decision-making at the level they choose  - Honor patient and family perspectives and choices  Outcome: Progressing     Problem: Patient/Family Goals  Goal: Patient/Family Long Term Goal  Description: Patient's Long Term Goal: to go home    Interventions:  - Psych on consult  - See additional Care Plan goals for specific interventions  Outcome: Progressing  Goal: Patient/Family Short Term Goal  Description: Patient's Short Term Goal: To  rest    Interventions:   - Compliance with psych medication and pain mgmt  - See additional Care Plan goals for specific interventions  Outcome: Progressing     Problem: CARDIOVASCULAR - ADULT  Goal: Maintains optimal cardiac output and hemodynamic stability  Description: INTERVENTIONS:  - Monitor vital signs, rhythm, and trends  - Monitor for bleeding, hypotension and signs of decreased cardiac output  - Evaluate effectiveness of vasoactive medications to optimize hemodynamic stability  - Monitor arterial and/or venous puncture sites for bleeding and/or hematoma  - Assess quality of pulses, skin color and temperature  - Assess for signs of decreased coronary artery perfusion - ex. Angina  - Evaluate fluid balance, assess for edema, trend weights  Outcome: Progressing  Goal: Absence of cardiac arrhythmias or at baseline  Description: INTERVENTIONS:  - Continuous cardiac monitoring, monitor vital signs, obtain 12 lead EKG if indicated  - Evaluate effectiveness of antiarrhythmic and heart rate control medications as ordered  - Initiate emergency measures for life threatening arrhythmias  - Monitor electrolytes and administer replacement therapy as ordered  Outcome: Progressing     Problem: SKIN/TISSUE INTEGRITY - ADULT  Goal: Skin integrity remains intact  Description: INTERVENTIONS  - Assess and document risk factors for pressure ulcer development  - Assess and document skin integrity  - Monitor for areas of redness and/or skin breakdown  - Initiate interventions, skin care algorithm/standards of care as needed  Outcome: Progressing  Goal: Incision(s), wounds(s) or drain site(s) healing without S/S of infection  Description: INTERVENTIONS:  - Assess and document risk factors for pressure ulcer development  - Assess and document skin integrity  - Assess and document dressing/incision, wound bed, drain sites and surrounding tissue  - Implement wound care per orders  - Initiate isolation precautions as appropriate  -  Initiate Pressure Ulcer prevention bundle as indicated  Outcome: Progressing  Goal: Oral mucous membranes remain intact  Description: INTERVENTIONS  - Assess oral mucosa and hygiene practices  - Implement preventative oral hygiene regimen  - Implement oral medicated treatments as ordered  Outcome: Progressing     Problem: CONFUSION  Goal: Confusion, delirium, dementia or psychosis is improved or at baseline  Description: INTERVENTIONS:  - Assess for possible contributors to thought disturbance, including medications, impaired vision or hearing, underlying metabolic abnormalities, dehydration, psychiatric diagnoses, and notify attending MD/LIP  - [place] high risk fall precautions, as indicated  - Provide frequent short contacts to provide reality reorientation, refocusing and direction  - Decrease environmental stimuli, including noise as appropriate  - Monitor and intervene to maintain adequate nutrition, hydration, elimination, sleep and activity  - Consider the need for a sitter if unable to leave patient unattended  - Initiate Spiritual Care consult as indicated  - Consult Pharmacy as needed  Outcome: Progressing

## 2024-09-07 NOTE — PLAN OF CARE
Patient pleasant and cooperative. Alert and oriented 3-4, forgetful at times. Started on IV cefepime for cellulitis. Family at bedside. Norco given for pain.    Problem: CARDIOVASCULAR - ADULT  Goal: Maintains optimal cardiac output and hemodynamic stability  Description: INTERVENTIONS:  - Monitor vital signs, rhythm, and trends  - Monitor for bleeding, hypotension and signs of decreased cardiac output  - Evaluate effectiveness of vasoactive medications to optimize hemodynamic stability  - Monitor arterial and/or venous puncture sites for bleeding and/or hematoma  - Assess quality of pulses, skin color and temperature  - Assess for signs of decreased coronary artery perfusion - ex. Angina  - Evaluate fluid balance, assess for edema, trend weights  Outcome: Progressing  Goal: Absence of cardiac arrhythmias or at baseline  Description: INTERVENTIONS:  - Continuous cardiac monitoring, monitor vital signs, obtain 12 lead EKG if indicated  - Evaluate effectiveness of antiarrhythmic and heart rate control medications as ordered  - Initiate emergency measures for life threatening arrhythmias  - Monitor electrolytes and administer replacement therapy as ordered  Outcome: Progressing     Problem: SKIN/TISSUE INTEGRITY - ADULT  Goal: Skin integrity remains intact  Description: INTERVENTIONS  - Assess and document risk factors for pressure ulcer development  - Assess and document skin integrity  - Monitor for areas of redness and/or skin breakdown  - Initiate interventions, skin care algorithm/standards of care as needed  Outcome: Progressing  Goal: Incision(s), wounds(s) or drain site(s) healing without S/S of infection  Description: INTERVENTIONS:  - Assess and document risk factors for pressure ulcer development  - Assess and document skin integrity  - Assess and document dressing/incision, wound bed, drain sites and surrounding tissue  - Implement wound care per orders  - Initiate isolation precautions as appropriate  -  Initiate Pressure Ulcer prevention bundle as indicated  Outcome: Progressing  Goal: Oral mucous membranes remain intact  Description: INTERVENTIONS  - Assess oral mucosa and hygiene practices  - Implement preventative oral hygiene regimen  - Implement oral medicated treatments as ordered  Outcome: Progressing     Problem: CONFUSION  Goal: Confusion, delirium, dementia or psychosis is improved or at baseline  Description: INTERVENTIONS:  - Assess for possible contributors to thought disturbance, including medications, impaired vision or hearing, underlying metabolic abnormalities, dehydration, psychiatric diagnoses, and notify attending MD/LIP  - [place] high risk fall precautions, as indicated  - Provide frequent short contacts to provide reality reorientation, refocusing and direction  - Decrease environmental stimuli, including noise as appropriate  - Monitor and intervene to maintain adequate nutrition, hydration, elimination, sleep and activity  - Consider the need for a sitter if unable to leave patient unattended  - Initiate Spiritual Care consult as indicated  - Consult Pharmacy as needed  Outcome: Progressing

## 2024-09-07 NOTE — PROGRESS NOTES
[facility]  part of [facility]    Progress Note    Sheri Garzon Patient Status:  Inpatient    1960 MRN [id number]   Location [facility] 5SW/SE Attending Purnima Herrera, DO   Hosp Day # 2 PCP TERI MCKENNA MD     Chief complaint delerium     Subjective:   Sheri Garzon is a(n) 64 year old female pt doing well     ROS:   No cp, sob   No c/d   No n/v     Objective:   Blood pressure 105/86, pulse 95, temperature 98.2 °F (36.8 °C), temperature source Oral, resp. rate 18, height 5' 2\" (1.575 m), weight 181 lb 3.2 oz (82.2 kg), SpO2 100%.      Intake/Output Summary (Last 24 hours) at 2024 1458  Last data filed at 2024  Gross per 24 hour   Intake 426.67 ml   Output 0 ml   Net 426.67 ml       Patient Weight(s) for the past 336 hrs:   Weight   24 181 lb 3.2 oz (82.2 kg)   24 1232 178 lb (80.7 kg)           General appearance: alert, appears stated age and cooperative  Pulmonary:  clear to auscultation bilaterally  Cardiovascular: S1, S2 normal, no murmur, click, rub or gallop, regular rate and rhythm  Abdominal: soft, non-tender; bowel sounds normal; no masses,  no organomegaly  Extremities: wound on right leg         Medicines:     Current Facility-Administered Medications   Medication Dose Route Frequency    hydrocortisone Na succinate PF (Solu-CORTEF) injection 50 mg  50 mg Intravenous BID    Aspirin CAPS 81 mg  81 mg Oral Daily    atorvastatin (Lipitor) tab 10 mg  10 mg Oral Daily    clopidogrel (Plavix) tab 75 mg  75 mg Oral Daily    Copper TABS 1 tablet  1 tablet Oral Daily    ergocalciferol (Vitamin D2) cap 50,000 Units  50,000 Units Oral Weekly    [START ON 2024] ferrous sulfate DR tab 325 mg  325 mg Oral Daily with breakfast    folic acid (Folvite) tab 1 mg  1 mg Oral Daily    hydroxychloroquine (Plaquenil) tab 200 mg  200 mg Oral BID    ibuprofen (Motrin) tab 600 mg  600 mg Oral Q8H PRN    losartan (Cozaar) tab 25 mg  25 mg Oral Daily     oxybutynin ER (Ditropan-XL) 24 hr tab 10 mg  10 mg Oral Daily    pantoprazole (Protonix) DR tab 40 mg  40 mg Oral BID AC    sodium bicarbonate tab 650 mg  650 mg Oral BID    collagenase (Santyl) 250 UNIT/GM ointment   Topical Daily    haloperidol lactate (Haldol) 5 MG/ML injection 2 mg  2 mg Intravenous Q8H PRN    QUEtiapine (SEROquel) tab 50 mg  50 mg Oral Nightly    lamoTRIgine (LaMICtal) tab 25 mg  25 mg Oral BID    metoprolol tartrate (Lopressor) tab 25 mg  25 mg Oral BID    enoxaparin (Lovenox) 40 MG/0.4ML SUBQ injection 40 mg  40 mg Subcutaneous Daily    acetaminophen (Tylenol Extra Strength) tab 500 mg  500 mg Oral Q4H PRN    ondansetron (Zofran) 4 MG/2ML injection 4 mg  4 mg Intravenous Q6H PRN    OLANZapine (Zyprexa) 10 mg in sterile water for injection (PF) IM injection  10 mg Intramuscular Q8H PRN    HYDROcodone-acetaminophen (Norco)  MG per tab 1 tablet  1 tablet Oral Q4H PRN           Ant-Infective Medications            cefpodoxime 200 MG Oral Tab    hydroxychloroquine 200 MG Oral Tab                Blood Pressure and Cardiac Medications            metoprolol tartrate 50 MG Oral Tab    losartan 50 MG Oral Tab                      Lab Results   Component Value Date    WBC 14.0 (H) 09/07/2024    HGB 7.9 (L) 09/07/2024    HCT 25.0 (L) 09/07/2024    .0 09/07/2024    CREATSERUM 0.55 09/07/2024    BUN 8 (L) 09/07/2024     09/07/2024    K 4.1 09/07/2024     09/07/2024    CO2 20.0 (L) 09/07/2024     (H) 09/07/2024    CA 8.2 (L) 09/07/2024    ALB 3.1 (L) 09/05/2024    ALKPHO 140 (H) 09/05/2024    BILT 0.7 09/05/2024    TP 5.5 (L) 09/05/2024    AST 38 (H) 09/05/2024    ALT 33 09/05/2024    LIP 38 09/05/2024    DDIMER 1.69 (H) 07/23/2024    MG 1.9 09/06/2024    PHOS 2.9 08/21/2024    CK 50 08/27/2024    B12 748 08/21/2024    ETOH <3 09/05/2024       No results found.        Results:     CBC:    Lab Results   Component Value Date    WBC 14.0 (H) 09/07/2024    WBC 12.3 (H) 09/06/2024     WBC 16.6 (H) 09/05/2024     Lab Results   Component Value Date    HGB 7.9 (L) 09/07/2024    HGB 8.2 (L) 09/06/2024    HGB 8.9 (L) 09/05/2024      Lab Results   Component Value Date    .0 09/07/2024    .0 09/06/2024    .0 09/05/2024       Recent Labs   Lab 09/05/24  1302 09/06/24  0615 09/07/24  0633   GLU 87 63* 112*   BUN 12 6* 8*   CREATSERUM 0.68 0.53* 0.55   CA 8.6* 8.1* 8.2*    142 140   K 3.3* 4.0 4.1    111 111   CO2 25.0 20.0* 20.0*         @[email or web address]@    Assessment and Plan:         ASSESSMENT:    1.       Acute encephalopathy, suspect acute psychosis considering visual and auditory hallucinations accompanied by bizarre behavior.  - apprec psych consult - discussed with dr whitaker - haldol and seroquel added . Likely bipolar   - much better today     2.       Minimal low-grade fever with leukocytosis.  So far no clear indication of an infectious process.  Will continue to monitor her temperature curve.   Wound service consult to evaluate her right foot dorsum superficial abrasion.  Further recommendations to follow.   - restart iv cefepime. Pt was on 9/1 and tolerated well     3.       Rheumatoid arthritis.  4. Adrenal insuff with hypoglycemia - pt 's prednisone stopped at rehab for last 5 days. Will start hydrocortisone for crisis and Dr Miller to see. Apprec input   - apprec endo - hydrocortisone 50 bid         Purnima Herrera DO         Chart reviewed, including current vitals, notes, labs and imaging  Labs ordered and medications adjusted as outlined above  Coordinate care with care team/consultants  Discussed with patient results of tests, management plan as outlined above, and the need for ongoing hospitalization  D/w RN     MDM high

## 2024-09-07 NOTE — PROGRESS NOTES
[facility] Pharmacy Dosing Service  Antibiotic Dosing    Sheri Garzon is a 64 year old for whom pharmacy is dosing cefepime for treatment of  cellulitis.     Allergies: is allergic to cephalosporins, gadolinium derivatives, penicillins, sulfa antibiotics, and bananas.    Vitals: /86 (BP Location: Right arm)   Pulse 95   Temp 98.2 °F (36.8 °C) (Oral)   Resp 18   Ht 1.575 m (5' 2\")   Wt 82.2 kg (181 lb 3.2 oz)   SpO2 100%   BMI 33.14 kg/m²     Labs:   WBC   Date Value Ref Range Status   09/07/2024 14.0 (H) 4.0 - 11.0 x10(3) uL Final     Creatinine   Date Value Ref Range Status   09/07/2024 0.55 0.55 - 1.02 mg/dL Final     BUN   Date Value Ref Range Status   09/07/2024 8 (L) 9 - 23 mg/dL Final       CrCl: estimated creatinine clearance is 81.7 mL/min (based on SCr of 0.55 mg/dL).      Assessment/Plan: Will initiate patient on cefepime 1g IV Q8H    Pharmacy will continue to follow.  We appreciate the opportunity to assist in the care of this patient.    Thank you,   Fidencio Lopez, PharmD  9/7/2024  3:04 PM

## 2024-09-07 NOTE — PLAN OF CARE
Pt midline noted to not have blood return despite coughing on command and repositioning patient's arm. Pt changed to lab draw.     Problem: Safety Risk - Non-Violent Restraints  Goal: Patient will remain free from self-harm  Description: INTERVENTIONS:  - Apply the least restrictive restraint to prevent harm  - Notify patient and family of reasons restraints applied  - Assess for any contributing factors to confusion (electrolyte disturbances, delirium, medications)  - Discontinue any unnecessary medical devices as soon as possible  - Assess the patient's physical comfort, circulation, skin condition, hydration, nutrition and elimination needs   - Reorient and redirection as needed  - Assess for the need to continue restraints  Outcome: Progressing     Problem: Patient Centered Care  Goal: Patient preferences are identified and integrated in the patient's plan of care  Description: Interventions:  - What would you like us to know as we care for you? Pain in legs, pain mgmt  - Provide timely, complete, and accurate information to patient/family  - Incorporate patient and family knowledge, values, beliefs, and cultural backgrounds into the planning and delivery of care  - Encourage patient/family to participate in care and decision-making at the level they choose  - Honor patient and family perspectives and choices  Outcome: Progressing     Problem: Patient/Family Goals  Goal: Patient/Family Long Term Goal  Description: Patient's Long Term Goal: to go home    Interventions:  - Psych on consult  - See additional Care Plan goals for specific interventions  Outcome: Progressing  Goal: Patient/Family Short Term Goal  Description: Patient's Short Term Goal: To rest    Interventions:   - Compliance with psych medication and pain mgmt  - See additional Care Plan goals for specific interventions  Outcome: Progressing     Problem: CARDIOVASCULAR - ADULT  Goal: Maintains optimal cardiac output and hemodynamic  stability  Description: INTERVENTIONS:  - Monitor vital signs, rhythm, and trends  - Monitor for bleeding, hypotension and signs of decreased cardiac output  - Evaluate effectiveness of vasoactive medications to optimize hemodynamic stability  - Monitor arterial and/or venous puncture sites for bleeding and/or hematoma  - Assess quality of pulses, skin color and temperature  - Assess for signs of decreased coronary artery perfusion - ex. Angina  - Evaluate fluid balance, assess for edema, trend weights  Outcome: Progressing  Goal: Absence of cardiac arrhythmias or at baseline  Description: INTERVENTIONS:  - Continuous cardiac monitoring, monitor vital signs, obtain 12 lead EKG if indicated  - Evaluate effectiveness of antiarrhythmic and heart rate control medications as ordered  - Initiate emergency measures for life threatening arrhythmias  - Monitor electrolytes and administer replacement therapy as ordered  Outcome: Progressing     Problem: SKIN/TISSUE INTEGRITY - ADULT  Goal: Skin integrity remains intact  Description: INTERVENTIONS  - Assess and document risk factors for pressure ulcer development  - Assess and document skin integrity  - Monitor for areas of redness and/or skin breakdown  - Initiate interventions, skin care algorithm/standards of care as needed  Outcome: Progressing  Goal: Incision(s), wounds(s) or drain site(s) healing without S/S of infection  Description: INTERVENTIONS:  - Assess and document risk factors for pressure ulcer development  - Assess and document skin integrity  - Assess and document dressing/incision, wound bed, drain sites and surrounding tissue  - Implement wound care per orders  - Initiate isolation precautions as appropriate  - Initiate Pressure Ulcer prevention bundle as indicated  Outcome: Progressing  Goal: Oral mucous membranes remain intact  Description: INTERVENTIONS  - Assess oral mucosa and hygiene practices  - Implement preventative oral hygiene regimen  - Implement  oral medicated treatments as ordered  Outcome: Progressing     Problem: CONFUSION  Goal: Confusion, delirium, dementia or psychosis is improved or at baseline  Description: INTERVENTIONS:  - Assess for possible contributors to thought disturbance, including medications, impaired vision or hearing, underlying metabolic abnormalities, dehydration, psychiatric diagnoses, and notify attending MD/LIP  - [place] high risk fall precautions, as indicated  - Provide frequent short contacts to provide reality reorientation, refocusing and direction  - Decrease environmental stimuli, including noise as appropriate  - Monitor and intervene to maintain adequate nutrition, hydration, elimination, sleep and activity  - Consider the need for a sitter if unable to leave patient unattended  - Initiate Spiritual Care consult as indicated  - Consult Pharmacy as needed  Outcome: Progressing

## 2024-09-07 NOTE — CONSULTS
[facility]  part of [facility]    Report of Consultation    Sheri Garzon Patient Status:  Inpatient    1960 MRN [id number]   Location [facility] 5SW/SE Attending Purnima Herrera, DO   Hosp Day # 2 PCP TERI MCKENNA MD     Date of Admission:  2024  Date of Consult:  2024    Reason for Consultation:  Adrenal crisis    History of Present Illness:  Sheri Garzon is a a(n) 64 year old female below including long-term steroid use for rheumatoid arthritis who presented from rehab facility for poor appetite and fatigue and generalized weakness.  She was also noted to have visual and auditory hallucinations.  She was noted to be hypoglycemic.  She was started on dextrose.  Patient reports that she was not given steroid dose at the rehab facility.  She states that she has been on steroids for more than 10 years for rheumatoid arthritis.  She normally takes prednisone 10 mg daily.    History:  Past Medical History:    Allergic rhinitis    Anxiety    Arthritis    RA and Osteoarthritis    Asthma (HCC)    Depression    Not officially diagnosed    Esophageal reflux    Essential hypertension    High blood pressure    High cholesterol    Hyperlipidemia    Myocardial infarction (HCC)    Cardiac event    Osteoarthritis    Pancreatitis (HCC)    Problems with swallowing    RA (rheumatoid arthritis) (HCC)    Sleep apnea     Past Surgical History:   Procedure Laterality Date    Colonoscopy      Other surgical history      Revision of uterine anomaly      Rotator cuff repair      Wrist fracture surgery       Family History   Problem Relation Age of Onset    Diabetes Mother     Genetic Disease Mother     Heart Disorder Mother     Hypertension Mother     Obesity Mother     Diabetes Father     Hypertension Father     Depression Daughter     Psychiatric Daughter       reports that she has never smoked. She has never used smokeless tobacco. She reports that she does not currently use alcohol.  She reports that she does not use drugs.    Allergies:  Allergies   Allergen Reactions    Cephalosporins ANAPHYLAXIS, NAUSEA AND VOMITING and OTHER (SEE COMMENTS)     Other reaction(s): Fever    - Tolerated multiple doses of ceftriaxone on 7/2024 without any complications  - Tolerated multiple doses of cefepime 8/2024 without complications    Gadolinium Derivatives FEVER    Penicillins OTHER (SEE COMMENTS)     esophagitis    Sulfa Antibiotics OTHER (SEE COMMENTS)     esophagitis    Bananas ITCHING       Medications:    Current Facility-Administered Medications:     hydrocortisone Na succinate PF (Solu-CORTEF) injection 50 mg, 50 mg, Intravenous, BID    collagenase (Santyl) 250 UNIT/GM ointment, , Topical, Daily    haloperidol lactate (Haldol) 5 MG/ML injection 2 mg, 2 mg, Intravenous, Q8H PRN    QUEtiapine (SEROquel) tab 50 mg, 50 mg, Oral, Nightly    lamoTRIgine (LaMICtal) tab 25 mg, 25 mg, Oral, BID    metoprolol tartrate (Lopressor) tab 25 mg, 25 mg, Oral, BID    enoxaparin (Lovenox) 40 MG/0.4ML SUBQ injection 40 mg, 40 mg, Subcutaneous, Daily    acetaminophen (Tylenol Extra Strength) tab 500 mg, 500 mg, Oral, Q4H PRN    ondansetron (Zofran) 4 MG/2ML injection 4 mg, 4 mg, Intravenous, Q6H PRN    OLANZapine (Zyprexa) 10 mg in sterile water for injection (PF) IM injection, 10 mg, Intramuscular, Q8H PRN    HYDROcodone-acetaminophen (Norco)  MG per tab 1 tablet, 1 tablet, Oral, Q4H PRN    A comprehensive 10 point review of systems was completed.  Pertinent positives and negatives noted in the the HPI.    Physical Exam:  Blood pressure 103/66, pulse 105, temperature 97.8 °F (36.6 °C), temperature source Oral, resp. rate 18, height 5' 2\" (1.575 m), weight 181 lb 3.2 oz (82.2 kg), SpO2 100%.        General Appearance:  alert, well developed, in no acute distress  Head: Atraumatic  Eyes:  normal conjunctivae, sclera., normal sclera and normal pupils  Throat/Neck: normal sound to voice. Normal hearing, normal  speech  Respiratory:  Speaking in full sentences, non-labored. no increased work of breathing, no audible wheezing    Neuro: motor grossly intact, moving all extremities without difficulty  Psychiatric:  oriented to time, self, and place  Extremities: no obvious extremity swelling, no lesions      Impression and Plan:  Patient Active Problem List   Diagnosis    Dehydration    Metabolic acidosis    Bilious vomiting with nausea    Difficult intravenous access    Hypokalemia    Hypomagnesemia    Hiatal hernia    Cellulitis of right lower extremity    Cellulitis    RTA (renal tubular acidosis)    Adrenal insufficiency (Storey's disease) (HCC)    Current chronic use of systemic steroids    Neutropenia ([facility])    Anemia of infection    Weakness generalized    Leg edema    Adrenal insufficiency ([facility])    Altered mental status, unspecified altered mental status type    Acute psychosis ([facility])    Moderate bipolar I disorder, current or most recent episode depressed, with psychotic features, with mixed features ([facility])       Patient is a 64-year-old female with history of long-term steroid use  She presented with confusion and hypoglycemia  She was started on dextrose and hydrocortisone  Per patient she was not given regular steroid doses at the rehab facility  She is is doing better today    Plan:  Will decrease hydrocortisone to 50 mg bid  Will stop dextrose and monitor blood sugars    Discharge planning:  From an endocrinology perspective recommend monitoring the patient till the end of the day to make sure sugars stay stable off the dextrose  She is also tachycardic, recommend discharge once this normalizes    Recommend she take prednisone 20 mg daily for 3 days after discharge  She can decrease dose to prednisone 10 mg daily after this  Ana Miller MD

## 2024-09-08 LAB
ANION GAP SERPL CALC-SCNC: 6 MMOL/L (ref 0–18)
BASOPHILS # BLD AUTO: 0.04 X10(3) UL (ref 0–0.2)
BASOPHILS NFR BLD AUTO: 0.2 %
BUN BLD-MCNC: 9 MG/DL (ref 9–23)
BUN/CREAT SERPL: 15.8 (ref 10–20)
CALCIUM BLD-MCNC: 8.2 MG/DL (ref 8.7–10.4)
CHLORIDE SERPL-SCNC: 112 MMOL/L (ref 98–112)
CO2 SERPL-SCNC: 21 MMOL/L (ref 21–32)
CREAT BLD-MCNC: 0.57 MG/DL
DEPRECATED RDW RBC AUTO: 66.1 FL (ref 35.1–46.3)
EGFRCR SERPLBLD CKD-EPI 2021: 101 ML/MIN/1.73M2 (ref 60–?)
EOSINOPHIL # BLD AUTO: 0 X10(3) UL (ref 0–0.7)
EOSINOPHIL NFR BLD AUTO: 0 %
ERYTHROCYTE [DISTWIDTH] IN BLOOD BY AUTOMATED COUNT: 20.2 % (ref 11–15)
GLUCOSE BLD-MCNC: 88 MG/DL (ref 70–99)
GLUCOSE BLDC GLUCOMTR-MCNC: 111 MG/DL (ref 70–99)
GLUCOSE BLDC GLUCOMTR-MCNC: 112 MG/DL (ref 70–99)
GLUCOSE BLDC GLUCOMTR-MCNC: 88 MG/DL (ref 70–99)
GLUCOSE BLDC GLUCOMTR-MCNC: 96 MG/DL (ref 70–99)
HCT VFR BLD AUTO: 22.8 %
HGB BLD-MCNC: 7.3 G/DL
IMM GRANULOCYTES # BLD AUTO: 0.26 X10(3) UL (ref 0–1)
IMM GRANULOCYTES NFR BLD: 1.1 %
LYMPHOCYTES # BLD AUTO: 2.43 X10(3) UL (ref 1–4)
LYMPHOCYTES NFR BLD AUTO: 10 %
MCH RBC QN AUTO: 29.8 PG (ref 26–34)
MCHC RBC AUTO-ENTMCNC: 32 G/DL (ref 31–37)
MCV RBC AUTO: 93.1 FL
MONOCYTES # BLD AUTO: 1.44 X10(3) UL (ref 0.1–1)
MONOCYTES NFR BLD AUTO: 6 %
NEUTROPHILS # BLD AUTO: 20.01 X10 (3) UL (ref 1.5–7.7)
NEUTROPHILS # BLD AUTO: 20.01 X10(3) UL (ref 1.5–7.7)
NEUTROPHILS NFR BLD AUTO: 82.7 %
OSMOLALITY SERPL CALC.SUM OF ELEC: 286 MOSM/KG (ref 275–295)
PLATELET # BLD AUTO: 265 10(3)UL (ref 150–450)
POTASSIUM SERPL-SCNC: 4 MMOL/L (ref 3.5–5.1)
RBC # BLD AUTO: 2.45 X10(6)UL
SODIUM SERPL-SCNC: 139 MMOL/L (ref 136–145)
WBC # BLD AUTO: 24.2 X10(3) UL (ref 4–11)

## 2024-09-08 PROCEDURE — 99233 SBSQ HOSP IP/OBS HIGH 50: CPT | Performed by: HOSPITALIST

## 2024-09-08 PROCEDURE — 99232 SBSQ HOSP IP/OBS MODERATE 35: CPT | Performed by: INTERNAL MEDICINE

## 2024-09-08 PROCEDURE — 99233 SBSQ HOSP IP/OBS HIGH 50: CPT | Performed by: OTHER

## 2024-09-08 RX ORDER — PREDNISONE 10 MG/1
10 TABLET ORAL
Status: DISCONTINUED | OUTPATIENT
Start: 2024-09-09 | End: 2024-09-11

## 2024-09-08 RX ORDER — PREDNISONE 20 MG/1
20 TABLET ORAL
Status: DISCONTINUED | OUTPATIENT
Start: 2024-09-09 | End: 2024-09-08

## 2024-09-08 RX ORDER — HYDROCORTISONE 10 MG/1
25 TABLET ORAL ONCE
Status: COMPLETED | OUTPATIENT
Start: 2024-09-08 | End: 2024-09-08

## 2024-09-08 NOTE — PROGRESS NOTES
[facility]  part of [facility]    Progress Note    Sheri Garzon Patient Status:  Inpatient    1960 MRN [id number]   Location [facility] 5SW/SE Attending Purnima Herrera, DO   Hosp Day # 3 PCP TERI MCKENNA MD     Subjective:  Feeling better  Appetite is good    A comprehensive 10 point review of systems was completed.  Pertinent positives and negatives noted in the the HPI.    Objective/Physical Exam:  Physical Exam:   Vital Signs:  Blood pressure 96/69, pulse 95, temperature 98.2 °F (36.8 °C), temperature source Oral, resp. rate 18, height 5' 2\" (1.575 m), weight 181 lb 3.2 oz (82.2 kg), SpO2 100%.    Labs:  Pertinent data reviewed    Assessment/Plan:  Patient Active Problem List   Diagnosis    Dehydration    Metabolic acidosis    Bilious vomiting with nausea    Difficult intravenous access    Hypokalemia    Hypomagnesemia    Hiatal hernia    Cellulitis of right lower extremity    Cellulitis    RTA (renal tubular acidosis)    Adrenal insufficiency (Carver's disease) (HCC)    Current chronic use of systemic steroids    Neutropenia (HCC)    Anemia of infection    Weakness generalized    Leg edema    Adrenal insufficiency (HCC)    Altered mental status, unspecified altered mental status type    Acute psychosis (HCC)    Moderate bipolar I disorder, current or most recent episode depressed, with psychotic features, with mixed features (HCC)       Patient is a 64-year-old female with history of long-term steroid use  She presented with confusion and hypoglycemia  She was started on dextrose and hydrocortisone  Per patient she was not given regular steroid doses at the rehab facility  She is is doing better      Plan:  Will decrease hydrocortisone to 25 mg bid     Discharge planning:   Patient should get hydrocortisone 25 mg evening dose before discharge today  Recommend she take prednisone 10 mg daily f baseline dose starting tomorrow  Ana Miller MD

## 2024-09-08 NOTE — PLAN OF CARE
Problem: Safety Risk - Non-Violent Restraints  Goal: Patient will remain free from self-harm  Description: INTERVENTIONS:  - Apply the least restrictive restraint to prevent harm  - Notify patient and family of reasons restraints applied  - Assess for any contributing factors to confusion (electrolyte disturbances, delirium, medications)  - Discontinue any unnecessary medical devices as soon as possible  - Assess the patient's physical comfort, circulation, skin condition, hydration, nutrition and elimination needs   - Reorient and redirection as needed  - Assess for the need to continue restraints  Outcome: Progressing     Problem: Patient Centered Care  Goal: Patient preferences are identified and integrated in the patient's plan of care  Description: Interventions:  - What would you like us to know as we care for you?   - Provide timely, complete, and accurate information to patient/family  - Incorporate patient and family knowledge, values, beliefs, and cultural backgrounds into the planning and delivery of care  - Encourage patient/family to participate in care and decision-making at the level they choose  - Honor patient and family perspectives and choices  Outcome: Progressing     Problem: Patient/Family Goals  Goal: Patient/Family Long Term Goal  Description: Patient's Long Term Goal:     Interventions:  -   - See additional Care Plan goals for specific interventions  Outcome: Progressing  Goal: Patient/Family Short Term Goal  Description: Patient's Short Term Goal:     Interventions:   -   - See additional Care Plan goals for specific interventions  Outcome: Progressing     Problem: CARDIOVASCULAR - ADULT  Goal: Maintains optimal cardiac output and hemodynamic stability  Description: INTERVENTIONS:  - Monitor vital signs, rhythm, and trends  - Monitor for bleeding, hypotension and signs of decreased cardiac output  - Evaluate effectiveness of vasoactive medications to optimize hemodynamic stability  -  Monitor arterial and/or venous puncture sites for bleeding and/or hematoma  - Assess quality of pulses, skin color and temperature  - Assess for signs of decreased coronary artery perfusion - ex. Angina  - Evaluate fluid balance, assess for edema, trend weights  Outcome: Progressing  Goal: Absence of cardiac arrhythmias or at baseline  Description: INTERVENTIONS:  - Continuous cardiac monitoring, monitor vital signs, obtain 12 lead EKG if indicated  - Evaluate effectiveness of antiarrhythmic and heart rate control medications as ordered  - Initiate emergency measures for life threatening arrhythmias  - Monitor electrolytes and administer replacement therapy as ordered  Outcome: Progressing     Problem: SKIN/TISSUE INTEGRITY - ADULT  Goal: Skin integrity remains intact  Description: INTERVENTIONS  - Assess and document risk factors for pressure ulcer development  - Assess and document skin integrity  - Monitor for areas of redness and/or skin breakdown  - Initiate interventions, skin care algorithm/standards of care as needed  Outcome: Progressing  Goal: Incision(s), wounds(s) or drain site(s) healing without S/S of infection  Description: INTERVENTIONS:  - Assess and document risk factors for pressure ulcer development  - Assess and document skin integrity  - Assess and document dressing/incision, wound bed, drain sites and surrounding tissue  - Implement wound care per orders  - Initiate isolation precautions as appropriate  - Initiate Pressure Ulcer prevention bundle as indicated  Outcome: Progressing  Goal: Oral mucous membranes remain intact  Description: INTERVENTIONS  - Assess oral mucosa and hygiene practices  - Implement preventative oral hygiene regimen  - Implement oral medicated treatments as ordered  Outcome: Progressing     Problem: CONFUSION  Goal: Confusion, delirium, dementia or psychosis is improved or at baseline  Description: INTERVENTIONS:  - Assess for possible contributors to thought  disturbance, including medications, impaired vision or hearing, underlying metabolic abnormalities, dehydration, psychiatric diagnoses, and notify attending MD/LIP  - [place] high risk fall precautions, as indicated  - Provide frequent short contacts to provide reality reorientation, refocusing and direction  - Decrease environmental stimuli, including noise as appropriate  - Monitor and intervene to maintain adequate nutrition, hydration, elimination, sleep and activity  - Consider the need for a sitter if unable to leave patient unattended  - Initiate Spiritual Care consult as indicated  - Consult Pharmacy as needed  Outcome: Progressing

## 2024-09-08 NOTE — PLAN OF CARE
Pt A, O x 3-4. Pleasant, calm and no episodes of hallucinations noted overnight. Pt was able to call appropriately and ask for assistance. No reports of headaches, SOB or difficulty with thought process. IV Cefepime for wound infection. No fever. Repositioned. Fall precaution in place.     Problem: Patient Centered Care  Goal: Patient preferences are identified and integrated in the patient's plan of care  Description: Interventions:  - What would you like us to know as we care for you?   - Provide timely, complete, and accurate information to patient/family  - Incorporate patient and family knowledge, values, beliefs, and cultural backgrounds into the planning and delivery of care  - Encourage patient/family to participate in care and decision-making at the level they choose  - Honor patient and family perspectives and choices  Outcome: Progressing     Problem: CARDIOVASCULAR - ADULT  Goal: Maintains optimal cardiac output and hemodynamic stability  Description: INTERVENTIONS:  - Monitor vital signs, rhythm, and trends  - Monitor for bleeding, hypotension and signs of decreased cardiac output  - Evaluate effectiveness of vasoactive medications to optimize hemodynamic stability  - Monitor arterial and/or venous puncture sites for bleeding and/or hematoma  - Assess quality of pulses, skin color and temperature  - Assess for signs of decreased coronary artery perfusion - ex. Angina  - Evaluate fluid balance, assess for edema, trend weights  Outcome: Progressing  Goal: Absence of cardiac arrhythmias or at baseline  Description: INTERVENTIONS:  - Continuous cardiac monitoring, monitor vital signs, obtain 12 lead EKG if indicated  - Evaluate effectiveness of antiarrhythmic and heart rate control medications as ordered  - Initiate emergency measures for life threatening arrhythmias  - Monitor electrolytes and administer replacement therapy as ordered  Outcome: Progressing     Problem: SKIN/TISSUE INTEGRITY - ADULT  Goal:  Skin integrity remains intact  Description: INTERVENTIONS  - Assess and document risk factors for pressure ulcer development  - Assess and document skin integrity  - Monitor for areas of redness and/or skin breakdown  - Initiate interventions, skin care algorithm/standards of care as needed  Outcome: Progressing  Goal: Incision(s), wounds(s) or drain site(s) healing without S/S of infection  Description: INTERVENTIONS:  - Assess and document risk factors for pressure ulcer development  - Assess and document skin integrity  - Assess and document dressing/incision, wound bed, drain sites and surrounding tissue  - Implement wound care per orders  - Initiate isolation precautions as appropriate  - Initiate Pressure Ulcer prevention bundle as indicated  Outcome: Progressing  Goal: Oral mucous membranes remain intact  Description: INTERVENTIONS  - Assess oral mucosa and hygiene practices  - Implement preventative oral hygiene regimen  - Implement oral medicated treatments as ordered  Outcome: Progressing     Problem: CONFUSION  Goal: Confusion, delirium, dementia or psychosis is improved or at baseline  Description: INTERVENTIONS:  - Assess for possible contributors to thought disturbance, including medications, impaired vision or hearing, underlying metabolic abnormalities, dehydration, psychiatric diagnoses, and notify attending MD/LIP  - Daphne high risk fall precautions, as indicated  - Provide frequent short contacts to provide reality reorientation, refocusing and direction  - Decrease environmental stimuli, including noise as appropriate  - Monitor and intervene to maintain adequate nutrition, hydration, elimination, sleep and activity  - Consider the need for a sitter if unable to leave patient unattended  - Initiate Spiritual Care consult as indicated  - Consult Pharmacy as needed  Outcome: Progressing

## 2024-09-08 NOTE — PROGRESS NOTES
[facility]  part of [facility]    Progress Note    Sheri Garzon Patient Status:  Inpatient    1960 MRN [id number]   Location [facility] 5SW/SE Attending Purnima Herrera, DO   Hosp Day # 3 PCP TERI MCKENNA MD     Chief complaint delerium     Subjective:   Sheri Garzon is a(n) 64 year old female pt doing well . No c/o dizziness.     ROS:   No cp, sob   No c/d   No n/v     Objective:   Blood pressure 94/69, pulse 84, temperature 98.4 °F (36.9 °C), temperature source Oral, resp. rate 18, height 5' 2\" (1.575 m), weight 181 lb 3.2 oz (82.2 kg), SpO2 100%.      Intake/Output Summary (Last 24 hours) at 2024 1543  Last data filed at 2024 0721  Gross per 24 hour   Intake 330 ml   Output --   Net 330 ml       Patient Weight(s) for the past 336 hrs:   Weight   24 181 lb 3.2 oz (82.2 kg)   24 1232 178 lb (80.7 kg)           General appearance: alert, appears stated age and cooperative  Pulmonary:  clear to auscultation bilaterally  Cardiovascular: S1, S2 normal, no murmur, click, rub or gallop, regular rate and rhythm  Abdominal: soft, non-tender; bowel sounds normal; no masses,  no organomegaly  Extremities: wound on right leg         Medicines:     Current Facility-Administered Medications   Medication Dose Route Frequency    hydrocortisone (Cortef) tab 25 mg  25 mg Oral Once    aspirin DR tab 81 mg  81 mg Oral Daily    atorvastatin (Lipitor) tab 10 mg  10 mg Oral Daily    clopidogrel (Plavix) tab 75 mg  75 mg Oral Daily    [START ON 2024] ergocalciferol (Vitamin D2) cap 50,000 Units  50,000 Units Oral Weekly    ferrous sulfate DR tab 325 mg  325 mg Oral Daily with breakfast    folic acid (Folvite) tab 1 mg  1 mg Oral Daily    hydroxychloroquine (Plaquenil) tab 200 mg  200 mg Oral BID    ibuprofen (Motrin) tab 600 mg  600 mg Oral Q8H PRN    losartan (Cozaar) tab 25 mg  25 mg Oral Daily    oxybutynin ER (Ditropan-XL) 24 hr tab 10 mg  10 mg Oral Daily     pantoprazole (Protonix) DR tab 40 mg  40 mg Oral BID AC    sodium bicarbonate tab 650 mg  650 mg Oral BID    ceFEPIme (Maxipime) 1 g in sodium chloride 0.9% 100 mL IVPB-MBP  1 g Intravenous Q8H    collagenase (Santyl) 250 UNIT/GM ointment   Topical Daily    haloperidol lactate (Haldol) 5 MG/ML injection 2 mg  2 mg Intravenous Q8H PRN    QUEtiapine (SEROquel) tab 50 mg  50 mg Oral Nightly    lamoTRIgine (LaMICtal) tab 25 mg  25 mg Oral BID    metoprolol tartrate (Lopressor) tab 25 mg  25 mg Oral BID    enoxaparin (Lovenox) 40 MG/0.4ML SUBQ injection 40 mg  40 mg Subcutaneous Daily    acetaminophen (Tylenol Extra Strength) tab 500 mg  500 mg Oral Q4H PRN    ondansetron (Zofran) 4 MG/2ML injection 4 mg  4 mg Intravenous Q6H PRN    OLANZapine (Zyprexa) 10 mg in sterile water for injection (PF) IM injection  10 mg Intramuscular Q8H PRN    HYDROcodone-acetaminophen (Norco)  MG per tab 1 tablet  1 tablet Oral Q4H PRN           Ant-Infective Medications            cefpodoxime 200 MG Oral Tab    hydroxychloroquine 200 MG Oral Tab                Blood Pressure and Cardiac Medications            metoprolol tartrate 50 MG Oral Tab    losartan 50 MG Oral Tab                      Lab Results   Component Value Date    WBC 24.2 (H) 09/08/2024    HGB 7.3 (L) 09/08/2024    HCT 22.8 (L) 09/08/2024    .0 09/08/2024    CREATSERUM 0.57 09/08/2024    BUN 9 09/08/2024     09/08/2024    K 4.0 09/08/2024     09/08/2024    CO2 21.0 09/08/2024    GLU 88 09/08/2024    CA 8.2 (L) 09/08/2024    ALB 3.1 (L) 09/05/2024    ALKPHO 140 (H) 09/05/2024    BILT 0.7 09/05/2024    TP 5.5 (L) 09/05/2024    AST 38 (H) 09/05/2024    ALT 33 09/05/2024    LIP 38 09/05/2024    DDIMER 1.69 (H) 07/23/2024    MG 1.9 09/06/2024    PHOS 2.9 08/21/2024    CK 50 08/27/2024    B12 748 08/21/2024    ETOH <3 09/05/2024       No results found.        Results:     CBC:    Lab Results   Component Value Date    WBC 24.2 (H) 09/08/2024    WBC 14.0 (H)  09/07/2024    WBC 12.3 (H) 09/06/2024     Lab Results   Component Value Date    HGB 7.3 (L) 09/08/2024    HGB 7.9 (L) 09/07/2024    HGB 8.2 (L) 09/06/2024      Lab Results   Component Value Date    .0 09/08/2024    .0 09/07/2024    .0 09/06/2024       Recent Labs   Lab 09/06/24  0615 09/07/24  0633 09/08/24  0728   GLU 63* 112* 88   BUN 6* 8* 9   CREATSERUM 0.53* 0.55 0.57   CA 8.1* 8.2* 8.2*    140 139   K 4.0 4.1 4.0    111 112   CO2 20.0* 20.0* 21.0         @[email or web address]@    Assessment and Plan:         ASSESSMENT:    1.       Acute encephalopathy, suspect acute psychosis considering visual and auditory hallucinations accompanied by bizarre behavior.  - apprec psych consult - discussed with dr whitaker - haldol and seroquel added . Likely bipolar   - much better today     2.       Minimal low-grade fever with leukocytosis.  So far no clear indication of an infectious process.  Will continue to monitor her temperature curve.   Wound service consult to evaluate her right foot dorsum superficial abrasion.    - cont iv cefepime. Pt was on 9/1 and tolerated well   - wbc up today likely due to steroids     3.       Rheumatoid arthritis.  4. Adrenal insuff with hypoglycemia - pt 's prednisone stopped at rehab for last 5 days. Will start hydrocortisone for crisis and Dr Miller to see. Apprec input   - apprec endo - hydrocortisone 25 x 1 -> restart prednisone tomorrow       To rehab tomorrow if wbc improved/stable         Purnima Herrera DO         Chart reviewed, including current vitals, notes, labs and imaging  Labs ordered and medications adjusted as outlined above  Coordinate care with care team/consultants  Discussed with patient results of tests, management plan as outlined above, and the need for ongoing hospitalization  D/w RN     MDM high

## 2024-09-09 LAB
ANION GAP SERPL CALC-SCNC: 10 MMOL/L (ref 0–18)
BASOPHILS # BLD AUTO: 0.06 X10(3) UL (ref 0–0.2)
BASOPHILS NFR BLD AUTO: 0.3 %
BUN BLD-MCNC: 14 MG/DL (ref 9–23)
BUN/CREAT SERPL: 19.7 (ref 10–20)
CALCIUM BLD-MCNC: 8.8 MG/DL (ref 8.7–10.4)
CHLORIDE SERPL-SCNC: 111 MMOL/L (ref 98–112)
CO2 SERPL-SCNC: 20 MMOL/L (ref 21–32)
CREAT BLD-MCNC: 0.71 MG/DL
DEPRECATED RDW RBC AUTO: 70.6 FL (ref 35.1–46.3)
EGFRCR SERPLBLD CKD-EPI 2021: 95 ML/MIN/1.73M2 (ref 60–?)
EOSINOPHIL # BLD AUTO: 0.01 X10(3) UL (ref 0–0.7)
EOSINOPHIL NFR BLD AUTO: 0 %
ERYTHROCYTE [DISTWIDTH] IN BLOOD BY AUTOMATED COUNT: 20.5 % (ref 11–15)
GLUCOSE BLD-MCNC: 86 MG/DL (ref 70–99)
GLUCOSE BLDC GLUCOMTR-MCNC: 110 MG/DL (ref 70–99)
GLUCOSE BLDC GLUCOMTR-MCNC: 93 MG/DL (ref 70–99)
GLUCOSE BLDC GLUCOMTR-MCNC: 97 MG/DL (ref 70–99)
GLUCOSE BLDC GLUCOMTR-MCNC: 98 MG/DL (ref 70–99)
HCT VFR BLD AUTO: 28.9 %
HGB BLD-MCNC: 8.7 G/DL
IMM GRANULOCYTES # BLD AUTO: 0.37 X10(3) UL (ref 0–1)
IMM GRANULOCYTES NFR BLD: 1.8 %
LYMPHOCYTES # BLD AUTO: 1.69 X10(3) UL (ref 1–4)
LYMPHOCYTES NFR BLD AUTO: 8.2 %
MCH RBC QN AUTO: 29.1 PG (ref 26–34)
MCHC RBC AUTO-ENTMCNC: 30.1 G/DL (ref 31–37)
MCV RBC AUTO: 96.7 FL
MONOCYTES # BLD AUTO: 1.32 X10(3) UL (ref 0.1–1)
MONOCYTES NFR BLD AUTO: 6.4 %
NEUTROPHILS # BLD AUTO: 17.05 X10 (3) UL (ref 1.5–7.7)
NEUTROPHILS # BLD AUTO: 17.05 X10(3) UL (ref 1.5–7.7)
NEUTROPHILS NFR BLD AUTO: 83.3 %
OSMOLALITY SERPL CALC.SUM OF ELEC: 292 MOSM/KG (ref 275–295)
PLATELET # BLD AUTO: 312 10(3)UL (ref 150–450)
POTASSIUM SERPL-SCNC: 4 MMOL/L (ref 3.5–5.1)
RBC # BLD AUTO: 2.99 X10(6)UL
SODIUM SERPL-SCNC: 141 MMOL/L (ref 136–145)
WBC # BLD AUTO: 20.5 X10(3) UL (ref 4–11)

## 2024-09-09 PROCEDURE — 99233 SBSQ HOSP IP/OBS HIGH 50: CPT | Performed by: HOSPITALIST

## 2024-09-09 NOTE — PAYOR COMM NOTE
--------------  CONTINUED STAY REVIEW------REQUESTING ADDITIONAL DAY 9/6 9/7 9/8      Payor: [employer]  Subscriber #:  [id number]  Authorization Number: [id number]    Admit date: 9/5/24  Admit time:  7:23 PM    9/6   Chief complaint delerium      Subjective:   Sheri Garzon is a(n) 64 year old female pt seen earlier today. Doing much better. Still hypoglycemic      ROS:   No cp, sob   No c/d   No n/v      Objective:   Blood pressure 119/77, pulse 100, temperature 97.7 °F (36.5 °C), temperature source Oral, resp. rate 18, height 5' 2\" (1.575 m), weight 181 lb 3.2 oz (82.2 kg), SpO2 99%.        Intake/Output Summary (Last 24 hours) at 9/6/2024 1741  Last data filed at 9/6/2024 1733      Gross per 24 hour   Intake 492.5 ml   Output --   Net 492.5 ml         Patient Weight(s) for the past 336 hrs:    Weight   09/05/24 2029 181 lb 3.2 oz (82.2 kg)   09/05/24 1232 178 lb (80.7 kg)               General appearance: alert, appears stated age and cooperative  Pulmonary:  clear to auscultation bilaterally  Cardiovascular: S1, S2 normal, no murmur, click, rub or gallop, regular rate and rhythm  Abdominal: soft, non-tender; bowel sounds normal; no masses,  no organomegaly  Extremities: extremities normal, atraumatic, no cyanosis or edema           Medicines:             Current Facility-Administered Medications   Medication Dose Route Frequency    collagenase (Santyl) 250 UNIT/GM ointment   Topical Daily    dextrose 5%-sodium chloride 0.45% infusion   Intravenous Continuous    hydrocortisone Na succinate PF (Solu-CORTEF) injection 50 mg  50 mg Intravenous Q8H    haloperidol lactate (Haldol) 5 MG/ML injection 2 mg  2 mg Intravenous Q8H PRN    QUEtiapine (SEROquel) tab 50 mg  50 mg Oral Nightly    lamoTRIgine (LaMICtal) tab 25 mg  25 mg Oral BID    enoxaparin (Lovenox) 40 MG/0.4ML SUBQ injection 40 mg  40 mg Subcutaneous Daily    acetaminophen (Tylenol Extra Strength) tab 500 mg  500 mg Oral Q4H PRN     ondansetron (Zofran) 4 MG/2ML injection 4 mg  4 mg Intravenous Q6H PRN    OLANZapine (Zyprexa) 10 mg in sterile water for injection (PF) IM injection  10 mg Intramuscular Q8H PRN    HYDROcodone-acetaminophen (Norco)  MG per tab 1 tablet  1 tablet Oral Q4H PRN               Ant-Infective Medications               cefpodoxime 200 MG Oral Tab     hydroxychloroquine 200 MG Oral Tab                      Blood Pressure and Cardiac Medications               metoprolol tartrate 50 MG Oral Tab     losartan 50 MG Oral Tab                 dextrose 5%-sodium chloride 0.45% 100 mL/hr at 09/06/24 1633                     Lab Results   Component Value Date     WBC 12.3 (H) 09/06/2024     HGB 8.2 (L) 09/06/2024     HCT 29.0 (L) 09/06/2024     .0 09/06/2024     CREATSERUM 0.53 (L) 09/06/2024     BUN 6 (L) 09/06/2024      09/06/2024     K 4.0 09/06/2024      09/06/2024     CO2 20.0 (L) 09/06/2024     GLU 63 (L) 09/06/2024     CA 8.1 (L) 09/06/2024     ALB 3.1 (L) 09/05/2024     ALKPHO 140 (H) 09/05/2024     BILT 0.7 09/05/2024     TP 5.5 (L) 09/05/2024     AST 38 (H) 09/05/2024     ALT 33 09/05/2024     LIP 38 09/05/2024     DDIMER 1.69 (H) 07/23/2024     MG 1.9 09/06/2024     PHOS 2.9 08/21/2024     CK 50 08/27/2024     B12 748 08/21/2024     ETOH <3 09/05/2024         CT BRAIN OR HEAD (CPT=70450)     Result Date: 9/5/2024  CONCLUSION:          No transcortical area of hypoattenuation to suggest an acute infarct.  No acute intracranial hemorrhage, midline shift, mass effect, or hydrocephalus.  Lesser incidental findings described above.    Dictated by (CST): Ron Whitman MD on 9/05/2024 at 2:25 PM     Finalized by (CST): Ron Whitman MD on 9/05/2024 at 2:27 PM           XR CHEST AP PORTABLE  (CPT=71045)     Result Date: 9/5/2024  CONCLUSION:         1. Mild cardiomegaly and normal pulmonary vascularity.  No acute airspace disease.    Dictated by (CST): Ronen Washington MD on 9/05/2024 at 2:15 PM      Finalized by (CST): Ronen Washington MD on 9/05/2024 at 2:16 PM         EKG     Result Date: 9/5/2024  Sinus tachycardia Poor R wave progression Abnormal ECG When compared with ECG of 12-AUG-2024 13:09, Criteria for Inferior infarct are no longer Present Confirmed by ITA CONNOR ELMER ([id number]) on 9/5/2024 3:45:17 PM      Results:      CBC:          Lab Results   Component Value Date     WBC 12.3 (H) 09/06/2024     WBC 16.6 (H) 09/05/2024     WBC 12.2 (H) 08/29/2024            Lab Results   Component Value Date     HGB 8.2 (L) 09/06/2024     HGB 8.9 (L) 09/05/2024     HGB 9.0 (L) 08/29/2024            Lab Results   Component Value Date     .0 09/06/2024     .0 09/05/2024     .0 08/29/2024              Recent Labs   Lab 09/05/24  1302 09/06/24  0615   GLU 87 63*   BUN 12 6*   CREATSERUM 0.68 0.53*   CA 8.6* 8.1*    142   K 3.3* 4.0    111   CO2 25.0 20.0*            @[email or web address]@     Assessment and Plan:         ASSESSMENT:    1.       Acute encephalopathy, suspect acute psychosis considering visual and auditory hallucinations accompanied by bizarre behavior.  - apprec psych consult - discussed with dr whitaker - haldol and seroquel added . Likely bipolar      2.       Minimal low-grade fever with leukocytosis.  So far no clear indication of an infectious process.  Will continue to monitor her temperature curve.   Wound service consult to evaluate her right foot dorsum superficial abrasion.  Further recommendations to follow.      3.       Rheumatoid arthritis.  4. Adrenal insuff with hypoglycemia - pt 's prednisone stopped at rehab for last 5 days. Will start hydrocortisone for crisis and Dr Miller to see. Apprec input            Purnima Herrera DO           Chart reviewed, including current vitals, notes, labs and imaging  Labs ordered and medications adjusted as outlined above  Coordinate care with care team/consultants  Discussed with patient results of tests, management  plan as outlined above, and the need for ongoing hospitalization  D/w RN     [facility]           9/6/2024 9/7   Chief complaint delerium      Subjective:   Sheri Garzon is a(n) 64 year old female pt doing well      ROS:   No cp, sob   No c/d   No n/v      Objective:   Blood pressure 105/86, pulse 95, temperature 98.2 °F (36.8 °C), temperature source Oral, resp. rate 18, height 5' 2\" (1.575 m), weight 181 lb 3.2 oz (82.2 kg), SpO2 100%.        Intake/Output Summary (Last 24 hours) at 9/7/2024 1458  Last data filed at 9/6/2024 2049      Gross per 24 hour   Intake 426.67 ml   Output 0 ml   Net 426.67 ml         Patient Weight(s) for the past 336 hrs:    Weight   09/05/24 2029 181 lb 3.2 oz (82.2 kg)   09/05/24 1232 178 lb (80.7 kg)               General appearance: alert, appears stated age and cooperative  Pulmonary:  clear to auscultation bilaterally  Cardiovascular: S1, S2 normal, no murmur, click, rub or gallop, regular rate and rhythm  Abdominal: soft, non-tender; bowel sounds normal; no masses,  no organomegaly  Extremities: wound on right leg            Medicines:             Current Facility-Administered Medications   Medication Dose Route Frequency    hydrocortisone Na succinate PF (Solu-CORTEF) injection 50 mg  50 mg Intravenous BID    Aspirin CAPS 81 mg  81 mg Oral Daily    atorvastatin (Lipitor) tab 10 mg  10 mg Oral Daily    clopidogrel (Plavix) tab 75 mg  75 mg Oral Daily    Copper TABS 1 tablet  1 tablet Oral Daily    ergocalciferol (Vitamin D2) cap 50,000 Units  50,000 Units Oral Weekly    [START ON 9/8/2024] ferrous sulfate DR tab 325 mg  325 mg Oral Daily with breakfast    folic acid (Folvite) tab 1 mg  1 mg Oral Daily    hydroxychloroquine (Plaquenil) tab 200 mg  200 mg Oral BID    ibuprofen (Motrin) tab 600 mg  600 mg Oral Q8H PRN    losartan (Cozaar) tab 25 mg  25 mg Oral Daily    oxybutynin ER (Ditropan-XL) 24 hr tab 10 mg  10 mg Oral Daily    pantoprazole (Protonix) DR tab 40  mg  40 mg Oral BID AC    sodium bicarbonate tab 650 mg  650 mg Oral BID    collagenase (Santyl) 250 UNIT/GM ointment   Topical Daily    haloperidol lactate (Haldol) 5 MG/ML injection 2 mg  2 mg Intravenous Q8H PRN    QUEtiapine (SEROquel) tab 50 mg  50 mg Oral Nightly    lamoTRIgine (LaMICtal) tab 25 mg  25 mg Oral BID    metoprolol tartrate (Lopressor) tab 25 mg  25 mg Oral BID    enoxaparin (Lovenox) 40 MG/0.4ML SUBQ injection 40 mg  40 mg Subcutaneous Daily    acetaminophen (Tylenol Extra Strength) tab 500 mg  500 mg Oral Q4H PRN    ondansetron (Zofran) 4 MG/2ML injection 4 mg  4 mg Intravenous Q6H PRN    OLANZapine (Zyprexa) 10 mg in sterile water for injection (PF) IM injection  10 mg Intramuscular Q8H PRN    HYDROcodone-acetaminophen (Norco)  MG per tab 1 tablet  1 tablet Oral Q4H PRN               Ant-Infective Medications               cefpodoxime 200 MG Oral Tab     hydroxychloroquine 200 MG Oral Tab                      Blood Pressure and Cardiac Medications               metoprolol tartrate 50 MG Oral Tab     losartan 50 MG Oral Tab                                     Lab Results   Component Value Date     WBC 14.0 (H) 09/07/2024     HGB 7.9 (L) 09/07/2024     HCT 25.0 (L) 09/07/2024     .0 09/07/2024     CREATSERUM 0.55 09/07/2024     BUN 8 (L) 09/07/2024      09/07/2024     K 4.1 09/07/2024      09/07/2024     CO2 20.0 (L) 09/07/2024      (H) 09/07/2024     CA 8.2 (L) 09/07/2024     ALB 3.1 (L) 09/05/2024     ALKPHO 140 (H) 09/05/2024     BILT 0.7 09/05/2024     TP 5.5 (L) 09/05/2024     AST 38 (H) 09/05/2024     ALT 33 09/05/2024     LIP 38 09/05/2024     DDIMER 1.69 (H) 07/23/2024     MG 1.9 09/06/2024     PHOS 2.9 08/21/2024     CK 50 08/27/2024     B12 748 08/21/2024     ETOH <3 09/05/2024         No results found.         Results:      CBC:          Lab Results   Component Value Date     WBC 14.0 (H) 09/07/2024     WBC 12.3 (H) 09/06/2024     WBC 16.6 (H) 09/05/2024             Lab Results   Component Value Date     HGB 7.9 (L) 09/07/2024     HGB 8.2 (L) 09/06/2024     HGB 8.9 (L) 09/05/2024            Lab Results   Component Value Date     .0 09/07/2024     .0 09/06/2024     .0 09/05/2024               Recent Labs   Lab 09/05/24  1302 09/06/24  0615 09/07/24  0633   GLU 87 63* 112*   BUN 12 6* 8*   CREATSERUM 0.68 0.53* 0.55   CA 8.6* 8.1* 8.2*    142 140   K 3.3* 4.0 4.1    111 111   CO2 25.0 20.0* 20.0*            @[email or web address]@     Assessment and Plan:         ASSESSMENT:    1.       Acute encephalopathy, suspect acute psychosis considering visual and auditory hallucinations accompanied by bizarre behavior.  - apprec psych consult - discussed with dr whitaker - haldol and seroquel added . Likely bipolar   - much better today      2.       Minimal low-grade fever with leukocytosis.  So far no clear indication of an infectious process.  Will continue to monitor her temperature curve.   Wound service consult to evaluate her right foot dorsum superficial abrasion.  Further recommendations to follow.   - restart iv cefepime. Pt was on 9/1 and tolerated well      3.       Rheumatoid arthritis.  4. Adrenal insuff with hypoglycemia - pt 's prednisone stopped at rehab for last 5 days. Will start hydrocortisone for crisis and Dr Miller to see. Apprec input   - apprec endo - hydrocortisone 50 bid            Purnima Herrera DO           Chart reviewed, including current vitals, notes, labs and imaging  Labs ordered and medications adjusted as outlined above  Coordinate care with care team/consultants  Discussed with patient results of tests, management plan as outlined above, and the need for ongoing hospitalization  D/w RN     [facility]                          9/8   Chief complaint delerium      Subjective:   Sheri Garzon is a(n) 64 year old female pt doing well . No c/o dizziness.      ROS:   No cp, sob   No c/d   No n/v      Objective:    Blood pressure 94/69, pulse 84, temperature 98.4 °F (36.9 °C), temperature source Oral, resp. rate 18, height 5' 2\" (1.575 m), weight 181 lb 3.2 oz (82.2 kg), SpO2 100%.        Intake/Output Summary (Last 24 hours) at 9/8/2024 1543  Last data filed at 9/8/2024 0721      Gross per 24 hour   Intake 330 ml   Output --   Net 330 ml         Patient Weight(s) for the past 336 hrs:    Weight   09/05/24 2029 181 lb 3.2 oz (82.2 kg)   09/05/24 1232 178 lb (80.7 kg)               General appearance: alert, appears stated age and cooperative  Pulmonary:  clear to auscultation bilaterally  Cardiovascular: S1, S2 normal, no murmur, click, rub or gallop, regular rate and rhythm  Abdominal: soft, non-tender; bowel sounds normal; no masses,  no organomegaly  Extremities: wound on right leg            Medicines:             Current Facility-Administered Medications   Medication Dose Route Frequency    hydrocortisone (Cortef) tab 25 mg  25 mg Oral Once    aspirin DR tab 81 mg  81 mg Oral Daily    atorvastatin (Lipitor) tab 10 mg  10 mg Oral Daily    clopidogrel (Plavix) tab 75 mg  75 mg Oral Daily    [START ON 9/9/2024] ergocalciferol (Vitamin D2) cap 50,000 Units  50,000 Units Oral Weekly    ferrous sulfate DR tab 325 mg  325 mg Oral Daily with breakfast    folic acid (Folvite) tab 1 mg  1 mg Oral Daily    hydroxychloroquine (Plaquenil) tab 200 mg  200 mg Oral BID    ibuprofen (Motrin) tab 600 mg  600 mg Oral Q8H PRN    losartan (Cozaar) tab 25 mg  25 mg Oral Daily    oxybutynin ER (Ditropan-XL) 24 hr tab 10 mg  10 mg Oral Daily    pantoprazole (Protonix) DR tab 40 mg  40 mg Oral BID AC    sodium bicarbonate tab 650 mg  650 mg Oral BID    ceFEPIme (Maxipime) 1 g in sodium chloride 0.9% 100 mL IVPB-MBP  1 g Intravenous Q8H    collagenase (Santyl) 250 UNIT/GM ointment   Topical Daily    haloperidol lactate (Haldol) 5 MG/ML injection 2 mg  2 mg Intravenous Q8H PRN    QUEtiapine (SEROquel) tab 50 mg  50 mg Oral Nightly    lamoTRIgine  (LaMICtal) tab 25 mg  25 mg Oral BID    metoprolol tartrate (Lopressor) tab 25 mg  25 mg Oral BID    enoxaparin (Lovenox) 40 MG/0.4ML SUBQ injection 40 mg  40 mg Subcutaneous Daily    acetaminophen (Tylenol Extra Strength) tab 500 mg  500 mg Oral Q4H PRN    ondansetron (Zofran) 4 MG/2ML injection 4 mg  4 mg Intravenous Q6H PRN    OLANZapine (Zyprexa) 10 mg in sterile water for injection (PF) IM injection  10 mg Intramuscular Q8H PRN    HYDROcodone-acetaminophen (Norco)  MG per tab 1 tablet  1 tablet Oral Q4H PRN               Ant-Infective Medications               cefpodoxime 200 MG Oral Tab     hydroxychloroquine 200 MG Oral Tab                      Blood Pressure and Cardiac Medications               metoprolol tartrate 50 MG Oral Tab     losartan 50 MG Oral Tab                                     Lab Results   Component Value Date     WBC 24.2 (H) 09/08/2024     HGB 7.3 (L) 09/08/2024     HCT 22.8 (L) 09/08/2024     .0 09/08/2024     CREATSERUM 0.57 09/08/2024     BUN 9 09/08/2024      09/08/2024     K 4.0 09/08/2024      09/08/2024     CO2 21.0 09/08/2024     GLU 88 09/08/2024     CA 8.2 (L) 09/08/2024     ALB 3.1 (L) 09/05/2024     ALKPHO 140 (H) 09/05/2024     BILT 0.7 09/05/2024     TP 5.5 (L) 09/05/2024     AST 38 (H) 09/05/2024     ALT 33 09/05/2024     LIP 38 09/05/2024     DDIMER 1.69 (H) 07/23/2024     MG 1.9 09/06/2024     PHOS 2.9 08/21/2024     CK 50 08/27/2024     B12 748 08/21/2024     ETOH <3 09/05/2024         No results found.         Results:      CBC:          Lab Results   Component Value Date     WBC 24.2 (H) 09/08/2024     WBC 14.0 (H) 09/07/2024     WBC 12.3 (H) 09/06/2024            Lab Results   Component Value Date     HGB 7.3 (L) 09/08/2024     HGB 7.9 (L) 09/07/2024     HGB 8.2 (L) 09/06/2024            Lab Results   Component Value Date     .0 09/08/2024     .0 09/07/2024     .0 09/06/2024               Recent Labs   Lab 09/06/24  0615  09/07/24  0633 09/08/24  0728   GLU 63* 112* 88   BUN 6* 8* 9   CREATSERUM 0.53* 0.55 0.57   CA 8.1* 8.2* 8.2*    140 139   K 4.0 4.1 4.0    111 112   CO2 20.0* 20.0* 21.0            @[email or web address]@     Assessment and Plan:         ASSESSMENT:    1.       Acute encephalopathy, suspect acute psychosis considering visual and auditory hallucinations accompanied by bizarre behavior.  - apprec psych consult - discussed with dr whitaker - haldol and seroquel added . Likely bipolar   - much better today      2.       Minimal low-grade fever with leukocytosis.  So far no clear indication of an infectious process.  Will continue to monitor her temperature curve.   Wound service consult to evaluate her right foot dorsum superficial abrasion.    - cont iv cefepime. Pt was on 9/1 and tolerated well   - wbc up today likely due to steroids      3.       Rheumatoid arthritis.  4. Adrenal insuff with hypoglycemia - pt 's prednisone stopped at rehab for last 5 days. Will start hydrocortisone for crisis and Dr Miller to see. Apprec input   - apprec endo - hydrocortisone 25 x 1 -> restart prednisone tomorrow         To rehab tomorrow if wbc improved/stable            Purnima Herrera DO           Chart reviewed, including current vitals, notes, labs and imaging  Labs ordered and medications adjusted as outlined above  Coordinate care with care team/consultants  Discussed with patient results of tests, management plan as outlined above, and the need for ongoing hospitalization  D/w RN     [facility]                  MEDICATIONS ADMINISTERED IN LAST 1 DAY:  acetaminophen (Tylenol Extra Strength) tab 500 mg       Date Action Dose Route User    9/9/2024 0636 Given 500 mg Oral Charley Recio, JULIET          aspirin  tab 81 mg       Date Action Dose Route User    9/9/2024 0830 Given 81 mg Oral Ariadna Brock, JULIET          atorvastatin (Lipitor) tab 10 mg       Date Action Dose Route User    9/9/2024 0830 Given 10 mg Oral  Ariadna Brock RN          ceFEPIme (Maxipime) 1 g in sodium chloride 0.9% 100 mL IVPB-MBP       Date Action Dose Route User    9/9/2024 1532 New Bag 1 g Intravenous Ariadna Brock RN    9/9/2024 0631 New Bag 1 g Intravenous Charley Recio RN    9/8/2024 2253 New Bag 1 g Intravenous Charley Recio RN          clopidogrel (Plavix) tab 75 mg       Date Action Dose Route User    9/9/2024 0830 Given 75 mg Oral Ariadna Brock RN          collagenase (Santyl) 250 UNIT/GM ointment       Date Action Dose Route User    9/9/2024 0833 Given (none) Topical Ariadna Brock RN          enoxaparin (Lovenox) 40 MG/0.4ML SUBQ injection 40 mg       Date Action Dose Route User    9/9/2024 0833 Given 40 mg Subcutaneous (Right Upper Abdomen) Ariadna Brock RN          ferrous sulfate DR tab 325 mg       Date Action Dose Route User    9/9/2024 0830 Given 325 mg Oral Ariadna Brock RN          folic acid (Folvite) tab 1 mg       Date Action Dose Route User    9/9/2024 0830 Given 1 mg Oral Ariadna Brock RN          HYDROcodone-acetaminophen (Norco)  MG per tab 1 tablet       Date Action Dose Route User    9/9/2024 1540 Given 1 tablet Oral Ariadna Brock RN    9/8/2024 1742 Given 1 tablet Oral Joan Alvarenga RN          hydrocortisone (Cortef) tab 25 mg       Date Action Dose Route User    9/8/2024 1739 Given 25 mg Oral Joan Alvarenga RN          hydroxychloroquine (Plaquenil) tab 200 mg       Date Action Dose Route User    9/9/2024 0830 Given 200 mg Oral Ariadna Brock RN    9/8/2024 2127 Given 200 mg Oral Charley Recio RN          lamoTRIgine (LaMICtal) tab 25 mg       Date Action Dose Route User    9/9/2024 0829 Given 25 mg Oral Ariadna Brock RN    9/8/2024 2127 Given 25 mg Oral Charley Recio RN          metoprolol tartrate (Lopressor) tab 25 mg       Date Action Dose Route User    9/8/2024 2126 Given 25 mg Oral Charley Recio, JULIET          oxybutynin ER (Ditropan-XL) 24 hr tab 10 mg       Date  Action Dose Route User    9/9/2024 0830 Given 10 mg Oral Ariadna Brock RN          pantoprazole (Protonix) DR tab 40 mg       Date Action Dose Route User    9/9/2024 1540 Given 40 mg Oral Ariadna Brock RN    9/9/2024 0631 Given 40 mg Oral Charley Recio RN    9/8/2024 1739 Given 40 mg Oral Joan Alvarenga RN          predniSONE (Deltasone) tab 10 mg       Date Action Dose Route User    9/9/2024 0830 Given 10 mg Oral Ariadna Brock RN          QUEtiapine (SEROquel) tab 50 mg       Date Action Dose Route User    9/8/2024 2126 Given 50 mg Oral Charley Recio RN          sodium bicarbonate tab 650 mg       Date Action Dose Route User    9/9/2024 0829 Given 650 mg Oral Ariadna Brock RN    9/8/2024 2127 Given 650 mg Oral Charley Recio RN          ergocalciferol (Vitamin D2) cap 50,000 Units       Date Action Dose Route User    9/9/2024 0829 Given 50,000 Units Oral Ariadna Brock RN            Vitals (last day)       Date/Time Temp Pulse Resp BP SpO2 Weight O2 Device O2 Flow Rate (L/min) [facility]    09/09/24 1537 97.9 °F (36.6 °C) -- -- 107/83 100 % -- None (Room air) -- ML    09/09/24 0830 97.9 °F (36.6 °C) -- 16 91/70 100 % -- None (Room air) -- ML    09/09/24 0500 97.5 °F (36.4 °C) 75 16 94/73 100 % -- None (Room air) -- RB    09/08/24 2117 98.3 °F (36.8 °C) 94 16 121/86 100 % -- None (Room air) -- RB    09/08/24 1405 98.4 °F (36.9 °C) 84 18 94/69 100 % -- None (Room air) -- TR    09/08/24 0841 98.2 °F (36.8 °C) 95 18 96/69 100 % -- None (Room air) -- TR    09/08/24 0300 97.8 °F (36.6 °C) 80 17 97/70 99 % -- None (Room air) -- AM

## 2024-09-09 NOTE — PROGRESS NOTES
[facility]  part of [facility]    Progress Note    Sheri Garzon Patient Status:  Inpatient    1960 MRN [id number]   Location [facility] 5SW/SE Attending Purnima Herrera, DO   Hosp Day # 4 PCP TERI MCKENNA MD     Chief complaint delerium     Subjective:   Sheri Garzon is a(n) 64 year old female pt doing well . No c/o dizziness.     ROS:   No cp, sob   No c/d   No n/v     Objective:   Blood pressure 107/83, pulse 75, temperature 97.9 °F (36.6 °C), temperature source Oral, resp. rate 16, height 5' 2\" (1.575 m), weight 181 lb 3.2 oz (82.2 kg), SpO2 100%.      Intake/Output Summary (Last 24 hours) at 2024 161  Last data filed at 2024 1524  Gross per 24 hour   Intake 780 ml   Output --   Net 780 ml       Patient Weight(s) for the past 336 hrs:   Weight   24 181 lb 3.2 oz (82.2 kg)   24 1232 178 lb (80.7 kg)           General appearance: alert, appears stated age and cooperative  Pulmonary:  clear to auscultation bilaterally  Cardiovascular: S1, S2 normal, no murmur, click, rub or gallop, regular rate and rhythm  Abdominal: soft, non-tender; bowel sounds normal; no masses,  no organomegaly  Extremities: wound on right leg - no erythema         Medicines:     Current Facility-Administered Medications   Medication Dose Route Frequency    predniSONE (Deltasone) tab 10 mg  10 mg Oral Daily with breakfast    aspirin DR tab 81 mg  81 mg Oral Daily    atorvastatin (Lipitor) tab 10 mg  10 mg Oral Daily    clopidogrel (Plavix) tab 75 mg  75 mg Oral Daily    ergocalciferol (Vitamin D2) cap 50,000 Units  50,000 Units Oral Weekly    ferrous sulfate DR tab 325 mg  325 mg Oral Daily with breakfast    folic acid (Folvite) tab 1 mg  1 mg Oral Daily    hydroxychloroquine (Plaquenil) tab 200 mg  200 mg Oral BID    ibuprofen (Motrin) tab 600 mg  600 mg Oral Q8H PRN    losartan (Cozaar) tab 25 mg  25 mg Oral Daily    oxybutynin ER (Ditropan-XL) 24 hr tab 10 mg  10 mg Oral  Daily    pantoprazole (Protonix) DR tab 40 mg  40 mg Oral BID AC    sodium bicarbonate tab 650 mg  650 mg Oral BID    ceFEPIme (Maxipime) 1 g in sodium chloride 0.9% 100 mL IVPB-MBP  1 g Intravenous Q8H    collagenase (Santyl) 250 UNIT/GM ointment   Topical Daily    haloperidol lactate (Haldol) 5 MG/ML injection 2 mg  2 mg Intravenous Q8H PRN    QUEtiapine (SEROquel) tab 50 mg  50 mg Oral Nightly    lamoTRIgine (LaMICtal) tab 25 mg  25 mg Oral BID    metoprolol tartrate (Lopressor) tab 25 mg  25 mg Oral BID    enoxaparin (Lovenox) 40 MG/0.4ML SUBQ injection 40 mg  40 mg Subcutaneous Daily    acetaminophen (Tylenol Extra Strength) tab 500 mg  500 mg Oral Q4H PRN    ondansetron (Zofran) 4 MG/2ML injection 4 mg  4 mg Intravenous Q6H PRN    OLANZapine (Zyprexa) 10 mg in sterile water for injection (PF) IM injection  10 mg Intramuscular Q8H PRN    HYDROcodone-acetaminophen (Norco)  MG per tab 1 tablet  1 tablet Oral Q4H PRN           Ant-Infective Medications            cefpodoxime 200 MG Oral Tab    hydroxychloroquine 200 MG Oral Tab                Blood Pressure and Cardiac Medications            metoprolol tartrate 50 MG Oral Tab    losartan 50 MG Oral Tab                      Lab Results   Component Value Date    WBC 20.5 (H) 09/09/2024    HGB 8.7 (L) 09/09/2024    HCT 28.9 (L) 09/09/2024    .0 09/09/2024    CREATSERUM 0.71 09/09/2024    BUN 14 09/09/2024     09/09/2024    K 4.0 09/09/2024     09/09/2024    CO2 20.0 (L) 09/09/2024    GLU 86 09/09/2024    CA 8.8 09/09/2024    ALB 3.1 (L) 09/05/2024    ALKPHO 140 (H) 09/05/2024    BILT 0.7 09/05/2024    TP 5.5 (L) 09/05/2024    AST 38 (H) 09/05/2024    ALT 33 09/05/2024    LIP 38 09/05/2024    DDIMER 1.69 (H) 07/23/2024    MG 1.9 09/06/2024    PHOS 2.9 08/21/2024    CK 50 08/27/2024    B12 748 08/21/2024    ETOH <3 09/05/2024       No results found.        Results:     CBC:    Lab Results   Component Value Date    WBC 20.5 (H) 09/09/2024    WBC  24.2 (H) 09/08/2024    WBC 14.0 (H) 09/07/2024     Lab Results   Component Value Date    HGB 8.7 (L) 09/09/2024    HGB 7.3 (L) 09/08/2024    HGB 7.9 (L) 09/07/2024      Lab Results   Component Value Date    .0 09/09/2024    .0 09/08/2024    .0 09/07/2024       Recent Labs   Lab 09/07/24  0633 09/08/24  0728 09/09/24  1416   * 88 86   BUN 8* 9 14   CREATSERUM 0.55 0.57 0.71   CA 8.2* 8.2* 8.8    139 141   K 4.1 4.0 4.0    112 111   CO2 20.0* 21.0 20.0*         @[email or web address]@    Assessment and Plan:         ASSESSMENT:    1.       Acute encephalopathy, suspect acute psychosis considering visual and auditory hallucinations accompanied by bizarre behavior.  - apprec psych consult - discussed with dr whitkaer - haldol and seroquel added . Likely bipolar   - much better today     2.       Minimal low-grade fever with leukocytosis.  So far no clear indication of an infectious process.  Will continue to monitor her temperature curve.   Wound service consult to evaluate her right foot dorsum superficial abrasion.    - cont iv cefepime. Pt was on 9/1 and tolerated well   - wbc up today likely due to steroids     3.       Rheumatoid arthritis.  4. Adrenal insuff with hypoglycemia - pt 's prednisone stopped at rehab for last 5 days. Will start hydrocortisone for crisis and Dr Miller to see. Apprec input   - apprec endo - hydrocortisone 25 x 1 -> restart prednisone today       To rehab tomorrow if wbc improved/stable         Purnima Herrera DO         Chart reviewed, including current vitals, notes, labs and imaging  Labs ordered and medications adjusted as outlined above  Coordinate care with care team/consultants  Discussed with patient results of tests, management plan as outlined above, and the need for ongoing hospitalization  D/w RN     MDM high

## 2024-09-09 NOTE — PHYSICAL THERAPY NOTE
PHYSICAL THERAPY TREATMENT NOTE - INPATIENT     Room Number: 565/565-A       Presenting Problem: AMS  Co-Morbidities : [place] disease, cellulitis, acute psychosis, anemia, R shoulder DJD, obesity    Problem List  Principal Problem:    Altered mental status, unspecified altered mental status type  Active Problems:    Acute psychosis (HCC)    Moderate bipolar I disorder, current or most recent episode depressed, with psychotic features, with mixed features (HCC)      PHYSICAL THERAPY ASSESSMENT   Patient demonstrates good  progress this session, goals  remain in progress.      Patient is requiring minimal assist as a result of the following impairments: decreased functional strength, pain, impaired dynamic standing balance, decreased muscular endurance, and medical status.     Patient continues to function below baseline with bed mobility, transfers, gait, stair negotiation, standing prolonged periods, and performing household tasks.  Next session anticipate patient to progress transfers, gait, stair negotiation, standing prolonged periods, and performing household tasks.  Physical Therapy will continue to follow patient for duration of hospitalization.    Patient continues to benefit from continued skilled PT services: to promote return to prior level of function and safety with continuous assistance and gradual rehabilitative therapy .    PLAN  PT Treatment Plan: Bed mobility;Body mechanics;Endurance;Energy conservation;Patient education;Gait training;Range of motion;Strengthening;Transfer training;Balance training  Frequency (Obs): 3-5x/week    SUBJECTIVE  Agreeable to activity.     OBJECTIVE  Precautions: Bed/chair alarm    WEIGHT BEARING RESTRICTION  none    PAIN ASSESSMENT   Rating:  (did not rate)  Location: right leg  Management Techniques: Activity promotion;Breathing techniques;Body mechanics;Repositioning    BALANCE  Static Sitting: Good  Dynamic Sitting: Fair +  Static Standing: Fair -  Dynamic Standing:  Poor +    AM-PAC '6-Clicks' INPATIENT SHORT FORM - BASIC MOBILITY  How much difficulty does the patient currently have...  Patient Difficulty: Turning over in bed (including adjusting bedclothes, sheets and blankets)?: A Little   Patient Difficulty: Sitting down on and standing up from a chair with arms (e.g., wheelchair, bedside commode, etc.): A Little   Patient Difficulty: Moving from lying on back to sitting on the side of the bed?: A Little   How much help from another person does the patient currently need...   Help from Another: Moving to and from a bed to a chair (including a wheelchair)?: A Little   Help from Another: Need to walk in hospital room?: A Little   Help from Another: Climbing 3-5 steps with a railing?: A Lot     AM-PAC Score:  Raw Score: 17   Approx Degree of Impairment: 50.57%   Standardized Score (AM-PAC Scale): 42.13   CMS Modifier (G-Code): CK    FUNCTIONAL ABILITY STATUS  Functional Mobility/Gait Assessment  Gait Assistance: Minimum assistance  Distance (ft): 15  Assistive Device: Rolling walker  Pattern: Shuffle;R Decreased stance time (slow pace, slightly unsteady, flexed posture with VC to correct, no LOB)  Supine to Sit: stand-by assist  Sit to Stand: minimal assist    Skilled Therapy Provided: RN approved PT tx. Pt received resting in bed and agreeable to activity. Introduced self and role. Pt c/o RLE pain with activity. Demos bed mobility with SBA. Demos STS transfer to/from bed and chair with Min A and RW. Ambulated short distance in room with RW and Min A, slow, shuffled and overall unsteady gait but no LOB. Pt was left sitting in chair with alarm on, needs within reach, handoff to RN complete.     The patient's Approx Degree of Impairment: 50.57% has been calculated based on documentation in the [facility] '6 clicks' Inpatient Daily Activity Short Form.  Research supports that patients with this level of impairment may benefit from rehab facility.  Final disposition will be made by  interdisciplinary medical team.    Patient End of Session: Up in chair;Needs met;Call light within reach;RN aware of session/findings;All patient questions and concerns addressed;Alarm set    CURRENT GOALS   Goals to be met by: 9/20/2024  Patient Goal Patient's self-stated goal is: Return home    Goal #1 Patient is able to demonstrate supine - sit EOB @ level: independent      Goal #1   Current Status  unmet   Goal #2 Patient is able to demonstrate transfers Sit to/from Stand at assistance level: supervision with walker - rolling      Goal #2  Current Status  unmet   Goal #3 Patient is able to ambulate 50 feet with assist device: walker - rolling at assistance level: supervision   Goal #3   Current Status  unmet    Goal #4 Patient will negotiate 8 stairs/one curb w/ assistive device and supervision   Goal #4   Current Status  unmet   Goal #5 Patient to demonstrate independence with home activity/exercise instructions provided to patient in preparation for discharge.   Goal #5   Current Status  unmet, progressing   Goal #6     Goal #6  Current Status        Therapeutic Activity: 15 minutes

## 2024-09-09 NOTE — PROGRESS NOTES
Patient is a 64 year old single  female retired  of 35 years with a past medical history of hypertension, rheumatoid arthritis, obesity, chronic adrenal insufficiency secondary to chronic corticosteroid use, asthma, pancreatitis, obstructive sleep apnea, obesity, hyperlipidemia, anemia of chronic kidney disease, nonocclusive coronary artery disease, and peripheral arterial disease who also has past mental lorenzo history of anxiety and depression was admitted to the hospital for Altered mental status, unspecified altered mental status type: The patient has been demonstrating continued confusion and irritability with visual and auditory hallucinations.   Psych consult requested for evaluation and advice.     Consult Duration     The patient seen for over 50-minute, follow-up evaluation, over 50% counseling and coordinating care addressing confusion and mood instability.    Record reviewed, communication with attending, communication with RN and patient seen face to face evaluation.    History of Present Illness:   The patient seen 2 days ago and the patient found with delirium and underlying bipolar disorder and treated.    The patient started on Seroquel and lamotrigine.    The patient seen today in the presence of her sister.    The patient today presented very friendly, pleasant with slight excitability reporting feeling \"great\".  The patient reporting that she has been sleeping well and her sleep has been making her feel much better during the day and her mind has been calm.    The patient denying any mood alternation or any confusion and denied any paranoia.  Patient reported that she is going back to the rehab to focus on her wound and getting better so she can go back to live with her family and enjoyed her life.    The patient denied repeatedly any homicidal or suicidal ideation.  Patient denied any auditory visual hallucination.  Patient denied any side effect to the  medication.    Sister at bedside indicating that the patient has been doing really well and she will look \"back to her own self\".    Patient indicated about the treatment plan and encouraged to comply with the medication and follow-up.      Past Psychiatric/Medication History:  1. Prior diagnoses: Depression, Anxiety  2. Past psychiatric inpatient: Denies   3. Past outpatient history: Denies  4. Past suicide history: None  5. Medication history: Denies    Social History:   The patient is a 64 year old  female. She is a retired  of 35 years. She retired and moved to [place] with her daughter. Her health declined since retiring and moving to [place] and moved back to [place] with her daughter in June 2024. She and her daughter moved back in with her eldest sister, Gladys. She was admitted to rehab for decreased mobility and chronic wound care on multiple occasions.     Family History:  Daughter: depression    Medical History:   Past Medical History  Past Medical History:    Allergic rhinitis    Anxiety    Arthritis    RA and Osteoarthritis    Asthma (HCC)    Depression    Not officially diagnosed    Esophageal reflux    Essential hypertension    High blood pressure    High cholesterol    Hyperlipidemia    Myocardial infarction (HCC)    Cardiac event    Osteoarthritis    Pancreatitis (HCC)    Problems with swallowing    RA (rheumatoid arthritis) (HCC)    Sleep apnea       Past Surgical History  Past Surgical History:   Procedure Laterality Date    Colonoscopy      Other surgical history      Revision of uterine anomaly      Rotator cuff repair      Wrist fracture surgery         Family History  Family History   Problem Relation Age of Onset    Diabetes Mother     Genetic Disease Mother     Heart Disorder Mother     Hypertension Mother     Obesity Mother     Diabetes Father     Hypertension Father     Depression Daughter     Psychiatric Daughter        Social History  Social History      Socioeconomic History    Marital status: Single   Tobacco Use    Smoking status: Never    Smokeless tobacco: Never   Vaping Use    Vaping status: Never Used   Substance and Sexual Activity    Alcohol use: Not Currently    Drug use: Never     Social Determinants of Health     Food Insecurity: No Food Insecurity (9/5/2024)    Food Insecurity     Food Insecurity: Never true   Transportation Needs: No Transportation Needs (9/5/2024)    Transportation Needs     Lack of Transportation: No   Housing Stability: Low Risk  (9/5/2024)    Housing Stability     Housing Instability: No           Current Medications:  Current Facility-Administered Medications   Medication Dose Route Frequency    [START ON 9/9/2024] predniSONE (Deltasone) tab 20 mg  20 mg Oral Daily with breakfast    aspirin DR tab 81 mg  81 mg Oral Daily    atorvastatin (Lipitor) tab 10 mg  10 mg Oral Daily    clopidogrel (Plavix) tab 75 mg  75 mg Oral Daily    [START ON 9/9/2024] ergocalciferol (Vitamin D2) cap 50,000 Units  50,000 Units Oral Weekly    ferrous sulfate DR tab 325 mg  325 mg Oral Daily with breakfast    folic acid (Folvite) tab 1 mg  1 mg Oral Daily    hydroxychloroquine (Plaquenil) tab 200 mg  200 mg Oral BID    ibuprofen (Motrin) tab 600 mg  600 mg Oral Q8H PRN    losartan (Cozaar) tab 25 mg  25 mg Oral Daily    oxybutynin ER (Ditropan-XL) 24 hr tab 10 mg  10 mg Oral Daily    pantoprazole (Protonix) DR tab 40 mg  40 mg Oral BID AC    sodium bicarbonate tab 650 mg  650 mg Oral BID    ceFEPIme (Maxipime) 1 g in sodium chloride 0.9% 100 mL IVPB-MBP  1 g Intravenous Q8H    collagenase (Santyl) 250 UNIT/GM ointment   Topical Daily    haloperidol lactate (Haldol) 5 MG/ML injection 2 mg  2 mg Intravenous Q8H PRN    QUEtiapine (SEROquel) tab 50 mg  50 mg Oral Nightly    lamoTRIgine (LaMICtal) tab 25 mg  25 mg Oral BID    metoprolol tartrate (Lopressor) tab 25 mg  25 mg Oral BID    enoxaparin (Lovenox) 40 MG/0.4ML SUBQ injection 40 mg  40 mg  Subcutaneous Daily    acetaminophen (Tylenol Extra Strength) tab 500 mg  500 mg Oral Q4H PRN    ondansetron (Zofran) 4 MG/2ML injection 4 mg  4 mg Intravenous Q6H PRN    OLANZapine (Zyprexa) 10 mg in sterile water for injection (PF) IM injection  10 mg Intramuscular Q8H PRN    HYDROcodone-acetaminophen (Norco)  MG per tab 1 tablet  1 tablet Oral Q4H PRN     Medications Prior to Admission   Medication Sig    cefpodoxime 200 MG Oral Tab Take 2 tablets (400 mg total) by mouth 2 (two) times daily for 10 days.    metoprolol tartrate 50 MG Oral Tab Take 0.5 tablets (25 mg total) by mouth 2 (two) times daily.    losartan 50 MG Oral Tab Take 0.5 tablets (25 mg total) by mouth daily.    predniSONE 10 MG Oral Tab Take 1 tablet (10 mg total) by mouth daily with breakfast.    sodium bicarbonate 650 MG Oral Tab Take 1 tablet (650 mg total) by mouth 2 (two) times daily.    Copper (COPPERMIN) 5 MG Oral Tab Take 1 tablet by mouth daily.    Ferrous Gluconate 324 (38 Fe) MG Oral Tab Take 1 tablet (325 mg total) by mouth daily with breakfast.    hydroxychloroquine 200 MG Oral Tab Take 1 tablet (200 mg total) by mouth 2 (two) times daily.    Aspirin 81 MG Oral Cap Take 81 mg by mouth daily.    atorvastatin 10 MG Oral Tab Take 1 tablet (10 mg total) by mouth daily.    clopidogrel 75 MG Oral Tab Take 1 tablet (75 mg total) by mouth daily.    ergocalciferol 1.25 MG (12603 UT) Oral Cap Take 1 capsule (50,000 Units total) by mouth once a week.    oxybutynin ER 10 MG Oral Tablet 24 Hr Take 1 tablet (10 mg total) by mouth daily.    Potassium Chloride ER 10 MEQ Oral Tab CR Take 1 tablet (10 mEq total) by mouth 2 (two) times daily.    folic acid 1 MG Oral Tab Take 1 tablet (1 mg total) by mouth daily.    clotrimazole-betamethasone 1-0.05 % External Cream Apply 1 Application topically 2 (two) times daily. Apply to groin and abdominal folds    Acetaminophen ER (ACETAMINOPHEN 8 HOUR) 650 MG Oral Tab CR Take 2-3 tablets (1,300-1,950 mg total)  by mouth every 8 (eight) hours as needed for Pain.    ibuprofen 200 MG Oral Tab Take 3 tablets (600 mg total) by mouth every 8 (eight) hours as needed for Pain.    albuterol 108 (90 Base) MCG/ACT Inhalation Aero Soln Inhale 2 puffs into the lungs every 4 to 6 hours as needed for Wheezing.    [] pantoprazole 40 MG Oral Tab EC Take 1 tablet (40 mg total) by mouth 2 (two) times daily before meals.    methotrexate 2.5 MG Oral Tab Take 1 tablet (2.5 mg total) by mouth once a week. (Patient not taking: Reported on 2024)       Allergies  Allergies   Allergen Reactions    Cephalosporins ANAPHYLAXIS, NAUSEA AND VOMITING and OTHER (SEE COMMENTS)     Other reaction(s): Fever    - Tolerated multiple doses of ceftriaxone on 2024 without any complications  - Tolerated multiple doses of cefepime 2024 without complications    Gadolinium Derivatives FEVER    Penicillins OTHER (SEE COMMENTS)     esophagitis    Sulfa Antibiotics OTHER (SEE COMMENTS)     esophagitis    Bananas ITCHING       Review of Systems:   As by Admitting/Attending    Results:   Laboratory Data:  Lab Results   Component Value Date    WBC 24.2 (H) 2024    HGB 7.3 (L) 2024    HCT 22.8 (L) 2024    .0 2024    CREATSERUM 0.57 2024    BUN 9 2024     2024    K 4.0 2024     2024    CO2 21.0 2024    GLU 88 2024    CA 8.2 (L) 2024    ALB 3.1 (L) 2024    ALKPHO 140 (H) 2024    TP 5.5 (L) 2024    AST 38 (H) 2024    ALT 33 2024    LIP 38 2024    DDIMER 1.69 (H) 2024    MG 1.9 2024    PHOS 2.9 2024    CK 50 2024    B12 748 2024    ETOH <3 2024         Imaging:  No results found.    Vital Signs:   Blood pressure 94/69, pulse 84, temperature 98.4 °F (36.9 °C), temperature source Oral, resp. rate 18, height 62\", weight 82.2 kg (181 lb 3.2 oz), SpO2 100%.    Mental Status Exam:   Appearance: Stated age 64,  in hospital gown, laying down in hospital bed.  Sister at bedside  Psychomotor: No psychomotor agitation or retardation presented.  Orientation: Alert and oriented to person, place, condition and date.  Gait: Not evaluated.  Attitude/Coorperation: Patient presented cooperative and attentive in the pleasant.  Behavior: Appropriate behavior  Speech: Regular rate and rhythm speech.  Mood: Reporting feeling much better  Affect: Slightly expanded affect but appropriate.  Thought process: Appropriate thought process with no distortion observed.  Thought content: Denied any Hemosol or suicidal ideation  Perceptions: Not any auditory visual hallucination.  Patient did not appear response to internal stimuli  Concentration: Appropriate concentration.  Memory: Fair  Intellect: Average.  Judgment and Insight: Questionable.     Impression:     Delirium due another medical condition.  Resolved  Moderate bipolar I disorder, current or most recent episode depressed, with psychotic features, with mixed features (HCC)         The patient is a 64 year old  female with a past psychiatric history of depression and anxiety.     The patient has been demonstrating delirium episodes with alternation in mood and cognition with episodes of increased confusion, restlessness, agitation and response to internal stimuli. T  he patient is also demonstrating acute and hostile episodes of irritability. There has been need to use chemical and physical restraints to maintain patient and staff.     Per the patient and corrobarating information from the sister, the patient has been suffering from a lifetime of mood swings, irritability, and intermittent psychotic episodes. Currently the patient is suffering from acute delirium due to possible undiagnosed bipolar disease.     9/8/2024: The patient has been demonstrating significant improvement in her cognition, demeanor and behavior.    Discussed risk and benefit, acknowledging the  current symptom and severity.  At this point, I would recommend the following approach:     Focus on safety  Focus on education and support.  Focus on insight orientation helping the patient understand diagnosis and treatment plan.  Continue Seroquel 50 mg nightly.  Continue Lamictal 25 mg twice daily.  Utilize Haldol 2 mg IV every 8 hour as needed for agitation.  Patient appropriate to [facility].  Coordinate plan with team    Orders This Visit:  Orders Placed This Encounter   Procedures    CBC With Differential With Platelet    Basic Metabolic Panel (8)    Hepatic Function Panel (7)    Lipase    Ethyl Alcohol    Drug screen 8 w/out confirmation, urine    Urinalysis, Routine    Magnesium    Basic Metabolic Panel (8)    CBC With Differential With Platelet    Magnesium    RBC Morphology Scan    CBC With Differential With Platelet    Basic Metabolic Panel (8)    Basic Metabolic Panel (8)    CBC With Differential With Platelet    CBC With Differential With Platelet    Basic Metabolic Panel (8)    CBC With Differential With Platelet    Rapid SARS-CoV-2 by PCR       Meds This Visit:  Requested Prescriptions      No prescriptions requested or ordered in this encounter       Hudson Boles MD  9/8/2024    Note to Patient: The 21st Century Cures Act makes medical notes like these available to patients in the interest of transparency. However, be advised this is a medical document. It is intended as peer to peer communication. It is written in medical language and may contain abbreviations or verbiage that are unfamiliar. It may appear blunt or direct. Medical documents are intended to carry relevant information, facts as evident, and the clinical opinion of the practitioner. This note may have been transcribed using a voice dictation system. Voice recognition errors may occur. This should not be taken to alter the content or meaning of this note.

## 2024-09-09 NOTE — PLAN OF CARE
Problem: Safety Risk - Non-Violent Restraints  Goal: Patient will remain free from self-harm  Description: INTERVENTIONS:  - Apply the least restrictive restraint to prevent harm  - Notify patient and family of reasons restraints applied  - Assess for any contributing factors to confusion (electrolyte disturbances, delirium, medications)  - Discontinue any unnecessary medical devices as soon as possible  - Assess the patient's physical comfort, circulation, skin condition, hydration, nutrition and elimination needs   - Reorient and redirection as needed  - Assess for the need to continue restraints  Outcome: Progressing     Problem: Patient Centered Care  Goal: Patient preferences are identified and integrated in the patient's plan of care  Description: Interventions:  - What would you like us to know as we care for you?   - Provide timely, complete, and accurate information to patient/family  - Incorporate patient and family knowledge, values, beliefs, and cultural backgrounds into the planning and delivery of care  - Encourage patient/family to participate in care and decision-making at the level they choose  - Honor patient and family perspectives and choices  Outcome: Progressing     Problem: Patient/Family Goals  Goal: Patient/Family Long Term Goal  Description: Patient's Long Term Goal:     Interventions:  -   - See additional Care Plan goals for specific interventions  Outcome: Progressing  Goal: Patient/Family Short Term Goal  Description: Patient's Short Term Goal:     Interventions:   -   - See additional Care Plan goals for specific interventions  Outcome: Progressing     Problem: CARDIOVASCULAR - ADULT  Goal: Maintains optimal cardiac output and hemodynamic stability  Description: INTERVENTIONS:  - Monitor vital signs, rhythm, and trends  - Monitor for bleeding, hypotension and signs of decreased cardiac output  - Evaluate effectiveness of vasoactive medications to optimize hemodynamic stability  -  Monitor arterial and/or venous puncture sites for bleeding and/or hematoma  - Assess quality of pulses, skin color and temperature  - Assess for signs of decreased coronary artery perfusion - ex. Angina  - Evaluate fluid balance, assess for edema, trend weights  Outcome: Progressing  Goal: Absence of cardiac arrhythmias or at baseline  Description: INTERVENTIONS:  - Continuous cardiac monitoring, monitor vital signs, obtain 12 lead EKG if indicated  - Evaluate effectiveness of antiarrhythmic and heart rate control medications as ordered  - Initiate emergency measures for life threatening arrhythmias  - Monitor electrolytes and administer replacement therapy as ordered  Outcome: Progressing     Problem: SKIN/TISSUE INTEGRITY - ADULT  Goal: Skin integrity remains intact  Description: INTERVENTIONS  - Assess and document risk factors for pressure ulcer development  - Assess and document skin integrity  - Monitor for areas of redness and/or skin breakdown  - Initiate interventions, skin care algorithm/standards of care as needed  Outcome: Progressing  Goal: Incision(s), wounds(s) or drain site(s) healing without S/S of infection  Description: INTERVENTIONS:  - Assess and document risk factors for pressure ulcer development  - Assess and document skin integrity  - Assess and document dressing/incision, wound bed, drain sites and surrounding tissue  - Implement wound care per orders  - Initiate isolation precautions as appropriate  - Initiate Pressure Ulcer prevention bundle as indicated  Outcome: Progressing  Goal: Oral mucous membranes remain intact  Description: INTERVENTIONS  - Assess oral mucosa and hygiene practices  - Implement preventative oral hygiene regimen  - Implement oral medicated treatments as ordered  Outcome: Progressing     Problem: CONFUSION  Goal: Confusion, delirium, dementia or psychosis is improved or at baseline  Description: INTERVENTIONS:  - Assess for possible contributors to thought  disturbance, including medications, impaired vision or hearing, underlying metabolic abnormalities, dehydration, psychiatric diagnoses, and notify attending MD/LIP  - [place] high risk fall precautions, as indicated  - Provide frequent short contacts to provide reality reorientation, refocusing and direction  - Decrease environmental stimuli, including noise as appropriate  - Monitor and intervene to maintain adequate nutrition, hydration, elimination, sleep and activity  - Consider the need for a sitter if unable to leave patient unattended  - Initiate Spiritual Care consult as indicated  - Consult Pharmacy as needed  Outcome: Progressing

## 2024-09-09 NOTE — PLAN OF CARE
Problem: Safety Risk - Non-Violent Restraints  Goal: Patient will remain free from self-harm  Description: INTERVENTIONS:  - Apply the least restrictive restraint to prevent harm  - Notify patient and family of reasons restraints applied  - Assess for any contributing factors to confusion (electrolyte disturbances, delirium, medications)  - Discontinue any unnecessary medical devices as soon as possible  - Assess the patient's physical comfort, circulation, skin condition, hydration, nutrition and elimination needs   - Reorient and redirection as needed  - Assess for the need to continue restraints  Outcome: Progressing     Problem: Patient Centered Care  Goal: Patient preferences are identified and integrated in the patient's plan of care  Description: Interventions:  - What would you like us to know as we care for you?   - Provide timely, complete, and accurate information to patient/family  - Incorporate patient and family knowledge, values, beliefs, and cultural backgrounds into the planning and delivery of care  - Encourage patient/family to participate in care and decision-making at the level they choose  - Honor patient and family perspectives and choices  Outcome: Progressing     Problem: Patient/Family Goals  Goal: Patient/Family Long Term Goal  Description: Patient's Long Term Goal:     Interventions:  - See additional Care Plan goals for specific interventions  Outcome: Progressing  Goal: Patient/Family Short Term Goal  Description: Patient's Short Term Goal:     Interventions:   - See additional Care Plan goals for specific interventions  Outcome: Progressing

## 2024-09-10 LAB
BASOPHILS # BLD AUTO: 0.03 X10(3) UL (ref 0–0.2)
BASOPHILS NFR BLD AUTO: 0.2 %
DEPRECATED RDW RBC AUTO: 69.5 FL (ref 35.1–46.3)
EOSINOPHIL # BLD AUTO: 0.22 X10(3) UL (ref 0–0.7)
EOSINOPHIL NFR BLD AUTO: 1.3 %
ERYTHROCYTE [DISTWIDTH] IN BLOOD BY AUTOMATED COUNT: 20.1 % (ref 11–15)
GLUCOSE BLDC GLUCOMTR-MCNC: 108 MG/DL (ref 70–99)
GLUCOSE BLDC GLUCOMTR-MCNC: 65 MG/DL (ref 70–99)
GLUCOSE BLDC GLUCOMTR-MCNC: 68 MG/DL (ref 70–99)
GLUCOSE BLDC GLUCOMTR-MCNC: 75 MG/DL (ref 70–99)
GLUCOSE BLDC GLUCOMTR-MCNC: 75 MG/DL (ref 70–99)
GLUCOSE BLDC GLUCOMTR-MCNC: 88 MG/DL (ref 70–99)
GLUCOSE BLDC GLUCOMTR-MCNC: 90 MG/DL (ref 70–99)
HCT VFR BLD AUTO: 24.4 %
HGB BLD-MCNC: 7.4 G/DL
IMM GRANULOCYTES # BLD AUTO: 0.31 X10(3) UL (ref 0–1)
IMM GRANULOCYTES NFR BLD: 1.9 %
LYMPHOCYTES # BLD AUTO: 3.1 X10(3) UL (ref 1–4)
LYMPHOCYTES NFR BLD AUTO: 18.7 %
MCH RBC QN AUTO: 29.1 PG (ref 26–34)
MCHC RBC AUTO-ENTMCNC: 30.3 G/DL (ref 31–37)
MCV RBC AUTO: 96.1 FL
MONOCYTES # BLD AUTO: 1.79 X10(3) UL (ref 0.1–1)
MONOCYTES NFR BLD AUTO: 10.8 %
NEUTROPHILS # BLD AUTO: 11.14 X10 (3) UL (ref 1.5–7.7)
NEUTROPHILS # BLD AUTO: 11.14 X10(3) UL (ref 1.5–7.7)
NEUTROPHILS NFR BLD AUTO: 67.1 %
PLATELET # BLD AUTO: 275 10(3)UL (ref 150–450)
PLATELET MORPHOLOGY: NORMAL
PLATELETS.RETICULATED NFR BLD AUTO: 1.7 % (ref 0–7)
RBC # BLD AUTO: 2.54 X10(6)UL
WBC # BLD AUTO: 16.6 X10(3) UL (ref 4–11)

## 2024-09-10 PROCEDURE — 99233 SBSQ HOSP IP/OBS HIGH 50: CPT | Performed by: HOSPITALIST

## 2024-09-10 PROCEDURE — 99233 SBSQ HOSP IP/OBS HIGH 50: CPT | Performed by: OTHER

## 2024-09-10 RX ORDER — PREDNISONE 5 MG/1
5 TABLET ORAL
Status: DISCONTINUED | OUTPATIENT
Start: 2024-09-10 | End: 2024-09-11

## 2024-09-10 RX ORDER — DEXTROSE MONOHYDRATE 25 G/50ML
50 INJECTION, SOLUTION INTRAVENOUS
Status: DISCONTINUED | OUTPATIENT
Start: 2024-09-10 | End: 2024-09-12

## 2024-09-10 RX ORDER — NICOTINE POLACRILEX 4 MG
30 LOZENGE BUCCAL
Status: DISCONTINUED | OUTPATIENT
Start: 2024-09-10 | End: 2024-09-12

## 2024-09-10 RX ORDER — NICOTINE POLACRILEX 4 MG
15 LOZENGE BUCCAL
Status: DISCONTINUED | OUTPATIENT
Start: 2024-09-10 | End: 2024-09-12

## 2024-09-10 RX ORDER — DEXTROSE MONOHYDRATE AND SODIUM CHLORIDE 5; .45 G/100ML; G/100ML
INJECTION, SOLUTION INTRAVENOUS CONTINUOUS
Status: DISCONTINUED | OUTPATIENT
Start: 2024-09-10 | End: 2024-09-11

## 2024-09-10 RX ORDER — LAMOTRIGINE 25 MG/1
50 TABLET ORAL 2 TIMES DAILY
Status: DISCONTINUED | OUTPATIENT
Start: 2024-09-10 | End: 2024-09-12

## 2024-09-10 NOTE — PROGRESS NOTES
Patient is a 64 year old single  female retired  of 35 years with a past medical history of hypertension, rheumatoid arthritis, obesity, chronic adrenal insufficiency secondary to chronic corticosteroid use, asthma, pancreatitis, obstructive sleep apnea, obesity, hyperlipidemia, anemia of chronic kidney disease, nonocclusive coronary artery disease, and peripheral arterial disease who also has past mental lorenzo history of anxiety and depression was admitted to the hospital for Altered mental status, unspecified altered mental status type: The patient has been demonstrating continued confusion and irritability with visual and auditory hallucinations.   Psych consult requested for evaluation and advice.     Consult Duration     The patient seen for over 50-minute, follow-up evaluation, over 50% counseling and coordinating care addressing confusion and mood instability.    Record reviewed, communication with attending, communication with RN and patient seen face to face evaluation.    History of Present Illness:   The patient has been compliant in treatment and therapy.    The patient is seen today in the presence of her sister.  Patient talking about physical pain in her legs and using ice pack.    Patient reporting that she has been sleeping with reasonable and her mood is positive.  Patient talking today about waking up with some excitability and little hypomanic feeling.  Patient educated about the bipolar disorder and she agreed that she have it.    Patient reporting that otherwise her cognition has been intact.  Sister is bedside reporting that the patient continue demonstrating improvement otherwise occasional confusion been observed.    The patient reporting that she has been sleeping well and her sleep has been making her feel much better during the day and her mind has been calm.    The patient denying any mood alternation or any confusion and denied any paranoia.  Patient  reported that she is going back to the rehab to focus on her wound and getting better so she can go back to live with her family and enjoyed her life.    The patient denied repeatedly any homicidal or suicidal ideation.  Patient denied any auditory visual hallucination.  Patient denied any side effect to the medication.    Sister at bedside indicating that the patient has been doing really well and she will look \"back to her own self\".    Patient indicated about the treatment plan and encouraged to comply with the medication and follow-up.      Past Psychiatric/Medication History:  1. Prior diagnoses: Depression, Anxiety  2. Past psychiatric inpatient: Denies   3. Past outpatient history: Denies  4. Past suicide history: None  5. Medication history: Denies    Social History:   The patient is a 64 year old  female. She is a retired  of 35 years. She retired and moved to [place] with her daughter. Her health declined since retiring and moving to [place] and moved back to [place] with her daughter in June 2024. She and her daughter moved back in with her eldest sister, Gladys. She was admitted to rehab for decreased mobility and chronic wound care on multiple occasions.     Family History:  Daughter: depression    Medical History:   Past Medical History  Past Medical History:    Allergic rhinitis    Anxiety    Arthritis    RA and Osteoarthritis    Asthma (HCC)    Depression    Not officially diagnosed    Esophageal reflux    Essential hypertension    High blood pressure    High cholesterol    Hyperlipidemia    Myocardial infarction (HCC)    Cardiac event    Osteoarthritis    Pancreatitis (HCC)    Problems with swallowing    RA (rheumatoid arthritis) (HCC)    Sleep apnea       Past Surgical History  Past Surgical History:   Procedure Laterality Date    Colonoscopy      Other surgical history      Revision of uterine anomaly      Rotator cuff repair      Wrist fracture surgery         Family  History  Family History   Problem Relation Age of Onset    Diabetes Mother     Genetic Disease Mother     Heart Disorder Mother     Hypertension Mother     Obesity Mother     Diabetes Father     Hypertension Father     Depression Daughter     Psychiatric Daughter        Social History  Social History     Socioeconomic History    Marital status: Single   Tobacco Use    Smoking status: Never    Smokeless tobacco: Never   Vaping Use    Vaping status: Never Used   Substance and Sexual Activity    Alcohol use: Not Currently    Drug use: Never     Social Determinants of Health     Food Insecurity: No Food Insecurity (9/5/2024)    Food Insecurity     Food Insecurity: Never true   Transportation Needs: No Transportation Needs (9/5/2024)    Transportation Needs     Lack of Transportation: No   Housing Stability: Low Risk  (9/5/2024)    Housing Stability     Housing Instability: No           Current Medications:  Current Facility-Administered Medications   Medication Dose Route Frequency    dextrose 5%-sodium chloride 0.45% infusion   Intravenous Continuous    glucose (Dex4) 15 GM/59ML oral liquid 15 g  15 g Oral Q15 Min PRN    Or    glucose (Glutose) 40% oral gel 15 g  15 g Oral Q15 Min PRN    Or    glucose-vitamin C (Dex-4) chewable tab 4 tablet  4 tablet Oral Q15 Min PRN    Or    dextrose 50% injection 50 mL  50 mL Intravenous Q15 Min PRN    Or    glucose (Dex4) 15 GM/59ML oral liquid 30 g  30 g Oral Q15 Min PRN    Or    glucose (Glutose) 40% oral gel 30 g  30 g Oral Q15 Min PRN    Or    glucose-vitamin C (Dex-4) chewable tab 8 tablet  8 tablet Oral Q15 Min PRN    predniSONE (Deltasone) tab 5 mg  5 mg Oral Daily with dinner    predniSONE (Deltasone) tab 10 mg  10 mg Oral Daily with breakfast    aspirin DR tab 81 mg  81 mg Oral Daily    atorvastatin (Lipitor) tab 10 mg  10 mg Oral Daily    clopidogrel (Plavix) tab 75 mg  75 mg Oral Daily    ergocalciferol (Vitamin D2) cap 50,000 Units  50,000 Units Oral Weekly    ferrous  sulfate DR tab 325 mg  325 mg Oral Daily with breakfast    folic acid (Folvite) tab 1 mg  1 mg Oral Daily    hydroxychloroquine (Plaquenil) tab 200 mg  200 mg Oral BID    ibuprofen (Motrin) tab 600 mg  600 mg Oral Q8H PRN    losartan (Cozaar) tab 25 mg  25 mg Oral Daily    oxybutynin ER (Ditropan-XL) 24 hr tab 10 mg  10 mg Oral Daily    pantoprazole (Protonix) DR tab 40 mg  40 mg Oral BID AC    sodium bicarbonate tab 650 mg  650 mg Oral BID    collagenase (Santyl) 250 UNIT/GM ointment   Topical Daily    haloperidol lactate (Haldol) 5 MG/ML injection 2 mg  2 mg Intravenous Q8H PRN    QUEtiapine (SEROquel) tab 50 mg  50 mg Oral Nightly    lamoTRIgine (LaMICtal) tab 25 mg  25 mg Oral BID    metoprolol tartrate (Lopressor) tab 25 mg  25 mg Oral BID    enoxaparin (Lovenox) 40 MG/0.4ML SUBQ injection 40 mg  40 mg Subcutaneous Daily    acetaminophen (Tylenol Extra Strength) tab 500 mg  500 mg Oral Q4H PRN    ondansetron (Zofran) 4 MG/2ML injection 4 mg  4 mg Intravenous Q6H PRN    OLANZapine (Zyprexa) 10 mg in sterile water for injection (PF) IM injection  10 mg Intramuscular Q8H PRN    HYDROcodone-acetaminophen (Norco)  MG per tab 1 tablet  1 tablet Oral Q4H PRN     Medications Prior to Admission   Medication Sig    cefpodoxime 200 MG Oral Tab Take 2 tablets (400 mg total) by mouth 2 (two) times daily for 10 days.    metoprolol tartrate 50 MG Oral Tab Take 0.5 tablets (25 mg total) by mouth 2 (two) times daily.    losartan 50 MG Oral Tab Take 0.5 tablets (25 mg total) by mouth daily.    predniSONE 10 MG Oral Tab Take 1 tablet (10 mg total) by mouth daily with breakfast.    sodium bicarbonate 650 MG Oral Tab Take 1 tablet (650 mg total) by mouth 2 (two) times daily.    Copper (COPPERMIN) 5 MG Oral Tab Take 1 tablet by mouth daily.    Ferrous Gluconate 324 (38 Fe) MG Oral Tab Take 1 tablet (325 mg total) by mouth daily with breakfast.    hydroxychloroquine 200 MG Oral Tab Take 1 tablet (200 mg total) by mouth 2 (two)  times daily.    Aspirin 81 MG Oral Cap Take 81 mg by mouth daily.    atorvastatin 10 MG Oral Tab Take 1 tablet (10 mg total) by mouth daily.    clopidogrel 75 MG Oral Tab Take 1 tablet (75 mg total) by mouth daily.    ergocalciferol 1.25 MG (61740 UT) Oral Cap Take 1 capsule (50,000 Units total) by mouth once a week.    oxybutynin ER 10 MG Oral Tablet 24 Hr Take 1 tablet (10 mg total) by mouth daily.    Potassium Chloride ER 10 MEQ Oral Tab CR Take 1 tablet (10 mEq total) by mouth 2 (two) times daily.    folic acid 1 MG Oral Tab Take 1 tablet (1 mg total) by mouth daily.    clotrimazole-betamethasone 1-0.05 % External Cream Apply 1 Application topically 2 (two) times daily. Apply to groin and abdominal folds    Acetaminophen ER (ACETAMINOPHEN 8 HOUR) 650 MG Oral Tab CR Take 2-3 tablets (1,300-1,950 mg total) by mouth every 8 (eight) hours as needed for Pain.    ibuprofen 200 MG Oral Tab Take 3 tablets (600 mg total) by mouth every 8 (eight) hours as needed for Pain.    albuterol 108 (90 Base) MCG/ACT Inhalation Aero Soln Inhale 2 puffs into the lungs every 4 to 6 hours as needed for Wheezing.    [] pantoprazole 40 MG Oral Tab EC Take 1 tablet (40 mg total) by mouth 2 (two) times daily before meals.    methotrexate 2.5 MG Oral Tab Take 1 tablet (2.5 mg total) by mouth once a week. (Patient not taking: Reported on 2024)       Allergies  Allergies   Allergen Reactions    Cephalosporins ANAPHYLAXIS, NAUSEA AND VOMITING and OTHER (SEE COMMENTS)     Other reaction(s): Fever    - Tolerated multiple doses of ceftriaxone on 2024 without any complications  - Tolerated multiple doses of cefepime 2024 without complications    Gadolinium Derivatives FEVER    Penicillins OTHER (SEE COMMENTS)     esophagitis    Sulfa Antibiotics OTHER (SEE COMMENTS)     esophagitis    Bananas ITCHING       Review of Systems:   As by Admitting/Attending    Results:   Laboratory Data:  Lab Results   Component Value Date    WBC 16.6  (H) 09/10/2024    HGB 7.4 (L) 09/10/2024    HCT 24.4 (L) 09/10/2024    .0 09/10/2024    CREATSERUM 0.71 09/09/2024    BUN 14 09/09/2024     09/09/2024    K 4.0 09/09/2024     09/09/2024    CO2 20.0 (L) 09/09/2024    GLU 86 09/09/2024    CA 8.8 09/09/2024    ALB 3.1 (L) 09/05/2024    ALKPHO 140 (H) 09/05/2024    TP 5.5 (L) 09/05/2024    AST 38 (H) 09/05/2024    ALT 33 09/05/2024    LIP 38 09/05/2024    DDIMER 1.69 (H) 07/23/2024    MG 1.9 09/06/2024    PHOS 2.9 08/21/2024    CK 50 08/27/2024    B12 748 08/21/2024    ETOH <3 09/05/2024         Imaging:  No results found.    Vital Signs:   Blood pressure 125/83, pulse 91, temperature 98.2 °F (36.8 °C), resp. rate 16, height 62\", weight 82.2 kg (181 lb 3.2 oz), SpO2 100%.    Mental Status Exam:   Appearance: Stated age 64, in hospital gown, laying down in hospital bed.  Sister at bedside  Psychomotor: No psychomotor agitation or retardation presented.  Orientation: Alert and oriented to person, place, condition and date.  Gait: Not evaluated.  Attitude/Coorperation: Patient presented cooperative and attentive in the pleasant.  Behavior: Appropriate behavior  Speech: Regular rate and rhythm speech.  Mood: Reporting feeling much better  Affect: Slightly expanded affect but appropriate.  Thought process: Appropriate thought process with no distortion observed.  Thought content: Denied any Hemosol or suicidal ideation  Perceptions: Not any auditory visual hallucination.  Otherwise sister indicate some confusion at times but significantly less than before  Concentration: Appropriate concentration.  Memory: Fair  Intellect: Average.  Judgment and Insight: Questionable.     Impression:     Delirium due another medical condition.  Resolved  Moderate bipolar I disorder, current or most recent episode depressed, with psychotic features, with mixed features (HCC)         The patient is a 64 year old  female with a past psychiatric history of  depression and anxiety.     The patient has been demonstrating delirium episodes with alternation in mood and cognition with episodes of increased confusion, restlessness, agitation and response to internal stimuli. T  he patient is also demonstrating acute and hostile episodes of irritability. There has been need to use chemical and physical restraints to maintain patient and staff.     Per the patient and corrobarating information from the sister, the patient has been suffering from a lifetime of mood swings, irritability, and intermittent psychotic episodes. Currently the patient is suffering from acute delirium due to possible undiagnosed bipolar disease.     9/8/2024: The patient has been demonstrating significant improvement in her cognition, demeanor and behavior.    9/10/2024: The patient continued demonstrating improvement otherwise some occasional delirious process and some excitability at time.    Discussed risk and benefit, acknowledging the current symptom and severity.  At this point, I would recommend the following approach:     Focus on safety  Focus on education and support.  Focus on insight orientation helping the patient understand diagnosis and treatment plan.  Continue Seroquel 50 mg nightly.  Increase lamotrigine to 50 mg twice daily.  Discontinue Haldol.  Patient appropriate to return to [facility] [place].  Coordinate plan with team    Orders This Visit:  Orders Placed This Encounter   Procedures    CBC With Differential With Platelet    Basic Metabolic Panel (8)    Hepatic Function Panel (7)    Lipase    Ethyl Alcohol    Drug screen 8 w/out confirmation, urine    Urinalysis, Routine    Magnesium    Basic Metabolic Panel (8)    CBC With Differential With Platelet    Magnesium    RBC Morphology Scan    CBC With Differential With Platelet    Basic Metabolic Panel (8)    Basic Metabolic Panel (8)    CBC With Differential With Platelet    CBC With Differential With Platelet    Basic Metabolic Panel (8)    CBC  With Differential With Platelet    Scan slide    RBC Morphology Scan    Urinalysis with Culture Reflex    Basic Metabolic Panel (8)    CBC With Differential With Platelet    Rapid SARS-CoV-2 by PCR       Meds This Visit:  Requested Prescriptions      No prescriptions requested or ordered in this encounter       Hudson Boles MD  9/10/2024    Note to Patient: The 21st Century Cures Act makes medical notes like these available to patients in the interest of transparency. However, be advised this is a medical document. It is intended as peer to peer communication. It is written in medical language and may contain abbreviations or verbiage that are unfamiliar. It may appear blunt or direct. Medical documents are intended to carry relevant information, facts as evident, and the clinical opinion of the practitioner. This note may have been transcribed using a voice dictation system. Voice recognition errors may occur. This should not be taken to alter the content or meaning of this note.

## 2024-09-10 NOTE — PROGRESS NOTES
[facility]  part of [facility]    Progress Note    Sheri Garzon Patient Status:  Inpatient    1960 MRN [id number]   Location [facility] 5SW/SE Attending Purnima Herrera, DO   Hosp Day # 7 PCP TERI MCKENNA MD     Subjective:  Feels okay   Blood sugars slightly low this morning    A comprehensive 10 point review of systems was completed.  Pertinent positives and negatives noted in the the HPI.    Objective/Physical Exam:  Physical Exam:   Vital Signs:  Blood pressure 93/68, pulse 100, temperature 98.5 °F (36.9 °C), temperature source Axillary, resp. rate 18, height 5' 2\" (1.575 m), weight 181 lb 3.2 oz (82.2 kg), SpO2 100%.    Labs:  Pertinent data reviewed    Assessment/Plan:  Patient Active Problem List   Diagnosis    Dehydration    Metabolic acidosis    Bilious vomiting with nausea    Difficult intravenous access    Hypokalemia    Hypomagnesemia    Hiatal hernia    Cellulitis of right lower extremity    Cellulitis    RTA (renal tubular acidosis)    Adrenal insufficiency ([place]'s disease) (HCC)    Current chronic use of systemic steroids    Neutropenia (HCC)    Anemia of infection    Weakness generalized    Leg edema    Adrenal insufficiency (HCC)    Altered mental status, unspecified altered mental status type    Acute psychosis (HCC)    Moderate bipolar I disorder, current or most recent episode depressed, with psychotic features, with mixed features (HCC)       Patient is a 64-year-old female with history of long-term steroid use  She presented with confusion and hypoglycemia  She was started on dextrose and hydrocortisone  Per patient she was not given regular steroid doses at the rehab facility  She was doing better sugars have been on the lower side     Plan:    Prednisone 20 mg in the morning and 10 mg in the evening for 3 days  Prednisone 10 mg in the morning and 10 mg in the evening after that  We will need titration as outpatient  Recommend that she follow-up  with her primary care physician in the next 1 week.  If patient would like to follow-up in the endocrinology clinic recommend that she call [phone] to schedule an appointment she should speak to the endocrinology nurse to facilitate a timely appointment  Also recommend a nutrition consult  Accuchecks pre hypoglycemia protocol in addition to ac and hs  Hypoglycemia protocol     Ana Miller MD

## 2024-09-10 NOTE — CM/SW NOTE
Pt discussed in RN DC rounds. SW sent updated clinicals via aidin. SW sent PT note to [facility], Liaison aware to start auth.     [id number]- pt received insurance auth approved 9/10-9/13.     Plan: Pending medical clearance, DC to [facility].     SW/AMANDA to remain available for support and/or discharge planning.     Quyen Hdz, MSW, LSW   x [phone]

## 2024-09-10 NOTE — OCCUPATIONAL THERAPY NOTE
OCCUPATIONAL THERAPY TREATMENT NOTE - INPATIENT        Room Number: 565/565-A     Presenting Problem: AMS, acute psychosis    Problem List  Principal Problem:    Altered mental status, unspecified altered mental status type  Active Problems:    Acute psychosis (HCC)    Moderate bipolar I disorder, current or most recent episode depressed, with psychotic features, with mixed features (HCC)      OCCUPATIONAL THERAPY ASSESSMENT   Patient demonstrates limited progress this session, goals remain in progress.    Patient is requiring stand-by assist, minimal assist, and moderate assist as a result of the following impairments: decreased functional strength, decreased endurance, decreased muscular endurance, and decreased safety awareness.    Patient continues to function below baseline with toileting, lower body dressing, grooming, bed mobility, transfers, and functional standing tolerance.  Next session anticipate patient to progress bed mobility, transfers, and functional standing tolerance.  Occupational Therapy will continue to follow patient for duration of hospitalization.    Patient continues to benefit from continued skilled OT services: to promote return to prior level of function and safety with continuous assistance and gradual rehabilitative therapy.     PLAN  OT Treatment Plan: Balance activities;Energy conservation/work simplification techniques;ADL training;Functional transfer training;Endurance training;Patient/Family education;Patient/Family training;Equipment eval/education;Compensatory technique education  OT Device Recommendations: None    SUBJECTIVE  \"I have been waiting for pain meds\"     OBJECTIVE  Precautions: Bed/chair alarm         PAIN ASSESSMENT  Rating: 10  Location: legs  Management Techniques: Activity promotion; Repositioning    ACTIVITY TOLERANCE           BP: (!) 132/92  BP Location: Right arm  BP Method: Automatic  Patient Position: Sitting         ACTIVITIES OF DAILY LIVING  ASSESSMENT  AM-PAC ‘6-Clicks’ Inpatient Daily Activity Short Form  How much help from another person does the patient currently need…  -   Putting on and taking off regular lower body clothing?: A Lot  -   Bathing (including washing, rinsing, drying)?: A Lot  -   Toileting, which includes using toilet, bedpan or urinal? : A Lot  -   Putting on and taking off regular upper body clothing?: A Little  -   Taking care of personal grooming such as brushing teeth?: A Little  -   Eating meals?: A Little    AM-PAC Score:  Score: 15  Approx Degree of Impairment: 56.46%  Standardized Score (AM-PAC Scale): 34.69  CMS Modifier (G-Code): CK    FUNCTIONAL TRANSFER ASSESSMENT  Sit to Stand: Edge of Bed  Edge of Bed: Moderate Assist    BED MOBILITY  Rolling: Contact Guard Assist  Supine to Sit : Moderate Assist  Sit to Supine (OT): Moderate Assist       FUNCTIONAL ADL ASSESSMENT  Grooming Seated: Stand-by Assist         Skilled Therapy Provided:   Pt received in bed and requiring encouragement to participate in therapy. Pt stating she has 10/10 pain in BLE. Pt willing to sit EOB to complete grooming with encouragement. Pt demonstrating self-limiting behavior throughout session but ultimately participating with lots of cues.     Pt required mod assist to complete bed mobility to sit EOB for BLE management due to decreased strength. Pt demonstrated fair static sitting balance requiring SBA for safety. Pt completed grooming (face washing, oral care) seated EOB with SBA. Pt completed 2 STS from EOB with mod assist (1st trial) and min assist (2nd trial) with RW in preparation for functional transfers. Pt tolerated ~10 seconds of static standing each trial. Pt completed transfer to rolling chair with min assist, taking ~3 side steps without use of ad. Pt demonstrated no SOB or LOB at any point during session. Pt would benefit from standing grooming tasks to increase endurance for ADLs at discharge and further functional transfer training  to prepare for toilet transfers.       EDUCATION PROVIDED  Patient: Role of Occupational Therapy; Plan of Care; Functional Transfer Techniques; Posture/Positioning; Other (importance of getting out of bed)  Patient's Response to Education: Verbalized Understanding; Returned Demonstration    The patient's Approx Degree of Impairment: 56.46% has been calculated based on documentation in the [facility] '6 clicks' Inpatient Daily Activity Short Form.  Research supports that patients with this level of impairment may benefit from rehab.  Final disposition will be made by interdisciplinary medical team.    Patient End of Session: Needs met;With [facility] staff;In bathroom - nursing staff aware    OT Goals:  Patients self stated goal is: get back and finish rehab      Patient will complete functional transfer with Mod A  Comment: ongoing    Patient will complete bed mobility from pressure relief and supine ADls with Min A  Comment: ongoing     Patient will tolerate standing for 2 minutes in prep for adls with CGA   Comment:ongoing     Comment:          Goals  on: 24  Frequency: 3-5x/week    OT Session Time: 30 minutes  Self-Care Home Management: 30 minutes

## 2024-09-10 NOTE — PROGRESS NOTES
[facility]  part of [facility]    Progress Note    Sheri Garzon Patient Status:  Inpatient    1960 MRN [id number]   Location [facility] 5SW/SE Attending Purnima Herrear, DO   Hosp Day # 5 PCP TERI MCKENNA MD     Chief complaint delerium     Subjective:   Sheri Garzon is a(n) 64 year old female pt with nausea and dizziness this am. Blood sugar low again     ROS:   No cp, sob   No c/d   No n/v     Objective:   Blood pressure 98/66, pulse 91, temperature 98.2 °F (36.8 °C), temperature source Oral, resp. rate 16, height 5' 2\" (1.575 m), weight 181 lb 3.2 oz (82.2 kg), SpO2 97%.      Intake/Output Summary (Last 24 hours) at 9/10/2024 151  Last data filed at 9/10/2024 0524  Gross per 24 hour   Intake 120 ml   Output 0 ml   Net 120 ml       Patient Weight(s) for the past 336 hrs:   Weight   24 181 lb 3.2 oz (82.2 kg)   24 1232 178 lb (80.7 kg)           General appearance: alert, appears stated age and cooperative  Pulmonary:  clear to auscultation bilaterally  Cardiovascular: S1, S2 normal, no murmur, click, rub or gallop, regular rate and rhythm  Abdominal: soft, non-tender; bowel sounds normal; no masses,  no organomegaly  Extremities: wound on right leg - no erythema         Medicines:     Current Facility-Administered Medications   Medication Dose Route Frequency    dextrose 5%-sodium chloride 0.45% infusion   Intravenous Continuous    glucose (Dex4) 15 GM/59ML oral liquid 15 g  15 g Oral Q15 Min PRN    Or    glucose (Glutose) 40% oral gel 15 g  15 g Oral Q15 Min PRN    Or    glucose-vitamin C (Dex-4) chewable tab 4 tablet  4 tablet Oral Q15 Min PRN    Or    dextrose 50% injection 50 mL  50 mL Intravenous Q15 Min PRN    Or    glucose (Dex4) 15 GM/59ML oral liquid 30 g  30 g Oral Q15 Min PRN    Or    glucose (Glutose) 40% oral gel 30 g  30 g Oral Q15 Min PRN    Or    glucose-vitamin C (Dex-4) chewable tab 8 tablet  8 tablet Oral Q15 Min PRN    predniSONE  (Deltasone) tab 5 mg  5 mg Oral Daily with dinner    predniSONE (Deltasone) tab 10 mg  10 mg Oral Daily with breakfast    aspirin DR tab 81 mg  81 mg Oral Daily    atorvastatin (Lipitor) tab 10 mg  10 mg Oral Daily    clopidogrel (Plavix) tab 75 mg  75 mg Oral Daily    ergocalciferol (Vitamin D2) cap 50,000 Units  50,000 Units Oral Weekly    ferrous sulfate DR tab 325 mg  325 mg Oral Daily with breakfast    folic acid (Folvite) tab 1 mg  1 mg Oral Daily    hydroxychloroquine (Plaquenil) tab 200 mg  200 mg Oral BID    ibuprofen (Motrin) tab 600 mg  600 mg Oral Q8H PRN    losartan (Cozaar) tab 25 mg  25 mg Oral Daily    oxybutynin ER (Ditropan-XL) 24 hr tab 10 mg  10 mg Oral Daily    pantoprazole (Protonix) DR tab 40 mg  40 mg Oral BID AC    sodium bicarbonate tab 650 mg  650 mg Oral BID    collagenase (Santyl) 250 UNIT/GM ointment   Topical Daily    haloperidol lactate (Haldol) 5 MG/ML injection 2 mg  2 mg Intravenous Q8H PRN    QUEtiapine (SEROquel) tab 50 mg  50 mg Oral Nightly    lamoTRIgine (LaMICtal) tab 25 mg  25 mg Oral BID    metoprolol tartrate (Lopressor) tab 25 mg  25 mg Oral BID    enoxaparin (Lovenox) 40 MG/0.4ML SUBQ injection 40 mg  40 mg Subcutaneous Daily    acetaminophen (Tylenol Extra Strength) tab 500 mg  500 mg Oral Q4H PRN    ondansetron (Zofran) 4 MG/2ML injection 4 mg  4 mg Intravenous Q6H PRN    OLANZapine (Zyprexa) 10 mg in sterile water for injection (PF) IM injection  10 mg Intramuscular Q8H PRN    HYDROcodone-acetaminophen (Norco)  MG per tab 1 tablet  1 tablet Oral Q4H PRN           Ant-Infective Medications            cefpodoxime 200 MG Oral Tab    hydroxychloroquine 200 MG Oral Tab                Blood Pressure and Cardiac Medications            metoprolol tartrate 50 MG Oral Tab    losartan 50 MG Oral Tab             dextrose 5%-sodium chloride 0.45% 75 mL/hr at 09/10/24 1110             Lab Results   Component Value Date    WBC 16.6 (H) 09/10/2024    HGB 7.4 (L) 09/10/2024     HCT 24.4 (L) 09/10/2024    .0 09/10/2024    CREATSERUM 0.71 09/09/2024    BUN 14 09/09/2024     09/09/2024    K 4.0 09/09/2024     09/09/2024    CO2 20.0 (L) 09/09/2024    GLU 86 09/09/2024    CA 8.8 09/09/2024    ALB 3.1 (L) 09/05/2024    ALKPHO 140 (H) 09/05/2024    BILT 0.7 09/05/2024    TP 5.5 (L) 09/05/2024    AST 38 (H) 09/05/2024    ALT 33 09/05/2024    LIP 38 09/05/2024    DDIMER 1.69 (H) 07/23/2024    MG 1.9 09/06/2024    PHOS 2.9 08/21/2024    CK 50 08/27/2024    B12 748 08/21/2024    ETOH <3 09/05/2024       No results found.        Results:     CBC:    Lab Results   Component Value Date    WBC 16.6 (H) 09/10/2024    WBC 20.5 (H) 09/09/2024    WBC 24.2 (H) 09/08/2024     Lab Results   Component Value Date    HGB 7.4 (L) 09/10/2024    HGB 8.7 (L) 09/09/2024    HGB 7.3 (L) 09/08/2024      Lab Results   Component Value Date    .0 09/10/2024    .0 09/09/2024    .0 09/08/2024       Recent Labs   Lab 09/07/24  0633 09/08/24  0728 09/09/24  1416   * 88 86   BUN 8* 9 14   CREATSERUM 0.55 0.57 0.71   CA 8.2* 8.2* 8.8    139 141   K 4.1 4.0 4.0    112 111   CO2 20.0* 21.0 20.0*         @[email or web address]@    Assessment and Plan:         ASSESSMENT:    1.       Acute encephalopathy, suspect acute psychosis considering visual and auditory hallucinations accompanied by bizarre behavior.  - apprec psych consult - discussed with dr aldurra - haldol and seroquel added . Likely bipolar   - much better     2.       Minimal low-grade fever with leukocytosis.  So far no clear indication of an infectious process.  Will continue to monitor her temperature curve.   Wound service consult to evaluate her right foot dorsum superficial abrasion.    - sp iv cefepime for right leg wound   - apprec ID consult - stop today. Monitor off abx   - wbc up today likely due to steroids     3.       Rheumatoid arthritis.  4. Adrenal insuff with hypoglycemia - pt 's prednisone stopped  at rehab for last 5 days. Will start hydrocortisone for crisis and Dr Miller to see. Apprec input   - apprec endo - hydrocortisone 25 x 1 -> restart prednisone today -> hypoglycemia again today. Discussed with Dr Miller. Will raise prednisone to bid and back on d5 for now       To rehab in 1-2 days if improved         Purnima Herrera, DO         Chart reviewed, including current vitals, notes, labs and imaging  Labs ordered and medications adjusted as outlined above  Coordinate care with care team/consultants  Discussed with patient results of tests, management plan as outlined above, and the need for ongoing hospitalization  D/w RN     MDM high

## 2024-09-10 NOTE — PLAN OF CARE
Problem: Safety Risk - Non-Violent Restraints  Goal: Patient will remain free from self-harm  Description: INTERVENTIONS:  - Apply the least restrictive restraint to prevent harm  - Notify patient and family of reasons restraints applied  - Assess for any contributing factors to confusion (electrolyte disturbances, delirium, medications)  - Discontinue any unnecessary medical devices as soon as possible  - Assess the patient's physical comfort, circulation, skin condition, hydration, nutrition and elimination needs   - Reorient and redirection as needed  - Assess for the need to continue restraints  Outcome: Progressing     Problem: Patient Centered Care  Goal: Patient preferences are identified and integrated in the patient's plan of care  Description: Interventions:  - What would you like us to know as we care for you?   - Provide timely, complete, and accurate information to patient/family  - Incorporate patient and family knowledge, values, beliefs, and cultural backgrounds into the planning and delivery of care  - Encourage patient/family to participate in care and decision-making at the level they choose  - Honor patient and family perspectives and choices  Outcome: Progressing     Problem: Patient/Family Goals  Goal: Patient/Family Long Term Goal  Description: Patient's Long Term Goal:     Interventions:  -   - See additional Care Plan goals for specific interventions  Outcome: Progressing  Goal: Patient/Family Short Term Goal  Description: Patient's Short Term Goal:     Interventions:   -   - See additional Care Plan goals for specific interventions  Outcome: Progressing     Problem: CARDIOVASCULAR - ADULT  Goal: Maintains optimal cardiac output and hemodynamic stability  Description: INTERVENTIONS:  - Monitor vital signs, rhythm, and trends  - Monitor for bleeding, hypotension and signs of decreased cardiac output  - Evaluate effectiveness of vasoactive medications to optimize hemodynamic stability  -  Monitor arterial and/or venous puncture sites for bleeding and/or hematoma  - Assess quality of pulses, skin color and temperature  - Assess for signs of decreased coronary artery perfusion - ex. Angina  - Evaluate fluid balance, assess for edema, trend weights  Outcome: Progressing  Goal: Absence of cardiac arrhythmias or at baseline  Description: INTERVENTIONS:  - Continuous cardiac monitoring, monitor vital signs, obtain 12 lead EKG if indicated  - Evaluate effectiveness of antiarrhythmic and heart rate control medications as ordered  - Initiate emergency measures for life threatening arrhythmias  - Monitor electrolytes and administer replacement therapy as ordered  Outcome: Progressing     Problem: SKIN/TISSUE INTEGRITY - ADULT  Goal: Skin integrity remains intact  Description: INTERVENTIONS  - Assess and document risk factors for pressure ulcer development  - Assess and document skin integrity  - Monitor for areas of redness and/or skin breakdown  - Initiate interventions, skin care algorithm/standards of care as needed  Outcome: Progressing  Goal: Incision(s), wounds(s) or drain site(s) healing without S/S of infection  Description: INTERVENTIONS:  - Assess and document risk factors for pressure ulcer development  - Assess and document skin integrity  - Assess and document dressing/incision, wound bed, drain sites and surrounding tissue  - Implement wound care per orders  - Initiate isolation precautions as appropriate  - Initiate Pressure Ulcer prevention bundle as indicated  Outcome: Progressing  Goal: Oral mucous membranes remain intact  Description: INTERVENTIONS  - Assess oral mucosa and hygiene practices  - Implement preventative oral hygiene regimen  - Implement oral medicated treatments as ordered  Outcome: Progressing     Problem: CONFUSION  Goal: Confusion, delirium, dementia or psychosis is improved or at baseline  Description: INTERVENTIONS:  - Assess for possible contributors to thought  disturbance, including medications, impaired vision or hearing, underlying metabolic abnormalities, dehydration, psychiatric diagnoses, and notify attending MD/LIP  - Hendricks high risk fall precautions, as indicated  - Provide frequent short contacts to provide reality reorientation, refocusing and direction  - Decrease environmental stimuli, including noise as appropriate  - Monitor and intervene to maintain adequate nutrition, hydration, elimination, sleep and activity  - Consider the need for a sitter if unable to leave patient unattended  - Initiate Spiritual Care consult as indicated  - Consult Pharmacy as needed  Outcome: Progressing

## 2024-09-10 NOTE — PROGRESS NOTES
Patient hypoglycemic 65, no prn protocol ( dextrose available)   Administered 4oz of juice, tammie hospitalist and endocrinology   Endo ordered dextrose 5% 0.45 NS continous 75 ml.   Recheck is 75.

## 2024-09-10 NOTE — PLAN OF CARE
Problem: Patient Centered Care  Goal: Patient preferences are identified and integrated in the patient's plan of care  Description: Interventions:  - What would you like us to know as we care for you?   - Provide timely, complete, and accurate information to patient/family  - Incorporate patient and family knowledge, values, beliefs, and cultural backgrounds into the planning and delivery of care  - Encourage patient/family to participate in care and decision-making at the level they choose  - Honor patient and family perspectives and choices  Outcome: Not Progressing     Problem: Patient/Family Goals  Goal: Patient/Family Long Term Goal  Description: Patient's Long Term Goal:     Interventions:    - See additional Care Plan goals for specific interventions  Outcome: Not Progressing  Goal: Patient/Family Short Term Goal  Description: Patient's Short Term Goal:     Interventions:     - See additional Care Plan goals for specific interventions  Outcome: Not Progressing

## 2024-09-10 NOTE — CONSULTS
[facility]  part of [facility] ID CONSULT NOTE    Sheri Garzon Patient Status:  Inpatient    1960 MRN [id number]   Location [facility] 5SW/SE Attending Purnima Herrera, DO   Hosp Day # 5 PCP TERI MCKENNA MD       Reason for Consultation:  Leukocytosis    ASSESSMENT:    Antibiotics: IV cefepime    # Adrenal crisis after not receiving steroids at SNF - improved  # Delirium  # Acute leukocytosis - suspect reactive w/o signs of active infection    # Recent Neisseria species bacteremia ?source - resolved     # Recent RLE cellulitis with posterior leg wound in immunocompromised patient - improved               -s/p bedside debridement 8/15/24               -s/p linezolid -> daptomycin course, dc      # Rheumatoid arthritis, on 20 mg prednisone daily, methotrexate, HCQ     # Adrenal insufficiency - Endocrine following; steroids      PLAN:    -  discontinue IV cefepime and monitor off abx  -  remove midline prior to discharge  -  local wound care RLE  -  Follow fever curve, wbc  -  Reviewed labs, micro, imaging reports, available old records  -  d/w patient, RN, Primary    History of Present Illness:  Sheri Garzon is a a(n) 64 year old female. This 64-year-old female with history of known arthritis on chronic prednisone 20 mg daily, methotrexate, hydroxychloroquine.  Also with adrenal insufficiency on steroids.  Was seen recently almost a month ago for right lower extremity cellulitis with a wound status post bedside debridement.  Treated with daptomycin and then linezolid.  Also incidentally found to have Neisseria species bacteremia from unclear source.  Discharged to rehab facility for deconditioning and now returns with altered mentation with hallucinations Tmax 99, WBC initially 16.  Also found to be hypoglycemic and given stress dose steroids, dextrose.  Per notes patient was not given steroid doses at the rehab facility.  Started on IV cefepime here and  steroids with transient increase in WBC which is now downtrending.  Chest x-ray with no focal opacity and CT head without acute process.  No cultures available.    History:  Past Medical History:    Allergic rhinitis    Anxiety    Arthritis    RA and Osteoarthritis    Asthma (HCC)    Depression    Not officially diagnosed    Esophageal reflux    Essential hypertension    High blood pressure    High cholesterol    Hyperlipidemia    Myocardial infarction (HCC)    Cardiac event    Osteoarthritis    Pancreatitis (HCC)    Problems with swallowing    RA (rheumatoid arthritis) (HCC)    Sleep apnea     Past Surgical History:   Procedure Laterality Date    Colonoscopy      Other surgical history      Revision of uterine anomaly      Rotator cuff repair      Wrist fracture surgery       Family History   Problem Relation Age of Onset    Diabetes Mother     Genetic Disease Mother     Heart Disorder Mother     Hypertension Mother     Obesity Mother     Diabetes Father     Hypertension Father     Depression Daughter     Psychiatric Daughter       reports that she has never smoked. She has never used smokeless tobacco. She reports that she does not currently use alcohol. She reports that she does not use drugs.    Allergies:  Allergies   Allergen Reactions    Cephalosporins ANAPHYLAXIS, NAUSEA AND VOMITING and OTHER (SEE COMMENTS)     Other reaction(s): Fever    - Tolerated multiple doses of ceftriaxone on 7/2024 without any complications  - Tolerated multiple doses of cefepime 8/2024 without complications    Gadolinium Derivatives FEVER    Penicillins OTHER (SEE COMMENTS)     esophagitis    Sulfa Antibiotics OTHER (SEE COMMENTS)     esophagitis    Bananas ITCHING       Medications:    Current Facility-Administered Medications:     dextrose 5%-sodium chloride 0.45% infusion, , Intravenous, Continuous    glucose (Dex4) 15 GM/59ML oral liquid 15 g, 15 g, Oral, Q15 Min PRN **OR** glucose (Glutose) 40% oral gel 15 g, 15 g, Oral, Q15  Min PRN **OR** glucose-vitamin C (Dex-4) chewable tab 4 tablet, 4 tablet, Oral, Q15 Min PRN **OR** dextrose 50% injection 50 mL, 50 mL, Intravenous, Q15 Min PRN **OR** glucose (Dex4) 15 GM/59ML oral liquid 30 g, 30 g, Oral, Q15 Min PRN **OR** glucose (Glutose) 40% oral gel 30 g, 30 g, Oral, Q15 Min PRN **OR** glucose-vitamin C (Dex-4) chewable tab 8 tablet, 8 tablet, Oral, Q15 Min PRN    predniSONE (Deltasone) tab 5 mg, 5 mg, Oral, Daily with dinner    predniSONE (Deltasone) tab 10 mg, 10 mg, Oral, Daily with breakfast    aspirin DR tab 81 mg, 81 mg, Oral, Daily    atorvastatin (Lipitor) tab 10 mg, 10 mg, Oral, Daily    clopidogrel (Plavix) tab 75 mg, 75 mg, Oral, Daily    ergocalciferol (Vitamin D2) cap 50,000 Units, 50,000 Units, Oral, Weekly    ferrous sulfate DR tab 325 mg, 325 mg, Oral, Daily with breakfast    folic acid (Folvite) tab 1 mg, 1 mg, Oral, Daily    hydroxychloroquine (Plaquenil) tab 200 mg, 200 mg, Oral, BID    ibuprofen (Motrin) tab 600 mg, 600 mg, Oral, Q8H PRN    losartan (Cozaar) tab 25 mg, 25 mg, Oral, Daily    oxybutynin ER (Ditropan-XL) 24 hr tab 10 mg, 10 mg, Oral, Daily    pantoprazole (Protonix) DR tab 40 mg, 40 mg, Oral, BID AC    sodium bicarbonate tab 650 mg, 650 mg, Oral, BID    ceFEPIme (Maxipime) 1 g in sodium chloride 0.9% 100 mL IVPB-MBP, 1 g, Intravenous, Q8H    collagenase (Santyl) 250 UNIT/GM ointment, , Topical, Daily    haloperidol lactate (Haldol) 5 MG/ML injection 2 mg, 2 mg, Intravenous, Q8H PRN    QUEtiapine (SEROquel) tab 50 mg, 50 mg, Oral, Nightly    lamoTRIgine (LaMICtal) tab 25 mg, 25 mg, Oral, BID    metoprolol tartrate (Lopressor) tab 25 mg, 25 mg, Oral, BID    enoxaparin (Lovenox) 40 MG/0.4ML SUBQ injection 40 mg, 40 mg, Subcutaneous, Daily    acetaminophen (Tylenol Extra Strength) tab 500 mg, 500 mg, Oral, Q4H PRN    ondansetron (Zofran) 4 MG/2ML injection 4 mg, 4 mg, Intravenous, Q6H PRN    OLANZapine (Zyprexa) 10 mg in sterile water for injection (PF) IM  injection, 10 mg, Intramuscular, Q8H PRN    HYDROcodone-acetaminophen (Norco)  MG per tab 1 tablet, 1 tablet, Oral, Q4H PRN    Review of Systems:  Per HPI    Physical Exam:  Vital signs: Blood pressure 98/66, pulse 91, temperature 98.2 °F (36.8 °C), temperature source Oral, resp. rate 16, height 157.5 cm (5' 2\"), weight 181 lb 3.2 oz (82.2 kg), SpO2 97%.    General: Alert, oriented, NAD  HEENT: Moist mucous membranes. EOMI  Neck: No lymphadenopathy.  Supple.  Cardiovascular: No chest wall tenderness  Respiratory: Symmetric expansion  Abdomen: Soft, nontender, nondistended.   Musculoskeletal: Improved RLE edema  Integument: R calf wound c/d/I; no erythema or warmth; wound images reviewed  LUE midline    Laboratory Data: Reviewed in EMR    Microbiology: Reviewed in EMR    Radiology: Reviewed    Thank you for allowing us to participate in the care of this patient. Please do not hesitate to call if you have any questions.     We will continue to follow with you and will make further recommendations based on his progress.    Yaw Yao MD   [facility]  [phone]  9/10/2024

## 2024-09-10 NOTE — PAYOR COMM NOTE
--------------  CONTINUED STAY REVIEW    Payor: [employer]  Subscriber #:  [id number]  Authorization Number: [id number]    Admit date: 9/5/24  Admit time:  7:23 PM    REVIEW DOCUMENTATION:   9-9-24       General appearance: alert, appears stated age and cooperative  Pulmonary:  clear to auscultation bilaterally  Cardiovascular: S1, S2 normal, no murmur, click, rub or gallop, regular rate and rhythm  Abdominal: soft, non-tender; bowel sounds normal; no masses,  no organomegaly  Extremities: wound on right leg - no erythema      Lab Results   Component Value Date     WBC 20.5 (H) 09/09/2024     HGB 8.7 (L) 09/09/2024     HCT 28.9 (L) 09/09/2024     .0 09/09/2024     CREATSERUM 0.71 09/09/2024     BUN 14 09/09/2024      09/09/2024     K 4.0 09/09/2024      09/09/2024     CO2 20.0 (L) 09/09/2024     GLU 86 09/09/2024     CA 8.8 09/09/2024     Assessment and Plan:         ASSESSMENT:    1.       Acute encephalopathy, suspect acute psychosis considering visual and auditory hallucinations accompanied by bizarre behavior.  - apprec psych consult - discussed with dr aldurra - haldol and seroquel added . Likely bipolar   - much better today      2.       Minimal low-grade fever with leukocytosis.  So far no clear indication of an infectious process.  Will continue to monitor her temperature curve.   Wound service consult to evaluate her right foot dorsum superficial abrasion.    - cont iv cefepime. Pt was on 9/1 and tolerated well   - wbc up today likely due to steroids      3.       Rheumatoid arthritis.  4. Adrenal insuff with hypoglycemia - pt 's prednisone stopped at rehab for last 5 days. Will start hydrocortisone for crisis and Dr Miller to see. Apprec input   - apprec endo - hydrocortisone 25 x 1 -> restart prednisone today       REVIEW DOCUMENTATION:   9-10-24       Patient hypoglycemic 65, no prn protocol ( dextrose available)   Administered 4oz of juice, paged hospitalist and  endocrinology   Endo ordered dextrose 5% 0.45 NS continous 75 ml.   Recheck is 75.    Component  Ref Range & Units 9/10/24 0745   WBC  4.0 - 11.0 x10(3) uL 16.6 High    RBC  3.80 - 5.30 x10(6)uL 2.54 Low    HGB  12.0 - 16.0 g/dL 7.4 Low    HCT  35.0 - 48.0 % 24.4 Low    MCV  80.0 - 100.0 fL 96.1   MCH  26.0 - 34.0 pg 29.1   MCHC  31.0 - 37.0 g/dL 30.3 Low    RDW-SD  35.1 - 46.3 fL 69.5 High    RDW  11.0 - 15.0 % 20.1 High    PLT  150.0 - 450.0 10(3)uL 275.0   Immature Platelet Fraction  0.0 - 7.0 % 1.7     Component  Ref Range & Units 9/10/24 0922   POC Glucose  70 - 99 mg/dL 65 Low      Component  Ref Range & Units 9/10/24 0947   POC Glucose  70 - 99 mg/dL 68 Low        Had a low BG   Appetite is good     Patient is a 64-year-old female with history of long-term steroid use  She presented with confusion and hypoglycemia  She was started on dextrose and hydrocortisone  Per patient she was not given regular steroid doses at the rehab facility  She was doing better but had a low BG this morning     Plan:  Will increase prednisone to 10 mg in am and 5 mg in pm   D 5 + 0.45 at 75 ml/hr   Accuchecks pre hypoglycemia protocol in addition to ac and hs  Hypoglycemia protocol          MEDICATIONS ADMINISTERED IN LAST 1 DAY:  acetaminophen (Tylenol Extra Strength) tab 500 mg       Date Action Dose Route User    9/10/2024 0700 Given 500 mg Oral Cong Evans RN    9/9/2024 2200 Given 500 mg Oral Charley Recio, JULIET          aspirin DR tab 81 mg       Date Action Dose Route User    9/10/2024 0805 Given 81 mg Oral Ariadna Brock, JULIET          atorvastatin (Lipitor) tab 10 mg       Date Action Dose Route User    9/10/2024 0805 Given 10 mg Oral Ariadna Brock, JULIET          ceFEPIme (Maxipime) 1 g in sodium chloride 0.9% 100 mL IVPB-MBP       Date Action Dose Route User    9/10/2024 0700 New Bag 1 g Intravenous Pesnaux, Cong, JULIET    9/9/2024 2336 New Bag 1 g Intravenous Charley Recio, RN    9/9/2024 1532 New Bag 1 g  Intravenous Ariadna Brock RN          clopidogrel (Plavix) tab 75 mg       Date Action Dose Route User    9/10/2024 0805 Given 75 mg Oral Ariadna Brock RN          collagenase (Santyl) 250 UNIT/GM ointment       Date Action Dose Route User    9/10/2024 0805 Given (none) Topical Ariadna Brock RN          dextrose 5%-sodium chloride 0.45% infusion   75 ML HR        Date Action Dose Route User    9/10/2024 1110 New Bag (none) Intravenous Ariadna Brock RN          enoxaparin (Lovenox) 40 MG/0.4ML SUBQ injection 40 mg       Date Action Dose Route User    9/10/2024 0805 Given 40 mg Subcutaneous (Right Upper Arm) Ariadna Brock RN          ferrous sulfate DR tab 325 mg       Date Action Dose Route User    9/10/2024 0805 Given 325 mg Oral Ariadna Brock RN          folic acid (Folvite) tab 1 mg       Date Action Dose Route User    9/10/2024 0804 Given 1 mg Oral Ariadna Brock RN          HYDROcodone-acetaminophen (Norco)  MG per tab 1 tablet       Date Action Dose Route User    9/10/2024 1405 Given 1 tablet Oral Darby Ro RN    9/9/2024 1540 Given 1 tablet Oral Ariadna Brock RN          hydroxychloroquine (Plaquenil) tab 200 mg       Date Action Dose Route User    9/10/2024 0804 Given 200 mg Oral Ariadna Brock RN    9/9/2024 2158 Given 200 mg Oral Charley Recio RN          lamoTRIgine (LaMICtal) tab 25 mg       Date Action Dose Route User    9/10/2024 0804 Given 25 mg Oral Ariadna Brock RN    9/9/2024 2157 Given 25 mg Oral Charley Recio RN          metoprolol tartrate (Lopressor) tab 25 mg       Date Action Dose Route User    9/9/2024 2157 Given 25 mg Oral Charley Recio RN          oxybutynin ER (Ditropan-XL) 24 hr tab 10 mg       Date Action Dose Route User    9/10/2024 0804 Given 10 mg Oral Ariadna Brock RN          pantoprazole (Protonix) DR tab 40 mg       Date Action Dose Route User    9/10/2024 0700 Given 40 mg Oral Cong Evans, RN    9/9/2024 1540 Given 40 mg  Oral Ariadna Brock RN          predniSONE (Deltasone) tab 10 mg       Date Action Dose Route User    9/10/2024 0805 Given 10 mg Oral Ariadna Brock RN          QUEtiapine (SEROquel) tab 50 mg       Date Action Dose Route User    9/9/2024 2157 Given 50 mg Oral Charley Recio RN          sodium bicarbonate tab 650 mg       Date Action Dose Route User    9/10/2024 0804 Given 650 mg Oral Ariadna Brock RN    9/9/2024 2157 Given 650 mg Oral Charley Recio RN          sodium chloride 0.9 % IV bolus 250 mL       Date Action Dose Route User    9/10/2024 0543 New Bag 250 mL Intravenous Charley Recio RN            Vitals (last day)       Date/Time Temp Pulse Resp BP SpO2 Weight O2 Device O2 Flow Rate (L/min) [facility]    09/10/24 0803 98.2 °F (36.8 °C) -- 16 98/66 97 % -- None (Room air) -- ML    09/10/24 0531 98.4 °F (36.9 °C) 91 16 81/60 97 % -- None (Room air) --     09/09/24 2151 99.2 °F (37.3 °C) 102 18 104/63 100 % -- None (Room air) -- RB    09/09/24 1902 -- 113 -- -- -- -- -- -- GT    09/09/24 1537 97.9 °F (36.6 °C) -- -- 107/83 100 % -- None (Room air) -- ML    09/09/24 0830 97.9 °F (36.6 °C) -- 16 91/70 100 % -- None (Room air) -- ML    09/09/24 0500 97.5 °F (36.4 °C) 75 16 94/73 100 % -- None (Room air) -- RB

## 2024-09-10 NOTE — PROGRESS NOTES
[facility]  part of [facility]    Progress Note    Sheri Garzon Patient Status:  Inpatient    1960 MRN [id number]   Location [facility] 5SW/SE Attending Purnima Herrera, DO   Hosp Day # 6 PCP TERI MCKENNA MD     Subjective:  Feels okay   Had a low BG yesterday  Appetite is okay     A comprehensive 10 point review of systems was completed.  Pertinent positives and negatives noted in the the HPI.    Objective/Physical Exam:  Physical Exam:   Vital Signs:  Blood pressure 120/84, pulse 104, temperature 98.4 °F (36.9 °C), temperature source Oral, resp. rate 18, height 5' 2\" (1.575 m), weight 181 lb 3.2 oz (82.2 kg), SpO2 95%.    Labs:  Pertinent data reviewed    Assessment/Plan:  Patient Active Problem List   Diagnosis    Dehydration    Metabolic acidosis    Bilious vomiting with nausea    Difficult intravenous access    Hypokalemia    Hypomagnesemia    Hiatal hernia    Cellulitis of right lower extremity    Cellulitis    RTA (renal tubular acidosis)    Adrenal insufficiency (Dana's disease) (HCC)    Current chronic use of systemic steroids    Neutropenia (HCC)    Anemia of infection    Weakness generalized    Leg edema    Adrenal insufficiency (HCC)    Altered mental status, unspecified altered mental status type    Acute psychosis (HCC)    Moderate bipolar I disorder, current or most recent episode depressed, with psychotic features, with mixed features (HCC)       Patient is a 64-year-old female with history of long-term steroid use  She presented with confusion and hypoglycemia  She was started on dextrose and hydrocortisone  Per patient she was not given regular steroid doses at the rehab facility  She was doing better but had a low BG this morning     She was given an additional 5 mg prednisone yesterday evening    Plan:  Will increase prednisone to 20 mg daily   Will stop D5 and monitor BG  Accuchecks per hypoglycemia protocol in addition to ac and hs  Hypoglycemia  protocol     Ana Miller MD

## 2024-09-11 ENCOUNTER — APPOINTMENT (OUTPATIENT)
Dept: GENERAL RADIOLOGY | Facility: HOSPITAL | Age: 64
End: 2024-09-11
Attending: INTERNAL MEDICINE
Payer: COMMERCIAL

## 2024-09-11 LAB
ANION GAP SERPL CALC-SCNC: 9 MMOL/L (ref 0–18)
BASOPHILS # BLD AUTO: 0.01 X10(3) UL (ref 0–0.2)
BASOPHILS NFR BLD AUTO: 0.1 %
BILIRUB UR QL: NEGATIVE
BUN BLD-MCNC: 12 MG/DL (ref 9–23)
BUN/CREAT SERPL: 19.4 (ref 10–20)
CALCIUM BLD-MCNC: 7.9 MG/DL (ref 8.7–10.4)
CHLORIDE SERPL-SCNC: 112 MMOL/L (ref 98–112)
CO2 SERPL-SCNC: 19 MMOL/L (ref 21–32)
COLOR UR: YELLOW
CREAT BLD-MCNC: 0.62 MG/DL
DEPRECATED RDW RBC AUTO: 65.5 FL (ref 35.1–46.3)
EGFRCR SERPLBLD CKD-EPI 2021: 99 ML/MIN/1.73M2 (ref 60–?)
EOSINOPHIL # BLD AUTO: 0.14 X10(3) UL (ref 0–0.7)
EOSINOPHIL NFR BLD AUTO: 1 %
ERYTHROCYTE [DISTWIDTH] IN BLOOD BY AUTOMATED COUNT: 19.8 % (ref 11–15)
GLUCOSE BLD-MCNC: 75 MG/DL (ref 70–99)
GLUCOSE BLDC GLUCOMTR-MCNC: 150 MG/DL (ref 70–99)
GLUCOSE BLDC GLUCOMTR-MCNC: 76 MG/DL (ref 70–99)
GLUCOSE BLDC GLUCOMTR-MCNC: 82 MG/DL (ref 70–99)
GLUCOSE BLDC GLUCOMTR-MCNC: 95 MG/DL (ref 70–99)
GLUCOSE UR-MCNC: NORMAL MG/DL
HCT VFR BLD AUTO: 24.7 %
HGB BLD-MCNC: 7.8 G/DL
HYALINE CASTS #/AREA URNS AUTO: PRESENT /LPF
IMM GRANULOCYTES # BLD AUTO: 0.21 X10(3) UL (ref 0–1)
IMM GRANULOCYTES NFR BLD: 1.6 %
KETONES UR-MCNC: NEGATIVE MG/DL
LEUKOCYTE ESTERASE UR QL STRIP.AUTO: 500
LYMPHOCYTES # BLD AUTO: 2.54 X10(3) UL (ref 1–4)
LYMPHOCYTES NFR BLD AUTO: 19 %
MCH RBC QN AUTO: 29.1 PG (ref 26–34)
MCHC RBC AUTO-ENTMCNC: 31.6 G/DL (ref 31–37)
MCV RBC AUTO: 92.2 FL
MONOCYTES # BLD AUTO: 1.45 X10(3) UL (ref 0.1–1)
MONOCYTES NFR BLD AUTO: 10.8 %
NEUTROPHILS # BLD AUTO: 9.03 X10 (3) UL (ref 1.5–7.7)
NEUTROPHILS # BLD AUTO: 9.03 X10(3) UL (ref 1.5–7.7)
NEUTROPHILS NFR BLD AUTO: 67.5 %
NITRITE UR QL STRIP.AUTO: NEGATIVE
OSMOLALITY SERPL CALC.SUM OF ELEC: 288 MOSM/KG (ref 275–295)
PH UR: 7.5 [PH] (ref 5–8)
PLATELET # BLD AUTO: 270 10(3)UL (ref 150–450)
POTASSIUM SERPL-SCNC: 3.3 MMOL/L (ref 3.5–5.1)
PROT UR-MCNC: 70 MG/DL
RBC # BLD AUTO: 2.68 X10(6)UL
SODIUM SERPL-SCNC: 140 MMOL/L (ref 136–145)
SP GR UR STRIP: 1.02 (ref 1–1.03)
UROBILINOGEN UR STRIP-ACNC: NORMAL
WBC # BLD AUTO: 13.4 X10(3) UL (ref 4–11)
YEAST UR QL: PRESENT /HPF

## 2024-09-11 PROCEDURE — 99232 SBSQ HOSP IP/OBS MODERATE 35: CPT | Performed by: INTERNAL MEDICINE

## 2024-09-11 PROCEDURE — 99233 SBSQ HOSP IP/OBS HIGH 50: CPT | Performed by: INTERNAL MEDICINE

## 2024-09-11 PROCEDURE — 73502 X-RAY EXAM HIP UNI 2-3 VIEWS: CPT | Performed by: INTERNAL MEDICINE

## 2024-09-11 RX ORDER — MELATONIN
3 NIGHTLY PRN
Status: DISCONTINUED | OUTPATIENT
Start: 2024-09-11 | End: 2024-09-12

## 2024-09-11 RX ORDER — PREDNISONE 20 MG/1
20 TABLET ORAL
Status: DISCONTINUED | OUTPATIENT
Start: 2024-09-11 | End: 2024-09-12

## 2024-09-11 RX ORDER — POTASSIUM CHLORIDE 1500 MG/1
40 TABLET, EXTENDED RELEASE ORAL ONCE
Status: COMPLETED | OUTPATIENT
Start: 2024-09-11 | End: 2024-09-11

## 2024-09-11 NOTE — PAYOR COMM NOTE
--------------  CONTINUED STAY REVIEW    Payor: [employer]  Subscriber #:  [id number]  Authorization Number: [id number]    Admit date: 9/5/24  Admit time:  7:23 PM    REVIEW DOCUMENTATION:   9-11-24    pt with nausea and dizziness this am. Blood sugar low again         D/w RN- pt had fall when going to bathroom- no BHT  Pt R hip pain  Able to mobilize joint actively    General appearance: alert, appears stated age and cooperative  Pulmonary:  clear to auscultation bilaterally  Cardiovascular: S1, S2 normal, no murmur, click, rub or gallop, regular rate and rhythm  Abdominal: soft, non-tender; bowel sounds normal; no masses,  no organomegaly  Extremities: wound on right leg - no erythema   Ttp over R hip- no e/o bruising/swelling  Internal and external rotation R hip wnl           dextrose 5%-sodium chloride 0.45% 75 mL/hr at 09/11/24 0617                     Lab Results   Component Value Date     WBC 13.4 (H) 09/11/2024     HGB 7.8 (L) 09/11/2024     HCT 24.7 (L) 09/11/2024     .0 09/11/2024     CREATSERUM 0.62 09/11/2024     BUN 12 09/11/2024      09/11/2024     K 3.3 (L) 09/11/2024      09/11/2024     CO2 19.0 (L) 09/11/2024     GLU 75 09/11/2024     CA 7.9 (L) 09/11/2024       Assessment and Plan:         ASSESSMENT:    1.       Acute encephalopathy, suspect acute psychosis considering visual and auditory hallucinations accompanied by bizarre behavior.  - apprec psych consult - discussed with dr whitaker - haldol and seroquel added . Likely bipolar   - much better      2.       Minimal low-grade fever with leukocytosis.    So far no clear indication of an infectious process.  Will continue to monitor her temperature curve.   Wound service consult to evaluate her right foot dorsum superficial abrasion.    - sp iv cefepime for right leg wound   - apprec ID consult - stopped abx 9/10. Monitor off abx   - wbc up today likely due to steroids      3.       Rheumatoid arthritis.  9/11:  fall overnight- no BHT;  R hip pain- check Xray  Good int and ext rotation        4. Adrenal insuff with hypoglycemia   - pt 's prednisone stopped at rehab for last 5 days.   - hydrocortisone for crisis  Apprec endo input   D 5 + 0.45 at 75 ml/hr      - apprec endo - hydrocortisone 25 x 1 -> restart prednisone today -> hypoglycemia again today. Discussed with Dr Miller. Will raise prednisone to bid and back on d5 for now           ENDOCRINE    Patient is a 64-year-old female with history of long-term steroid use  She presented with confusion and hypoglycemia  She was started on dextrose and hydrocortisone  Per patient she was not given regular steroid doses at the rehab facility  She was doing better but had a low BG this morning     She was given an additional 5 mg prednisone yesterday evening     Plan:  Will increase prednisone to 20 mg daily   Will stop D5 and monitor BG  Accuchecks per hypoglycemia protocol in addition to ac and hs  Hypoglycemia protocol           MEDICATIONS ADMINISTERED IN LAST 1 DAY:  aspirin DR tab 81 mg       Date Action Dose Route User    9/11/2024 1043 Given 81 mg Oral Pura Gonzalez RN          atorvastatin (Lipitor) tab 10 mg       Date Action Dose Route User    9/11/2024 1043 Given 10 mg Oral Pura Gonzalez RN          clopidogrel (Plavix) tab 75 mg       Date Action Dose Route User    9/11/2024 1042 Given 75 mg Oral Pura Gonzalez RN          collagenase (Santyl) 250 UNIT/GM ointment       Date Action Dose Route User    9/11/2024 1046 Given (none) Topical Pura Gonzalez RN          dextrose 5%-sodium chloride 0.45% infusion       Date Action Dose Route User    9/11/2024 0617 New Bag (none) Intravenous Charley Recio RN          enoxaparin (Lovenox) 40 MG/0.4ML SUBQ injection 40 mg       Date Action Dose Route User    9/11/2024 1041 Given 40 mg Subcutaneous (Left Lower Abdomen) Pura Gonzalez RN          ferrous sulfate DR tab 325 mg       Date Action Dose Route User     9/11/2024 1043 Given 325 mg Oral Pura Gonzalez RN          folic acid (Folvite) tab 1 mg       Date Action Dose Route User    9/11/2024 1042 Given 1 mg Oral Pura Gonzalez RN          HYDROcodone-acetaminophen (Norco)  MG per tab 1 tablet       Date Action Dose Route User    9/11/2024 1043 Given 1 tablet Oral Pura Gonzalez RN    9/11/2024 0418 Given 1 tablet Oral Charley Recio RN    9/10/2024 2126 Given 1 tablet Oral Charley Recio RN          hydroxychloroquine (Plaquenil) tab 200 mg       Date Action Dose Route User    9/11/2024 1042 Given 200 mg Oral Pura Gonzalez RN    9/10/2024 2126 Given 200 mg Oral Charley Recio RN          lamoTRIgine (LaMICtal) tab 50 mg       Date Action Dose Route User    9/11/2024 1043 Given 50 mg Oral Pura Gonzalez RN    9/10/2024 2126 Given 50 mg Oral Charley Recio RN          losartan (Cozaar) tab 25 mg       Date Action Dose Route User    9/11/2024 1042 Given 25 mg Oral Pura Gonzalez RN          melatonin tab 3 mg       Date Action Dose Route User    9/11/2024 0028 Given 3 mg Oral Charley Recio RN          metoprolol tartrate (Lopressor) tab 25 mg       Date Action Dose Route User    9/11/2024 1042 Given 25 mg Oral Pura Gonzalez RN          oxybutynin ER (Ditropan-XL) 24 hr tab 10 mg       Date Action Dose Route User    9/11/2024 1043 Given 10 mg Oral Pura Gonzalez RN          pantoprazole (Protonix) DR tab 40 mg       Date Action Dose Route User    9/11/2024 0617 Given 40 mg Oral Charley Recio RN    9/10/2024 1659 Given 40 mg Oral Ariadna Brock RN          potassium chloride 40 mEq in 250mL sodium chloride 0.9% IVPB premix       Date Action Dose Route User    9/11/2024 1047 New Bag 40 mEq Intravenous Pura Gonzalez RN          potassium chloride (Klor-Con M20) tab 40 mEq       Date Action Dose Route User    9/11/2024 1250 Given 40 mEq Oral Pura Gonzalez, RN          predniSONE (Deltasone) tab 20 mg        Date Action Dose Route User    9/11/2024 1042 Given 20 mg Oral Pura Gonzalez RN          predniSONE (Deltasone) tab 5 mg       Date Action Dose Route User    9/10/2024 1659 Given 5 mg Oral Ariadna Brock RN          QUEtiapine (SEROquel) tab 50 mg       Date Action Dose Route User    9/10/2024 2126 Given 50 mg Oral Charley Recio RN          sodium bicarbonate tab 650 mg       Date Action Dose Route User    9/11/2024 1042 Given 650 mg Oral Pura Gonzalez RN    9/10/2024 2126 Given 650 mg Oral Charley Recio RN            Vitals (last day)       Date/Time Temp Pulse Resp BP SpO2 Weight O2 Device O2 Flow Rate (L/min) [facility]    09/11/24 1459 98.5 °F (36.9 °C) 102 20 109/76 100 % -- None (Room air) -- AA    09/11/24 1039 98.4 °F (36.9 °C) 104 18 120/84 95 % -- None (Room air) -- AA    09/11/24 0415 98.4 °F (36.9 °C) 88 16 134/94 100 % -- None (Room air) -- RB    09/10/24 2117 98.3 °F (36.8 °C) 104 16 133/74 100 % -- None (Room air) -- RB    09/10/24 1900 -- 112 -- -- -- -- -- -- CY    09/10/24 1658 98.2 °F (36.8 °C) -- 16 125/83 100 % -- None (Room air) -- ML    09/10/24 1330 -- -- -- 132/92 -- -- -- -- GS    09/10/24 0803 98.2 °F (36.8 °C) -- 16 98/66 97 % -- None (Room air) -- ML    09/10/24 0531 98.4 °F (36.9 °C) 91 16 81/60 97 % -- None (Room air) -- RB

## 2024-09-11 NOTE — CDS QUERY
Dear Dr. Lenz,   Please clarify type of Acute Encephalopathy.      ( )  Diabetic Hypoglycemic Encephalopathy     ( X )  Metabolic Encephalopathy    ( )  Other (please specify)     ______________________________________________________________________________   Clinical Indicators:  H&P: Acute encephalopathy, suspect acute psychosis considering visual and auditory hallucinations accompanied by bizarre behavior.  Psych consult - Delirium due another medical condition. Moderate bipolar I disorder, current or most recent episode depressed, with psychotic features, with mixed features demonstrating delirium episodes with alternation in mood and cognition with episodes of increased confusion, restlessness, agitation and response to internal stimuli. T he patient is also demonstrating acute and hostile episodes of irritability  ID: Adrenal crisis after not receiving steroids at SNF - improved # Delirium # Acute leukocytosis - suspect reactive w/o signs of active infection  9/10/2024: The patient continued demonstrating improvement otherwise some occasional delirious process and some excitability at time.   Treatment: Lamictal BID. Haldol, Seroquel, prednisone  Risk factors: Adrenal insufficiency, history of depression, anxiety     If you have any questions, please contact Clinical :  Shari Ocampo RN CCDS at [phone]  Thank You!     THIS FORM IS A PERMANENT PART OF THE MEDICAL RECORD

## 2024-09-11 NOTE — PLAN OF CARE
Problem: Patient Centered Care  Goal: Patient preferences are identified and integrated in the patient's plan of care  Description: Interventions:  - What would you like us to know as we care for you? From [facility]    - Provide timely, complete, and accurate information to patient/family  - Incorporate patient and family knowledge, values, beliefs, and cultural backgrounds into the planning and delivery of care  - Encourage patient/family to participate in care and decision-making at the level they choose  - Honor patient and family perspectives and choices  Outcome: Progressing     Problem: Patient/Family Goals  Goal: Patient/Family Long Term Goal  Description: Patient's Long Term Goal: Pt wants to return home    Interventions:  - Pt educated on plan of care and safety interventions   - See additional Care Plan goals for specific interventions  Outcome: Progressing  Goal: Patient/Family Short Term Goal  Description: Patient's Short Term Goal: Pt wants to take pain medicine for leg pain    Interventions:   - Pt given pain meds as needed prn  - See additional Care Plan goals for specific interventions  Outcome: Progressing     Problem: CARDIOVASCULAR - ADULT  Goal: Maintains optimal cardiac output and hemodynamic stability  Description: INTERVENTIONS:  - Monitor vital signs, rhythm, and trends  - Monitor for bleeding, hypotension and signs of decreased cardiac output  - Evaluate effectiveness of vasoactive medications to optimize hemodynamic stability  - Monitor arterial and/or venous puncture sites for bleeding and/or hematoma  - Assess quality of pulses, skin color and temperature  - Assess for signs of decreased coronary artery perfusion - ex. Angina  - Evaluate fluid balance, assess for edema, trend weights  Outcome: Progressing  Goal: Absence of cardiac arrhythmias or at baseline  Description: INTERVENTIONS:  - Continuous cardiac monitoring, monitor vital signs, obtain 12 lead EKG if indicated  - Evaluate  effectiveness of antiarrhythmic and heart rate control medications as ordered  - Initiate emergency measures for life threatening arrhythmias  - Monitor electrolytes and administer replacement therapy as ordered  Outcome: Progressing     Problem: SKIN/TISSUE INTEGRITY - ADULT  Goal: Skin integrity remains intact  Description: INTERVENTIONS  - Assess and document risk factors for pressure ulcer development  - Assess and document skin integrity  - Monitor for areas of redness and/or skin breakdown  - Initiate interventions, skin care algorithm/standards of care as needed  Outcome: Progressing  Goal: Incision(s), wounds(s) or drain site(s) healing without S/S of infection  Description: INTERVENTIONS:  - Assess and document risk factors for pressure ulcer development  - Assess and document skin integrity  - Assess and document dressing/incision, wound bed, drain sites and surrounding tissue  - Implement wound care per orders  - Initiate isolation precautions as appropriate  - Initiate Pressure Ulcer prevention bundle as indicated  Outcome: Progressing  Goal: Oral mucous membranes remain intact  Description: INTERVENTIONS  - Assess oral mucosa and hygiene practices  - Implement preventative oral hygiene regimen  - Implement oral medicated treatments as ordered  Outcome: Progressing     Problem: CONFUSION  Goal: Confusion, delirium, dementia or psychosis is improved or at baseline  Description: INTERVENTIONS:  - Assess for possible contributors to thought disturbance, including medications, impaired vision or hearing, underlying metabolic abnormalities, dehydration, psychiatric diagnoses, and notify attending MD/LIP  - [place] high risk fall precautions, as indicated  - Provide frequent short contacts to provide reality reorientation, refocusing and direction  - Decrease environmental stimuli, including noise as appropriate  - Monitor and intervene to maintain adequate nutrition, hydration, elimination, sleep and  activity  - Consider the need for a sitter if unable to leave patient unattended  - Initiate Spiritual Care consult as indicated  - Consult Pharmacy as needed  Outcome: Progressing

## 2024-09-11 NOTE — PROGRESS NOTES
Significant Event - Fall Note    Date/Time of Fall: September 11, 2024 at 0410    Fall huddle completed: Yes    Description of patient fall:     Patient fell from: Standing     Activity when fall occurred: Toileting-related activities     Where did fall occur: Bathroom     Was the fall assisted: Found on floor/unassisted to floor    Who witnessed the fall: Staff    Patient narrative of fall: Patient reported that she thought she was doing better and stood up and lost her balance while taking off her brief. Sates that she did not hit her head. Hit her hip and her ankle. Reports she does not feel as though anything is broken.    Staff narrative of fall: Assisted patient to bathroom via rolling chair. Patient stood up without my help to take off her brief, lost balance and fell. Vital signs stable, pt stated she did not hit her head, complains of hip pain. MD notified. No new orders, just to continue to monitor.    Name of Provider notified of fall: Dr. Rivera    Family notification: Family notified    Factors contributing to fall:     Physical: Balance impairment, Unsteady gait, and Weakness/fatigue     Psychological: Alert and Oriented     Environmental: Footwear     Medications received in the past 8 hours:   Medication(s) Administered in past 8 Hours from 09/10/2024 2039 to 09/11/2024 0439       Date/Time Order Dose Route Action Action by Comments    09/10/2024 2126 CDT HYDROcodone-acetaminophen (Norco)  MG per tab 1 tablet 1 tablet Oral Given Charley Recio RN --    09/11/2024 0418 CDT HYDROcodone-acetaminophen (Norco)  MG per tab 1 tablet 1 tablet Oral Given Charley Recio RN --    09/10/2024 2126 CDT hydroxychloroquine (Plaquenil) tab 200 mg 200 mg Oral Given Charley Recio RN --    09/10/2024 2126 CDT lamoTRIgine (LaMICtal) tab 50 mg 50 mg Oral Given Charley Recio RN --    09/11/2024 0028 CDT melatonin tab 3 mg 3 mg Oral Given Charley Recio RN --    09/10/2024 2126  CDT QUEtiapine (SEROquel) tab 50 mg 50 mg Oral Given Charley Recio RN --    09/10/2024 2126 CDT sodium bicarbonate tab 650 mg 650 mg Oral Given Charley Recio RN --            Was patient identified as high fall risk prior to fall:                                What interventions were in place prior to fall: Assistive devices (wheelchair, commode, walker, gait belt), Bed alarm, Bed in lowest position, Call light within reach, Fall alert wristband, Low bed (Edward only), Personal items within reach, Initiated PT/OT therapy, Rounding, and Signage in place    Interventions post fall: Assistive devices (wheelchair, commode, walker, gait belt), Bed alarm, Bed in lowest position, Patient education tool, Patient/family involved in fall prevention plan, Personal items within reach, Initiated PT/OT therapy, and Signage in place    Additional comments:

## 2024-09-11 NOTE — PHYSICAL THERAPY NOTE
PHYSICAL THERAPY TREATMENT NOTE - INPATIENT     Room Number: 565/565-A       Presenting Problem: AMS  Co-Morbidities : Lee disease, cellulitis, acute psychosis, anemia, R shoulder DJD, obesity    Problem List  Principal Problem:    Altered mental status, unspecified altered mental status type  Active Problems:    Acute psychosis (HCC)    Moderate bipolar I disorder, current or most recent episode depressed, with psychotic features, with mixed features (HCC)      PHYSICAL THERAPY ASSESSMENT   Patient demonstrates limited progress this session, goals  remain in progress.      Patient is requiring minimal assist and moderate assist as a result of the following impairments: decreased functional strength, pain, impaired dynamic standing balance, difficulty maintaining precautions, and decreased compliance/participation.     Patient continues to function below baseline with bed mobility, transfers, gait, standing prolonged periods, and performing household tasks.  Next session anticipate patient to progress bed mobility, transfers, and gait.  Physical Therapy will continue to follow patient for duration of hospitalization.    Patient continues to benefit from continued skilled PT services: to promote return to prior level of function and safety with continuous assistance and gradual rehabilitative therapy .    PLAN  PT Treatment Plan: Bed mobility;Body mechanics;Endurance;Energy conservation;Patient education;Gait training;Range of motion;Strengthening;Transfer training;Balance training  Frequency (Obs): 3-5x/week    SUBJECTIVE  Agreeable to activity.     OBJECTIVE  Precautions: Bed/chair alarm    WEIGHT BEARING RESTRICTION  none    PAIN ASSESSMENT   Rating: Unable to rate  Location: BLE, R hip  Management Techniques: Activity promotion;Body mechanics;Breathing techniques;Repositioning    BALANCE  Static Sitting: Good  Dynamic Sitting: Fair +  Static Standing: Poor +  Dynamic Standing: Poor +    AM-PAC '6-Clicks'  INPATIENT SHORT FORM - BASIC MOBILITY  How much difficulty does the patient currently have...  Patient Difficulty: Turning over in bed (including adjusting bedclothes, sheets and blankets)?: A Little   Patient Difficulty: Sitting down on and standing up from a chair with arms (e.g., wheelchair, bedside commode, etc.): A Little   Patient Difficulty: Moving from lying on back to sitting on the side of the bed?: A Little   How much help from another person does the patient currently need...   Help from Another: Moving to and from a bed to a chair (including a wheelchair)?: A Little   Help from Another: Need to walk in hospital room?: A Little   Help from Another: Climbing 3-5 steps with a railing?: A Lot     AM-PAC Score:  Raw Score: 17   Approx Degree of Impairment: 50.57%   Standardized Score (AM-PAC Scale): 42.13   CMS Modifier (G-Code): CK    FUNCTIONAL ABILITY STATUS  Functional Mobility/Gait Assessment  Gait Assistance: Minimum assistance  Distance (ft): 3 + 3  Assistive Device: None  Pattern: Shuffle (slow, guarded, unsteady, antalgic gait RLE)  Rolling: contact guard assist  Supine to Sit: moderate assist  Sit to Supine: moderate assist  Sit to Stand: minimal assist    Skilled Therapy Provided: Pt received resting in bed and agreeable to limited activity due to pain and fear of falling. RN present for session. Introduced self and role. Pt requested to go to  bathroom. Pt demos bed mobility with increased time and mod A. Pt refusing to attempt ambulation this date despite encouragement and education provided. Demos STS and steps to rolling chair with min A. Taken to bathroom in rolling chair. SBA provided for toileting. Pt then returned to bed in rolling chair; again required min A to transfer back to sitting at EOB. Mod A to return to supine. Max A x 2 to scoot up in bed. Pt was left resting in bed with alarm on, needs within reach, handoff to RN complete.     The patient's Approx Degree of Impairment: 50.57%  has been calculated based on documentation in the [facility] '6 clicks' Inpatient Daily Activity Short Form.  Research supports that patients with this level of impairment may benefit from rehab facility.  Final disposition will be made by interdisciplinary medical team.    Patient End of Session: Call light within reach;Needs met;In bed;RN aware of session/findings;All patient questions and concerns addressed;Alarm set;With  staff    CURRENT GOALS   Goals to be met by: 9/20/2024  Patient Goal Patient's self-stated goal is: Return home    Goal #1 Patient is able to demonstrate supine - sit EOB @ level: independent      Goal #1   Current Status  unmet   Goal #2 Patient is able to demonstrate transfers Sit to/from Stand at assistance level: supervision with walker - rolling      Goal #2  Current Status  unmet   Goal #3 Patient is able to ambulate 50 feet with assist device: walker - rolling at assistance level: supervision   Goal #3   Current Status  unmet    Goal #4 Patient will negotiate 8 stairs/one curb w/ assistive device and supervision   Goal #4   Current Status  unmet   Goal #5 Patient to demonstrate independence with home activity/exercise instructions provided to patient in preparation for discharge.   Goal #5   Current Status  unmet, progressing   Goal #6     Goal #6  Current Status        Therapeutic Activity: 24 minutes

## 2024-09-11 NOTE — PLAN OF CARE
Problem: Safety Risk - Non-Violent Restraints  Goal: Patient will remain free from self-harm  Description: INTERVENTIONS:  - Apply the least restrictive restraint to prevent harm  - Notify patient and family of reasons restraints applied  - Assess for any contributing factors to confusion (electrolyte disturbances, delirium, medications)  - Discontinue any unnecessary medical devices as soon as possible  - Assess the patient's physical comfort, circulation, skin condition, hydration, nutrition and elimination needs   - Reorient and redirection as needed  - Assess for the need to continue restraints  Outcome: Progressing     Problem: Patient Centered Care  Goal: Patient preferences are identified and integrated in the patient's plan of care  Description: Interventions:  - What would you like us to know as we care for you?   - Provide timely, complete, and accurate information to patient/family  - Incorporate patient and family knowledge, values, beliefs, and cultural backgrounds into the planning and delivery of care  - Encourage patient/family to participate in care and decision-making at the level they choose  - Honor patient and family perspectives and choices  Outcome: Progressing     Problem: Patient/Family Goals  Goal: Patient/Family Long Term Goal  Description: Patient's Long Term Goal:     Interventions:  -   - See additional Care Plan goals for specific interventions  Outcome: Progressing  Goal: Patient/Family Short Term Goal  Description: Patient's Short Term Goal:     Interventions:   -   - See additional Care Plan goals for specific interventions  Outcome: Progressing     Problem: CARDIOVASCULAR - ADULT  Goal: Maintains optimal cardiac output and hemodynamic stability  Description: INTERVENTIONS:  - Monitor vital signs, rhythm, and trends  - Monitor for bleeding, hypotension and signs of decreased cardiac output  - Evaluate effectiveness of vasoactive medications to optimize hemodynamic stability  -  Monitor arterial and/or venous puncture sites for bleeding and/or hematoma  - Assess quality of pulses, skin color and temperature  - Assess for signs of decreased coronary artery perfusion - ex. Angina  - Evaluate fluid balance, assess for edema, trend weights  Outcome: Progressing  Goal: Absence of cardiac arrhythmias or at baseline  Description: INTERVENTIONS:  - Continuous cardiac monitoring, monitor vital signs, obtain 12 lead EKG if indicated  - Evaluate effectiveness of antiarrhythmic and heart rate control medications as ordered  - Initiate emergency measures for life threatening arrhythmias  - Monitor electrolytes and administer replacement therapy as ordered  Outcome: Progressing     Problem: SKIN/TISSUE INTEGRITY - ADULT  Goal: Skin integrity remains intact  Description: INTERVENTIONS  - Assess and document risk factors for pressure ulcer development  - Assess and document skin integrity  - Monitor for areas of redness and/or skin breakdown  - Initiate interventions, skin care algorithm/standards of care as needed  Outcome: Progressing  Goal: Incision(s), wounds(s) or drain site(s) healing without S/S of infection  Description: INTERVENTIONS:  - Assess and document risk factors for pressure ulcer development  - Assess and document skin integrity  - Assess and document dressing/incision, wound bed, drain sites and surrounding tissue  - Implement wound care per orders  - Initiate isolation precautions as appropriate  - Initiate Pressure Ulcer prevention bundle as indicated  Outcome: Progressing  Goal: Oral mucous membranes remain intact  Description: INTERVENTIONS  - Assess oral mucosa and hygiene practices  - Implement preventative oral hygiene regimen  - Implement oral medicated treatments as ordered  Outcome: Progressing     Problem: CONFUSION  Goal: Confusion, delirium, dementia or psychosis is improved or at baseline  Description: INTERVENTIONS:  - Assess for possible contributors to thought  disturbance, including medications, impaired vision or hearing, underlying metabolic abnormalities, dehydration, psychiatric diagnoses, and notify attending MD/LIP  - [place] high risk fall precautions, as indicated  - Provide frequent short contacts to provide reality reorientation, refocusing and direction  - Decrease environmental stimuli, including noise as appropriate  - Monitor and intervene to maintain adequate nutrition, hydration, elimination, sleep and activity  - Consider the need for a sitter if unable to leave patient unattended  - Initiate Spiritual Care consult as indicated  - Consult Pharmacy as needed  Outcome: Progressing

## 2024-09-11 NOTE — PROGRESS NOTES
INFECTIOUS DISEASE PROGRESS NOTE  [facility]  part of formerly [facility] ID PROGRESS NOTE    Sheri Garzon Patient Status:  Inpatient    1960 MRN [id number]   Location [facility] 5SW/SE Attending Maryse Lenz MD   Hosp Day # 6 PCP TERI MCKENNA MD     Subjective:  ROS reviewed. Had fall overnight on her right side and complains of hip pain.    ASSESSMENT:    Antibiotics: OFF  IV cefepime     # Adrenal crisis after not receiving steroids at SNF - improved  # R hip pain s/p fall    -FU XR  # Delirium  # Acute leukocytosis - suspect reactive w/o signs of active infection     # Recent Neisseria species bacteremia ?source - resolved     # Recent RLE cellulitis with posterior leg wound in immunocompromised patient - improved               -s/p bedside debridement 8/15/24               -s/p linezolid -> daptomycin course, dc      # Rheumatoid arthritis, on 20 mg prednisone daily, methotrexate, HCQ     # Adrenal insufficiency - Endocrine following; steroids        PLAN:  -  Continue to monitor off abx.  -  Remove midline prior to discharge.  -  Follow fever curve, wbc.  -  Reviewed labs, micro, imaging reports, available old records  -  d/w patient, RN.  -  ID will continue to follow along peripherally.     History of Present Illness:  64-year-old female with history of known arthritis on chronic prednisone 20 mg daily, methotrexate, hydroxychloroquine.  Also with adrenal insufficiency on steroids.  Was seen recently almost a month ago for right lower extremity cellulitis with a wound status post bedside debridement.  Treated with daptomycin and then linezolid.  Also incidentally found to have Neisseria species bacteremia from unclear source.  Discharged to rehab facility for deconditioning and now returns with altered mentation with hallucinations Tmax 99, WBC initially 16.  Also found to be hypoglycemic and given stress dose steroids, dextrose.  Per notes patient  was not given steroid doses at the rehab facility.  Started on IV cefepime here and steroids with transient increase in WBC which is now downtrending.  Chest x-ray with no focal opacity and CT head without acute process.  No cultures available.    Physical Exam:  /84 (BP Location: Right arm)   Pulse 104   Temp 98.4 °F (36.9 °C) (Oral)   Resp 18   Ht 5' 2\" (1.575 m)   Wt 181 lb 3.2 oz (82.2 kg)   SpO2 95%   BMI 33.14 kg/m²     Gen:   Awake, in bed  HEENT:  EOMI, neck supple  CV/lungs:  RRR, CTAB  Abdom:  Soft, NT/ND, +BS  Skin/extrem:  R leg with calf wound c/d/i  Lines:  Midline+    Laboratory Data: Reviewed    Microbiology: Reviewed    Radiology: Reviewed      [facility]  [phone]  9/11/2024

## 2024-09-11 NOTE — PROGRESS NOTES
[facility]  part of [facility]    Progress Note    Sheri Garzon Patient Status:  Inpatient    1960 MRN [id number]   Location [facility] 5SW/SE Attending Purnima Herrera, DO   Hosp Day # 6 PCP TERI MCKENNA MD     Chief complaint delerium     Subjective:   Sheri Garzon is a(n) 64 year old female pt with nausea and dizziness this am. Blood sugar low again       D/w RN- pt had fall when going to bathroom- no BHT  Pt R hip pain  Able to mobilize joint actively    No other acute complaints or other nursing concerns or overnight events      ROS:   No cp, sob   No c/d   No n/v     Objective:   Blood pressure (!) 134/94, pulse 88, temperature 98.4 °F (36.9 °C), temperature source Oral, resp. rate 16, height 5' 2\" (1.575 m), weight 181 lb 3.2 oz (82.2 kg), SpO2 100%.    No intake or output data in the 24 hours ending 24 0822      Patient Weight(s) for the past 336 hrs:   Weight   24 181 lb 3.2 oz (82.2 kg)   24 1232 178 lb (80.7 kg)           General appearance: alert, appears stated age and cooperative  Pulmonary:  clear to auscultation bilaterally  Cardiovascular: S1, S2 normal, no murmur, click, rub or gallop, regular rate and rhythm  Abdominal: soft, non-tender; bowel sounds normal; no masses,  no organomegaly  Extremities: wound on right leg - no erythema   Ttp over R hip- no e/o bruising/swelling  Internal and external rotation R hip wnl      Medicines:     Current Facility-Administered Medications   Medication Dose Route Frequency    melatonin tab 3 mg  3 mg Oral Nightly PRN    potassium chloride 40 mEq in 250mL sodium chloride 0.9% IVPB premix  40 mEq Intravenous Once    predniSONE (Deltasone) tab 20 mg  20 mg Oral Daily with breakfast    dextrose 5%-sodium chloride 0.45% infusion   Intravenous Continuous    glucose (Dex4) 15 GM/59ML oral liquid 15 g  15 g Oral Q15 Min PRN    Or    glucose (Glutose) 40% oral gel 15 g  15 g Oral Q15 Min PRN    Or     glucose-vitamin C (Dex-4) chewable tab 4 tablet  4 tablet Oral Q15 Min PRN    Or    dextrose 50% injection 50 mL  50 mL Intravenous Q15 Min PRN    Or    glucose (Dex4) 15 GM/59ML oral liquid 30 g  30 g Oral Q15 Min PRN    Or    glucose (Glutose) 40% oral gel 30 g  30 g Oral Q15 Min PRN    Or    glucose-vitamin C (Dex-4) chewable tab 8 tablet  8 tablet Oral Q15 Min PRN    lamoTRIgine (LaMICtal) tab 50 mg  50 mg Oral BID    aspirin DR tab 81 mg  81 mg Oral Daily    atorvastatin (Lipitor) tab 10 mg  10 mg Oral Daily    clopidogrel (Plavix) tab 75 mg  75 mg Oral Daily    ergocalciferol (Vitamin D2) cap 50,000 Units  50,000 Units Oral Weekly    ferrous sulfate DR tab 325 mg  325 mg Oral Daily with breakfast    folic acid (Folvite) tab 1 mg  1 mg Oral Daily    hydroxychloroquine (Plaquenil) tab 200 mg  200 mg Oral BID    ibuprofen (Motrin) tab 600 mg  600 mg Oral Q8H PRN    losartan (Cozaar) tab 25 mg  25 mg Oral Daily    oxybutynin ER (Ditropan-XL) 24 hr tab 10 mg  10 mg Oral Daily    pantoprazole (Protonix) DR tab 40 mg  40 mg Oral BID AC    sodium bicarbonate tab 650 mg  650 mg Oral BID    collagenase (Santyl) 250 UNIT/GM ointment   Topical Daily    haloperidol lactate (Haldol) 5 MG/ML injection 2 mg  2 mg Intravenous Q8H PRN    QUEtiapine (SEROquel) tab 50 mg  50 mg Oral Nightly    metoprolol tartrate (Lopressor) tab 25 mg  25 mg Oral BID    enoxaparin (Lovenox) 40 MG/0.4ML SUBQ injection 40 mg  40 mg Subcutaneous Daily    acetaminophen (Tylenol Extra Strength) tab 500 mg  500 mg Oral Q4H PRN    ondansetron (Zofran) 4 MG/2ML injection 4 mg  4 mg Intravenous Q6H PRN    OLANZapine (Zyprexa) 10 mg in sterile water for injection (PF) IM injection  10 mg Intramuscular Q8H PRN    HYDROcodone-acetaminophen (Norco)  MG per tab 1 tablet  1 tablet Oral Q4H PRN           Ant-Infective Medications            cefpodoxime 200 MG Oral Tab    hydroxychloroquine 200 MG Oral Tab                Blood Pressure and Cardiac  Medications            metoprolol tartrate 50 MG Oral Tab    losartan 50 MG Oral Tab             dextrose 5%-sodium chloride 0.45% 75 mL/hr at 09/11/24 0617             Lab Results   Component Value Date    WBC 13.4 (H) 09/11/2024    HGB 7.8 (L) 09/11/2024    HCT 24.7 (L) 09/11/2024    .0 09/11/2024    CREATSERUM 0.62 09/11/2024    BUN 12 09/11/2024     09/11/2024    K 3.3 (L) 09/11/2024     09/11/2024    CO2 19.0 (L) 09/11/2024    GLU 75 09/11/2024    CA 7.9 (L) 09/11/2024    ALB 3.1 (L) 09/05/2024    ALKPHO 140 (H) 09/05/2024    BILT 0.7 09/05/2024    TP 5.5 (L) 09/05/2024    AST 38 (H) 09/05/2024    ALT 33 09/05/2024    LIP 38 09/05/2024    DDIMER 1.69 (H) 07/23/2024    MG 1.9 09/06/2024    PHOS 2.9 08/21/2024    CK 50 08/27/2024    B12 748 08/21/2024    ETOH <3 09/05/2024       No results found.        Results:     CBC:    Lab Results   Component Value Date    WBC 13.4 (H) 09/11/2024    WBC 16.6 (H) 09/10/2024    WBC 20.5 (H) 09/09/2024     Lab Results   Component Value Date    HGB 7.8 (L) 09/11/2024    HGB 7.4 (L) 09/10/2024    HGB 8.7 (L) 09/09/2024      Lab Results   Component Value Date    .0 09/11/2024    .0 09/10/2024    .0 09/09/2024       Recent Labs   Lab 09/08/24  0728 09/09/24  1416 09/11/24  0610   GLU 88 86 75   BUN 9 14 12   CREATSERUM 0.57 0.71 0.62   CA 8.2* 8.8 7.9*    141 140   K 4.0 4.0 3.3*    111 112   CO2 21.0 20.0* 19.0*         @[email or web address]@    Assessment and Plan:         ASSESSMENT:    1.       Acute encephalopathy, suspect acute psychosis considering visual and auditory hallucinations accompanied by bizarre behavior.  - apprec psych consult - discussed with dr whitaker - haldol and seroquel added . Likely bipolar   - much better     2.       Minimal low-grade fever with leukocytosis.    So far no clear indication of an infectious process.  Will continue to monitor her temperature curve.   Wound service consult to evaluate her  right foot dorsum superficial abrasion.    - sp iv cefepime for right leg wound   - apprec ID consult - stopped abx 9/10. Monitor off abx   - wbc up today likely due to steroids     3.       Rheumatoid arthritis.  9/11: fall overnight- no BHT;  R hip pain- check Xray  Good int and ext rotation      4. Adrenal insuff with hypoglycemia   - pt 's prednisone stopped at rehab for last 5 days.   - hydrocortisone for crisis  Apprec endo input   D 5 + 0.45 at 75 ml/hr     - apprec endo - hydrocortisone 25 x 1 -> restart prednisone today -> hypoglycemia again today. Discussed with Dr Miller. Will raise prednisone to bid and back on d5 for now       To rehab in 1-2 days if improved                Chart reviewed, including current vitals, notes, labs and imaging  Labs ordered and medications adjusted as outlined above  Coordinate care with care team/consultants  Discussed with patient results of tests, management plan as outlined above, and the need for ongoing hospitalization  D/w RN     MDM high

## 2024-09-11 NOTE — CM/SW NOTE
Pt discussed in RN DC rounds. Pt has insurance auth approved for [facility]. SW updated MB Liaison that discharge will likely not be today due to patient having a fall overnight.     Plan: Pending medical clearance, DC to [facility], Auth approved.     LAURA/CM to remain available for support and/or discharge planning.     Quyen Hdz, MSW, LSW   x [phone]

## 2024-09-12 ENCOUNTER — TELEPHONE (OUTPATIENT)
Dept: ENDOCRINOLOGY CLINIC | Facility: CLINIC | Age: 64
End: 2024-09-12

## 2024-09-12 VITALS
DIASTOLIC BLOOD PRESSURE: 74 MMHG | RESPIRATION RATE: 18 BRPM | SYSTOLIC BLOOD PRESSURE: 106 MMHG | BODY MASS INDEX: 33.34 KG/M2 | WEIGHT: 181.19 LBS | OXYGEN SATURATION: 100 % | TEMPERATURE: 99 F | HEART RATE: 106 BPM | HEIGHT: 62 IN

## 2024-09-12 LAB
GLUCOSE BLDC GLUCOMTR-MCNC: 117 MG/DL (ref 70–99)
GLUCOSE BLDC GLUCOMTR-MCNC: 126 MG/DL (ref 70–99)
GLUCOSE BLDC GLUCOMTR-MCNC: 67 MG/DL (ref 70–99)
GLUCOSE BLDC GLUCOMTR-MCNC: 78 MG/DL (ref 70–99)
GLUCOSE BLDC GLUCOMTR-MCNC: 79 MG/DL (ref 70–99)
GLUCOSE BLDC GLUCOMTR-MCNC: 84 MG/DL (ref 70–99)
POTASSIUM SERPL-SCNC: 3.5 MMOL/L (ref 3.5–5.1)

## 2024-09-12 PROCEDURE — 99239 HOSP IP/OBS DSCHRG MGMT >30: CPT | Performed by: INTERNAL MEDICINE

## 2024-09-12 PROCEDURE — 99232 SBSQ HOSP IP/OBS MODERATE 35: CPT | Performed by: INTERNAL MEDICINE

## 2024-09-12 RX ORDER — PANTOPRAZOLE SODIUM 40 MG/1
40 TABLET, DELAYED RELEASE ORAL
Qty: 60 TABLET | Refills: 0 | Status: SHIPPED | OUTPATIENT
Start: 2024-09-12 | End: 2024-10-12

## 2024-09-12 RX ORDER — PREDNISONE 10 MG/1
TABLET ORAL
Qty: 30 TABLET | Refills: 0 | Status: SHIPPED | OUTPATIENT
Start: 2024-09-12

## 2024-09-12 RX ORDER — PREDNISONE 20 MG/1
TABLET ORAL
Qty: 19 TABLET | Refills: 0 | Status: SHIPPED | OUTPATIENT
Start: 2024-09-12 | End: 2024-09-12

## 2024-09-12 RX ORDER — QUETIAPINE FUMARATE 50 MG/1
50 TABLET, FILM COATED ORAL NIGHTLY
Qty: 30 TABLET | Refills: 0 | Status: SHIPPED | OUTPATIENT
Start: 2024-09-12 | End: 2024-10-12

## 2024-09-12 RX ORDER — METOPROLOL TARTRATE 25 MG/1
12.5 TABLET, FILM COATED ORAL 2 TIMES DAILY
Qty: 30 TABLET | Refills: 0 | Status: SHIPPED | OUTPATIENT
Start: 2024-09-12 | End: 2024-10-12

## 2024-09-12 RX ORDER — HYDROCODONE BITARTRATE AND ACETAMINOPHEN 10; 325 MG/1; MG/1
1 TABLET ORAL EVERY 4 HOURS PRN
Qty: 20 TABLET | Refills: 0 | Status: SHIPPED | OUTPATIENT
Start: 2024-09-12

## 2024-09-12 RX ORDER — LAMOTRIGINE 25 MG/1
50 TABLET ORAL 2 TIMES DAILY
Qty: 120 TABLET | Refills: 0 | Status: SHIPPED | OUTPATIENT
Start: 2024-09-12 | End: 2024-10-12

## 2024-09-12 RX ORDER — PREDNISONE 10 MG/1
10 TABLET ORAL
Status: DISCONTINUED | OUTPATIENT
Start: 2024-09-12 | End: 2024-09-12

## 2024-09-12 NOTE — DISCHARGE SUMMARY
Discharge Summary     Sheri Garzon Patient Status:  Inpatient    1960 MRN [id number]   Location [facility] 5SW/SE Attending Maryse Lenz MD   Hosp Day # 7 PCP TERI MCKENNA MD     Date of Admission: 2024  Date of Discharge: 2024  Discharge Disposition: SNF Subacute Rehab    Discharge Diagnosis:     1.       Acute encephalopathy, suspect acute psychosis considering visual and auditory hallucinations accompanied by bizarre behavior.  2.       Minimal low-grade fever with leukocytosis.   4. Adrenal insuff with hypoglycemia   history of depression and anxiety.     History of Present Illness:             Brief Synopsis:   1.       Acute encephalopathy, suspect acute psychosis considering visual and auditory hallucinations accompanied by bizarre behavior.  - apprec psych consult - discussed with dr whitaker - haldol and seroquel added . Likely bipolar   - much better      2.       Minimal low-grade fever with leukocytosis.    So far no clear indication of an infectious process.  Will continue to monitor her temperature curve.   Wound service consult to evaluate her right foot dorsum superficial abrasion.    - sp iv cefepime for right leg wound   - apprec ID consult - stopped abx 9/10. Monitor off abx   - wbc up today likely due to steroids      3.       Rheumatoid arthritis.  -fall:- no BHT;  R hip pain- check Xray neg fracture or dislocation  Good int and ext rotation        4. Adrenal insuff with hypoglycemia   - pt 's prednisone stopped at rehab for last 5 days.   - hydrocortisone for crisis  Apprec endo input   D 5 + 0.45 at 75 ml/hr then stopped    At dc continue accuchecks qAC and at bedtime  Three meals and snacks in between meals including bedtime snack  Prednisone 20mg po bid x 4 days daily then resume home dose at 10mg po bid  Nutriotion consult prior to dc        history of depression and anxiety.   Psychiatry consulted  Continue Seroquel 50 mg nightly.  Increase lamotrigine  to 50 mg twice daily.  Discontinue Haldol.  Patient appropriate to return [facility].        HTN:  Pt low normal BP during admission  Dc ARB at dc and decrease BB dose  C/u monitor      Lace+ Score: 56  59-90 High Risk  29-58 Medium Risk  0-28   Low Risk       TCM Follow-Up Recommendation:  LACE < 29: Low Risk of readmission after discharge from the hospital; Still recommend for TCM follow-up.        Consultants:  Endocrinology. ID, psychiatry    Discharge Medication List:     Discharge Medications        START taking these medications        Instructions Prescription details   collagenase 250 UNIT/GM Oint  Commonly known as: Santyl      Apply topically daily.   Refills: 0     HYDROcodone-acetaminophen  MG Tabs  Commonly known as: Norco      Take 1 tablet by mouth every 4 (four) hours as needed.   Quantity: 20 tablet  Refills: 0     lamoTRIgine 25 MG Tabs  Commonly known as: LaMICtal      Take 2 tablets (50 mg total) by mouth 2 (two) times daily.   Stop taking on: October 12, 2024  Quantity: 120 tablet  Refills: 0     QUEtiapine 50 MG Tabs  Commonly known as: SEROquel      Take 1 tablet (50 mg total) by mouth nightly.   Stop taking on: October 12, 2024  Quantity: 30 tablet  Refills: 0            CHANGE how you take these medications        Instructions Prescription details   predniSONE 20 MG Tabs  Commonly known as: Deltasone  Start taking on: September 12, 2024  What changed:   medication strength  See the new instructions.      Take 1 tablet (20 mg total) by mouth daily with breakfast for 4 days, THEN 0.5 tablets (10 mg total) daily with breakfast.   Stop taking on: October 16, 2024  Quantity: 19 tablet  Refills: 0            CONTINUE taking these medications        Instructions Prescription details   Acetaminophen 8 Hour 650 MG Tbcr  Generic drug: Acetaminophen ER      Take 2-3 tablets (1,300-1,950 mg total) by mouth every 8 (eight) hours as needed for Pain.   Refills: 0     albuterol 108 (90 Base) MCG/ACT  Aers  Commonly known as: Ventolin HFA      Inhale 2 puffs into the lungs every 4 to 6 hours as needed for Wheezing.   Refills: 0     Aspirin 81 MG Caps      Take 81 mg by mouth daily.   Refills: 0     atorvastatin 10 MG Tabs  Commonly known as: Lipitor      Take 1 tablet (10 mg total) by mouth daily.   Refills: 0     clopidogrel 75 MG Tabs  Commonly known as: Plavix      Take 1 tablet (75 mg total) by mouth daily.   Refills: 0     clotrimazole-betamethasone 1-0.05 % Crea  Commonly known as: Lotrisone      Apply 1 Application topically 2 (two) times daily. Apply to groin and abdominal folds   Refills: 0     Coppermin 5 MG Tabs  Generic drug: Copper      Take 1 tablet by mouth daily.   Quantity: 30 tablet  Refills: 0     ergocalciferol 1.25 MG (32549 UT) Caps  Commonly known as: Vitamin D2      Take 1 capsule (50,000 Units total) by mouth once a week.   Refills: 0     Ferrous Gluconate 324 (38 Fe) MG Tabs      Take 1 tablet (325 mg total) by mouth daily with breakfast.   Stop taking on: September 23, 2024  Quantity: 30 tablet  Refills: 0     folic acid 1 MG Tabs  Commonly known as: Folvite      Take 1 tablet (1 mg total) by mouth daily.   Quantity: 90 tablet  Refills: 0     hydroxychloroquine 200 MG Tabs  Commonly known as: Plaquenil      Take 1 tablet (200 mg total) by mouth 2 (two) times daily.   Quantity: 180 tablet  Refills: 0     ibuprofen 200 MG Tabs  Commonly known as: Motrin      Take 3 tablets (600 mg total) by mouth every 8 (eight) hours as needed for Pain.   Refills: 0     losartan 50 MG Tabs  Commonly known as: Cozaar      Take 0.5 tablets (25 mg total) by mouth daily.   Refills: 0     metoprolol tartrate 50 MG Tabs  Commonly known as: Lopressor      Take 0.5 tablets (25 mg total) by mouth 2 (two) times daily.   Refills: 0     oxybutynin ER 10 MG Tb24  Commonly known as: Ditropan-XL      Take 1 tablet (10 mg total) by mouth daily.   Refills: 0     pantoprazole 40 MG Tbec  Commonly known as: Protonix       Take 1 tablet (40 mg total) by mouth 2 (two) times daily before meals.   Stop taking on: October 12, 2024  Quantity: 60 tablet  Refills: 0     Potassium Chloride ER 10 MEQ Tbcr      Take 1 tablet (10 mEq total) by mouth 2 (two) times daily.   Refills: 0     sodium bicarbonate 650 MG Tabs      Take 1 tablet (650 mg total) by mouth 2 (two) times daily.   Stop taking on: September 29, 2024  Quantity: 60 tablet  Refills: 0            STOP taking these medications      cefpodoxime 200 MG Tabs  Commonly known as: Vantin        methotrexate 2.5 MG Tabs  Commonly known as: Rheumatrex                  Where to Get Your Medications        These medications were sent to "[facility][id number] [place], [place] - [address] AT [facility], [phone], [phone] [address] [place] [address]      Phone: [phone]   lamoTRIgine 25 MG Tabs  pantoprazole 40 MG Tbec  predniSONE 20 MG Tabs  QUEtiapine 50 MG Tabs       Please  your prescriptions at the location directed by your doctor or nurse    Bring a paper prescription for each of these medications  HYDROcodone-acetaminophen  MG Tabs         Follow-up appointment:   No follow-up provider specified.  Appointments for Next 30 Days 9/12/2024 - 10/12/2024        Date Arrival Time Visit Type Length Department Provider     9/20/2024  9:00 AM  LAB - EM CC [[id number]] 15 min [facility] Hematology Oncology [facility] RESOURCE    Patient Instructions:         Location Instructions:     Your appointment is at the [facility]. The address is [address] [place] [address]. The entrance is located on the East side of the [place] parking lot.  Masks are optional for all patients and visitors, unless otherwise indicated.               9/20/2024 10:00 AM  FOLLOW UP-HEM/ONC [[id number]] 30 min [facility] Hematology Oncology Mercy Khan MD    Patient Instructions:         Location Instructions:     Your  appointment is at the [facility]. The address is [address] [place] [id number]. The entrance is located on the East side of the [place] parking lot.  Masks are optional for all patients and visitors, unless otherwise indicated.               9/23/2024  2:00 PM  CONSULT [[id number]] 40 min [facility] [place] Doreen Brooke MD    Patient Instructions:     Pleaese arrive 15 min. prior to your appointment to complete your registration.   Bring your ID, Insuance card, co-pay amount and test results.   **If patient has an HMO insurance: Please bring your referral**        Location Instructions:     Your appointment is located at [address] [facility] in [place], [place].&nbsp; Please park in the Yellow lot and enter through the [facility] entrance.&nbsp; Then proceed to [address].  Masks are optional for all patients and visitors, unless otherwise indicated.                      Supplementary Documentation:   [place] reviewed[place] na    Vital signs:  Temp:  [98.4 °F (36.9 °C)-98.8 °F (37.1 °C)] 98.5 °F (36.9 °C)  Pulse:  [] 100  Resp:  [16-20] 18  BP: ()/(62-84) 93/68  SpO2:  [95 %-100 %] 100 %    Physical Exam:    General:  NAD  Cardiovascular:  S1, S2    -----------------------------------------------------------------------------------------------  PATIENT DISCHARGE INSTRUCTIONS: See electronic chart    Tip: Documentation requirements: For split shared discharge, BOTH providers need to document specific floor, unit, and time spent on the discharge.  The note needs to be signed by the provider with > 50% of time and bill under their NPI.   Time spent:  45 min         Maryse Lenz MD

## 2024-09-12 NOTE — PLAN OF CARE
Problem: Patient Centered Care  Goal: Patient preferences are identified and integrated in the patient's plan of care  Description: Interventions:  - What would you like us to know as we care for you? From [facility]  - Provide timely, complete, and accurate information to patient/family  - Incorporate patient and family knowledge, values, beliefs, and cultural backgrounds into the planning and delivery of care  - Encourage patient/family to participate in care and decision-making at the level they choose  - Honor patient and family perspectives and choices  Outcome: Progressing     Problem: Patient/Family Goals  Goal: Patient/Family Long Term Goal  Description: Patient's Long Term Goal: To return home    Interventions:  - Monitor vital signs and labs  - Monitor accuchecks ACHS  - Fall precautions  - PT/OT consult  - See additional Care Plan goals for specific interventions  Outcome: Progressing  Goal: Patient/Family Short Term Goal  Description: Patient's Short Term Goal: To go back home    Interventions:   - Monitor vital signs and labs  - Monitor accuchecks ACHS  - Fall precautions  - PT/OT consult  - See additional Care Plan goals for specific interventions  Outcome: Progressing     Problem: CARDIOVASCULAR - ADULT  Goal: Maintains optimal cardiac output and hemodynamic stability  Description: INTERVENTIONS:  - Monitor vital signs, rhythm, and trends  - Monitor for bleeding, hypotension and signs of decreased cardiac output  - Evaluate effectiveness of vasoactive medications to optimize hemodynamic stability  - Monitor arterial and/or venous puncture sites for bleeding and/or hematoma  - Assess quality of pulses, skin color and temperature  - Assess for signs of decreased coronary artery perfusion - ex. Angina  - Evaluate fluid balance, assess for edema, trend weights  Outcome: Progressing  Goal: Absence of cardiac arrhythmias or at baseline  Description: INTERVENTIONS:  - Continuous cardiac monitoring,  monitor vital signs, obtain 12 lead EKG if indicated  - Evaluate effectiveness of antiarrhythmic and heart rate control medications as ordered  - Initiate emergency measures for life threatening arrhythmias  - Monitor electrolytes and administer replacement therapy as ordered  Outcome: Progressing     Problem: SKIN/TISSUE INTEGRITY - ADULT  Goal: Skin integrity remains intact  Description: INTERVENTIONS  - Assess and document risk factors for pressure ulcer development  - Assess and document skin integrity  - Monitor for areas of redness and/or skin breakdown  - Initiate interventions, skin care algorithm/standards of care as needed  Outcome: Progressing  Goal: Incision(s), wounds(s) or drain site(s) healing without S/S of infection  Description: INTERVENTIONS:  - Assess and document risk factors for pressure ulcer development  - Assess and document skin integrity  - Assess and document dressing/incision, wound bed, drain sites and surrounding tissue  - Implement wound care per orders  - Initiate isolation precautions as appropriate  - Initiate Pressure Ulcer prevention bundle as indicated  Outcome: Progressing  Goal: Oral mucous membranes remain intact  Description: INTERVENTIONS  - Assess oral mucosa and hygiene practices  - Implement preventative oral hygiene regimen  - Implement oral medicated treatments as ordered  Outcome: Progressing     Problem: CONFUSION  Goal: Confusion, delirium, dementia or psychosis is improved or at baseline  Description: INTERVENTIONS:  - Assess for possible contributors to thought disturbance, including medications, impaired vision or hearing, underlying metabolic abnormalities, dehydration, psychiatric diagnoses, and notify attending MD/LIP  - [place] high risk fall precautions, as indicated  - Provide frequent short contacts to provide reality reorientation, refocusing and direction  - Decrease environmental stimuli, including noise as appropriate  - Monitor and intervene to  maintain adequate nutrition, hydration, elimination, sleep and activity  - Consider the need for a sitter if unable to leave patient unattended  - Initiate Spiritual Care consult as indicated  - Consult Pharmacy as needed  Outcome: Progressing

## 2024-09-12 NOTE — DIETARY NOTE
ADULT NUTRITION REASSESSMENT    Pt is at moderate nutrition risk.  Pt does not meet malnutrition criteria.        RECOMMENDATIONS TO MD: See Nutrition Intervention and Nutrition education provided    ADMITTING DIAGNOSIS:  Altered mental status, unspecified altered mental status type [R41.82]  PERTINENT PAST MEDICAL HISTORY:   Past Medical History:    Allergic rhinitis    Anxiety    Arthritis    RA and Osteoarthritis    Asthma (HCC)    Depression    Not officially diagnosed    Esophageal reflux    Essential hypertension    High blood pressure    High cholesterol    Hyperlipidemia    Myocardial infarction (HCC)    Cardiac event    Osteoarthritis    Pancreatitis (HCC)    Problems with swallowing    RA (rheumatoid arthritis) (HCC)    Sleep apnea       PATIENT STATUS: Initial 09/06/24: Pt admit for AMS. PMHx sig for HTN, RA, chronic adrenal insufficiency, NIKOLAI, and others as seen above. Pt known to nutrition department from multiple recent hospitalizations in July and August. Pt was in rehab facility in the last week PTA. Pt assessed due to screening at risk for MST and wound alert. Visited pt today, sister in the room. Diet Hx: Pt reported poor appetite, consuming 25% of meals per documentation. Endorsed poor appetite PTA. Stated  intakes have been significantly decreased in the last 3-6 months due to dysgeusia, loss of appetite, and recent hospitalizations (July, August). RD reviewed last admissions' documentation and suboptimal <75% po was noted. Weight Hx: pt denied weight loss at admission MST. Reported UBW: 187-192 over the last several months, all of 2024. #.  This 6-11# weight loss represents a 4-8% loss of body weight in 6 months, not significant. NFPE conducted, no muscle/fat loss noted. Pt does not meets criteria for PCM. Pt with unstageable wound to posterior right leg, increasing protein needs. Nutrition Plan:  recommended protein/calorie supplementation to help pt meet nutrient needs 2/2 poor po and  due to increased nutrient needs for wound healing. Pt verbalized understanding, agreeable to Ensure Enlive strawberry.      Update 09/12/24: Reassessment per protocol. Additionally, attending MD consulting nutrition for \"AM hypoglycemia of unclear etiology\". Chart reviewed. Learning needs assessed. Visited pt today for brief reassessment interview and education. Pt with improved po intakes, now consuming 60-00% of documented meals, plus pt reported good intake of meals brought in from home. Clinical status has improved and pt now cleared for discharge. Endocrinology followed pt due to Adrenal insufficiency and management with corticosteroids. Endo and attending MD recommending nutrition consult prior to discharge due to pt experiencing low BG in the mornings. RD provided and reviewed NCM handout: \"Hypoglycemia (without diabetes) Nutrition Therapy\". Recommendations include abstaining from long periods of time without food, especially over 8 hours. Recommended pt to have evening snack with 15 grams of CHO and some protein, about 1 hours before bedtime; Carbohydrate food list provided and reviewed with pt. Pt verbalized understanding and expressed appreciation for RD visit.      FOOD/NUTRITION RELATED HISTORY:  Appetite: Good and Improved   Intake: ~% x11 meals documented since last visit,and goo intake of several meals from home, and bout 50% of ONS per flowsheet documentation.  Intake Meeting Needs: Yes, and oral nutrition supplements (ONS) to maximize  Percent Meals Eaten (last 6 days)       Date/Time Percent Meals Eaten (%)    09/06/24 1200 25 %     Percent Meals Eaten (%): per review of tray at 09/06/24 1200    09/08/24 1000 60 %    09/08/24 1405 --     Percent Meals Eaten (%): food from home at 09/08/24 1405    09/08/24 1811 --     Percent Meals Eaten (%): food from home at 09/08/24 1811    09/09/24 0959 80 %    09/09/24 1524 60 %    09/09/24 1847 --     Percent Meals Eaten (%): food from home at 09/09/24  1847    09/11/24 1015 100 %    09/11/24 1320 0 %    09/11/24 1700 --     Percent Meals Eaten (%): pt refused dinner; ate food from home at 09/11/24 1700    09/12/24 0826 100 %          Food Allergies:  bananas, itching reaction  Cultural/Ethnic/Catholic Preferences: None    GASTROINTESTINAL: no GI issues reported per pt/chart and +BM 9/09 per flowsheets    MEDICATIONS: reviewed  Scheduled:   predniSONE  10 mg Oral Daily with dinner    predniSONE  20 mg Oral Daily with breakfast    lamoTRIgine  50 mg Oral BID    aspirin  81 mg Oral Daily    atorvastatin  10 mg Oral Daily    clopidogrel  75 mg Oral Daily    ergocalciferol  50,000 Units Oral Weekly    ferrous sulfate  325 mg Oral Daily with breakfast    folic acid  1 mg Oral Daily    hydroxychloroquine  200 mg Oral BID    losartan  25 mg Oral Daily    oxybutynin ER  10 mg Oral Daily    pantoprazole  40 mg Oral BID AC    sodium bicarbonate  650 mg Oral BID    collagenase   Topical Daily    QUEtiapine  50 mg Oral Nightly    metoprolol tartrate  25 mg Oral BID    enoxaparin  40 mg Subcutaneous Daily     LABS: reviewed,   Recent Labs     09/09/24  1416 09/11/24  0610 09/12/24  0707   GLU 86 75  --    BUN 14 12  --    CREATSERUM 0.71 0.62  --    CA 8.8 7.9*  --     140  --    K 4.0 3.3* 3.5    112  --    CO2 20.0* 19.0*  --    OSMOCALC 292 288  --        NUTRITION RELATED PHYSICAL FINDINGS:  - Nutrition Focused Physical Exam (NFPE): well nourished and no wasting noted  - Fluid Accumulation: +1 Bilateral Foot  See RN documentation for details  - Skin Integrity: Pressure Injury: unstageable on Posterior right leg and other wounds noted See RN documentation for details    ANTHROPOMETRICS:  HT: 157.5 cm (5' 2\")  WT: 82.2 kg (181 lb 3.2 oz)   BMI: Body mass index is 33.14 kg/m².  BMI CLASSIFICATION: 30-34.9 kg/m2 - obesity class I  IBW: 110 lbs        164% IBW  Usual Body Wt: 187-190 lbs   6 months to 1 yr   ~92-96% UBW    WEIGHT HISTORY:  Patient Weight(s) for the  past 336 hrs:   Weight   09/05/24 2029 82.2 kg (181 lb 3.2 oz)   09/05/24 1232 80.7 kg (178 lb)     Wt Readings from Last 10 Encounters:   09/05/24 82.2 kg (181 lb 3.2 oz)   08/27/24 87.7 kg (193 lb 5 oz)   08/21/24 89.8 kg (198 lb)   08/08/24 82.6 kg (182 lb)   07/22/24 83.4 kg (183 lb 12.8 oz)   06/28/24 87.1 kg (192 lb)   09/06/21 72.6 kg (160 lb)   08/11/21 77.1 kg (170 lb)     NUTRITION DIAGNOSIS/PROBLEM:   Inadequate oral intake related to Physiological causes increasing nutrient needs as evidenced by increased needs for unstageable wound to posterior right leg and reported poor po intakes for several months, documented sub optimal intakes during previous recent admissions.    NUTRITION DIAGNOSIS PROGRESS:  Improvement (unresolved) - improved po, 60/100% of meals and ~50% of ONS    NUTRITION INTERVENTION:     NUTRITION PRESCRIPTION:   Estimated Nutrition needs: --dosing wt of 82 kg - wt taken on 09/05/24  Calories: 7317-3730 calories/day (20-22 calories per kg Dosing wt)  Protein:  g protein/day (1.25-2.0 g protein/kg IBW wt  [50 kg])  Fluid Needs: 9884-3587 ml/day   mL/day (1mL/kcal/day)    - Diet:       Procedures    Cardiac diet Cardiac; Is Patient on Accuchecks? No     - Meals and snacks: Encouraged small frequent meals, Encouraged adequate PO intake, and Encouraged increased PO intake  - Medical Food Supplements-RD added Ensure Enlive (350 calories/ 20 g protein each) BID Strawberry. Rational/use of oral supplements discussed.  - Vitamin and mineral supplements: none  - Feeding assistance: meal set up  - Nutrition education: Discussed importance of adequate energy and protein intake and ONS to support. On 9/12 prior to discharge: RD provided and reviewed NCM handout: \"Hypoglycemia (without diabetes) Nutrition Therapy\".    - Coordination of nutrition care: collaboration with other providers  - Discharge and transfer of nutrition care to new setting or provider: monitor plans    MONITOR AND  EVALUATE/NUTRITION GOALS:  - Food and Nutrient Intake:      Monitor: adequacy of PO intake and adequacy of supplement intake  - Food and Nutrient Administration:      Monitor: N/A  - Anthropometric Measurement:    Monitor weight  - Nutrition Goals:      maintain wt within 5%, intake to meet needs, good supplement intake, euglycemia, support wound healing, and support body systems    DIETITIAN FOLLOW UP: RD to follow and monitor nutrition status.      Jenny Martinez RD, LDN  Clinical Dietitian  Available via Epic securechat  Ext. [phone]

## 2024-09-12 NOTE — TELEPHONE ENCOUNTER
Patient's sugars have been on the lower side in the hospital.  Discharging her on prednisone 20 mg in the morning and 10 mg in the evening x 3 days  10 mg in the morning and 10 mg in the evening after that.  I think she will need dose titration as an outpatient.  Dr. Powell , would you like to see her in the office?.  Thanks.    Endo staff: Please thanks

## 2024-09-12 NOTE — SPIRITUAL CARE NOTE
Spiritual Care Visit Note    Patient Name: Sheri Garzon Date of Spiritual Care Visit: 24   : 1960 Primary Dx: Altered mental status, unspecified altered mental status type       Referred By: Referral From:     Spiritual Care Taxonomy:    Intended Effects: Helping someone feel comforted    Methods: Collaborate with care team member;Offer support    Interventions: Active listening;Ask guided questions;Silent prayer;Provide compassionate touch    Visit Type/Summary:     - Spiritual Care: Consulted with RN prior to visit. Offered empathic listening and emotional support. Patient and family expressed appreciation for  visit. Provided information regarding how to contact Spiritual Care and left a Spiritual Care information card.       ERIN Ray CAMII   [id number]     Spiritual Care support can be requested via an Epic consult. For urgent/immediate needs, please contact the On Call  at: Doon: ext [phone]

## 2024-09-12 NOTE — DISCHARGE INSTRUCTIONS
To prevent low blood sugars:    Continue accuchecks qAC and at bedtime  Three meals and snacks in between meals including bedtime snack

## 2024-09-12 NOTE — PLAN OF CARE
Report given to Livia. Pt returning back to [facility]. Pt discharged with all belongings.     Problem: Patient Centered Care  Goal: Patient preferences are identified and integrated in the patient's plan of care  Description: Interventions:  - What would you like us to know as we care for you?   - Provide timely, complete, and accurate information to patient/family  - Incorporate patient and family knowledge, values, beliefs, and cultural backgrounds into the planning and delivery of care  - Encourage patient/family to participate in care and decision-making at the level they choose  - Honor patient and family perspectives and choices  9/12/2024 1431 by Pura Gonzalez, RN  Outcome: Adequate for Discharge  9/12/2024 1042 by Pura Gonzalez RN  Outcome: Progressing       Problem: CARDIOVASCULAR - ADULT  Goal: Maintains optimal cardiac output and hemodynamic stability  Description: INTERVENTIONS:  - Monitor vital signs, rhythm, and trends  - Monitor for bleeding, hypotension and signs of decreased cardiac output  - Evaluate effectiveness of vasoactive medications to optimize hemodynamic stability  - Monitor arterial and/or venous puncture sites for bleeding and/or hematoma  - Assess quality of pulses, skin color and temperature  - Assess for signs of decreased coronary artery perfusion - ex. Angina  - Evaluate fluid balance, assess for edema, trend weights  9/12/2024 1431 by Pura Gonzalez RN  Outcome: Adequate for Discharge  9/12/2024 1042 by Pura Gonzalez, RN  Outcome: Progressing  Goal: Absence of cardiac arrhythmias or at baseline  Description: INTERVENTIONS:  - Continuous cardiac monitoring, monitor vital signs, obtain 12 lead EKG if indicated  - Evaluate effectiveness of antiarrhythmic and heart rate control medications as ordered  - Initiate emergency measures for life threatening arrhythmias  - Monitor electrolytes and administer replacement therapy as ordered  9/12/2024 1431 by  Gonzalez, Pura, RN  Outcome: Adequate for Discharge  9/12/2024 1042 by Pura Gonzalez RN  Outcome: Progressing     Problem: SKIN/TISSUE INTEGRITY - ADULT  Goal: Skin integrity remains intact  Description: INTERVENTIONS  - Assess and document risk factors for pressure ulcer development  - Assess and document skin integrity  - Monitor for areas of redness and/or skin breakdown  - Initiate interventions, skin care algorithm/standards of care as needed  9/12/2024 1431 by Pura Gonzalez RN  Outcome: Adequate for Discharge  9/12/2024 1042 by Pura Gonzalez RN  Outcome: Progressing  Goal: Incision(s), wounds(s) or drain site(s) healing without S/S of infection  Description: INTERVENTIONS:  - Assess and document risk factors for pressure ulcer development  - Assess and document skin integrity  - Assess and document dressing/incision, wound bed, drain sites and surrounding tissue  - Implement wound care per orders  - Initiate isolation precautions as appropriate  - Initiate Pressure Ulcer prevention bundle as indicated  9/12/2024 1431 by Pura Gonzalez RN  Outcome: Adequate for Discharge  9/12/2024 1042 by Pura Gonzalez RN  Outcome: Progressing  Goal: Oral mucous membranes remain intact  Description: INTERVENTIONS  - Assess oral mucosa and hygiene practices  - Implement preventative oral hygiene regimen  - Implement oral medicated treatments as ordered  9/12/2024 1431 by Pura Gonzalez RN  Outcome: Adequate for Discharge  9/12/2024 1042 by Pura Gonzalez RN  Outcome: Progressing     Problem: CONFUSION  Goal: Confusion, delirium, dementia or psychosis is improved or at baseline  Description: INTERVENTIONS:  - Assess for possible contributors to thought disturbance, including medications, impaired vision or hearing, underlying metabolic abnormalities, dehydration, psychiatric diagnoses, and notify attending MD/LIP  - [place] high risk fall precautions, as indicated  - Provide frequent short contacts to  provide reality reorientation, refocusing and direction  - Decrease environmental stimuli, including noise as appropriate  - Monitor and intervene to maintain adequate nutrition, hydration, elimination, sleep and activity  - Consider the need for a sitter if unable to leave patient unattended  - Initiate Spiritual Care consult as indicated  - Consult Pharmacy as needed  9/12/2024 1431 by Pura Gonzalez, RN  Outcome: Adequate for Discharge  9/12/2024 1042 by Pura Gonzalez, RN  Outcome: Progressing

## 2024-09-12 NOTE — PLAN OF CARE
Problem: Patient Centered Care  Goal: Patient preferences are identified and integrated in the patient's plan of care  Description: Interventions:  - What would you like us to know as we care for you? Pt clear for discharge to return to rehab  - Provide timely, complete, and accurate information to patient/family  - Incorporate patient and family knowledge, values, beliefs, and cultural backgrounds into the planning and delivery of care  - Encourage patient/family to participate in care and decision-making at the level they choose  - Honor patient and family perspectives and choices  Outcome: Progressing     Problem: Patient/Family Goals  Goal: Patient/Family Long Term Goal  Description: Patient's Long Term Goal: Pt would like to return to rehab today     Interventions:  - Pt updated on plan of care   - See additional Care Plan goals for specific interventions  Outcome: Progressing  Goal: Patient/Family Short Term Goal  Description: Patient's Short Term Goal: Pt would like ensure with meals    Interventions:   - Ensure ordered, pt encouraged to eat and drink  - See additional Care Plan goals for specific interventions  Outcome: Progressing     Problem: CARDIOVASCULAR - ADULT  Goal: Maintains optimal cardiac output and hemodynamic stability  Description: INTERVENTIONS:  - Monitor vital signs, rhythm, and trends  - Monitor for bleeding, hypotension and signs of decreased cardiac output  - Evaluate effectiveness of vasoactive medications to optimize hemodynamic stability  - Monitor arterial and/or venous puncture sites for bleeding and/or hematoma  - Assess quality of pulses, skin color and temperature  - Assess for signs of decreased coronary artery perfusion - ex. Angina  - Evaluate fluid balance, assess for edema, trend weights  Outcome: Progressing  Goal: Absence of cardiac arrhythmias or at baseline  Description: INTERVENTIONS:  - Continuous cardiac monitoring, monitor vital signs, obtain 12 lead EKG if  indicated  - Evaluate effectiveness of antiarrhythmic and heart rate control medications as ordered  - Initiate emergency measures for life threatening arrhythmias  - Monitor electrolytes and administer replacement therapy as ordered  Outcome: Progressing     Problem: SKIN/TISSUE INTEGRITY - ADULT  Goal: Skin integrity remains intact  Description: INTERVENTIONS  - Assess and document risk factors for pressure ulcer development  - Assess and document skin integrity  - Monitor for areas of redness and/or skin breakdown  - Initiate interventions, skin care algorithm/standards of care as needed  Outcome: Progressing  Goal: Incision(s), wounds(s) or drain site(s) healing without S/S of infection  Description: INTERVENTIONS:  - Assess and document risk factors for pressure ulcer development  - Assess and document skin integrity  - Assess and document dressing/incision, wound bed, drain sites and surrounding tissue  - Implement wound care per orders  - Initiate isolation precautions as appropriate  - Initiate Pressure Ulcer prevention bundle as indicated  Outcome: Progressing  Goal: Oral mucous membranes remain intact  Description: INTERVENTIONS  - Assess oral mucosa and hygiene practices  - Implement preventative oral hygiene regimen  - Implement oral medicated treatments as ordered  Outcome: Progressing     Problem: CONFUSION  Goal: Confusion, delirium, dementia or psychosis is improved or at baseline  Description: INTERVENTIONS:  - Assess for possible contributors to thought disturbance, including medications, impaired vision or hearing, underlying metabolic abnormalities, dehydration, psychiatric diagnoses, and notify attending MD/LIP  - [place] high risk fall precautions, as indicated  - Provide frequent short contacts to provide reality reorientation, refocusing and direction  - Decrease environmental stimuli, including noise as appropriate  - Monitor and intervene to maintain adequate nutrition, hydration,  elimination, sleep and activity  - Consider the need for a sitter if unable to leave patient unattended  - Initiate Spiritual Care consult as indicated  - Consult Pharmacy as needed  Outcome: Progressing

## 2024-09-12 NOTE — CM/SW NOTE
09/12/24 0917   Discharge disposition   Expected discharge disposition subacute   Post Acute Care Provider [place]   Discharge transportation [facility]     Pt discussed in RN DC rounds. SW was informed patient is anticipated to be discharge today. Pt requires an ambulance according to the RN due to being a max assist, AMS, R hip pain. SW called and ordered an Ambulance from [facility] to transport pt to [facility]  at 3:00 PM.  PCS flow sheet completed. RN to attached to AVS and print out.  RN is to call for report at .       SW/AMANDA to remain available for support and/or discharge planning.     Quyen Hdz, MSW, LSW   x [phone]

## 2024-09-12 NOTE — TELEPHONE ENCOUNTER
Attempted to call patient, left message as well as my chart message sent to schedule appointment.

## 2024-09-13 NOTE — PAYOR COMM NOTE
--------------  DISCHARGE REVIEW    Payor: [facility] OUT OF STATE HMO  Subscriber #:  [id number]  Authorization Number: [id number]    Admit date: 24  Admit time:   7:23 PM  Discharge Date: 2024  4:15 PM     Admitting Physician: Christianne Davis MD  Attending Physician:  No att. providers found  Primary Care Physician: Sulma Juarez MD          Discharge Summary Notes        Discharge Summary signed by Maryse Lenz MD at 2024 10:05 AM       Author: Maryse Lenz MD Specialty: HOSPITALIST, Internal Medicine Author Type: Physician    Filed: 2024 10:05 AM Date of Service: 2024  9:14 AM Status: Addendum    : Maryse Lenz MD (Physician)    Related Notes: Original Note by Maryse Lenz MD (Physician) filed at 2024  9:39 AM            Discharge Summary     Sheri Garzon Patient Status:  Inpatient    1960 MRN [id number]   [facility] 5SW/SE Attending Maryse Lenz MD   Hosp Day # 7 PCP SULMA JUAREZ MD     Date of Admission: 2024  Date of Discharge: 2024  Discharge Disposition: SNF Subacute Rehab    Discharge Diagnosis:     1.       Acute encephalopathy, suspect acute psychosis considering visual and auditory hallucinations accompanied by bizarre behavior.  2.       Minimal low-grade fever with leukocytosis.   4. Adrenal insuff with hypoglycemia   history of depression and anxiety.     History of Present Illness:             Brief Synopsis:   1.       Acute encephalopathy, suspect acute psychosis considering visual and auditory hallucinations accompanied by bizarre behavior.  - apprec psych consult - discussed with dr whitaker - haldol and seroquel added . Likely bipolar   - much better      2.       Minimal low-grade fever with leukocytosis.    So far no clear indication of an infectious process.  Will continue to monitor her temperature curve.   Wound service consult to evaluate her right foot dorsum superficial abrasion.    - sp iv  cefepime for right leg wound   - apprec ID consult - stopped abx 9/10. Monitor off abx   - wbc up today likely due to steroids      3.       Rheumatoid arthritis.  -fall:- no BHT;  R hip pain- check Xray neg fracture or dislocation  Good int and ext rotation        4. Adrenal insuff with hypoglycemia   - pt 's prednisone stopped at rehab for last 5 days.   - hydrocortisone for crisis  Apprec endo input   D 5 + 0.45 at 75 ml/hr then stopped    At dc continue accuchecks qAC and at bedtime  Three meals and snacks in between meals including bedtime snack  Prednisone 20mg po bid x 4 days daily then resume home dose at 10mg po bid  Nutriotion consult prior to dc        history of depression and anxiety.   Psychiatry consulted  Continue Seroquel 50 mg nightly.  Increase lamotrigine to 50 mg twice daily.  Discontinue Haldol.  Patient appropriate to return to [facility].        HTN:  Pt low normal BP during admission  Dc ARB at dc and decrease BB dose  C/u monitor      Lace+ Score: 56  59-90 High Risk  29-58 Medium Risk  0-28   Low Risk       TCM Follow-Up Recommendation:  LACE < 29: Low Risk of readmission after discharge from the hospital; Still recommend for TCM follow-up.        Consultants:  Endocrinology. ID, psychiatry    Discharge Medication List:     Discharge Medications        START taking these medications        Instructions Prescription details   collagenase 250 UNIT/GM Oint  Commonly known as: Santyl      Apply topically daily.   Refills: 0     HYDROcodone-acetaminophen  MG Tabs  Commonly known as: Norco      Take 1 tablet by mouth every 4 (four) hours as needed.   Quantity: 20 tablet  Refills: 0     lamoTRIgine 25 MG Tabs  Commonly known as: LaMICtal      Take 2 tablets (50 mg total) by mouth 2 (two) times daily.   Stop taking on: October 12, 2024  Quantity: 120 tablet  Refills: 0     QUEtiapine 50 MG Tabs  Commonly known as: SEROquel      Take 1 tablet (50 mg total) by mouth nightly.   Stop taking on: October  12, 2024  Quantity: 30 tablet  Refills: 0            CHANGE how you take these medications        Instructions Prescription details   predniSONE 20 MG Tabs  Commonly known as: Deltasone  Start taking on: September 12, 2024  What changed:   medication strength  See the new instructions.      Take 1 tablet (20 mg total) by mouth daily with breakfast for 4 days, THEN 0.5 tablets (10 mg total) daily with breakfast.   Stop taking on: October 16, 2024  Quantity: 19 tablet  Refills: 0            CONTINUE taking these medications        Instructions Prescription details   Acetaminophen 8 Hour 650 MG Tbcr  Generic drug: Acetaminophen ER      Take 2-3 tablets (1,300-1,950 mg total) by mouth every 8 (eight) hours as needed for Pain.   Refills: 0     albuterol 108 (90 Base) MCG/ACT Aers  Commonly known as: Ventolin HFA      Inhale 2 puffs into the lungs every 4 to 6 hours as needed for Wheezing.   Refills: 0     Aspirin 81 MG Caps      Take 81 mg by mouth daily.   Refills: 0     atorvastatin 10 MG Tabs  Commonly known as: Lipitor      Take 1 tablet (10 mg total) by mouth daily.   Refills: 0     clopidogrel 75 MG Tabs  Commonly known as: Plavix      Take 1 tablet (75 mg total) by mouth daily.   Refills: 0     clotrimazole-betamethasone 1-0.05 % Crea  Commonly known as: Lotrisone      Apply 1 Application topically 2 (two) times daily. Apply to groin and abdominal folds   Refills: 0     Coppermin 5 MG Tabs  Generic drug: Copper      Take 1 tablet by mouth daily.   Quantity: 30 tablet  Refills: 0     ergocalciferol 1.25 MG (65948 UT) Caps  Commonly known as: Vitamin D2      Take 1 capsule (50,000 Units total) by mouth once a week.   Refills: 0     Ferrous Gluconate 324 (38 Fe) MG Tabs      Take 1 tablet (325 mg total) by mouth daily with breakfast.   Stop taking on: September 23, 2024  Quantity: 30 tablet  Refills: 0     folic acid 1 MG Tabs  Commonly known as: Folvite      Take 1 tablet (1 mg total) by mouth daily.   Quantity: 90  tablet  Refills: 0     hydroxychloroquine 200 MG Tabs  Commonly known as: Plaquenil      Take 1 tablet (200 mg total) by mouth 2 (two) times daily.   Quantity: 180 tablet  Refills: 0     ibuprofen 200 MG Tabs  Commonly known as: Motrin      Take 3 tablets (600 mg total) by mouth every 8 (eight) hours as needed for Pain.   Refills: 0     losartan 50 MG Tabs  Commonly known as: Cozaar      Take 0.5 tablets (25 mg total) by mouth daily.   Refills: 0     metoprolol tartrate 50 MG Tabs  Commonly known as: Lopressor      Take 0.5 tablets (25 mg total) by mouth 2 (two) times daily.   Refills: 0     oxybutynin ER 10 MG Tb24  Commonly known as: Ditropan-XL      Take 1 tablet (10 mg total) by mouth daily.   Refills: 0     pantoprazole 40 MG Tbec  Commonly known as: Protonix      Take 1 tablet (40 mg total) by mouth 2 (two) times daily before meals.   Stop taking on: October 12, 2024  Quantity: 60 tablet  Refills: 0     Potassium Chloride ER 10 MEQ Tbcr      Take 1 tablet (10 mEq total) by mouth 2 (two) times daily.   Refills: 0     sodium bicarbonate 650 MG Tabs      Take 1 tablet (650 mg total) by mouth 2 (two) times daily.   Stop taking on: September 29, 2024  Quantity: 60 tablet  Refills: 0            STOP taking these medications      cefpodoxime 200 MG Tabs  Commonly known as: Vantin        methotrexate 2.5 MG Tabs  Commonly known as: Rheumatrex                  Where to Get Your Medications        These medications were sent to [id number] [place], [place] - [address] AT [facility], [phone], [phone]  [address] [place] [id number]      Phone: [phone]   lamoTRIgine 25 MG Tabs  pantoprazole 40 MG Tbec  predniSONE 20 MG Tabs  QUEtiapine 50 MG Tabs       Please  your prescriptions at the location directed by your doctor or nurse    Bring a paper prescription for each of these medications  HYDROcodone-acetaminophen  MG Tabs         Follow-up  appointment:   No follow-up provider specified.  Appointments for Next 30 Days 9/12/2024 - 10/12/2024        Date Arrival Time Visit Type Length Department Provider     9/20/2024  9:00 AM  LAB - EM CC [[id number]] 15 min [facility] Hematology Oncology [facility] RESOURCE    Patient Instructions:         Location Instructions:     Your appointment is at the [facility]. The address is [address] [place] [address]. The entrance is located on the East side of the [place] parking lot.  Masks are optional for all patients and visitors, unless otherwise indicated.               9/20/2024 10:00 AM  FOLLOW UP-HEM/ONC [[id number]] 30 min [facility] Hematology Oncology Mercy Khan MD    Patient Instructions:         Location Instructions:     Your appointment is at the [facility]. The address is [address] [place] [address]. The entrance is located on the East side of the [place] parking lot.  Masks are optional for all patients and visitors, unless otherwise indicated.               9/23/2024  2:00 PM  CONSULT [[id number]] 40 min [facility] [place] [place] Doreen Brooke MD    Patient Instructions:     Pleaese arrive 15 min. prior to your appointment to complete your registration.   Bring your ID, Insuance card, co-pay amount and test results.   **If patient has an HMO insurance: Please bring your referral**        Location Instructions:     Your appointment is located at [address] [facility] in [place], [place].&nbsp; Please park in the Yellow lot and enter through the [place] for [facility] entrance.&nbsp; Then proceed to [address].  Masks are optional for all patients and visitors, unless otherwise indicated.                      Supplementary Documentation:   [place] reviewed[place] na    Vital signs:  Temp:  [98.4 °F (36.9 °C)-98.8 °F (37.1 °C)] 98.5 °F (36.9 °C)  Pulse:  [] 100  Resp:  [16-20] 18  BP: ()/(62-84) 93/68  SpO2:  [95 %-100 %] 100  %    Physical Exam:    General:  NAD  Cardiovascular:  S1, S2    -----------------------------------------------------------------------------------------------  PATIENT DISCHARGE INSTRUCTIONS: See electronic chart    Tip: Documentation requirements: For split shared discharge, BOTH providers need to document specific floor, unit, and time spent on the discharge.  The note needs to be signed by the provider with > 50% of time and bill under their NPI.   Time spent:  45 min        Maryse Lenz MD          Electronically signed by Maryse Lenz MD on 9/12/2024 10:05 AM         REVIEWER COMMENTS

## 2024-09-20 ENCOUNTER — NURSE ONLY (OUTPATIENT)
Dept: HEMATOLOGY/ONCOLOGY | Facility: HOSPITAL | Age: 64
End: 2024-09-20
Attending: STUDENT IN AN ORGANIZED HEALTH CARE EDUCATION/TRAINING PROGRAM
Payer: COMMERCIAL

## 2024-09-20 VITALS
TEMPERATURE: 98 F | OXYGEN SATURATION: 100 % | DIASTOLIC BLOOD PRESSURE: 42 MMHG | RESPIRATION RATE: 18 BRPM | SYSTOLIC BLOOD PRESSURE: 123 MMHG | HEART RATE: 83 BPM

## 2024-09-20 DIAGNOSIS — D72.9 NEUTROPHILIA: Primary | ICD-10-CM

## 2024-09-20 DIAGNOSIS — D70.9 NEUTROPENIA (HCC): ICD-10-CM

## 2024-09-20 DIAGNOSIS — E61.1 IRON DEFICIENCY: ICD-10-CM

## 2024-09-20 LAB
BASOPHILS # BLD AUTO: 0.02 X10(3) UL (ref 0–0.2)
BASOPHILS NFR BLD AUTO: 0.1 %
DEPRECATED RDW RBC AUTO: 70 FL (ref 35.1–46.3)
EOSINOPHIL # BLD AUTO: 0.01 X10(3) UL (ref 0–0.7)
EOSINOPHIL NFR BLD AUTO: 0.1 %
ERYTHROCYTE [DISTWIDTH] IN BLOOD BY AUTOMATED COUNT: 19.7 % (ref 11–15)
HCT VFR BLD AUTO: 31.7 %
HGB BLD-MCNC: 9.7 G/DL
IMM GRANULOCYTES # BLD AUTO: 0.16 X10(3) UL (ref 0–1)
IMM GRANULOCYTES NFR BLD: 1.1 %
LYMPHOCYTES # BLD AUTO: 2.89 X10(3) UL (ref 1–4)
LYMPHOCYTES NFR BLD AUTO: 20.1 %
MCH RBC QN AUTO: 29.5 PG (ref 26–34)
MCHC RBC AUTO-ENTMCNC: 30.6 G/DL (ref 31–37)
MCV RBC AUTO: 96.4 FL
MONOCYTES # BLD AUTO: 1.24 X10(3) UL (ref 0.1–1)
MONOCYTES NFR BLD AUTO: 8.6 %
NEUTROPHILS # BLD AUTO: 10.03 X10 (3) UL (ref 1.5–7.7)
NEUTROPHILS # BLD AUTO: 10.03 X10(3) UL (ref 1.5–7.7)
NEUTROPHILS NFR BLD AUTO: 70 %
PLATELET # BLD AUTO: 344 10(3)UL (ref 150–450)
PLATELET MORPHOLOGY: NORMAL
RBC # BLD AUTO: 3.29 X10(6)UL
WBC # BLD AUTO: 14.4 X10(3) UL (ref 4–11)

## 2024-09-20 PROCEDURE — 99214 OFFICE O/P EST MOD 30 MIN: CPT | Performed by: STUDENT IN AN ORGANIZED HEALTH CARE EDUCATION/TRAINING PROGRAM

## 2024-09-20 PROCEDURE — 85025 COMPLETE CBC W/AUTO DIFF WBC: CPT

## 2024-09-20 PROCEDURE — 36415 COLL VENOUS BLD VENIPUNCTURE: CPT

## 2024-09-20 NOTE — PROGRESS NOTES
Hematology/Oncology Clinic Follow Up Visit    Patient Name: Sheri Garzon  Medical Record Number: [id number]    YOB: 1960   PCP:   Other providers:      Reason for Consultation:  Sheri Garzon was seen today for the diagnosis of leukopenia and anemia    =============================================================  History of Present Illness:         64 year old female with a history of rheumatoid arthritis on chronic 20 mg prednisone daily, methotrexate which has been on hold since beginning of August 2024, was initially seen as an inpatient consult for her leukopenia and anemia.      Patient presented to the ER with a wound that was not healing on oral antibiotics.  She underwent bedside debridement on 15 August and was started on IV antibiotics.      Since her discharge from the hospital she was admitted to the times with adrenal insufficiency and encephalopathy.    Patient's white cells have recovered after hospital discharge.  Platelets have remained stable.  Her hemoglobin is improved.  Patient is currently off antibiotics.  She continues with Cruz catheter and iron supplementation.  Patient was also noted to be copper deficient during her inpatient workup and will  her copper prescription.    Past Medical History:  Past Medical History:    Allergic rhinitis    Anxiety    Arthritis    RA and Osteoarthritis    Asthma (HCC)    Depression    Not officially diagnosed    Esophageal reflux    Essential hypertension    High blood pressure    High cholesterol    Hyperlipidemia    Myocardial infarction (HCC)    Cardiac event    Osteoarthritis    Pancreatitis (HCC)    Problems with swallowing    RA (rheumatoid arthritis) (HCC)    Sleep apnea     Past Surgical History:   Procedure Laterality Date    Colonoscopy      Other surgical history      Revision of uterine anomaly      Rotator cuff repair      Wrist fracture surgery         Home Medications:   collagenase 250 UNIT/GM External Ointment  Apply topically daily.      HYDROcodone-acetaminophen  MG Oral Tab Take 1 tablet by mouth every 4 (four) hours as needed. 20 tablet 0    lamoTRIgine 25 MG Oral Tab Take 2 tablets (50 mg total) by mouth 2 (two) times daily. 120 tablet 0    pantoprazole 40 MG Oral Tab EC Take 1 tablet (40 mg total) by mouth 2 (two) times daily before meals. 60 tablet 0    QUEtiapine 50 MG Oral Tab Take 1 tablet (50 mg total) by mouth nightly. 30 tablet 0    metoprolol tartrate 25 MG Oral Tab Take 0.5 tablets (12.5 mg total) by mouth 2 (two) times daily. 30 tablet 0    predniSONE 10 MG Oral Tab Take 20mg (2 tabs) in AM and 10mg (1tab) for 4 days then resume 10mg twice  a day indefinitely 30 tablet 0    clotrimazole-betamethasone 1-0.05 % External Cream Apply 1 Application topically 2 (two) times daily. Apply to groin and abdominal folds      sodium bicarbonate 650 MG Oral Tab Take 1 tablet (650 mg total) by mouth 2 (two) times daily. 60 tablet 0    Copper (COPPERMIN) 5 MG Oral Tab Take 1 tablet by mouth daily. 30 tablet 0    Acetaminophen ER (ACETAMINOPHEN 8 HOUR) 650 MG Oral Tab CR Take 2-3 tablets (1,300-1,950 mg total) by mouth every 8 (eight) hours as needed for Pain.      ibuprofen 200 MG Oral Tab Take 3 tablets (600 mg total) by mouth every 8 (eight) hours as needed for Pain.      Ferrous Gluconate 324 (38 Fe) MG Oral Tab Take 1 tablet (325 mg total) by mouth daily with breakfast. 30 tablet 0    albuterol 108 (90 Base) MCG/ACT Inhalation Aero Soln Inhale 2 puffs into the lungs every 4 to 6 hours as needed for Wheezing.      hydroxychloroquine 200 MG Oral Tab Take 1 tablet (200 mg total) by mouth 2 (two) times daily. 180 tablet 0    Aspirin 81 MG Oral Cap Take 81 mg by mouth daily.      atorvastatin 10 MG Oral Tab Take 1 tablet (10 mg total) by mouth daily.      clopidogrel 75 MG Oral Tab Take 1 tablet (75 mg total) by mouth daily.      ergocalciferol 1.25 MG (56528 UT) Oral Cap Take 1 capsule (50,000 Units total) by mouth  once a week.      oxybutynin ER 10 MG Oral Tablet 24 Hr Take 1 tablet (10 mg total) by mouth daily.      Potassium Chloride ER 10 MEQ Oral Tab CR Take 1 tablet (10 mEq total) by mouth 2 (two) times daily.      folic acid 1 MG Oral Tab Take 1 tablet (1 mg total) by mouth daily. 90 tablet 0       Allergies:   Allergies   Allergen Reactions    Cephalosporins ANAPHYLAXIS, NAUSEA AND VOMITING and OTHER (SEE COMMENTS)     Other reaction(s): Fever    - Tolerated multiple doses of ceftriaxone on 7/2024 without any complications  - Tolerated multiple doses of cefepime 8/2024 without complications    Gadolinium Derivatives FEVER    Penicillins OTHER (SEE COMMENTS)     esophagitis    Sulfa Antibiotics OTHER (SEE COMMENTS)     esophagitis    Bananas ITCHING       Psychosocial History:  Social History     Socioeconomic History    Marital status: Single     Spouse name: Not on file    Number of children: Not on file    Years of education: Not on file    Highest education level: Not on file   Occupational History    Not on file   Tobacco Use    Smoking status: Never    Smokeless tobacco: Never   Vaping Use    Vaping status: Never Used   Substance and Sexual Activity    Alcohol use: Not Currently    Drug use: Never    Sexual activity: Not on file   Other Topics Concern    Not on file   Social History Narrative    Not on file     Social Determinants of Health     Financial Resource Strain: Not on file   Food Insecurity: No Food Insecurity (9/5/2024)    Food Insecurity     Food Insecurity: Never true   Transportation Needs: No Transportation Needs (9/5/2024)    Transportation Needs     Lack of Transportation: No     Car Seat: Not on file   Physical Activity: Not on file   Stress: Not on file   Social Connections: Not on file   Housing Stability: Low Risk  (9/5/2024)    Housing Stability     Housing Instability: No     Housing Instability Emergency: Not on file     Crib or Bassinette: Not on file       Family Medical History:  Family  History   Problem Relation Age of Onset    Diabetes Mother     Genetic Disease Mother     Heart Disorder Mother     Hypertension Mother     Obesity Mother     Diabetes Father     Hypertension Father     Depression Daughter     Psychiatric Daughter        Review of Systems:  A 10-point ROS was done with pertinent positives and negative per the HPI    Vital Signs:  Height: --  Weight: --  BSA (Calculated - sq m): --  Pulse: 83 (09/20 1011)  BP: 123/42 (09/20 1011)  Temp: 98 °F (36.7 °C) (09/20 1011)  Do Not Use - Resp Rate: --  SpO2: 100 % (09/20 1011)    Wt Readings from Last 6 Encounters:   09/05/24 82.2 kg (181 lb 3.2 oz)   08/27/24 87.7 kg (193 lb 5 oz)   08/21/24 89.8 kg (198 lb)   08/08/24 82.6 kg (182 lb)   07/22/24 83.4 kg (183 lb 12.8 oz)   06/28/24 87.1 kg (192 lb)       ECOG PS: 1    Physical Examination:  General: Patient is alert and oriented, not in acute distress  Psych: Mood and affect are appropriate  Eyes: EOMI, PERRL  ENT: Oropharynx is clear, no adenopathy  CV: Regular rate and rhythm, normal S1S2, no murmurs, no LE edema  Respiratory: Lungs clear to auscultation bilaterally  GI/Abd: Soft, non-tender with normoactive bowel sounds, no hepatosplenomegaly  Neurological: Grossly intact   Lymphatics: No palpable cervical, supraclavicular, axillary, or inguinal lymphadenopathy  Skin: no rashes or petechiae      Laboratory:  Lab Results   Component Value Date    WBC 14.4 (H) 09/20/2024    WBC 13.4 (H) 09/11/2024    WBC 16.6 (H) 09/10/2024    HGB 9.7 (L) 09/20/2024    HGB 7.8 (L) 09/11/2024    HGB 7.4 (L) 09/10/2024    HCT 31.7 (L) 09/20/2024    MCV 96.4 09/20/2024    MCH 29.5 09/20/2024    MCHC 30.6 (L) 09/20/2024    RDW 19.7 (H) 09/20/2024    .0 09/20/2024    .0 09/11/2024    .0 09/10/2024     Lab Results   Component Value Date    GLU 88 09/16/2024    BUN 17 09/16/2024    BUNCREA 21.0 (H) 09/16/2024    CREATSERUM 0.81 09/16/2024    CREATSERUM 0.62 09/11/2024    CREATSERUM 0.71  09/09/2024    ANIONGAP 11 09/16/2024    GFRNAA 51 (L) 09/06/2021    GFRAA 59 (L) 09/06/2021    CA 9.0 09/16/2024    OSMOCALC 297 (H) 09/16/2024    ALKPHO 108 09/16/2024    AST 21 09/16/2024    ALT 29 09/16/2024    BILT 0.4 09/16/2024    TP 5.5 (L) 09/16/2024    ALB 3.1 (L) 09/16/2024    GLOBULIN 2.4 09/16/2024     09/16/2024    K 4.6 09/16/2024     09/16/2024    CO2 20.0 (L) 09/16/2024     No results found for: \"PTT\", \"PT\", \"INR\"    Imaging:    NA    Pathology:  NA    Impression & Plan:     64-year-old female presents to the clinic to establish care for mild anemia and leukocytosis noted.  Leukocytosis with predominantly neutrophilic predominant.  Patient has been recovering from recent infections.  Her workup course suggestive of the iron deficiency for which she is on oral iron and also continues to take folic acid since patient was on methotrexate.    Since her hemoglobin has improved, we will not pursue any further workup.  Patient to continue on oral copper that was prescribed.    We will follow-up with patient in 2 to 3 months with repeat CBC.    Discussed with the patient about importance of establishing care with primary care and undergoing age-appropriate workup. Also advised to follow-up with GI for low iron level.    Mercy Khan MD  Hematology/Medical Oncology

## 2024-09-20 NOTE — CDS QUERY
Dear Dr. Herrera,  Clinical information (provided below) includes documentation indicating impaired skin integrity  Please specify the locations and types of wounds and   were they Present on Admission.               (  x)  Right posterior calf Pressure Ulcer Unstageable          POA: (x  ) Yes    (  )  No    (  ) Unable to determine                     (  ) Other (please specify):     ( x ) Right and left friction shear buttocks         POA: ( x ) Yes    (  )  No    (  ) Unable to determine                    (  ) Other (please specify):   (  ) Left friction shear Ischium         POA: ( x ) Yes    (  )  No    (  ) Unable to determine                     (  ) Other (please specify):  ___________________________________________________________________  Clinical Indicators:  Wound Care:   Present on Original Admission: Yes Primary Wound Type: (c) Pressure Injury Location: Leg Wound Location Orientation: Lower; Posterior;Right Wound Description (Comments): R posterior calf unstageable  2.  Present on Original Admission: Yes Primary Wound Type: Friction/Shear Location: Buttocks Wound Location Orientation: Right;Left  3.  Present on Original Admission: Yes Primary Wound Type: Friction/Shear Location: Ischium Wound Location Orientation: Left   Progress notes: right leg wound  Treatment: Cefepime, Aquacel Foam, Dressings  Risk Factors: rheumatoid arthritis , peripheral arterial disease.   For questions regarding this query, please contact Clinical : Shari Ocampo RN CCDS [phone]    Thank you.  THIS FORM IS A PERMANENT PART OF THE MEDICAL RECORD

## 2024-10-17 ENCOUNTER — TELEPHONE (OUTPATIENT)
Dept: INTERNAL MEDICINE CLINIC | Facility: CLINIC | Age: 64
End: 2024-10-17

## 2024-10-17 NOTE — TELEPHONE ENCOUNTER
Patient's sister, Gladys is calling to advise the patient is presently at [facility] site in [place], [place] and they are advising the patient will be discharged on 10/21/24 or 10/22/24.    The concern is if the insurance would continue to cover.    Patient's sister is concerned with the discharge advise as the home has stairs and patient is not lifting legs on her own .    Please advise.

## 2024-10-17 NOTE — TELEPHONE ENCOUNTER
Returned call to Gladys. Gladys states now not the best time to talk as she is about to  meet with a . I advised sister that was going to be my recommendation based on message below. She states patient's daughter also involved with patient's care and they will call our office back to keep us updated with patient's discharge process.

## 2024-10-27 ENCOUNTER — APPOINTMENT (OUTPATIENT)
Dept: CT IMAGING | Facility: HOSPITAL | Age: 64
End: 2024-10-27
Attending: EMERGENCY MEDICINE
Payer: COMMERCIAL

## 2024-10-27 ENCOUNTER — HOSPITAL ENCOUNTER (INPATIENT)
Facility: HOSPITAL | Age: 64
LOS: 13 days | Discharge: NURSING, REHAB OR LONG-TERM CARE | End: 2024-11-09
Attending: EMERGENCY MEDICINE | Admitting: INTERNAL MEDICINE
Payer: COMMERCIAL

## 2024-10-27 NOTE — ED INITIAL ASSESSMENT (HPI)
Patient via ems from SNF to ED with complaint of NV that began a few days ago. Recent admission for cellulitis. Pt also endorses difficulty eating and keeping food down. Family also has concerns about her care at facility she is at and is looking to change facilities.      Patient is AXOX4.

## 2024-10-27 NOTE — ED PROVIDER NOTES
Patient Seen in: [facility] Emergency Department      History     Chief Complaint   Patient presents with    Nausea/Vomiting/Diarrhea     Stated Complaint: failure to thrive/full code AOx3    Subjective:   HPI      64-year-old female presents for evaluation for nausea and vomiting for the past 2 weeks.  Family reports that she has been having very little to eat or drink over the past 2 weeks.  Patient reports that anytime she does eat or drink she gets sick to her stomach.  She also reports with her treating wounds on her back leg and foot.  She denies any chest pain, shortness of breath, cough.    Objective:     Past Medical History:    Allergic rhinitis    Anxiety    Arthritis    RA and Osteoarthritis    Asthma (HCC)    Depression    Not officially diagnosed    Esophageal reflux    Essential hypertension    High blood pressure    High cholesterol    Hyperlipidemia    Myocardial infarction (HCC)    Cardiac event    Osteoarthritis    Pancreatitis (HCC)    Problems with swallowing    RA (rheumatoid arthritis) (HCC)    Sleep apnea              Past Surgical History:   Procedure Laterality Date    Colonoscopy      Other surgical history      Revision of uterine anomaly      Rotator cuff repair      Wrist fracture surgery                  Social History     Socioeconomic History    Marital status: Single   Tobacco Use    Smoking status: Never    Smokeless tobacco: Never   Vaping Use    Vaping status: Never Used   Substance and Sexual Activity    Alcohol use: Not Currently    Drug use: Never     Social Drivers of Health     Food Insecurity: No Food Insecurity (9/5/2024)    Food Insecurity     Food Insecurity: Never true   Transportation Needs: No Transportation Needs (9/5/2024)    Transportation Needs     Lack of Transportation: No   Housing Stability: Low Risk  (9/5/2024)    Housing Stability     Housing Instability: No                  Physical Exam     ED Triage Vitals [10/27/24 1739]   BP (!) 130/107   Pulse  87   Resp 18   Temp 98.4 °F (36.9 °C)   Temp src Temporal   SpO2 96 %   O2 Device None (Room air)       Current Vitals:   Vital Signs  BP: 107/80  Pulse: 79  Resp: 17  Temp: 98.4 °F (36.9 °C)  Temp src: Temporal  MAP (mmHg): 90    Oxygen Therapy  SpO2: 99 %  O2 Device: None (Room air)        Physical Exam  Vitals and nursing note reviewed.   Constitutional:       Appearance: Normal appearance.   HENT:      Head: Normocephalic and atraumatic.      Mouth/Throat:      Mouth: Mucous membranes are dry.   Cardiovascular:      Rate and Rhythm: Normal rate and regular rhythm.      Pulses: Normal pulses.           Dorsalis pedis pulses are 2+ on the right side and 2+ on the left side.        Posterior tibial pulses are 2+ on the right side and 2+ on the left side.      Heart sounds: Normal heart sounds.   Pulmonary:      Effort: Pulmonary effort is normal.      Breath sounds: Normal breath sounds.   Abdominal:      Palpations: Abdomen is soft.      Tenderness: There is no abdominal tenderness. There is no rebound.   Musculoskeletal:         General: Normal range of motion.      Cervical back: Normal range of motion.   Skin:     General: Skin is warm and dry.      Comments: Decubitus ulcer on patient's thoracic back without induration fluctuance crepitus or drainage.  Ulceration the patient's right foot without any fluctuance crepitus or induration.  Ulceration the patient's right lower extremity with purulent drainage.   Neurological:      General: No focal deficit present.      Mental Status: She is alert.             ED Course     Labs Reviewed   CBC WITH DIFFERENTIAL WITH PLATELET - Abnormal; Notable for the following components:       Result Value    RDW-SD 50.5 (*)     RDW 15.3 (*)     All other components within normal limits   COMP METABOLIC PANEL (14) - Abnormal; Notable for the following components:    Potassium 3.1 (*)     CO2 18.0 (*)     AST 37 (*)     Alkaline Phosphatase 534 (*)     All other components within  normal limits   MAGNESIUM - Normal   LIPASE - Normal   URINALYSIS, ROUTINE   LACTIC ACID, PLASMA   RAINBOW DRAW LAVENDER   RAINBOW DRAW LIGHT GREEN   RAINBOW DRAW BLUE   AEROBIC BACTERIAL CULTURE   BLOOD CULTURE   BLOOD CULTURE     EKG    Rate, intervals and axes as noted on EKG Report.  Rate: 88  Rhythm: Sinus Rhythm  Reading: no stemi, interpreted by myself.            ED Course as of 10/27/24 2228  ------------------------------------------------------------  Time: 10/27 2225  Value: CT ABDOMEN+PELVIS(CONTRAST ONLY)(CPT=74177)  Comment: Per my independent interpretation, patient's CT Abdomen and Pelvis demonstrates no small bowel obstruction.                MDM          Admission disposition: 10/27/2024 10:25 PM           Medical Decision Making  Differential diagnosis includes but is not limited to electrolyte disturbance, adrenal insufficiency, dehydration, cellulitis, etc.    CBC unremarkable.  Chemistry with a mild acidosis which could be related to malnourishment.  Patient has yet to provide a urinalysis.  CT imaging with findings of gastritis.  Patient's wound was cultured.  She will be started on vancomycin.  At this time she does not meet severe sepsis or septic shock criteria.  Patient will be admitted for further workup and treatment.        Complicating factors: The patient  has a past medical history of Allergic rhinitis, Anxiety, Arthritis, Asthma ([facility]), Depression, Esophageal reflux, Essential hypertension, High blood pressure, High cholesterol, Hyperlipidemia, Myocardial infarction ([facility]) (6/15/2024), Osteoarthritis, Pancreatitis ([facility]), Problems with swallowing, RA (rheumatoid arthritis) ([facility]), and Sleep apnea. and  has a past surgical history that includes Rotator cuff repair; wrist fracture surgery; revision of uterine anomaly; colonoscopy; and other surgical history. that contribute to the medical complexity of this ED evaluation.     Medical Record Review: I personally reviewed available prior  medical records for any recent pertinent discharge summaries, testing, and procedures, and reviewed those reports.          Problems Addressed:  Cellulitis of right lower extremity: acute illness or injury with systemic symptoms    Amount and/or Complexity of Data Reviewed  External Data Reviewed: ECG.     Details: EKG from 9/5/24 reviewed, rate 101, NSR  Labs: ordered. Decision-making details documented in ED Course.  Radiology: ordered and independent interpretation performed. Decision-making details documented in ED Course.  ECG/medicine tests: ordered and independent interpretation performed. Decision-making details documented in ED Course.  Discussion of management or test interpretation with external provider(s): Discussed with Dr. Rivera    Risk  Parenteral controlled substances.  Decision regarding hospitalization.  Risk Details: IV morphine        Disposition and Plan     Clinical Impression:  1. Cellulitis of right lower extremity         Disposition:  Admit  10/27/2024 10:25 pm    Follow-up:  No follow-up provider specified.        Medications Prescribed:  Current Discharge Medication List              Supplementary Documentation:         Hospital Problems       Present on Admission  Date Reviewed: 9/22/2024            ICD-10-CM Noted POA    * (Principal) Cellulitis of right lower extremity L03.115 8/12/2024 Unknown

## 2024-10-28 PROBLEM — F39 EPISODIC MOOD DISORDER (HCC): Status: ACTIVE | Noted: 2024-01-01

## 2024-10-28 PROBLEM — F39 EPISODIC MOOD DISORDER (HCC): Status: ACTIVE | Noted: 2024-10-28

## 2024-10-28 PROBLEM — R11.2 NAUSEA AND VOMITING: Status: ACTIVE | Noted: 2024-01-01

## 2024-10-28 PROBLEM — R11.2 NAUSEA AND VOMITING: Status: ACTIVE | Noted: 2024-10-28

## 2024-10-28 NOTE — PLAN OF CARE
Problem: Patient Centered Care  Goal: Patient preferences are identified and integrated in the patient's plan of care  Description: Interventions:  - What would you like us to know as we care for you? \"I fell twice at rehab\"  - Provide timely, complete, and accurate information to patient/family  - Incorporate patient and family knowledge, values, beliefs, and cultural backgrounds into the planning and delivery of care  - Encourage patient/family to participate in care and decision-making at the level they choose  - Honor patient and family perspectives and choices  Outcome: Progressing     Problem: Patient/Family Goals  Goal: Patient/Family Long Term Goal  Description: Patient's Long Term Goal: Discharge home    Interventions:  - IVF  -Wound care  -IV antibiotics  -PT/OT  - See additional Care Plan goals for specific interventions  Outcome: Progressing  Goal: Patient/Family Short Term Goal  Description: Patient's Short Term Goal: Regain Strength    Interventions:   - PT/OT  - Dietary consult  - See additional Care Plan goals for specific interventions  Outcome: Progressing     Problem: PAIN - ADULT  Goal: Verbalizes/displays adequate comfort level or patient's stated pain goal  Description: INTERVENTIONS:  - Encourage pt to monitor pain and request assistance  - Assess pain using appropriate pain scale  - Administer analgesics based on type and severity of pain and evaluate response  - Implement non-pharmacological measures as appropriate and evaluate response  - Consider cultural and social influences on pain and pain management  - Manage/alleviate anxiety  - Utilize distraction and/or relaxation techniques  - Monitor for opioid side effects  - Notify MD/LIP if interventions unsuccessful or patient reports new pain  - Anticipate increased pain with activity and pre-medicate as appropriate  Outcome: Progressing     Problem: RISK FOR INFECTION - ADULT  Goal: Absence of fever/infection during anticipated  neutropenic period  Description: INTERVENTIONS  - Monitor WBC  - Administer growth factors as ordered  - Implement neutropenic guidelines  Outcome: Progressing     Problem: SAFETY ADULT - FALL  Goal: Free from fall injury  Description: INTERVENTIONS:  - Assess pt frequently for physical needs  - Identify cognitive and physical deficits and behaviors that affect risk of falls.  - Jacobson fall precautions as indicated by assessment.  - Educate pt/family on patient safety including physical limitations  - Instruct pt to call for assistance with activity based on assessment  - Modify environment to reduce risk of injury  - Provide assistive devices as appropriate  - Consider OT/PT consult to assist with strengthening/mobility  - Encourage toileting schedule  Outcome: Progressing     Problem: GASTROINTESTINAL - ADULT  Goal: Minimal or absence of nausea and vomiting  Description: INTERVENTIONS:  - Maintain adequate hydration with IV or PO as ordered and tolerated  - Nasogastric tube to low intermittent suction as ordered  - Evaluate effectiveness of ordered antiemetic medications  - Provide nonpharmacologic comfort measures as appropriate  - Advance diet as tolerated, if ordered  - Obtain nutritional consult as needed  - Evaluate fluid balance  Outcome: Progressing  Goal: Maintains or returns to baseline bowel function  Description: INTERVENTIONS:  - Assess bowel function  - Maintain adequate hydration with IV or PO as ordered and tolerated  - Evaluate effectiveness of GI medications  - Encourage mobilization and activity  - Obtain nutritional consult as needed  - Establish a toileting routine/schedule  - Consider collaborating with pharmacy to review patient's medication profile  Outcome: Progressing     Problem: SKIN/TISSUE INTEGRITY - ADULT  Goal: Skin integrity remains intact  Description: INTERVENTIONS  - Assess and document risk factors for pressure ulcer development  - Assess and document skin integrity  -  Monitor for areas of redness and/or skin breakdown  - Initiate interventions, skin care algorithm/standards of care as needed  Outcome: Progressing  Goal: Incision(s), wounds(s) or drain site(s) healing without S/S of infection  Description: INTERVENTIONS:  - Assess and document risk factors for pressure ulcer development  - Assess and document skin integrity  - Assess and document dressing/incision, wound bed, drain sites and surrounding tissue  - Implement wound care per orders  - Initiate isolation precautions as appropriate  - Initiate Pressure Ulcer prevention bundle as indicated  Outcome: Progressing  Goal: Oral mucous membranes remain intact  Description: INTERVENTIONS  - Assess oral mucosa and hygiene practices  - Implement preventative oral hygiene regimen  - Implement oral medicated treatments as ordered  Outcome: Progressing     Problem: MUSCULOSKELETAL - ADULT  Goal: Return mobility to safest level of function  Description: INTERVENTIONS:  - Assess patient stability and activity tolerance for standing, transferring and ambulating w/ or w/o assistive devices  - Assist with transfers and ambulation using safe patient handling equipment as needed  - Ensure adequate protection for wounds/incisions during mobilization  - Obtain PT/OT consults as needed  - Advance activity as appropriate  - Communicate ordered activity level and limitations with patient/family  Outcome: Progressing  Goal: Maintain proper alignment of affected body part  Description: INTERVENTIONS:  - Support and protect limb and body alignment per provider's orders  - Instruct and reinforce with patient and family use of appropriate assistive device and precautions (e.g. spinal or hip dislocation precautions)  Outcome: Progressing

## 2024-10-28 NOTE — RESPIRATORY THERAPY NOTE
NIKOLAI ASSESSMENT:    Pt does have a previous diagnosis of NIKOLAI. Pt does not routinely use a CPAP device at home.     CPAP INITIATION:    Pt to be placed on CPAP: no  Pt refused: yes

## 2024-10-28 NOTE — PROGRESS NOTES
10/28/24 1040   Wound 10/28/24 Leg Lower;Posterior;Right   Date First Assessed/Time First Assessed: 10/28/24 0019   Present on Original Admission: Yes  Primary Wound Type: Venous Ulcer  Location: Leg  Wound Location Orientation: Lower;Posterior;Right   Wound Image    Site Assessment Clean;Fragile;Granulation tissue;Moist;Painful;Red   Closure Not approximated   Drainage Amount Scant   Drainage Description Serosanguineous   Treatments Cleansed;Saline   Dressing Aquacel Foam;Xeroform   Dressing Changed Changed   Dressing Status Dressing Changed;Removed;Old drainage   Wound Depth (cm) 0.2 cm   Non-staged Wound Description Partial thickness   Norma-wound Assessment Clean;Dry;Intact   Wound Bed Granulation (%) 90 %   State of Healing Early/partial granulation   Wound Odor None   Wound 10/28/24 Back Medial   Date First Assessed/Time First Assessed: 10/28/24 0019   Present on Original Admission: Yes  Primary Wound Type: Friction/Shear  Location: Back  Wound Location Orientation: Medial   Wound Image    Site Assessment Clean;Fragile;Moist;Pink   Closure Not approximated   Drainage Amount Scant   Drainage Description Serous   Treatments Cleansed;Topical (Barrier/Moisturizer/Ointment)   Dressing 4x4s;Aquacel Foam   Dressing Changed Changed   Dressing Status Dressing Changed;Removed;Old drainage   Wound Depth (cm) 0.1 cm   Non-staged Wound Description Partial thickness   Norma-wound Assessment Clean;Dry;Intact   Wound Granulation Tissue Pink   Wound Bed Granulation (%) 100 %   State of Healing Fully granulated   Wound Odor None   Wound 10/28/24 Foot Dorsal;Right   Date First Assessed/Time First Assessed: 10/28/24 0019   Present on Original Admission: Yes  Primary Wound Type: Diabetic Ulcer  Location: Foot  Wound Location Orientation: Dorsal;Right   Wound Image    Site Assessment Fragile;Moist;Painful;Yellow   Closure Not approximated   Drainage Amount Scant   Drainage Description Yellow   Treatments Cleansed;Saline   Dressing  Aquacel Foam   Dressing Changed Reinforced   Dressing Status Intact   Non-staged Wound Description Full thickness   Norma-wound Assessment Clean;Dry;Intact   Wound Bed Slough (%) 100 %   State of Healing Non-healing   Wound Odor None   Wound Follow Up   Follow up needed Yes       WOUND CARE NOTE    History:  Past Medical History:    Allergic rhinitis    Anxiety    Arthritis    RA and Osteoarthritis    Asthma (HCC)    Depression    Not officially diagnosed    Esophageal reflux    Essential hypertension    High blood pressure    High cholesterol    Hyperlipidemia    Myocardial infarction (HCC)    Cardiac event    Osteoarthritis    Pancreatitis (HCC)    Problems with swallowing    RA (rheumatoid arthritis) (HCC)    Sleep apnea     Past Surgical History:   Procedure Laterality Date    Colonoscopy      Debridement  08/15/2024    Sharp excisional debridement of RLE posterior leg wound    Egd  07/26/2024    Hiatal hernia    Other surgical history      Revision of uterine anomaly      Rotator cuff repair      Wrist fracture surgery        Social History     Socioeconomic History    Marital status: Single   Tobacco Use    Smoking status: Never    Smokeless tobacco: Never   Vaping Use    Vaping status: Never Used   Substance and Sexual Activity    Alcohol use: Not Currently    Drug use: Never     Social Drivers of Health     Food Insecurity: No Food Insecurity (10/28/2024)    Food Insecurity     Food Insecurity: Never true   Transportation Needs: No Transportation Needs (10/28/2024)    Transportation Needs     Lack of Transportation: No   Housing Stability: Low Risk  (10/28/2024)    Housing Stability     Housing Instability: No       PLAN   Recommendations:  Dietary consult for recommendations for nutrition to optimize wound healing  Heels elevated using pillows, heel wedge or heel boots to offload heels  To prevent sliding: decrease head of bed and elevate foot of bed as medical condition tolerates  Prompt incontinence  care  Moisture barrier for incontinence. May consider ZINC    Wound(s)  Location: RIGHT POSTERIOR CALF  Cleansing  Saline   Dressings XEROFORM  Secure with BORDERED FOAM  Frequency DAILY   Location: RIGHT DORSAL FOOT  Cleansing  Saline   Topical MEDIHONEY  Dressings DRY 2X2 GAUZE  Secure with BORDERED FOAM  Frequency DAILY   Location: MID BACK  Cleansing  Saline   Topical ZINC  Dressings FOAM  Frequency DAILY     OBJECTIVE   MD Consult new  Reason for Consult LEG / FOOT ULCERS      ASSESSMENT   Omid Score:  Omid Scale Score: 15    Chart Reviewed: yes    Wound(s):  Pt seen for above wounds. The back does not appear to be pressure related, wound is moist and red. Recommend zinc for moisture and cover with foam protective dressing. The right posterior calf wound is clean, moist. Recommend xeroform and foam dressing. The right dorsal foot has some scant drainage, yellow, moist slough. Recommend medihoney for debridement purposes. Heels elevated with pillow, pt sat up for breakfast.        Allergies: Cephalosporins, Gadolinium derivatives, Penicillins, Sulfa antibiotics, and Bananas    Labs:   Lab Results   Component Value Date    WBC 9.6 10/27/2024    HGB 12.5 10/27/2024    HCT 38.5 10/27/2024    .0 10/27/2024    CREATSERUM 0.77 10/27/2024    BUN 15 10/27/2024     10/27/2024    K 3.1 (L) 10/27/2024     10/27/2024    CO2 18.0 (L) 10/27/2024    GLU 77 10/27/2024    CA 8.9 10/27/2024    ALB 3.2 10/27/2024    ALKPHO 534 (H) 10/27/2024    BILT 0.3 10/27/2024    TP 5.8 10/27/2024    AST 37 (H) 10/27/2024    ALT 44 10/27/2024    LIP 31 10/27/2024    MG 2.1 10/27/2024    PHOS 2.9 08/21/2024     No results found for: \"PREALBUMIN\"      Time Spent 30 Minutes.    Natalie Jones, RN, BSN, [facility]  [facility]  [phone]

## 2024-10-28 NOTE — PHYSICAL THERAPY NOTE
PHYSICAL THERAPY EVALUATION - INPATIENT     Room Number: 546/546-A  Evaluation Date: 10/28/2024  Type of Evaluation: Initial   Physician Order: PT Eval and Treat    Presenting Problem: cellulitis of right lower extremity     Reason for Therapy: Mobility Dysfunction and Discharge Planning    PHYSICAL THERAPY ASSESSMENT   Patient is a 64 year old female admitted 10/27/2024 for cellulitis of right lower extremity.  Prior to admission, patient was at rehab facility and required assist for all ADLs and pivot transfers only; prior to rehab admission was home with sister and independent with ADLs using a cane.  Patient is currently functioning below baseline with bed mobility, transfers, gait, standing prolonged periods, and performing household tasks.  Patient is requiring minimal assist as a result of the following impairments: decreased functional strength, impaired dynamic standing balance, decreased muscular endurance, and medical status.  Physical Therapy will continue to follow for duration of hospitalization.    Patient will benefit from continued skilled PT Services to promote return to prior level of function and safety with continuous assistance and gradual rehabilitative therapy .    PLAN DURING HOSPITALIZATION  Nursing Mobility Recommendation : 1 Assist  PT Device Recommendation: Rolling walker  PT Treatment Plan: Bed mobility;Body mechanics;Energy conservation;Endurance;Patient education;Gait training;Strengthening;Transfer training;Balance training  Rehab Potential : Good  Frequency (Obs): 3-5x/week     PHYSICAL THERAPY MEDICAL/SOCIAL HISTORY     Problem List  Principal Problem:    Cellulitis of right lower extremity  Active Problems:    Nausea and vomiting    HOME SITUATION  Type of Home: Skilled nursing facility  Home Layout: One level  Lives With: Staff 24 hours    Drives: No   Patient Regularly Uses: None     Prior Level of [place]: admitted from [facility] where she required assist for ADLs and limited  mobility; prior to [facility] admission was independent, ambulating with cane     SUBJECTIVE  Agreeable to activity.     PHYSICAL THERAPY EXAMINATION   OBJECTIVE  Precautions: Bed/chair alarm  Fall Risk: High fall risk    WEIGHT BEARING RESTRICTION  none    PAIN ASSESSMENT  Rating:  (did not rate)  Location: BLE  Management Techniques: Activity promotion;Body mechanics;Breathing techniques;Repositioning    COGNITION  Overall Cognitive Status:  WFL - within functional limits    RANGE OF MOTION AND STRENGTH ASSESSMENT  Upper extremity ROM and strength are within functional limits.  Lower extremity ROM is within functional limits.  Lower extremity strength is within functional limits.    BALANCE  Static Sitting: Good  Dynamic Sitting: Fair +  Static Standing: Fair -  Dynamic Standing: Poor +    AM-PAC '6-Clicks' INPATIENT SHORT FORM - BASIC MOBILITY  How much difficulty does the patient currently have...  Patient Difficulty: Turning over in bed (including adjusting bedclothes, sheets and blankets)?: A Little   Patient Difficulty: Sitting down on and standing up from a chair with arms (e.g., wheelchair, bedside commode, etc.): A Little   Patient Difficulty: Moving from lying on back to sitting on the side of the bed?: A Little   How much help from another person does the patient currently need...   Help from Another: Moving to and from a bed to a chair (including a wheelchair)?: A Little   Help from Another: Need to walk in hospital room?: A Little   Help from Another: Climbing 3-5 steps with a railing?: A Lot     AM-PAC Score:  Raw Score: 17   Approx Degree of Impairment: 50.57%   Standardized Score (AM-PAC Scale): 42.13   CMS Modifier (G-Code): CK    FUNCTIONAL ABILITY STATUS  Functional Mobility/Gait Assessment  Gait Assistance: Minimum assistance  Distance (ft): 2 (stand step transfer)  Pattern: Shuffle (posterior lean, slow pace)  Supine to Sit: minimal assist  Sit to Stand: minimal assist  Stand step transfer to chair:  minimal assist x 2, no assistive device     Exercise/Education Provided:  Education Provided To: Patient   Patient Education: Role of Physical Therapy;Plan of Care;Discharge Recommendations;DME Recommendations;Functional Transfer Techniques;Posture/Positioning;Proper Body Mechanics;Gait Training   Patient's Response to Education: Verbalized Understanding;Requires Further Education     Skilled Therapy Provided: Pt received resting in bed; introduced self and role; pt agreeable to activity. Demos bed mobility with min A; able to complete partial pivot and step to chair with min a X  2. posterior lean and unsteady gait noted but no LOB. Left pt sitting in chair to eat breakfast with needs within reach, alarm on, handoff to RN complete.     The patient's Approx Degree of Impairment: 50.57% has been calculated based on documentation in the [facility] '6 clicks' Inpatient Basic Mobility Short Form.  Research supports that patients with this level of impairment may benefit from rehab facility.  Final disposition will be made by interdisciplinary medical team.    Patient End of Session: Up in chair;Needs met;Call light within reach;RN aware of session/findings;All patient questions and concerns addressed;Hospital anti-slip socks;Alarm set    CURRENT GOALS  Goals to be met by: 11/4/24  Patient Goal Patient's self-stated goal is: none stated   Goal #1 Patient is able to demonstrate supine - sit EOB @ level: supervision     Goal #1   Current Status    Goal #2 Patient is able to demonstrate transfers Sit to/from Stand at assistance level: supervision with walker - rolling     Goal #2  Current Status    Goal #3 Patient is able to ambulate at least 15 feet with assist device: walker - rolling at assistance level: supervision   Goal #3   Current Status    Goal #4    Goal #4   Current Status    Goal #5 Patient to demonstrate independence with home activity/exercise instructions provided to patient in preparation for discharge.   Goal #5    Current Status    Goal #6    Goal #6  Current Status      Patient Evaluation Complexity Level:  History Moderate - 1 or 2 personal factors and/or co-morbidities   Examination of body systems Moderate - addressing a total of 3 or more elements   Clinical Presentation  Moderate - Evolving   Clinical Decision Making  Moderate Complexity     Therapeutic Activity:  15 minutes

## 2024-10-28 NOTE — OCCUPATIONAL THERAPY NOTE
OCCUPATIONAL THERAPY EVALUATION - INPATIENT     Room Number: 546/546-A  Evaluation Date: 10/28/2024  Type of Evaluation: Initial       Physician Order: IP Consult to Occupational Therapy  Reason for Therapy: ADL/IADL Dysfunction and Discharge Planning    OCCUPATIONAL THERAPY ASSESSMENT   RN cleared pt for participation in OT session, which was completed in collaboration with PT. Upon arrival, pt was supine in bed and agreeable to activity. No visitors present during session. Pt was left in chair and alarm was activated. Call light and all needs left in reach. Handoff given to RN.    Patient is a 64 year old female admitted 10/27/2024 for RLE cellulitis.  Prior to admission, pt was at [facility].  Patient is currently requiring up to max A for ADLs overall along with min A for supine to sit, CGA for sitting at EOB, min A for STS, and min A for functional transfer with HHA. Pt tolerated about <1 minutes of supported standing.  Pt has the following impairments: weakness, fall risk.                              Education provided  Educated pt about role of OT and hospital therapy process as well as proper safety techniques including proper hand placement, body mechanics, safety techniques . Pt verbalized/demonstrated fair carryover.    Patient will benefit from continued skilled OT Services to promote return to prior level of function and safety with continuous assistance and gradual rehabilitative therapy    PLAN  OT Treatment Plan: Balance activities;Energy conservation/work simplification techniques;Continued evaluation;Compensatory technique education;Fine motor coordination activities;Neuromuscluar reeducation;Equipment eval/education;Patient/Family training;Patient/Family education;Cognitive reorientation;Endurance training;UE strengthening/ROM;Functional transfer training;Visual perceptual training;IADL training;ADL training      OCCUPATIONAL THERAPY MEDICAL/SOCIAL HISTORY     Problem List  Principal Problem:     Cellulitis of right lower extremity  Active Problems:    Nausea and vomiting    Episodic mood disorder (HCC)      Past Medical History  Past Medical History:    Allergic rhinitis    Anxiety    Arthritis    RA and Osteoarthritis    Asthma (HCC)    Depression    Not officially diagnosed    Esophageal reflux    Essential hypertension    High blood pressure    High cholesterol    Hyperlipidemia    Myocardial infarction (HCC)    Cardiac event    Osteoarthritis    Pancreatitis (HCC)    Problems with swallowing    RA (rheumatoid arthritis) (HCC)    Sleep apnea       Past Surgical History  Past Surgical History:   Procedure Laterality Date    Colonoscopy      Debridement  08/15/2024    Sharp excisional debridement of RLE posterior leg wound    Egd  2024    Hiatal hernia    Other surgical history      Revision of uterine anomaly      Rotator cuff repair      Wrist fracture surgery         HOME SITUATION  Type of Home: Skilled nursing facility  Home Layout: One level  Lives With: Staff 24 hours                      Drives: No  Patient Regularly Uses: None    SUBJECTIVE  \"I want to sit up and eat.\"    OCCUPATIONAL THERAPY EXAMINATION      OBJECTIVE  Precautions: Bed/chair alarm  Fall Risk: High fall risk    PAIN ASSESSMENT  Ratin             RANGE OF MOTION   Upper extremity ROM is within functional limits     STRENGTH ASSESSMENT  Upper extremity strength is within functional limits     COORDINATION  Gross Motor: WFL   Fine Motor: WFL     ACTIVITIES OF DAILY LIVING ASSESSMENT  AM-PAC ‘6-Clicks’ Inpatient Daily Activity Short Form  How much help from another person does the patient currently need…  -   Putting on and taking off regular lower body clothing?: A Lot  -   Bathing (including washing, rinsing, drying)?: A Lot  -   Toileting, which includes using toilet, bedpan or urinal? : A Lot  -   Putting on and taking off regular upper body clothing?: A Little  -   Taking care of personal grooming such as brushing  teeth?: A Little  -   Eating meals?: None    AM-PAC Score:  Score: 16  Approx Degree of Impairment: 53.32%  Standardized Score (AM-PAC Scale): 35.96  CMS Modifier (G-Code): CK      FUNCTIONAL TRANSFER ASSESSMENT    Supine to sit: in A    Sit to stand: min A  Toilet Transfer: min A  Chair Transfer: min A    FUNCTIONAL ADL ASSESSMENT  Overall max A    The patient's Approx Degree of Impairment: 53.32% has been calculated based on documentation in the [facility] '6 clicks' Inpatient Daily Activity Short Form.  Research supports that patients with this level of impairment may benefit from GR. Final disposition will be made by interdisciplinary medical team.    OT Goals  Patients self stated goal is: to go back to rehab     Patient will complete functional transfer with CGA  Comment:     Patient will complete toileting with min A  Comment:     Patient will tolerate standing for 2 minutes in prep for adls with CGA   Comment:    Patient will complete item retrieval with CGA  Comment:          Goals  on:   Frequency: 3-5x/wk    Patient Evaluation Complexity Level:   Occupational Profile/Medical History MODERATE - Expanded review of history including review of medical or therapy record   Specific performance deficits impacting engagement in ADL/IADL MODERATE  3 - 5 performance deficits   Client Assessment/Performance Deficits MODERATE - Comorbidities and min to mod modifications of tasks    Clinical Decision Making MODERATE - Analysis of occupational profile, detailed assessments, several treatment options    Overall Complexity MODERATE     OT Session Time: 20 minutes  Self-Care Home Management: 10 minutes           Mary Alejandra OT  [facility]  Inpatient Rehabilitation  Occupational Therapy  [phone]

## 2024-10-28 NOTE — ED QUICK NOTES
"Subjective     Nadeen Rdz is a 38 year old female who presents to clinic today for the following health issues:    HPI            Patient went to urgent care a week ago, had a swollen lymph node and was given antibiotic. Swollen area has gone down, neck is very stiff. Still exhausted. Not really painful, more annoyance per patient report. Waking up with night sweats since neck lump.  No COVID-19 exposure. Going to [place] tomorrow for almost a week for vacation.  Eyes other symptoms.    Hx of bladder infections last one about 6 months ago.     Asking about work-up for fertility issues.  She would like to return for work-up of this and physical.  Advised that physical does not include  infertility work-up.  She states she may bring in her significant other also to get work-up done.    History of meth use in the past, no longer using.    She has a coworker who mentioned about getting her anemia checked.  She would like this checked in the future or at her next visit.    Feels her mood is stable, is not on any medications at this time.    Review of Systems   Constitutional, HEENT-as above, cardiovascular, pulmonary,  systems are negative, except as otherwise noted.      Objective    /72   Pulse 80   Temp 98.7  F (37.1  C)   Ht 1.575 m (5' 2\")   Wt 54.4 kg (120 lb)   SpO2 99%   BMI 21.95 kg/m    Body mass index is 21.95 kg/m .  Physical Exam   GENERAL: healthy, alert and no distress  EYES: Eyes grossly normal to inspection, PERRL and conjunctivae and sclerae normal  HENT: ear canals and TM's normal, nose and mouth without ulcers or lesions  NECK: small lump noted in right posterior head behind the ear within the hairline, no asymmetry, masses, or scars and thyroid normal to palpation  RESP: lungs clear to auscultation - no rales, rhonchi or wheezes  CV: regular rate and rhythm, normal S1 S2, no S3 or S4, no murmur, click or rub  MS: no gross musculoskeletal defects noted    No results found for this " Pt refusing straight cath at this time. MD aware. Pt placed on puriwick at this time.    or any previous visit (from the past 24 hour(s)).        Assessment & Plan     Lump in neck   finish up antibiotic.  Lump is improving.  If she is in part over the next couple days and feels is worsening she is to call and we can send an antibiotic to her pharmacy near her.  And then she would follow-up with provider next week.    Bruise  Patient reports that she also bruises easily.  Unsure why.  Continue work-up  She has not    Fertility testing  Patient wondering about fertility testing.  She would potentially like this done next time she comes in       Tobacco Cessation:   reports that she has been smoking cigarettes. She has a 7.50 pack-year smoking history. She has never used smokeless tobacco.  Tobacco Cessation Action Plan: Did not discuss today          Return in about 2 months (around 1/2/2021).  For physical and or work-up of the above concerns    MINDI Rock, NP-C  [facility]

## 2024-10-28 NOTE — ED QUICK NOTES
Orders for admission, patient is aware of plan and ready to go upstairs. Any questions, please call ED RN Scarlet at extension [phone].     Patient Covid vaccination status: Fully vaccinated     COVID Test Ordered in ED: None    COVID Suspicion at Admission: N/A    Running Infusions:      Mental Status/LOC at time of transport: Aox4    Other pertinent information:   CIWA score: N/A   NIH score:  N/A

## 2024-10-28 NOTE — PROGRESS NOTES
[facility]  part of [facility]  Hospitalist Progress Note     Sheri Garzon Patient Status:  Inpatient    1960  64 year old CSN [id number]   Location 546/546-A Attending John Hinds MD   Hosp Day # 1 PCP TERI MCKENNA MD     Assessment & Plan:   ----------------------------------  Nausea/vomiting.  Per patient this is much better today.  Recent EGD negative.  CT scan unremarkable.  -Antiemetics as needed  -Gentle IV fluids  -Advance diet as tolerated  -Consider GI consult if worsens, utility of repeat EGD limited  -PPI    Right lower extremity ulcer.  Possible overlying cellulitis though not clear.  White count is normal.  Afebrile.  -Continue vancomycin for now  -Monitor cultures    Other problems  Metabolic acidosis  Anxiety  Asthma  Adrenal insufficiency  GERD  Hypertension  Dyslipidemia  History of coronary artery disease  Rheumatoid arthritis  Sleep apnea    DVT Mechanical Prophylaxis:     Early ambuation  DVT Pharmacologic Prophylaxis   Medication    heparin (Porcine) 5000 UNIT/ML injection 5,000 Units      DVT Pharmacologic prophylaxis: Aspirin 162 mg       code status: full  dispo: to be determined  If applicable, malnutrition status at bottom of note    I personally reviewed the available laboratories, imaging including. I discussed/will discuss the case with consultants. I ordered laboratories and/or radiographic studies. I adjusted medications as detailed above.  Medical decision making high, risk is high.    Subjective:   ----------------------------------  Minimal discomfort in the right lower extremity.  Mild nausea overnight, no vomiting.  Currently without any nausea.      Objective:   Chief Complaint:   Chief Complaint   Patient presents with    Nausea/Vomiting/Diarrhea     ----------------------------------  Temp:  [97.6 °F (36.4 °C)-98.4 °F (36.9 °C)] 98.1 °F (36.7 °C)  Pulse:  [79-99] 93  Resp:  [12-23] 16  BP: (107-144)/() 120/83  SpO2:  [96 %-100 %] 100  %  Gen: A+Ox3.  No distress.   HEENT: NCAT, neck supple, no carotid bruit.  CV: RRR, S1S2, and intact distal pulses. No gallop, rub, murmur.  Pulm: Effort and breath sounds normal. No distress, wheezes, rales, rhonchi.  Abd: Soft, NTND, BS normal, no mass, no HSM, no rebound/guarding.   Neuro: Normal reflexes, CN. Sensory/motor exams grossly normal deficit.   MS: No joint effusions.  No peripheral edema.  Right lower extremity with 2 ulcerations.  1 on the lower leg posteriorly.  Another on the dorsum of the right foot.  No surrounding erythema.  No purulence expressed.  Minimal tenderness.  Skin: Skin is warm and dry. No rashes, erythema, diaphoresis.   Psych: Normal mood and affect. Calm, cooperative    Labs:  Lab Results   Component Value Date    HGB 12.5 10/27/2024    WBC 9.6 10/27/2024    .0 10/27/2024     10/27/2024    K 3.1 (L) 10/27/2024    CREATSERUM 0.77 10/27/2024    AST 37 (H) 10/27/2024    ALT 44 10/27/2024            vancomycin  15 mg/kg Intravenous Q24H    predniSONE  10 mg Oral BID    aspirin  81 mg Oral Daily    clopidogrel  75 mg Oral Daily    docusate sodium  100 mg Oral BID    hydroxychloroquine  200 mg Oral BID    lamoTRIgine  50 mg Oral BID    metoprolol tartrate  12.5 mg Oral BID    oxybutynin ER  10 mg Oral Daily    QUEtiapine  50 mg Oral Nightly    heparin  5,000 Units Subcutaneous Q8H FALLON    pantoprazole  40 mg Intravenous Q12H       albuterol    acetaminophen    acetaminophen **OR** HYDROcodone-acetaminophen **OR** HYDROcodone-acetaminophen    melatonin    ondansetron    prochlorperazine

## 2024-10-28 NOTE — CONSULTS
[facility]  part of [facility]    Report of Consultation    Sheri Garzon Patient Status:  Inpatient    1960 MRN [id number]   Location [facility] 5SW/SE Attending John Hinds MD   Hosp Day # 1 PCP TERI MCKENNA MD     Date of Admission:  10/27/2024  Date of Consult:  10/28/24   Reason for Consultation:   Patient presented with increased anxiety, medication management, John Villafana MD requested psychiatric consult for evaluation and advice.    Consult Duration     The patient seen for initial psychiatric consult evaluation.   Record reviewed, communication with attending, communication with RN and patient seen face to face evaluation.    History of Present Illness:   Patient is a 64 year old -American,female with past medical history of hypertension, rheumatoid arthritis, obesity, chronic adrenal insufficiency secondary to chronic corticosteroid use, asthma, pancreatitis, obstructive sleep apnea, obesity, hyperlipidemia, anemia, chronic kidney disease  nonocclusive coronary artery disease, and peripheral arterial disease who also has past mental lorenzo history of anxiety and depression  who was admitted to the hospital for Cellulitis of right lower extremity: The patient has been demonstrating increased anxiety and depressive symptoms.Patient indicated for psych consult for evaluation and advise.    Per chart review, the patient presented to the hospital from skilled nursing facility with complaint of nausea and vomiting. Patient was recently admitted for cellulitis.     The patient received the following psychotropic medications Lamictal 50 mg BID, Seroquel 50 mg nightly    Labs and imaging reviewed: Glucose 77, sodium 141, potassium 3.1, AST 37, ALT 44    The patient is well known to the psychiatry team from previous consult. The patient was last seen   for confusion, restlessness    The patient seen today sitting in hospital recliner. The patient  presents calm, cooperative and pleasant. She is not demonstrating any restlessness or agitation.     The patient is alert and oriented to person, place, date and condition. The patient answers questions and engages in appropriate conversation with no impairment in cognition or distortion in thought process.     The patient stating that she came to the hospital due to leg wound and pain.     She reports that her mood had been stable. She denies any feelings of of hopelessness, helplessness, worthlessness, low mood, low energy, decreased motivation.    She reports some anxiety, worry, ruminations with impairment in sleep. She reports history of panic attacks otherwise denies any recently.     The patient reporting that she has been having a difficult time finding housing. She notes that she recently moved from [place] with her daughter.     She reports that she saw Dr. Boles during her last admission and she was started on new medications. She is not sure if she has been receiving them otherwise she would like to continue them.     She denies any issues with the initiation of Seroquel and Lamictal.     The patient is not demonstrating any apryl or psychosis  The patient denies auditory or visual hallucinations  The patient denies suicidal or homicidal ideation.    Provided patient with supportive psychotherapy including listening, emotional support and encouragement.     Past Psychiatric/Medication History:  1. Prior diagnoses: Depression, Anxiety  2. Past psychiatric inpatient: Denies           3. Past outpatient history: Denies  4. Past suicide history: None  5. Medication history: Denies     Social History:   The patient is a 64 year old  female. She is a retired  of 35 years. She retired and moved to [place] with her daughter. Her health declined since retiring and moving to [place] and moved back to [place] with her daughter in June 2024. She and her daughter moved back in with her  eldest sister, Gladys. She was admitted to rehab for decreased mobility and chronic wound care on multiple occasions.      Family History:  Daughter: depression  Medical History:   Past Medical History  Past Medical History:    Allergic rhinitis    Anxiety    Arthritis    RA and Osteoarthritis    Asthma (HCC)    Depression    Not officially diagnosed    Esophageal reflux    Essential hypertension    High blood pressure    High cholesterol    Hyperlipidemia    Myocardial infarction (HCC)    Cardiac event    Osteoarthritis    Pancreatitis (HCC)    Problems with swallowing    RA (rheumatoid arthritis) (HCC)    Sleep apnea       Past Surgical History  Past Surgical History:   Procedure Laterality Date    Colonoscopy      Debridement  08/15/2024    Sharp excisional debridement of RLE posterior leg wound    Egd  07/26/2024    Hiatal hernia    Other surgical history      Revision of uterine anomaly      Rotator cuff repair      Wrist fracture surgery         Family History  Family History   Problem Relation Age of Onset    Diabetes Mother     Genetic Disease Mother     Heart Disorder Mother     Hypertension Mother     Obesity Mother     Diabetes Father     Hypertension Father     Depression Daughter     Psychiatric Daughter        Social History  Social History     Socioeconomic History    Marital status: Single   Tobacco Use    Smoking status: Never    Smokeless tobacco: Never   Vaping Use    Vaping status: Never Used   Substance and Sexual Activity    Alcohol use: Not Currently    Drug use: Never     Social Drivers of Health     Food Insecurity: No Food Insecurity (10/28/2024)    Food Insecurity     Food Insecurity: Never true   Transportation Needs: No Transportation Needs (10/28/2024)    Transportation Needs     Lack of Transportation: No   Housing Stability: Low Risk  (10/28/2024)    Housing Stability     Housing Instability: No           Current Medications:  Current Facility-Administered Medications   Medication Dose  Route Frequency    vancomycin (Vancocin) 1.25 g in sodium chloride 0.9% 250mL IVPB premix  15 mg/kg Intravenous Q24H    predniSONE (Deltasone) tab 10 mg  10 mg Oral BID    albuterol (Ventolin HFA) 108 (90 Base) MCG/ACT inhaler 2 puff  2 puff Inhalation Q4H PRN    aspirin chewable tab 81 mg  81 mg Oral Daily    clopidogrel (Plavix) tab 75 mg  75 mg Oral Daily    docusate sodium (Colace) cap 100 mg  100 mg Oral BID    hydroxychloroquine (Plaquenil) tab 200 mg  200 mg Oral BID    lamoTRIgine (LaMICtal) tab 50 mg  50 mg Oral BID    metoprolol tartrate (Lopressor) partial tab 12.5 mg  12.5 mg Oral BID    oxybutynin ER (Ditropan-XL) 24 hr tab 10 mg  10 mg Oral Daily    QUEtiapine (SEROquel) tab 50 mg  50 mg Oral Nightly    sodium chloride 0.9% infusion   Intravenous Continuous    heparin (Porcine) 5000 UNIT/ML injection 5,000 Units  5,000 Units Subcutaneous Q8H FALLON    acetaminophen (Tylenol Extra Strength) tab 500 mg  500 mg Oral Q4H PRN    acetaminophen (Tylenol) tab 650 mg  650 mg Oral Q4H PRN    Or    HYDROcodone-acetaminophen (Norco) 5-325 MG per tab 1 tablet  1 tablet Oral Q4H PRN    Or    HYDROcodone-acetaminophen (Norco) 5-325 MG per tab 2 tablet  2 tablet Oral Q4H PRN    melatonin tab 3 mg  3 mg Oral Nightly PRN    ondansetron (Zofran) 4 MG/2ML injection 4 mg  4 mg Intravenous Q6H PRN    prochlorperazine (Compazine) 10 MG/2ML injection 5 mg  5 mg Intravenous Q8H PRN    pantoprazole (Protonix) 40 mg in sodium chloride 0.9% PF 10 mL IV push  40 mg Intravenous Q12H     Medications Prior to Admission   Medication Sig    docusate sodium 100 MG Oral Cap Take 1 capsule (100 mg total) by mouth 2 (two) times daily.    ondansetron (ZOFRAN) 4 mg tablet Take 1 tablet (4 mg total) by mouth every 8 (eight) hours as needed for Nausea.    HYDROcodone-acetaminophen  MG Oral Tab Take 1 tablet by mouth every 4 (four) hours as needed.    lamoTRIgine 25 MG Oral Tab Take 2 tablets (50 mg total) by mouth 2 (two) times daily.     pantoprazole 40 MG Oral Tab EC Take 1 tablet (40 mg total) by mouth 2 (two) times daily before meals.    QUEtiapine 50 MG Oral Tab Take 1 tablet (50 mg total) by mouth nightly.    metoprolol tartrate 25 MG Oral Tab Take 0.5 tablets (12.5 mg total) by mouth 2 (two) times daily. (Patient taking differently: Take 0.5 tablets (12.5 mg total) by mouth 2 (two) times daily. Hold for SBP <100 and HR <60)    predniSONE 10 MG Oral Tab Take 20mg (2 tabs) in AM and 10mg (1tab) for 4 days then resume 10mg twice  a day indefinitely (Patient taking differently: Take 1 tablet (10 mg total) by mouth 2 (two) times daily. Take 20mg (2 tabs) in AM and 10mg (1tab) for 4 days then resume 10mg twice  a day indefinitely)    Acetaminophen ER (ACETAMINOPHEN 8 HOUR) 650 MG Oral Tab CR Take 2-3 tablets (1,300-1,950 mg total) by mouth every 8 (eight) hours as needed for Pain.    albuterol 108 (90 Base) MCG/ACT Inhalation Aero Soln Inhale 2 puffs into the lungs every 4 to 6 hours as needed for Wheezing.    hydroxychloroquine 200 MG Oral Tab Take 1 tablet (200 mg total) by mouth 2 (two) times daily.    Aspirin 81 MG Oral Cap Take 81 mg by mouth daily.    atorvastatin 10 MG Oral Tab Take 1 tablet (10 mg total) by mouth daily.    clopidogrel 75 MG Oral Tab Take 1 tablet (75 mg total) by mouth daily.    ergocalciferol 1.25 MG (89649 UT) Oral Cap Take 1 capsule (50,000 Units total) by mouth once a week.    oxybutynin ER 10 MG Oral Tablet 24 Hr Take 1 tablet (10 mg total) by mouth daily.    folic acid 1 MG Oral Tab Take 1 tablet (1 mg total) by mouth daily.    collagenase 250 UNIT/GM External Ointment Apply topically daily.    clotrimazole-betamethasone 1-0.05 % External Cream Apply 1 Application topically 2 (two) times daily. Apply to groin and abdominal folds    [] sodium bicarbonate 650 MG Oral Tab Take 1 tablet (650 mg total) by mouth 2 (two) times daily.    Copper (COPPERMIN) 5 MG Oral Tab Take 1 tablet by mouth daily.    ibuprofen 200 MG  Oral Tab Take 3 tablets (600 mg total) by mouth every 8 (eight) hours as needed for Pain.    [] Ferrous Gluconate 324 (38 Fe) MG Oral Tab Take 1 tablet (325 mg total) by mouth daily with breakfast.    Potassium Chloride ER 10 MEQ Oral Tab CR Take 1 tablet (10 mEq total) by mouth 2 (two) times daily.       Allergies  Allergies[1]    Review of Systems:   As by Admitting/Attending    Results:   Laboratory Data:  Lab Results   Component Value Date    WBC 9.6 10/27/2024    HGB 12.5 10/27/2024    HCT 38.5 10/27/2024    .0 10/27/2024    CREATSERUM 0.77 10/27/2024    BUN 15 10/27/2024     10/27/2024    K 3.1 (L) 10/27/2024     10/27/2024    CO2 18.0 (L) 10/27/2024    GLU 77 10/27/2024    CA 8.9 10/27/2024    ALB 3.2 10/27/2024    ALKPHO 534 (H) 10/27/2024    TP 5.8 10/27/2024    AST 37 (H) 10/27/2024    ALT 44 10/27/2024    LIP 31 10/27/2024    DDIMER 1.69 (H) 2024    MG 2.1 10/27/2024    PHOS 2.9 2024    CK 50 2024    B12 748 2024    ETOH <3 2024         Imaging:  CT ABDOMEN+PELVIS(CONTRAST ONLY)(CPT=74177)    Result Date: 10/27/2024  CONCLUSION:  1. Nonspecific gastric wall thickening, which may be indicative of underlying gastritis/gastroduodenitis. If not already performed, endoscopic evaluation may be of benefit.  2. Otherwise, no acute intra-abdominal process is identified. No additional etiology of the patient's symptoms is appreciated from this study.  3. Small hiatal hernia with possible esophagitis.  4. Uncomplicated distal colonic diverticulosis.  5. Degenerating intramural uterine fibroids.  6. Nonspecific fat deposition throughout the proximal colon which could reflect sequela of chronic inflammation, obesity, or steroid use, among other etiologies.  7. At least partial transitional lumbosacral anatomy.   8. Partially visualized chronic-appearing thoracolumbar compression fracture deformities.  9. Lesser incidental findings as above.    Dictated by (CST):  Bo Wynne MD on 10/27/2024 at 9:33 PM     Finalized by (CST): Bo Wynne MD on 10/27/2024 at 9:41 PM           Vital Signs:   Blood pressure 120/83, pulse 93, temperature 98.1 °F (36.7 °C), temperature source Oral, resp. rate 16, weight 68.9 kg (151 lb 12.8 oz), SpO2 100%.    Mental Status Exam:   Appearance: Stated age 64, in hospital gown, laying down in hospital bed.  Psychomotor: no agitation or restlessness  Orientation: Alert and oriented to person, place, date and condition  Gait: Not evaluated.  Attitude/Coorperation: cooperative, pleasant.  Behavior:appropriate  Speech: Regular rate and rhythm speech.  Mood: anxious  Affect: restricted  Thought process: Linear  Thought content: No reports of  suicidal or homicidal ideation.  Perceptions: Patient denies auditory or visual hallucinations  Concentration: intact  Memory: intact  Intellect: Average.  Judgment and Insight: Questionable.     Impression:     Episodic mood disorder     Cellulitis of right lower extremity    Nausea and vomiting    Patient is a 64 year old -American,female with past medical history of hypertension, rheumatoid arthritis, obesity, chronic adrenal insufficiency secondary to chronic corticosteroid use, asthma, pancreatitis, obstructive sleep apnea, obesity, hyperlipidemia, anemia, chronic kidney disease  nonocclusive coronary artery disease, and peripheral arterial disease who also has past mental lorenzo history of anxiety and depression  who was admitted to the hospital for Cellulitis of right lower extremity: The patient has been demonstrating increased anxiety and depressive symptoms. Patient was started on Lamictal and Seroquel during her last admission and would like those medications continued.     Discussed risk and benefit, acknowledging the current symptom and severity.  At this point, I would recommend the following approach:     Focus on safety  Focus on education and support.  Focus on insight orientation  helping the patient understand diagnosis and treatment plan.  Start Seroquel 50 mg nightly  Start Lamictal 25 mg BID  Processed with patient at length, the initiation of the above psychotropic medications I advised the patient of the risks, benefits, alternatives and potential side effects. The patient consents to administration of the medications and understands the right to refuse medications at any time. The patient verbalized understanding.   Coordinate plan with team    Orders This Visit:  Orders Placed This Encounter   Procedures    CBC With Differential With Platelet    Comp Metabolic Panel (14)    Magnesium    Lipase    Urinalysis, Routine    Lactic Acid, Plasma    Basic Metabolic Panel (8)    CBC With Differential With Platelet    CBC With Differential With Platelet    Basic Metabolic Panel (8)    Magnesium    Aerobic Bacterial Culture    Blood Culture    MRSA Screen by PCR       Meds This Visit:  Requested Prescriptions      No prescriptions requested or ordered in this encounter       EBONY Kyle  10/28/2024    Note to Patient: The 21st Century Cures Act makes medical notes like these available to patients in the interest of transparency. However, be advised this is a medical document. It is intended as peer to peer communication. It is written in medical language and may contain abbreviations or verbiage that are unfamiliar. It may appear blunt or direct. Medical documents are intended to carry relevant information, facts as evident, and the clinical opinion of the practitioner. This note may have been transcribed using a voice dictation system. Voice recognition errors may occur. This should not be taken to alter the content or meaning of this note.           [1]   Allergies  Allergen Reactions    Cephalosporins ANAPHYLAXIS, NAUSEA AND VOMITING and OTHER (SEE COMMENTS)     Other reaction(s): Fever    - Tolerated multiple doses of ceftriaxone on 7/2024 without any complications  - Tolerated multiple  doses of cefepime 8/2024 without complications    Gadolinium Derivatives FEVER    Penicillins OTHER (SEE COMMENTS)     esophagitis    Sulfa Antibiotics OTHER (SEE COMMENTS)     esophagitis    Bananas ITCHING

## 2024-10-28 NOTE — PROGRESS NOTES
[facility] Pharmacy Dosing Service      Initial Pharmacokinetic Consult for Vancomycin Dosing     Sheri Garzon is a 64 year old female who is being initiated on vancomycin therapy for cellulitis.  Pharmacy has been asked to dose vancomycin by Jocelyn Rivera MD .  The initial treatment and monitoring approach will be steady state AUC strategy.        Weight and Temperature:    Wt Readings from Last 1 Encounters:   10/27/24 82.2 kg (181 lb 3.5 oz)        Temp Readings from Last 1 Encounters:   10/27/24 98.4 °F (36.9 °C) (Temporal)      Labs:   Recent Labs   Lab 10/27/24  1838   CREATSERUM 0.77      Estimated Creatinine Clearance: 58.4 mL/min (based on SCr of 0.77 mg/dL).     Recent Labs   Lab 10/27/24  1838   WBC 9.6          The Pharmacokinetic Target is:     to 600 mg-h/L and trough <=15 mg/L    Renal Dosing Considerations:    None     Assessment/Plan:   Initial/Loading dose: Has received 1250 mg IV (15 mg/kg, capped at 2250 mg) x 1 initial dose.      Maintenance dose: Pharmacy will dose vancomycin at 1250 mg IV every 24 hours    Monitorin) Plan for vancomycin peak and trough to be obtained in approximately 72 hours    2) Pharmacy will order SCr as clinically indicated to assess renal function.    3) Pharmacy will monitor for toxicity and efficacy, adjust vancomycin dose and/or frequency, and order vancomycin levels as appropriate per the Pharmacy and Therapeutics Committee approved protocol until discontinuation of the medication.       We appreciate the opportunity to assist in the care of this patient.     Gui Esqueda, IrisD  10/28/2024  12:15 AM  [facility] Extension: [phone]

## 2024-10-28 NOTE — VASCULAR ACCESS
Arrived to place PIV for patient, pt states she does not want to be poked today, asking to come back tomorrow morning, RN aware.

## 2024-10-28 NOTE — H&P
[facility]  part of [facility]                                                                                                          History and Physical     Sheri Garzon Patient Status:  Emergency    1960 MRN [id number]   Location [facility] EMERGENCY DEPARTMENT Attending Osmar Oliver*   Hosp Day # 0 PCP TERI MCKENNA MD     History obtained from patient and daughter at bedside.    Chief Complaint: Nausea, vomiting, right leg wound    Subjective:    Sheri Garzon is a 64 year old female with history of anxiety, asthma, GERD, HTN, HLD, MI, RA, sleep apnea who was sent to the ED from SNF for evaluation of intractable nausea and vomiting, right leg wound.  Symptoms started a few weeks ago, patient has had difficulty eating and keeping food down.  Patient notes episodes\" after taking certain medications.    No chest pain, shortness of breath, cough, fever, chills.  No diarrhea.  A few days ago, she had abdominal pain which resolved after treatment of constipation.  She has also had a chronic right lower extremity wound.  Today wound was noted with thick brown-colored drainage, prompting ED visit.    She was recently admitted and treated for cellulitis.  Wound was being managed at the nursing home.  Cultures obtained.  She has been started on vancomycin.    CTAP: Nonspecific gastric wall thickening which may be indicative of underlying gastritis/gastroduodenitis.  If not already performed, endoscopic evaluation may be of benefit.  Otherwise no acute intra-abdominal process.  Labs stable except potassium 3.1, CO2 18, alk phos 534  Vital stable.    She has been admitted for gastritis, unable to tolerate p.o., recurrent cellulitis.    History/Other:      Past Medical History:  Past Medical History:    Allergic rhinitis    Anxiety    Arthritis    RA and Osteoarthritis    Asthma (HCC)    Depression    Not officially diagnosed    Esophageal reflux    Essential  hypertension    High blood pressure    High cholesterol    Hyperlipidemia    Myocardial infarction (HCC)    Cardiac event    Osteoarthritis    Pancreatitis (HCC)    Problems with swallowing    RA (rheumatoid arthritis) (HCC)    Sleep apnea        Past Surgical History:   Past Surgical History:   Procedure Laterality Date    Colonoscopy      Other surgical history      Revision of uterine anomaly      Rotator cuff repair      Wrist fracture surgery         Social History:  reports that she has never smoked. She has never used smokeless tobacco. She reports that she does not currently use alcohol. She reports that she does not use drugs.    Family History:   Family History   Problem Relation Age of Onset    Diabetes Mother     Genetic Disease Mother     Heart Disorder Mother     Hypertension Mother     Obesity Mother     Diabetes Father     Hypertension Father     Depression Daughter     Psychiatric Daughter        Allergies: Allergies[1]    Medications:  Medications Ordered Prior to Encounter[2]    Review of Systems:   A comprehensive 14 point review of systems was completed.    Pertinent positives and negatives noted in the HPI.    Objective:     /80   Pulse 79   Temp 98.4 °F (36.9 °C) (Temporal)   Resp 17   Wt 181 lb 3.5 oz (82.2 kg)   SpO2 99%   BMI 33.15 kg/m²   General: No acute distress.    HEENT: Normocephalic, atraumatic.  Neck: Supple.  Respiratory: Normal effort.  CTAB  Cardiovascular: S1, S2 regular.  Normal rate.   Abdomen: Soft, not tender or distended.  Neurologic: Alert, oriented x 3.  Nonfocal.  Musculoskeletal: No tenderness.  Ulcer posterior distal right lower extremity with purulent drainage.  Ulcer dorsal right foot  Extremities: Trace edema  Psychiatric: Cooperative    Results:      Labs:  Labs Last 24 Hours:   BMP     CBC    Other     Na 141 Cl 112 BUN 15 Glu 77   Hb 12.5   PTT - Procal -   K 3.1 CO2 18.0 Cr 0.77   WBC 9.6 >< .0  INR - CRP -   Renal Lytes Endo    Hct 38.5    Trop - D dim -   eGFR - Ca 8.9 POC Gluc  -    LFT   pBNP - Lactic -   eGFR AA - PO4 - A1c -   AST 37 APk 534 Prot 5.8  LDL -     Mg 2.1 TSH -   ALT 44 T rajesh 0.3 Alb 3.2          COVID-19 Lab Results    COVID-19  Lab Results   Component Value Date    COVID19 Not Detected 09/05/2024       Pro-Calcitonin  No results for input(s): \"PCT\" in the last 168 hours.    Cardiac  No results for input(s): \"TROP\", \"PBNP\" in the last 168 hours.    Creatinine Kinase  No results for input(s): \"CK\" in the last 168 hours.    Inflammatory Markers  No results for input(s): \"CRP\", \"KEMI\", \"LDH\", \"DDIMER\" in the last 168 hours.    Imaging: Imaging data reviewed in Epic.    Assessment & Plan:    Infected right lower extremity ulcer   -Admit   -IV antibiotics with vancomycin   -Wound care consult     Gastritis/gastroduodenitis  Intractable nausea, vomiting due to above versus meds  -PPI  -Chart reviewed.  Recent EGD 7/26/2024: Small hiatal hernia but otherwise normal.  -Consider GI consult in a.m.    Mild metabolic acidosis  -Likely due to nausea vomiting  -IV fluids and monitor    Anxiety,  Asthma,  Adrenal insufficiency secondary to long-term steroids  GERD,  HTN,  HLD,  MI,  RA,  Sleep apnea  -Resume home regimen when reconciled    Quality:  DVT Prophylaxis: Heparin  CODE status: Full code  ROSELINE: TBD        Plan of care discussed with patient and daughter at bedside. Acknowledged understanding and agrees to plan. Also discussed with the ED physician.    High MDM  Time spent on this admission - examining patient, obtaining history, reviewing previous medical records, going over test results/imaging and discussing plan of care. More than 50% of the time was spent in counseling and/or coordination of care related to right lower extremity infected ulcer, gastritis.   All questions answered.     Jocelyn Rivera MD  10/27/2024                   [1]   Allergies  Allergen Reactions    Cephalosporins ANAPHYLAXIS, NAUSEA AND VOMITING and OTHER (SEE  COMMENTS)     Other reaction(s): Fever    - Tolerated multiple doses of ceftriaxone on 7/2024 without any complications  - Tolerated multiple doses of cefepime 8/2024 without complications    Gadolinium Derivatives FEVER    Penicillins OTHER (SEE COMMENTS)     esophagitis    Sulfa Antibiotics OTHER (SEE COMMENTS)     esophagitis    Bananas ITCHING   [2]   Current Facility-Administered Medications on File Prior to Encounter   Medication Dose Route Frequency Provider Last Rate Last Admin    [COMPLETED] potassium chloride 40 mEq in 250mL sodium chloride 0.9% IVPB premix  40 mEq Intravenous Once Purnima Herrera DO   Stopped at 09/11/24 1157    [COMPLETED] potassium chloride (Klor-Con M20) tab 40 mEq  40 mEq Oral Once Maryse Lenz MD   40 mEq at 09/11/24 1250    [COMPLETED] sodium chloride 0.9 % IV bolus 250 mL  250 mL Intravenous Once Jocelyn Rivera  mL/hr at 09/10/24 0543 250 mL at 09/10/24 0543    [COMPLETED] hydrocortisone (Cortef) tab 25 mg  25 mg Oral Once Ana Miller MD   25 mg at 09/08/24 1739    [COMPLETED] lidocaine PF (Xylocaine-MPF) 1% injection  5 mL Intradermal Once Purnima Herrera DO   5 mL at 09/06/24 1543    [COMPLETED] LORazepam (Ativan) 2 mg/mL injection 2 mg  2 mg Intramuscular Once Ashutosh Paulino DO   2 mg at 09/05/24 1322    [COMPLETED] haloperidol lactate (Haldol) 5 MG/ML injection 5 mg  5 mg Intramuscular Once Ashutosh Paulino DO   5 mg at 09/05/24 1442    [COMPLETED] droperidol (Inapsine) 2.5 mg/mL injection 2.5 mg  2.5 mg Intravenous Once Ashutosh Paulino DO   2.5 mg at 09/05/24 1548    [COMPLETED] potassium chloride 40 mEq in 250mL sodium chloride 0.9% IVPB premix  40 mEq Intravenous Once Christianne Davis MD 62.5 mL/hr at 09/06/24 0044 40 mEq at 09/06/24 0044    [COMPLETED] magnesium sulfate in sterile water for injection 2 g/50mL IVPB premix 2 g  2 g Intravenous Once Christianne Davis MD 50 mL/hr at 09/05/24 2321 2 g at 09/05/24 2321    [COMPLETED] predniSONE  (Deltasone) tab 15 mg  15 mg Oral Daily Mich Leigh MD   15 mg at 08/31/24 0908    [COMPLETED] ceFEPIme (Maxpime) 2 g in sodium chloride 0.9% 100 mL IVPB-MBP  2 g Intravenous Once Yaw Yao MD   Stopped at 08/29/24 0707    [COMPLETED] dextrose 50% injection        50 mL at 08/27/24 1055    [COMPLETED] DAPTOmycin (Cubicin) 400 mg in sodium chloride 0.9% PF IV syringe  6 mg/kg (Adjusted) Intravenous Q24H Ursula Leslie PA-C   400 mg at 08/30/24 1535    [COMPLETED] HYDROcodone-acetaminophen (Norco) 5-325 MG per tab 1 tablet  1 tablet Oral Once Karissa Ivan MD   1 tablet at 08/26/24 1439    [COMPLETED] sodium chloride 0.9 % IV bolus 500 mL  500 mL Intravenous Once Karissa Ivan MD   Stopped at 08/26/24 1706    [COMPLETED] lidocaine PF (Xylocaine-MPF) 1% injection  5 mL Intradermal Once Karissa Ivan MD   5 mL at 08/26/24 1547    [COMPLETED] magnesium oxide (Mag-Ox) tab 400 mg  400 mg Oral Once All Arroyo MD   400 mg at 08/21/24 0941    [COMPLETED] dextrose 50% injection        50 mL at 08/20/24 1029    [COMPLETED] sodium phosphate 15 mmol in 0.9% NaCl 100mL IVPB premix  15 mmol Intravenous Once Derick Hsieh MD   15 mmol at 08/19/24 1302    [COMPLETED] potassium phosphate dibasic 15 mmol in sodium chloride 0.9% 250 mL IVPB  15 mmol Intravenous Once Derick Hsieh MD 62.5 mL/hr at 08/18/24 1000 15 mmol at 08/18/24 1000    Followed by    [COMPLETED] potassium chloride 20 mEq/100mL IVPB premix 20 mEq  20 mEq Intravenous Once Derick Hsieh MD 50 mL/hr at 08/18/24 1503 20 mEq at 08/18/24 1503    [COMPLETED] potassium chloride 40 mEq in 250mL sodium chloride 0.9% IVPB premix  40 mEq Intravenous Once Derick Hsieh MD 62.5 mL/hr at 08/18/24 2123 40 mEq at 08/18/24 2123    [COMPLETED] potassium chloride 40 mEq in 250mL sodium chloride 0.9% IVPB premix  40 mEq Intravenous Once Derick Hsieh MD 62.5 mL/hr at 08/17/24 0848 40 mEq at 08/17/24 0848    Followed by     [COMPLETED] potassium chloride 20 mEq/100mL IVPB premix 20 mEq  20 mEq Intravenous Once МарияDerick egan MD 50 mL/hr at 08/17/24 1308 20 mEq at 08/17/24 1308    [COMPLETED] magnesium oxide (Mag-Ox) tab 800 mg  800 mg Oral Once МарияDerick egan MD   800 mg at 08/17/24 0847    [COMPLETED] magnesium sulfate 4 g/100mL IVPB premix 4 g  4 g Intravenous Once Ezio Bhardwaj MD 50 mL/hr at 08/17/24 1825 4 g at 08/17/24 1825    [COMPLETED] potassium phosphate dibasic 30 mmol in sodium chloride 0.9% 250 mL IVPB  30 mmol Intravenous Once МарияDerick egan MD 62.5 mL/hr at 08/16/24 1630 30 mmol at 08/16/24 1630    [COMPLETED] magnesium oxide (Mag-Ox) tab 800 mg  800 mg Oral Once МарияDerick egan MD   800 mg at 08/16/24 1400    [COMPLETED] potassium chloride 40 mEq in 250mL sodium chloride 0.9% IVPB premix  40 mEq Intravenous Once МарияDerick egan MD 62.5 mL/hr at 08/16/24 2330 40 mEq at 08/16/24 2330    [COMPLETED] potassium phosphate dibasic 15 mmol in sodium chloride 0.9% 250 mL IVPB  15 mmol Intravenous Once МарияDerick egan MD 62.5 mL/hr at 08/15/24 0828 15 mmol at 08/15/24 0828    Followed by    [COMPLETED] potassium chloride 40 mEq in 250mL sodium chloride 0.9% IVPB premix  40 mEq Intravenous Once МарияDerick egan MD 62.5 mL/hr at 08/15/24 1244 40 mEq at 08/15/24 1244    [COMPLETED] magnesium oxide (Mag-Ox) tab 800 mg  800 mg Oral Once МарияDerick egan MD   800 mg at 08/15/24 0828    [COMPLETED] potassium chloride 40 mEq in 250mL sodium chloride 0.9% IVPB premix  40 mEq Intravenous Once МарияDerick egan MD 62.5 mL/hr at 08/15/24 2309 40 mEq at 08/15/24 2309    Followed by    [COMPLETED] potassium chloride 20 mEq/100mL IVPB premix 20 mEq  20 mEq Intravenous Once Derick Hsieh MD 50 mL/hr at 08/16/24 0314 20 mEq at 08/16/24 0314    [COMPLETED] potassium chloride (Klor-Con M20) tab 40 mEq  40 mEq Oral Once Derick Hsieh MD   40 mEq at 08/14/24 0840    [COMPLETED] morphINE PF 2 MG/ML  injection 2 mg  2 mg Intravenous Once Dexter Peng MD   2 mg at 24 1551    [COMPLETED] iopamidol 76% (ISOVUE-370) injection for power injector  90 mL Intravenous ONCE PRN Dexter Peng MD   90 mL at 24 1707    [COMPLETED] dextrose 50% injection 25 mL  25 mL Intravenous Once Dexter Peng MD   25 mL at 24 1741    [COMPLETED] hydrocortisone Na succinate PF (Solu-CORTEF) injection 100 mg  100 mg Intravenous Once Dexter Peng MD   100 mg at 24 1827    [COMPLETED] vancomycin (Vancocin) 1.25 g in sodium chloride 0.9% 250mL IVPB premix  15 mg/kg Intravenous Once Dexter Peng MD   Stopped at 24    [] sodium chloride 0.9 % IV bolus 2,463 mL  30 mL/kg Intravenous Continuous Ashutosh Paulino  mL/hr at 24 192 2,463 mL at 24 192    [COMPLETED] acetaminophen (Tylenol Extra Strength) tab 1,000 mg  1,000 mg Oral Once Shorty Roman MD   1,000 mg at 24 1450    [COMPLETED] LORazepam (Ativan) tab 1 mg  1 mg Oral Once Shorty Roman MD   1 mg at 24 1559    [COMPLETED] potassium chloride (Klor-Con M20) tab 40 mEq  40 mEq Oral Once Shorty Roman MD   40 mEq at 24 1752     Current Outpatient Medications on File Prior to Encounter   Medication Sig Dispense Refill    collagenase 250 UNIT/GM External Ointment Apply topically daily.      HYDROcodone-acetaminophen  MG Oral Tab Take 1 tablet by mouth every 4 (four) hours as needed. 20 tablet 0    [] lamoTRIgine 25 MG Oral Tab Take 2 tablets (50 mg total) by mouth 2 (two) times daily. 120 tablet 0    [] pantoprazole 40 MG Oral Tab EC Take 1 tablet (40 mg total) by mouth 2 (two) times daily before meals. 60 tablet 0    [] QUEtiapine 50 MG Oral Tab Take 1 tablet (50 mg total) by mouth nightly. 30 tablet 0    [] metoprolol tartrate 25 MG Oral Tab Take 0.5 tablets (12.5 mg total) by mouth 2 (two) times daily. 30 tablet 0    predniSONE 10 MG  Oral Tab Take 20mg (2 tabs) in AM and 10mg (1tab) for 4 days then resume 10mg twice  a day indefinitely 30 tablet 0    clotrimazole-betamethasone 1-0.05 % External Cream Apply 1 Application topically 2 (two) times daily. Apply to groin and abdominal folds      [] sodium bicarbonate 650 MG Oral Tab Take 1 tablet (650 mg total) by mouth 2 (two) times daily. 60 tablet 0    Copper (COPPERMIN) 5 MG Oral Tab Take 1 tablet by mouth daily. 30 tablet 0    Acetaminophen ER (ACETAMINOPHEN 8 HOUR) 650 MG Oral Tab CR Take 2-3 tablets (1,300-1,950 mg total) by mouth every 8 (eight) hours as needed for Pain.      ibuprofen 200 MG Oral Tab Take 3 tablets (600 mg total) by mouth every 8 (eight) hours as needed for Pain.      [] Ferrous Gluconate 324 (38 Fe) MG Oral Tab Take 1 tablet (325 mg total) by mouth daily with breakfast. 30 tablet 0    albuterol 108 (90 Base) MCG/ACT Inhalation Aero Soln Inhale 2 puffs into the lungs every 4 to 6 hours as needed for Wheezing.      hydroxychloroquine 200 MG Oral Tab Take 1 tablet (200 mg total) by mouth 2 (two) times daily. 180 tablet 0    Aspirin 81 MG Oral Cap Take 81 mg by mouth daily.      atorvastatin 10 MG Oral Tab Take 1 tablet (10 mg total) by mouth daily.      clopidogrel 75 MG Oral Tab Take 1 tablet (75 mg total) by mouth daily.      ergocalciferol 1.25 MG (84056 UT) Oral Cap Take 1 capsule (50,000 Units total) by mouth once a week.      oxybutynin ER 10 MG Oral Tablet 24 Hr Take 1 tablet (10 mg total) by mouth daily.      Potassium Chloride ER 10 MEQ Oral Tab CR Take 1 tablet (10 mEq total) by mouth 2 (two) times daily.      folic acid 1 MG Oral Tab Take 1 tablet (1 mg total) by mouth daily. 90 tablet 0

## 2024-10-28 NOTE — PLAN OF CARE
Problem: Patient Centered Care  Goal: Patient preferences are identified and integrated in the patient's plan of care  Description: Interventions:  - What would you like us to know as we care for you? \"I fell twice at rehab\"  - Provide timely, complete, and accurate information to patient/family  - Incorporate patient and family knowledge, values, beliefs, and cultural backgrounds into the planning and delivery of care  - Encourage patient/family to participate in care and decision-making at the level they choose  - Honor patient and family perspectives and choices  Outcome: Progressing     Problem: Patient/Family Goals  Goal: Patient/Family Long Term Goal  Description: Patient's Long Term Goal: Discharge home    Interventions:  - IVF  -Wound care  -IV antibiotics  -PT/OT  - See additional Care Plan goals for specific interventions  Outcome: Progressing  Goal: Patient/Family Short Term Goal  Description: Patient's Short Term Goal: Regain Strength    Interventions:   - PT/OT  - Dietary consult  - See additional Care Plan goals for specific interventions  Outcome: Progressing     Problem: PAIN - ADULT  Goal: Verbalizes/displays adequate comfort level or patient's stated pain goal  Description: INTERVENTIONS:  - Encourage pt to monitor pain and request assistance  - Assess pain using appropriate pain scale  - Administer analgesics based on type and severity of pain and evaluate response  - Implement non-pharmacological measures as appropriate and evaluate response  - Consider cultural and social influences on pain and pain management  - Manage/alleviate anxiety  - Utilize distraction and/or relaxation techniques  - Monitor for opioid side effects  - Notify MD/LIP if interventions unsuccessful or patient reports new pain  - Anticipate increased pain with activity and pre-medicate as appropriate  Outcome: Progressing     Problem: RISK FOR INFECTION - ADULT  Goal: Absence of fever/infection during anticipated  neutropenic period  Description: INTERVENTIONS  - Monitor WBC  - Administer growth factors as ordered  - Implement neutropenic guidelines  Outcome: Progressing     Problem: SAFETY ADULT - FALL  Goal: Free from fall injury  Description: INTERVENTIONS:  - Assess pt frequently for physical needs  - Identify cognitive and physical deficits and behaviors that affect risk of falls.  - [place] fall precautions as indicated by assessment.  - Educate pt/family on patient safety including physical limitations  - Instruct pt to call for assistance with activity based on assessment  - Modify environment to reduce risk of injury  - Provide assistive devices as appropriate  - Consider OT/PT consult to assist with strengthening/mobility  - Encourage toileting schedule  Outcome: Progressing     Problem: GASTROINTESTINAL - ADULT  Goal: Minimal or absence of nausea and vomiting  Description: INTERVENTIONS:  - Maintain adequate hydration with IV or PO as ordered and tolerated  - Nasogastric tube to low intermittent suction as ordered  - Evaluate effectiveness of ordered antiemetic medications  - Provide nonpharmacologic comfort measures as appropriate  - Advance diet as tolerated, if ordered  - Obtain nutritional consult as needed  - Evaluate fluid balance  Outcome: Progressing  Goal: Maintains or returns to baseline bowel function  Description: INTERVENTIONS:  - Assess bowel function  - Maintain adequate hydration with IV or PO as ordered and tolerated  - Evaluate effectiveness of GI medications  - Encourage mobilization and activity  - Obtain nutritional consult as needed  - Establish a toileting routine/schedule  - Consider collaborating with pharmacy to review patient's medication profile  Outcome: Progressing     Problem: SKIN/TISSUE INTEGRITY - ADULT  Goal: Skin integrity remains intact  Description: INTERVENTIONS  - Assess and document risk factors for pressure ulcer development  - Assess and document skin integrity  -  Monitor for areas of redness and/or skin breakdown  - Initiate interventions, skin care algorithm/standards of care as needed  Outcome: Progressing  Goal: Incision(s), wounds(s) or drain site(s) healing without S/S of infection  Description: INTERVENTIONS:  - Assess and document risk factors for pressure ulcer development  - Assess and document skin integrity  - Assess and document dressing/incision, wound bed, drain sites and surrounding tissue  - Implement wound care per orders  - Initiate isolation precautions as appropriate  - Initiate Pressure Ulcer prevention bundle as indicated  Outcome: Progressing  Goal: Oral mucous membranes remain intact  Description: INTERVENTIONS  - Assess oral mucosa and hygiene practices  - Implement preventative oral hygiene regimen  - Implement oral medicated treatments as ordered  Outcome: Progressing     Problem: MUSCULOSKELETAL - ADULT  Goal: Return mobility to safest level of function  Description: INTERVENTIONS:  - Assess patient stability and activity tolerance for standing, transferring and ambulating w/ or w/o assistive devices  - Assist with transfers and ambulation using safe patient handling equipment as needed  - Ensure adequate protection for wounds/incisions during mobilization  - Obtain PT/OT consults as needed  - Advance activity as appropriate  - Communicate ordered activity level and limitations with patient/family  Outcome: Progressing  Goal: Maintain proper alignment of affected body part  Description: INTERVENTIONS:  - Support and protect limb and body alignment per provider's orders  - Instruct and reinforce with patient and family use of appropriate assistive device and precautions (e.g. spinal or hip dislocation precautions)  Outcome: Progressing   Patient received from ED to room 546. Patient arrives from [facility] rehab for nausea and vomiting for a few days. Patient also states she is not happy with care received at rehab. Patient arrives a/ox3,  in no acute distress. Patient oriented to room, plan of care reviewed, safety precautions are in place.

## 2024-10-28 NOTE — ED QUICK NOTES
This RN received report from Ethan CHUNG RN.    Rounding Completed    Plan of Care reviewed. Waiting for lab results/imaging.  Elimination needs assessed.  Patient resting in bed, speaking in full sentences. Pt on cardiac monitor, for frequent VS assessments, NSR. No new requests at this time.  Respiratory effort good, even and unlabored. IV flushes and draws with ease    Bed is locked and in lowest position. Call light within reach.

## 2024-10-29 ENCOUNTER — APPOINTMENT (OUTPATIENT)
Dept: GENERAL RADIOLOGY | Facility: HOSPITAL | Age: 64
End: 2024-10-29
Attending: HOSPITALIST
Payer: COMMERCIAL

## 2024-10-29 ENCOUNTER — APPOINTMENT (OUTPATIENT)
Dept: PICC SERVICES | Facility: HOSPITAL | Age: 64
End: 2024-10-29
Attending: HOSPITALIST
Payer: COMMERCIAL

## 2024-10-29 PROBLEM — R13.10 ODYNOPHAGIA: Status: ACTIVE | Noted: 2024-01-01

## 2024-10-29 PROBLEM — R13.10 ODYNOPHAGIA: Status: ACTIVE | Noted: 2024-10-29

## 2024-10-29 NOTE — H&P (VIEW-ONLY)
Is this a shared or split note between Advanced Practice Provider and Physician? Yes       [facility]   Gastroenterology Consultation Note    Sheri Garzon  Patient Status:    Inpatient  Date of Admission:         10/27/2024, Hospital day #2  Attending:   John Hinds MD  PCP:     TERI MCKENNA MD    Reason for Consultation:  Dysphagia, nausea    History of Present Illness:  Sheri Garzon is a 64 year old female w/ PMHx of GERD, HTN, asthma, HLD, RA on plaquenil, on PLAVIX who presents with intractable nausea and emesis and right leg wound. She notes her nausea and vomiting began about 3 weeks ago. She states she started with increased GERD and then was slowly having nausea and the inability to keep food down. She notes emesis episodes are digested food, not full pieces. She notes globus with meals and this am had increased pain with swallowing her oral medications. She states she does not take acid reducing medication at home. Has note had abdominal pain. No fevers or chills. BM are daily, brown in color. No overt bleeding.   Notes she feels similar to how she felt during admission in the summer. Had EGD at that time noted small hiatal hernia, no mass ulcer or stricture.   She denies heavy NSAID use. Denies alcohol and tobacco use.       Pertinent Family Hx:  - No known history of esophageal, gastric or colon cancers  - No known IBD  - No known liver pathologies    Endoscopy Hx:  - 7/26/2024 EGD with Dr. Wynne  Impression:  1. Small hiatal hernia, otherwise normal.  2. No mass, ulcer or stricture.  3. Suspect nausea/vomiting and poor appetite issues may be due to dysbiosis from recent oral antibiotics or abx side-effect.     Recommend:  1. Await pathology.  2. Full liquid diet, advance diet as tolerated.  3. Continue oral PPI BID.  4. Avoid caffeine, chocolate, peppermint, and alcohol. Also avoid lying down flat or in a recumbent position for 3 hours after a meal.   5. Okay for plavix.   6.  Okay for PRN zofran and reglan.     Social Hx:  - No tobacco use/No ETOH  - Denies illicit drug use  - Lives with: nursing home  - Occupation: not working       History:  Past Medical History:    Allergic rhinitis    Anxiety    Arthritis    RA and Osteoarthritis    Asthma (HCC)    Depression    Not officially diagnosed    Esophageal reflux    Essential hypertension    High blood pressure    High cholesterol    Hyperlipidemia    Myocardial infarction (HCC)    Cardiac event    Osteoarthritis    Pancreatitis (HCC)    Problems with swallowing    RA (rheumatoid arthritis) (HCC)    Sleep apnea     Past Surgical History:   Procedure Laterality Date    Colonoscopy      Debridement  08/15/2024    Sharp excisional debridement of RLE posterior leg wound    Egd  07/26/2024    Hiatal hernia    Other surgical history      Revision of uterine anomaly      Rotator cuff repair      Wrist fracture surgery       Family History   Problem Relation Age of Onset    Diabetes Mother     Genetic Disease Mother     Heart Disorder Mother     Hypertension Mother     Obesity Mother     Diabetes Father     Hypertension Father     Depression Daughter     Psychiatric Daughter       reports that she has never smoked. She has never used smokeless tobacco. She reports that she does not currently use alcohol. She reports that she does not use drugs.    Allergies:  Allergies[1]    Medications:    Current Facility-Administered Medications:     morphINE PF 2 MG/ML injection 2 mg, 2 mg, Intravenous, Q4H PRN    vancomycin (Vancocin) 1.25 g in sodium chloride 0.9% 250mL IVPB premix, 15 mg/kg, Intravenous, Q24H    predniSONE (Deltasone) tab 10 mg, 10 mg, Oral, BID    albuterol (Ventolin HFA) 108 (90 Base) MCG/ACT inhaler 2 puff, 2 puff, Inhalation, Q4H PRN    aspirin chewable tab 81 mg, 81 mg, Oral, Daily    clopidogrel (Plavix) tab 75 mg, 75 mg, Oral, Daily    docusate sodium (Colace) cap 100 mg, 100 mg, Oral, BID    hydroxychloroquine (Plaquenil) tab 200  mg, 200 mg, Oral, BID    lamoTRIgine (LaMICtal) tab 50 mg, 50 mg, Oral, BID    metoprolol tartrate (Lopressor) partial tab 12.5 mg, 12.5 mg, Oral, BID    oxybutynin ER (Ditropan-XL) 24 hr tab 10 mg, 10 mg, Oral, Daily    QUEtiapine (SEROquel) tab 50 mg, 50 mg, Oral, Nightly    sodium chloride 0.9% infusion, , Intravenous, Continuous    heparin (Porcine) 5000 UNIT/ML injection 5,000 Units, 5,000 Units, Subcutaneous, Q8H FALLON    acetaminophen (Tylenol Extra Strength) tab 500 mg, 500 mg, Oral, Q4H PRN    acetaminophen (Tylenol) tab 650 mg, 650 mg, Oral, Q4H PRN **OR** HYDROcodone-acetaminophen (Norco) 5-325 MG per tab 1 tablet, 1 tablet, Oral, Q4H PRN **OR** HYDROcodone-acetaminophen (Norco) 5-325 MG per tab 2 tablet, 2 tablet, Oral, Q4H PRN    melatonin tab 3 mg, 3 mg, Oral, Nightly PRN    ondansetron (Zofran) 4 MG/2ML injection 4 mg, 4 mg, Intravenous, Q6H PRN    prochlorperazine (Compazine) 10 MG/2ML injection 5 mg, 5 mg, Intravenous, Q8H PRN    pantoprazole (Protonix) 40 mg in sodium chloride 0.9% PF 10 mL IV push, 40 mg, Intravenous, Q12H    Review of Systems:   CONSTITUTIONAL:  negative for fevers, chills, unintentional weight loss   EYES:  negative for diplopia or change in vision   RESPIRATORY:  negative for severe shortness of breath  CARDIOVASCULAR:  negative for crushing sub-sternal chest pain  GASTROINTESTINAL:  see HPI  GENITOURINARY:  negative for dysuria or gross hematuria  INTEGUMENT/BREAST:  SKIN:  negative for jaundice or new rash   ALLERGIC/IMMUNOLOGIC:  negative for hay fever   ENDOCRINE:  negative for cold intolerance and heat intolerance  MUSCULOSKELETAL:  negative for joint effusion/severe erythema  NEURO: negative for new loss of consciousness or dizziness   BEHAVIOR/PSYCH:  negative for psychotic behavior    Physical Exam:    Blood pressure 111/69, pulse 82, temperature 97.2 °F (36.2 °C), temperature source Oral, resp. rate 16, weight 151 lb 12.8 oz (68.9 kg), SpO2 100%. Body mass index is 27.76  kg/m².    Gen: awake, alert patient, NAD  HEENT: EOMI, the sclera appears anicteric, oropharynx clear, mucus membranes appear moist  CV: RRR  Lung: no conversational dyspnea   Abdomen: soft NTND abdomen with NABS appreciated   Back: No CVA tenderness   Skin: dry, warm, no jaundice  Ext:RLE wound noted,  no LE edema is evident  Neuro: Alert and interactive  Psych: calm, cooperative    Laboratory Data:       Imaging:  CT ABDOMEN+PELVIS(CONTRAST ONLY)(CPT=74177)    Result Date: 10/27/2024  CONCLUSION:  1. Nonspecific gastric wall thickening, which may be indicative of underlying gastritis/gastroduodenitis. If not already performed, endoscopic evaluation may be of benefit.  2. Otherwise, no acute intra-abdominal process is identified. No additional etiology of the patient's symptoms is appreciated from this study.  3. Small hiatal hernia with possible esophagitis.  4. Uncomplicated distal colonic diverticulosis.  5. Degenerating intramural uterine fibroids.  6. Nonspecific fat deposition throughout the proximal colon which could reflect sequela of chronic inflammation, obesity, or steroid use, among other etiologies.  7. At least partial transitional lumbosacral anatomy.   8. Partially visualized chronic-appearing thoracolumbar compression fracture deformities.  9. Lesser incidental findings as above.    Dictated by (CST): Bo Wynne MD on 10/27/2024 at 9:33 PM     Finalized by (CST): Bo Wynne MD on 10/27/2024 at 9:41 PM           Assessment & Plan   Sheri Garzon is a 64 year old female w/ PMHx of GERD, HTN, asthma, HLD, RA on plaquenil, on PLAVIX who presents with intractable nausea and emesis and right leg wound. She notes her nausea and vomiting began about 3 weeks ago. She states she started with increased GERD and then was slowly having nausea and the inability to keep food down. She notes emesis episodes are digested food, not full pieces. She notes globus with meals and this am had increased pain  with swallowing her oral medications. She states she does not take acid reducing medication at home. Has note had abdominal pain. No fevers or chills. BM are daily, brown in color. No overt bleeding.     #nausea  #dysphagia   -worsening symptoms over last 3 weeks  -not currently on PPI therapy  -patient on DAPT for CAD  -today worsening odynophagia and globus with medication   -last EGD 8/2024 without mass, ulcer or stricture, CT a/p with non specific gastric wall thickening   -discussed with patient plan for PPI BID, can trial carafate with meals  -will order barium esophogram at this time to evaluate for dysmotility     Recommend:  -IV PPI BID  -Carafate TID with meals  -esophogram ordered  -speech pathology following     Thank you for the opportunity to participate in the care of this patient.    Case discussed with Maryse Hardin MD and Laura CHUNG.    Juana Carroll PA-C  [facility]  10/29/2024         [1]   Allergies  Allergen Reactions    Cephalosporins ANAPHYLAXIS, NAUSEA AND VOMITING and OTHER (SEE COMMENTS)     Other reaction(s): Fever    - Tolerated multiple doses of ceftriaxone on 7/2024 without any complications  - Tolerated multiple doses of cefepime 8/2024 without complications    Gadolinium Derivatives FEVER    Penicillins OTHER (SEE COMMENTS)     esophagitis    Sulfa Antibiotics OTHER (SEE COMMENTS)     esophagitis    Bananas ITCHING

## 2024-10-29 NOTE — PAYOR COMM NOTE
--------------  ADMISSION REVIEW     Payor: [employer]  Subscriber #:  [id number]  Authorization Number: [id number]    Admit date: 10/27/24  Admit time: 11:57 PM       History   HPI  64-year-old female presents for evaluation for nausea and vomiting for the past 2 weeks.  Family reports that she has been having very little to eat or drink over the past 2 weeks.  Patient reports that anytime she does eat or drink she gets sick to her stomach.  She also reports with her treating wounds on her back leg and foot.  She denies any chest pain, shortness of breath, cough.    ED Triage Vitals [10/27/24 1739]   BP (!) 130/107   Pulse 87   Resp 18   Temp 98.4 °F (36.9 °C)   Temp src Temporal   SpO2 96 %   O2 Device None (Room air)   HENT:      Head: Normocephalic and atraumatic.      Mouth/Throat:      Mouth: Mucous membranes are dry.   Cardiovascular:      Rate and Rhythm: Normal rate and regular rhythm.      Pulses: Normal pulses.           Dorsalis pedis pulses are 2+ on the right side and 2+ on the left side.        Posterior tibial pulses are 2+ on the right side and 2+ on the left side.      Heart sounds: Normal heart sounds.   Pulmonary:      Effort: Pulmonary effort is normal.      Breath sounds: Normal breath sounds.   Abdominal:      Palpations: Abdomen is soft.      Tenderness: There is no abdominal tenderness. There is no rebound.   Musculoskeletal:         General: Normal range of motion.      Cervical back: Normal range of motion.   Skin:     General: Skin is warm and dry.      Comments: Decubitus ulcer on patient's thoracic back without induration fluctuance crepitus or drainage.  Ulceration the patient's right foot without any fluctuance crepitus or induration.  Ulceration the patient's right lower extremity with purulent drainage.   Neurological:      General: No focal deficit present.      Mental Status: She is alert.     Labs Reviewed   CBC WITH DIFFERENTIAL WITH PLATELET - Abnormal; Notable  for the following components:       Result Value    RDW-SD 50.5 (*)     RDW 15.3 (*)     All other components within normal limits   COMP METABOLIC PANEL (14) - Abnormal; Notable for the following components:    Potassium 3.1 (*)     CO2 18.0 (*)     AST 37 (*)     Alkaline Phosphatase 534 (*)    Admission disposition: 10/27/2024 10:25 PM    Disposition and Plan   Clinical Impression:  1. Cellulitis of right lower extremity       Disposition:  Admit  10/27/2024 10:25 pm      History and Physical   Sheri Garzon is a 64 year old female with history of anxiety, asthma, GERD, HTN, HLD, MI, RA, sleep apnea who was sent to the ED from SNF for evaluation of intractable nausea and vomiting, right leg wound.  Symptoms started a few weeks ago, patient has had difficulty eating and keeping food down.  Patient notes episodes\" after taking certain medications.    No chest pain, shortness of breath, cough, fever, chills.  No diarrhea.  A few days ago, she had abdominal pain which resolved after treatment of constipation.  She has also had a chronic right lower extremity wound.  Today wound was noted with thick brown-colored drainage, prompting ED visit.     She was recently admitted and treated for cellulitis.  Wound was being managed at the nursing home.  Cultures obtained.  She has been started on vancomycin.     CTAP: Nonspecific gastric wall thickening which may be indicative of underlying gastritis/gastroduodenitis.  If not already performed, endoscopic evaluation may be of benefit.  Otherwise no acute intra-abdominal process.  Labs stable except potassium 3.1, CO2 18, alk phos 534    She has been admitted for gastritis, unable to tolerate p.o., recurrent cellulitis.   Labs Last 24 Hours:                 BMP         CBC     Na 141 Cl 112 BUN 15 Glu 77     Hb 12.5     K 3.1 CO2 18.0 Cr 0.77     WBC 9.6 >< .0    Lytes        Hct 38.5      Ca 8.9        LFT      PO4 -      AST 37 APk 534 Prot 5.8     Mg 2.1      ALT  44 T rajesh 0.3 Alb 3.2    Assessment & Plan:    Infected right lower extremity ulcer   -Admit   -IV antibiotics with vancomycin   -Wound care consult      Gastritis/gastroduodenitis  Intractable nausea, vomiting due to above versus meds  -PPI  -Chart reviewed.  Recent EGD 7/26/2024: Small hiatal hernia but otherwise normal.  -Consider GI consult in a.m.     Mild metabolic acidosis  -Likely due to nausea vomiting  -IV fluids and monitor        10/28/24  Assessment & Plan:   Nausea/vomiting.  Per patient this is much better today.  Recent EGD negative.  CT scan unremarkable.  -Antiemetics as needed  -Gentle IV fluids  -Advance diet as tolerated  -Consider GI consult if worsens, utility of repeat EGD limited  -PPI     Right lower extremity ulcer.  Possible overlying cellulitis though not clear.  White count is normal.  Afebrile.  -Continue vancomycin for now  -Monitor cultures    Subjective:   Minimal discomfort in the right lower extremity.  Mild nausea overnight, no vomiting.  Currently without any nausea.    Lab Results   Component Value Date     HGB 12.5 10/27/2024     WBC 9.6 10/27/2024     .0 10/27/2024      10/27/2024     K 3.1 (L) 10/27/2024     CREATSERUM 0.77 10/27/2024     AST 37 (H) 10/27/2024     ALT 44 10/27/2024      vancomycin  15 mg/kg Intravenous Q24H    predniSONE  10 mg Oral BID    aspirin  81 mg Oral Daily    clopidogrel  75 mg Oral Daily    docusate sodium  100 mg Oral BID    hydroxychloroquine  200 mg Oral BID    lamoTRIgine  50 mg Oral BID    metoprolol tartrate  12.5 mg Oral BID    oxybutynin ER  10 mg Oral Daily    QUEtiapine  50 mg Oral Nightly    heparin  5,000 Units Subcutaneous Q8H FALLON    pantoprazole  40 mg Intravenous Q12H         10/29/24  Assessment & Plan:   Nausea/vomiting.  Some nausea overnight.  CT scan unremarkable.  -Antiemetics as needed  -Gentle IV fluids  -Advance diet as tolerated  -PPI     Odynophagia.  Now patient complaining of pills getting stuck in her  throat.  She states that every single pill, as well as food is getting stuck and causing discomfort.  Nursing cannot corroborate this.  -GI consult  -Hold oral medications for now     Right lower extremity ulcer.  Possible overlying cellulitis on right foot dorsum possible.  Ulceration on the back of the right lower leg does not appear infected.  White count is normal.  Afebrile.  -Continue vancomycin for now  -Monitor cultures     Left foot pain.  Now complaining of moderate pain in the left fourth and fifth digits.  No injury.  The toes are warm, well-perfused.  Sensation intact.  -X-rays      Subjective:   Patient has several complaints this morning.  States that yesterday she could not swallow any of her pills or food because they get stuck causing discomfort.  Nausea has been minimal.  Now also complaining of pain in her left fourth and fifth digits.  Does not recall an injury.    Lab Results   Component Value Date     HGB 12.5 10/27/2024     WBC 9.6 10/27/2024     .0 10/27/2024       GASTROENTEROLOGY  Assessment & Plan   Sheri Garzon is a 64 year old female w/ PMHx of GERD, HTN, asthma, HLD, RA on plaquenil, on PLAVIX who presents with intractable nausea and emesis and right leg wound. She notes her nausea and vomiting began about 3 weeks ago. She states she started with increased GERD and then was slowly having nausea and the inability to keep food down. She notes emesis episodes are digested food, not full pieces. She notes globus with meals and this am had increased pain with swallowing her oral medications. She states she does not take acid reducing medication at home. Has note had abdominal pain. No fevers or chills. BM are daily, brown in color. No overt bleeding.      #nausea  #dysphagia   -worsening symptoms over last 3 weeks  -not currently on PPI therapy  -patient on DAPT for CAD  -today worsening odynophagia and globus with medication   -last EGD 8/2024 without mass, ulcer or stricture, CT  a/p with non specific gastric wall thickening   -discussed with patient plan for PPI BID, can trial carafate with meals  -will order barium esophogram at this time to evaluate for dysmotility      Recommend:  -IV PPI BID  -Carafate TID with meals  -esophogram ordered  -speech pathology following      Blood -- 10/29/24 1022     Component Value   Glucose 48   Sodium 142   Potassium 3.1   Chloride 116   CO2 14.0   Anion Gap 12   BUN 11   Creatinine 0.58   BUN/CREA Ratio 19.0   Calcium, Total 7.7   Calculated Osmolality 291   eGFR-Cr 101     Type Source Collected On   Blood -- 10/29/24 1022     Component Value   WBC 9.2   RBC 3.45   HGB 10.0   HCT 31.2   MCV 90.4   MCH 29.0   MCHC 32.1   RDW-SD 52.0   RDW 15.9   .0       MEDICATIONS ADMINISTERED IN LAST 1 DAY:  dextrose 50% injection 50 mL       Date Action Dose Route User    10/29/2024 1222 Given 50 mL Intravenous Michelle Kearns RN          dextrose 5%-sodium chloride 0.45% infusion       Date Action Dose Route User    10/29/2024 1417 New Bag (none) Intravenous Laura Rodas RN          heparin (Porcine) 5000 UNIT/ML injection 5,000 Units       Date Action Dose Route User    10/29/2024 1425 Given 5,000 Units Subcutaneous (Right Upper Arm) Laura Rodas RN    10/29/2024 0519 Given 5,000 Units Subcutaneous (Left Upper Arm) Rosina Morgan RN    10/28/2024 2149 Given 5,000 Units Subcutaneous (Left Upper Arm) Rosina Morgan RN          HYDROcodone-acetaminophen (Norco) 5-325 MG per tab 2 tablet       Date Action Dose Route User    10/28/2024 2141 Given 2 tablet Oral Rosina Morgan RN          lamoTRIgine (LaMICtal) tab 50 mg       Date Action Dose Route User    10/29/2024 1105 Given 50 mg Oral Laura Rodas RN     magnesium sulfate in sterile water for injection 2 g/50mL IVPB premix 2 g       Date Action Dose Route User    10/29/2024 1421 New Bag 2 g Intravenous Laura Rodas RN          morphINE PF 2 MG/ML injection 2 mg       Date  Action Dose Route User    10/29/2024 1423 Given 2 mg Intravenous Laura Rodas RN    10/29/2024 0923 Given 2 mg Intravenous Laura Rodas RN          oxybutynin ER (Ditropan-XL) 24 hr tab 10 mg       Date Action Dose Route User    10/29/2024 1105 Given 10 mg Oral Laura Rodas RN          pantoprazole (Protonix) 40 mg in sodium chloride 0.9% PF 10 mL IV push       Date Action Dose Route User    10/29/2024 1412 Given 40 mg Intravenous Laura Rodas RN    10/28/2024 2315 Given 40 mg Intravenous Rosina Morgan RN          potassium chloride 40 mEq in 250mL sodium chloride 0.9% IVPB premix       Date Action Dose Route User    10/29/2024 1527 New Bag 40 mEq Intravenous Laura Rodas RN          QUEtiapine (SEROquel) tab 50 mg       Date Action Dose Route User    10/28/2024 2147 Given 50 mg Oral Rosina Morgan RN          vancomycin (Vancocin) 1.25 g in sodium chloride 0.9% 250mL IVPB premix       Date Action Dose Route User    10/28/2024 2315 New Bag 1,250 mg Intravenous Rosina Morgan RN            Vitals (last day)       Date/Time Temp Pulse Resp BP SpO2 Weight O2 Device O2 Flow Rate (L/min) [facility]    10/29/24 1523 98 °F (36.7 °C) 105 16 146/99 99 % -- None (Room air) -- MS    10/29/24 0921 97.2 °F (36.2 °C) 82 16 111/69 100 % -- None (Room air) -- MS    10/29/24 0515 97.5 °F (36.4 °C) 83 18 117/71 96 % -- None (Room air) --     10/28/24 2127 97.5 °F (36.4 °C) 95 18 127/89 100 % -- None (Room air) --     10/28/24 1452 97.3 °F (36.3 °C) 76 16 129/95 98 % -- None (Room air) -- MS    10/28/24 0809 98.1 °F (36.7 °C) 93 16 120/83 100 % -- None (Room air) -- MS    10/28/24 0519 97.7 °F (36.5 °C) 99 16 117/99 99 % -- None (Room air) -- RP    10/28/24 0014 97.6 °F (36.4 °C) 88 16 143/100 99 % 151 lb 12.8 oz (68.9 kg) None (Room air) -- RP

## 2024-10-29 NOTE — PLAN OF CARE
Problem: Patient Centered Care  Goal: Patient preferences are identified and integrated in the patient's plan of care  Description: Interventions:  - What would you like us to know as we care for you? \"I fell twice at rehab\"  - Provide timely, complete, and accurate information to patient/family  - Incorporate patient and family knowledge, values, beliefs, and cultural backgrounds into the planning and delivery of care  - Encourage patient/family to participate in care and decision-making at the level they choose  - Honor patient and family perspectives and choices  Outcome: Progressing     Problem: Patient/Family Goals  Goal: Patient/Family Long Term Goal  Description: Patient's Long Term Goal: Discharge home    Interventions:  - IVF  -Wound care  -IV antibiotics  -PT/OT  - See additional Care Plan goals for specific interventions  Outcome: Progressing  Goal: Patient/Family Short Term Goal  Description: Patient's Short Term Goal: Regain Strength    Interventions:   - PT/OT  - Dietary consult  - See additional Care Plan goals for specific interventions  Outcome: Progressing     Problem: PAIN - ADULT  Goal: Verbalizes/displays adequate comfort level or patient's stated pain goal  Description: INTERVENTIONS:  - Encourage pt to monitor pain and request assistance  - Assess pain using appropriate pain scale  - Administer analgesics based on type and severity of pain and evaluate response  - Implement non-pharmacological measures as appropriate and evaluate response  - Consider cultural and social influences on pain and pain management  - Manage/alleviate anxiety  - Utilize distraction and/or relaxation techniques  - Monitor for opioid side effects  - Notify MD/LIP if interventions unsuccessful or patient reports new pain  - Anticipate increased pain with activity and pre-medicate as appropriate  Outcome: Progressing     Problem: RISK FOR INFECTION - ADULT  Goal: Absence of fever/infection during anticipated  neutropenic period  Description: INTERVENTIONS  - Monitor WBC  - Administer growth factors as ordered  - Implement neutropenic guidelines  Outcome: Progressing     Problem: SAFETY ADULT - FALL  Goal: Free from fall injury  Description: INTERVENTIONS:  - Assess pt frequently for physical needs  - Identify cognitive and physical deficits and behaviors that affect risk of falls.  - [place] fall precautions as indicated by assessment.  - Educate pt/family on patient safety including physical limitations  - Instruct pt to call for assistance with activity based on assessment  - Modify environment to reduce risk of injury  - Provide assistive devices as appropriate  - Consider OT/PT consult to assist with strengthening/mobility  - Encourage toileting schedule  Outcome: Progressing     Problem: GASTROINTESTINAL - ADULT  Goal: Minimal or absence of nausea and vomiting  Description: INTERVENTIONS:  - Maintain adequate hydration with IV or PO as ordered and tolerated  - Nasogastric tube to low intermittent suction as ordered  - Evaluate effectiveness of ordered antiemetic medications  - Provide nonpharmacologic comfort measures as appropriate  - Advance diet as tolerated, if ordered  - Obtain nutritional consult as needed  - Evaluate fluid balance  Outcome: Progressing  Goal: Maintains or returns to baseline bowel function  Description: INTERVENTIONS:  - Assess bowel function  - Maintain adequate hydration with IV or PO as ordered and tolerated  - Evaluate effectiveness of GI medications  - Encourage mobilization and activity  - Obtain nutritional consult as needed  - Establish a toileting routine/schedule  - Consider collaborating with pharmacy to review patient's medication profile  Outcome: Progressing     Problem: SKIN/TISSUE INTEGRITY - ADULT  Goal: Skin integrity remains intact  Description: INTERVENTIONS  - Assess and document risk factors for pressure ulcer development  - Assess and document skin integrity  -  Monitor for areas of redness and/or skin breakdown  - Initiate interventions, skin care algorithm/standards of care as needed  Outcome: Progressing  Goal: Incision(s), wounds(s) or drain site(s) healing without S/S of infection  Description: INTERVENTIONS:  - Assess and document risk factors for pressure ulcer development  - Assess and document skin integrity  - Assess and document dressing/incision, wound bed, drain sites and surrounding tissue  - Implement wound care per orders  - Initiate isolation precautions as appropriate  - Initiate Pressure Ulcer prevention bundle as indicated  Outcome: Progressing  Goal: Oral mucous membranes remain intact  Description: INTERVENTIONS  - Assess oral mucosa and hygiene practices  - Implement preventative oral hygiene regimen  - Implement oral medicated treatments as ordered  Outcome: Progressing     Problem: MUSCULOSKELETAL - ADULT  Goal: Return mobility to safest level of function  Description: INTERVENTIONS:  - Assess patient stability and activity tolerance for standing, transferring and ambulating w/ or w/o assistive devices  - Assist with transfers and ambulation using safe patient handling equipment as needed  - Ensure adequate protection for wounds/incisions during mobilization  - Obtain PT/OT consults as needed  - Advance activity as appropriate  - Communicate ordered activity level and limitations with patient/family  Outcome: Progressing  Goal: Maintain proper alignment of affected body part  Description: INTERVENTIONS:  - Support and protect limb and body alignment per provider's orders  - Instruct and reinforce with patient and family use of appropriate assistive device and precautions (e.g. spinal or hip dislocation precautions)  Outcome: Progressing

## 2024-10-29 NOTE — PROGRESS NOTES
Patient is a 64 year old -American,female with past medical history of hypertension, rheumatoid arthritis, obesity, chronic adrenal insufficiency secondary to chronic corticosteroid use, asthma, pancreatitis, obstructive sleep apnea, obesity, hyperlipidemia, anemia, chronic kidney disease  nonocclusive coronary artery disease, and peripheral arterial disease who also has past mental lorenzo history of anxiety and depression  who was admitted to the hospital for Cellulitis of right lower extremity: The patient has been demonstrating increased anxiety and depressive symptoms.Patient indicated for psych consult for evaluation and advise.  Consult Duration   The patient seen for over 50-minute, follow-up evaluation, over 50% counseling and coordinating care addressing anxiety, depression.      Record reviewed, communication with attending, communication with RN and patient seen face to face evaluation.  History of Present Illness:     Communicating with the team,the patient has been refusing medications and uncooperative at times. Patient told team that she is not able to swallow her medications.     The patient received the following psychotropic medications Lamictal 50 mg BID, Seroquel 50 mg nightly    Labs and imaging reviewed: No recent labs performed.    The patient seen today sitting in hospital bed.  The patient presents calm, cooperative and pleasant. She is not demonstrating any restlessness or agitation.     The patient is alert and oriented to person, place, date and condition with some prompting. The patient answers questions and engages in conversation otherwise demonstrating some alternation his cognition and thought process.     She reports that her mood had been stable. She denies any feelings of of hopelessness, helplessness, worthlessness, low mood, low energy, decreased motivation.    She reports some anxiety, worry, ruminations with impairment in sleep.     Discussed patients refusal of  medications. She states that she was not sure what the medications were for. Educated patient on medications. Patient is agreeable to taking.     The patient otherwise later stating that she did not take her medications because she has been having difficulty swallowing     The patient is not demonstrating any apryl or psychosis  The patient denies auditory or visual hallucinations  The patient denies suicidal or homicidal ideation.    Provided patient with supportive psychotherapy including listening, emotional support and encouragement.     The patient has been demonstrating some alternation in her cognition and thought process. Patient refusing medications and reporting that she can not swallow pills. She demonstrates some increased anxiety, worry. Patient reporting that she can take her pills otherwise not all at one time.     Past Psychiatric/Medication History:  1. Prior diagnoses: Depression, Anxiety  2. Past psychiatric inpatient: Denies           3. Past outpatient history: Denies  4. Past suicide history: None  5. Medication history: Denies     Social History:   The patient is a 64 year old  female. She is a retired  of 35 years. She retired and moved to [place] with her daughter. Her health declined since retiring and moving to [place] and moved back to [place] with her daughter in June 2024. She and her daughter moved back in with her eldest sister, Gladys. She was admitted to rehab for decreased mobility and chronic wound care on multiple occasions.      Family History:  Daughter: depression  Medical History:   Past Medical History  Past Medical History:    Allergic rhinitis    Anxiety    Arthritis    RA and Osteoarthritis    Asthma (HCC)    Depression    Not officially diagnosed    Esophageal reflux    Essential hypertension    High blood pressure    High cholesterol    Hyperlipidemia    Myocardial infarction (HCC)    Cardiac event    Osteoarthritis    Pancreatitis (HCC)     Problems with swallowing    RA (rheumatoid arthritis) (HCC)    Sleep apnea       Past Surgical History  Past Surgical History:   Procedure Laterality Date    Colonoscopy      Debridement  08/15/2024    Sharp excisional debridement of RLE posterior leg wound    Egd  07/26/2024    Hiatal hernia    Other surgical history      Revision of uterine anomaly      Rotator cuff repair      Wrist fracture surgery         Family History  Family History   Problem Relation Age of Onset    Diabetes Mother     Genetic Disease Mother     Heart Disorder Mother     Hypertension Mother     Obesity Mother     Diabetes Father     Hypertension Father     Depression Daughter     Psychiatric Daughter        Social History  Social History     Socioeconomic History    Marital status: Single   Tobacco Use    Smoking status: Never    Smokeless tobacco: Never   Vaping Use    Vaping status: Never Used   Substance and Sexual Activity    Alcohol use: Not Currently    Drug use: Never     Social Drivers of Health     Food Insecurity: No Food Insecurity (10/28/2024)    Food Insecurity     Food Insecurity: Never true   Transportation Needs: No Transportation Needs (10/28/2024)    Transportation Needs     Lack of Transportation: No   Housing Stability: Low Risk  (10/28/2024)    Housing Stability     Housing Instability: No           Current Medications:  Current Facility-Administered Medications   Medication Dose Route Frequency    morphINE PF 2 MG/ML injection 2 mg  2 mg Intravenous Q4H PRN    vancomycin (Vancocin) 1.25 g in sodium chloride 0.9% 250mL IVPB premix  15 mg/kg Intravenous Q24H    predniSONE (Deltasone) tab 10 mg  10 mg Oral BID    albuterol (Ventolin HFA) 108 (90 Base) MCG/ACT inhaler 2 puff  2 puff Inhalation Q4H PRN    aspirin chewable tab 81 mg  81 mg Oral Daily    clopidogrel (Plavix) tab 75 mg  75 mg Oral Daily    docusate sodium (Colace) cap 100 mg  100 mg Oral BID    hydroxychloroquine (Plaquenil) tab 200 mg  200 mg Oral BID     lamoTRIgine (LaMICtal) tab 50 mg  50 mg Oral BID    metoprolol tartrate (Lopressor) partial tab 12.5 mg  12.5 mg Oral BID    oxybutynin ER (Ditropan-XL) 24 hr tab 10 mg  10 mg Oral Daily    QUEtiapine (SEROquel) tab 50 mg  50 mg Oral Nightly    sodium chloride 0.9% infusion   Intravenous Continuous    heparin (Porcine) 5000 UNIT/ML injection 5,000 Units  5,000 Units Subcutaneous Q8H FALLON    acetaminophen (Tylenol Extra Strength) tab 500 mg  500 mg Oral Q4H PRN    acetaminophen (Tylenol) tab 650 mg  650 mg Oral Q4H PRN    Or    HYDROcodone-acetaminophen (Norco) 5-325 MG per tab 1 tablet  1 tablet Oral Q4H PRN    Or    HYDROcodone-acetaminophen (Norco) 5-325 MG per tab 2 tablet  2 tablet Oral Q4H PRN    melatonin tab 3 mg  3 mg Oral Nightly PRN    ondansetron (Zofran) 4 MG/2ML injection 4 mg  4 mg Intravenous Q6H PRN    prochlorperazine (Compazine) 10 MG/2ML injection 5 mg  5 mg Intravenous Q8H PRN    pantoprazole (Protonix) 40 mg in sodium chloride 0.9% PF 10 mL IV push  40 mg Intravenous Q12H     Medications Prior to Admission   Medication Sig    docusate sodium 100 MG Oral Cap Take 1 capsule (100 mg total) by mouth 2 (two) times daily.    ondansetron (ZOFRAN) 4 mg tablet Take 1 tablet (4 mg total) by mouth every 8 (eight) hours as needed for Nausea.    HYDROcodone-acetaminophen  MG Oral Tab Take 1 tablet by mouth every 4 (four) hours as needed.    [] lamoTRIgine 25 MG Oral Tab Take 2 tablets (50 mg total) by mouth 2 (two) times daily.    [] pantoprazole 40 MG Oral Tab EC Take 1 tablet (40 mg total) by mouth 2 (two) times daily before meals.    [] QUEtiapine 50 MG Oral Tab Take 1 tablet (50 mg total) by mouth nightly.    [] metoprolol tartrate 25 MG Oral Tab Take 0.5 tablets (12.5 mg total) by mouth 2 (two) times daily. (Patient taking differently: Take 0.5 tablets (12.5 mg total) by mouth 2 (two) times daily. Hold for SBP <100 and HR <60)    predniSONE 10 MG Oral Tab Take 20mg (2  tabs) in AM and 10mg (1tab) for 4 days then resume 10mg twice  a day indefinitely (Patient taking differently: Take 1 tablet (10 mg total) by mouth 2 (two) times daily. Take 20mg (2 tabs) in AM and 10mg (1tab) for 4 days then resume 10mg twice  a day indefinitely)    Acetaminophen ER (ACETAMINOPHEN 8 HOUR) 650 MG Oral Tab CR Take 2-3 tablets (1,300-1,950 mg total) by mouth every 8 (eight) hours as needed for Pain.    albuterol 108 (90 Base) MCG/ACT Inhalation Aero Soln Inhale 2 puffs into the lungs every 4 to 6 hours as needed for Wheezing.    hydroxychloroquine 200 MG Oral Tab Take 1 tablet (200 mg total) by mouth 2 (two) times daily.    Aspirin 81 MG Oral Cap Take 81 mg by mouth daily.    atorvastatin 10 MG Oral Tab Take 1 tablet (10 mg total) by mouth daily.    clopidogrel 75 MG Oral Tab Take 1 tablet (75 mg total) by mouth daily.    ergocalciferol 1.25 MG (78947 UT) Oral Cap Take 1 capsule (50,000 Units total) by mouth once a week.    oxybutynin ER 10 MG Oral Tablet 24 Hr Take 1 tablet (10 mg total) by mouth daily.    folic acid 1 MG Oral Tab Take 1 tablet (1 mg total) by mouth daily.    collagenase 250 UNIT/GM External Ointment Apply topically daily.    clotrimazole-betamethasone 1-0.05 % External Cream Apply 1 Application topically 2 (two) times daily. Apply to groin and abdominal folds    [] sodium bicarbonate 650 MG Oral Tab Take 1 tablet (650 mg total) by mouth 2 (two) times daily.    Copper (COPPERMIN) 5 MG Oral Tab Take 1 tablet by mouth daily.    ibuprofen 200 MG Oral Tab Take 3 tablets (600 mg total) by mouth every 8 (eight) hours as needed for Pain.    [] Ferrous Gluconate 324 (38 Fe) MG Oral Tab Take 1 tablet (325 mg total) by mouth daily with breakfast.    Potassium Chloride ER 10 MEQ Oral Tab CR Take 1 tablet (10 mEq total) by mouth 2 (two) times daily.       Allergies  Allergies[1]    Review of Systems:   As by Admitting/Attending    Results:   Laboratory Data:  Lab Results    Component Value Date    WBC 9.6 10/27/2024    HGB 12.5 10/27/2024    HCT 38.5 10/27/2024    .0 10/27/2024    CREATSERUM 0.77 10/27/2024    BUN 15 10/27/2024     10/27/2024    K 3.1 (L) 10/27/2024     10/27/2024    CO2 18.0 (L) 10/27/2024    GLU 77 10/27/2024    CA 8.9 10/27/2024    ALB 3.2 10/27/2024    ALKPHO 534 (H) 10/27/2024    TP 5.8 10/27/2024    AST 37 (H) 10/27/2024    ALT 44 10/27/2024    LIP 31 10/27/2024    DDIMER 1.69 (H) 07/23/2024    MG 2.1 10/27/2024    PHOS 2.9 08/21/2024    CK 50 08/27/2024    B12 748 08/21/2024    ETOH <3 09/05/2024         Imaging:  CT ABDOMEN+PELVIS(CONTRAST ONLY)(CPT=74177)    Result Date: 10/27/2024  CONCLUSION:  1. Nonspecific gastric wall thickening, which may be indicative of underlying gastritis/gastroduodenitis. If not already performed, endoscopic evaluation may be of benefit.  2. Otherwise, no acute intra-abdominal process is identified. No additional etiology of the patient's symptoms is appreciated from this study.  3. Small hiatal hernia with possible esophagitis.  4. Uncomplicated distal colonic diverticulosis.  5. Degenerating intramural uterine fibroids.  6. Nonspecific fat deposition throughout the proximal colon which could reflect sequela of chronic inflammation, obesity, or steroid use, among other etiologies.  7. At least partial transitional lumbosacral anatomy.   8. Partially visualized chronic-appearing thoracolumbar compression fracture deformities.  9. Lesser incidental findings as above.    Dictated by (CST): Bo Wynne MD on 10/27/2024 at 9:33 PM     Finalized by (CST): Bo Wynne MD on 10/27/2024 at 9:41 PM           Vital Signs:   Blood pressure 111/69, pulse 82, temperature 97.2 °F (36.2 °C), temperature source Oral, resp. rate 16, weight 68.9 kg (151 lb 12.8 oz), SpO2 100%.    Mental Status Exam:   Appearance: Stated age 64, in hospital gown, laying down in hospital bed.  Psychomotor: no agitation or  restlessness  Orientation: Alert and oriented to person, place, date and condition  Gait: Not evaluated.  Attitude/Coorperation: cooperative, pleasant.  Behavior:appropriate  Speech: Regular rate and rhythm speech.  Mood: anxious  Affect: restricted  Thought process: Linear  Thought content: No reports of  suicidal or homicidal ideation.  Perceptions: Patient denies auditory or visual hallucinations  Concentration: intact  Memory: intact  Intellect: Average.  Judgment and Insight: Questionable.     Impression:     Episodic mood disorder     Cellulitis of right lower extremity    Nausea and vomiting    Patient is a 64 year old -American,female with past medical history of hypertension, rheumatoid arthritis, obesity, chronic adrenal insufficiency secondary to chronic corticosteroid use, asthma, pancreatitis, obstructive sleep apnea, obesity, hyperlipidemia, anemia, chronic kidney disease  nonocclusive coronary artery disease, and peripheral arterial disease who also has past mental lorenzo history of anxiety and depression  who was admitted to the hospital for Cellulitis of right lower extremity: The patient has been demonstrating increased anxiety and depressive symptoms. Patient was started on Lamictal and Seroquel during her last admission and would like those medications continued.     10/29/2024: The patient has been demonstrating some alternation in her cognition and thought process. Patient refusing medications and reporting to team that she can not swallow pills. She demonstrates some increased anxiety, worry. Patient reporting that she can take her pills otherwise not all at one time.     Discussed risk and benefit, acknowledging the current symptom and severity.  At this point, I would recommend the following approach:     Focus on safety  Focus on education and support.  Focus on insight orientation helping the patient understand diagnosis and treatment plan.  Continue Seroquel 50 mg nightly  Continue  Lamictal 25 mg BID  Processed with patient at length, the initiation of the above psychotropic medications I advised the patient of the risks, benefits, alternatives and potential side effects. The patient consents to administration of the medications and understands the right to refuse medications at any time. The patient verbalized understanding.   Coordinate plan with team    Orders This Visit:  Orders Placed This Encounter   Procedures    CBC With Differential With Platelet    Comp Metabolic Panel (14)    Magnesium    Lipase    Urinalysis, Routine    Lactic Acid, Plasma    Magnesium    CBC With Differential With Platelet    Basic Metabolic Panel (8)    Aerobic Bacterial Culture    Blood Culture    MRSA Screen by PCR       Meds This Visit:  Requested Prescriptions      No prescriptions requested or ordered in this encounter       Luiza Sorianoshantelle, APRN  10/29/2024    Note to Patient: The 21st Century Cures Act makes medical notes like these available to patients in the interest of transparency. However, be advised this is a medical document. It is intended as peer to peer communication. It is written in medical language and may contain abbreviations or verbiage that are unfamiliar. It may appear blunt or direct. Medical documents are intended to carry relevant information, facts as evident, and the clinical opinion of the practitioner. This note may have been transcribed using a voice dictation system. Voice recognition errors may occur. This should not be taken to alter the content or meaning of this note.           [1]   Allergies  Allergen Reactions    Cephalosporins ANAPHYLAXIS, NAUSEA AND VOMITING and OTHER (SEE COMMENTS)     Other reaction(s): Fever    - Tolerated multiple doses of ceftriaxone on 7/2024 without any complications  - Tolerated multiple doses of cefepime 8/2024 without complications    Gadolinium Derivatives FEVER    Penicillins OTHER (SEE COMMENTS)     esophagitis    Sulfa Antibiotics OTHER (SEE  COMMENTS)     esophagitis    Bananas ITCHING

## 2024-10-29 NOTE — CONSULTS
Is this a shared or split note between Advanced Practice Provider and Physician? Yes       [facility]   Gastroenterology Consultation Note    Sheri Garzon  Patient Status:    Inpatient  Date of Admission:         10/27/2024, Hospital day #2  Attending:   John Hinds MD  PCP:     TERI MCKENNA MD    Reason for Consultation:  Dysphagia, nausea    History of Present Illness:  Sheri Garzon is a 64 year old female w/ PMHx of GERD, HTN, asthma, HLD, RA on plaquenil, on PLAVIX who presents with intractable nausea and emesis and right leg wound. She notes her nausea and vomiting began about 3 weeks ago. She states she started with increased GERD and then was slowly having nausea and the inability to keep food down. She notes emesis episodes are digested food, not full pieces. She notes globus with meals and this am had increased pain with swallowing her oral medications. She states she does not take acid reducing medication at home. Has note had abdominal pain. No fevers or chills. BM are daily, brown in color. No overt bleeding.   Notes she feels similar to how she felt during admission in the summer. Had EGD at that time noted small hiatal hernia, no mass ulcer or stricture.   She denies heavy NSAID use. Denies alcohol and tobacco use.       Pertinent Family Hx:  - No known history of esophageal, gastric or colon cancers  - No known IBD  - No known liver pathologies    Endoscopy Hx:  - 7/26/2024 EGD with Dr. Wynne  Impression:  1. Small hiatal hernia, otherwise normal.  2. No mass, ulcer or stricture.  3. Suspect nausea/vomiting and poor appetite issues may be due to dysbiosis from recent oral antibiotics or abx side-effect.     Recommend:  1. Await pathology.  2. Full liquid diet, advance diet as tolerated.  3. Continue oral PPI BID.  4. Avoid caffeine, chocolate, peppermint, and alcohol. Also avoid lying down flat or in a recumbent position for 3 hours after a meal.   5. Okay for plavix.   6.  Okay for PRN zofran and reglan.     Social Hx:  - No tobacco use/No ETOH  - Denies illicit drug use  - Lives with: nursing home  - Occupation: not working       History:  Past Medical History:    Allergic rhinitis    Anxiety    Arthritis    RA and Osteoarthritis    Asthma (HCC)    Depression    Not officially diagnosed    Esophageal reflux    Essential hypertension    High blood pressure    High cholesterol    Hyperlipidemia    Myocardial infarction (HCC)    Cardiac event    Osteoarthritis    Pancreatitis (HCC)    Problems with swallowing    RA (rheumatoid arthritis) (HCC)    Sleep apnea     Past Surgical History:   Procedure Laterality Date    Colonoscopy      Debridement  08/15/2024    Sharp excisional debridement of RLE posterior leg wound    Egd  07/26/2024    Hiatal hernia    Other surgical history      Revision of uterine anomaly      Rotator cuff repair      Wrist fracture surgery       Family History   Problem Relation Age of Onset    Diabetes Mother     Genetic Disease Mother     Heart Disorder Mother     Hypertension Mother     Obesity Mother     Diabetes Father     Hypertension Father     Depression Daughter     Psychiatric Daughter       reports that she has never smoked. She has never used smokeless tobacco. She reports that she does not currently use alcohol. She reports that she does not use drugs.    Allergies:  Allergies[1]    Medications:    Current Facility-Administered Medications:     morphINE PF 2 MG/ML injection 2 mg, 2 mg, Intravenous, Q4H PRN    vancomycin (Vancocin) 1.25 g in sodium chloride 0.9% 250mL IVPB premix, 15 mg/kg, Intravenous, Q24H    predniSONE (Deltasone) tab 10 mg, 10 mg, Oral, BID    albuterol (Ventolin HFA) 108 (90 Base) MCG/ACT inhaler 2 puff, 2 puff, Inhalation, Q4H PRN    aspirin chewable tab 81 mg, 81 mg, Oral, Daily    clopidogrel (Plavix) tab 75 mg, 75 mg, Oral, Daily    docusate sodium (Colace) cap 100 mg, 100 mg, Oral, BID    hydroxychloroquine (Plaquenil) tab 200  mg, 200 mg, Oral, BID    lamoTRIgine (LaMICtal) tab 50 mg, 50 mg, Oral, BID    metoprolol tartrate (Lopressor) partial tab 12.5 mg, 12.5 mg, Oral, BID    oxybutynin ER (Ditropan-XL) 24 hr tab 10 mg, 10 mg, Oral, Daily    QUEtiapine (SEROquel) tab 50 mg, 50 mg, Oral, Nightly    sodium chloride 0.9% infusion, , Intravenous, Continuous    heparin (Porcine) 5000 UNIT/ML injection 5,000 Units, 5,000 Units, Subcutaneous, Q8H FALLON    acetaminophen (Tylenol Extra Strength) tab 500 mg, 500 mg, Oral, Q4H PRN    acetaminophen (Tylenol) tab 650 mg, 650 mg, Oral, Q4H PRN **OR** HYDROcodone-acetaminophen (Norco) 5-325 MG per tab 1 tablet, 1 tablet, Oral, Q4H PRN **OR** HYDROcodone-acetaminophen (Norco) 5-325 MG per tab 2 tablet, 2 tablet, Oral, Q4H PRN    melatonin tab 3 mg, 3 mg, Oral, Nightly PRN    ondansetron (Zofran) 4 MG/2ML injection 4 mg, 4 mg, Intravenous, Q6H PRN    prochlorperazine (Compazine) 10 MG/2ML injection 5 mg, 5 mg, Intravenous, Q8H PRN    pantoprazole (Protonix) 40 mg in sodium chloride 0.9% PF 10 mL IV push, 40 mg, Intravenous, Q12H    Review of Systems:   CONSTITUTIONAL:  negative for fevers, chills, unintentional weight loss   EYES:  negative for diplopia or change in vision   RESPIRATORY:  negative for severe shortness of breath  CARDIOVASCULAR:  negative for crushing sub-sternal chest pain  GASTROINTESTINAL:  see HPI  GENITOURINARY:  negative for dysuria or gross hematuria  INTEGUMENT/BREAST:  SKIN:  negative for jaundice or new rash   ALLERGIC/IMMUNOLOGIC:  negative for hay fever   ENDOCRINE:  negative for cold intolerance and heat intolerance  MUSCULOSKELETAL:  negative for joint effusion/severe erythema  NEURO: negative for new loss of consciousness or dizziness   BEHAVIOR/PSYCH:  negative for psychotic behavior    Physical Exam:    Blood pressure 111/69, pulse 82, temperature 97.2 °F (36.2 °C), temperature source Oral, resp. rate 16, weight 151 lb 12.8 oz (68.9 kg), SpO2 100%. Body mass index is 27.76  kg/m².    Gen: awake, alert patient, NAD  HEENT: EOMI, the sclera appears anicteric, oropharynx clear, mucus membranes appear moist  CV: RRR  Lung: no conversational dyspnea   Abdomen: soft NTND abdomen with NABS appreciated   Back: No CVA tenderness   Skin: dry, warm, no jaundice  Ext:RLE wound noted,  no LE edema is evident  Neuro: Alert and interactive  Psych: calm, cooperative    Laboratory Data:       Imaging:  CT ABDOMEN+PELVIS(CONTRAST ONLY)(CPT=74177)    Result Date: 10/27/2024  CONCLUSION:  1. Nonspecific gastric wall thickening, which may be indicative of underlying gastritis/gastroduodenitis. If not already performed, endoscopic evaluation may be of benefit.  2. Otherwise, no acute intra-abdominal process is identified. No additional etiology of the patient's symptoms is appreciated from this study.  3. Small hiatal hernia with possible esophagitis.  4. Uncomplicated distal colonic diverticulosis.  5. Degenerating intramural uterine fibroids.  6. Nonspecific fat deposition throughout the proximal colon which could reflect sequela of chronic inflammation, obesity, or steroid use, among other etiologies.  7. At least partial transitional lumbosacral anatomy.   8. Partially visualized chronic-appearing thoracolumbar compression fracture deformities.  9. Lesser incidental findings as above.    Dictated by (CST): Bo Wynne MD on 10/27/2024 at 9:33 PM     Finalized by (CST): Bo Wynne MD on 10/27/2024 at 9:41 PM           Assessment & Plan   Sheri Garzon is a 64 year old female w/ PMHx of GERD, HTN, asthma, HLD, RA on plaquenil, on PLAVIX who presents with intractable nausea and emesis and right leg wound. She notes her nausea and vomiting began about 3 weeks ago. She states she started with increased GERD and then was slowly having nausea and the inability to keep food down. She notes emesis episodes are digested food, not full pieces. She notes globus with meals and this am had increased pain  with swallowing her oral medications. She states she does not take acid reducing medication at home. Has note had abdominal pain. No fevers or chills. BM are daily, brown in color. No overt bleeding.     #nausea  #dysphagia   -worsening symptoms over last 3 weeks  -not currently on PPI therapy  -patient on DAPT for CAD  -today worsening odynophagia and globus with medication   -last EGD 8/2024 without mass, ulcer or stricture, CT a/p with non specific gastric wall thickening   -discussed with patient plan for PPI BID, can trial carafate with meals  -will order barium esophogram at this time to evaluate for dysmotility     Recommend:  -IV PPI BID  -Carafate TID with meals  -esophogram ordered  -speech pathology following     Thank you for the opportunity to participate in the care of this patient.    Case discussed with Maryse Hardin MD and Laura CHUNG.    Juana Carroll PA-C  [facility] Gastroenterology  10/29/2024         [1]   Allergies  Allergen Reactions    Cephalosporins ANAPHYLAXIS, NAUSEA AND VOMITING and OTHER (SEE COMMENTS)     Other reaction(s): Fever    - Tolerated multiple doses of ceftriaxone on 7/2024 without any complications  - Tolerated multiple doses of cefepime 8/2024 without complications    Gadolinium Derivatives FEVER    Penicillins OTHER (SEE COMMENTS)     esophagitis    Sulfa Antibiotics OTHER (SEE COMMENTS)     esophagitis    Bananas ITCHING

## 2024-10-29 NOTE — PROGRESS NOTES
[facility]  part of [facility]  Hospitalist Progress Note     Sheri Gazron Patient Status:  Inpatient    1960  64 year old CSN [id number]   Location 546/546-A Attending John Hinds MD   Hosp Day # 2 PCP TERI MCKENNA MD     Assessment & Plan:   ----------------------------------  Nausea/vomiting.  Some nausea overnight.  CT scan unremarkable.  -Antiemetics as needed  -Gentle IV fluids  -Advance diet as tolerated  -PPI    Odynophagia.  Now patient complaining of pills getting stuck in her throat.  She states that every single pill, as well as food is getting stuck and causing discomfort.  Nursing cannot corroborate this.  -GI consult  -Hold oral medications for now    Right lower extremity ulcer.  Possible overlying cellulitis on right foot dorsum possible.  Ulceration on the back of the right lower leg does not appear infected.  White count is normal.  Afebrile.  -Continue vancomycin for now  -Monitor cultures    Left foot pain.  Now complaining of moderate pain in the left fourth and fifth digits.  No injury.  The toes are warm, well-perfused.  Sensation intact.  -X-rays    Other problems  Metabolic acidosis  Anxiety  Asthma  Adrenal insufficiency  GERD  Hypertension  Dyslipidemia  History of coronary artery disease  Rheumatoid arthritis  Sleep apnea    DVT Mechanical Prophylaxis:     Early ambuation  DVT Pharmacologic Prophylaxis   Medication    heparin (Porcine) 5000 UNIT/ML injection 5,000 Units      DVT Pharmacologic prophylaxis: Aspirin 162 mg       code status: full  dispo: to be determined  If applicable, malnutrition status at bottom of note    I personally reviewed the available laboratories, imaging including. I discussed/will discuss the case with consultants. I ordered laboratories and/or radiographic studies. I adjusted medications as detailed above.  Medical decision making high, risk is high.  Discussed with gastroenterology    Subjective:    ----------------------------------  Patient has several complaints this morning.  States that yesterday she could not swallow any of her pills or food because they get stuck causing discomfort.  Nausea has been minimal.  Now also complaining of pain in her left fourth and fifth digits.  Does not recall an injury.      Objective:   Chief Complaint:   Chief Complaint   Patient presents with    Nausea/Vomiting/Diarrhea     ----------------------------------  Temp:  [97.3 °F (36.3 °C)-97.5 °F (36.4 °C)] 97.5 °F (36.4 °C)  Pulse:  [76-95] 83  Resp:  [16-18] 18  BP: (117-129)/(71-95) 117/71  SpO2:  [96 %-100 %] 96 %  Gen: A+Ox3.  No distress.   HEENT: NCAT, neck supple, no carotid bruit.  CV: RRR, S1S2, and intact distal pulses. No gallop, rub, murmur.  Pulm: Effort and breath sounds normal. No distress, wheezes, rales, rhonchi.  Abd: Soft, NTND, BS normal, no mass, no HSM, no rebound/guarding.   Neuro: Normal reflexes, CN. Sensory/motor exams grossly normal deficit.   MS: No joint effusions.  No peripheral edema.  Right lower extremity with 2 ulcerations.  1 on the lower leg posteriorly.  Another on the dorsum of the right foot.  No surrounding erythema.  No purulence expressed.  Minimal tenderness.  The feet are well-perfused, warm.  Sensation intact  Skin: Skin is warm and dry. No rashes, erythema, diaphoresis.   Psych: Normal mood and affect. Calm, cooperative    Labs:  Lab Results   Component Value Date    HGB 12.5 10/27/2024    WBC 9.6 10/27/2024    .0 10/27/2024     10/27/2024    K 3.1 (L) 10/27/2024    CREATSERUM 0.77 10/27/2024    AST 37 (H) 10/27/2024    ALT 44 10/27/2024            vancomycin  15 mg/kg Intravenous Q24H    predniSONE  10 mg Oral BID    aspirin  81 mg Oral Daily    clopidogrel  75 mg Oral Daily    docusate sodium  100 mg Oral BID    hydroxychloroquine  200 mg Oral BID    lamoTRIgine  50 mg Oral BID    metoprolol tartrate  12.5 mg Oral BID    oxybutynin ER  10 mg Oral Daily     QUEtiapine  50 mg Oral Nightly    heparin  5,000 Units Subcutaneous Q8H FALLON    pantoprazole  40 mg Intravenous Q12H       morphINE    albuterol    acetaminophen    acetaminophen **OR** HYDROcodone-acetaminophen **OR** HYDROcodone-acetaminophen    melatonin    ondansetron    prochlorperazine

## 2024-10-29 NOTE — CM/SW NOTE
10/29/24 1400   CM/SW Referral Data   Referral Source Social Work (self-referral)   Reason for Referral Discharge planning   Informant Sibling;EMR;Clinical Staff Member   Medical Hx   Does patient have an established PCP? Yes  (Dr. Sulma Juarez)   Significant Past Medical/Mental Health Hx Addisons disease; bipolar l   Patient Info   Advanced directives? No   Patient's Current Mental Status at Time of Assessment Alert;Oriented   Patient's Home Environment Subacute Rehab   Post Acute Care Provider Upon Admission [facility]   Number of Levels in Home 2   Patient lives with Sibling;Daughter   Patient Status Prior to Admission   Independent with ADLs and Mobility No   Pt. requires assistance with Ambulating   Discharge Needs   Anticipated D/C needs Subacute rehab;Long term care facility   Services Requested   Submitted to [facility] Yes   PASRR Level 1 Submitted No - Current within 90 days   PMR Consult Requested Not clinically appropriate at this time   Choice of Post-Acute Provider   Informed patient of right to choose their preferred provider Yes     SW self-referred to this case for discharge planning.    Pt admitted for cellulitis of RLE.  ID on consult.    Per EMR review[facility]   During rounds it was discussed pt wanted a new [facility].    SW spoke to pt's sister Gladys regarding discharge plan.    Gladys confirmed pt relocated from [place] hence her [employer] ins plan.  Prior to rehab, pt was living with her in a  2-level home. Gladys confirmed pt's last day of rehab was 11/7 as she has been since early September and has not progressed much.  Gladys stated pt has been paying her premiums.    Gladys stated she didn't have an issue with the care at [facility] but feels as though pt was unhappy there emotionally.  Gladys stated pt could push herself harder with therapy but has some mental health issues that impede this.    SW asked about Gladys's income- she stated pt gets a pension but  is over-income for PA at this time.  SW asked if she has funds to pay for respite to which Gladys said no.    Gladys toured [place] or [place] and would prefer this location as they can accommodate Medicaid pending,    [facility] and asked Ryan to review.    LAURA let Gladys know if Lolita is in-network with plan we can try for auth so she can finish her therapy then convert to PA pending.    SW to keep Gladys updated.    PLAN: TBD pending above    / to remain available for support and/or discharge planning.     Simona Gonzales MSW, LSW [id number]

## 2024-10-29 NOTE — PLAN OF CARE
Problem: Patient Centered Care  Goal: Patient preferences are identified and integrated in the patient's plan of care  Description: Interventions:  - What would you like us to know as we care for you? \"I fell twice at rehab\"  - Provide timely, complete, and accurate information to patient/family  - Incorporate patient and family knowledge, values, beliefs, and cultural backgrounds into the planning and delivery of care  - Encourage patient/family to participate in care and decision-making at the level they choose  - Honor patient and family perspectives and choices  Outcome: Progressing     Problem: Patient/Family Goals  Goal: Patient/Family Long Term Goal  Description: Patient's Long Term Goal: Discharge home    Interventions:  - IVF  -Wound care  -IV antibiotics  -PT/OT  - See additional Care Plan goals for specific interventions  Outcome: Progressing  Goal: Patient/Family Short Term Goal  Description: Patient's Short Term Goal: Regain Strength    Interventions:   - PT/OT  - Dietary consult  - See additional Care Plan goals for specific interventions  Outcome: Progressing     Problem: PAIN - ADULT  Goal: Verbalizes/displays adequate comfort level or patient's stated pain goal  Description: INTERVENTIONS:  - Encourage pt to monitor pain and request assistance  - Assess pain using appropriate pain scale  - Administer analgesics based on type and severity of pain and evaluate response  - Implement non-pharmacological measures as appropriate and evaluate response  - Consider cultural and social influences on pain and pain management  - Manage/alleviate anxiety  - Utilize distraction and/or relaxation techniques  - Monitor for opioid side effects  - Notify MD/LIP if interventions unsuccessful or patient reports new pain  - Anticipate increased pain with activity and pre-medicate as appropriate  Outcome: Progressing     Problem: RISK FOR INFECTION - ADULT  Goal: Absence of fever/infection during anticipated  neutropenic period  Description: INTERVENTIONS  - Monitor WBC  - Administer growth factors as ordered  - Implement neutropenic guidelines  Outcome: Progressing     Problem: SAFETY ADULT - FALL  Goal: Free from fall injury  Description: INTERVENTIONS:  - Assess pt frequently for physical needs  - Identify cognitive and physical deficits and behaviors that affect risk of falls.  - [place] fall precautions as indicated by assessment.  - Educate pt/family on patient safety including physical limitations  - Instruct pt to call for assistance with activity based on assessment  - Modify environment to reduce risk of injury  - Provide assistive devices as appropriate  - Consider OT/PT consult to assist with strengthening/mobility  - Encourage toileting schedule  Outcome: Progressing     Problem: GASTROINTESTINAL - ADULT  Goal: Minimal or absence of nausea and vomiting  Description: INTERVENTIONS:  - Maintain adequate hydration with IV or PO as ordered and tolerated  - Nasogastric tube to low intermittent suction as ordered  - Evaluate effectiveness of ordered antiemetic medications  - Provide nonpharmacologic comfort measures as appropriate  - Advance diet as tolerated, if ordered  - Obtain nutritional consult as needed  - Evaluate fluid balance  Outcome: Progressing  Goal: Maintains or returns to baseline bowel function  Description: INTERVENTIONS:  - Assess bowel function  - Maintain adequate hydration with IV or PO as ordered and tolerated  - Evaluate effectiveness of GI medications  - Encourage mobilization and activity  - Obtain nutritional consult as needed  - Establish a toileting routine/schedule  - Consider collaborating with pharmacy to review patient's medication profile  Outcome: Progressing     Problem: SKIN/TISSUE INTEGRITY - ADULT  Goal: Skin integrity remains intact  Description: INTERVENTIONS  - Assess and document risk factors for pressure ulcer development  - Assess and document skin integrity  -  Monitor for areas of redness and/or skin breakdown  - Initiate interventions, skin care algorithm/standards of care as needed  Outcome: Progressing  Goal: Incision(s), wounds(s) or drain site(s) healing without S/S of infection  Description: INTERVENTIONS:  - Assess and document risk factors for pressure ulcer development  - Assess and document skin integrity  - Assess and document dressing/incision, wound bed, drain sites and surrounding tissue  - Implement wound care per orders  - Initiate isolation precautions as appropriate  - Initiate Pressure Ulcer prevention bundle as indicated  Outcome: Progressing  Goal: Oral mucous membranes remain intact  Description: INTERVENTIONS  - Assess oral mucosa and hygiene practices  - Implement preventative oral hygiene regimen  - Implement oral medicated treatments as ordered  Outcome: Progressing     Problem: MUSCULOSKELETAL - ADULT  Goal: Return mobility to safest level of function  Description: INTERVENTIONS:  - Assess patient stability and activity tolerance for standing, transferring and ambulating w/ or w/o assistive devices  - Assist with transfers and ambulation using safe patient handling equipment as needed  - Ensure adequate protection for wounds/incisions during mobilization  - Obtain PT/OT consults as needed  - Advance activity as appropriate  - Communicate ordered activity level and limitations with patient/family  Outcome: Progressing  Goal: Maintain proper alignment of affected body part  Description: INTERVENTIONS:  - Support and protect limb and body alignment per provider's orders  - Instruct and reinforce with patient and family use of appropriate assistive device and precautions (e.g. spinal or hip dislocation precautions)  Outcome: Progressing

## 2024-10-30 ENCOUNTER — APPOINTMENT (OUTPATIENT)
Dept: GENERAL RADIOLOGY | Facility: HOSPITAL | Age: 64
End: 2024-10-30
Attending: PHYSICIAN ASSISTANT
Payer: COMMERCIAL

## 2024-10-30 NOTE — PROGRESS NOTES
[facility]     Gastroenterology Progress Note    Sheri Garzon Patient Status:  Inpatient    1960 MRN [id number]   Location [facility] 5SW/SE Attending John Hinds MD   Hosp Day # 3 PCP TERI MCKENNA MD       Subjective:   Pt states she continues to feel pills are getting stuck and then causing N/V.  Requesting medications be changed to liquid form is available.    NPO for esophagram  No ABD pain  No fever, chills  No BM but passing gas  Objective:   Blood pressure 127/80, pulse 79, temperature 97.1 °F (36.2 °C), temperature source Oral, resp. rate 18, weight 151 lb 12.8 oz (68.9 kg), SpO2 100%. Body mass index is 27.76 kg/m².    Gen: awake, alert patient, NAD  HEENT: EOMI, the sclera appears anicteric, oropharynx clear, mucus membranes appear moist  CV: RRR  Lung: no conversational dyspnea   Abdomen: soft NTND abdomen with NABS appreciated   Skin: dry, warm, no jaundice  Ext: no LE edema is evident  Neuro: Alert, oriented x4 and interactive  Psych: calm, cooperative    Assessment and Plan:   Sheri Garzon is a 64 year old female w/ PMHx of GERD, HTN, asthma, HLD, RA on plaquenil, CAD on ASA/PLAVIX who presents with intractable nausea and emesis and right leg wound.      #Nausea  #Dysphagia   -worsening symptoms over last 3 weeks including increase in GERD without PPI therapy  -last EGD 2024 without mass, ulcer or stricture, CT a/p with non specific gastric wall thickening   -barium esophogram to evaluate for dysmotility completed this am  -Otherwise conservative management with PPI therapy and carafate with meals.  Endoscopic evaluation pending esophagram results.      Recommend:  -Clear liquid diet  -IV PPI BID  -Carafate TID with meals  -esophogram pending  -Consult to SLP    Case discussed with Maryse Hardin MD and Akua CHUNG.    Margaret Levy DNP, FNP-[facility]  10/30/2024    ADDENDUM:  -Esophagram with luminal narrowing/web-like stricture of  cervical esophagus measuring 4.5 mm, large amount of reflux and HH.  Will plan for upper endoscopy tomorrow.  Keep on clear liquid diet today and NPO at 0000.  -Updated POC and risks/benefits of EGD discussed with Pt who states understanding and is willing to proceed.    EGD consent: I have discussed the risks, benefits, and alternatives to upper endoscopy/enteroscopy with the patient/primary decision maker [who demonstrated understanding], including but not limited to the risks of bleeding, infection, pain, death, as well as the risks of anesthesia and perforation all leading to prolonged hospitalization, surgical intervention, or even death. I also specifically mentioned the miss rate of upper endoscopy of 5-10% in the best of all circumstances.  The patient has agreed to sign an informed consent and elected to proceed with procedure with possible intervention [i.e. polypectomy, stent placement, etc.] as indicated.     Results:     Lab Results   Component Value Date    WBC 9.2 10/29/2024    HGB 10.0 (L) 10/29/2024    HCT 31.2 (L) 10/29/2024    .0 10/29/2024    CREATSERUM 0.58 10/29/2024    BUN 11 10/29/2024     10/29/2024    K 3.0 (L) 10/30/2024     (H) 10/29/2024    CO2 14.0 (L) 10/29/2024    GLU 48 (LL) 10/29/2024    CA 7.7 (L) 10/29/2024    ALB 3.2 10/27/2024    ALKPHO 534 (H) 10/27/2024    BILT 0.3 10/27/2024    TP 5.8 10/27/2024    AST 37 (H) 10/27/2024    ALT 44 10/27/2024    LIP 31 10/27/2024    DDIMER 1.69 (H) 07/23/2024    MG 2.0 10/30/2024    PHOS 2.9 08/21/2024    CK 50 08/27/2024    B12 748 08/21/2024    ETOH <3 09/05/2024       XR FOOT (2 VIEW), LEFT (CPT=73620)    Result Date: 10/29/2024  CONCLUSION:   No acute fracture or dislocation.  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  Correlate with clinical history.  Vascular calcifications.    Dictated by (CST): Isha Hargrove MD on  10/29/2024 at 3:44 PM     Finalized by (CST): Isha Hargrove MD on 10/29/2024 at 3:47 PM

## 2024-10-30 NOTE — CONGREGATE LIVING REVIEW
[facility] Authorization    The [facility] Review Committee has reviewed this case and the patient IS APPROVED for discharge to a facility for Short Term Skilled once the following procedure is followed:     - The physician discharge instructions (contained within the [facility] note for SNF) must inlcude the below appropriate and approved COVID instructions to the facility    For questions regarding [facility] approval process, please contact the CM assigned to the case.  For questions regarding RN discharge workflow, please contact the unit Clinical Leader.

## 2024-10-30 NOTE — PLAN OF CARE
Patient is alert and oriented x4. Received vancomycin IV, protonix IV. NPO for barium esophogram in the AM. IVF changed to D10. R Foot dressing changed; clean, dry, intact. Patient oriented to the call bell system and it is placed at bedside. Safety plan in place, frequent rounding completed.     Problem: Patient Centered Care  Goal: Patient preferences are identified and integrated in the patient's plan of care  Description: Interventions:  - What would you like us to know as we care for you? \"I fell twice at rehab\"  - Provide timely, complete, and accurate information to patient/family  - Incorporate patient and family knowledge, values, beliefs, and cultural backgrounds into the planning and delivery of care  - Encourage patient/family to participate in care and decision-making at the level they choose  - Honor patient and family perspectives and choices  Outcome: Progressing     Problem: Patient/Family Goals  Goal: Patient/Family Long Term Goal  Description: Patient's Long Term Goal: Discharge home    Interventions:  - IVF  -Wound care  -IV antibiotics  -PT/OT  - See additional Care Plan goals for specific interventions  Outcome: Progressing  Goal: Patient/Family Short Term Goal  Description: Patient's Short Term Goal: Regain Strength    Interventions:   - PT/OT  - Dietary consult  - See additional Care Plan goals for specific interventions  Outcome: Progressing     Problem: PAIN - ADULT  Goal: Verbalizes/displays adequate comfort level or patient's stated pain goal  Description: INTERVENTIONS:  - Encourage pt to monitor pain and request assistance  - Assess pain using appropriate pain scale  - Administer analgesics based on type and severity of pain and evaluate response  - Implement non-pharmacological measures as appropriate and evaluate response  - Consider cultural and social influences on pain and pain management  - Manage/alleviate anxiety  - Utilize distraction and/or relaxation techniques  - Monitor for  opioid side effects  - Notify MD/LIP if interventions unsuccessful or patient reports new pain  - Anticipate increased pain with activity and pre-medicate as appropriate  Outcome: Progressing     Problem: RISK FOR INFECTION - ADULT  Goal: Absence of fever/infection during anticipated neutropenic period  Description: INTERVENTIONS  - Monitor WBC  - Administer growth factors as ordered  - Implement neutropenic guidelines  Outcome: Progressing     Problem: SAFETY ADULT - FALL  Goal: Free from fall injury  Description: INTERVENTIONS:  - Assess pt frequently for physical needs  - Identify cognitive and physical deficits and behaviors that affect risk of falls.  - [place] fall precautions as indicated by assessment.  - Educate pt/family on patient safety including physical limitations  - Instruct pt to call for assistance with activity based on assessment  - Modify environment to reduce risk of injury  - Provide assistive devices as appropriate  - Consider OT/PT consult to assist with strengthening/mobility  - Encourage toileting schedule  Outcome: Progressing     Problem: GASTROINTESTINAL - ADULT  Goal: Minimal or absence of nausea and vomiting  Description: INTERVENTIONS:  - Maintain adequate hydration with IV or PO as ordered and tolerated  - Nasogastric tube to low intermittent suction as ordered  - Evaluate effectiveness of ordered antiemetic medications  - Provide nonpharmacologic comfort measures as appropriate  - Advance diet as tolerated, if ordered  - Obtain nutritional consult as needed  - Evaluate fluid balance  Outcome: Progressing  Goal: Maintains or returns to baseline bowel function  Description: INTERVENTIONS:  - Assess bowel function  - Maintain adequate hydration with IV or PO as ordered and tolerated  - Evaluate effectiveness of GI medications  - Encourage mobilization and activity  - Obtain nutritional consult as needed  - Establish a toileting routine/schedule  - Consider collaborating with  pharmacy to review patient's medication profile  Outcome: Progressing     Problem: SKIN/TISSUE INTEGRITY - ADULT  Goal: Skin integrity remains intact  Description: INTERVENTIONS  - Assess and document risk factors for pressure ulcer development  - Assess and document skin integrity  - Monitor for areas of redness and/or skin breakdown  - Initiate interventions, skin care algorithm/standards of care as needed  Outcome: Progressing  Goal: Incision(s), wounds(s) or drain site(s) healing without S/S of infection  Description: INTERVENTIONS:  - Assess and document risk factors for pressure ulcer development  - Assess and document skin integrity  - Assess and document dressing/incision, wound bed, drain sites and surrounding tissue  - Implement wound care per orders  - Initiate isolation precautions as appropriate  - Initiate Pressure Ulcer prevention bundle as indicated  Outcome: Progressing  Goal: Oral mucous membranes remain intact  Description: INTERVENTIONS  - Assess oral mucosa and hygiene practices  - Implement preventative oral hygiene regimen  - Implement oral medicated treatments as ordered  Outcome: Progressing     Problem: MUSCULOSKELETAL - ADULT  Goal: Return mobility to safest level of function  Description: INTERVENTIONS:  - Assess patient stability and activity tolerance for standing, transferring and ambulating w/ or w/o assistive devices  - Assist with transfers and ambulation using safe patient handling equipment as needed  - Ensure adequate protection for wounds/incisions during mobilization  - Obtain PT/OT consults as needed  - Advance activity as appropriate  - Communicate ordered activity level and limitations with patient/family  Outcome: Progressing  Goal: Maintain proper alignment of affected body part  Description: INTERVENTIONS:  - Support and protect limb and body alignment per provider's orders  - Instruct and reinforce with patient and family use of appropriate assistive device and  precautions (e.g. spinal or hip dislocation precautions)  Outcome: Progressing

## 2024-10-30 NOTE — PROGRESS NOTES
Patient is a 64 year old -American,female with past medical history of hypertension, rheumatoid arthritis, obesity, chronic adrenal insufficiency secondary to chronic corticosteroid use, asthma, pancreatitis, obstructive sleep apnea, obesity, hyperlipidemia, anemia, chronic kidney disease  nonocclusive coronary artery disease, and peripheral arterial disease who also has past mental lorenzo history of anxiety and depression  who was admitted to the hospital for Cellulitis of right lower extremity: The patient has been demonstrating increased anxiety and depressive symptoms.Patient indicated for psych consult for evaluation and advise.  Consult Duration   The patient seen for over 50-minute, follow-up evaluation, over 50% counseling and coordinating care addressing anxiety, depression.      Record reviewed, communication with attending, communication with RN and patient seen face to face evaluation.  History of Present Illness:   Communicating with the team, no issues reported    The patient received the following psychotropic medications Lamictal 50 mg BID, Seroquel 50 mg nightly    Labs and imaging reviewed: Glucose 141, sodium 139    The patient seen today sitting in hospital bed.    The patient presents calm, cooperative and pleasant.   She is not demonstrating any restlessness or agitation.     The patient is alert and oriented to person, place, date and condition with prompting.    She reports that her mood had been good.   She denies any feelings of of hopelessness, helplessness, worthlessness, low mood, low energy, decreased motivation.    She reports some anxiety, worry, ruminations with impairment in sleep.     She notes that her biggest challenge is her physical health.     She reports that she has support from family.     The patient is not demonstrating any apryl or psychosis  The patient denies auditory or visual hallucinations  The patient denies suicidal or homicidal ideation.    Provided patient  with supportive psychotherapy including listening, emotional support and encouragement.     No issues reported from the team. She has not demonstrated any concerning behaviors. She continues to have some difficulty swallowing her medications. EGD scheduled for tomorrow.    Past Psychiatric/Medication History:  1. Prior diagnoses: Depression, Anxiety  2. Past psychiatric inpatient: Denies           3. Past outpatient history: Denies  4. Past suicide history: None  5. Medication history: Denies     Social History:   The patient is a 64 year old  female. She is a retired  of 35 years. She retired and moved to [place] with her daughter. Her health declined since retiring and moving to [place] and moved back to [place] with her daughter in June 2024. She and her daughter moved back in with her eldest sister, Gladys. She was admitted to rehab for decreased mobility and chronic wound care on multiple occasions.      Family History:  Daughter: depression  Medical History:   Past Medical History  Past Medical History:    Allergic rhinitis    Anxiety    Arthritis    RA and Osteoarthritis    Asthma (HCC)    Depression    Not officially diagnosed    Esophageal reflux    Essential hypertension    High blood pressure    High cholesterol    Hyperlipidemia    Myocardial infarction (HCC)    Cardiac event    Osteoarthritis    Pancreatitis (HCC)    Problems with swallowing    RA (rheumatoid arthritis) (HCC)    Sleep apnea       Past Surgical History  Past Surgical History:   Procedure Laterality Date    Colonoscopy      Debridement  08/15/2024    Sharp excisional debridement of RLE posterior leg wound    Egd  07/26/2024    Hiatal hernia    Other surgical history      Revision of uterine anomaly      Rotator cuff repair      Wrist fracture surgery         Family History  Family History   Problem Relation Age of Onset    Diabetes Mother     Genetic Disease Mother     Heart Disorder Mother     Hypertension  Mother     Obesity Mother     Diabetes Father     Hypertension Father     Depression Daughter     Psychiatric Daughter        Social History  Social History     Socioeconomic History    Marital status: Single   Tobacco Use    Smoking status: Never    Smokeless tobacco: Never   Vaping Use    Vaping status: Never Used   Substance and Sexual Activity    Alcohol use: Not Currently    Drug use: Never     Social Drivers of Health     Food Insecurity: No Food Insecurity (10/28/2024)    Food Insecurity     Food Insecurity: Never true   Transportation Needs: No Transportation Needs (10/28/2024)    Transportation Needs     Lack of Transportation: No   Housing Stability: Low Risk  (10/28/2024)    Housing Stability     Housing Instability: No           Current Medications:  Current Facility-Administered Medications   Medication Dose Route Frequency    potassium chloride 20 mEq/100mL IVPB premix 20 mEq  20 mEq Intravenous Once    acetaminophen (Tylenol) 160 MG/5ML oral liquid 650 mg  650 mg Oral Q6H PRN    morphINE PF 2 MG/ML injection 2 mg  2 mg Intravenous Q4H PRN    sucralfate (Carafate) 1 GM/10ML oral suspension 1 g  1 g Oral TID AC and HS (2200)    dextrose 5%-sodium chloride 0.45% infusion   Intravenous Continuous    dextrose 10% infusion   Intravenous Continuous    vancomycin (Vancocin) 1.25 g in sodium chloride 0.9% 250mL IVPB premix  15 mg/kg Intravenous Q24H    predniSONE (Deltasone) tab 10 mg  10 mg Oral BID    albuterol (Ventolin HFA) 108 (90 Base) MCG/ACT inhaler 2 puff  2 puff Inhalation Q4H PRN    aspirin chewable tab 81 mg  81 mg Oral Daily    [Held by provider] clopidogrel (Plavix) tab 75 mg  75 mg Oral Daily    docusate sodium (Colace) cap 100 mg  100 mg Oral BID    hydroxychloroquine (Plaquenil) tab 200 mg  200 mg Oral BID    lamoTRIgine (LaMICtal) tab 50 mg  50 mg Oral BID    metoprolol tartrate (Lopressor) partial tab 12.5 mg  12.5 mg Oral BID    oxybutynin ER (Ditropan-XL) 24 hr tab 10 mg  10 mg Oral Daily     QUEtiapine (SEROquel) tab 50 mg  50 mg Oral Nightly    heparin (Porcine) 5000 UNIT/ML injection 5,000 Units  5,000 Units Subcutaneous Q8H FALLON    HYDROcodone-acetaminophen (Norco) 5-325 MG per tab 1 tablet  1 tablet Oral Q4H PRN    Or    HYDROcodone-acetaminophen (Norco) 5-325 MG per tab 2 tablet  2 tablet Oral Q4H PRN    melatonin tab 3 mg  3 mg Oral Nightly PRN    ondansetron (Zofran) 4 MG/2ML injection 4 mg  4 mg Intravenous Q6H PRN    prochlorperazine (Compazine) 10 MG/2ML injection 5 mg  5 mg Intravenous Q8H PRN    pantoprazole (Protonix) 40 mg in sodium chloride 0.9% PF 10 mL IV push  40 mg Intravenous Q12H     Medications Prior to Admission   Medication Sig    docusate sodium 100 MG Oral Cap Take 1 capsule (100 mg total) by mouth 2 (two) times daily.    ondansetron (ZOFRAN) 4 mg tablet Take 1 tablet (4 mg total) by mouth every 8 (eight) hours as needed for Nausea.    HYDROcodone-acetaminophen  MG Oral Tab Take 1 tablet by mouth every 4 (four) hours as needed.    [] lamoTRIgine 25 MG Oral Tab Take 2 tablets (50 mg total) by mouth 2 (two) times daily.    [] pantoprazole 40 MG Oral Tab EC Take 1 tablet (40 mg total) by mouth 2 (two) times daily before meals.    [] QUEtiapine 50 MG Oral Tab Take 1 tablet (50 mg total) by mouth nightly.    [] metoprolol tartrate 25 MG Oral Tab Take 0.5 tablets (12.5 mg total) by mouth 2 (two) times daily. (Patient taking differently: Take 0.5 tablets (12.5 mg total) by mouth 2 (two) times daily. Hold for SBP <100 and HR <60)    predniSONE 10 MG Oral Tab Take 20mg (2 tabs) in AM and 10mg (1tab) for 4 days then resume 10mg twice  a day indefinitely (Patient taking differently: Take 1 tablet (10 mg total) by mouth 2 (two) times daily. Take 20mg (2 tabs) in AM and 10mg (1tab) for 4 days then resume 10mg twice  a day indefinitely)    Acetaminophen ER (ACETAMINOPHEN 8 HOUR) 650 MG Oral Tab CR Take 2-3 tablets (1,300-1,950 mg total) by mouth every 8  (eight) hours as needed for Pain.    albuterol 108 (90 Base) MCG/ACT Inhalation Aero Soln Inhale 2 puffs into the lungs every 4 to 6 hours as needed for Wheezing.    hydroxychloroquine 200 MG Oral Tab Take 1 tablet (200 mg total) by mouth 2 (two) times daily.    Aspirin 81 MG Oral Cap Take 81 mg by mouth daily.    atorvastatin 10 MG Oral Tab Take 1 tablet (10 mg total) by mouth daily.    clopidogrel 75 MG Oral Tab Take 1 tablet (75 mg total) by mouth daily.    ergocalciferol 1.25 MG (12595 UT) Oral Cap Take 1 capsule (50,000 Units total) by mouth once a week.    oxybutynin ER 10 MG Oral Tablet 24 Hr Take 1 tablet (10 mg total) by mouth daily.    folic acid 1 MG Oral Tab Take 1 tablet (1 mg total) by mouth daily.    collagenase 250 UNIT/GM External Ointment Apply topically daily.    clotrimazole-betamethasone 1-0.05 % External Cream Apply 1 Application topically 2 (two) times daily. Apply to groin and abdominal folds    [] sodium bicarbonate 650 MG Oral Tab Take 1 tablet (650 mg total) by mouth 2 (two) times daily.    Copper (COPPERMIN) 5 MG Oral Tab Take 1 tablet by mouth daily.    ibuprofen 200 MG Oral Tab Take 3 tablets (600 mg total) by mouth every 8 (eight) hours as needed for Pain.    [] Ferrous Gluconate 324 (38 Fe) MG Oral Tab Take 1 tablet (325 mg total) by mouth daily with breakfast.    Potassium Chloride ER 10 MEQ Oral Tab CR Take 1 tablet (10 mEq total) by mouth 2 (two) times daily.       Allergies  Allergies[1]    Review of Systems:   As by Admitting/Attending    Results:   Laboratory Data:  Lab Results   Component Value Date    WBC 9.2 10/29/2024    HGB 10.0 (L) 10/29/2024    HCT 31.2 (L) 10/29/2024    .0 10/29/2024    CREATSERUM 0.58 10/29/2024    BUN 11 10/29/2024     10/29/2024    K 3.0 (L) 10/30/2024     (H) 10/29/2024    CO2 14.0 (L) 10/29/2024    GLU 48 (LL) 10/29/2024    CA 7.7 (L) 10/29/2024    ALB 2.2 (L) 10/30/2024    ALKPHO 331 (H) 10/30/2024    TP 3.9 (L)  10/30/2024    AST 23 10/30/2024    ALT 31 10/30/2024    LIP 31 10/27/2024    DDIMER 1.69 (H) 07/23/2024    MG 2.0 10/30/2024    PHOS 2.9 08/21/2024    CK 50 08/27/2024    B12 748 08/21/2024    ETOH <3 09/05/2024         Imaging:  XR ESOPHAGUS DOUBLE CONTRAST (CPT=74221)    Result Date: 10/30/2024  CONCLUSION:  1. Luminal narrowing/web-like stricture cervical esophagus measuring 4.5 mm transverse.  Small hiatal hernia.  Large amount of gastroesophageal reflux.  Mucosal changes distal esophagus may reflect reflux esophagitis.  Recommend upper endoscopy. 2. Mildly abnormal esophageal motility with incomplete esophageal emptying. 3. No gastric outlet obstruction.  Suboptimal distention of the stomach limiting evaluation of the lumen    Dictated by (CST): Roldan Edwards MD on 10/30/2024 at 11:18 AM     Finalized by (CST): Roldan Edwards MD on 10/30/2024 at 11:25 AM          XR FOOT (2 VIEW), LEFT (CPT=73620)    Result Date: 10/29/2024  CONCLUSION:   No acute fracture or dislocation.  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  Correlate with clinical history.  Vascular calcifications.    Dictated by (CST): Isha Hargrove MD on 10/29/2024 at 3:44 PM     Finalized by (CST): Isha Hargrove MD on 10/29/2024 at 3:47 PM          CT ABDOMEN+PELVIS(CONTRAST ONLY)(CPT=74177)    Result Date: 10/27/2024  CONCLUSION:  1. Nonspecific gastric wall thickening, which may be indicative of underlying gastritis/gastroduodenitis. If not already performed, endoscopic evaluation may be of benefit.  2. Otherwise, no acute intra-abdominal process is identified. No additional etiology of the patient's symptoms is appreciated from this study.  3. Small hiatal hernia with possible esophagitis.  4. Uncomplicated distal colonic diverticulosis.  5. Degenerating intramural uterine fibroids.  6. Nonspecific fat deposition throughout the proximal  colon which could reflect sequela of chronic inflammation, obesity, or steroid use, among other etiologies.  7. At least partial transitional lumbosacral anatomy.   8. Partially visualized chronic-appearing thoracolumbar compression fracture deformities.  9. Lesser incidental findings as above.    Dictated by (CST): Bo Wynne MD on 10/27/2024 at 9:33 PM     Finalized by (CST): Bo Wynne MD on 10/27/2024 at 9:41 PM           Vital Signs:   Blood pressure 127/76, pulse 78, temperature 97.8 °F (36.6 °C), temperature source Oral, resp. rate 18, height 5' 2\" (1.575 m), weight 74.8 kg (164 lb 14.4 oz), SpO2 100%.    Mental Status Exam:   Appearance: Stated age 64, in hospital gown, laying down in hospital bed.  Psychomotor: no agitation or restlessness  Orientation: Alert and oriented to person, place, date and condition with some prompting today.  Gait: Not evaluated.  Attitude/Coorperation: cooperative, pleasant.  Behavior:appropriate  Speech: Regular rate and rhythm speech.  Mood: anxious  Affect: restricted  Thought process: mostly Linear  Thought content: No reports of  suicidal or homicidal ideation.  Perceptions: Patient denies auditory or visual hallucinations  Concentration: intact  Memory: intact  Intellect: Average.  Judgment and Insight: Questionable.     Impression:     Episodic mood disorder     Cellulitis of right lower extremity    Nausea and vomiting    Patient is a 64 year old -American,female with past medical history of hypertension, rheumatoid arthritis, obesity, chronic adrenal insufficiency secondary to chronic corticosteroid use, asthma, pancreatitis, obstructive sleep apnea, obesity, hyperlipidemia, anemia, chronic kidney disease  nonocclusive coronary artery disease, and peripheral arterial disease who also has past mental lorenzo history of anxiety and depression  who was admitted to the hospital for Cellulitis of right lower extremity: The patient has been demonstrating  increased anxiety and depressive symptoms. Patient was started on Lamictal and Seroquel during her last admission and would like those medications continued.     10/29/2024: The patient has been demonstrating some alternation in her cognition and thought process. Patient refusing medications and reporting to team that she can not swallow pills. She demonstrates some increased anxiety, worry. Patient reporting that she can take her pills otherwise not all at one time.     11/1/2024: No issues reported from the team. She has not demonstrated any concerning behaviors. She continues to have some difficulty swallowing her medications. EGD scheduled for tomorrow.    Discussed risk and benefit, acknowledging the current symptom and severity.  At this point, I would recommend the following approach:     Focus on safety  Focus on education and support.  Focus on insight orientation helping the patient understand diagnosis and treatment plan.  Continue Seroquel 50 mg nightly  Continue Lamictal 25 mg BID  Processed with patient at length, the initiation of the above psychotropic medications I advised the patient of the risks, benefits, alternatives and potential side effects. The patient consents to administration of the medications and understands the right to refuse medications at any time. The patient verbalized understanding.   Coordinate plan with team    Orders This Visit:  Orders Placed This Encounter   Procedures    CBC With Differential With Platelet    Comp Metabolic Panel (14)    Magnesium    Lipase    Urinalysis, Routine    Lactic Acid, Plasma    Magnesium    CBC With Differential With Platelet    Basic Metabolic Panel (8)    Potassium    Magnesium    Vancomycin Trough, Serum    Vancomycin Peak, Serum    Hepatic Function Panel (7)    Iron And Tibc    Ferritin    CBC With Differential With Platelet    Basic Metabolic Panel (8)    Potassium    Aerobic Bacterial Culture    Blood Culture    MRSA Screen by PCR        Meds This Visit:  Requested Prescriptions      No prescriptions requested or ordered in this encounter       Luiza Darden, APRN  11/1/2024    Note to Patient: The 21st Century Cures Act makes medical notes like these available to patients in the interest of transparency. However, be advised this is a medical document. It is intended as peer to peer communication. It is written in medical language and may contain abbreviations or verbiage that are unfamiliar. It may appear blunt or direct. Medical documents are intended to carry relevant information, facts as evident, and the clinical opinion of the practitioner. This note may have been transcribed using a voice dictation system. Voice recognition errors may occur. This should not be taken to alter the content or meaning of this note.           [1]   Allergies  Allergen Reactions    Cephalosporins ANAPHYLAXIS, NAUSEA AND VOMITING and OTHER (SEE COMMENTS)     Other reaction(s): Fever    - Tolerated multiple doses of ceftriaxone on 7/2024 without any complications  - Tolerated multiple doses of cefepime 8/2024 without complications    Gadolinium Derivatives FEVER    Penicillins OTHER (SEE COMMENTS)     esophagitis    Sulfa Antibiotics OTHER (SEE COMMENTS)     esophagitis    Bananas ITCHING

## 2024-10-30 NOTE — PLAN OF CARE
Problem: Patient Centered Care  Goal: Patient preferences are identified and integrated in the patient's plan of care  Description: Interventions:  - What would you like us to know as we care for you? \"I fell twice at rehab\"  - Provide timely, complete, and accurate information to patient/family  - Incorporate patient and family knowledge, values, beliefs, and cultural backgrounds into the planning and delivery of care  - Encourage patient/family to participate in care and decision-making at the level they choose  - Honor patient and family perspectives and choices  Outcome: Progressing     Problem: Patient/Family Goals  Goal: Patient/Family Long Term Goal  Description: Patient's Long Term Goal: Discharge home    Interventions:  - IVF  -Wound care  -IV antibiotics  -PT/OT  - See additional Care Plan goals for specific interventions  Outcome: Progressing  Goal: Patient/Family Short Term Goal  Description: Patient's Short Term Goal: Regain Strength    Interventions:   - PT/OT  - Dietary consult  - See additional Care Plan goals for specific interventions  Outcome: Progressing     Problem: PAIN - ADULT  Goal: Verbalizes/displays adequate comfort level or patient's stated pain goal  Description: INTERVENTIONS:  - Encourage pt to monitor pain and request assistance  - Assess pain using appropriate pain scale  - Administer analgesics based on type and severity of pain and evaluate response  - Implement non-pharmacological measures as appropriate and evaluate response  - Consider cultural and social influences on pain and pain management  - Manage/alleviate anxiety  - Utilize distraction and/or relaxation techniques  - Monitor for opioid side effects  - Notify MD/LIP if interventions unsuccessful or patient reports new pain  - Anticipate increased pain with activity and pre-medicate as appropriate  Outcome: Progressing     Problem: RISK FOR INFECTION - ADULT  Goal: Absence of fever/infection during anticipated  neutropenic period  Description: INTERVENTIONS  - Monitor WBC  - Administer growth factors as ordered  - Implement neutropenic guidelines  Outcome: Progressing     Problem: SAFETY ADULT - FALL  Goal: Free from fall injury  Description: INTERVENTIONS:  - Assess pt frequently for physical needs  - Identify cognitive and physical deficits and behaviors that affect risk of falls.  - Malo fall precautions as indicated by assessment.  - Educate pt/family on patient safety including physical limitations  - Instruct pt to call for assistance with activity based on assessment  - Modify environment to reduce risk of injury  - Provide assistive devices as appropriate  - Consider OT/PT consult to assist with strengthening/mobility  - Encourage toileting schedule  Outcome: Progressing     Problem: GASTROINTESTINAL - ADULT  Goal: Minimal or absence of nausea and vomiting  Description: INTERVENTIONS:  - Maintain adequate hydration with IV or PO as ordered and tolerated  - Nasogastric tube to low intermittent suction as ordered  - Evaluate effectiveness of ordered antiemetic medications  - Provide nonpharmacologic comfort measures as appropriate  - Advance diet as tolerated, if ordered  - Obtain nutritional consult as needed  - Evaluate fluid balance  Outcome: Progressing  Goal: Maintains or returns to baseline bowel function  Description: INTERVENTIONS:  - Assess bowel function  - Maintain adequate hydration with IV or PO as ordered and tolerated  - Evaluate effectiveness of GI medications  - Encourage mobilization and activity  - Obtain nutritional consult as needed  - Establish a toileting routine/schedule  - Consider collaborating with pharmacy to review patient's medication profile  Outcome: Progressing     Problem: SKIN/TISSUE INTEGRITY - ADULT  Goal: Skin integrity remains intact  Description: INTERVENTIONS  - Assess and document risk factors for pressure ulcer development  - Assess and document skin integrity  -  Monitor for areas of redness and/or skin breakdown  - Initiate interventions, skin care algorithm/standards of care as needed  Outcome: Progressing  Goal: Incision(s), wounds(s) or drain site(s) healing without S/S of infection  Description: INTERVENTIONS:  - Assess and document risk factors for pressure ulcer development  - Assess and document skin integrity  - Assess and document dressing/incision, wound bed, drain sites and surrounding tissue  - Implement wound care per orders  - Initiate isolation precautions as appropriate  - Initiate Pressure Ulcer prevention bundle as indicated  Outcome: Progressing  Goal: Oral mucous membranes remain intact  Description: INTERVENTIONS  - Assess oral mucosa and hygiene practices  - Implement preventative oral hygiene regimen  - Implement oral medicated treatments as ordered  Outcome: Progressing     Problem: MUSCULOSKELETAL - ADULT  Goal: Return mobility to safest level of function  Description: INTERVENTIONS:  - Assess patient stability and activity tolerance for standing, transferring and ambulating w/ or w/o assistive devices  - Assist with transfers and ambulation using safe patient handling equipment as needed  - Ensure adequate protection for wounds/incisions during mobilization  - Obtain PT/OT consults as needed  - Advance activity as appropriate  - Communicate ordered activity level and limitations with patient/family  Outcome: Progressing  Goal: Maintain proper alignment of affected body part  Description: INTERVENTIONS:  - Support and protect limb and body alignment per provider's orders  - Instruct and reinforce with patient and family use of appropriate assistive device and precautions (e.g. spinal or hip dislocation precautions)  Outcome: Progressing     Patient is presently resting in the bed. Alert x 4. Vital signs taken and stable. Fall risk precautions are followed at all times. Patient is medicated as needed for pain or discomfort. Turn and repositioned  every 2 hours. Prompt care is given to incontinence. Bed bath was given by this RN and PCT. Intravenous fluids infusing and tolerated. Patient receives intravenous antibiotics per order. Patient will be NPO after midnight for EGD with possible biopsy and dilation done by Dr. Maryse Hardin on tomorrow. Consent signed by patient and witnessed by this RN. Currently on clear liquid diet. Purewick in place and draining yellow urine.  Call light within reach at all times.

## 2024-10-30 NOTE — CM/SW NOTE
CM f/u with Ryan on SNF choice for [facility].  Per LAURA Jarvis note on 1/029 LAURA let Gladys know if Lolita is in-network with plan we can try for auth so she can finish her therapy then convert to PA pending.     Ryan will f/u with her CM on insurance.    Call back pending from Ryan if able to accept  or will take as medicaid pending.    DC PLAN: [facility] @ [facility] pending insurance vs medicaid pending.    CM to remain available for support and/or discharge planning.     Phyllis Farrar BS, RN, CCM  PRN RN CCM  Ext.  [phone]

## 2024-10-30 NOTE — PROGRESS NOTES
[facility]  part of [facility]  Hospitalist Progress Note     Sheri Garzon Patient Status:  Inpatient    1960  64 year old CSN [id number]   Location 546/546-A Attending John Hinds MD   Hosp Day # 3 PCP TERI MCKENNA MD     Assessment & Plan:   ----------------------------------  Nausea/vomiting.   CT scan unremarkable.  -Antiemetics as needed  -Gentle IV fluids  -Advance diet as tolerated  -PPI    Odynophagia.  Now patient complaining of pills getting stuck in her throat.  She states that every single pill, as well as food is getting stuck and causing discomfort.  Nursing cannot corroborate this.  -GI consult  -Hold oral medications for now  -esophagram    Right lower extremity ulcer.  Possible overlying cellulitis on right foot dorsum possible.  Ulceration on the back of the right lower leg does not appear infected.  White count is normal.  Afebrile.  -Continue vancomycin for now  -Cxs show MRSA sensitive to bactrim    Left foot pain.  Now complaining of moderate pain in the left fourth and fifth digits.  No injury.  The toes are warm, well-perfused.  Sensation intact.  -X-rays neg, prob OA    Hypoglycemia.  Due to poor oral intake.  -D10  -Accu-Cheks  -Dietitian    Other problems  Metabolic acidosis  Anxiety  Asthma  Adrenal insufficiency  GERD  Hypertension  Dyslipidemia  History of coronary artery disease  Rheumatoid arthritis  Sleep apnea    DVT Mechanical Prophylaxis:     Early ambuation  DVT Pharmacologic Prophylaxis   Medication    heparin (Porcine) 5000 UNIT/ML injection 5,000 Units      DVT Pharmacologic prophylaxis: Aspirin 162 mg       code status: full  dispo: SULTANA  If applicable, malnutrition status at bottom of note    I personally reviewed the available laboratories, imaging including. I discussed/will discuss the case with consultants. I ordered laboratories and/or radiographic studies. I adjusted medications as detailed above.  Medical decision making high, risk  is high.  Discussed with gastroenterology    Subjective:   ----------------------------------  Patient has several complaints this morning.  States that yesterday she could not swallow any of her pills or food because they get stuck causing discomfort.  Nausea has been minimal.  Now also complaining of pain in her left fourth and fifth digits.  Does not recall an injury.      Objective:   Chief Complaint:   Chief Complaint   Patient presents with    Nausea/Vomiting/Diarrhea     ----------------------------------  Temp:  [97.1 °F (36.2 °C)-98 °F (36.7 °C)] 97.1 °F (36.2 °C)  Pulse:  [] 79  Resp:  [16-18] 18  BP: (127-149)/() 127/80  SpO2:  [99 %-100 %] 100 %  Gen: A+Ox3.  No distress.   HEENT: NCAT, neck supple, no carotid bruit.  CV: RRR, S1S2, and intact distal pulses. No gallop, rub, murmur.  Pulm: Effort and breath sounds normal. No distress, wheezes, rales, rhonchi.  Abd: Soft, NTND, BS normal, no mass, no HSM, no rebound/guarding.   Neuro: Normal reflexes, CN. Sensory/motor exams grossly normal deficit.   MS: No joint effusions.  No peripheral edema.  Right lower extremity with 2 ulcerations.  1 on the lower leg posteriorly.  Another on the dorsum of the right foot.  No surrounding erythema.  No purulence expressed.  Minimal tenderness.  The feet are well-perfused, warm.  Sensation intact  Skin: Skin is warm and dry. No rashes, erythema, diaphoresis.   Psych: Normal mood and affect. Calm, cooperative    Labs:  Lab Results   Component Value Date    HGB 10.0 (L) 10/29/2024    WBC 9.2 10/29/2024    .0 10/29/2024     10/29/2024    K 3.0 (L) 10/30/2024    CREATSERUM 0.58 10/29/2024    AST 23 10/30/2024    ALT 31 10/30/2024            potassium chloride  40 mEq Intravenous Once    Followed by    potassium chloride  20 mEq Intravenous Once    sucralfate  1 g Oral TID AC and HS (2200)    vancomycin  15 mg/kg Intravenous Q24H    predniSONE  10 mg Oral BID    aspirin  81 mg Oral Daily    [Held by  provider] clopidogrel  75 mg Oral Daily    docusate sodium  100 mg Oral BID    hydroxychloroquine  200 mg Oral BID    lamoTRIgine  50 mg Oral BID    metoprolol tartrate  12.5 mg Oral BID    oxybutynin ER  10 mg Oral Daily    QUEtiapine  50 mg Oral Nightly    heparin  5,000 Units Subcutaneous Q8H FALLON    pantoprazole  40 mg Intravenous Q12H       acetaminophen    morphINE    albuterol    [DISCONTINUED] acetaminophen **OR** HYDROcodone-acetaminophen **OR** HYDROcodone-acetaminophen    melatonin    ondansetron    prochlorperazine

## 2024-10-30 NOTE — PAYOR COMM NOTE
--------------  CONTINUED STAY REVIEW    Payor: [employer]  Subscriber #:  [id number]  Authorization Number: [id number]    Admit date: 10/27/24  Admit time: 11:57 PM    10/30/24    Assessment & Plan:   ----------------------------------  Nausea/vomiting.   CT scan unremarkable.  -Antiemetics as needed  -Gentle IV fluids  -Advance diet as tolerated  -PPI     Odynophagia.  Now patient complaining of pills getting stuck in her throat.  She states that every single pill, as well as food is getting stuck and causing discomfort.  Nursing cannot corroborate this.  -GI consult  -Hold oral medications for now  -esophagram     Right lower extremity ulcer.  Possible overlying cellulitis on right foot dorsum possible.  Ulceration on the back of the right lower leg does not appear infected.  White count is normal.  Afebrile.  -Continue vancomycin for now  -Cxs show MRSA sensitive to bactrim     Left foot pain.  Now complaining of moderate pain in the left fourth and fifth digits.  No injury.  The toes are warm, well-perfused.  Sensation intact.  -X-rays neg, prob OA     Hypoglycemia.  Due to poor oral intake.  -D10  -Accu-Cheks  -Dietitian     Subjective:   Patient has several complaints this morning.  States that yesterday she could not swallow any of her pills or food because they get stuck causing discomfort.  Nausea has been minimal.  Now also complaining of pain in her left fourth and fifth digits.  Does not recall an injury.        Lab Results   Component Value Date     HGB 10.0 (L) 10/29/2024     WBC 9.2 10/29/2024     .0 10/29/2024      10/29/2024     K 3.0 (L) 10/30/2024     CREATSERUM 0.58 10/29/2024     AST 23 10/30/2024     ALT 31 10/30/2024        MEDICATIONS ADMINISTERED IN LAST 1 DAY:  aspirin chewable tab 81 mg       Date Action Dose Route User    10/30/2024 0903 Given 81 mg Oral Akua Velasquez, RN          dextrose 10% infusion       Date Action Dose Route User    10/30/2024 0902  New Bag (none) Intravenous Akua Velasquez RN    10/30/2024 0047 New Bag (none) Intravenous Deborah Camarillo RN          heparin (Porcine) 5000 UNIT/ML injection 5,000 Units       Date Action Dose Route User    10/30/2024 1333 Given 5,000 Units Subcutaneous (Right Lower Abdomen) Akua Velasquez RN    10/30/2024 0618 Given 5,000 Units Subcutaneous (Right Upper Arm) Deborah Camarillo RN    10/29/2024 2224 Given 5,000 Units Subcutaneous (Right Upper Arm) Deborah Camarillo RN          HYDROcodone-acetaminophen (Norco) 5-325 MG per tab 2 tablet       Date Action Dose Route User    10/29/2024 2223 Given 2 tablet Oral Deborah Camarillo RN          hydroxychloroquine (Plaquenil) tab 200 mg       Date Action Dose Route User    10/30/2024 0903 Given 200 mg Oral Akua Velasquez RN    10/29/2024 2224 Given 200 mg Oral Deborah Camarillo RN          lamoTRIgine (LaMICtal) tab 50 mg       Date Action Dose Route User    10/30/2024 0902 Given 50 mg Oral Akua Velasquez RN    10/29/2024 2223 Given 50 mg Oral Deborah Camarillo RN          metoprolol tartrate (Lopressor) partial tab 12.5 mg       Date Action Dose Route User    10/30/2024 0904 Given 12.5 mg Oral Akua Velasquez RN    10/29/2024 2223 Given 12.5 mg Oral Deborah Camarillo RN          morphINE PF 2 MG/ML injection 2 mg       Date Action Dose Route User    10/30/2024 1212 Given 2 mg Intravenous Akua Velasquez RN          oxybutynin ER (Ditropan-XL) 24 hr tab 10 mg       Date Action Dose Route User    10/30/2024 0904 Given 10 mg Oral Akua Velasquez RN          pantoprazole (Protonix) 40 mg in sodium chloride 0.9% PF 10 mL IV push       Date Action Dose Route User    10/30/2024 1333 Given 40 mg Intravenous Akua Velasquez RN    10/30/2024 0209 Given 40 mg Intravenous Deborah Camarillo RN          potassium chloride 20 mEq/100mL IVPB premix 20 mEq       Date Action Dose Route User    10/30/2024 1333 New Bag 20 mEq Intravenous Akua Velasquez, RN          potassium chloride  40 mEq in 250mL sodium chloride 0.9% IVPB premix       Date Action Dose Route User    10/30/2024 0904 New Bag 40 mEq Intravenous Akua Velasquez RN          predniSONE (Deltasone) tab 10 mg       Date Action Dose Route User    10/30/2024 0902 Given 10 mg Oral Akua Velasquez RN    10/29/2024 2223 Given 10 mg Oral Deborah Camarillo RN          QUEtiapine (SEROquel) tab 50 mg       Date Action Dose Route User    10/29/2024 2223 Given 50 mg Oral Deborah Camarillo RN          sucralfate (Carafate) 1 GM/10ML oral suspension 1 g       Date Action Dose Route User    10/30/2024 1212 Given 1 g Oral Akua Velasquez RN    10/30/2024 0618 Given 1 g Oral Deborah Camarillo RN    10/29/2024 2355 Given 1 g Oral Deborah Camarillo RN    10/29/2024 1609 Given 1 g Oral Laura Rodas RN          vancomycin (Vancocin) 1.25 g in sodium chloride 0.9% 250mL IVPB premix       Date Action Dose Route User    10/29/2024 2349 New Bag 1,250 mg Intravenous Deborah Camarillo RN            Vitals (last day)       Date/Time Temp Pulse Resp BP SpO2 Weight O2 Device O2 Flow Rate (L/min) [facility]    10/30/24 1332 97.8 °F (36.6 °C) -- 18 127/76 100 % -- None (Room air) -- URIEL    10/30/24 0901 97.1 °F (36.2 °C) 79 18 127/80 100 % -- None (Room air) -- URIEL    10/30/24 0420 97.6 °F (36.4 °C) 85 16 138/87 100 % -- None (Room air) -- GD    10/29/24 2214 98 °F (36.7 °C) -- 16 149/117 100 % -- None (Room air) -- GD    10/29/24 1523 98 °F (36.7 °C) 105 16 146/99 99 % -- None (Room air) -- MS    10/29/24 0921 97.2 °F (36.2 °C) 82 16 111/69 100 % -- None (Room air) -- MS

## 2024-10-30 NOTE — DIETARY NOTE
ADULT NUTRITION INITIAL ASSESSMENT      RECOMMENDATIONS TO MD: See nutrition intervention for ONS (oral nutrition supplements)    Pt is at moderate nutrition risk.  Pt meets moderate malnutrition criteria.      CRITERIA FOR MALNUTRITION DIAGNOSIS: Criteria for non-severe malnutrition diagnosis: acute illness/injury related to wt loss 5% in 1 month and energy intake less than 75% for greater than 7 days.     ADMITTING DIAGNOSIS:  Cellulitis of right lower extremity [L03.115]     PERTINENT PAST MEDICAL HISTORY:    Past Medical History:    Allergic rhinitis    Anxiety    Arthritis    RA and Osteoarthritis    Asthma (HCC)    Depression    Not officially diagnosed    Esophageal reflux    Essential hypertension    High blood pressure    High cholesterol    Hyperlipidemia    Myocardial infarction (HCC)    Cardiac event    Osteoarthritis    Pancreatitis (HCC)    Problems with swallowing    RA (rheumatoid arthritis) (HCC)    Sleep apnea    has a past surgical history that includes Rotator cuff repair; wrist fracture surgery; revision of uterine anomaly; colonoscopy; other surgical history; Debridement (08/15/2024); and egd (07/26/2024).     PATIENT STATUS: Initial 10/30/24: Patient (pt) identified at Nutrition risk due to unintentional wt loss after the screening process. Presented with N/V, inability to keep food down. Other Medical findings:  Odynophagia.   Upon visit, pt lying in bed. Just completed Esophagram with findings of esophageal stricture per  APN. Place on cl liq diet. Pt reports pain in swallow food and vomiting d/t unable to keep food down, but less pain in liquid intake. Also c/o dysgeusia, hence po intake and appetite diminished, pt states in the past 2 days PTA but H&P indicated worsening symptoms over the past 3 weeks. Diet hx/eval:     Prior to acute illness, pt reports eating fairly at NH in mostly 2 small meals. Wt eval:    15# or 8% significant wt loss in the past 2-3 weeks based on reported usual wt  of 180# (consistent with emr wt hx), compared to current wt of 164# today. Nutrition findings:      Agree with the current diet and Provision for Oral Nutrition supplements (ONS) is also indicated to support the patient’s nutritional needs. Plan esophageal dilation and will await diet advancement, Otherwise, consider temporary nutrition support if unable to advance diet beyond cl liq.       FOOD/NUTRITION INTAKE ANALYSIS:    Current Appetite: Fair  Current Intake:  % of cl liq.   Current Intake Meeting Needs: No, but oral nutrition supplements (ONS) to maximize and or Nutrition support if unable to advance diet beyond cl liq.   Percent Meals Eaten (last 6 days)       Date/Time Percent Meals Eaten (%)    10/28/24 1235 20 %    10/28/24 1709 85 %    10/28/24 1830 200 %    10/29/24 0600 25 %    10/29/24 1138 100 %    10/29/24 1647 20 %    10/30/24 0900 --     Percent Meals Eaten (%): NPO at 10/30/24 0900    10/30/24 1236 20 %           Food Allergies:  bananas  Cultural/Ethnic/Sikh Preferences: None    GASTROINTESTINAL: nausea, vomiting, and Esophageal stricture.       MEDICATIONS: reviewed     dextrose 5%-sodium chloride 0.45% Stopped (10/29/24 2355)    dextrose 75 mL/hr at 10/30/24 0902      sucralfate  1 g Oral TID AC and HS (2200)    vancomycin  15 mg/kg Intravenous Q24H    predniSONE  10 mg Oral BID    aspirin  81 mg Oral Daily    [Held by provider] clopidogrel  75 mg Oral Daily    docusate sodium  100 mg Oral BID    hydroxychloroquine  200 mg Oral BID    lamoTRIgine  50 mg Oral BID    metoprolol tartrate  12.5 mg Oral BID    oxybutynin ER  10 mg Oral Daily    QUEtiapine  50 mg Oral Nightly    heparin  5,000 Units Subcutaneous Q8H FALLON    pantoprazole  40 mg Intravenous Q12H       LABS: reviewed   Recent Labs     10/27/24  1838 10/29/24  1022 10/30/24  0521   GLU 77 48*  --    BUN 15 11  --    CREATSERUM 0.77 0.58  --    CA 8.9 7.7*  --    MG 2.1 1.8 2.0    142  --    K 3.1* 3.1* 3.0*    116*   --    CO2 18.0* 14.0*  --    OSMOCALC 292 291  --        NUTRITION RELATED PHYSICAL FINDINGS:  - Nutrition Focused Physical Exam (NFPE): no wasting noted, malnutrition related to PO intake and weight loss   - Fluid Accumulation: none  see RN documentation for details  - Skin Integrity:  L E ulcer-->  see RN documentation for details    ANTHROPOMETRICS:  HT: 157.5 cm (5' 2\")  WT: 74.8 kg (164 lb 14.4 oz)   BMI: Body mass index is 30.16 kg/m².  BMI CLASSIFICATION: 30-34.9 kg/m2 - obesity class I  IBW: 110 lbs        149% IBW  Usual Body Wt: 180 lbs per pt and consistent with emr wt hx      92% UBW  WEIGHT HISTORY:    Patient Weight(s) for the past 336 hrs:   Weight   10/30/24 1100 74.8 kg (164 lb 14.4 oz)   10/28/24 0014 68.9 kg (151 lb 12.8 oz)   10/27/24 1739 82.2 kg (181 lb 3.5 oz)     Wt Readings from Last 10 Encounters:   10/30/24 74.8 kg (164 lb 14.4 oz)   09/05/24 82.2 kg (181 lb 3.2 oz)   08/27/24 87.7 kg (193 lb 5 oz)   08/21/24 89.8 kg (198 lb)   08/08/24 82.6 kg (182 lb)   07/22/24 83.4 kg (183 lb 12.8 oz)   06/28/24 87.1 kg (192 lb)   09/06/21 72.6 kg (160 lb)   08/11/21 77.1 kg (170 lb)       NUTRITION DIAGNOSIS/PROBLEM:    Moderate Malnutrition related to Acute - Physiological causes resulting in anorexia or diminished intake in the setting of esophageal stricture as evidenced by N/V x 3 weeks, poor po <50%, 8% significant wt loss.     NUTRITION INTERVENTION:     NUTRITION PRESCRIPTION:   Estimated Nutrition needs: Dosing wt of 74.8 kg --wt taken on 10/30 .  Calories: 8585-4147 calories/day (22-25 calories per kg Dosing wt)  Protein: 65-75 g protein/day (1.3-1.5 g protein/kg 50 kg Ideal body wt (IBW))    - Diet:       Procedures    Clear liquid diet Is Patient on Accuchecks? No; Misc Restriction: Cardiac    NPO        - RD Malnutrition Care Plan: Encouraged increased PO intake and Initiated ONS (oral nutritional supplements)  - Medical Food Supplements- Ensure Clear (240 calories/ 8 g protein each) BID  Apple-  - Vitamin and mineral supplements: none  - Feeding assistance: meal set up  - Nutrition education: Discussed importance of adequate energy and protein intake   - Coordination of nutrition care: collaboration with other providers  - Discharge and transfer of nutrition care to new setting or provider: monitor plans      MONITOR AND EVALUATE/NUTRITION GOALS:   - Food and Nutrient Intake:    Monitor: adequacy of PO intake, adequacy of supplement intake, and for PO diet advancement  - Food and Nutrient Administration:    Monitor: need for temporary nutrition support  - Anthropometric Measurement:    Monitor weight  - Nutrition Goals:    halt wt loss, maintain wt within 5%, PO and supplement greater than 75% of needs, diet beyond clears in 48hrs, labs within acceptable limits, minimize lean body mass loss, support body systems, and improved GI status      RD will follow up.    Pattie Rachel, RD, LDN, [facility]  Clinical Dietitian  [phone]

## 2024-10-30 NOTE — OCCUPATIONAL THERAPY NOTE
Attempted OT follow up x2. Pt off unit for esphogram then when pt returned, pt declined politely stating fatigue. Will re-attempt as able      Mary Alejandra OT  [facility]  Inpatient Rehabilitation  Occupational Therapy  [phone]

## 2024-10-30 NOTE — PHYSICAL THERAPY NOTE
Attempted PT follow up x 2. Pt off unit for esophagram initially then declined participation with therapy this date when returned from testing, c/o fatigue. Will follow up at later date as appropriate and able.     Paula Renteria, PT   Ext. [phone]

## 2024-10-30 NOTE — PLAN OF CARE
Problem: ALTERED NUTRIENT INTAKE - ADULT  Goal: Nutrient intake appropriate for improving, restoring or maintaining nutritional needs  Description: INTERVENTIONS:  - Assess nutritional status and recommend course of action  - Monitor oral intake, labs, and treatment plans  - Recommend appropriate diets, oral nutritional supplements, and vitamin/mineral supplements  - Recommend, monitor, and adjust tube feedings  based on assessed needs  - Provide specific nutrition education as appropriate  Outcome: Progressing

## 2024-10-31 ENCOUNTER — ANESTHESIA (OUTPATIENT)
Dept: ENDOSCOPY | Facility: HOSPITAL | Age: 64
End: 2024-10-31
Payer: COMMERCIAL

## 2024-10-31 ENCOUNTER — ANESTHESIA EVENT (OUTPATIENT)
Dept: ENDOSCOPY | Facility: HOSPITAL | Age: 64
End: 2024-10-31
Payer: COMMERCIAL

## 2024-10-31 RX ADMIN — ONDANSETRON 4 MG: 2 INJECTION INTRAMUSCULAR; INTRAVENOUS at 10:12:00

## 2024-10-31 NOTE — ANESTHESIA PREPROCEDURE EVALUATION
Anesthesia PreOp Note    HPI:     Sheri Garzon is a 64 year old female who presents for preoperative consultation requested by: Tahira Escobar MD    Date of Surgery: 10/31/2024    Procedure(s):  ESOPHAGOGASTRODUODENOSCOPY (EGD)  Indication: dysphagia    Relevant Problems   No relevant active problems       NPO:  Last Liquid Consumption Date: 10/30/24  Last Liquid Consumption Time: 0000  Last Solid Consumption Date: 10/30/24  Last Solid Consumption Time: 0000  Last Liquid Consumption Date: 10/30/24          History Review:  Patient Active Problem List    Diagnosis Date Noted    Odynophagia 10/29/2024    Nausea and vomiting 10/28/2024    Episodic mood disorder (HCC) 10/28/2024    Moderate bipolar I disorder, current or most recent episode depressed, with psychotic features, with mixed features (HCC) 09/06/2024    Altered mental status, unspecified altered mental status type 09/05/2024    Acute psychosis (formerly [facility]) 09/05/2024    Leg edema 08/27/2024    Adrenal insufficiency (formerly [facility]) 08/27/2024    Weakness generalized 08/26/2024    Adrenal insufficiency ([place] disease) (formerly [facility]) 08/23/2024    Current chronic use of systemic steroids 08/23/2024    Neutropenia (formerly [facility]) 08/23/2024    Anemia of infection 08/23/2024    RTA (renal tubular acidosis) 08/17/2024    Cellulitis of right lower extremity 08/12/2024    Cellulitis 08/12/2024    Hiatal hernia 07/26/2024    Hypomagnesemia 07/24/2024    Difficult intravenous access 07/23/2024    Hypokalemia 07/23/2024    Metabolic acidosis 07/22/2024    Bilious vomiting with nausea 07/22/2024    Dehydration 07/21/2024       Past Medical History:    Allergic rhinitis    Anxiety    Arthritis    RA and Osteoarthritis    Asthma (HCC)    Depression    Not officially diagnosed    Esophageal reflux    Essential hypertension    High blood pressure    High cholesterol    Hyperlipidemia    Myocardial infarction (HCC)    Cardiac event    Osteoarthritis    Pancreatitis (HCC)    Problems with  swallowing    RA (rheumatoid arthritis) (HCC)    Sleep apnea       Past Surgical History:   Procedure Laterality Date    Colonoscopy      Debridement  08/15/2024    Sharp excisional debridement of RLE posterior leg wound    Egd  07/26/2024    Hiatal hernia    Other surgical history      Revision of uterine anomaly      Rotator cuff repair      Wrist fracture surgery         Prescriptions Prior to Admission[1]  Current Medications and Prescriptions Ordered in Epic[2]    Allergies[3]    Family History   Problem Relation Age of Onset    Diabetes Mother     Genetic Disease Mother     Heart Disorder Mother     Hypertension Mother     Obesity Mother     Diabetes Father     Hypertension Father     Depression Daughter     Psychiatric Daughter      Social History     Socioeconomic History    Marital status: Single   Tobacco Use    Smoking status: Never    Smokeless tobacco: Never   Vaping Use    Vaping status: Never Used   Substance and Sexual Activity    Alcohol use: Not Currently    Drug use: Never       Available pre-op labs reviewed.  Lab Results   Component Value Date    WBC 8.6 10/31/2024    RBC 3.32 (L) 10/31/2024    HGB 9.8 (L) 10/31/2024    HCT 29.9 (L) 10/31/2024    MCV 90.1 10/31/2024    MCH 29.5 10/31/2024    MCHC 32.8 10/31/2024    RDW 15.7 (H) 10/31/2024    .0 10/31/2024     Lab Results   Component Value Date     10/31/2024    K 3.3 (L) 10/31/2024     (H) 10/31/2024    CO2 14.0 (L) 10/31/2024    BUN 6 (L) 10/31/2024    CREATSERUM 0.56 10/31/2024    GLU 95 10/31/2024    PGLU 72 10/29/2024    CA 7.7 (L) 10/31/2024          Vital Signs:  Body mass index is 30.16 kg/m².   height is 1.575 m (5' 2\") and weight is 74.8 kg (164 lb 14.4 oz). Her oral temperature is 97.6 °F (36.4 °C). Her blood pressure is 105/82 and her pulse is 76. Her respiration is 18 and oxygen saturation is 100%.   Vitals:    10/30/24 1332 10/30/24 2105 10/31/24 0522 10/31/24 0830   BP: 127/76 112/72 106/69 105/82   Pulse: 79 76  80 76   Resp: 18 18 18 18   Temp: 97.8 °F (36.6 °C) 98 °F (36.7 °C) 98.1 °F (36.7 °C) 97.6 °F (36.4 °C)   TempSrc: Oral Oral Oral Oral   SpO2: 100% 99% 99% 100%   Weight:       Height:            Anesthesia Evaluation     Patient summary reviewed and Nursing notes reviewed    No history of anesthetic complications   Airway   Mallampati: II  TM distance: <3 FB  Neck ROM: full  Dental - Dentition appears grossly intact     Pulmonary - normal exam   (+) asthma, sleep apnea  Cardiovascular - normal exam  (+) hypertension    ROS comment: 7/2024 echo  Conclusions:     1. Left ventricle: The cavity size was normal. Wall thickness was moderately      increased. Systolic function was normal. The estimated ejection fraction      was 55-60%, by visual assessment. No diagnostic evidence for regional      wall motion abnormalities. Doppler parameters are consistent with      abnormal left ventricular relaxation - grade 1 diastolic dysfunction.   2. Ventricular septum: Thickness was moderately increased.   3. Left atrium: The left atrial volume was normal.   4. Mitral valve: There was mild regurgitation.   Impressions:  No previous study was available for comparison.   *         Neuro/Psych    (+)  anxiety/panic attacks,  bipolar disorder, depression      GI/Hepatic/Renal    (+) hiatal hernia, GERD    Endo/Other    (+) arthritis (rheumatoid arthritis)    Comments: Adrenal insufficiency, on long term steroids x23 years  Abdominal                  Anesthesia Plan:   ASA:  3  Plan:   MAC  Plan Comments: Some mild nausea still, risk of aspiration discussed  Informed Consent Plan and Risks Discussed With:  Patient      I have informed Sheri MONISHA Duran and/or legal guardian or family member of the nature of the anesthetic plan, benefits, risks including possible dental damage if relevant, major complications, and any alternative forms of anesthetic management.   All of the patient's questions were answered to the best of my ability. The  patient desires the anesthetic management as planned.  Tino Brooke MD  10/31/2024 9:17 AM  Present on Admission:   Cellulitis of right lower extremity           [1]   Medications Prior to Admission   Medication Sig Dispense Refill Last Dose/Taking    docusate sodium 100 MG Oral Cap Take 1 capsule (100 mg total) by mouth 2 (two) times daily.   10/27/2024 Morning    ondansetron (ZOFRAN) 4 mg tablet Take 1 tablet (4 mg total) by mouth every 8 (eight) hours as needed for Nausea.   Taking As Needed    HYDROcodone-acetaminophen  MG Oral Tab Take 1 tablet by mouth every 4 (four) hours as needed. 20 tablet 0 10/27/2024 Noon    [] lamoTRIgine 25 MG Oral Tab Take 2 tablets (50 mg total) by mouth 2 (two) times daily. 120 tablet 0 10/27/2024 Morning    [] pantoprazole 40 MG Oral Tab EC Take 1 tablet (40 mg total) by mouth 2 (two) times daily before meals. 60 tablet 0 10/27/2024 Morning    [] QUEtiapine 50 MG Oral Tab Take 1 tablet (50 mg total) by mouth nightly. 30 tablet 0 10/26/2024    [] metoprolol tartrate 25 MG Oral Tab Take 0.5 tablets (12.5 mg total) by mouth 2 (two) times daily. (Patient taking differently: Take 0.5 tablets (12.5 mg total) by mouth 2 (two) times daily. Hold for SBP <100 and HR <60) 30 tablet 0 10/27/2024 Morning    predniSONE 10 MG Oral Tab Take 20mg (2 tabs) in AM and 10mg (1tab) for 4 days then resume 10mg twice  a day indefinitely (Patient taking differently: Take 1 tablet (10 mg total) by mouth 2 (two) times daily. Take 20mg (2 tabs) in AM and 10mg (1tab) for 4 days then resume 10mg twice  a day indefinitely) 30 tablet 0 10/27/2024 Morning    Acetaminophen ER (ACETAMINOPHEN 8 HOUR) 650 MG Oral Tab CR Take 2-3 tablets (1,300-1,950 mg total) by mouth every 8 (eight) hours as needed for Pain.   Taking As Needed    albuterol 108 (90 Base) MCG/ACT Inhalation Aero Soln Inhale 2 puffs into the lungs every 4 to 6 hours as needed for Wheezing.   Taking As Needed     hydroxychloroquine 200 MG Oral Tab Take 1 tablet (200 mg total) by mouth 2 (two) times daily. 180 tablet 0 10/27/2024 Morning    Aspirin 81 MG Oral Cap Take 81 mg by mouth daily.   10/27/2024    atorvastatin 10 MG Oral Tab Take 1 tablet (10 mg total) by mouth daily.   10/27/2024 Evening    clopidogrel 75 MG Oral Tab Take 1 tablet (75 mg total) by mouth daily.   10/27/2024 Morning    ergocalciferol 1.25 MG (36130 UT) Oral Cap Take 1 capsule (50,000 Units total) by mouth once a week.   10/22/2024    oxybutynin ER 10 MG Oral Tablet 24 Hr Take 1 tablet (10 mg total) by mouth daily.   10/27/2024 Morning    folic acid 1 MG Oral Tab Take 1 tablet (1 mg total) by mouth daily. 90 tablet 0 10/27/2024 Morning    collagenase 250 UNIT/GM External Ointment Apply topically daily.       clotrimazole-betamethasone 1-0.05 % External Cream Apply 1 Application topically 2 (two) times daily. Apply to groin and abdominal folds       [] sodium bicarbonate 650 MG Oral Tab Take 1 tablet (650 mg total) by mouth 2 (two) times daily. 60 tablet 0     Copper (COPPERMIN) 5 MG Oral Tab Take 1 tablet by mouth daily. 30 tablet 0     ibuprofen 200 MG Oral Tab Take 3 tablets (600 mg total) by mouth every 8 (eight) hours as needed for Pain.       [] Ferrous Gluconate 324 (38 Fe) MG Oral Tab Take 1 tablet (325 mg total) by mouth daily with breakfast. 30 tablet 0     Potassium Chloride ER 10 MEQ Oral Tab CR Take 1 tablet (10 mEq total) by mouth 2 (two) times daily.      [2]   Current Facility-Administered Medications Ordered in Epic   Medication Dose Route Frequency Provider Last Rate Last Admin    potassium chloride 40 mEq in 250mL sodium chloride 0.9% IVPB premix  40 mEq Intravenous Once John Hinds MD 62.5 mL/hr at 10/31/24 0831 40 mEq at 10/31/24 0831    acetaminophen (Tylenol) 160 MG/5ML oral liquid 650 mg  650 mg Oral Q6H PRN John Hinds MD   650 mg at 10/30/24 2122    morphINE PF 2 MG/ML injection 2 mg  2 mg Intravenous Q4H PRN  John Hinds MD   2 mg at 10/31/24 0531    sucralfate (Carafate) 1 GM/10ML oral suspension 1 g  1 g Oral TID AC and HS (2200) Juana Carroll PA-C   1 g at 10/30/24 2126    dextrose 10% infusion   Intravenous Continuous John Hinds MD 75 mL/hr at 10/31/24 0020 New Bag at 10/31/24 0020    [Held by provider] vancomycin (Vancocin) 1.25 g in sodium chloride 0.9% 250mL IVPB premix  15 mg/kg Intravenous Q24H Jocelyn Rivera .7 mL/hr at 10/30/24 2303 1,250 mg at 10/30/24 2303    predniSONE (Deltasone) tab 10 mg  10 mg Oral BID Jocelyn Rivera MD   10 mg at 10/30/24 0902    albuterol (Ventolin HFA) 108 (90 Base) MCG/ACT inhaler 2 puff  2 puff Inhalation Q4H PRN John Hinds MD        aspirin chewable tab 81 mg  81 mg Oral Daily John Hinds MD   81 mg at 10/30/24 0903    [Held by provider] clopidogrel (Plavix) tab 75 mg  75 mg Oral Daily John Hinds MD   75 mg at 10/28/24 0959    docusate sodium (Colace) cap 100 mg  100 mg Oral BID John Hinds MD   100 mg at 10/28/24 1000    hydroxychloroquine (Plaquenil) tab 200 mg  200 mg Oral BID John Hinds MD   200 mg at 10/30/24 0903    lamoTRIgine (LaMICtal) tab 50 mg  50 mg Oral BID John Hinds MD   50 mg at 10/30/24 0902    metoprolol tartrate (Lopressor) partial tab 12.5 mg  12.5 mg Oral BID John Hinds MD   12.5 mg at 10/30/24 0904    oxybutynin ER (Ditropan-XL) 24 hr tab 10 mg  10 mg Oral Daily John Hinds MD   10 mg at 10/30/24 0904    QUEtiapine (SEROquel) tab 50 mg  50 mg Oral Nightly John Hinds MD   50 mg at 10/29/24 2223    [Held by provider] heparin (Porcine) 5000 UNIT/ML injection 5,000 Units  5,000 Units Subcutaneous Q8H [facility] Jocelyn Rivera MD   5,000 Units at 10/30/24 2115    HYDROcodone-acetaminophen (Norco) 5-325 MG per tab 1 tablet  1 tablet Oral Q4H PRN Jocelyn Rivera MD        Or    HYDROcodone-acetaminophen (Norco) 5-325 MG per tab 2 tablet  2 tablet Oral Q4H PRN Jocelyn Rivera MD   2 tablet at 10/29/24  2223    melatonin tab 3 mg  3 mg Oral Nightly PRN Jocelyn Rivera MD        ondansetron (Zofran) 4 MG/2ML injection 4 mg  4 mg Intravenous Q6H PRN Jocelyn Rivera MD   4 mg at 10/28/24 1315    prochlorperazine (Compazine) 10 MG/2ML injection 5 mg  5 mg Intravenous Q8H PRN Jocelyn Rivera MD        pantoprazole (Protonix) 40 mg in sodium chloride 0.9% PF 10 mL IV push  40 mg Intravenous Q12H Jocelyn Rivera MD   40 mg at 10/31/24 0202     No current Epic-ordered outpatient medications on file.   [3]   Allergies  Allergen Reactions    Cephalosporins ANAPHYLAXIS, NAUSEA AND VOMITING and OTHER (SEE COMMENTS)     Other reaction(s): Fever    - Tolerated multiple doses of ceftriaxone on 7/2024 without any complications  - Tolerated multiple doses of cefepime 8/2024 without complications    Gadolinium Derivatives FEVER    Penicillins OTHER (SEE COMMENTS)     esophagitis    Sulfa Antibiotics OTHER (SEE COMMENTS)     esophagitis    Bananas ITCHING

## 2024-10-31 NOTE — PLAN OF CARE
Patient having difficulty tolerating pills overnight, however is able to tolerate liquid medications. PRN Tylenol and morphine given for pain. IV Vancomycin given. IV fluids continued. NPO since midnight for EGD in AM. No acute changes.    Problem: Patient Centered Care  Goal: Patient preferences are identified and integrated in the patient's plan of care  Description: Interventions:  - What would you like us to know as we care for you? \"I fell twice at rehab\"  - Provide timely, complete, and accurate information to patient/family  - Incorporate patient and family knowledge, values, beliefs, and cultural backgrounds into the planning and delivery of care  - Encourage patient/family to participate in care and decision-making at the level they choose  - Honor patient and family perspectives and choices  Outcome: Progressing     Problem: Patient/Family Goals  Goal: Patient/Family Long Term Goal  Description: Patient's Long Term Goal: Discharge home    Interventions:  - IVF  -Wound care  -IV antibiotics  -PT/OT  - See additional Care Plan goals for specific interventions  Outcome: Progressing  Goal: Patient/Family Short Term Goal  Description: Patient's Short Term Goal: Regain Strength    Interventions:   - PT/OT  - Dietary consult  - See additional Care Plan goals for specific interventions  Outcome: Progressing     Problem: PAIN - ADULT  Goal: Verbalizes/displays adequate comfort level or patient's stated pain goal  Description: INTERVENTIONS:  - Encourage pt to monitor pain and request assistance  - Assess pain using appropriate pain scale  - Administer analgesics based on type and severity of pain and evaluate response  - Implement non-pharmacological measures as appropriate and evaluate response  - Consider cultural and social influences on pain and pain management  - Manage/alleviate anxiety  - Utilize distraction and/or relaxation techniques  - Monitor for opioid side effects  - Notify MD/LIP if interventions  unsuccessful or patient reports new pain  - Anticipate increased pain with activity and pre-medicate as appropriate  Outcome: Progressing     Problem: RISK FOR INFECTION - ADULT  Goal: Absence of fever/infection during anticipated neutropenic period  Description: INTERVENTIONS  - Monitor WBC  - Administer growth factors as ordered  - Implement neutropenic guidelines  Outcome: Progressing     Problem: SAFETY ADULT - FALL  Goal: Free from fall injury  Description: INTERVENTIONS:  - Assess pt frequently for physical needs  - Identify cognitive and physical deficits and behaviors that affect risk of falls.  - [place] fall precautions as indicated by assessment.  - Educate pt/family on patient safety including physical limitations  - Instruct pt to call for assistance with activity based on assessment  - Modify environment to reduce risk of injury  - Provide assistive devices as appropriate  - Consider OT/PT consult to assist with strengthening/mobility  - Encourage toileting schedule  Outcome: Progressing     Problem: GASTROINTESTINAL - ADULT  Goal: Minimal or absence of nausea and vomiting  Description: INTERVENTIONS:  - Maintain adequate hydration with IV or PO as ordered and tolerated  - Nasogastric tube to low intermittent suction as ordered  - Evaluate effectiveness of ordered antiemetic medications  - Provide nonpharmacologic comfort measures as appropriate  - Advance diet as tolerated, if ordered  - Obtain nutritional consult as needed  - Evaluate fluid balance  Outcome: Progressing  Goal: Maintains or returns to baseline bowel function  Description: INTERVENTIONS:  - Assess bowel function  - Maintain adequate hydration with IV or PO as ordered and tolerated  - Evaluate effectiveness of GI medications  - Encourage mobilization and activity  - Obtain nutritional consult as needed  - Establish a toileting routine/schedule  - Consider collaborating with pharmacy to review patient's medication profile  Outcome:  Progressing     Problem: SKIN/TISSUE INTEGRITY - ADULT  Goal: Skin integrity remains intact  Description: INTERVENTIONS  - Assess and document risk factors for pressure ulcer development  - Assess and document skin integrity  - Monitor for areas of redness and/or skin breakdown  - Initiate interventions, skin care algorithm/standards of care as needed  Outcome: Progressing  Goal: Incision(s), wounds(s) or drain site(s) healing without S/S of infection  Description: INTERVENTIONS:  - Assess and document risk factors for pressure ulcer development  - Assess and document skin integrity  - Assess and document dressing/incision, wound bed, drain sites and surrounding tissue  - Implement wound care per orders  - Initiate isolation precautions as appropriate  - Initiate Pressure Ulcer prevention bundle as indicated  Outcome: Progressing  Goal: Oral mucous membranes remain intact  Description: INTERVENTIONS  - Assess oral mucosa and hygiene practices  - Implement preventative oral hygiene regimen  - Implement oral medicated treatments as ordered  Outcome: Progressing     Problem: MUSCULOSKELETAL - ADULT  Goal: Return mobility to safest level of function  Description: INTERVENTIONS:  - Assess patient stability and activity tolerance for standing, transferring and ambulating w/ or w/o assistive devices  - Assist with transfers and ambulation using safe patient handling equipment as needed  - Ensure adequate protection for wounds/incisions during mobilization  - Obtain PT/OT consults as needed  - Advance activity as appropriate  - Communicate ordered activity level and limitations with patient/family  Outcome: Progressing  Goal: Maintain proper alignment of affected body part  Description: INTERVENTIONS:  - Support and protect limb and body alignment per provider's orders  - Instruct and reinforce with patient and family use of appropriate assistive device and precautions (e.g. spinal or hip dislocation precautions)  Outcome:  Progressing

## 2024-10-31 NOTE — PROGRESS NOTES
[facility]     Gastroenterology Progress Note    Sheri Garzon Patient Status:  Inpatient    1960 MRN [id number]   Location [facility] 5SW/SE Attending John Hinds MD   Hosp Day # 4 PCP TERI MCKENNA MD       Subjective:   Pt came to endoscopy today for dilation. On review of medications, she took plavix 10/28. Recommended waiting another 2 days before performing elective dilation. She is agreeable with this plan. She notes difficulty primarily with swallowing pills, but also solids and less so liquids.     Objective:   Blood pressure 105/75, pulse 92, temperature 97.6 °F (36.4 °C), temperature source Oral, resp. rate 15, height 5' 2\" (1.575 m), weight 164 lb 14.4 oz (74.8 kg), SpO2 100%. Body mass index is 30.16 kg/m².    Gen: awake, alert patient, NAD  HEENT: EOMI, the sclera appears anicteric, oropharynx clear, mucus membranes appear moist  CV: RRR  Lung: no conversational dyspnea   Abdomen: soft NTND abdomen with NABS appreciated   Skin: dry, warm, no jaundice  Ext: no LE edema is evident  Neuro: Alert, oriented x4 and interactive  Psych: calm, cooperative    Assessment and Plan:   Sheri Garzon is a 64 year old female w/ PMHx of GERD, HTN, asthma, HLD, RA on plaquenil, CAD on ASA/PLAVIX who presents with intractable nausea and emesis and right leg wound.      #Nausea  #Dysphagia   -worsening symptoms over last 3 weeks including increase in GERD without PPI therapy  -last EGD 2024 without mass, ulcer or stricture, CT a/p with non specific gastric wall thickening   -barium esophogram with luminal narrowing/web-like stricture of the cervical esophagus measuring 4.5 mm with a large amount of reflux and HH.   -initial plan for EGD 10/31; however, canceled due to recent plavix use (10/28). Discussed that plavix should be held 5 days prior to elective dilation to decrease bleeding risk.   -conservative management with PPI therapy and carafate with meals.        Recommend:  -continue to hold plavix (last dose 10/28)  -Clear liquid diet  -IV PPI BID  -Carafate TID with meals  -consult placed to SLP  -please try to change all pills to liquid formulations or IV formulations as pt is having significant difficulty swallowing and keeping pills down  -plan for EGD 11/2 (Saturday) with possible dilatation after plavix has been held 5 days     Tahira Escobar MD    EGD consent: I have discussed the risks, benefits, and alternatives to upper endoscopy/enteroscopy with the patient/primary decision maker [who demonstrated understanding], including but not limited to the risks of bleeding, infection, pain, death, as well as the risks of anesthesia and perforation all leading to prolonged hospitalization, surgical intervention, or even death. I also specifically mentioned the miss rate of upper endoscopy of 5-10% in the best of all circumstances.  The patient has agreed to sign an informed consent and elected to proceed with procedure with possible intervention [i.e. polypectomy, stent placement, etc.] as indicated.     Results:     Lab Results   Component Value Date    WBC 8.6 10/31/2024    HGB 9.8 (L) 10/31/2024    HCT 29.9 (L) 10/31/2024    .0 10/31/2024    CREATSERUM 0.56 10/31/2024    BUN 6 (L) 10/31/2024     10/31/2024    K 3.3 (L) 10/31/2024     (H) 10/31/2024    CO2 14.0 (L) 10/31/2024    GLU 95 10/31/2024    CA 7.7 (L) 10/31/2024    ALB 2.2 (L) 10/30/2024    ALKPHO 331 (H) 10/30/2024    BILT 0.2 10/30/2024    TP 3.9 (L) 10/30/2024    AST 23 10/30/2024    ALT 31 10/30/2024    LIP 31 10/27/2024    DDIMER 1.69 (H) 07/23/2024    MG 2.0 10/30/2024    PHOS 2.9 08/21/2024    CK 50 08/27/2024    B12 748 08/21/2024    ETOH <3 09/05/2024       XR ESOPHAGUS DOUBLE CONTRAST (CPT=74221)    Result Date: 10/30/2024  CONCLUSION:  1. Luminal narrowing/web-like stricture cervical esophagus measuring 4.5 mm transverse.  Small hiatal hernia.  Large amount of  gastroesophageal reflux.  Mucosal changes distal esophagus may reflect reflux esophagitis.  Recommend upper endoscopy. 2. Mildly abnormal esophageal motility with incomplete esophageal emptying. 3. No gastric outlet obstruction.  Suboptimal distention of the stomach limiting evaluation of the lumen    Dictated by (CST): Roldan Edwards MD on 10/30/2024 at 11:18 AM     Finalized by (CST): Roldan Edwards MD on 10/30/2024 at 11:25 AM

## 2024-10-31 NOTE — INTERVAL H&P NOTE
Pre-op Diagnosis: dysphagia    The above referenced H&P was reviewed by Tahira Escobar MD on 10/31/2024, the patient was examined and no significant changes have occurred in the patient's condition since the H&P was performed.  I discussed with the patient and/or legal representative the potential benefits, risks and side effects of this procedure; the likelihood of the patient achieving goals; and potential problems that might occur during recuperation.  I discussed reasonable alternatives to the procedure, including risks, benefits and side effects related to the alternatives and risks related to not receiving this procedure.  We will proceed with procedure as planned.

## 2024-10-31 NOTE — SLP NOTE
ADULT SWALLOWING EVALUATION    ASSESSMENT    ASSESSMENT/OVERALL IMPRESSION:      Proper PPE worn. Hands sanitized upon entrance/exit Pt room.       BSE ordered 2/2 \"dysphagia.\" Pt admitted 10/27/24 with cellulitis (R) lower extremity. Pt on solid/thin liquids at [facility]. PMH includes GERD, Depression, MI, HTN, Asthma, Anxiety. Pt reports \"food doesn't go down, comes back up.\"; reported difficulty taking pills. Current diet clear thin liquids.     PMH of dysphagia E-EHC:  BSE 8/20/24 rec solid/thin liquids.    Esophagram 10/30/24:  CONCLUSION:   1. Luminal narrowing/web-like stricture cervical esophagus measuring 4.5 mm transverse.  Small hiatal hernia.  Large amount of gastroesophageal reflux.  Mucosal changes distal esophagus may reflect reflux esophagitis.  Recommend upper endoscopy.   2. Mildly abnormal esophageal motility with incomplete esophageal emptying.   3. No gastric outlet obstruction.  Suboptimal distention of the stomach limiting evaluation of the lumen     No CXR completed.        Pt alert, on room air, afebrile and assessed sitting upright in chair (after consulting with RN). Pt agreed to participate. Pt's sister present. Learning preference verbal. or demonstration. No verbal or non-verbal indication of pain. Vocal quality/intensity adequate. Volitional swallow and cough present; considered functional. Oral motor exam unremarkable. Functional dentition. Pt self-fed clear thin liquid trials. Bilabial seal adequate; no anterior loss. Lingual skills adequate for bolus formation of the liquid trials. Oral cavity clear post liquid swallows.     Pharyngeal response appeared timely per hyolaryngeal elevation to completion (considered functional in strength/rise to palpation). No overt CSA on swallows of thin liquid, including chain swallows of thin liquids via straw. Overall, swallow function appeared coordinated (for the liquids assessed). Sp02 ~99% during this assessment.        IMPRESSIONS:    Pt presents  with functional oral swallow (for consistencies assessed) and possible pharyngeal dysfunction. Collaborated with RN regarding Pt's swallowing plan of care. BSE results/recommendations discussed with Pt/sister.  Pt/sister v/u; would benefit from additional reinforcement when able to assess with solid trials. Swallowing precautions written on white board in room for reinforcement/carry-over of skills for Pt, family and staff. Call light within Pt's reach upon SLP discharge from room.          PLAN:    To f/u x1-2 sessions when Pt is cleared to accept food trials. To monitor for any CXR or other test results. Family education to be continued as available.          RECOMMENDATIONS   Diet Recommendations - Solids:  (clear liquid diet)  Diet Recommendations - Liquids: Thin Liquids (clear liquids)       Medication Administration Recommendations:  (pills crushed in lemon ice)  Treatment Plan/Recommendations: Aspiration precautions    HISTORY   MEDICAL HISTORY  Reason for Referral: RN dysphagia screen    Problem List  Principal Problem:    Cellulitis of right lower extremity  Active Problems:    Nausea and vomiting    Episodic mood disorder (HCC)    Odynophagia      Past Medical History  Past Medical History:    Allergic rhinitis    Anxiety    Arthritis    RA and Osteoarthritis    Asthma (HCC)    Depression    Not officially diagnosed    Esophageal reflux    Essential hypertension    High blood pressure    High cholesterol    Hyperlipidemia    Myocardial infarction (HCC)    Cardiac event    Osteoarthritis    Pancreatitis (HCC)    Problems with swallowing    RA (rheumatoid arthritis) (HCC)    Sleep apnea       Prior Living Situation:  (admitted from SNF)  Diet Prior to Admission: Regular;Thin liquids  Precautions: Aspiration    Patient/Family Goals: to eat    SWALLOWING HISTORY  Current Diet Consistency:  (clear (THIN) liquids)    OBJECTIVE   ORAL MOTOR EXAMINATION  Dentition: Natural;Functional  Symmetry: Within Functional  Limits  Strength: Within Functional Limits  Range of Motion: Within Functional Limits  Rate of Motion: Within Functional Limits    Voice Quality: Clear  Respiratory Status: Unlabored  Consistencies Trialed: Thin liquids (clear)  Method of Presentation: Self presentation;Straw;Cup  Patient Positioning: Upright;Midline    Oral Phase of Swallow: Within Functional Limits ((for consistencies assessed))  Pharyngeal Phase of Swallow: Impaired  Laryngeal Elevation Timing: Appears impaired  Laryngeal Elevation Strength: Appears impaired     (Please note: Silent aspiration cannot be evaluated clinically. Videofluoroscopic Swallow Study is required to rule-out silent aspiration.)      GOALS  Goal #1 The patient will tolerate clear thin liquid consistency without overt signs or symptoms of aspiration with 100 % accuracy over one session(s).  In Progress   Goal #2 The patient/family/caregiver will demonstrate understanding and implementation of aspiration precautions and swallow strategies independently over 1-2 session(s).    In Progress   Goal #3 The patient will tolerate trial upgrade of SOFT/SOLIDS (with MD approval) and thin liquids without overt signs or symptoms of aspiration with 100 % accuracy over one session(s).  In Progress   FOLLOW UP  Treatment Plan/Recommendations: Aspiration precautions  Number of Visits to Meet Established Goals:  (1-2)  Follow Up Needed (Documentation Required): Yes  SLP Follow-up Date: 11/03/24    Thank you for your referral.   If you have any questions, please contact   Natalie Edwards M.S. [facility]/SLP  Speech-Language Pathologist  [facility]  #[phone]

## 2024-10-31 NOTE — PLAN OF CARE
Problem: Patient Centered Care  Goal: Patient preferences are identified and integrated in the patient's plan of care  Description: Interventions:  - What would you like us to know as we care for you? \"I fell twice at rehab\"  - Provide timely, complete, and accurate information to patient/family  - Incorporate patient and family knowledge, values, beliefs, and cultural backgrounds into the planning and delivery of care  - Encourage patient/family to participate in care and decision-making at the level they choose  - Honor patient and family perspectives and choices  Outcome: Progressing     Problem: Patient/Family Goals  Goal: Patient/Family Long Term Goal  Description: Patient's Long Term Goal: Discharge home    Interventions:  - IVF  -Wound care  -IV antibiotics  -PT/OT  - See additional Care Plan goals for specific interventions  Outcome: Progressing  Goal: Patient/Family Short Term Goal  Description: Patient's Short Term Goal: Regain Strength    Interventions:   - PT/OT  - Dietary consult  - See additional Care Plan goals for specific interventions  Outcome: Progressing     Problem: PAIN - ADULT  Goal: Verbalizes/displays adequate comfort level or patient's stated pain goal  Description: INTERVENTIONS:  - Encourage pt to monitor pain and request assistance  - Assess pain using appropriate pain scale  - Administer analgesics based on type and severity of pain and evaluate response  - Implement non-pharmacological measures as appropriate and evaluate response  - Consider cultural and social influences on pain and pain management  - Manage/alleviate anxiety  - Utilize distraction and/or relaxation techniques  - Monitor for opioid side effects  - Notify MD/LIP if interventions unsuccessful or patient reports new pain  - Anticipate increased pain with activity and pre-medicate as appropriate  Outcome: Progressing     Problem: RISK FOR INFECTION - ADULT  Goal: Absence of fever/infection during anticipated  neutropenic period  Description: INTERVENTIONS  - Monitor WBC  - Administer growth factors as ordered  - Implement neutropenic guidelines  Outcome: Progressing     Problem: SAFETY ADULT - FALL  Goal: Free from fall injury  Description: INTERVENTIONS:  - Assess pt frequently for physical needs  - Identify cognitive and physical deficits and behaviors that affect risk of falls.  - [place] fall precautions as indicated by assessment.  - Educate pt/family on patient safety including physical limitations  - Instruct pt to call for assistance with activity based on assessment  - Modify environment to reduce risk of injury  - Provide assistive devices as appropriate  - Consider OT/PT consult to assist with strengthening/mobility  - Encourage toileting schedule  Outcome: Progressing     Problem: GASTROINTESTINAL - ADULT  Goal: Minimal or absence of nausea and vomiting  Description: INTERVENTIONS:  - Maintain adequate hydration with IV or PO as ordered and tolerated  - Nasogastric tube to low intermittent suction as ordered  - Evaluate effectiveness of ordered antiemetic medications  - Provide nonpharmacologic comfort measures as appropriate  - Advance diet as tolerated, if ordered  - Obtain nutritional consult as needed  - Evaluate fluid balance  Outcome: Progressing  Goal: Maintains or returns to baseline bowel function  Description: INTERVENTIONS:  - Assess bowel function  - Maintain adequate hydration with IV or PO as ordered and tolerated  - Evaluate effectiveness of GI medications  - Encourage mobilization and activity  - Obtain nutritional consult as needed  - Establish a toileting routine/schedule  - Consider collaborating with pharmacy to review patient's medication profile  Outcome: Progressing     Problem: SKIN/TISSUE INTEGRITY - ADULT  Goal: Skin integrity remains intact  Description: INTERVENTIONS  - Assess and document risk factors for pressure ulcer development  - Assess and document skin integrity  -  Monitor for areas of redness and/or skin breakdown  - Initiate interventions, skin care algorithm/standards of care as needed  Outcome: Progressing  Goal: Incision(s), wounds(s) or drain site(s) healing without S/S of infection  Description: INTERVENTIONS:  - Assess and document risk factors for pressure ulcer development  - Assess and document skin integrity  - Assess and document dressing/incision, wound bed, drain sites and surrounding tissue  - Implement wound care per orders  - Initiate isolation precautions as appropriate  - Initiate Pressure Ulcer prevention bundle as indicated  Outcome: Progressing  Goal: Oral mucous membranes remain intact  Description: INTERVENTIONS  - Assess oral mucosa and hygiene practices  - Implement preventative oral hygiene regimen  - Implement oral medicated treatments as ordered  Outcome: Progressing     Problem: MUSCULOSKELETAL - ADULT  Goal: Return mobility to safest level of function  Description: INTERVENTIONS:  - Assess patient stability and activity tolerance for standing, transferring and ambulating w/ or w/o assistive devices  - Assist with transfers and ambulation using safe patient handling equipment as needed  - Ensure adequate protection for wounds/incisions during mobilization  - Obtain PT/OT consults as needed  - Advance activity as appropriate  - Communicate ordered activity level and limitations with patient/family  Outcome: Progressing  Goal: Maintain proper alignment of affected body part  Description: INTERVENTIONS:  - Support and protect limb and body alignment per provider's orders  - Instruct and reinforce with patient and family use of appropriate assistive device and precautions (e.g. spinal or hip dislocation precautions)  Outcome: Progressing     Patient is presently is presently resting in the bed. Alert x 4. Vital signs taken and stable. Fall risk precautions are followed at all times. Patient is turn and repositioned every 2 hours. Prompt care is given  to incontinence. Kept clean and dry. Dressing changes are done to bilateral lower extremities, and right upper back per orders. Purewick in place to suction and draining yellow urine. Patient currently on clear liquid diet. Patient is medicated as needed for pain or discomfort. Intravenous fluids infusing and tolerated. Call light within reach at all times. Bed alarm in use at all times. Patient was seen by Physical therapist on today per eval.

## 2024-10-31 NOTE — PROGRESS NOTES
[facility]  part of [facility]  Hospitalist Progress Note     Sheri Garzon Patient Status:  Inpatient    1960  64 year old CSN [id number]   Location 546/546-A Attending John Hinds MD   Hosp Day # 4 PCP TERI MCKENNA MD     Assessment & Plan:   ----------------------------------  Nausea/vomiting.   CT scan unremarkable.  -Antiemetics as needed  -Gentle IV fluids  -Advance diet as tolerated  -PPI    Odynophagia.  Abnormal esophagram  -GI consult  -Hold oral medications for now  -EGD 10/31    Right lower extremity ulcer.  Possible overlying cellulitis on right foot dorsum possible.  Ulceration on the back of the right lower leg does not appear infected.  White count is normal.  Afebrile.  -Continue vancomycin for now  -Cxs show MRSA sensitive to bactrim  -outpt vascular evaluation if fails to heal    Left foot pain.  Now complaining of moderate pain in the left fourth and fifth digits.  No injury.  The toes are warm, well-perfused.  Sensation intact.  -X-rays neg, prob OA    Hypoglycemia.  Due to poor oral intake.  -D10  -Accu-Cheks  -Dietitian    Other problems  Metabolic acidosis  Anxiety  Asthma  Adrenal insufficiency  GERD  Hypertension  Dyslipidemia  History of coronary artery disease  Rheumatoid arthritis  Sleep apnea    DVT Mechanical Prophylaxis:     Early ambuation  DVT Pharmacologic Prophylaxis   Medication    [Held by provider] heparin (Porcine) 5000 UNIT/ML injection 5,000 Units      DVT Pharmacologic prophylaxis: Aspirin 162 mg       code status: full  dispo: SULTANA  If applicable, malnutrition status at bottom of note    I personally reviewed the available laboratories, imaging including. I discussed/will discuss the case with consultants. I ordered laboratories and/or radiographic studies. I adjusted medications as detailed above.  Medical decision making high, risk is high.  Discussed with family at bedside    Subjective:   ----------------------------------  No new  complaints.  Left foot pain improved.  Still has swallowing difficulties.  Agreeable to EGD.      Objective:   Chief Complaint:   Chief Complaint   Patient presents with    Nausea/Vomiting/Diarrhea     ----------------------------------  Temp:  [97.1 °F (36.2 °C)-98.1 °F (36.7 °C)] 97.6 °F (36.4 °C)  Pulse:  [76-80] 80  Resp:  [18] 18  BP: (105-127)/(66-82) 105/82  SpO2:  [98 %-100 %] 99 %  Gen: A+Ox3.  No distress.   HEENT: NCAT, neck supple, no carotid bruit.  CV: RRR, S1S2, and intact distal pulses. No gallop, rub, murmur.  Pulm: Effort and breath sounds normal. No distress, wheezes, rales, rhonchi.  Abd: Soft, NTND, BS normal, no mass, no HSM, no rebound/guarding.   Neuro: Normal reflexes, CN. Sensory/motor exams grossly normal deficit.   MS: No joint effusions.  No peripheral edema.  Right lower extremity with 2 ulcerations.  1 on the lower leg posteriorly.  Another on the dorsum of the right foot.  No surrounding erythema.  No purulence expressed.  Minimal tenderness.  The feet are well-perfused, warm.  Sensation intact  Skin: Skin is warm and dry. No rashes, erythema, diaphoresis.   Psych: Normal mood and affect. Calm, cooperative    Labs:  Lab Results   Component Value Date    HGB 9.8 (L) 10/31/2024    WBC 8.6 10/31/2024    .0 10/31/2024     10/31/2024    K 3.3 (L) 10/31/2024    CREATSERUM 0.56 10/31/2024    AST 23 10/30/2024    ALT 31 10/30/2024            potassium chloride  40 mEq Intravenous Once    sucralfate  1 g Oral TID AC and HS (2200)    [Held by provider] vancomycin  15 mg/kg Intravenous Q24H    predniSONE  10 mg Oral BID    aspirin  81 mg Oral Daily    [Held by provider] clopidogrel  75 mg Oral Daily    docusate sodium  100 mg Oral BID    hydroxychloroquine  200 mg Oral BID    lamoTRIgine  50 mg Oral BID    metoprolol tartrate  12.5 mg Oral BID    oxybutynin ER  10 mg Oral Daily    QUEtiapine  50 mg Oral Nightly    [Held by provider] heparin  5,000 Units Subcutaneous Q8H F[place]ON     pantoprazole  40 mg Intravenous Q12H       acetaminophen    morphINE    albuterol    [DISCONTINUED] acetaminophen **OR** HYDROcodone-acetaminophen **OR** HYDROcodone-acetaminophen    melatonin    ondansetron    prochlorperazine      Dietitian Malnutrition Assessment    Evaluation for Malnutrition: Criteria for non-severe malnutrition diagnosis-         acute illness/injury related to Wt loss 5% in 1 month., Energy intake less than 75% for greater than 7 days.         RD Malnutrition Care Plan: Encouraged increased PO intake., Intiated ONS (oral nutritional supplements).    Body Fat/Muscle Mass: no wasting noted, malnutrition related to PO intake and weight loss        Physician Assessment     Patient has a diagnosis of moderate malnutrition

## 2024-10-31 NOTE — PLAN OF CARE
Patient received in bed from endo department. Alert x 4. Vital signs taken and stable. Intravenous fluids infusing and tolerated. Call light within reach at all times. Sister present at bedside. Patient made comfortable in the bed.

## 2024-10-31 NOTE — PHYSICAL THERAPY NOTE
PHYSICAL THERAPY TREATMENT NOTE - INPATIENT     Room Number: 546/546-A       Presenting Problem: cellulitis of right lower extremity       Problem List  Principal Problem:    Cellulitis of right lower extremity  Active Problems:    Nausea and vomiting    Episodic mood disorder (HCC)    Odynophagia      PHYSICAL THERAPY ASSESSMENT   Patient demonstrates fair progress this session, goals  remain in progress.      Patient is requiring moderate assist as a result of the following impairments: decreased functional strength, decreased endurance/aerobic capacity, pain, impaired standing balance, impaired coordination, decreased muscular endurance, and medical status.     Patient continues to function below baseline with bed mobility, transfers, gait, maintaining seated position, standing prolonged periods, and performing household tasks.  Next session anticipate patient to progress bed mobility, transfers, gait, maintaining seated position, standing prolonged periods, and performing household tasks.  Physical Therapy will continue to follow patient for duration of hospitalization.    Patient continues to benefit from continued skilled PT services: to promote return to prior level of function and safety with continuous assistance and gradual rehabilitative therapy .    PLAN DURING HOSPITALIZATION  Nursing Mobility Recommendation : 1 Assist  PT Device Recommendation: Rolling walker  PT Treatment Plan: Bed mobility;Body mechanics;Endurance;Energy conservation;Patient education;Gait training;Strengthening;Transfer training;Balance training;Range of motion  Frequency (Obs): 3-5x/week     SUBJECTIVE  I feel sore in the RLE with movement with standing it hurts in the knee and the foot. I just got pain medication so this is a good time for me to get up for my dinner.     OBJECTIVE  Precautions: Bed/chair alarm    WEIGHT BEARING RESTRICTION       PAIN ASSESSMENT   Ratin  Location: BLE  Management Techniques: Activity  promotion;Body mechanics;Breathing techniques;Relaxation;Repositioning    BALANCE  Static Sitting: Good  Dynamic Sitting: Fair +  Static Standing: Poor +  Dynamic Standing: Poor    ACTIVITY TOLERANCE                          O2 WALK       AM-PAC '6-Clicks' INPATIENT SHORT FORM - BASIC MOBILITY  How much difficulty does the patient currently have...  Patient Difficulty: Turning over in bed (including adjusting bedclothes, sheets and blankets)?: A Little   Patient Difficulty: Sitting down on and standing up from a chair with arms (e.g., wheelchair, bedside commode, etc.): A Little   Patient Difficulty: Moving from lying on back to sitting on the side of the bed?: A Little   How much help from another person does the patient currently need...   Help from Another: Moving to and from a bed to a chair (including a wheelchair)?: A Lot   Help from Another: Need to walk in hospital room?: A Lot   Help from Another: Climbing 3-5 steps with a railing?: A Lot     AM-PAC Score:  Raw Score: 15   Approx Degree of Impairment: 57.7%   Standardized Score (AM-PAC Scale): 39.45   CMS Modifier (G-Code): CK    FUNCTIONAL ABILITY STATUS  Functional Mobility/Gait Assessment  Gait Assistance: Moderate assistance  Distance (ft): 3  Assistive Device: Rolling walker  Pattern: Shuffle (unsteady LE's weakness RLE antalgic with gait)  Rolling: moderate assist  Supine to Sit: moderate assist  Sit to Supine: moderate assist  Sit to Stand: moderate assist    Skilled Therapy Provided: Pt ed with bed mobility and transfers with RW with mod A. Pt ed with standing and SPT to a chair with 3 shuffling steps with mod a for balance. Pt reports RLE pain with movement in knee and foot. Pt was pre medicated for pain prior to PT session for optimal participation. Pt will benefit from GR with PT, OT as medical progress allows to regain her maximal PLOF.     The patient's Approx Degree of Impairment: 57.7% has been calculated based on documentation in the [facility]  '6 clicks' Inpatient Daily Activity Short Form.  Research supports that patients with this level of impairment may benefit from GR with PT, OT.    Final disposition will be made by interdisciplinary medical team.    THERAPEUTIC EXERCISES  Lower Extremity Ankle pumps  Heel slides  LAQ     Position Sitting & Standing       Patient End of Session: Up in chair;With  staff;Needs met;Call light within reach;RN aware of session/findings;All patient questions and concerns addressed;Alarm set    CURRENT GOALS   CURRENT GOALS  Goals to be met by: 11/4/24  Patient Goal Patient's self-stated goal is: none stated   Goal #1 Patient is able to demonstrate supine - sit EOB @ level: supervision      Goal #1   Current Status  Mod A    Goal #2 Patient is able to demonstrate transfers Sit to/from Stand at assistance level: supervision with walker - rolling      Goal #2  Current Status  Mod A with RW SPT to chair RLE pain   Goal #3 Patient is able to ambulate at least 15 feet with assist device: walker - rolling at assistance level: supervision   Goal #3   Current Status  Mod A with SPT to chair unsteady gait   Goal #4     Goal #4   Current Status     Goal #5 Patient to demonstrate independence with home activity/exercise instructions provided to patient in preparation for discharge.   Goal #5   Current Status  Ongoing     Gait Training: 15 minutes  Therapeutic Exercise: 8 minutes

## 2024-11-01 NOTE — PROGRESS NOTES
[facility]  part of [facility]  Hospitalist Progress Note     Sheri Garzon Patient Status:  Inpatient    1960  64 year old CSN [id number]   Location 546/546-A Attending John Hinds MD   Hosp Day # 5 PCP TERI MCKENNA MD     Assessment & Plan:   ----------------------------------  Nausea/vomiting.   CT scan unremarkable.  -Antiemetics as needed  -Gentle IV fluids, change to bicarb solution  -Advance diet as tolerated  -PPI    Odynophagia.  Abnormal esophagram  -GI consult  -Hold oral medications for now  -EGD delayed until  for plavix washout    Right lower extremity ulcer.  Possible overlying cellulitis on right foot dorsum possible.  Ulceration on the back of the right lower leg does not appear infected.  White count is normal.  Afebrile.  -Continue vancomycin for now  -Cxs show MRSA sensitive to bactrim  -outpt vascular evaluation if fails to heal    Left foot pain.  Now complaining of moderate pain in the left fourth and fifth digits.  No injury.  The toes are warm, well-perfused.  Sensation intact.  -X-rays neg, prob OA    Hypoglycemia.  Due to poor oral intake. Generally better  -Accu-Cheks  -Dietitian    Other problems  Metabolic acidosis  Anxiety  Asthma  Adrenal insufficiency  GERD  Hypertension  Dyslipidemia  History of coronary artery disease  Rheumatoid arthritis  Sleep apnea    DVT Mechanical Prophylaxis:     Early ambuation  DVT Pharmacologic Prophylaxis   Medication    heparin (Porcine) 5000 UNIT/ML injection 5,000 Units      DVT Pharmacologic prophylaxis: Aspirin 162 mg       code status: full  dispo: [facility]NA  If applicable, malnutrition status at bottom of note    I personally reviewed the available laboratories, imaging including. I discussed/will discuss the case with consultants. I ordered laboratories and/or radiographic studies. I adjusted medications as detailed above.  Medical decision making high, risk is high.     Subjective:    ----------------------------------  No new complaints.  Left foot pain improved.  Still has swallowing difficulties.  Agreeable to EGD, disappointed it was delayed      Objective:   Chief Complaint:   Chief Complaint   Patient presents with    Nausea/Vomiting/Diarrhea     ----------------------------------  Temp:  [97.6 °F (36.4 °C)-98.2 °F (36.8 °C)] 97.8 °F (36.6 °C)  Pulse:  [] 83  Resp:  [15-18] 18  BP: (105-123)/(75-88) 110/81  SpO2:  [98 %-100 %] 100 %  Gen: A+Ox3.  No distress.   HEENT: NCAT, neck supple, no carotid bruit.  CV: RRR, S1S2, and intact distal pulses. No gallop, rub, murmur.  Pulm: Effort and breath sounds normal. No distress, wheezes, rales, rhonchi.  Abd: Soft, NTND, BS normal, no mass, no HSM, no rebound/guarding.   Neuro: Normal reflexes, CN. Sensory/motor exams grossly normal deficit.   MS: No joint effusions.  No peripheral edema.  Right lower extremity with 2 ulcerations.  1 on the lower leg posteriorly.  Another on the dorsum of the right foot.  No surrounding erythema.  No purulence expressed.  Minimal tenderness.  The feet are well-perfused, warm.  Sensation intact  Skin: Skin is warm and dry. No rashes, erythema, diaphoresis.   Psych: Normal mood and affect. Calm, cooperative    Labs:  Lab Results   Component Value Date    HGB 9.7 (L) 11/01/2024    WBC 7.7 11/01/2024    .0 11/01/2024     11/01/2024    K 4.0 11/01/2024    K 4.0 11/01/2024    CREATSERUM 0.49 (L) 11/01/2024    AST 23 10/30/2024    ALT 31 10/30/2024            vancomycin  750 mg Intravenous Q24H    sucralfate  1 g Oral TID AC and HS (2200)    predniSONE  10 mg Oral BID    aspirin  81 mg Oral Daily    [Held by provider] clopidogrel  75 mg Oral Daily    docusate sodium  100 mg Oral BID    hydroxychloroquine  200 mg Oral BID    lamoTRIgine  50 mg Oral BID    metoprolol tartrate  12.5 mg Oral BID    oxybutynin ER  10 mg Oral Daily    QUEtiapine  50 mg Oral Nightly    heparin  5,000 Units Subcutaneous Q8H  FLALON    pantoprazole  40 mg Intravenous Q12H       acetaminophen    morphINE    albuterol    [DISCONTINUED] acetaminophen **OR** HYDROcodone-acetaminophen **OR** HYDROcodone-acetaminophen    melatonin    ondansetron    prochlorperazine      Dietitian Malnutrition Assessment    Evaluation for Malnutrition: Criteria for non-severe malnutrition diagnosis-         acute illness/injury related to Wt loss 5% in 1 month., Energy intake less than 75% for greater than 7 days.         RD Malnutrition Care Plan: Encouraged increased PO intake., Intiated ONS (oral nutritional supplements).    Body Fat/Muscle Mass: no wasting noted, malnutrition related to PO intake and weight loss        Physician Assessment     Patient has a diagnosis of moderate malnutrition

## 2024-11-01 NOTE — CM/SW NOTE
Pt's daughter notified CM that family is no longer interested in [facility] [place]. Daughter requested that referrals be sent to [facility] and [facility] in [place]. CM notified daughter that [facility] has already declined due to facility being OON w/patient's insurance. [facility] added to referral search. Weekend SW will need to confirm facility acceptance and request facility to submit for ins approval.    11/4/2024 at 1315: CM notified daughter Rod that [facility] in [place] did not respond for [facility] placement. List of remains alternative facilities emailed to Rod at [email or web address] per her request. Rod notified CM that she will likely choose [facility] but will tour on Tuesday to confirm.    MDO received for [facility] [place]. Palliative referral sent to [facility] in [place] since provider follows patient's at [facility].    Plan: [facility] w/[facility] pending facility choice, [facility] acceptance, and medical clearance.    Ashutosh Christina, VINICIUSN    [phone]

## 2024-11-01 NOTE — WOUND PROGRESS NOTE
Attempted to follow up on wound dressings. Bedside RN has already completed dressing change for today. Will continue to follow. Pt planned for EGD tomorrow.

## 2024-11-01 NOTE — PAYOR COMM NOTE
--------------  CONTINUED STAY REVIEW    Payor: [employer]  Subscriber #:  [id number]  Authorization Number: [id number]    Admit date: 10/27/24  Admit time: 11:57 PM    11/1/24    Assessment & Plan:   Nausea/vomiting.   CT scan unremarkable.  -Antiemetics as needed  -Gentle IV fluids, change to bicarb solution  -Advance diet as tolerated  -PPI     Odynophagia.  Abnormal esophagram  -GI consult  -Hold oral medications for now  -EGD delayed until 11/2 for plavix washout     Right lower extremity ulcer.  Possible overlying cellulitis on right foot dorsum possible.  Ulceration on the back of the right lower leg does not appear infected.  White count is normal.  Afebrile.  -Continue vancomycin for now  -Cxs show MRSA sensitive to bactrim  -outpt vascular evaluation if fails to heal     Left foot pain.  Now complaining of moderate pain in the left fourth and fifth digits.  No injury.  The toes are warm, well-perfused.  Sensation intact.  -X-rays neg, prob OA     Hypoglycemia.  Due to poor oral intake. Generally better  -Accu-Cheks  -Dietitian     Other problems  Metabolic acidosis  Anxiety  Asthma  Adrenal insufficiency  GERD  Hypertension  Dyslipidemia  History of coronary artery disease  Rheumatoid arthritis  Sleep apnea     Subjective:    Left foot pain improved.  Still has swallowing difficulties.  Agreeable to EGD, disappointed it was delayed      CV: RRR, S1S2, and intact distal pulses. No gallop, rub, murmur.  Pulm: Effort and breath sounds normal. No distress, wheezes, rales, rhonchi.  Abd: Soft, NTND, BS normal, no mass, no HSM, no rebound/guarding.   Neuro:  Normal reflexes, CN. Sensory/motor exams grossly normal deficit.   MS: No joint effusions.  No peripheral edema.  Right lower extremity with 2 ulcerations.  1 on the lower leg posteriorly.  Another on the dorsum of the right foot.  No surrounding erythema.  No purulence expressed.  Minimal tenderness.  The feet are well-perfused, warm.   Sensation intact  Skin: Skin is warm and dry. No rashes, erythema, diaphoresis.   Psych:   Normal mood and affect. Calm, cooperative     Lab Results   Component Value Date     HGB 9.7 (L) 11/01/2024     WBC 7.7 11/01/2024     .0 11/01/2024      11/01/2024     K 4.0 11/01/2024     K 4.0 11/01/2024     CREATSERUM 0.49 (L) 11/01/2024       vancomycin  750 mg Intravenous Q24H    sucralfate  1 g Oral TID AC and HS (2200)    predniSONE  10 mg Oral BID    aspirin  81 mg Oral Daily    [Held by provider] clopidogrel  75 mg Oral Daily    docusate sodium  100 mg Oral BID    hydroxychloroquine  200 mg Oral BID    lamoTRIgine  50 mg Oral BID    metoprolol tartrate  12.5 mg Oral BID    oxybutynin ER  10 mg Oral Daily    QUEtiapine  50 mg Oral Nightly    heparin  5,000 Units Subcutaneous Q8H FALLON    pantoprazole  40 mg Intravenous Q12H        MEDICATIONS ADMINISTERED IN LAST 1 DAY:  acetaminophen (Tylenol) 160 MG/5ML oral liquid 650 mg       Date Action Dose Route User    11/1/2024 1230 Given 650 mg Oral Akua Velasquez RN    10/31/2024 1546 Given 650 mg Oral Akua Velasquez RN          aspirin chewable tab 81 mg       Date Action Dose Route User    11/1/2024 0907 Given 81 mg Oral Akua Velasquez RN          dextrose 10% infusion       Date Action Dose Route User    11/1/2024 0209 New Bag (none) Intravenous Bethany Eller RN          heparin (Porcine) 5000 UNIT/ML injection 5,000 Units       Date Action Dose Route User    11/1/2024 1343 Given 5,000 Units Subcutaneous (Right Lower Abdomen) Akua Velasquez RN          hydroxychloroquine (Plaquenil) tab 200 mg       Date Action Dose Route User    11/1/2024 0907 Given 200 mg Oral Akua Velasquez RN    10/31/2024 2137 Given 200 mg Oral Bethany Eller RN          lamoTRIgine (LaMICtal) tab 50 mg       Date Action Dose Route User    11/1/2024 0906 Given 50 mg Oral Akua Velasquez RN    10/31/2024 2133 Given 50 mg Oral Bethany Eller, JULIET          metoprolol  tartrate (Lopressor) partial tab 12.5 mg       Date Action Dose Route User    11/1/2024 0906 Given 12.5 mg Oral Akua Velasquez RN    10/31/2024 2134 Given 12.5 mg Oral Bethany Eller RN          morphINE PF 2 MG/ML injection 2 mg       Date Action Dose Route User    11/1/2024 0908 Given 2 mg Intravenous Akua Velasquez RN    10/31/2024 2134 Given 2 mg Intravenous Bethany Eller RN          ondansetron (Zofran) 4 MG/2ML injection 4 mg       Date Action Dose Route User    10/31/2024 1815 Given 4 mg Intravenous Georgie Vernon RN          oxybutynin ER (Ditropan-XL) 24 hr tab 10 mg       Date Action Dose Route User    11/1/2024 0907 Given 10 mg Oral Akua Velasquez RN          pantoprazole (Protonix) 40 mg in sodium chloride 0.9% PF 10 mL IV push       Date Action Dose Route User    11/1/2024 1343 Given 40 mg Intravenous Akua Velasquez RN    11/1/2024 0209 Given 40 mg Intravenous Bethany Eller RN    10/31/2024 1404 Given 40 mg Intravenous Akua Velasquez RN          predniSONE (Deltasone) tab 10 mg       Date Action Dose Route User    11/1/2024 0907 Given 10 mg Oral Akua Velasquez RN    10/31/2024 2137 Given 10 mg Oral Bethany Eller RN       sodium bicarbonate 50 mEq in dextrose 5% 1,050 mL infusion       Date Action Dose Route User    11/1/2024 0907 New Bag 50 mEq Intravenous Akua Velasquez RN          sucralfate (Carafate) 1 GM/10ML oral suspension 1 g       Date Action Dose Route User    11/1/2024 1230 Given 1 g Oral Akua Velasquez RN    11/1/2024 0601 Given by Other 1 g Oral Bethany Eller RN    10/31/2024 2215 Given 1 g Oral Bethany Eller RN    10/31/2024 1815 Given 1 g Oral Georgie Vernon RN          vancomycin (Vancocin) 750 mg in sodium chloride 0.9% 250 mL IVPB-ADDV       Date Action Dose Route User    11/1/2024 0907 New Bag 750 mg Intravenous Akua Velasquez, RN            Vitals (last day)       Date/Time Temp Pulse Resp BP SpO2 Weight O2 Device O2 Flow  Rate (L/min) [facility]    11/01/24 1342 98.2 °F (36.8 °C) 76 18 107/76 92 % -- None (Room air) --     11/01/24 0906 97.8 °F (36.6 °C) 83 18 110/81 100 % -- None (Room air) --     10/31/24 1236 98.2 °F (36.8 °C) 80 18 123/88 98 % -- None (Room air) --     10/31/24 1100 98 °F (36.7 °C) 95 16 121/86 100 % -- None (Room air) -- URIEL

## 2024-11-01 NOTE — PROGRESS NOTES
[facility]     Gastroenterology Progress Note    Sheri Garzon Patient Status:  Inpatient    1960 MRN [id number]   Location [facility] 5SW/SE Attending John Hinds MD   Hosp Day # 5 PCP TERI MCKENNA MD       Subjective:   Pt resting comfortably in bed  Still having difficult swallowing pills with regurgitation/vomiting after attempting  No ABD pain, diarrhea  No fever, chills  Objective:   Blood pressure 110/81, pulse 83, temperature 97.8 °F (36.6 °C), temperature source Oral, resp. rate 18, height 5' 2\" (1.575 m), weight 164 lb 14.4 oz (74.8 kg), SpO2 100%. Body mass index is 30.16 kg/m².    Gen: awake, alert patient, NAD  HEENT: EOMI, the sclera appears anicteric, oropharynx clear, mucus membranes appear moist  CV: RRR  Lung: no conversational dyspnea   Abdomen: soft NTND abdomen with NABS appreciated   Skin: dry, warm, no jaundice  Ext: no LE edema is evident  Neuro: Alert, oriented x4 and interactive  Psych: calm, cooperative    Assessment and Plan:   Sheri Garzon is a 64 year old female w/ PMHx of GERD, HTN, asthma, HLD, RA on plaquenil, CAD on ASA/PLAVIX who presents with intractable nausea and emesis and right leg wound.      #Nausea  #Dysphagia   -worsening symptoms over last 3 weeks including increase in GERD without PPI therapy  -last EGD 2024 without mass, ulcer or stricture, CT a/p with non specific gastric wall thickening   -barium esophogram with luminal narrowing/web-like stricture of the cervical esophagus measuring 4.5 mm with a large amount of reflux and HH.   -initial plan for EGD 10/31; however, canceled due to recent plavix use (10/28). Discussed that plavix should be held 5 days prior to elective dilation to decrease bleeding risk.   -conservative management with PPI therapy and carafate with meals.       Recommend:  -continue to hold plavix (last dose 10/28)  -Clear liquid diet  -IV PPI BID  -Carafate TID with meals  -consult placed to  SLP  -please try to change all pills to liquid formulations or IV formulations as pt is having significant difficulty swallowing and keeping pills down  -plan for EGD 11/2 (Saturday) with possible dilatation after plavix has been held 5 days     Case discussed with Randal Wynne MD and Akua CHUNG.    [facility]aret [facility], [facility] Gastroenterology  11/1/2024      Results:     Lab Results   Component Value Date    WBC 7.7 11/01/2024    HGB 9.7 (L) 11/01/2024    HCT 30.4 (L) 11/01/2024    .0 11/01/2024    CREATSERUM 0.49 (L) 11/01/2024    BUN <5 (L) 11/01/2024     11/01/2024    K 4.0 11/01/2024    K 4.0 11/01/2024     (H) 11/01/2024    CO2 14.0 (L) 11/01/2024     (H) 11/01/2024    CA 7.8 (L) 11/01/2024    ALB 2.2 (L) 10/30/2024    ALKPHO 331 (H) 10/30/2024    BILT 0.2 10/30/2024    TP 3.9 (L) 10/30/2024    AST 23 10/30/2024    ALT 31 10/30/2024    LIP 31 10/27/2024    DDIMER 1.69 (H) 07/23/2024    MG 2.0 10/30/2024    PHOS 2.9 08/21/2024    CK 50 08/27/2024    B12 748 08/21/2024    ETOH <3 09/05/2024       XR ESOPHAGUS DOUBLE CONTRAST (CPT=74221)    Result Date: 10/30/2024  CONCLUSION:  1. Luminal narrowing/web-like stricture cervical esophagus measuring 4.5 mm transverse.  Small hiatal hernia.  Large amount of gastroesophageal reflux.  Mucosal changes distal esophagus may reflect reflux esophagitis.  Recommend upper endoscopy. 2. Mildly abnormal esophageal motility with incomplete esophageal emptying. 3. No gastric outlet obstruction.  Suboptimal distention of the stomach limiting evaluation of the lumen    Dictated by (CST): Roldan Edwards MD on 10/30/2024 at 11:18 AM     Finalized by (CST): Roldan Edwards MD on 10/30/2024 at 11:25 AM

## 2024-11-01 NOTE — PLAN OF CARE
Problem: Patient Centered Care  Goal: Patient preferences are identified and integrated in the patient's plan of care  Description: Interventions:  - What would you like us to know as we care for you? \"I fell twice at rehab\"  - Provide timely, complete, and accurate information to patient/family  - Incorporate patient and family knowledge, values, beliefs, and cultural backgrounds into the planning and delivery of care  - Encourage patient/family to participate in care and decision-making at the level they choose  - Honor patient and family perspectives and choices  Outcome: Progressing     Problem: Patient/Family Goals  Goal: Patient/Family Long Term Goal  Description: Patient's Long Term Goal: Discharge home    Interventions:  - IVF  -Wound care  -IV antibiotics  -PT/OT  - See additional Care Plan goals for specific interventions  Outcome: Progressing  Goal: Patient/Family Short Term Goal  Description: Patient's Short Term Goal: Regain Strength    Interventions:   - PT/OT  - Dietary consult  - See additional Care Plan goals for specific interventions  Outcome: Progressing     Problem: PAIN - ADULT  Goal: Verbalizes/displays adequate comfort level or patient's stated pain goal  Description: INTERVENTIONS:  - Encourage pt to monitor pain and request assistance  - Assess pain using appropriate pain scale  - Administer analgesics based on type and severity of pain and evaluate response  - Implement non-pharmacological measures as appropriate and evaluate response  - Consider cultural and social influences on pain and pain management  - Manage/alleviate anxiety  - Utilize distraction and/or relaxation techniques  - Monitor for opioid side effects  - Notify MD/LIP if interventions unsuccessful or patient reports new pain  - Anticipate increased pain with activity and pre-medicate as appropriate  Outcome: Progressing     Problem: RISK FOR INFECTION - ADULT  Goal: Absence of fever/infection during anticipated  neutropenic period  Description: INTERVENTIONS  - Monitor WBC  - Administer growth factors as ordered  - Implement neutropenic guidelines  Outcome: Progressing     Problem: SAFETY ADULT - FALL  Goal: Free from fall injury  Description: INTERVENTIONS:  - Assess pt frequently for physical needs  - Identify cognitive and physical deficits and behaviors that affect risk of falls.  - Amity fall precautions as indicated by assessment.  - Educate pt/family on patient safety including physical limitations  - Instruct pt to call for assistance with activity based on assessment  - Modify environment to reduce risk of injury  - Provide assistive devices as appropriate  - Consider OT/PT consult to assist with strengthening/mobility  - Encourage toileting schedule  Outcome: Progressing     Problem: GASTROINTESTINAL - ADULT  Goal: Minimal or absence of nausea and vomiting  Description: INTERVENTIONS:  - Maintain adequate hydration with IV or PO as ordered and tolerated  - Nasogastric tube to low intermittent suction as ordered  - Evaluate effectiveness of ordered antiemetic medications  - Provide nonpharmacologic comfort measures as appropriate  - Advance diet as tolerated, if ordered  - Obtain nutritional consult as needed  - Evaluate fluid balance  Outcome: Progressing  Goal: Maintains or returns to baseline bowel function  Description: INTERVENTIONS:  - Assess bowel function  - Maintain adequate hydration with IV or PO as ordered and tolerated  - Evaluate effectiveness of GI medications  - Encourage mobilization and activity  - Obtain nutritional consult as needed  - Establish a toileting routine/schedule  - Consider collaborating with pharmacy to review patient's medication profile  Outcome: Progressing     Problem: SKIN/TISSUE INTEGRITY - ADULT  Goal: Skin integrity remains intact  Description: INTERVENTIONS  - Assess and document risk factors for pressure ulcer development  - Assess and document skin integrity  -  Monitor for areas of redness and/or skin breakdown  - Initiate interventions, skin care algorithm/standards of care as needed  Outcome: Progressing  Goal: Incision(s), wounds(s) or drain site(s) healing without S/S of infection  Description: INTERVENTIONS:  - Assess and document risk factors for pressure ulcer development  - Assess and document skin integrity  - Assess and document dressing/incision, wound bed, drain sites and surrounding tissue  - Implement wound care per orders  - Initiate isolation precautions as appropriate  - Initiate Pressure Ulcer prevention bundle as indicated  Outcome: Progressing  Goal: Oral mucous membranes remain intact  Description: INTERVENTIONS  - Assess oral mucosa and hygiene practices  - Implement preventative oral hygiene regimen  - Implement oral medicated treatments as ordered  Outcome: Progressing     Problem: MUSCULOSKELETAL - ADULT  Goal: Return mobility to safest level of function  Description: INTERVENTIONS:  - Assess patient stability and activity tolerance for standing, transferring and ambulating w/ or w/o assistive devices  - Assist with transfers and ambulation using safe patient handling equipment as needed  - Ensure adequate protection for wounds/incisions during mobilization  - Obtain PT/OT consults as needed  - Advance activity as appropriate  - Communicate ordered activity level and limitations with patient/family  Outcome: Progressing  Goal: Maintain proper alignment of affected body part  Description: INTERVENTIONS:  - Support and protect limb and body alignment per provider's orders  - Instruct and reinforce with patient and family use of appropriate assistive device and precautions (e.g. spinal or hip dislocation precautions)  Outcome: Progressing

## 2024-11-01 NOTE — BH PROGRESS NOTE
Writer provided referrals for psychiatrists.    Referrals for Psychiatry:     [facility]  Multiple locations  [phone]    [facility]  [address] [address]   [phone]    [facility]  Barbara SMITH  [address]  [address]  [place] [address]   [phone]

## 2024-11-01 NOTE — PROGRESS NOTES
[facility] Pharm[facility] Dosing Service      Follow Up Pharmacokinetic Consult for Vancomycin Dosing     Sheri Garzon is a 64 year old female who is receiving vancomycin therapy for cellulitis. Patient is on day 6 of vancomycin and is currently receiving 1250 mg IV every 24 hours. The current treatment and monitoring approach is steady state AUC strategy.        Weight and Temperature:    Wt Readings from Last 1 Encounters:   10/30/24 74.8 kg (164 lb 14.4 oz)         Temp Readings from Last 1 Encounters:   10/31/24 97.6 °F (36.4 °C) (Oral)      Labs:   Recent Labs   Lab 10/27/24  1838 10/29/24  1022 10/31/24  0539   CREATSERUM 0.77 0.58 0.56      Estimated Creatinine Clearance: 80.3 mL/min (based on SCr of 0.56 mg/dL).     Recent Labs   Lab 10/27/24  1838 10/29/24  1022 10/31/24  0539   WBC 9.6 9.2 8.6        Vancomycin Levels:  Lab Results   Component Value Date/Time    VANCT 21.2 (H) 10/31/2024 10:25 PM    VANCP 42.7 10/31/2024 02:19 AM       Corresponding 24 h-AUC:  759 mg-h/L     The Pharmacokinetic Target is:     to 600 mg-h/L and trough <=15 mg/L    Renal Dosing Considerations:    None     Assessment/Plan:   Maintenance Regimen: Adjust vancomycin to 750 mg IV every 24 hours    Monitorin) Plan for vancomycin trough to be obtained  prior to 4rth dose  Pt specific targert trough= 10.9- 15    2) Pharmacy will order SCr as clinically indicated to assess renal function.    3) Pharmacy will monitor for toxicity and efficacy, adjust vancomycin dose and/or frequency, and order vancomycin levels as appropriate per the Pharmacy and Therapeutics Committee approved protocol until discontinuation of the medication.       We appreciate the opportunity to assist in the care of this patient.     Fran Noel, PharmD  10/31/2024  11:24 PM  [place]  Pharmacy Extension: [phone]

## 2024-11-01 NOTE — DISCHARGE INSTRUCTIONS
Referrals for Psychiatry:     [facility]  Multiple locations  [phone]    [facility]  [address] [address]   [phone]    [facility]  Barbara SMITH  [address]  [address]  [place] [address]   [phone]

## 2024-11-01 NOTE — PLAN OF CARE
Problem: Patient Centered Care  Goal: Patient preferences are identified and integrated in the patient's plan of care  Description: Interventions:  - What would you like us to know as we care for you? \"I fell twice at rehab\"  - Provide timely, complete, and accurate information to patient/family  - Incorporate patient and family knowledge, values, beliefs, and cultural backgrounds into the planning and delivery of care  - Encourage patient/family to participate in care and decision-making at the level they choose  - Honor patient and family perspectives and choices  Outcome: Progressing     Problem: Patient/Family Goals  Goal: Patient/Family Long Term Goal  Description: Patient's Long Term Goal: Discharge home    Interventions:  - IVF  -Wound care  -IV antibiotics  -PT/OT  - See additional Care Plan goals for specific interventions  Outcome: Progressing  Goal: Patient/Family Short Term Goal  Description: Patient's Short Term Goal: Regain Strength    Interventions:   - PT/OT  - Dietary consult  - See additional Care Plan goals for specific interventions  Outcome: Progressing     Problem: PAIN - ADULT  Goal: Verbalizes/displays adequate comfort level or patient's stated pain goal  Description: INTERVENTIONS:  - Encourage pt to monitor pain and request assistance  - Assess pain using appropriate pain scale  - Administer analgesics based on type and severity of pain and evaluate response  - Implement non-pharmacological measures as appropriate and evaluate response  - Consider cultural and social influences on pain and pain management  - Manage/alleviate anxiety  - Utilize distraction and/or relaxation techniques  - Monitor for opioid side effects  - Notify MD/LIP if interventions unsuccessful or patient reports new pain  - Anticipate increased pain with activity and pre-medicate as appropriate  Outcome: Progressing     Problem: RISK FOR INFECTION - ADULT  Goal: Absence of fever/infection during anticipated  neutropenic period  Description: INTERVENTIONS  - Monitor WBC  - Administer growth factors as ordered  - Implement neutropenic guidelines  Outcome: Progressing     Problem: SAFETY ADULT - FALL  Goal: Free from fall injury  Description: INTERVENTIONS:  - Assess pt frequently for physical needs  - Identify cognitive and physical deficits and behaviors that affect risk of falls.  - [place] fall precautions as indicated by assessment.  - Educate pt/family on patient safety including physical limitations  - Instruct pt to call for assistance with activity based on assessment  - Modify environment to reduce risk of injury  - Provide assistive devices as appropriate  - Consider OT/PT consult to assist with strengthening/mobility  - Encourage toileting schedule  Outcome: Progressing     Problem: GASTROINTESTINAL - ADULT  Goal: Minimal or absence of nausea and vomiting  Description: INTERVENTIONS:  - Maintain adequate hydration with IV or PO as ordered and tolerated  - Nasogastric tube to low intermittent suction as ordered  - Evaluate effectiveness of ordered antiemetic medications  - Provide nonpharmacologic comfort measures as appropriate  - Advance diet as tolerated, if ordered  - Obtain nutritional consult as needed  - Evaluate fluid balance  Outcome: Progressing  Goal: Maintains or returns to baseline bowel function  Description: INTERVENTIONS:  - Assess bowel function  - Maintain adequate hydration with IV or PO as ordered and tolerated  - Evaluate effectiveness of GI medications  - Encourage mobilization and activity  - Obtain nutritional consult as needed  - Establish a toileting routine/schedule  - Consider collaborating with pharmacy to review patient's medication profile  Outcome: Progressing     Problem: SKIN/TISSUE INTEGRITY - ADULT  Goal: Skin integrity remains intact  Description: INTERVENTIONS  - Assess and document risk factors for pressure ulcer development  - Assess and document skin integrity  -  Monitor for areas of redness and/or skin breakdown  - Initiate interventions, skin care algorithm/standards of care as needed  Outcome: Progressing  Goal: Incision(s), wounds(s) or drain site(s) healing without S/S of infection  Description: INTERVENTIONS:  - Assess and document risk factors for pressure ulcer development  - Assess and document skin integrity  - Assess and document dressing/incision, wound bed, drain sites and surrounding tissue  - Implement wound care per orders  - Initiate isolation precautions as appropriate  - Initiate Pressure Ulcer prevention bundle as indicated  Outcome: Progressing  Goal: Oral mucous membranes remain intact  Description: INTERVENTIONS  - Assess oral mucosa and hygiene practices  - Implement preventative oral hygiene regimen  - Implement oral medicated treatments as ordered  Outcome: Progressing     Problem: MUSCULOSKELETAL - ADULT  Goal: Return mobility to safest level of function  Description: INTERVENTIONS:  - Assess patient stability and activity tolerance for standing, transferring and ambulating w/ or w/o assistive devices  - Assist with transfers and ambulation using safe patient handling equipment as needed  - Ensure adequate protection for wounds/incisions during mobilization  - Obtain PT/OT consults as needed  - Advance activity as appropriate  - Communicate ordered activity level and limitations with patient/family  Outcome: Progressing  Goal: Maintain proper alignment of affected body part  Description: INTERVENTIONS:  - Support and protect limb and body alignment per provider's orders  - Instruct and reinforce with patient and family use of appropriate assistive device and precautions (e.g. spinal or hip dislocation precautions)  Outcome: Progressing     Patient is presently resting in the bed. Alert x 4. Vital signs taken and stable. Patient is turn and repositioned every 2 hours for safety and comfort. Prompt care is given to incontinence. Patient is medicated  as needed for pain or discomfort. Intravenous fluids infusing and tolerated. Patient receives intravenous antibiotics per order. Dressing changes were done per doctor orders. Purewick in place and draining yellow urine. Patient will be nothing by mouth after midnight for EGD with possible biopsy and dilatation on tomorrow morning.

## 2024-11-02 ENCOUNTER — ANESTHESIA (OUTPATIENT)
Dept: ENDOSCOPY | Facility: HOSPITAL | Age: 64
End: 2024-11-02
Payer: COMMERCIAL

## 2024-11-02 ENCOUNTER — ANESTHESIA EVENT (OUTPATIENT)
Dept: ENDOSCOPY | Facility: HOSPITAL | Age: 64
End: 2024-11-02
Payer: COMMERCIAL

## 2024-11-02 PROBLEM — E46 MALNUTRITION (HCC): Status: ACTIVE | Noted: 2024-11-02

## 2024-11-02 PROBLEM — E46 MALNUTRITION (HCC): Status: ACTIVE | Noted: 2024-01-01

## 2024-11-02 RX ORDER — METOPROLOL TARTRATE 1 MG/ML
INJECTION, SOLUTION INTRAVENOUS AS NEEDED
Status: DISCONTINUED | OUTPATIENT
Start: 2024-11-02 | End: 2024-11-02 | Stop reason: SURG

## 2024-11-02 RX ORDER — LIDOCAINE HYDROCHLORIDE 10 MG/ML
INJECTION, SOLUTION EPIDURAL; INFILTRATION; INTRACAUDAL; PERINEURAL AS NEEDED
Status: DISCONTINUED | OUTPATIENT
Start: 2024-11-02 | End: 2024-11-02 | Stop reason: SURG

## 2024-11-02 RX ADMIN — LIDOCAINE HYDROCHLORIDE 50 MG: 10 INJECTION, SOLUTION EPIDURAL; INFILTRATION; INTRACAUDAL; PERINEURAL at 08:12:00

## 2024-11-02 RX ADMIN — METOPROLOL TARTRATE 2.5 MG: 1 INJECTION, SOLUTION INTRAVENOUS at 08:22:00

## 2024-11-02 NOTE — PHYSICAL THERAPY NOTE
PHYSICAL THERAPY TREATMENT NOTE - INPATIENT     Room Number: 546/546-A       Presenting Problem: cellulitis of right lower extremity       Problem List  Principal Problem:    Cellulitis of right lower extremity  Active Problems:    Nausea and vomiting    Episodic mood disorder (HCC)    Odynophagia    Malnutrition ([facility])      PHYSICAL THERAPY ASSESSMENT   Patient demonstrates limited progress this session, goals  remain in progress.      Patient is requiring moderate assist and maximum assist as a result of the following impairments: decreased functional strength, pain, impaired coordination, impaired motor planning, decreased muscular endurance, and medical status.     Patient continues to function below baseline with bed mobility, transfers, and gait.  Next session anticipate patient to progress bed mobility, transfers, gait, and maintaining seated position.  Physical Therapy will continue to follow patient for duration of hospitalization.    Patient continues to benefit from continued skilled PT services: to promote return to prior level of function and safety with continuous assistance and gradual rehabilitative therapy .    PLAN DURING HOSPITALIZATION  Nursing Mobility Recommendation : Lift Equipment  PT Device Recommendation: Rolling walker  PT Treatment Plan: Bed mobility;Body mechanics;Endurance;Patient education;Strengthening;Transfer training;Balance training  Frequency (Obs): 3-5x/week     SUBJECTIVE  I am having a pain in R knee.    OBJECTIVE  Precautions: Bed/chair alarm    WEIGHT BEARING RESTRICTION       PAIN ASSESSMENT   Ratin  Location: R knee and b heels  Management Techniques: Activity promotion;Body mechanics;Relaxation;Repositioning    BALANCE  Static Sitting: Fair  Dynamic Sitting: Fair -  Static Standing: Poor  Dynamic Standing: Dependent    ACTIVITY TOLERANCE                          O2 WALK       AM-PAC '6-Clicks' INPATIENT SHORT FORM - BASIC MOBILITY  How much difficulty does the patient  currently have...  Patient Difficulty: Turning over in bed (including adjusting bedclothes, sheets and blankets)?: A Little   Patient Difficulty: Sitting down on and standing up from a chair with arms (e.g., wheelchair, bedside commode, etc.): A Lot   Patient Difficulty: Moving from lying on back to sitting on the side of the bed?: A Lot   How much help from another person does the patient currently need...   Help from Another: Moving to and from a bed to a chair (including a wheelchair)?: A Lot   Help from Another: Need to walk in hospital room?: Total   Help from Another: Climbing 3-5 steps with a railing?: Total     AM-PAC Score:  Raw Score: 11   Approx Degree of Impairment: 72.57%   Standardized Score (AM-PAC Scale): 33.86   CMS Modifier (G-Code): CL    FUNCTIONAL ABILITY STATUS  Functional Mobility/Gait Assessment  Gait Assistance: Moderate assistance  Distance (ft): 3  Assistive Device: Rolling walker  Pattern: Shuffle (unsteady LE's weakness RLE antalgic with gait)  Rolling: moderate assist  Supine to Sit: maximum assist  Sit to Supine: maximum assist  Sit to Stand: maximum assist with RW not able to maintain upright posture with RW.    Skilled Therapy Provided: RN approved PT session, pt in supine and stated pain in b heels, R knee and R shin/ dressing done on R shin and b heels area/ Instructed pt on supine thera exs with b le's: pt needs AAROM with ble's R > LLE. Supine to sit with mod a to L side and noted swelling L arm. Sitting robert on EOB for 4-5 min to improve sitting robert. Pt needs occ cues to maintain upright position. Trail sit to stand from elevated bed level with RW 2 people, pt not able to stand up and [place] on RLE. Rest break and trail one more time, pt not able to sit to stand with RW. Pt needs to placed in supine with 2 people due pt sliding off from bed. Pt is dependent for bed mobility/reposition in supine. Placing soft boot on b feet and pillows for support. Pt left in supine with all needs  in reach and RN notify about findings and pt progress.    The patient's Approx Degree of Impairment: 72.57% has been calculated based on documentation in the [facility] '6 clicks' Inpatient Daily Activity Short Form.  Research supports that patients with this level of impairment may benefit from gradual rehabilitative therapy.  Final disposition will be made by interdisciplinary medical team.    THERAPEUTIC EXERCISES  Lower Extremity Ankle pumps  Hip AB/AD  Heel slides  Leg slides  Quad sets     Position Supine       Patient End of Session: Up in chair;With  staff;Needs met;Call light within reach;RN aware of session/findings;All patient questions and concerns addressed;Alarm set    CURRENT GOALS   Goals to be met by: 11/4/24  Patient Goal Patient's self-stated goal is: none stated   Goal #1 Patient is able to demonstrate supine - sit EOB @ level: supervision     Goal #1   Current Status Mod A to max A    Goal #2 Patient is able to demonstrate transfers Sit to/from Stand at assistance level: supervision with walker - rolling     Goal #2  Current Status Max A, not able to maintain upright posture with RW   Goal #3 Patient is able to ambulate at least 15 feet with assist device: walker - rolling at assistance level: supervision   Goal #3   Current Status Not able at this time   Goal #4    Goal #4   Current Status    Goal #5 Patient to demonstrate independence with home activity/exercise instructions provided to patient in preparation for discharge.   Goal #5   Current Status In progress   Goal #6        Therapeutic Activity: 16 minutes  Neuromuscular Re-education: 12 minutes

## 2024-11-02 NOTE — ANESTHESIA POSTPROCEDURE EVALUATION
Patient: Sheri Garzon    Procedure Summary       Date: 11/02/24 Room / Location: [facility] ENDOSCOPY 01 / EM ENDOSCOPY    Anesthesia Start: 0804 Anesthesia Stop:     Procedure: ESOPHAGOGASTRODUODENOSCOPY (EGD) Diagnosis: (cervical esophageal web)    Surgeons: Reggie Erwin MD Anesthesiologist: Romeo Brar MD    Anesthesia Type: MAC ASA Status: 3            Anesthesia Type: MAC    Vitals Value Taken Time   /56 11/02/24 0830   Temp  11/02/24 0830   Pulse 114 11/02/24 0830   Resp 16 11/02/24 0830   SpO2 100 % 11/02/24 0830       [facility] AN Post Evaluation:   Patient Evaluated in PACU  Patient Participation: complete - patient participated  Level of Consciousness: awake and sleepy but conscious  Pain Management: adequate  Airway Patency:patent  Dental exam unchanged from preop  Yes    Nausea/Vomiting: none  Cardiovascular Status: acceptable  Respiratory Status: acceptable  Postoperative Hydration acceptable      ROMEO BRAR MD  11/2/2024 8:30 AM

## 2024-11-02 NOTE — PROGRESS NOTES
Pt left arm is swollen. PICC line assessed, not infiltrated. Contacted MD - hold continuous fluids. Phosphate IV still running. Encouraged oral intake for management of hypoglycemia. Pt tolerated some PO medications.

## 2024-11-02 NOTE — PROGRESS NOTES
[facility]  part of [facility]  Hospitalist Progress Note     Sheri Garzon Patient Status:  Inpatient    1960  64 year old CSN [id number]   Location 546/546-A Attending John Hinds MD   Hosp Day # 6 PCP TERI MCKENNA MD     Assessment & Plan:   ----------------------------------  Nausea/vomiting.   CT scan unremarkable.  -Antiemetics as needed  -Gentle IV fluids, change to bicarb solution  -Advance diet as tolerated  -PPI    Odynophagia/esophageal web.  Abnormal esophagram  -GI consult  -Hold oral medications for now  -EGD  shows esophageal web which was dilated  -Diet as tolerated    Right lower extremity ulcer.  Possible overlying cellulitis on right foot dorsum possible.  Ulceration on the back of the right lower leg does not appear infected.  White count is normal.  Afebrile.  -Continue vancomycin for now  -Cxs show MRSA sensitive to bactrim  -outpt vascular evaluation if fails to heal    Left foot pain.  Now complaining of moderate pain in the left fourth and fifth digits.  No injury.  The toes are warm, well-perfused.  Sensation intact.  -X-rays neg, prob OA    Hypoglycemia.  Due to poor oral intake.  -Accu-Cheks  -Dietitian    Other problems  Metabolic acidosis  Anxiety  Asthma  Adrenal insufficiency  GERD  Hypertension  Dyslipidemia  History of coronary artery disease  Rheumatoid arthritis  Sleep apnea    DVT Mechanical Prophylaxis:     Early ambuation  DVT Pharmacologic Prophylaxis   Medication    [Held by provider] heparin (Porcine) 5000 UNIT/ML injection 5,000 Units      DVT Pharmacologic prophylaxis: Aspirin 162 mg       code status: full  dispo:  11/3, refusing SULTANA  If applicable, malnutrition status at bottom of note    I personally reviewed the available laboratories, imaging including. I discussed/will discuss the case with consultants. I ordered laboratories and/or radiographic studies. I adjusted medications as detailed above.  Medical decision making high,  risk is high.     Subjective:   ----------------------------------  Tolerated EGD.       Objective:   Chief Complaint:   Chief Complaint   Patient presents with    Nausea/Vomiting/Diarrhea     ----------------------------------  Temp:  [97.7 °F (36.5 °C)-98.2 °F (36.8 °C)] 98 °F (36.7 °C)  Pulse:  [] 110  Resp:  [13-36] 18  BP: (100-134)/(52-89) 102/83  SpO2:  [92 %-100 %] 98 %  Gen: A+Ox3.  No distress.   HEENT: NCAT, neck supple, no carotid bruit.  CV: RRR, S1S2, and intact distal pulses. No gallop, rub, murmur.  Pulm: Effort and breath sounds normal. No distress, wheezes, rales, rhonchi.  Abd: Soft, NTND, BS normal, no mass, no HSM, no rebound/guarding.   Neuro: Normal reflexes, CN. Sensory/motor exams grossly normal deficit.   MS: No joint effusions.  No peripheral edema.  Right lower extremity with 2 ulcerations.  1 on the lower leg posteriorly.  Another on the dorsum of the right foot.  No surrounding erythema.  No purulence expressed.  Minimal tenderness.  The feet are well-perfused, warm.  Sensation intact  Skin: Skin is warm and dry. No rashes, erythema, diaphoresis.   Psych: Normal mood and affect. Calm, cooperative    Labs:  Lab Results   Component Value Date    HGB 9.8 (L) 11/02/2024    WBC 8.9 11/02/2024    .0 11/02/2024     11/02/2024    K 3.8 11/02/2024    CREATSERUM 0.53 (L) 11/02/2024    AST 23 10/30/2024    ALT 31 10/30/2024            sodium phosphate  15 mmol Intravenous Once    sodium ferric gluconate  125 mg Intravenous Daily    vancomycin  750 mg Intravenous Q24H    sucralfate  1 g Oral TID AC and HS (2200)    predniSONE  10 mg Oral BID    aspirin  81 mg Oral Daily    [Held by provider] clopidogrel  75 mg Oral Daily    docusate sodium  100 mg Oral BID    hydroxychloroquine  200 mg Oral BID    lamoTRIgine  50 mg Oral BID    metoprolol tartrate  12.5 mg Oral BID    oxybutynin ER  10 mg Oral Daily    QUEtiapine  50 mg Oral Nightly    [Held by provider] heparin  5,000 Units  Subcutaneous Q8H FALLON    pantoprazole  40 mg Intravenous Q12H       dextrose    glucose **OR** glucose **OR** glucose-vitamin C **OR** dextrose **OR** glucose **OR** glucose **OR** glucose-vitamin C    acetaminophen    morphINE    albuterol    [DISCONTINUED] acetaminophen **OR** HYDROcodone-acetaminophen **OR** HYDROcodone-acetaminophen    melatonin    ondansetron    prochlorperazine      Dietitian Malnutrition Assessment    Evaluation for Malnutrition: Criteria for non-severe malnutrition diagnosis-         acute illness/injury related to Wt loss 5% in 1 month., Energy intake less than 75% for greater than 7 days.         RD Malnutrition Care Plan: Encouraged increased PO intake., Intiated ONS (oral nutritional supplements).    Body Fat/Muscle Mass: no wasting noted, malnutrition related to PO intake and weight loss        Physician Assessment     Patient has a diagnosis of moderate malnutrition

## 2024-11-02 NOTE — OPERATIVE REPORT
[facility] Endoscopy Report      Date of Procedure:  11/02/24        Preoperative Diagnosis:  1.  Dysphagia  2.  Abnormal esophagram (cervical esophageal web)      Postoperative Diagnosis:  1.  Cervical esophageal web(s)  2.  Small hiatal hernia      Procedure:    Esophagogastroduodenoscopy with biopsy and TTS balloon dilatation      Surgeon:  Reggie Erwin M.D.      Anesthesia:  Monitored anesthesia care  EBL:  Insignificant      Brief History:  This is a 64 year old female who presents with a few month history of dysphagia.  Endoscopy earlier this year (for an unrelated cause) was unremarkable.  An esophagram revealed a cervical esophageal web.  Upper endoscopy is being performed to evaluate the cervical esophagus and proceed with esophageal dilatation if feasible.  The rationale, nature and risks including esophageal perforation requiring surgery were discussed with the patient and her family preoperatively.      Technique:  After informed consent, the patient was placed in the left lateral recumbent position.  An Olympus adult HD gastroscope was inserted into the hypopharynx and advanced under direct vision into the esophagus, stomach and duodenum.  The endoscope was withdrawn and a retroflexed view of the gastric angulus, body, cardia and fundus was performed.  The instrument was straightened insufflated air and fluid were suctioned and the endoscope was withdrawn.  The procedure was well tolerated without immediate complication.    Findings:  There was at least #1 cervical esophageal web that resulted in slight luminal compromise at 17 cm.  Other more subtle webs immediately proximal to this point cannot be excluded.  With mild resistance the endoscope passed through the web.  The remainder of the esophagus was normal.  The GE junction and diaphragmatic impression were at 34/35 cm with a 1 cm sliding hiatal hernia.  The stomach distended appropriately with insufflated air.  The mucosa  of the stomach including cardia, fundus, gastric body and antrum was normal.  The duodenal bulb and immediate post bulbar regions were normal.    Following diagnostic endoscopy the endoscope was withdrawn to the cervical esophagus.  Biopsies were obtained.  A balloon dilator was positioned across the cervical esophageal web and the remainder of the cervical esophagus.  Sequential dilations were made with the 10, 11 and 12 mm balloons.  The balloon catheter was deflated and withdrawn.  There was appropriate fracture of the web without signs of deeper injury.      Impression:  1.  Cervical esophageal web(s).  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  An immune mediated etiology should be excluded as well.  2.  Small hiatal hernia  3.  Status post esophageal dilatation to 12 mm TTS    Recommendations:  1.  Soft diet  2.  Replete iron.  3.  Follow-up biopsy results.  4.  May resume antiplatelet therapy tomorrow.  5.  Assess response to dilatation.  Further recommendations pending biopsy results and clinical course.      Reggie Erwin MD  11/2/2024  8:06 AM

## 2024-11-02 NOTE — PLAN OF CARE
Problem: Patient Centered Care  Goal: Patient preferences are identified and integrated in the patient's plan of care  Description: Interventions:  - What would you like us to know as we care for you? \"I fell twice at rehab\"  - Provide timely, complete, and accurate information to patient/family  - Incorporate patient and family knowledge, values, beliefs, and cultural backgrounds into the planning and delivery of care  - Encourage patient/family to participate in care and decision-making at the level they choose  - Honor patient and family perspectives and choices  Outcome: Progressing     Problem: Patient/Family Goals  Goal: Patient/Family Long Term Goal  Description: Patient's Long Term Goal: Discharge home    Interventions:  - IVF  -Wound care  -IV antibiotics  -PT/OT  - See additional Care Plan goals for specific interventions  Outcome: Progressing  Goal: Patient/Family Short Term Goal  Description: Patient's Short Term Goal: Regain Strength    Interventions:   - PT/OT  - Dietary consult  - See additional Care Plan goals for specific interventions  Outcome: Progressing     Problem: PAIN - ADULT  Goal: Verbalizes/displays adequate comfort level or patient's stated pain goal  Description: INTERVENTIONS:  - Encourage pt to monitor pain and request assistance  - Assess pain using appropriate pain scale  - Administer analgesics based on type and severity of pain and evaluate response  - Implement non-pharmacological measures as appropriate and evaluate response  - Consider cultural and social influences on pain and pain management  - Manage/alleviate anxiety  - Utilize distraction and/or relaxation techniques  - Monitor for opioid side effects  - Notify MD/LIP if interventions unsuccessful or patient reports new pain  - Anticipate increased pain with activity and pre-medicate as appropriate  Outcome: Progressing     Problem: RISK FOR INFECTION - ADULT  Goal: Absence of fever/infection during anticipated  neutropenic period  Description: INTERVENTIONS  - Monitor WBC  - Administer growth factors as ordered  - Implement neutropenic guidelines  Outcome: Progressing     Problem: SAFETY ADULT - FALL  Goal: Free from fall injury  Description: INTERVENTIONS:  - Assess pt frequently for physical needs  - Identify cognitive and physical deficits and behaviors that affect risk of falls.  - [place] fall precautions as indicated by assessment.  - Educate pt/family on patient safety including physical limitations  - Instruct pt to call for assistance with activity based on assessment  - Modify environment to reduce risk of injury  - Provide assistive devices as appropriate  - Consider OT/PT consult to assist with strengthening/mobility  - Encourage toileting schedule  Outcome: Progressing     Problem: GASTROINTESTINAL - ADULT  Goal: Minimal or absence of nausea and vomiting  Description: INTERVENTIONS:  - Maintain adequate hydration with IV or PO as ordered and tolerated  - Nasogastric tube to low intermittent suction as ordered  - Evaluate effectiveness of ordered antiemetic medications  - Provide nonpharmacologic comfort measures as appropriate  - Advance diet as tolerated, if ordered  - Obtain nutritional consult as needed  - Evaluate fluid balance  Outcome: Progressing  Goal: Maintains or returns to baseline bowel function  Description: INTERVENTIONS:  - Assess bowel function  - Maintain adequate hydration with IV or PO as ordered and tolerated  - Evaluate effectiveness of GI medications  - Encourage mobilization and activity  - Obtain nutritional consult as needed  - Establish a toileting routine/schedule  - Consider collaborating with pharmacy to review patient's medication profile  Outcome: Progressing     Problem: SKIN/TISSUE INTEGRITY - ADULT  Goal: Skin integrity remains intact  Description: INTERVENTIONS  - Assess and document risk factors for pressure ulcer development  - Assess and document skin integrity  -  Monitor for areas of redness and/or skin breakdown  - Initiate interventions, skin care algorithm/standards of care as needed  Outcome: Progressing  Goal: Incision(s), wounds(s) or drain site(s) healing without S/S of infection  Description: INTERVENTIONS:  - Assess and document risk factors for pressure ulcer development  - Assess and document skin integrity  - Assess and document dressing/incision, wound bed, drain sites and surrounding tissue  - Implement wound care per orders  - Initiate isolation precautions as appropriate  - Initiate Pressure Ulcer prevention bundle as indicated  Outcome: Progressing  Goal: Oral mucous membranes remain intact  Description: INTERVENTIONS  - Assess oral mucosa and hygiene practices  - Implement preventative oral hygiene regimen  - Implement oral medicated treatments as ordered  Outcome: Progressing     Problem: MUSCULOSKELETAL - ADULT  Goal: Return mobility to safest level of function  Description: INTERVENTIONS:  - Assess patient stability and activity tolerance for standing, transferring and ambulating w/ or w/o assistive devices  - Assist with transfers and ambulation using safe patient handling equipment as needed  - Ensure adequate protection for wounds/incisions during mobilization  - Obtain PT/OT consults as needed  - Advance activity as appropriate  - Communicate ordered activity level and limitations with patient/family  Outcome: Progressing  Goal: Maintain proper alignment of affected body part  Description: INTERVENTIONS:  - Support and protect limb and body alignment per provider's orders  - Instruct and reinforce with patient and family use of appropriate assistive device and precautions (e.g. spinal or hip dislocation precautions)  Outcome: Progressing     Patient is alert and oriented x 4. She verbalized 9/10 generalized pain, pain meds given as needed. No acute changes at this time. Dressings to wounds still c/d/I. Safety and fall precautions maintained, bed  alarm on. Call light within reach. Frequent rounding by nursing staff.

## 2024-11-02 NOTE — INTERVAL H&P NOTE
Pre-op Diagnosis: dysphagia, webbing on esophagram    The above referenced H&P was reviewed by Reggie Erwin MD on 11/2/2024, the patient was examined and no significant changes have occurred in the patient's condition since the H&P was performed.  I discussed with the patient and/or legal representative the potential benefits, risks and side effects of this procedure; the likelihood of the patient achieving goals; and potential problems that might occur during recuperation.  I discussed reasonable alternatives to the procedure, including risks, benefits and side effects related to the alternatives and risks related to not receiving this procedure.  We will proceed with procedure as planned.

## 2024-11-02 NOTE — PLAN OF CARE
Problem: Patient Centered Care  Goal: Patient preferences are identified and integrated in the patient's plan of care  Description: Interventions:  - What would you like us to know as we care for you? \"I fell twice at rehab\"  - Provide timely, complete, and accurate information to patient/family  - Incorporate patient and family knowledge, values, beliefs, and cultural backgrounds into the planning and delivery of care  - Encourage patient/family to participate in care and decision-making at the level they choose  - Honor patient and family perspectives and choices  Outcome: Progressing     Problem: Patient/Family Goals  Goal: Patient/Family Long Term Goal  Description: Patient's Long Term Goal: Discharge home    Interventions:  - IVF  -Wound care  -IV antibiotics  -PT/OT  - See additional Care Plan goals for specific interventions  Outcome: Progressing  Goal: Patient/Family Short Term Goal  Description: Patient's Short Term Goal: Regain Strength    Interventions:   - PT/OT  - Dietary consult  - See additional Care Plan goals for specific interventions  Outcome: Progressing     Problem: PAIN - ADULT  Goal: Verbalizes/displays adequate comfort level or patient's stated pain goal  Description: INTERVENTIONS:  - Encourage pt to monitor pain and request assistance  - Assess pain using appropriate pain scale  - Administer analgesics based on type and severity of pain and evaluate response  - Implement non-pharmacological measures as appropriate and evaluate response  - Consider cultural and social influences on pain and pain management  - Manage/alleviate anxiety  - Utilize distraction and/or relaxation techniques  - Monitor for opioid side effects  - Notify MD/LIP if interventions unsuccessful or patient reports new pain  - Anticipate increased pain with activity and pre-medicate as appropriate  Outcome: Progressing     Problem: RISK FOR INFECTION - ADULT  Goal: Absence of fever/infection during anticipated  neutropenic period  Description: INTERVENTIONS  - Monitor WBC  - Administer growth factors as ordered  - Implement neutropenic guidelines  Outcome: Progressing     Problem: SAFETY ADULT - FALL  Goal: Free from fall injury  Description: INTERVENTIONS:  - Assess pt frequently for physical needs  - Identify cognitive and physical deficits and behaviors that affect risk of falls.  - [place] fall precautions as indicated by assessment.  - Educate pt/family on patient safety including physical limitations  - Instruct pt to call for assistance with activity based on assessment  - Modify environment to reduce risk of injury  - Provide assistive devices as appropriate  - Consider OT/PT consult to assist with strengthening/mobility  - Encourage toileting schedule  Outcome: Progressing     Problem: GASTROINTESTINAL - ADULT  Goal: Minimal or absence of nausea and vomiting  Description: INTERVENTIONS:  - Maintain adequate hydration with IV or PO as ordered and tolerated  - Nasogastric tube to low intermittent suction as ordered  - Evaluate effectiveness of ordered antiemetic medications  - Provide nonpharmacologic comfort measures as appropriate  - Advance diet as tolerated, if ordered  - Obtain nutritional consult as needed  - Evaluate fluid balance  Outcome: Progressing  Goal: Maintains or returns to baseline bowel function  Description: INTERVENTIONS:  - Assess bowel function  - Maintain adequate hydration with IV or PO as ordered and tolerated  - Evaluate effectiveness of GI medications  - Encourage mobilization and activity  - Obtain nutritional consult as needed  - Establish a toileting routine/schedule  - Consider collaborating with pharmacy to review patient's medication profile  Outcome: Progressing     Problem: SKIN/TISSUE INTEGRITY - ADULT  Goal: Skin integrity remains intact  Description: INTERVENTIONS  - Assess and document risk factors for pressure ulcer development  - Assess and document skin integrity  -  Monitor for areas of redness and/or skin breakdown  - Initiate interventions, skin care algorithm/standards of care as needed  Outcome: Progressing  Goal: Incision(s), wounds(s) or drain site(s) healing without S/S of infection  Description: INTERVENTIONS:  - Assess and document risk factors for pressure ulcer development  - Assess and document skin integrity  - Assess and document dressing/incision, wound bed, drain sites and surrounding tissue  - Implement wound care per orders  - Initiate isolation precautions as appropriate  - Initiate Pressure Ulcer prevention bundle as indicated  Outcome: Progressing  Goal: Oral mucous membranes remain intact  Description: INTERVENTIONS  - Assess oral mucosa and hygiene practices  - Implement preventative oral hygiene regimen  - Implement oral medicated treatments as ordered  Outcome: Progressing

## 2024-11-02 NOTE — ANESTHESIA PREPROCEDURE EVALUATION
Anesthesia PreOp Note    HPI:     Sheri Garzon is a 64 year old female who presents for preoperative consultation requested by: Reggie Erwin MD    Date of Surgery: 11/2/2024    Procedure(s):  ESOPHAGOGASTRODUODENOSCOPY (EGD)  Indication: dysphagia, webbing on esophagram    Relevant Problems   No relevant active problems       NPO:  Last Liquid Consumption Date: 11/01/24  Last Liquid Consumption Time: 2359  Last Solid Consumption Date: 11/01/24  Last Solid Consumption Time: 1800  Last Liquid Consumption Date: 11/01/24          History Review:  Patient Active Problem List    Diagnosis Date Noted    Odynophagia 10/29/2024    Nausea and vomiting 10/28/2024    Episodic mood disorder (HCC) 10/28/2024    Moderate bipolar I disorder, current or most recent episode depressed, with psychotic features, with mixed features ([facility]) 09/06/2024    Altered mental status, unspecified altered mental status type 09/05/2024    Acute psychosis ([facility]) 09/05/2024    Leg edema 08/27/2024    Adrenal insufficiency ([facility]) 08/27/2024    Weakness generalized 08/26/2024    Adrenal insufficiency ([place]'s disease) ([facility]) 08/23/2024    Current chronic use of systemic steroids 08/23/2024    Neutropenia ([facility]) 08/23/2024    Anemia of infection 08/23/2024    RTA (renal tubular acidosis) 08/17/2024    Cellulitis of right lower extremity 08/12/2024    Cellulitis 08/12/2024    Hiatal hernia 07/26/2024    Hypomagnesemia 07/24/2024    Difficult intravenous access 07/23/2024    Hypokalemia 07/23/2024    Metabolic acidosis 07/22/2024    Bilious vomiting with nausea 07/22/2024    Dehydration 07/21/2024       Past Medical History:    Allergic rhinitis    Anxiety    Arthritis    RA and Osteoarthritis    Asthma (HCC)    Depression    Not officially diagnosed    Esophageal reflux    Essential hypertension    High blood pressure    High cholesterol    Hyperlipidemia    Myocardial infarction (HCC)    Cardiac event    Osteoarthritis    Pancreatitis ([facility])     Problems with swallowing    RA (rheumatoid arthritis) ([facility])    Sleep apnea       Past Surgical History:   Procedure Laterality Date    Colonoscopy      Debridement  08/15/2024    Sharp excisional debridement of RLE posterior leg wound    Egd  07/26/2024    Hiatal hernia    Other surgical history      Revision of uterine anomaly      Rotator cuff repair      Wrist fracture surgery         Prescriptions Prior to Admission[1]  Current Medications and Prescriptions Ordered in Epic[2]    Allergies[3]    Family History   Problem Relation Age of Onset    Diabetes Mother     Genetic Disease Mother     Heart Disorder Mother     Hypertension Mother     Obesity Mother     Diabetes Father     Hypertension Father     Depression Daughter     Psychiatric Daughter      Social History     Socioeconomic History    Marital status: Single   Tobacco Use    Smoking status: Never    Smokeless tobacco: Never   Vaping Use    Vaping status: Never Used   Substance and Sexual Activity    Alcohol use: Not Currently    Drug use: Never       Available pre-op labs reviewed.  Lab Results   Component Value Date    WBC 8.9 11/02/2024    RBC 3.45 (L) 11/02/2024    HGB 9.8 (L) 11/02/2024    HCT 30.6 (L) 11/02/2024    MCV 88.7 11/02/2024    MCH 28.4 11/02/2024    MCHC 32.0 11/02/2024    RDW 16.2 (H) 11/02/2024    .0 11/02/2024     Lab Results   Component Value Date     11/02/2024    K 3.8 11/02/2024     (H) 11/02/2024    CO2 15.0 (L) 11/02/2024    BUN 5 (L) 11/02/2024    CREATSERUM 0.53 (L) 11/02/2024    GLU 69 (L) 11/02/2024    PGLU 60 (L) 11/02/2024    CA 7.8 (L) 11/02/2024          Vital Signs:  Body mass index is 30.16 kg/m².   height is 1.575 m (5' 2\") and weight is 74.8 kg (164 lb 14.4 oz). Her oral temperature is 97.8 °F (36.6 °C). Her blood pressure is 114/79 and her pulse is 98. Her respiration is 16 and oxygen saturation is 100%.   Vitals:    11/01/24 1342 11/01/24 1726 11/01/24 2112 11/02/24 0309   BP: 107/76 112/75 107/89  114/79   Pulse: 76 76 84 98   Resp: 18 18 18 16   Temp: 98.2 °F (36.8 °C) 97.9 °F (36.6 °C) 97.7 °F (36.5 °C) 97.8 °F (36.6 °C)   TempSrc: Oral Oral Oral Oral   SpO2: 92% 100% 100% 100%   Weight:       Height:            Anesthesia Evaluation     Patient summary reviewed and Nursing notes reviewed    Airway   Mallampati: II  TM distance: >3 FB  Neck ROM: limited  Dental - Dentition appears grossly intact     Comment: Poor     Pulmonary    (+) asthma, sleep apnea, decreased breath sounds  Cardiovascular - normal exam  (+) hypertension, CAD    Neuro/Psych    (+)  neuromuscular disease, anxiety/panic attacks,  bipolar disorder, depression      GI/Hepatic/Renal    (+) GERD    Endo/Other    (+) arthritis  Abdominal  - normal exam                 Anesthesia Plan:   ASA:  3  Plan:   MAC  Informed Consent Plan and Risks Discussed With:  Patient  Use of Blood Products Discussed With:  Patient      I have informed Sheri Garzon and/or legal guardian or family member of the nature of the anesthetic plan, benefits, risks including possible dental damage if relevant, major complications, and any alternative forms of anesthetic management.   All of the patient's questions were answered to the best of my ability. The patient desires the anesthetic management as planned.  MYNOR BRAR MD  2024 7:27 AM  Present on Admission:   Cellulitis of right lower extremity           [1]   Medications Prior to Admission   Medication Sig Dispense Refill Last Dose/Taking    docusate sodium 100 MG Oral Cap Take 1 capsule (100 mg total) by mouth 2 (two) times daily.   10/27/2024 Morning    ondansetron (ZOFRAN) 4 mg tablet Take 1 tablet (4 mg total) by mouth every 8 (eight) hours as needed for Nausea.   Taking As Needed    HYDROcodone-acetaminophen  MG Oral Tab Take 1 tablet by mouth every 4 (four) hours as needed. 20 tablet 0 10/27/2024 Noon    [] lamoTRIgine 25 MG Oral Tab Take 2 tablets (50 mg total) by mouth 2 (two) times  daily. 120 tablet 0 10/27/2024 Morning    [] pantoprazole 40 MG Oral Tab EC Take 1 tablet (40 mg total) by mouth 2 (two) times daily before meals. 60 tablet 0 10/27/2024 Morning    [] QUEtiapine 50 MG Oral Tab Take 1 tablet (50 mg total) by mouth nightly. 30 tablet 0 10/26/2024    [] metoprolol tartrate 25 MG Oral Tab Take 0.5 tablets (12.5 mg total) by mouth 2 (two) times daily. (Patient taking differently: Take 0.5 tablets (12.5 mg total) by mouth 2 (two) times daily. Hold for SBP <100 and HR <60) 30 tablet 0 10/27/2024 Morning    predniSONE 10 MG Oral Tab Take 20mg (2 tabs) in AM and 10mg (1tab) for 4 days then resume 10mg twice  a day indefinitely (Patient taking differently: Take 1 tablet (10 mg total) by mouth 2 (two) times daily. Take 20mg (2 tabs) in AM and 10mg (1tab) for 4 days then resume 10mg twice  a day indefinitely) 30 tablet 0 10/27/2024 Morning    Acetaminophen ER (ACETAMINOPHEN 8 HOUR) 650 MG Oral Tab CR Take 2-3 tablets (1,300-1,950 mg total) by mouth every 8 (eight) hours as needed for Pain.   Taking As Needed    albuterol 108 (90 Base) MCG/ACT Inhalation Aero Soln Inhale 2 puffs into the lungs every 4 to 6 hours as needed for Wheezing.   Taking As Needed    hydroxychloroquine 200 MG Oral Tab Take 1 tablet (200 mg total) by mouth 2 (two) times daily. 180 tablet 0 10/27/2024 Morning    Aspirin 81 MG Oral Cap Take 81 mg by mouth daily.   10/27/2024    atorvastatin 10 MG Oral Tab Take 1 tablet (10 mg total) by mouth daily.   10/27/2024 Evening    clopidogrel 75 MG Oral Tab Take 1 tablet (75 mg total) by mouth daily.   10/27/2024 Morning    ergocalciferol 1.25 MG (11981 UT) Oral Cap Take 1 capsule (50,000 Units total) by mouth once a week.   10/22/2024    oxybutynin ER 10 MG Oral Tablet 24 Hr Take 1 tablet (10 mg total) by mouth daily.   10/27/2024 Morning    folic acid 1 MG Oral Tab Take 1 tablet (1 mg total) by mouth daily. 90 tablet 0 10/27/2024 Morning    collagenase 250  UNIT/GM External Ointment Apply topically daily.       clotrimazole-betamethasone 1-0.05 % External Cream Apply 1 Application topically 2 (two) times daily. Apply to groin and abdominal folds       [] sodium bicarbonate 650 MG Oral Tab Take 1 tablet (650 mg total) by mouth 2 (two) times daily. 60 tablet 0     Copper (COPPERMIN) 5 MG Oral Tab Take 1 tablet by mouth daily. 30 tablet 0     ibuprofen 200 MG Oral Tab Take 3 tablets (600 mg total) by mouth every 8 (eight) hours as needed for Pain.       [] Ferrous Gluconate 324 (38 Fe) MG Oral Tab Take 1 tablet (325 mg total) by mouth daily with breakfast. 30 tablet 0     Potassium Chloride ER 10 MEQ Oral Tab CR Take 1 tablet (10 mEq total) by mouth 2 (two) times daily.      [2]   Current Facility-Administered Medications Ordered in Epic   Medication Dose Route Frequency Provider Last Rate Last Admin    dextrose 50% injection             dextrose 50% injection 50 mL  50 mL Intravenous Once John Hinds MD        sodium bicarbonate 50 mEq in dextrose 5% 1,050 mL infusion  50 mEq Intravenous Continuous John Hinds MD 42 mL/hr at 24 0907 50 mEq at 24 0907    vancomycin (Vancocin) 750 mg in sodium chloride 0.9% 250 mL IVPB-ADDV  750 mg Intravenous Q24H Jocelyn Rivera  mL/hr at 24 0907 750 mg at 24 0907    acetaminophen (Tylenol) 160 MG/5ML oral liquid 650 mg  650 mg Oral Q6H PRN John Hinds MD   650 mg at 24 1230    morphINE PF 2 MG/ML injection 2 mg  2 mg Intravenous Q4H PRN John Hinds MD   2 mg at 24 0705    sucralfate (Carafate) 1 GM/10ML oral suspension 1 g  1 g Oral TID AC and HS (0) Juana Carroll PA-C   1 g at 24 1727    predniSONE (Deltasone) tab 10 mg  10 mg Oral BID Jocelyn Rivera MD   10 mg at 24 0907    albuterol (Ventolin HFA) 108 (90 Base) MCG/ACT inhaler 2 puff  2 puff Inhalation Q4H PRN John Hinds MD        aspirin chewable tab 81 mg  81 mg Oral Daily [place]  John CARROLL MD   81 mg at 11/01/24 0907    [Held by provider] clopidogrel (Plavix) tab 75 mg  75 mg Oral Daily John Hinds MD   75 mg at 10/28/24 0959    docusate sodium (Colace) cap 100 mg  100 mg Oral BID John Hinds MD   100 mg at 10/31/24 1238    hydroxychloroquine (Plaquenil) tab 200 mg  200 mg Oral BID John Hinds MD   200 mg at 11/01/24 0907    lamoTRIgine (LaMICtal) tab 50 mg  50 mg Oral BID John Hinds MD   50 mg at 11/01/24 0906    metoprolol tartrate (Lopressor) partial tab 12.5 mg  12.5 mg Oral BID John Hinds MD   12.5 mg at 11/01/24 0906    oxybutynin ER (Ditropan-XL) 24 hr tab 10 mg  10 mg Oral Daily John Hinds MD   10 mg at 11/01/24 0907    QUEtiapine (SEROquel) tab 50 mg  50 mg Oral Nightly John Hinds MD   50 mg at 10/31/24 2137    [Held by provider] heparin (Porcine) 5000 UNIT/ML injection 5,000 Units  5,000 Units Subcutaneous Q8H [place] John Hinds MD   5,000 Units at 11/01/24 2120    HYDROcodone-acetaminophen (Norco) 5-325 MG per tab 1 tablet  1 tablet Oral Q4H PRN Jocelyn Rivera MD        Or    HYDROcodone-acetaminophen (Norco) 5-325 MG per tab 2 tablet  2 tablet Oral Q4H PRN Jocelyn Rivera MD   2 tablet at 10/29/24 2223    melatonin tab 3 mg  3 mg Oral Nightly PRN Jocelyn Rivera MD        ondansetron (Zofran) 4 MG/2ML injection 4 mg  4 mg Intravenous Q6H PRN Jocelyn Rivera MD   4 mg at 10/31/24 1815    prochlorperazine (Compazine) 10 MG/2ML injection 5 mg  5 mg Intravenous Q8H PRN Jocelyn Rivera MD        pantoprazole (Protonix) 40 mg in sodium chloride 0.9% PF 10 mL IV push  40 mg Intravenous Q12H Jocelyn Rivera MD   40 mg at 11/02/24 0308     No current Epic-ordered outpatient medications on file.   [3]   Allergies  Allergen Reactions    Cephalosporins ANAPHYLAXIS, NAUSEA AND VOMITING and OTHER (SEE COMMENTS)     Other reaction(s): Fever    - Tolerated multiple doses of ceftriaxone on 7/2024 without any complications  - Tolerated multiple doses of  cefepime 8/2024 without complications    Gadolinium Derivatives FEVER    Penicillins OTHER (SEE COMMENTS)     esophagitis    Sulfa Antibiotics OTHER (SEE COMMENTS)     esophagitis    Bananas ITCHING

## 2024-11-03 ENCOUNTER — APPOINTMENT (OUTPATIENT)
Dept: ULTRASOUND IMAGING | Facility: HOSPITAL | Age: 64
End: 2024-11-03
Attending: HOSPITALIST
Payer: COMMERCIAL

## 2024-11-03 ENCOUNTER — APPOINTMENT (OUTPATIENT)
Dept: GENERAL RADIOLOGY | Facility: HOSPITAL | Age: 64
End: 2024-11-03
Attending: HOSPITALIST
Payer: COMMERCIAL

## 2024-11-03 NOTE — PROGRESS NOTES
Patient continues to refuse PO medications and meals. Educated patient on glucose management and the body's need for nutrition. Spoke to MD about goals of care and possible palliative consult. MD requested re-evaluation by psych. Message sent to psych MD. Awaiting response. Daughter updated on plan of care at bedside.

## 2024-11-03 NOTE — PLAN OF CARE
Problem: Patient Centered Care  Goal: Patient preferences are identified and integrated in the patient's plan of care  Description: Interventions:  - What would you like us to know as we care for you? \"I fell twice at rehab\"  - Provide timely, complete, and accurate information to patient/family  - Incorporate patient and family knowledge, values, beliefs, and cultural backgrounds into the planning and delivery of care  - Encourage patient/family to participate in care and decision-making at the level they choose  - Honor patient and family perspectives and choices  Outcome: Progressing     Problem: Patient/Family Goals  Goal: Patient/Family Long Term Goal  Description: Patient's Long Term Goal: Discharge home    Interventions:  - IVF  -Wound care  -IV antibiotics  -PT/OT  - See additional Care Plan goals for specific interventions  Outcome: Progressing  Goal: Patient/Family Short Term Goal  Description: Patient's Short Term Goal: Regain Strength    Interventions:   - PT/OT  - Dietary consult  - See additional Care Plan goals for specific interventions  Outcome: Progressing     Problem: PAIN - ADULT  Goal: Verbalizes/displays adequate comfort level or patient's stated pain goal  Description: INTERVENTIONS:  - Encourage pt to monitor pain and request assistance  - Assess pain using appropriate pain scale  - Administer analgesics based on type and severity of pain and evaluate response  - Implement non-pharmacological measures as appropriate and evaluate response  - Consider cultural and social influences on pain and pain management  - Manage/alleviate anxiety  - Utilize distraction and/or relaxation techniques  - Monitor for opioid side effects  - Notify MD/LIP if interventions unsuccessful or patient reports new pain  - Anticipate increased pain with activity and pre-medicate as appropriate  Outcome: Progressing     Problem: RISK FOR INFECTION - ADULT  Goal: Absence of fever/infection during anticipated  neutropenic period  Description: INTERVENTIONS  - Monitor WBC  - Administer growth factors as ordered  - Implement neutropenic guidelines  Outcome: Progressing     Problem: SAFETY ADULT - FALL  Goal: Free from fall injury  Description: INTERVENTIONS:  - Assess pt frequently for physical needs  - Identify cognitive and physical deficits and behaviors that affect risk of falls.  - [place] fall precautions as indicated by assessment.  - Educate pt/family on patient safety including physical limitations  - Instruct pt to call for assistance with activity based on assessment  - Modify environment to reduce risk of injury  - Provide assistive devices as appropriate  - Consider OT/PT consult to assist with strengthening/mobility  - Encourage toileting schedule  Outcome: Progressing     Problem: GASTROINTESTINAL - ADULT  Goal: Minimal or absence of nausea and vomiting  Description: INTERVENTIONS:  - Maintain adequate hydration with IV or PO as ordered and tolerated  - Nasogastric tube to low intermittent suction as ordered  - Evaluate effectiveness of ordered antiemetic medications  - Provide nonpharmacologic comfort measures as appropriate  - Advance diet as tolerated, if ordered  - Obtain nutritional consult as needed  - Evaluate fluid balance  Outcome: Progressing  Goal: Maintains or returns to baseline bowel function  Description: INTERVENTIONS:  - Assess bowel function  - Maintain adequate hydration with IV or PO as ordered and tolerated  - Evaluate effectiveness of GI medications  - Encourage mobilization and activity  - Obtain nutritional consult as needed  - Establish a toileting routine/schedule  - Consider collaborating with pharmacy to review patient's medication profile  Outcome: Progressing     Problem: SKIN/TISSUE INTEGRITY - ADULT  Goal: Skin integrity remains intact  Description: INTERVENTIONS  - Assess and document risk factors for pressure ulcer development  - Assess and document skin integrity  -  Monitor for areas of redness and/or skin breakdown  - Initiate interventions, skin care algorithm/standards of care as needed  Outcome: Progressing  Goal: Incision(s), wounds(s) or drain site(s) healing without S/S of infection  Description: INTERVENTIONS:  - Assess and document risk factors for pressure ulcer development  - Assess and document skin integrity  - Assess and document dressing/incision, wound bed, drain sites and surrounding tissue  - Implement wound care per orders  - Initiate isolation precautions as appropriate  - Initiate Pressure Ulcer prevention bundle as indicated  Outcome: Progressing  Goal: Oral mucous membranes remain intact  Description: INTERVENTIONS  - Assess oral mucosa and hygiene practices  - Implement preventative oral hygiene regimen  - Implement oral medicated treatments as ordered  Outcome: Progressing     Problem: MUSCULOSKELETAL - ADULT  Goal: Return mobility to safest level of function  Description: INTERVENTIONS:  - Assess patient stability and activity tolerance for standing, transferring and ambulating w/ or w/o assistive devices  - Assist with transfers and ambulation using safe patient handling equipment as needed  - Ensure adequate protection for wounds/incisions during mobilization  - Obtain PT/OT consults as needed  - Advance activity as appropriate  - Communicate ordered activity level and limitations with patient/family  Outcome: Progressing  Goal: Maintain proper alignment of affected body part  Description: INTERVENTIONS:  - Support and protect limb and body alignment per provider's orders  - Instruct and reinforce with patient and family use of appropriate assistive device and precautions (e.g. spinal or hip dislocation precautions)  Outcome: Progressing     Patient is alert and oriented x 2-3, forgetful. She complained of headache, pain meds given as needed. No acute changes at this time. Safety and fall precautions maintained, bed alarm on. Call light within reach.  Frequent rounding by nursing staff.     MD notified regarding low BP and HR of 110s-120s, he ordered to hold scheduled Metoprolol at this time. MD also notified regarding her repeat blood sugar, refusal to take some PO meds, he ordered for CXR and GINA venous doppler. Informed pt.

## 2024-11-03 NOTE — PROGRESS NOTES
[facility]  part of [facility]  Hospitalist Progress Note     Sheri Garzon Patient Status:  Inpatient    1960  64 year old CSN [id number]   Location 546/546-A Attending John Hinds MD   Hosp Day # 7 PCP TERI MCKENNA MD     Assessment & Plan:   ----------------------------------  Odynophagia/esophageal web.  Abnormal esophagram  -Encouraged oral intake, there may be a functional issue now that is developed  -GI consult  -Restart oral medications for now  -EGD  shows esophageal web which was dilated  -Diet as tolerated  -Psychiatry on consult    Right lower extremity ulcer.  Possible overlying cellulitis on right foot dorsum possible.  Ulceration on the back of the right lower leg does not appear infected.  White count is normal.  Afebrile.  -Continue vancomycin for now  -Cxs show MRSA sensitive to bactrim  -outpt vascular evaluation if fails to heal    Hypoglycemia.  Due to poor oral intake.  Continue hypoglycemic protocol.  Strongly encouraged continued oral intake.  At this point her oral intake is the limiting factor to discharge  -Dietitian    Left upper extremity swelling.  May have some focal edema.  No evidence of DVT associated with PICC line.  PICC line is in proper place and functioning well.  -Manage expectantly  -DC IV fluids when acidosis improved, oral intake improved    Other problems  Metabolic acidosis  Anxiety  Asthma  Adrenal insufficiency  GERD  Hypertension  Dyslipidemia  History of coronary artery disease  Rheumatoid arthritis  Sleep apnea    DVT Mechanical Prophylaxis:     Early ambuation  DVT Pharmacologic Prophylaxis   Medication    heparin (Porcine) 5000 UNIT/ML injection 5,000 Units      DVT Pharmacologic prophylaxis: Aspirin 162 mg       code status: full  dispo:  11/3, refusing SULTANA.  I discussed with her that it is unlikely she will do well at home given her level of debility  If applicable, malnutrition status at bottom of note    I personally  reviewed the available laboratories, imaging including. I discussed/will discuss the case with consultants. I ordered laboratories and/or radiographic studies. I adjusted medications as detailed above.  Medical decision making high, risk is high.     Subjective:   ----------------------------------  Still reluctant to take oral intake.  Has not really tried any pills.      Objective:   Chief Complaint:   Chief Complaint   Patient presents with    Nausea/Vomiting/Diarrhea     ----------------------------------  Temp:  [98.1 °F (36.7 °C)-98.4 °F (36.9 °C)] 98.4 °F (36.9 °C)  Pulse:  [109-121] 109  Resp:  [16] 16  BP: (105-121)/(74-91) 119/74  SpO2:  [99 %-100 %] 100 %  Gen: A+Ox3.  No distress.   HEENT: NCAT, neck supple, no carotid bruit.  CV: RRR, S1S2, and intact distal pulses. No gallop, rub, murmur.  Pulm: Effort and breath sounds normal. No distress, wheezes, rales, rhonchi.  Abd: Soft, NTND, BS normal, no mass, no HSM, no rebound/guarding.   Neuro: Normal reflexes, CN. Sensory/motor exams grossly normal deficit.   MS: No joint effusions.  No peripheral edema.  Right lower extremity with 2 ulcerations.  1 on the lower leg posteriorly.  Another on the dorsum of the right foot.  No surrounding erythema.  No purulence expressed.  Minimal tenderness.  The feet are well-perfused, warm.  Sensation intact  Skin: Skin is warm and dry. No rashes, erythema, diaphoresis.   Psych: Normal mood and affect. Calm, cooperative    Labs:  Lab Results   Component Value Date    HGB 9.8 (L) 11/02/2024    WBC 8.9 11/02/2024    .0 11/02/2024     11/03/2024    K 3.3 (L) 11/03/2024    CREATSERUM 0.51 (L) 11/03/2024    AST 23 10/30/2024    ALT 31 10/30/2024            potassium chloride  40 mEq Intravenous Once    sodium ferric gluconate  125 mg Intravenous Daily    vancomycin  750 mg Intravenous Q24H    sucralfate  1 g Oral TID AC and HS (2200)    predniSONE  10 mg Oral BID    aspirin  81 mg Oral Daily    clopidogrel  75 mg  Oral Daily    docusate sodium  100 mg Oral BID    hydroxychloroquine  200 mg Oral BID    lamoTRIgine  50 mg Oral BID    metoprolol tartrate  12.5 mg Oral BID    oxybutynin ER  10 mg Oral Daily    QUEtiapine  50 mg Oral Nightly    heparin  5,000 Units Subcutaneous Q8H FALLON    pantoprazole  40 mg Intravenous Q12H       acetaminophen    dextrose    glucose **OR** glucose **OR** glucose-vitamin C **OR** dextrose **OR** glucose **OR** glucose **OR** glucose-vitamin C    acetaminophen    albuterol    [DISCONTINUED] acetaminophen **OR** HYDROcodone-acetaminophen **OR** HYDROcodone-acetaminophen    melatonin    ondansetron    prochlorperazine      Dietitian Malnutrition Assessment    Evaluation for Malnutrition: Criteria for non-severe malnutrition diagnosis-         acute illness/injury related to Wt loss 5% in 1 month., Energy intake less than 75% for greater than 7 days.         RD Malnutrition Care Plan: Encouraged increased PO intake., Intiated ONS (oral nutritional supplements).    Body Fat/Muscle Mass: no wasting noted, malnutrition related to PO intake and weight loss        Physician Assessment     Patient has a diagnosis of moderate malnutrition

## 2024-11-03 NOTE — PLAN OF CARE
Problem: Patient Centered Care  Goal: Patient preferences are identified and integrated in the patient's plan of care  Description: Interventions:  - What would you like us to know as we care for you? \"I fell twice at rehab\"  - Provide timely, complete, and accurate information to patient/family  - Incorporate patient and family knowledge, values, beliefs, and cultural backgrounds into the planning and delivery of care  - Encourage patient/family to participate in care and decision-making at the level they choose  - Honor patient and family perspectives and choices  Outcome: Progressing     Problem: Patient/Family Goals  Goal: Patient/Family Long Term Goal  Description: Patient's Long Term Goal: Discharge home    Interventions:  - IVF  -Wound care  -IV antibiotics  -PT/OT  - See additional Care Plan goals for specific interventions  Outcome: Progressing  Goal: Patient/Family Short Term Goal  Description: Patient's Short Term Goal: Regain Strength    Interventions:   - PT/OT  - Dietary consult  - See additional Care Plan goals for specific interventions  Outcome: Progressing     Problem: PAIN - ADULT  Goal: Verbalizes/displays adequate comfort level or patient's stated pain goal  Description: INTERVENTIONS:  - Encourage pt to monitor pain and request assistance  - Assess pain using appropriate pain scale  - Administer analgesics based on type and severity of pain and evaluate response  - Implement non-pharmacological measures as appropriate and evaluate response  - Consider cultural and social influences on pain and pain management  - Manage/alleviate anxiety  - Utilize distraction and/or relaxation techniques  - Monitor for opioid side effects  - Notify MD/LIP if interventions unsuccessful or patient reports new pain  - Anticipate increased pain with activity and pre-medicate as appropriate  Outcome: Progressing     Problem: SAFETY ADULT - FALL  Goal: Free from fall injury  Description: INTERVENTIONS:  - Assess pt  frequently for physical needs  - Identify cognitive and physical deficits and behaviors that affect risk of falls.  - [place] fall precautions as indicated by assessment.  - Educate pt/family on patient safety including physical limitations  - Instruct pt to call for assistance with activity based on assessment  - Modify environment to reduce risk of injury  - Provide assistive devices as appropriate  - Consider OT/PT consult to assist with strengthening/mobility  - Encourage toileting schedule  Outcome: Progressing     Problem: SKIN/TISSUE INTEGRITY - ADULT  Goal: Incision(s), wounds(s) or drain site(s) healing without S/S of infection  Description: INTERVENTIONS:  - Assess and document risk factors for pressure ulcer development  - Assess and document skin integrity  - Assess and document dressing/incision, wound bed, drain sites and surrounding tissue  - Implement wound care per orders  - Initiate isolation precautions as appropriate  - Initiate Pressure Ulcer prevention bundle as indicated  Outcome: Progressing  Goal: Oral mucous membranes remain intact  Description: INTERVENTIONS  - Assess oral mucosa and hygiene practices  - Implement preventative oral hygiene regimen  - Implement oral medicated treatments as ordered  Outcome: Progressing     Problem: MUSCULOSKELETAL - ADULT  Goal: Return mobility to safest level of function  Description: INTERVENTIONS:  - Assess patient stability and activity tolerance for standing, transferring and ambulating w/ or w/o assistive devices  - Assist with transfers and ambulation using safe patient handling equipment as needed  - Ensure adequate protection for wounds/incisions during mobilization  - Obtain PT/OT consults as needed  - Advance activity as appropriate  - Communicate ordered activity level and limitations with patient/family  Outcome: Progressing  Goal: Maintain proper alignment of affected body part  Description: INTERVENTIONS:  - Support and protect limb and  body alignment per provider's orders  - Instruct and reinforce with patient and family use of appropriate assistive device and precautions (e.g. spinal or hip dislocation precautions)  Outcome: Progressing

## 2024-11-03 NOTE — PROGRESS NOTES
[facility]     Gastroenterology Progress Note    Sheri Garzon Patient Status:  Inpatient    1960 MRN [id number]   Location [facility] 5SW/SE Attending John Hinds MD   Hosp Day # 7 PCP TERI MCKENNA MD       Assessment and Plan:   Cervical esophageal web/dysphagia  Less discomfort today.  Feels she will be able to swallow.  Pathology pending.  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  To advance diet as tolerated.  IV iron repletion.  May continue sucralfate.  Await pathology results.    Recommend:  1.  Advance diet as tolerated.  2.  IV iron repletion.  3.  May continue sucralfate.  4.  Await pathology results.        Subjective:   Looks better.  Sitting up in the chair with a breakfast tray in place.  Feels like she will be able to swallow.  Throat/upper chest discomfort persists but has improved.  No other complaints    Objective:   Patient Vitals for the past 24 hrs:   BP Temp Temp src Pulse Resp SpO2   24 0916 119/74 98.4 °F (36.9 °C) Oral 109 16 100 %   24 0256 105/83 98.1 °F (36.7 °C) Oral 117 16 99 %   24 2107 (!) 106/91 98.2 °F (36.8 °C) Oral 118 16 99 %   24 1904 -- -- -- (!) 121 -- --   24 1641 121/90 98.1 °F (36.7 °C) Oral (!) 121 16 100 %     Body mass index is 30 kg/m².    General: Patient appears comfortable and in no acute distress  HEENT:Negative for scleral icterus.  Mucous membranes are moist.  Neck: No crepitus  Cardiovascular: Regular rate and rhythm   Lung: Clear to auscultation bilaterally  Abdomen:Non-distended.  Bowel sounds are present.  There is no tenderness to palpation.  There are no masses appreciated.  Liver and spleen are not palpable.  Skin: No jaundice.  Warm and dry.  Ext: No cyanosis, clubbing or edema is evident.   Neuro- Alert and interactive, and gross movements of extremities normal  Affect:Normal      Results:     Recent  Labs   Lab 10/31/24  0539 11/01/24  0608 11/02/24  0602   RBC 3.32* 3.41* 3.45*   HGB 9.8* 9.7* 9.8*   HCT 29.9* 30.4* 30.6*   MCV 90.1 89.1 88.7   MCH 29.5 28.4 28.4   MCHC 32.8 31.9 32.0   RDW 15.7* 16.0* 16.2*   NEPRELIM 4.92 5.91 5.06   WBC 8.6 7.7 8.9   .0 213.0 207.0         Recent Labs   Lab 10/27/24  1838 10/29/24  1022 10/30/24  0521 10/30/24  1822 11/01/24  0608 11/02/24  0602 11/03/24  0954   GLU 77   < >  --    < > 141* 69* 68*   BUN 15   < >  --    < > <5* 5* 6*   CREATSERUM 0.77   < >  --    < > 0.49* 0.53* 0.51*   CA 8.9   < >  --    < > 7.8* 7.8* 7.8*   ALB 3.2  --  2.2*  --   --  2.1*  --       < >  --    < > 139 137 143   K 3.1*   < > 3.0*   < > 4.0  4.0 3.8 3.3*      < >  --    < > 116* 114* 117*   CO2 18.0*   < >  --    < > 14.0* 15.0* 16.0*   ALKPHO 534*  --  331*  --   --   --   --    AST 37*  --  23  --   --   --   --    ALT 44  --  31  --   --   --   --    BILT 0.3  --  0.2  --   --   --   --    TP 5.8  --  3.9*  --   --   --   --     < > = values in this interval not displayed.       No results found for: \"PT\", \"INR\"    US VENOUS DOPPLER ARM LEFT - DIAG IMG (CPT=93971)    Result Date: 11/3/2024  CONCLUSION:    No sonographic evidence of acute deep venous thrombosis within the visualized upper extremity veins from the internal jugular to axillary veins.  More distal veins of the upper extremity including the brachial, cephalic, and basilic veins could not be visualized/evaluated, due to extensive subcutaneous edema of the upper extremity in these regions.  Left PICC seen within the left subclavian vein.       Dictated by (CST): Isha Hargrove MD on 11/03/2024 at 8:55 AM     Finalized by (CST): Isha Hargrove MD on 11/03/2024 at 8:58 AM          XR CHEST AP PORTABLE  (CPT=71045)    Result Date: 11/3/2024  CONCLUSION:   Left PICC with tip in the expected location of the lower superior vena cava.      Dictated by (CST): Isha Hargrove MD on 11/03/2024 at 8:08 AM      Finalized by (CST): Isha Hargrove MD on 11/03/2024 at 8:09 AM                 Reggie Erwin MD  11/3/2024

## 2024-11-04 ENCOUNTER — TRANSCRIBE ORDERS (OUTPATIENT)
Dept: POST ACUTE CARE | Age: 64
End: 2024-11-04

## 2024-11-04 DIAGNOSIS — Z51.5 ENCOUNTER FOR PALLIATIVE CARE: Primary | ICD-10-CM

## 2024-11-04 PROBLEM — R52 PAIN: Status: ACTIVE | Noted: 2024-11-04

## 2024-11-04 PROBLEM — R52 PAIN: Status: ACTIVE | Noted: 2024-01-01

## 2024-11-04 NOTE — OCCUPATIONAL THERAPY NOTE
Pt declined OT/PT stating she needs to eat her breakfast first but was asleep. Offered to transfer to chair to eat or put breakfast in front of her. Multiple excuses given. Notified RN. Will re-attempt as able.      Mary Alejandra, OT  [facility]  Inpatient Rehabilitation  Occupational Therapy  [phone]

## 2024-11-04 NOTE — PROGRESS NOTES
Patient is a 64 year old -American,female with past medical history of hypertension, rheumatoid arthritis, obesity, chronic adrenal insufficiency secondary to chronic corticosteroid use, asthma, pancreatitis, obstructive sleep apnea, obesity, hyperlipidemia, anemia, chronic kidney disease  nonocclusive coronary artery disease, and peripheral arterial disease who also has past mental lorenzo history of anxiety and depression  who was admitted to the hospital for Cellulitis of right lower extremity: The patient has been demonstrating increased anxiety and depressive symptoms.Patient indicated for psych consult for evaluation and advise.  Consult Duration   The patient seen for over 50-minute, follow-up evaluation, over 50% counseling and coordinating care addressing anxiety, depression.    Record reviewed, communication with attending, communication with RN and patient seen face to face evaluation.  History of Present Illness:   The medical team requested reevaluation for patient do not have organic reason for having difficulty swallowing and passing swallow eval but continue claiming difficulty swallowing.    The patient is seen today in her room and the patient remembered me from previous admission.  Patient reporting feeling \"that have multiple layer of blanket over her.    The patient reporting that she continue finding herself anxious with difficulty relaxing and difficulty sleeping.  Patient has been in nursing home and going through medical process for a while.    Patient reporting that when she go to bed she feels nervous about the possibility of feeling to sleep.    Patient have few cups of water and drink next to her on the table.  Patient reporting that she has been making effort to drink and swallow but reporting that she cannot eat big pieces of food.     Patient today demonstrated appropriate orientation to the date, condition and location.  Patient reporting her memory is off.  Patient reporting  her mood has been fair, more anxious than depressed.  Patient denying any auditory visual hallucination.  Patient continued complaining about difficulty sleeping.    The patient received the following psychotropic medications Lamictal 50 mg BID, Seroquel 50 mg nightly    Labs and imaging reviewed: Glucose 141, sodium 139    Provided patient with supportive psychotherapy including listening, emotional support and encouragement.     Past Psychiatric/Medication History:  1. Prior diagnoses: Depression, Anxiety  2. Past psychiatric inpatient: Denies           3. Past outpatient history: Denies  4. Past suicide history: None  5. Medication history: Denies     Social History:   The patient is a 64 year old  female. She is a retired  of 35 years. She retired and moved to [place] with her daughter. Her health declined since retiring and moving to [place] and moved back to [place] with her daughter in June 2024. She and her daughter moved back in with her eldest sister, Gladys. She was admitted to rehab for decreased mobility and chronic wound care on multiple occasions.      Family History:  Daughter: depression  Medical History:   Past Medical History  Past Medical History:    Allergic rhinitis    Anxiety    Arthritis    RA and Osteoarthritis    Asthma (HCC)    Depression    Not officially diagnosed    Esophageal reflux    Essential hypertension    High blood pressure    High cholesterol    Hyperlipidemia    Myocardial infarction (HCC)    Cardiac event    Osteoarthritis    Pancreatitis (HCC)    Problems with swallowing    RA (rheumatoid arthritis) (HCC)    Sleep apnea       Past Surgical History  Past Surgical History:   Procedure Laterality Date    Colonoscopy      Debridement  08/15/2024    Sharp excisional debridement of RLE posterior leg wound    Egd  07/26/2024    Hiatal hernia    Other surgical history      Revision of uterine anomaly      Rotator cuff repair      Wrist fracture surgery          Family History  Family History   Problem Relation Age of Onset    Diabetes Mother     Genetic Disease Mother     Heart Disorder Mother     Hypertension Mother     Obesity Mother     Diabetes Father     Hypertension Father     Depression Daughter     Psychiatric Daughter        Social History  Social History     Socioeconomic History    Marital status: Single   Tobacco Use    Smoking status: Never    Smokeless tobacco: Never   Vaping Use    Vaping status: Never Used   Substance and Sexual Activity    Alcohol use: Not Currently    Drug use: Never     Social Drivers of Health     Food Insecurity: No Food Insecurity (10/28/2024)    Food Insecurity     Food Insecurity: Never true   Transportation Needs: No Transportation Needs (10/28/2024)    Transportation Needs     Lack of Transportation: No   Housing Stability: Low Risk  (10/28/2024)    Housing Stability     Housing Instability: No           Current Medications:  Current Facility-Administered Medications   Medication Dose Route Frequency    acetaminophen (Tylenol) 160 MG/5ML oral liquid 650 mg  650 mg Oral Q4H PRN    dextrose 10% infusion   Intravenous Continuous    mirtazapine (Remeron) tab 7.5 mg  7.5 mg Oral Nightly    dextrose 50% injection 50 mL  50 mL Intravenous PRN    sodium ferric gluconate (Ferrlecit) 125 mg in sodium chloride 0.9% 100mL IVPB premix  125 mg Intravenous Daily    glucose (Dex4) 15 GM/59ML oral liquid 15 g  15 g Oral Q15 Min PRN    Or    glucose (Glutose) 40% oral gel 15 g  15 g Oral Q15 Min PRN    Or    glucose-vitamin C (Dex-4) chewable tab 4 tablet  4 tablet Oral Q15 Min PRN    Or    dextrose 50% injection 50 mL  50 mL Intravenous Q15 Min PRN    Or    glucose (Dex4) 15 GM/59ML oral liquid 30 g  30 g Oral Q15 Min PRN    Or    glucose (Glutose) 40% oral gel 30 g  30 g Oral Q15 Min PRN    Or    glucose-vitamin C (Dex-4) chewable tab 8 tablet  8 tablet Oral Q15 Min PRN    vancomycin (Vancocin) 750 mg in sodium chloride 0.9% 250 mL  IVPB-ADDV  750 mg Intravenous Q24H    acetaminophen (Tylenol) 160 MG/5ML oral liquid 650 mg  650 mg Oral Q6H PRN    sucralfate (Carafate) 1 GM/10ML oral suspension 1 g  1 g Oral TID AC and HS ()    predniSONE (Deltasone) tab 10 mg  10 mg Oral BID    albuterol (Ventolin HFA) 108 (90 Base) MCG/ACT inhaler 2 puff  2 puff Inhalation Q4H PRN    aspirin chewable tab 81 mg  81 mg Oral Daily    clopidogrel (Plavix) tab 75 mg  75 mg Oral Daily    docusate sodium (Colace) cap 100 mg  100 mg Oral BID    hydroxychloroquine (Plaquenil) tab 200 mg  200 mg Oral BID    lamoTRIgine (LaMICtal) tab 50 mg  50 mg Oral BID    metoprolol tartrate (Lopressor) partial tab 12.5 mg  12.5 mg Oral BID    oxybutynin ER (Ditropan-XL) 24 hr tab 10 mg  10 mg Oral Daily    QUEtiapine (SEROquel) tab 50 mg  50 mg Oral Nightly    heparin (Porcine) 5000 UNIT/ML injection 5,000 Units  5,000 Units Subcutaneous Q8H [place]    HYDROcodone-acetaminophen (Norco) 5-325 MG per tab 1 tablet  1 tablet Oral Q4H PRN    Or    HYDROcodone-acetaminophen (Norco) 5-325 MG per tab 2 tablet  2 tablet Oral Q4H PRN    melatonin tab 3 mg  3 mg Oral Nightly PRN    ondansetron (Zofran) 4 MG/2ML injection 4 mg  4 mg Intravenous Q6H PRN    prochlorperazine (Compazine) 10 MG/2ML injection 5 mg  5 mg Intravenous Q8H PRN    pantoprazole (Protonix) 40 mg in sodium chloride 0.9% PF 10 mL IV push  40 mg Intravenous Q12H     Medications Prior to Admission   Medication Sig    docusate sodium 100 MG Oral Cap Take 1 capsule (100 mg total) by mouth 2 (two) times daily.    ondansetron (ZOFRAN) 4 mg tablet Take 1 tablet (4 mg total) by mouth every 8 (eight) hours as needed for Nausea.    HYDROcodone-acetaminophen  MG Oral Tab Take 1 tablet by mouth every 4 (four) hours as needed.    [] lamoTRIgine 25 MG Oral Tab Take 2 tablets (50 mg total) by mouth 2 (two) times daily.    [] pantoprazole 40 MG Oral Tab EC Take 1 tablet (40 mg total) by mouth 2 (two) times daily before  meals.    [] QUEtiapine 50 MG Oral Tab Take 1 tablet (50 mg total) by mouth nightly.    [] metoprolol tartrate 25 MG Oral Tab Take 0.5 tablets (12.5 mg total) by mouth 2 (two) times daily. (Patient taking differently: Take 0.5 tablets (12.5 mg total) by mouth 2 (two) times daily. Hold for SBP <100 and HR <60)    predniSONE 10 MG Oral Tab Take 20mg (2 tabs) in AM and 10mg (1tab) for 4 days then resume 10mg twice  a day indefinitely (Patient taking differently: Take 1 tablet (10 mg total) by mouth 2 (two) times daily. Take 20mg (2 tabs) in AM and 10mg (1tab) for 4 days then resume 10mg twice  a day indefinitely)    Acetaminophen ER (ACETAMINOPHEN 8 HOUR) 650 MG Oral Tab CR Take 2-3 tablets (1,300-1,950 mg total) by mouth every 8 (eight) hours as needed for Pain.    albuterol 108 (90 Base) MCG/ACT Inhalation Aero Soln Inhale 2 puffs into the lungs every 4 to 6 hours as needed for Wheezing.    hydroxychloroquine 200 MG Oral Tab Take 1 tablet (200 mg total) by mouth 2 (two) times daily.    Aspirin 81 MG Oral Cap Take 81 mg by mouth daily.    atorvastatin 10 MG Oral Tab Take 1 tablet (10 mg total) by mouth daily.    clopidogrel 75 MG Oral Tab Take 1 tablet (75 mg total) by mouth daily.    ergocalciferol 1.25 MG (83608 UT) Oral Cap Take 1 capsule (50,000 Units total) by mouth once a week.    oxybutynin ER 10 MG Oral Tablet 24 Hr Take 1 tablet (10 mg total) by mouth daily.    folic acid 1 MG Oral Tab Take 1 tablet (1 mg total) by mouth daily.    collagenase 250 UNIT/GM External Ointment Apply topically daily.    clotrimazole-betamethasone 1-0.05 % External Cream Apply 1 Application topically 2 (two) times daily. Apply to groin and abdominal folds    [] sodium bicarbonate 650 MG Oral Tab Take 1 tablet (650 mg total) by mouth 2 (two) times daily.    Copper (COPPERMIN) 5 MG Oral Tab Take 1 tablet by mouth daily.    ibuprofen 200 MG Oral Tab Take 3 tablets (600 mg total) by mouth every 8 (eight) hours as  needed for Pain.    [] Ferrous Gluconate 324 (38 Fe) MG Oral Tab Take 1 tablet (325 mg total) by mouth daily with breakfast.    Potassium Chloride ER 10 MEQ Oral Tab CR Take 1 tablet (10 mEq total) by mouth 2 (two) times daily.       Allergies  Allergies[1]    Review of Systems:   As by Admitting/Attending    Results:   Laboratory Data:  Lab Results   Component Value Date    WBC 8.9 2024    HGB 9.8 (L) 2024    HCT 30.6 (L) 2024    .0 2024    CREATSERUM 0.51 (L) 2024    BUN 6 (L) 2024     2024    K 3.3 (L) 2024     (H) 2024    CO2 16.0 (L) 2024    GLU 68 (L) 2024    CA 7.8 (L) 2024    ALB 2.1 (L) 2024    ALKPHO 331 (H) 10/30/2024    TP 3.9 (L) 10/30/2024    AST 23 10/30/2024    ALT 31 10/30/2024    LIP 31 10/27/2024    DDIMER 1.69 (H) 2024    MG 2.0 10/30/2024    PHOS 3.4 2024    CK 50 2024    B12 748 2024    ETOH <3 2024         Imaging:  US VENOUS DOPPLER ARM LEFT - DIAG IMG (CPT=93971)    Result Date: 11/3/2024  CONCLUSION:    No sonographic evidence of acute deep venous thrombosis within the visualized upper extremity veins from the internal jugular to axillary veins.  More distal veins of the upper extremity including the brachial, cephalic, and basilic veins could not be visualized/evaluated, due to extensive subcutaneous edema of the upper extremity in these regions.  Left PICC seen within the left subclavian vein.       Dictated by (CST): Isha Hargrove MD on 2024 at 8:55 AM     Finalized by (CST): Isha Hargrove MD on 2024 at 8:58 AM          XR CHEST AP PORTABLE  (CPT=71045)    Result Date: 11/3/2024  CONCLUSION:   Left PICC with tip in the expected location of the lower superior vena cava.      Dictated by (CST): Isha Hargrove MD on 2024 at 8:08 AM     Finalized by (CST): Isha Hargrove MD on 2024 at 8:09 AM           Vital Signs:   Blood  pressure 125/80, pulse 100, temperature 97.2 °F (36.2 °C), temperature source Oral, resp. rate 16, height 62\", weight 74.4 kg (164 lb), SpO2 100%.    Mental Status Exam:   Appearance: Stated age female, in hospital gown, laying down in hospital bed.  Multiple blanket over her  Psychomotor: No psychomotor agitation or retardation.  Orientation: Alert and oriented to person, place, date and condition.  Gait: Not evaluated.  Attitude/Coorperation: cooperative, pleasant.  Behavior: Appropriate communication.  Otherwise medical team concerned about her difficulty swallowing.  Speech: Regular rate and rhythm speech.  Mood: Patient admitted feeling anxious specially nightly  Affect: Anxious affect, congruent with the mood.  Thought process: mostly Linear  Thought content: No reports of  suicidal or homicidal ideation.  Perceptions: Patient denies auditory or visual hallucinations  Concentration: intact  Memory: intact  Intellect: Average.  Judgment and Insight: Questionable.     Impression:     Episodic mood disorder     Cellulitis of right lower extremity    Nausea and vomiting    Patient is a 64 year old -American,female with past medical history of hypertension, rheumatoid arthritis, obesity, chronic adrenal insufficiency secondary to chronic corticosteroid use, asthma, pancreatitis, obstructive sleep apnea, obesity, hyperlipidemia, anemia, chronic kidney disease  nonocclusive coronary artery disease, and peripheral arterial disease who also has past mental lorenzo history of anxiety and depression  who was admitted to the hospital for Cellulitis of right lower extremity: The patient has been demonstrating increased anxiety and depressive symptoms. Patient was started on Lamictal and Seroquel during her last admission and would like those medications continued.     10/29/2024: The patient has been demonstrating some alternation in her cognition and thought process. Patient refusing medications and reporting to team  that she can not swallow pills. She demonstrates some increased anxiety, worry. Patient reporting that she can take her pills otherwise not all at one time.     11/1/2024: No issues reported from the team. She has not demonstrated any concerning behaviors. She continues to have some difficulty swallowing her medications. EGD scheduled for tomorrow.    11/3/2024: Medical team reporting no physical reason for patient to have difficulty swallowing and questioning for reevaluation.  Patient herself addressing some anxiety.  Patient encouraged to cooperative team and agree that she is going to eat slowly with small pieces.  Otherwise the patient has not been consistent with her medication treatment but she took her Seroquel last night.  Patient continue reporting difficulty sleeping with increased anxiety nightly.    Discussed risk and benefit, acknowledging the current symptom and severity.  At this point, I would recommend the following approach:     Focus on safety  Focus on education and support.  Focus on insight orientation helping the patient understand diagnosis and treatment plan.  Encourage compliant on medication and treatment.  Start Remeron 7.5 mg nightly.  Continue Seroquel 50 mg nightly  Continue Lamictal 25 mg BID  Processed with patient at length, the initiation of the above psychotropic medications I advised the patient of the risks, benefits, alternatives and potential side effects. The patient consents to administration of the medications and understands the right to refuse medications at any time. The patient verbalized understanding.   Coordinate plan with team    Orders This Visit:  Orders Placed This Encounter   Procedures    CBC With Differential With Platelet    Comp Metabolic Panel (14)    Magnesium    Lipase    Urinalysis, Routine    Lactic Acid, Plasma    Magnesium    CBC With Differential With Platelet    Basic Metabolic Panel (8)    Potassium    Magnesium    Vancomycin Trough, Serum     Vancomycin Peak, Serum    Hepatic Function Panel (7)    Iron And Tibc    Ferritin    CBC With Differential With Platelet    Basic Metabolic Panel (8)    Potassium    Potassium    CBC With Differential With Platelet    Basic Metabolic Panel (8)    Renal Function Panel    CBC With Differential With Platelet    Basic Metabolic Panel (8)    Phosphorus    Basic Metabolic Panel (8)    Potassium    Specimen to Pathology Tissue    Aerobic Bacterial Culture    Blood Culture    MRSA Screen by PCR    Rapid SARS-CoV-2 by PCR       Meds This Visit:  Requested Prescriptions      No prescriptions requested or ordered in this encounter       Hudson Boles MD  11/3/2024    Note to Patient: The 21st Century Cures Act makes medical notes like these available to patients in the interest of transparency. However, be advised this is a medical document. It is intended as peer to peer communication. It is written in medical language and may contain abbreviations or verbiage that are unfamiliar. It may appear blunt or direct. Medical documents are intended to carry relevant information, facts as evident, and the clinical opinion of the practitioner. This note may have been transcribed using a voice dictation system. Voice recognition errors may occur. This should not be taken to alter the content or meaning of this note.           [1]   Allergies  Allergen Reactions    Cephalosporins ANAPHYLAXIS, NAUSEA AND VOMITING and OTHER (SEE COMMENTS)     Other reaction(s): Fever    - Tolerated multiple doses of ceftriaxone on 7/2024 without any complications  - Tolerated multiple doses of cefepime 8/2024 without complications    Gadolinium Derivatives FEVER    Penicillins OTHER (SEE COMMENTS)     esophagitis    Sulfa Antibiotics OTHER (SEE COMMENTS)     esophagitis    Bananas ITCHING

## 2024-11-04 NOTE — PLAN OF CARE
Problem: Patient Centered Care  Goal: Patient preferences are identified and integrated in the patient's plan of care  Description: Interventions:  - What would you like us to know as we care for you? \"I fell twice at rehab\"  - Provide timely, complete, and accurate information to patient/family  - Incorporate patient and family knowledge, values, beliefs, and cultural backgrounds into the planning and delivery of care  - Encourage patient/family to participate in care and decision-making at the level they choose  - Honor patient and family perspectives and choices  Outcome: Progressing     Problem: Patient/Family Goals  Goal: Patient/Family Long Term Goal  Description: Patient's Long Term Goal: Discharge home    Interventions:  - IVF  -Wound care  -IV antibiotics  -PT/OT  - See additional Care Plan goals for specific interventions  Outcome: Progressing  Goal: Patient/Family Short Term Goal  Description: Patient's Short Term Goal: Regain Strength    Interventions:   - PT/OT  - Dietary consult  - See additional Care Plan goals for specific interventions  Outcome: Progressing     Problem: PAIN - ADULT  Goal: Verbalizes/displays adequate comfort level or patient's stated pain goal  Description: INTERVENTIONS:  - Encourage pt to monitor pain and request assistance  - Assess pain using appropriate pain scale  - Administer analgesics based on type and severity of pain and evaluate response  - Implement non-pharmacological measures as appropriate and evaluate response  - Consider cultural and social influences on pain and pain management  - Manage/alleviate anxiety  - Utilize distraction and/or relaxation techniques  - Monitor for opioid side effects  - Notify MD/LIP if interventions unsuccessful or patient reports new pain  - Anticipate increased pain with activity and pre-medicate as appropriate  Outcome: Progressing     Problem: RISK FOR INFECTION - ADULT  Goal: Absence of fever/infection during anticipated  neutropenic period  Description: INTERVENTIONS  - Monitor WBC  - Administer growth factors as ordered  - Implement neutropenic guidelines  Outcome: Progressing     Problem: SAFETY ADULT - FALL  Goal: Free from fall injury  Description: INTERVENTIONS:  - Assess pt frequently for physical needs  - Identify cognitive and physical deficits and behaviors that affect risk of falls.  - [place] fall precautions as indicated by assessment.  - Educate pt/family on patient safety including physical limitations  - Instruct pt to call for assistance with activity based on assessment  - Modify environment to reduce risk of injury  - Provide assistive devices as appropriate  - Consider OT/PT consult to assist with strengthening/mobility  - Encourage toileting schedule  Outcome: Progressing     Problem: GASTROINTESTINAL - ADULT  Goal: Minimal or absence of nausea and vomiting  Description: INTERVENTIONS:  - Maintain adequate hydration with IV or PO as ordered and tolerated  - Nasogastric tube to low intermittent suction as ordered  - Evaluate effectiveness of ordered antiemetic medications  - Provide nonpharmacologic comfort measures as appropriate  - Advance diet as tolerated, if ordered  - Obtain nutritional consult as needed  - Evaluate fluid balance  Outcome: Progressing  Goal: Maintains or returns to baseline bowel function  Description: INTERVENTIONS:  - Assess bowel function  - Maintain adequate hydration with IV or PO as ordered and tolerated  - Evaluate effectiveness of GI medications  - Encourage mobilization and activity  - Obtain nutritional consult as needed  - Establish a toileting routine/schedule  - Consider collaborating with pharmacy to review patient's medication profile  Outcome: Progressing     Problem: SKIN/TISSUE INTEGRITY - ADULT  Goal: Skin integrity remains intact  Description: INTERVENTIONS  - Assess and document risk factors for pressure ulcer development  - Assess and document skin integrity  -  Monitor for areas of redness and/or skin breakdown  - Initiate interventions, skin care algorithm/standards of care as needed  Outcome: Progressing  Goal: Incision(s), wounds(s) or drain site(s) healing without S/S of infection  Description: INTERVENTIONS:  - Assess and document risk factors for pressure ulcer development  - Assess and document skin integrity  - Assess and document dressing/incision, wound bed, drain sites and surrounding tissue  - Implement wound care per orders  - Initiate isolation precautions as appropriate  - Initiate Pressure Ulcer prevention bundle as indicated  Outcome: Progressing  Goal: Oral mucous membranes remain intact  Description: INTERVENTIONS  - Assess oral mucosa and hygiene practices  - Implement preventative oral hygiene regimen  - Implement oral medicated treatments as ordered  Outcome: Progressing     Problem: MUSCULOSKELETAL - ADULT  Goal: Return mobility to safest level of function  Description: INTERVENTIONS:  - Assess patient stability and activity tolerance for standing, transferring and ambulating w/ or w/o assistive devices  - Assist with transfers and ambulation using safe patient handling equipment as needed  - Ensure adequate protection for wounds/incisions during mobilization  - Obtain PT/OT consults as needed  - Advance activity as appropriate  - Communicate ordered activity level and limitations with patient/family  Outcome: Progressing  Goal: Maintain proper alignment of affected body part  Description: INTERVENTIONS:  - Support and protect limb and body alignment per provider's orders  - Instruct and reinforce with patient and family use of appropriate assistive device and precautions (e.g. spinal or hip dislocation precautions)  Outcome: Progressing

## 2024-11-04 NOTE — SPIRITUAL CARE NOTE
Spiritual Care Visit Note    Patient Name: Sheri Garzon Date of Spiritual Care Visit: 24   : 1960 Primary Dx: Cellulitis of right lower extremity       Referred By: Referral From: MATT Quijano          Visit Type/Summary:     received referral for visit from MATT stout. Writer attempted to visit, patient was speaking with staff.     - Spiritual Care: Offered empathic listening and emotional support.  remains available as needed for follow up.    Spiritual Care support can be requested via an Epic consult.   For urgent/immediate needs, please contact the On Call  at: [facility]: ext [phone]    Chaplain Celso.

## 2024-11-04 NOTE — PROGRESS NOTES
[facility]  part of [facility]  Hospitalist Progress Note     Sheri Garzon Patient Status:  Inpatient    1960  64 year old CSN [id number]   Location 546/546-A Attending John Hinds MD   Hosp Day # 8 PCP TERI MCKENNA MD     Assessment & Plan:   ----------------------------------  Odynophagia/esophageal web.  Abnormal esophagram  -Encouraged oral intake, there may be a functional issue now that has developed  -GI consult  -Restart oral medications for now  -EGD  shows esophageal web which was dilated  -Diet as tolerated  -Psychiatry on consult, added Remeron - hopefully will help with appetite  -Palliative care consulted as well    Right lower extremity ulcer.  Possible overlying cellulitis on right foot dorsum possible.  Ulceration on the back of the right lower leg does not appear infected.  White count is normal.  Afebrile.  -Continue vancomycin for now  -Cxs show MRSA sensitive to bactrim  -outpt vascular evaluation if fails to heal    Hypoglycemia.  Due to poor oral intake.  Continue hypoglycemic protocol.  Strongly encouraged continued oral intake.  At this point her oral intake is the limiting factor to discharge  -Dietitian    Left upper extremity swelling.  May have some focal edema.  No evidence of DVT associated with PICC line.  PICC line is in proper place and functioning well.  -Manage expectantly  -DC IV fluids when acidosis improved, oral intake improved    Other problems  Metabolic acidosis  Anxiety  Asthma  Adrenal insufficiency  GERD  Hypertension  Dyslipidemia  History of coronary artery disease  Rheumatoid arthritis  Sleep apnea    DVT Mechanical Prophylaxis:     Early ambuation  DVT Pharmacologic Prophylaxis   Medication    heparin (Porcine) 5000 UNIT/ML injection 5,000 Units      DVT Pharmacologic prophylaxis: Aspirin 162 mg       code status: full  dispo:  11/3, refusing S[facility]NA.  I discussed with her that it is unlikely she will do well at home given her  level of debility  If applicable, malnutrition status at bottom of note    I personally reviewed the available laboratories, imaging including. I discussed/will discuss the case with consultants. I ordered laboratories and/or radiographic studies. I adjusted medications as detailed above.  Medical decision making high, risk is high.     Subjective:   ----------------------------------   Taking pills today.  Requesting grilled cheese sandwich.      10 ROS completed and otherwise negative.       Objective:   Chief Complaint:   Chief Complaint   Patient presents with    Nausea/Vomiting/Diarrhea     ----------------------------------  Temp:  [97.2 °F (36.2 °C)-98.1 °F (36.7 °C)] 97.9 °F (36.6 °C)  Pulse:  [] 89  Resp:  [16-18] 18  BP: ()/(63-88) 107/73  SpO2:  [99 %-100 %] 99 %    Gen: No acute distress  Pulm: Lungs clear, normal respiratory effort  CV: Heart with regular rate and rhythm  Abd: Abdomen soft, nontender, nondistended, bowel sounds present  Neuro: No acute focal deficits  MSK: moves extremities  Skin: Warm and dry  Psych: Normal affect  Ext: no c/c/e      Labs:  Lab Results   Component Value Date    HGB 9.8 (L) 11/02/2024    WBC 8.9 11/02/2024    .0 11/02/2024     11/04/2024    K 3.0 (L) 11/04/2024    K 3.0 (L) 11/04/2024    CREATSERUM 0.39 (L) 11/04/2024    AST 23 10/30/2024    ALT 31 10/30/2024            potassium chloride  40 mEq Intravenous Once    Followed by    potassium chloride  20 mEq Intravenous Once    mirtazapine  7.5 mg Oral Nightly    sodium ferric gluconate  125 mg Intravenous Daily    vancomycin  750 mg Intravenous Q24H    sucralfate  1 g Oral TID AC and HS (2200)    predniSONE  10 mg Oral BID    aspirin  81 mg Oral Daily    clopidogrel  75 mg Oral Daily    docusate sodium  100 mg Oral BID    hydroxychloroquine  200 mg Oral BID    lamoTRIgine  50 mg Oral BID    metoprolol tartrate  12.5 mg Oral BID    oxybutynin ER  10 mg Oral Daily    QUEtiapine  50 mg Oral Nightly     heparin  5,000 Units Subcutaneous Q8H FALLON    pantoprazole  40 mg Intravenous Q12H       acetaminophen    dextrose    glucose **OR** glucose **OR** glucose-vitamin C **OR** dextrose **OR** glucose **OR** glucose **OR** glucose-vitamin C    acetaminophen    albuterol    [DISCONTINUED] acetaminophen **OR** HYDROcodone-acetaminophen **OR** HYDROcodone-acetaminophen    melatonin    ondansetron    prochlorperazine      Dietitian Malnutrition Assessment    Evaluation for Malnutrition: Criteria for non-severe malnutrition diagnosis-         acute illness/injury related to Wt loss 5% in 1 month., Energy intake less than 75% for greater than 7 days.         RD Malnutrition Care Plan: Encouraged increased PO intake., Intiated ONS (oral nutritional supplements).    Body Fat/Muscle Mass: no wasting noted, malnutrition related to PO intake and weight loss        Physician Assessment     Patient has a diagnosis of moderate malnutrition

## 2024-11-04 NOTE — CONSULTS
[facility]  part of [facility]  Palliative Care Initial Consult Note    Sheri Garzon Patient Status:  Inpatient    1960 MRN [id number]   Location [facility] 5SW/SE Attending Marlena Morris MD   Hosp Day # 8 PCP TERI MCKENNA MD     Date of Consult: 2024  Patient seen at: [facility]    The  Cures Act makes medical notes like these available to patients in the interest of transparency. Please be advised this is a medical document. Medical documents are intended to carry relevant information, facts as evident, and the clinical opinion of the practitioner. The medical note is intended as peer to peer communication and may appear blunt or direct. It is written in medical language and may contain abbreviations or verbiage that are unfamiliar.     Reason for Consultation: Consult ordered by:: Dr Velasco for evaluation of Palliative Care needs and goals of care discussion.     Subjective     History of Present Illness: Sheri Garzon is a 64 year old female with history of anxiety, asthma, GERD, Pancreatitis, HTN, HLD, MI, RA, cellulitis, adrenal insufficiency, lower extremity wounds who was admitted on 10/27/2024 from the [facility] with N/V/D and difficulty eating/swallowing. Work up in our hospital revealed infected right lower extremity wound, gastritis/gastroduodenitis.  History was obtained from [facility] and the patient.      This is the 5th hospitalization in the past 3 months.    Today is day #8 of hospitalization.     When I entered the room, Sheri was in the bed she was watching a show on her phone. She is alert and oriented, pleasant affect on exam, breakfast tray was at the bedside she did not want to eat the breakfast, stated she wanted a grill cheese sandwich. Sheri states she doesn't feel hungry. C/O pain in left knee and left heel she states feels like stabbing pain, takes Norco which helps with pain relief.      Palliative Care symptom needs assessed:   Pertinent items are noted in HPI/below    Fatigue:  Yes Viviana states she is mainly in the bed  Dyspnea: denies   Current O2 therapy: RA  Cough: denies  Pain Present: left knee and left heel pain, stabbing pain she has taken Norco for the pain which helps relieve her pain. She has taken Norco 5/325 1 tab X2, Norco 5/325 2 tabs X1, Tylenol 650 X2, Zofran 4mg X1  Non-verbal signs of pain present: Yes  Appetite: poor she has been declining to eat, but states she is wanting to have grill cheese sandwich  Constipation: denies  Diarrhea: denies  Nausea/Vomiting: + intermittent nausea  Depression: episodic mood disorder, h/o bipolar  Anxiety: + anxiety    Palliative Care Social History:   Marital Status: Single  Children: 1 dtr  Living Situation Prior to Admit: Sheri was at [facility] facility prior to admission  Is Patient Confused: NO  Occupational History: retired Teacher    Substance History:  Smoking history: nonsmoker  Hx of Substance Use/Abuse: No    Spiritual Assessment:   Sikhism - No Visit  pt declines  visit    Past Medical History/Past Surgical History: obtained from [facility]  Past Medical History:    Allergic rhinitis    Anxiety    Arthritis    RA and Osteoarthritis    Asthma (HCC)    Depression    Not officially diagnosed    Esophageal reflux    Essential hypertension    High blood pressure    High cholesterol    Hyperlipidemia    Myocardial infarction (HCC)    Cardiac event    Osteoarthritis    Pancreatitis (HCC)    Problems with swallowing    RA (rheumatoid arthritis) (HCC)    Sleep apnea     Past Surgical History:   Procedure Laterality Date    Colonoscopy      Debridement  08/15/2024    Sharp excisional debridement of RLE posterior leg wound    Egd  07/26/2024    Hiatal hernia    Other surgical history      Revision of uterine anomaly      Rotator cuff repair      Wrist fracture surgery         Nutritional Status:  BMI:30 Weight: 164lbs  Weight changes: wt loss  of about 30lbs since August 2024  Current Appetite: poor  Dysphagia: denies -s/p esophageal dilatation    Family History: obtained from [facility]  Family History   Problem Relation Age of Onset    Diabetes Mother     Genetic Disease Mother     Heart Disorder Mother     Hypertension Mother     Obesity Mother     Diabetes Father     Hypertension Father     Depression Daughter     Psychiatric Daughter        Allergies:  Allergies[1]    Medications:     Current Facility-Administered Medications:     potassium chloride 40 mEq in 250mL sodium chloride 0.9% IVPB premix, 40 mEq, Intravenous, Once **FOLLOWED BY** potassium chloride 20 mEq/100mL IVPB premix 20 mEq, 20 mEq, Intravenous, Once    acetaminophen (Tylenol) 160 MG/5ML oral liquid 650 mg, 650 mg, Oral, Q4H PRN    dextrose 10% infusion, , Intravenous, Continuous    mirtazapine (Remeron) tab 7.5 mg, 7.5 mg, Oral, Nightly    dextrose 50% injection 50 mL, 50 mL, Intravenous, PRN    sodium ferric gluconate (Ferrlecit) 125 mg in sodium chloride 0.9% 100mL IVPB premix, 125 mg, Intravenous, Daily    glucose (Dex4) 15 GM/59ML oral liquid 15 g, 15 g, Oral, Q15 Min PRN **OR** glucose (Glutose) 40% oral gel 15 g, 15 g, Oral, Q15 Min PRN **OR** glucose-vitamin C (Dex-4) chewable tab 4 tablet, 4 tablet, Oral, Q15 Min PRN **OR** dextrose 50% injection 50 mL, 50 mL, Intravenous, Q15 Min PRN **OR** glucose (Dex4) 15 GM/59ML oral liquid 30 g, 30 g, Oral, Q15 Min PRN **OR** glucose (Glutose) 40% oral gel 30 g, 30 g, Oral, Q15 Min PRN **OR** glucose-vitamin C (Dex-4) chewable tab 8 tablet, 8 tablet, Oral, Q15 Min PRN    vancomycin (Vancocin) 750 mg in sodium chloride 0.9% 250 mL IVPB-ADDV, 750 mg, Intravenous, Q24H    acetaminophen (Tylenol) 160 MG/5ML oral liquid 650 mg, 650 mg, Oral, Q6H PRN    sucralfate (Carafate) 1 GM/10ML oral suspension 1 g, 1 g, Oral, TID AC and HS (2200)    predniSONE (Deltasone) tab 10 mg, 10 mg, Oral, BID    albuterol (Ventolin HFA) 108 (90 Base) MCG/ACT inhaler 2  puff, 2 puff, Inhalation, Q4H PRN    aspirin chewable tab 81 mg, 81 mg, Oral, Daily    clopidogrel (Plavix) tab 75 mg, 75 mg, Oral, Daily    docusate sodium (Colace) cap 100 mg, 100 mg, Oral, BID    hydroxychloroquine (Plaquenil) tab 200 mg, 200 mg, Oral, BID    lamoTRIgine (LaMICtal) tab 50 mg, 50 mg, Oral, BID    metoprolol tartrate (Lopressor) partial tab 12.5 mg, 12.5 mg, Oral, BID    oxybutynin ER (Ditropan-XL) 24 hr tab 10 mg, 10 mg, Oral, Daily    QUEtiapine (SEROquel) tab 50 mg, 50 mg, Oral, Nightly    heparin (Porcine) 5000 UNIT/ML injection 5,000 Units, 5,000 Units, Subcutaneous, Q8H FALLON    [DISCONTINUED] acetaminophen (Tylenol) tab 650 mg, 650 mg, Oral, Q4H PRN **OR** HYDROcodone-acetaminophen (Norco) 5-325 MG per tab 1 tablet, 1 tablet, Oral, Q4H PRN **OR** HYDROcodone-acetaminophen (Norco) 5-325 MG per tab 2 tablet, 2 tablet, Oral, Q4H PRN    melatonin tab 3 mg, 3 mg, Oral, Nightly PRN    ondansetron (Zofran) 4 MG/2ML injection 4 mg, 4 mg, Intravenous, Q6H PRN    prochlorperazine (Compazine) 10 MG/2ML injection 5 mg, 5 mg, Intravenous, Q8H PRN    pantoprazole (Protonix) 40 mg in sodium chloride 0.9% PF 10 mL IV push, 40 mg, Intravenous, Q12H    Functional Status History:  Falls: Yes, Viviana states she has fallen while at the [place] [facility] she stated she fell and landed on her left knee   ADLs: Viviana requires some assistance with all adl's, she has not been eating well due to difficulty swallowing, she has had +wt loss of about 30+lbs since August of this year. Sheri stated she is mainly in the bed, she has a couple of lower extremity wounds. She is cognitively intact.     Palliative Performance Scale:   Prior to admission: (pt/family reported) 50 %  Observed during hospitalization: 40 %  % Ambulation Activity Level Self-Care Intake Consciousness   100 Full  Normal  No Disease Full Normal Full   90 Full  Normal  Some Disease Full Normal Full   80 Full  Normal w/effort  Some Disease Full Normal or  reduced Full   70 Reduced  Can't Perform Job  Some Disease Full Normal or reduced Full   60 Reduced  Can't Perform Hobby   Significant Disease Occ Assist Normal or reduced Full or confused   50 Mainly sit/lie Can't do any work  Extensive Disease Partial Assist Normal or reduced Full or confused   40 Mainly in bed Can't do any work  Extensive Disease Mainly Assist Normal or reduced Full or confused   30 Bed Bound Can't do any work  Extensive Disease Max Assist  Total Care Reduced  Drowsy/confused   20 Bed Bound Can't do any work  Extensive Disease Max Assist  Total Care Minimal  Drowsy/confused   10 Bed Bound Can't do any work  Extensive Disease Max Assist  Total Care Mouth Care  Drowsy/confused   0 Death        Objective      Vital Signs:  Blood pressure 107/73, pulse 89, temperature 97.9 °F (36.6 °C), temperature source Oral, resp. rate 18, height 5' 2\" (1.575 m), weight 164 lb (74.4 kg), SpO2 99%.  Body mass index is 30 kg/m².  Present Level of pain: 6/10  Non-verbal signs of pain present: No    General: Viviana is lying in the bed, watching show on her phone, she is awake and alert, pleasant with flat affect.  HEENT: very dry oral MM and lips, lower gums pale appearing.   Cardiac: Regular rate and rhythm, S1, S2 normal, no murmur, rub or gallop.  Lungs: Clear without wheezes, rales, rhonchi or dullness.  Normal excursions and effort.  Abdomen: Soft, non-tender, +bowel sounds, no rebound or guarding. + BM  Extremities: Without clubbing, cyanosis. BLE trace Edema present. + upper extremity edema  Neurologic: Alert and oriented X4, pleasant with flat affect, normal affect.  Skin: Warm and dry.  Lower left heel wound and right lower extremity wound.    Hematology:  Lab Results   Component Value Date    WBC 8.9 11/02/2024    HGB 9.8 (L) 11/02/2024    HCT 30.6 (L) 11/02/2024    .0 11/02/2024       Coags:No results found for: \"PT\", \"INR\", \"PTT\"    Chemistry:  Lab Results   Component Value Date    CREATSERUM 0.39 (L)  11/04/2024    BUN <5 (L) 11/04/2024     11/04/2024    K 3.0 (L) 11/04/2024    K 3.0 (L) 11/04/2024     (H) 11/04/2024    CO2 15.0 (L) 11/04/2024     (H) 11/04/2024    CA 6.1 (L) 11/04/2024    ALB 2.1 (L) 11/02/2024    ALKPHO 331 (H) 10/30/2024    BILT 0.2 10/30/2024    TP 3.9 (L) 10/30/2024    AST 23 10/30/2024    ALT 31 10/30/2024    DDIMER 1.69 (H) 07/23/2024    MG 2.0 10/30/2024    PHOS 3.4 11/03/2024       Imaging:  US VENOUS DOPPLER ARM LEFT - DIAG IMG (CPT=93971)    Result Date: 11/3/2024  CONCLUSION:    No sonographic evidence of acute deep venous thrombosis within the visualized upper extremity veins from the internal jugular to axillary veins.  More distal veins of the upper extremity including the brachial, cephalic, and basilic veins could not be visualized/evaluated, due to extensive subcutaneous edema of the upper extremity in these regions.  Left PICC seen within the left subclavian vein.       Dictated by (CST): Isha Hargrove MD on 11/03/2024 at 8:55 AM     Finalized by (CST): Isha Hargrove MD on 11/03/2024 at 8:58 AM          XR CHEST AP PORTABLE  (CPT=71045)    Result Date: 11/3/2024  CONCLUSION:   Left PICC with tip in the expected location of the lower superior vena cava.      Dictated by (CST): Isha Hargrove MD on 11/03/2024 at 8:08 AM     Finalized by (CST): Isha Hargrove MD on 11/03/2024 at 8:09 AM             Summary of Discussion      I discussed reason for palliative care consultation with Sheri    I differentiated the palliative treatment-focus model versus the hospice comfort-focused philosophy of care. I informed the patient/family that having palliative care support does not limit medical treatment options or decisions to those who wish to continue curative or restorative medical therapies. I discussed the benefits of palliative care to include assistance with arising symptom management needs, an extra layer of support, to ensure GOC are respected  throughout healthcare continuum, and assist with transition to hospice care when appropriate.      Outpatient/Community Palliative Care Services:  Usually visit once per 4 weeks  Focus on GOC and symptom management   Palliative Care criteria:  Not altered by prognosis   Does not limit curative or restorative therapies      Prognostic awareness/understanding:   Viviana has been declining to take her medications and declining to eat. Discussed with Viviana who stated she wasn't very hungry. She has lost about 30+lbs since August of this year. Viviana had EGD 11/2/24 showed esophageal web which was dilated by GI. Viviana stated she was not interested in breakfast but would like to have grill cheese sandwich. Remeron was started yesterday.  Viviana states she would not want feeding tube if needed. We discussed continuing not to eat/no nutrition will lead to further decline, wt loss and then death Viviana stated she did not want to die.   Discussed nutrition for healing wounds and increasing strength. Viviana agreed    Hopes/goals:   Viviana expresses she did not feel that the staff were very nice to her at the [facility] she would like to discharge to another facility upon discharge.     Fears/concerns:   Viviana does not want to return to [facility] upon discharge,     Advance Care Planning counseling and discussion:   I discussed the importance of advance care planning prior to crisis with Viviana who stated she has HCPOA pw but would like to change her HCPOA. I confirmed that patient's HCPOA is her dtr Ugo and sister [place].   I provided blank HCPOA pw to Viviana who will review. Viviana declined filling out HCPOA pw today. Viviana stated she will review and will plan on filling out pw tomorrow.     Assessment and Recommendations         Cellulitis of right lower extremity/wound    Hypoglycemia    Left upper extremity swelling    Nausea and vomiting    Episodic mood disorder-Psyche following    Odynophagia/esophageal web-EGD 11/2 esophageal  web dilated by GI    Malnutrition-nutritional supplements    Anxiety    Adrenal insufficiency    GERD    HTN    HLD    RA    CAD    NIKOLAI    Lower extremity wounds    Pain  -continue with Norco 5/325 1-2 tabs as needed  -Tylenol as needed    Decreased appetite  -on Remeron    Goals of care counseling  -see above for details  -Pt is Full Code  -Agreeable to palliative care following  -Dispo: [facility] with [facility]. SW to help with dc planning.   -declined  services  -ongoing GOC discussions may be needed over time.  -Viviana is expressing she would not want feeding tube  -Provided emotional support to pt who is coping adequately    Advance care planning  -see above for details  -Pt's Dtr Rod Hoover is HC surrogate [phone]  -Sheri wishes to fill out HCPOA pw tomorrow blank copy left with Viviana to review.     Palliative Performance Scale 40%    Discussed today's visit with Dr Velasco, Yessenia CHUNG, Dr Boles and Ashutosh PATEL    Palliative Care Follow Up: Palliative care team will follow up tomorrow.  Feel free to contact our team with any questions or concerns.    Thank you for allowing Palliative Care services to participate in the care of Sheri Garzon.    A total of 55 minutes were spent on this consult, which included all of the following: chart review, direct face to face contact, history taking, physical examination, counseling and coordinating care, and documentation.     Saba Mauricio, APRN [id number]  11/4/2024  1:16PM  Palliative Care Services         [1]   Allergies  Allergen Reactions    Cephalosporins ANAPHYLAXIS, NAUSEA AND VOMITING and OTHER (SEE COMMENTS)     Other reaction(s): Fever    - Tolerated multiple doses of ceftriaxone on 7/2024 without any complications  - Tolerated multiple doses of cefepime 8/2024 without complications    Gadolinium Derivatives FEVER    Penicillins OTHER (SEE COMMENTS)     esophagitis    Sulfa Antibiotics OTHER (SEE COMMENTS)     esophagitis    Bananas ITCHING

## 2024-11-04 NOTE — PHYSICAL THERAPY NOTE
PT follow-up treatment attempted. RN approved activity. Patient requesting time to eat breakfast prior to working with therapy, however, declining current breakfast tray present in room. Offered to assist patient with transferring to bedside chair in order to eat, continuing to decline. RN aware. Will re-schedule PT follow-up.     Jenna Haase, PT, DPT  [facility]  Ext: [phone]

## 2024-11-04 NOTE — PROGRESS NOTES
[facility]     Gastroenterology Progress Note    Sheri Garzon Patient Status:  Inpatient    1960 MRN [id number]   Location [facility] 5SW/SE Attending Marlena Morris MD   Hosp Day # 8 PCP TERI MCKENNA MD       Subjective:   Pt states her swallowing is much improved  Confirmed with RN that Pt swallowed morning pills without issue  Pt did not have breakfast because she wants grilled cheese, she will attempt at lunch  No current complaints or distress  Objective:   Blood pressure 107/73, pulse 89, temperature 97.9 °F (36.6 °C), temperature source Oral, resp. rate 18, height 5' 2\" (1.575 m), weight 164 lb (74.4 kg), SpO2 99%. Body mass index is 30 kg/m².    Gen: awake, alert patient, NAD  HEENT: EOMI, the sclera appears anicteric, oropharynx clear, mucus membranes appear moist  CV: RRR  Lung: no conversational dyspnea   Abdomen: soft NTND abdomen with NABS appreciated   Skin: dry, warm, no jaundice  Ext: no LE edema is evident  Neuro: Alert, oriented x3 and interactive  Psych: calm, cooperative    Assessment and Plan:   Sheri Garzon is a 64 year old female w/ PMHx of GERD, HTN, asthma, HLD, RA on plaquenil, CAD on ASA/PLAVIX who presents with intractable nausea and emesis and right leg wound.      #Nausea  #Dysphagia   -worsening symptoms over last 3 weeks including increase in GERD without PPI therapy  -last EGD 2024 without mass, ulcer or stricture, CT a/p with non specific gastric wall thickening   -barium esophogram with luminal narrowing/web-like stricture of the cervical esophagus measuring 4.5 mm with a large amount of reflux and HH.   -initial plan for EGD 10/31; however, canceled due to recent plavix use (10/28).   -s/p EGD :   Impression:  1.  Cervical esophageal web(s).  The etiology could be related to iron deficiency and the [place]-Jose syndrome (iron saturation 12%).  An immune mediated etiology should be excluded as well.  2.  Small hiatal  hernia  3.  Status post esophageal dilatation to 12 mm TTS  -Path pending  -Pt states swallowing is much improved without difficult taking morning medications.  Awaiting ability to order grilled cheese to eat.     -Continue IV iron repletion along with carafate TID and PPI BID    Recommend:  -Await pathology  -Diet as tolerated  -IV PPI BID, transition to PO at discharge  -Carafate TID with meals  -IV iron repletion    GI available as needed.  Please call with further questions or concerns.    Case discussed with Maryse Hardin MD and Pura CHUNG.    Margaret Levy DNP, FN-[facility]  11/4/2024      Results:     Lab Results   Component Value Date    WBC 8.9 11/02/2024    HGB 9.8 (L) 11/02/2024    HCT 30.6 (L) 11/02/2024    .0 11/02/2024    CREATSERUM 0.39 (L) 11/04/2024    BUN <5 (L) 11/04/2024     11/04/2024    K 3.0 (L) 11/04/2024    K 3.0 (L) 11/04/2024     (H) 11/04/2024    CO2 15.0 (L) 11/04/2024     (H) 11/04/2024    CA 6.1 (L) 11/04/2024    ALB 2.1 (L) 11/02/2024    ALKPHO 331 (H) 10/30/2024    BILT 0.2 10/30/2024    TP 3.9 (L) 10/30/2024    AST 23 10/30/2024    ALT 31 10/30/2024    LIP 31 10/27/2024    DDIMER 1.69 (H) 07/23/2024    MG 2.0 10/30/2024    PHOS 3.4 11/03/2024    CK 50 08/27/2024    B12 748 08/21/2024    ETOH <3 09/05/2024       US VENOUS DOPPLER ARM LEFT - DIAG IMG (CPT=93971)    Result Date: 11/3/2024  CONCLUSION:    No sonographic evidence of acute deep venous thrombosis within the visualized upper extremity veins from the internal jugular to axillary veins.  More distal veins of the upper extremity including the brachial, cephalic, and basilic veins could not be visualized/evaluated, due to extensive subcutaneous edema of the upper extremity in these regions.  Left PICC seen within the left subclavian vein.       Dictated by (CST): Isha Hargrove MD on 11/03/2024 at 8:55 AM     Finalized by (CST): Isha Hargrove MD on 11/03/2024 at 8:58 AM           XR CHEST AP PORTABLE  (CPT=71045)    Result Date: 11/3/2024  CONCLUSION:   Left PICC with tip in the expected location of the lower superior vena cava.      Dictated by (CST): Isha Hargrove MD on 11/03/2024 at 8:08 AM     Finalized by (CST): Isha Hargrove MD on 11/03/2024 at 8:09 AM         EKG 12 Lead    Result Date: 11/3/2024  Sinus tachycardia Nonspecific T wave abnormality Abnormal ECG When compared with ECG of 27-OCT-2024 17:42, Criteria for Anterior infarct are no longer Present

## 2024-11-04 NOTE — PAYOR COMM NOTE
--------------  CONTINUED STAY REVIEW    Payor: [employer]  Subscriber #:  [id number]  Authorization Number: [id number]    Admit date: 10/27/24  Admit time: 11:57 PM    11/2/24   Assessment & Plan:   Nausea/vomiting.   CT scan unremarkable.  -Antiemetics as needed  -Gentle IV fluids, change to bicarb solution  -Advance diet as tolerated  -PPI     Odynophagia/esophageal web.  Abnormal esophagram  -GI consult  -Hold oral medications for now  -EGD 11/2 shows esophageal web which was dilated  -Diet as tolerated     Right lower extremity ulcer.  Possible overlying cellulitis on right foot dorsum possible.  Ulceration on the back of the right lower leg does not appear infected.  White count is normal.  Afebrile.  -Continue vancomycin for now  -Cxs show MRSA sensitive to bactrim  -outpt vascular evaluation if fails to heal     Left foot pain.  Now complaining of moderate pain in the left fourth and fifth digits.  No injury.  The toes are warm, well-perfused.  Sensation intact.  -X-rays neg, prob OA     Hypoglycemia.  Due to poor oral intake.  -Accu-Cheks  -Dietitian     Temp:  [97.7 °F (36.5 °C)-98.2 °F (36.8 °C)] 98 °F (36.7 °C)  Pulse:  [] 110  Resp:  [13-36] 18  BP: (100-134)/(52-89) 102/83  SpO2:  [92 %-100 %] 98 %  Gen: A+Ox3.    HEENT: NCAT, neck supple, no carotid bruit.  CV: RRR, S1S2, and intact distal pulses. No gallop, rub, murmur.  Pulm: Effort and breath sounds normal. No distress, wheezes, rales, rhonchi.  Abd: Soft, NTND, BS normal, no mass, no HSM, no rebound/guarding.   Neuro:  Normal reflexes, CN. Sensory/motor exams grossly normal deficit.   MS: No joint effusions.  No peripheral edema.  Right lower extremity with 2 ulcerations.  1 on the lower leg posteriorly.  Another on the dorsum of the right foot.  No surrounding erythema.  No purulence expressed.  Minimal tenderness.  The feet are well-perfused, warm.  Sensation intact  Skin: Skin is warm and dry. No rashes, erythema,  diaphoresis.   Psych:   Normal mood and affect. Calm, cooperative     Lab Results   Component Value Date     HGB 9.8 (L) 11/02/2024     WBC 8.9 11/02/2024     .0 11/02/2024      11/02/2024     K 3.8 11/02/2024     CREATSERUM 0.53 (L) 11/02/2024       sodium phosphate  15 mmol Intravenous Once    sodium ferric gluconate  125 mg Intravenous Daily    vancomycin  750 mg Intravenous Q24H    sucralfate  1 g Oral TID AC and HS (2200)    predniSONE  10 mg Oral BID    aspirin  81 mg Oral Daily    [Held by provider] clopidogrel  75 mg Oral Daily    docusate sodium  100 mg Oral BID    hydroxychloroquine  200 mg Oral BID    lamoTRIgine  50 mg Oral BID    metoprolol tartrate  12.5 mg Oral BID    oxybutynin ER  10 mg Oral Daily    QUEtiapine  50 mg Oral Nightly    [Held by provider] heparin  5,000 Units Subcutaneous Q8H FALLON    pantoprazole  40 mg Intravenous Q12H          11/3/24  Assessment & Plan:   Odynophagia/esophageal web.  Abnormal esophagram  -Encouraged oral intake, there may be a functional issue now that is developed  -GI consult  -Restart oral medications for now  -EGD 11/2 shows esophageal web which was dilated  -Diet as tolerated  -Psychiatry on consult     Right lower extremity ulcer.  Possible overlying cellulitis on right foot dorsum possible.  Ulceration on the back of the right lower leg does not appear infected.  White count is normal.  Afebrile.  -Continue vancomycin for now  -Cxs show MRSA sensitive to bactrim  -outpt vascular evaluation if fails to heal     Hypoglycemia.  Due to poor oral intake.  Continue hypoglycemic protocol.  Strongly encouraged continued oral intake.  At this point her oral intake is the limiting factor to discharge  -Dietitian     Left upper extremity swelling.  May have some focal edema.  No evidence of DVT associated with PICC line.  PICC line is in proper place and functioning well.  -Manage expectantly  -DC IV fluids when acidosis improved, oral intake improved      Subjective:   Still reluctant to take oral intake.  Has not really tried any pills.     Abd: Soft, NTND, BS normal, no mass, no HSM, no rebound/guarding.   Neuro:  Normal reflexes, CN. Sensory/motor exams grossly normal deficit.   MS: No joint effusions.  No peripheral edema.  Right lower extremity with 2 ulcerations.  1 on the lower leg posteriorly.  Another on the dorsum of the right foot.  No surrounding erythema.  No purulence expressed.  Minimal tenderness.  The feet are well-perfused, warm.  Sensation intact  Skin: Skin is warm and dry. No rashes, erythema, diaphoresis.   Psych:   Normal mood and affect. Calm, cooperative     Lab Results   Component Value Date     HGB 9.8 (L) 11/02/2024     WBC 8.9 11/02/2024     .0 11/02/2024      11/03/2024     K 3.3 (L) 11/03/2024     CREATSERUM 0.51 (L) 11/03/2024       MEDICATIONS ADMINISTERED IN LAST 1 DAY:  acetaminophen (Tylenol) 160 MG/5ML oral liquid 650 mg       Date Action Dose Route User    11/3/2024 1050 Given 650 mg Oral Laura Rodas RN          aspirin chewable tab 81 mg       Date Action Dose Route User    11/4/2024 0911 Given 81 mg Oral Pura Gonzalez RN          clopidogrel (Plavix) tab 75 mg       Date Action Dose Route User    11/4/2024 0911 Given 75 mg Oral Pura Gonzalez RN          dextrose 10% infusion       Date Action Dose Route User    11/4/2024 0859 New Bag (none) Intravenous Pura Gonzalez RN    11/3/2024 1836 New Bag (none) Intravenous Laura Rodas RN          dextrose 50% injection 50 mL       Date Action Dose Route User    11/3/2024 1044 Given 50 mL Intravenous Laura Rodas RN          heparin (Porcine) 5000 UNIT/ML injection 5,000 Units       Date Action Dose Route User    11/4/2024 0655 Given 5,000 Units Subcutaneous (Right Upper Arm) Arlene Glez RN    11/3/2024 2111 Given 5,000 Units Subcutaneous (Right Upper Arm) Arlene Glez RN    11/3/2024 1414 Given 5,000 Units Subcutaneous (Right Upper Arm)  Clark, Laura, RN          HYDROcodone-acetaminophen (Norco) 5-325 MG per tab 1 tablet       Date Action Dose Route User    11/4/2024 0910 Given 1 tablet Oral Pura Gonzalez RN    11/3/2024 1646 Given 1 tablet Oral Laura Rodas RN          HYDROcodone-acetaminophen ([place]) 5-325 MG per tab 2 tablet       Date Action Dose Route User    11/3/2024 2351 Given 2 tablet Oral Arlene Glez RN          hydroxychloroquine (Plaquenil) tab 200 mg       Date Action Dose Route User    11/4/2024 0911 Given 200 mg Oral Pura Gonzalez RN    11/3/2024 2117 Given 200 mg Oral Arlene Glez RN          lamoTRIgine (LaMICtal) tab 50 mg       Date Action Dose Route User    11/4/2024 0911 Given 50 mg Oral Pura Gonzalez RN    11/3/2024 2115 Given 50 mg Oral Arlene Glez RN          metoprolol tartrate (Lopressor) partial tab 12.5 mg       Date Action Dose Route User    11/4/2024 0909 Given 12.5 mg Oral Pura Gonzalez RN    11/3/2024 2113 Given 12.5 mg Oral Arlene Glez RN          mirtazapine (Remeron) tab 7.5 mg       Date Action Dose Route User    11/3/2024 2118 Given 7.5 mg Oral Arlene Glez RN          ondansetron (Zofran) 4 MG/2ML injection 4 mg       Date Action Dose Route User    11/3/2024 1256 Given 4 mg Intravenous Michelle Kearns RN          oxybutynin ER (Ditropan-XL) 24 hr tab 10 mg       Date Action Dose Route User    11/4/2024 0910 Given 10 mg Oral Pura Gonzalez RN          pantoprazole (Protonix) 40 mg in sodium chloride 0.9% PF 10 mL IV push       Date Action Dose Route User    11/4/2024 0300 Given 40 mg Intravenous Arlene Glez RN    11/3/2024 1414 Given 40 mg Intravenous Laura Rodas RN          potassium chloride 40 mEq in 250mL sodium chloride 0.9% IVPB premix       Date Action Dose Route User    11/3/2024 1400 New Bag 40 mEq Intravenous Laura Rodas, RN          predniSONE (Deltasone) tab 10 mg       Date Action Dose Route User    11/4/2024 0911 Given 10 mg Oral Gonzalez,  JULIET Neves    11/3/2024 2115 Given 10 mg Oral Arlene Glez RN          QUEtiapine (SEROquel) tab 50 mg       Date Action Dose Route User    11/3/2024 2118 Given 50 mg Oral Arlene Glez RN          sodium bicarbonate 150 mEq in dextrose 5% 1,000 mL infusion       Date Action Dose Route User    11/3/2024 1104 Restarted (none) Intravenous Laura Rodas RN          sodium ferric gluconate (Ferrlecit) 125 mg in sodium chloride 0.9% 100mL IVPB premix       Date Action Dose Route User    11/4/2024 0919 New Bag 125 mg Intravenous Pura Gonzalez RN    11/3/2024 1210 New Bag 125 mg Intravenous Laura Rodas RN            Vitals (last day)       Date/Time Temp Pulse Resp BP SpO2 Weight O2 Device O2 Flow Rate (L/min) [facility]    11/04/24 0907 -- -- -- 107/73 -- -- -- -- AA    11/04/24 0900 97.9 °F (36.6 °C) 89 18 87/75 99 % -- None (Room air) -- AA    11/04/24 0655 98.1 °F (36.7 °C) 83 16 102/63 99 % -- None (Room air) -- AR    11/03/24 2106 98 °F (36.7 °C) 115 18 115/88 100 % -- None (Room air) -- AR    11/03/24 1636 97.2 °F (36.2 °C) 100 16 125/80 100 % -- None (Room air) -- MS

## 2024-11-04 NOTE — PROGRESS NOTES
[facility] Liaison received palliative referral for community PC services. Waiting to obtain insurance (verification/authorization) prior to pursuing.    3:13 pm: Insurance verified, co-pay $45. Liaison will follow-up with patient/family for CPC.      Rachel Robertson  Residential Liaison  [phone]

## 2024-11-04 NOTE — PROGRESS NOTES
Patient continues to be hypoglycemic.  She is refusing to eat almost anything.  At this point it is not due to inability to swallow as she had recently dilation of her esophageal web.  There may be a psych illogical component.  This is the cause of her hypoglycemia.  Awaiting psychiatric reevaluation.  May need endocrinology consult if continues to be hypoglycemic despite improving her oral intake.    Notified that patient is mildly tachycardic without symptoms.  Blood pressure is good.  This was with activity.  Patient is not active at baseline and even transferring is considered significant activity for her.  I suspect this is appropriate reactive tachycardia.  EKG done reading pending

## 2024-11-04 NOTE — PLAN OF CARE
Problem: Patient Centered Care  Goal: Patient preferences are identified and integrated in the patient's plan of care  Description: Interventions:  - What would you like us to know as we care for you? \"I fell twice at rehab\"  - Provide timely, complete, and accurate information to patient/family  - Incorporate patient and family knowledge, values, beliefs, and cultural backgrounds into the planning and delivery of care  - Encourage patient/family to participate in care and decision-making at the level they choose  - Honor patient and family perspectives and choices  Outcome: Progressing     Problem: Patient/Family Goals  Goal: Patient/Family Long Term Goal  Description: Patient's Long Term Goal: Discharge home    Interventions:  - IVF  -Wound care  -IV antibiotics  -PT/OT  - See additional Care Plan goals for specific interventions  Outcome: Progressing  Goal: Patient/Family Short Term Goal  Description: Patient's Short Term Goal: Regain Strength    Interventions:   - PT/OT  - Dietary consult  - See additional Care Plan goals for specific interventions  Outcome: Progressing     Problem: PAIN - ADULT  Goal: Verbalizes/displays adequate comfort level or patient's stated pain goal  Description: INTERVENTIONS:  - Encourage pt to monitor pain and request assistance  - Assess pain using appropriate pain scale  - Administer analgesics based on type and severity of pain and evaluate response  - Implement non-pharmacological measures as appropriate and evaluate response  - Consider cultural and social influences on pain and pain management  - Manage/alleviate anxiety  - Utilize distraction and/or relaxation techniques  - Monitor for opioid side effects  - Notify MD/LIP if interventions unsuccessful or patient reports new pain  - Anticipate increased pain with activity and pre-medicate as appropriate  Outcome: Progressing     Problem: RISK FOR INFECTION - ADULT  Goal: Absence of fever/infection during anticipated  neutropenic period  Description: INTERVENTIONS  - Monitor WBC  - Administer growth factors as ordered  - Implement neutropenic guidelines  Outcome: Progressing     Problem: SAFETY ADULT - FALL  Goal: Free from fall injury  Description: INTERVENTIONS:  - Assess pt frequently for physical needs  - Identify cognitive and physical deficits and behaviors that affect risk of falls.  - [place] fall precautions as indicated by assessment.  - Educate pt/family on patient safety including physical limitations  - Instruct pt to call for assistance with activity based on assessment  - Modify environment to reduce risk of injury  - Provide assistive devices as appropriate  - Consider OT/PT consult to assist with strengthening/mobility  - Encourage toileting schedule  Outcome: Progressing     Problem: GASTROINTESTINAL - ADULT  Goal: Minimal or absence of nausea and vomiting  Description: INTERVENTIONS:  - Maintain adequate hydration with IV or PO as ordered and tolerated  - Nasogastric tube to low intermittent suction as ordered  - Evaluate effectiveness of ordered antiemetic medications  - Provide nonpharmacologic comfort measures as appropriate  - Advance diet as tolerated, if ordered  - Obtain nutritional consult as needed  - Evaluate fluid balance  Outcome: Progressing  Goal: Maintains or returns to baseline bowel function  Description: INTERVENTIONS:  - Assess bowel function  - Maintain adequate hydration with IV or PO as ordered and tolerated  - Evaluate effectiveness of GI medications  - Encourage mobilization and activity  - Obtain nutritional consult as needed  - Establish a toileting routine/schedule  - Consider collaborating with pharmacy to review patient's medication profile  Outcome: Progressing     Problem: SKIN/TISSUE INTEGRITY - ADULT  Goal: Skin integrity remains intact  Description: INTERVENTIONS  - Assess and document risk factors for pressure ulcer development  - Assess and document skin integrity  -  Monitor for areas of redness and/or skin breakdown  - Initiate interventions, skin care algorithm/standards of care as needed  Outcome: Progressing  Goal: Incision(s), wounds(s) or drain site(s) healing without S/S of infection  Description: INTERVENTIONS:  - Assess and document risk factors for pressure ulcer development  - Assess and document skin integrity  - Assess and document dressing/incision, wound bed, drain sites and surrounding tissue  - Implement wound care per orders  - Initiate isolation precautions as appropriate  - Initiate Pressure Ulcer prevention bundle as indicated  Outcome: Progressing  Goal: Oral mucous membranes remain intact  Description: INTERVENTIONS  - Assess oral mucosa and hygiene practices  - Implement preventative oral hygiene regimen  - Implement oral medicated treatments as ordered  Outcome: Progressing     Problem: MUSCULOSKELETAL - ADULT  Goal: Return mobility to safest level of function  Description: INTERVENTIONS:  - Assess patient stability and activity tolerance for standing, transferring and ambulating w/ or w/o assistive devices  - Assist with transfers and ambulation using safe patient handling equipment as needed  - Ensure adequate protection for wounds/incisions during mobilization  - Obtain PT/OT consults as needed  - Advance activity as appropriate  - Communicate ordered activity level and limitations with patient/family  Outcome: Progressing  Goal: Maintain proper alignment of affected body part  Description: INTERVENTIONS:  - Support and protect limb and body alignment per provider's orders  - Instruct and reinforce with patient and family use of appropriate assistive device and precautions (e.g. spinal or hip dislocation precautions)  Outcome: Progressing

## 2024-11-04 NOTE — PROGRESS NOTES
Patient is a 64 year old -American,female with past medical history of hypertension, rheumatoid arthritis, obesity, chronic adrenal insufficiency secondary to chronic corticosteroid use, asthma, pancreatitis, obstructive sleep apnea, obesity, hyperlipidemia, anemia, chronic kidney disease  nonocclusive coronary artery disease, and peripheral arterial disease who also has past mental lorenzo history of anxiety and depression  who was admitted to the hospital for Cellulitis of right lower extremity: The patient has been demonstrating increased anxiety and depressive symptoms.Patient indicated for psych consult for evaluation and advise.  Consult Duration   The patient seen for over 50-minute, follow-up evaluation, over 50% counseling and coordinating care addressing anxiety, depression.    Record reviewed, communication with attending, communication with RN and patient seen face to face evaluation.  History of Present Illness:   Communicating with the team, patient was reported difficulty swallowing over the weekend and was not eating or taking her medications.     The patient receiving Lamictal 50 mg BID, Remeron 7.5 mg, Seroquel 50 mg    The patient is seen today in her room the patient was eating a grilled cheese prior to my arrival.     The patient stating that she is feeling find today and just wants to get better and go home.     She reports that she has been struggling with her loss of independence.     The patient reporting that she continue finding herself anxious with difficulty relaxing and difficulty sleeping. Patient reporting that when she go to bed she feels nervous about the possibility of feeling to sleep.     Patient reporting that she has been making effort to drink and swallow her pills and that she took her medications this morning.      The patient is not demonstrating any apryl or psychosis  The patient denies any auditory or visual hallucinations  Patient denying any auditory visual  hallucination.    Provided patient with supportive psychotherapy including listening, emotional support and encouragement.     Past Psychiatric/Medication History:  1. Prior diagnoses: Depression, Anxiety  2. Past psychiatric inpatient: Denies           3. Past outpatient history: Denies  4. Past suicide history: None  5. Medication history: Denies     Social History:   The patient is a 64 year old  female. She is a retired  of 35 years. She retired and moved to [place] with her daughter. Her health declined since retiring and moving to [place] and moved back to [place] with her daughter in June 2024. She and her daughter moved back in with her eldest sister, Gladys. She was admitted to rehab for decreased mobility and chronic wound care on multiple occasions.      Family History:  Daughter: depression  Medical History:   Past Medical History  Past Medical History:    Allergic rhinitis    Anxiety    Arthritis    RA and Osteoarthritis    Asthma (HCC)    Depression    Not officially diagnosed    Esophageal reflux    Essential hypertension    High blood pressure    High cholesterol    Hyperlipidemia    Myocardial infarction (HCC)    Cardiac event    Osteoarthritis    Pancreatitis (HCC)    Problems with swallowing    RA (rheumatoid arthritis) (HCC)    Sleep apnea       Past Surgical History  Past Surgical History:   Procedure Laterality Date    Colonoscopy      Debridement  08/15/2024    Sharp excisional debridement of RLE posterior leg wound    Egd  07/26/2024    Hiatal hernia    Other surgical history      Revision of uterine anomaly      Rotator cuff repair      Wrist fracture surgery         Family History  Family History   Problem Relation Age of Onset    Diabetes Mother     Genetic Disease Mother     Heart Disorder Mother     Hypertension Mother     Obesity Mother     Diabetes Father     Hypertension Father     Depression Daughter     Psychiatric Daughter        Social History  Social  History     Socioeconomic History    Marital status: Single   Tobacco Use    Smoking status: Never    Smokeless tobacco: Never   Vaping Use    Vaping status: Never Used   Substance and Sexual Activity    Alcohol use: Not Currently    Drug use: Never     Social Drivers of Health     Food Insecurity: No Food Insecurity (10/28/2024)    Food Insecurity     Food Insecurity: Never true   Transportation Needs: No Transportation Needs (10/28/2024)    Transportation Needs     Lack of Transportation: No   Housing Stability: Low Risk  (10/28/2024)    Housing Stability     Housing Instability: No           Current Medications:  Current Facility-Administered Medications   Medication Dose Route Frequency    potassium chloride 40 mEq in 250mL sodium chloride 0.9% IVPB premix  40 mEq Intravenous Once    Followed by    potassium chloride 20 mEq/100mL IVPB premix 20 mEq  20 mEq Intravenous Once    acetaminophen (Tylenol) 160 MG/5ML oral liquid 650 mg  650 mg Oral Q4H PRN    dextrose 10% infusion   Intravenous Continuous    mirtazapine (Remeron) tab 7.5 mg  7.5 mg Oral Nightly    dextrose 50% injection 50 mL  50 mL Intravenous PRN    sodium ferric gluconate (Ferrlecit) 125 mg in sodium chloride 0.9% 100mL IVPB premix  125 mg Intravenous Daily    glucose (Dex4) 15 GM/59ML oral liquid 15 g  15 g Oral Q15 Min PRN    Or    glucose (Glutose) 40% oral gel 15 g  15 g Oral Q15 Min PRN    Or    glucose-vitamin C (Dex-4) chewable tab 4 tablet  4 tablet Oral Q15 Min PRN    Or    dextrose 50% injection 50 mL  50 mL Intravenous Q15 Min PRN    Or    glucose (Dex4) 15 GM/59ML oral liquid 30 g  30 g Oral Q15 Min PRN    Or    glucose (Glutose) 40% oral gel 30 g  30 g Oral Q15 Min PRN    Or    glucose-vitamin C (Dex-4) chewable tab 8 tablet  8 tablet Oral Q15 Min PRN    vancomycin (Vancocin) 750 mg in sodium chloride 0.9% 250 mL IVPB-ADDV  750 mg Intravenous Q24H    acetaminophen (Tylenol) 160 MG/5ML oral liquid 650 mg  650 mg Oral Q6H PRN     sucralfate (Carafate) 1 GM/10ML oral suspension 1 g  1 g Oral TID AC and HS (0)    predniSONE (Deltasone) tab 10 mg  10 mg Oral BID    albuterol (Ventolin HFA) 108 (90 Base) MCG/ACT inhaler 2 puff  2 puff Inhalation Q4H PRN    aspirin chewable tab 81 mg  81 mg Oral Daily    clopidogrel (Plavix) tab 75 mg  75 mg Oral Daily    docusate sodium (Colace) cap 100 mg  100 mg Oral BID    hydroxychloroquine (Plaquenil) tab 200 mg  200 mg Oral BID    lamoTRIgine (LaMICtal) tab 50 mg  50 mg Oral BID    metoprolol tartrate (Lopressor) partial tab 12.5 mg  12.5 mg Oral BID    oxybutynin ER (Ditropan-XL) 24 hr tab 10 mg  10 mg Oral Daily    QUEtiapine (SEROquel) tab 50 mg  50 mg Oral Nightly    heparin (Porcine) 5000 UNIT/ML injection 5,000 Units  5,000 Units Subcutaneous Q8H [place]    HYDROcodone-acetaminophen (Norco) 5-325 MG per tab 1 tablet  1 tablet Oral Q4H PRN    Or    HYDROcodone-acetaminophen (Norco) 5-325 MG per tab 2 tablet  2 tablet Oral Q4H PRN    melatonin tab 3 mg  3 mg Oral Nightly PRN    ondansetron (Zofran) 4 MG/2ML injection 4 mg  4 mg Intravenous Q6H PRN    prochlorperazine (Compazine) 10 MG/2ML injection 5 mg  5 mg Intravenous Q8H PRN    pantoprazole (Protonix) 40 mg in sodium chloride 0.9% PF 10 mL IV push  40 mg Intravenous Q12H     Medications Prior to Admission   Medication Sig    docusate sodium 100 MG Oral Cap Take 1 capsule (100 mg total) by mouth 2 (two) times daily.    ondansetron (ZOFRAN) 4 mg tablet Take 1 tablet (4 mg total) by mouth every 8 (eight) hours as needed for Nausea.    HYDROcodone-acetaminophen  MG Oral Tab Take 1 tablet by mouth every 4 (four) hours as needed.    [] lamoTRIgine 25 MG Oral Tab Take 2 tablets (50 mg total) by mouth 2 (two) times daily.    [] pantoprazole 40 MG Oral Tab EC Take 1 tablet (40 mg total) by mouth 2 (two) times daily before meals.    [] QUEtiapine 50 MG Oral Tab Take 1 tablet (50 mg total) by mouth nightly.    [] metoprolol  tartrate 25 MG Oral Tab Take 0.5 tablets (12.5 mg total) by mouth 2 (two) times daily. (Patient taking differently: Take 0.5 tablets (12.5 mg total) by mouth 2 (two) times daily. Hold for SBP <100 and HR <60)    predniSONE 10 MG Oral Tab Take 20mg (2 tabs) in AM and 10mg (1tab) for 4 days then resume 10mg twice  a day indefinitely (Patient taking differently: Take 1 tablet (10 mg total) by mouth 2 (two) times daily. Take 20mg (2 tabs) in AM and 10mg (1tab) for 4 days then resume 10mg twice  a day indefinitely)    Acetaminophen ER (ACETAMINOPHEN 8 HOUR) 650 MG Oral Tab CR Take 2-3 tablets (1,300-1,950 mg total) by mouth every 8 (eight) hours as needed for Pain.    albuterol 108 (90 Base) MCG/ACT Inhalation Aero Soln Inhale 2 puffs into the lungs every 4 to 6 hours as needed for Wheezing.    hydroxychloroquine 200 MG Oral Tab Take 1 tablet (200 mg total) by mouth 2 (two) times daily.    Aspirin 81 MG Oral Cap Take 81 mg by mouth daily.    atorvastatin 10 MG Oral Tab Take 1 tablet (10 mg total) by mouth daily.    clopidogrel 75 MG Oral Tab Take 1 tablet (75 mg total) by mouth daily.    ergocalciferol 1.25 MG (02206 UT) Oral Cap Take 1 capsule (50,000 Units total) by mouth once a week.    oxybutynin ER 10 MG Oral Tablet 24 Hr Take 1 tablet (10 mg total) by mouth daily.    folic acid 1 MG Oral Tab Take 1 tablet (1 mg total) by mouth daily.    collagenase 250 UNIT/GM External Ointment Apply topically daily.    clotrimazole-betamethasone 1-0.05 % External Cream Apply 1 Application topically 2 (two) times daily. Apply to groin and abdominal folds    [] sodium bicarbonate 650 MG Oral Tab Take 1 tablet (650 mg total) by mouth 2 (two) times daily.    Copper (COPPERMIN) 5 MG Oral Tab Take 1 tablet by mouth daily.    ibuprofen 200 MG Oral Tab Take 3 tablets (600 mg total) by mouth every 8 (eight) hours as needed for Pain.    [] Ferrous Gluconate 324 (38 Fe) MG Oral Tab Take 1 tablet (325 mg total) by mouth daily  with breakfast.    Potassium Chloride ER 10 MEQ Oral Tab CR Take 1 tablet (10 mEq total) by mouth 2 (two) times daily.       Allergies  Allergies[1]    Review of Systems:   As by Admitting/Attending    Results:   Laboratory Data:  Lab Results   Component Value Date    WBC 8.9 11/02/2024    HGB 9.8 (L) 11/02/2024    HCT 30.6 (L) 11/02/2024    .0 11/02/2024    CREATSERUM 0.39 (L) 11/04/2024    BUN <5 (L) 11/04/2024     11/04/2024    K 3.0 (L) 11/04/2024    K 3.0 (L) 11/04/2024     (H) 11/04/2024    CO2 15.0 (L) 11/04/2024     (H) 11/04/2024    CA 6.1 (L) 11/04/2024    ALB 2.1 (L) 11/02/2024    ALKPHO 331 (H) 10/30/2024    TP 3.9 (L) 10/30/2024    AST 23 10/30/2024    ALT 31 10/30/2024    LIP 31 10/27/2024    DDIMER 1.69 (H) 07/23/2024    MG 2.0 10/30/2024    PHOS 3.4 11/03/2024    CK 50 08/27/2024    B12 748 08/21/2024    ETOH <3 09/05/2024         Imaging:  US VENOUS DOPPLER ARM LEFT - DIAG IMG (CPT=93971)    Result Date: 11/3/2024  CONCLUSION:    No sonographic evidence of acute deep venous thrombosis within the visualized upper extremity veins from the internal jugular to axillary veins.  More distal veins of the upper extremity including the brachial, cephalic, and basilic veins could not be visualized/evaluated, due to extensive subcutaneous edema of the upper extremity in these regions.  Left PICC seen within the left subclavian vein.       Dictated by (CST): Isha Hargrove MD on 11/03/2024 at 8:55 AM     Finalized by (CST): Isha Hargrove MD on 11/03/2024 at 8:58 AM          XR CHEST AP PORTABLE  (CPT=71045)    Result Date: 11/3/2024  CONCLUSION:   Left PICC with tip in the expected location of the lower superior vena cava.      Dictated by (CST): Isha Hargrove MD on 11/03/2024 at 8:08 AM     Finalized by (CST): Isha Hargrove MD on 11/03/2024 at 8:09 AM           Vital Signs:   Blood pressure 107/73, pulse 89, temperature 97.9 °F (36.6 °C), temperature source Oral, resp. rate  18, height 5' 2\" (1.575 m), weight 74.4 kg (164 lb), SpO2 99%.    Mental Status Exam:   Appearance: Stated age female, in hospital gown, laying down in hospital bed.  Multiple blanket over her  Psychomotor: No psychomotor agitation or retardation.  Orientation: Alert and oriented to person, place, date and condition.  Gait: Not evaluated.  Attitude/Coorperation: cooperative, pleasant.  Behavior: Appropriate communication.  Otherwise medical team concerned about her difficulty swallowing.  Speech: Regular rate and rhythm speech.  Mood: Patient admitted feeling anxious specially nightly  Affect: Anxious affect, congruent with the mood.  Thought process: mostly Linear  Thought content: No reports of  suicidal or homicidal ideation.  Perceptions: Patient denies auditory or visual hallucinations  Concentration: intact  Memory: intact  Intellect: Average.  Judgment and Insight: Questionable.     Impression:     Episodic mood disorder     Cellulitis of right lower extremity    Nausea and vomiting    Patient is a 64 year old -American,female with past medical history of hypertension, rheumatoid arthritis, obesity, chronic adrenal insufficiency secondary to chronic corticosteroid use, asthma, pancreatitis, obstructive sleep apnea, obesity, hyperlipidemia, anemia, chronic kidney disease  nonocclusive coronary artery disease, and peripheral arterial disease who also has past mental lorenzo history of anxiety and depression  who was admitted to the hospital for Cellulitis of right lower extremity: The patient has been demonstrating increased anxiety and depressive symptoms. Patient was started on Lamictal and Seroquel during her last admission and would like those medications continued.     10/29/2024: The patient has been demonstrating some alternation in her cognition and thought process. Patient refusing medications and reporting to team that she can not swallow pills. She demonstrates some increased anxiety, worry.  Patient reporting that she can take her pills otherwise not all at one time.     11/1/2024: No issues reported from the team. She has not demonstrated any concerning behaviors. She continues to have some difficulty swallowing her medications. EGD scheduled for tomorrow.    11/3/2024: Medical team reporting no physical reason for patient to have difficulty swallowing and questioning for reevaluation.  Patient herself addressing some anxiety.  Patient encouraged to cooperative team and agree that she is going to eat slowly with small pieces.  Otherwise the patient has not been consistent with her medication treatment but she took her Seroquel last night.  Patient continue reporting difficulty sleeping with increased anxiety nightly.    11/4/2024: The patient took her medications this morning and ate lunch. She continues to report some anxiety and difficulty sleeping at night.    Discussed risk and benefit, acknowledging the current symptom and severity.  At this point, I would recommend the following approach:     Focus on safety  Focus on education and support.  Focus on insight orientation helping the patient understand diagnosis and treatment plan.  Encourage compliant on medication and treatment.  Continue Remeron 7.5 mg nightly.  Continue Seroquel 50 mg nightly  Continue Lamictal 25 mg BID  Processed with patient at length, the initiation of the above psychotropic medications I advised the patient of the risks, benefits, alternatives and potential side effects. The patient consents to administration of the medications and understands the right to refuse medications at any time. The patient verbalized understanding.   Coordinate plan with team    Orders This Visit:  Orders Placed This Encounter   Procedures    CBC With Differential With Platelet    Comp Metabolic Panel (14)    Magnesium    Lipase    Urinalysis, Routine    Lactic Acid, Plasma    Magnesium    CBC With Differential With Platelet    Basic Metabolic  Panel (8)    Potassium    Magnesium    Vancomycin Trough, Serum    Vancomycin Peak, Serum    Hepatic Function Panel (7)    Iron And Tibc    Ferritin    CBC With Differential With Platelet    Basic Metabolic Panel (8)    Potassium    Potassium    CBC With Differential With Platelet    Basic Metabolic Panel (8)    Renal Function Panel    CBC With Differential With Platelet    Basic Metabolic Panel (8)    Phosphorus    Basic Metabolic Panel (8)    Potassium    Potassium    Specimen to Pathology Tissue    Aerobic Bacterial Culture    Blood Culture    MRSA Screen by PCR    Rapid SARS-CoV-2 by PCR       Meds This Visit:  Requested Prescriptions      No prescriptions requested or ordered in this encounter       Luiza Adelso, APRN  11/4/2024    Note to Patient: The 21st Century Cures Act makes medical notes like these available to patients in the interest of transparency. However, be advised this is a medical document. It is intended as peer to peer communication. It is written in medical language and may contain abbreviations or verbiage that are unfamiliar. It may appear blunt or direct. Medical documents are intended to carry relevant information, facts as evident, and the clinical opinion of the practitioner. This note may have been transcribed using a voice dictation system. Voice recognition errors may occur. This should not be taken to alter the content or meaning of this note.           [1]   Allergies  Allergen Reactions    Cephalosporins ANAPHYLAXIS, NAUSEA AND VOMITING and OTHER (SEE COMMENTS)     Other reaction(s): Fever    - Tolerated multiple doses of ceftriaxone on 7/2024 without any complications  - Tolerated multiple doses of cefepime 8/2024 without complications    Gadolinium Derivatives FEVER    Penicillins OTHER (SEE COMMENTS)     esophagitis    Sulfa Antibiotics OTHER (SEE COMMENTS)     esophagitis    Bananas ITCHING

## 2024-11-05 ENCOUNTER — APPOINTMENT (OUTPATIENT)
Dept: PICC SERVICES | Facility: HOSPITAL | Age: 64
End: 2024-11-05
Attending: HOSPITALIST
Payer: COMMERCIAL

## 2024-11-05 NOTE — PROGRESS NOTES
Patient is a 64 year old -American,female with past medical history of hypertension, rheumatoid arthritis, obesity, chronic adrenal insufficiency secondary to chronic corticosteroid use, asthma, pancreatitis, obstructive sleep apnea, obesity, hyperlipidemia, anemia, chronic kidney disease  nonocclusive coronary artery disease, and peripheral arterial disease who also has past mental lorenzo history of anxiety and depression  who was admitted to the hospital for Cellulitis of right lower extremity: The patient has been demonstrating increased anxiety and depressive symptoms.Patient indicated for psych consult for evaluation and advise.  Consult Duration   The patient seen for over 50-minute, follow-up evaluation, over 50% counseling and coordinating care addressing anxiety, depression.    Record reviewed, communication with attending, communication with RN and patient seen face to face evaluation.  History of Present Illness:   Communicating with the team, no issues reported. Patient has been taking her medications.     The patient receiving Lamictal 50 mg BID, Remeron 7.5 mg, Seroquel 50 mg    The patient is seen today in her room. The patient is seen in the presence of her sister, whom the patient requested stay in the room.    She reports feeling tired today. She otherwise notes that she slept well last night. She notes that she has been trying to eat and that she took all of her medications today.    The patient reporting that she continue finding herself anxious with difficulty relaxing and difficulty sleeping. Patient reporting that when she go to bed she feels nervous.     Patient reporting that she has been making effort to drink and swallow her pills and that she took her medications this morning.      The patient is not demonstrating any apryl or psychosis  The patient denies any auditory or visual hallucinations  Patient denying any auditory visual hallucination.    Provided patient with supportive  psychotherapy including listening, emotional support and encouragement.     Past Psychiatric/Medication History:  1. Prior diagnoses: Depression, Anxiety  2. Past psychiatric inpatient: Denies           3. Past outpatient history: Denies  4. Past suicide history: None  5. Medication history: Denies     Social History:   The patient is a 64 year old  female. She is a retired  of 35 years. She retired and moved to [place] with her daughter. Her health declined since retiring and moving to [place] and moved back to [place] with her daughter in June 2024. She and her daughter moved back in with her eldest sister, Gladys. She was admitted to rehab for decreased mobility and chronic wound care on multiple occasions.      Family History:  Daughter: depression  Medical History:   Past Medical History  Past Medical History:    Allergic rhinitis    Anxiety    Arthritis    RA and Osteoarthritis    Asthma (HCC)    Depression    Not officially diagnosed    Esophageal reflux    Essential hypertension    High blood pressure    High cholesterol    Hyperlipidemia    Myocardial infarction (HCC)    Cardiac event    Osteoarthritis    Pancreatitis (HCC)    Problems with swallowing    RA (rheumatoid arthritis) (HCC)    Sleep apnea       Past Surgical History  Past Surgical History:   Procedure Laterality Date    Colonoscopy      Debridement  08/15/2024    Sharp excisional debridement of RLE posterior leg wound    Egd  07/26/2024    Hiatal hernia    Other surgical history      Revision of uterine anomaly      Rotator cuff repair      Wrist fracture surgery         Family History  Family History   Problem Relation Age of Onset    Diabetes Mother     Genetic Disease Mother     Heart Disorder Mother     Hypertension Mother     Obesity Mother     Diabetes Father     Hypertension Father     Depression Daughter     Psychiatric Daughter        Social History  Social History     Socioeconomic History    Marital status:  Single   Tobacco Use    Smoking status: Never    Smokeless tobacco: Never   Vaping Use    Vaping status: Never Used   Substance and Sexual Activity    Alcohol use: Not Currently    Drug use: Never     Social Drivers of Health     Food Insecurity: No Food Insecurity (10/28/2024)    Food Insecurity     Food Insecurity: Never true   Transportation Needs: No Transportation Needs (10/28/2024)    Transportation Needs     Lack of Transportation: No   Housing Stability: Low Risk  (10/28/2024)    Housing Stability     Housing Instability: No           Current Medications:  Current Facility-Administered Medications   Medication Dose Route Frequency    HYDROcodone-acetaminophen 7.5-325 MG/15ML solution 15 mL  15 mL Oral Q4H PRN    acetaminophen (Tylenol) 160 MG/5ML oral liquid 650 mg  650 mg Oral Q4H PRN    dextrose 10% infusion   Intravenous Continuous    mirtazapine (Remeron) tab 7.5 mg  7.5 mg Oral Nightly    dextrose 50% injection 50 mL  50 mL Intravenous PRN    glucose (Dex4) 15 GM/59ML oral liquid 15 g  15 g Oral Q15 Min PRN    Or    glucose (Glutose) 40% oral gel 15 g  15 g Oral Q15 Min PRN    Or    glucose-vitamin C (Dex-4) chewable tab 4 tablet  4 tablet Oral Q15 Min PRN    Or    dextrose 50% injection 50 mL  50 mL Intravenous Q15 Min PRN    Or    glucose (Dex4) 15 GM/59ML oral liquid 30 g  30 g Oral Q15 Min PRN    Or    glucose (Glutose) 40% oral gel 30 g  30 g Oral Q15 Min PRN    Or    glucose-vitamin C (Dex-4) chewable tab 8 tablet  8 tablet Oral Q15 Min PRN    vancomycin (Vancocin) 750 mg in sodium chloride 0.9% 250 mL IVPB-ADDV  750 mg Intravenous Q24H    acetaminophen (Tylenol) 160 MG/5ML oral liquid 650 mg  650 mg Oral Q6H PRN    sucralfate (Carafate) 1 GM/10ML oral suspension 1 g  1 g Oral TID AC and HS (2200)    predniSONE (Deltasone) tab 10 mg  10 mg Oral BID    albuterol (Ventolin HFA) 108 (90 Base) MCG/ACT inhaler 2 puff  2 puff Inhalation Q4H PRN    aspirin chewable tab 81 mg  81 mg Oral Daily     clopidogrel (Plavix) tab 75 mg  75 mg Oral Daily    docusate sodium (Colace) cap 100 mg  100 mg Oral BID    hydroxychloroquine (Plaquenil) tab 200 mg  200 mg Oral BID    lamoTRIgine (LaMICtal) tab 50 mg  50 mg Oral BID    metoprolol tartrate (Lopressor) partial tab 12.5 mg  12.5 mg Oral BID    oxybutynin ER (Ditropan-XL) 24 hr tab 10 mg  10 mg Oral Daily    QUEtiapine (SEROquel) tab 50 mg  50 mg Oral Nightly    heparin (Porcine) 5000 UNIT/ML injection 5,000 Units  5,000 Units Subcutaneous Q8H FALLON    melatonin tab 3 mg  3 mg Oral Nightly PRN    ondansetron (Zofran) 4 MG/2ML injection 4 mg  4 mg Intravenous Q6H PRN    prochlorperazine (Compazine) 10 MG/2ML injection 5 mg  5 mg Intravenous Q8H PRN    pantoprazole (Protonix) 40 mg in sodium chloride 0.9% PF 10 mL IV push  40 mg Intravenous Q12H     Medications Prior to Admission   Medication Sig    docusate sodium 100 MG Oral Cap Take 1 capsule (100 mg total) by mouth 2 (two) times daily.    ondansetron (ZOFRAN) 4 mg tablet Take 1 tablet (4 mg total) by mouth every 8 (eight) hours as needed for Nausea.    HYDROcodone-acetaminophen  MG Oral Tab Take 1 tablet by mouth every 4 (four) hours as needed.    [] lamoTRIgine 25 MG Oral Tab Take 2 tablets (50 mg total) by mouth 2 (two) times daily.    [] pantoprazole 40 MG Oral Tab EC Take 1 tablet (40 mg total) by mouth 2 (two) times daily before meals.    [] QUEtiapine 50 MG Oral Tab Take 1 tablet (50 mg total) by mouth nightly.    [] metoprolol tartrate 25 MG Oral Tab Take 0.5 tablets (12.5 mg total) by mouth 2 (two) times daily. (Patient taking differently: Take 0.5 tablets (12.5 mg total) by mouth 2 (two) times daily. Hold for SBP <100 and HR <60)    predniSONE 10 MG Oral Tab Take 20mg (2 tabs) in AM and 10mg (1tab) for 4 days then resume 10mg twice  a day indefinitely (Patient taking differently: Take 1 tablet (10 mg total) by mouth 2 (two) times daily. Take 20mg (2 tabs) in AM and 10mg  (1tab) for 4 days then resume 10mg twice  a day indefinitely)    Acetaminophen ER (ACETAMINOPHEN 8 HOUR) 650 MG Oral Tab CR Take 2-3 tablets (1,300-1,950 mg total) by mouth every 8 (eight) hours as needed for Pain.    albuterol 108 (90 Base) MCG/ACT Inhalation Aero Soln Inhale 2 puffs into the lungs every 4 to 6 hours as needed for Wheezing.    hydroxychloroquine 200 MG Oral Tab Take 1 tablet (200 mg total) by mouth 2 (two) times daily.    Aspirin 81 MG Oral Cap Take 81 mg by mouth daily.    atorvastatin 10 MG Oral Tab Take 1 tablet (10 mg total) by mouth daily.    clopidogrel 75 MG Oral Tab Take 1 tablet (75 mg total) by mouth daily.    ergocalciferol 1.25 MG (44304 UT) Oral Cap Take 1 capsule (50,000 Units total) by mouth once a week.    oxybutynin ER 10 MG Oral Tablet 24 Hr Take 1 tablet (10 mg total) by mouth daily.    folic acid 1 MG Oral Tab Take 1 tablet (1 mg total) by mouth daily.    collagenase 250 UNIT/GM External Ointment Apply topically daily.    clotrimazole-betamethasone 1-0.05 % External Cream Apply 1 Application topically 2 (two) times daily. Apply to groin and abdominal folds    [] sodium bicarbonate 650 MG Oral Tab Take 1 tablet (650 mg total) by mouth 2 (two) times daily.    Copper (COPPERMIN) 5 MG Oral Tab Take 1 tablet by mouth daily.    ibuprofen 200 MG Oral Tab Take 3 tablets (600 mg total) by mouth every 8 (eight) hours as needed for Pain.    [] Ferrous Gluconate 324 (38 Fe) MG Oral Tab Take 1 tablet (325 mg total) by mouth daily with breakfast.    Potassium Chloride ER 10 MEQ Oral Tab CR Take 1 tablet (10 mEq total) by mouth 2 (two) times daily.       Allergies  Allergies[1]    Review of Systems:   As by Admitting/Attending    Results:   Laboratory Data:  Lab Results   Component Value Date    WBC 8.9 2024    HGB 9.8 (L) 2024    HCT 30.6 (L) 2024    .0 2024    CREATSERUM 0.39 (L) 2024    BUN <5 (L) 2024     2024    K 3.3  (L) 11/04/2024     (H) 11/04/2024    CO2 15.0 (L) 11/04/2024     (H) 11/04/2024    CA 6.1 (L) 11/04/2024    ALB 2.1 (L) 11/02/2024    ALKPHO 331 (H) 10/30/2024    TP 3.9 (L) 10/30/2024    AST 23 10/30/2024    ALT 31 10/30/2024    LIP 31 10/27/2024    DDIMER 1.69 (H) 07/23/2024    MG 2.0 10/30/2024    PHOS 3.4 11/03/2024    CK 50 08/27/2024    B12 748 08/21/2024    ETOH <3 09/05/2024         Imaging:  US VENOUS DOPPLER ARM LEFT - DIAG IMG (CPT=93971)    Result Date: 11/3/2024  CONCLUSION:    No sonographic evidence of acute deep venous thrombosis within the visualized upper extremity veins from the internal jugular to axillary veins.  More distal veins of the upper extremity including the brachial, cephalic, and basilic veins could not be visualized/evaluated, due to extensive subcutaneous edema of the upper extremity in these regions.  Left PICC seen within the left subclavian vein.       Dictated by (CST): Isha Hargrove MD on 11/03/2024 at 8:55 AM     Finalized by (CST): Isha Hargrove MD on 11/03/2024 at 8:58 AM          XR CHEST AP PORTABLE  (CPT=71045)    Result Date: 11/3/2024  CONCLUSION:   Left PICC with tip in the expected location of the lower superior vena cava.      Dictated by (CST): Isha Hargrove MD on 11/03/2024 at 8:08 AM     Finalized by (CST): Isha Hargrove MD on 11/03/2024 at 8:09 AM           Vital Signs:   Blood pressure 115/70, pulse 88, temperature 98 °F (36.7 °C), temperature source Oral, resp. rate 18, height 5' 2\" (1.575 m), weight 74.4 kg (164 lb), SpO2 100%.    Mental Status Exam:   Appearance: Stated age female, in hospital gown, laying down in hospital bed.  Multiple blanket over her  Psychomotor: No psychomotor agitation or retardation.  Orientation: Alert and oriented to person, place, date and condition.  Gait: Not evaluated.  Attitude/Coorperation: cooperative, pleasant.  Behavior: Appropriate communication.  Otherwise medical team concerned about her  difficulty swallowing.  Speech: Regular rate and rhythm speech.  Mood: Patient admitted feeling anxious specially nightly  Affect: Anxious affect, congruent with the mood.  Thought process: mostly Linear  Thought content: No reports of  suicidal or homicidal ideation.  Perceptions: Patient denies auditory or visual hallucinations  Concentration: intact  Memory: intact  Intellect: Average.  Judgment and Insight: Questionable.     Impression:     Episodic mood disorder     Cellulitis of right lower extremity    Nausea and vomiting    Patient is a 64 year old -American,female with past medical history of hypertension, rheumatoid arthritis, obesity, chronic adrenal insufficiency secondary to chronic corticosteroid use, asthma, pancreatitis, obstructive sleep apnea, obesity, hyperlipidemia, anemia, chronic kidney disease  nonocclusive coronary artery disease, and peripheral arterial disease who also has past mental lorenzo history of anxiety and depression  who was admitted to the hospital for Cellulitis of right lower extremity: The patient has been demonstrating increased anxiety and depressive symptoms. Patient was started on Lamictal and Seroquel during her last admission and would like those medications continued.     10/29/2024: The patient has been demonstrating some alternation in her cognition and thought process. Patient refusing medications and reporting to team that she can not swallow pills. She demonstrates some increased anxiety, worry. Patient reporting that she can take her pills otherwise not all at one time.     11/1/2024: No issues reported from the team. She has not demonstrated any concerning behaviors. She continues to have some difficulty swallowing her medications. EGD scheduled for tomorrow.    11/3/2024: Medical team reporting no physical reason for patient to have difficulty swallowing and questioning for reevaluation.  Patient herself addressing some anxiety.  Patient encouraged to  cooperative team and agree that she is going to eat slowly with small pieces.  Otherwise the patient has not been consistent with her medication treatment but she took her Seroquel last night.  Patient continue reporting difficulty sleeping with increased anxiety nightly.    11/4/2024: The patient took her medications this morning and ate lunch. She continues to report some anxiety and difficulty sleeping at night.    11/5/2024: The patient has been eating and taking her medications.    Discussed risk and benefit, acknowledging the current symptom and severity.  At this point, I would recommend the following approach:     Focus on safety  Focus on education and support.  Focus on insight orientation helping the patient understand diagnosis and treatment plan.  Encourage compliant on medication and treatment.  Continue Remeron 7.5 mg nightly.  Continue Seroquel 50 mg nightly  Continue Lamictal 25 mg BID  Processed with patient at length, the initiation of the above psychotropic medications I advised the patient of the risks, benefits, alternatives and potential side effects. The patient consents to administration of the medications and understands the right to refuse medications at any time. The patient verbalized understanding.   Coordinate plan with team    Orders This Visit:  Orders Placed This Encounter   Procedures    CBC With Differential With Platelet    Comp Metabolic Panel (14)    Magnesium    Lipase    Urinalysis, Routine    Lactic Acid, Plasma    Magnesium    CBC With Differential With Platelet    Basic Metabolic Panel (8)    Potassium    Magnesium    Vancomycin Trough, Serum    Vancomycin Peak, Serum    Hepatic Function Panel (7)    Iron And Tibc    Ferritin    CBC With Differential With Platelet    Basic Metabolic Panel (8)    Potassium    Potassium    CBC With Differential With Platelet    Basic Metabolic Panel (8)    Renal Function Panel    CBC With Differential With Platelet    Basic Metabolic Panel  (8)    Phosphorus    Basic Metabolic Panel (8)    Potassium    Basic Metabolic Panel (8)    CBC With Differential With Platelet    Magnesium    Vancomycin Trough, Serum    Potassium    Potassium    Specimen to Pathology Tissue    Aerobic Bacterial Culture    Blood Culture    MRSA Screen by PCR    Rapid SARS-CoV-2 by PCR       Meds This Visit:  Requested Prescriptions      No prescriptions requested or ordered in this encounter       Luiza Darden, EBONY  11/5/2024    Note to Patient: The 21st Century Cures Act makes medical notes like these available to patients in the interest of transparency. However, be advised this is a medical document. It is intended as peer to peer communication. It is written in medical language and may contain abbreviations or verbiage that are unfamiliar. It may appear blunt or direct. Medical documents are intended to carry relevant information, facts as evident, and the clinical opinion of the practitioner. This note may have been transcribed using a voice dictation system. Voice recognition errors may occur. This should not be taken to alter the content or meaning of this note.           [1]   Allergies  Allergen Reactions    Cephalosporins ANAPHYLAXIS, NAUSEA AND VOMITING and OTHER (SEE COMMENTS)     Other reaction(s): Fever    - Tolerated multiple doses of ceftriaxone on 7/2024 without any complications  - Tolerated multiple doses of cefepime 8/2024 without complications    Gadolinium Derivatives FEVER    Penicillins OTHER (SEE COMMENTS)     esophagitis    Sulfa Antibiotics OTHER (SEE COMMENTS)     esophagitis    Bananas ITCHING

## 2024-11-05 NOTE — CM/SW NOTE
10/29/24 1400   Choice of Post-Acute Provider   Informed patient of right to choose their preferred provider Yes   List of appropriate post-acute services provided to patient/family with quality data Yes   Patient/family choice [facility]     LAURA followed up on discharge planning.    LAURA spoke to pt's daughter Rod who confirmed [facility]UL[facility]NA preference is [facility].    BTE liaison Shannan aware of choice and asked to initiate insurance auth.    Per Shannan, it shows pt's premiums have not been paid.  When LAURA completed assessment, pt's sister stated they are paid.  Pt has 60 calendar days per year- per EMR she has already used close to 60 days.      LAURA will see if GG from [employer] can start PA application while pt is admitted.    PLAN: [facility] to [facility] pending auth/ med clear    / to remain available for support and/or discharge planning.     Simona Gonzales MSW, LSW [id number]

## 2024-11-05 NOTE — PLAN OF CARE
Pt continues to have a poor appetite and declining meals.     Problem: Patient Centered Care  Goal: Patient preferences are identified and integrated in the patient's plan of care  Description: Interventions:  - What would you like us to know as we care for you? \"I fell twice at rehab\"  - Provide timely, complete, and accurate information to patient/family  - Incorporate patient and family knowledge, values, beliefs, and cultural backgrounds into the planning and delivery of care  - Encourage patient/family to participate in care and decision-making at the level they choose  - Honor patient and family perspectives and choices  Outcome: Progressing     Problem: Patient/Family Goals  Goal: Patient/Family Long Term Goal  Description: Patient's Long Term Goal: Discharge home    Interventions:  - IVF  -Wound care  -IV antibiotics  -PT/OT  - See additional Care Plan goals for specific interventions  Outcome: Progressing  Goal: Patient/Family Short Term Goal  Description: Patient's Short Term Goal: Regain Strength    Interventions:   - PT/OT  - Dietary consult  - See additional Care Plan goals for specific interventions  Outcome: Progressing     Problem: PAIN - ADULT  Goal: Verbalizes/displays adequate comfort level or patient's stated pain goal  Description: INTERVENTIONS:  - Encourage pt to monitor pain and request assistance  - Assess pain using appropriate pain scale  - Administer analgesics based on type and severity of pain and evaluate response  - Implement non-pharmacological measures as appropriate and evaluate response  - Consider cultural and social influences on pain and pain management  - Manage/alleviate anxiety  - Utilize distraction and/or relaxation techniques  - Monitor for opioid side effects  - Notify MD/LIP if interventions unsuccessful or patient reports new pain  - Anticipate increased pain with activity and pre-medicate as appropriate  Outcome: Progressing     Problem: RISK FOR INFECTION -  ADULT  Goal: Absence of fever/infection during anticipated neutropenic period  Description: INTERVENTIONS  - Monitor WBC  - Administer growth factors as ordered  - Implement neutropenic guidelines  Outcome: Progressing     Problem: SAFETY ADULT - FALL  Goal: Free from fall injury  Description: INTERVENTIONS:  - Assess pt frequently for physical needs  - Identify cognitive and physical deficits and behaviors that affect risk of falls.  - [place] fall precautions as indicated by assessment.  - Educate pt/family on patient safety including physical limitations  - Instruct pt to call for assistance with activity based on assessment  - Modify environment to reduce risk of injury  - Provide assistive devices as appropriate  - Consider OT/PT consult to assist with strengthening/mobility  - Encourage toileting schedule  Outcome: Progressing     Problem: GASTROINTESTINAL - ADULT  Goal: Minimal or absence of nausea and vomiting  Description: INTERVENTIONS:  - Maintain adequate hydration with IV or PO as ordered and tolerated  - Nasogastric tube to low intermittent suction as ordered  - Evaluate effectiveness of ordered antiemetic medications  - Provide nonpharmacologic comfort measures as appropriate  - Advance diet as tolerated, if ordered  - Obtain nutritional consult as needed  - Evaluate fluid balance  Outcome: Progressing  Goal: Maintains or returns to baseline bowel function  Description: INTERVENTIONS:  - Assess bowel function  - Maintain adequate hydration with IV or PO as ordered and tolerated  - Evaluate effectiveness of GI medications  - Encourage mobilization and activity  - Obtain nutritional consult as needed  - Establish a toileting routine/schedule  - Consider collaborating with pharmacy to review patient's medication profile  Outcome: Progressing     Problem: SKIN/TISSUE INTEGRITY - ADULT  Goal: Skin integrity remains intact  Description: INTERVENTIONS  - Assess and document risk factors for pressure ulcer  development  - Assess and document skin integrity  - Monitor for areas of redness and/or skin breakdown  - Initiate interventions, skin care algorithm/standards of care as needed  Outcome: Progressing  Goal: Incision(s), wounds(s) or drain site(s) healing without S/S of infection  Description: INTERVENTIONS:  - Assess and document risk factors for pressure ulcer development  - Assess and document skin integrity  - Assess and document dressing/incision, wound bed, drain sites and surrounding tissue  - Implement wound care per orders  - Initiate isolation precautions as appropriate  - Initiate Pressure Ulcer prevention bundle as indicated  Outcome: Progressing  Goal: Oral mucous membranes remain intact  Description: INTERVENTIONS  - Assess oral mucosa and hygiene practices  - Implement preventative oral hygiene regimen  - Implement oral medicated treatments as ordered  Outcome: Progressing     Problem: MUSCULOSKELETAL - ADULT  Goal: Return mobility to safest level of function  Description: INTERVENTIONS:  - Assess patient stability and activity tolerance for standing, transferring and ambulating w/ or w/o assistive devices  - Assist with transfers and ambulation using safe patient handling equipment as needed  - Ensure adequate protection for wounds/incisions during mobilization  - Obtain PT/OT consults as needed  - Advance activity as appropriate  - Communicate ordered activity level and limitations with patient/family  Outcome: Progressing  Goal: Maintain proper alignment of affected body part  Description: INTERVENTIONS:  - Support and protect limb and body alignment per provider's orders  - Instruct and reinforce with patient and family use of appropriate assistive device and precautions (e.g. spinal or hip dislocation precautions)  Outcome: Progressing

## 2024-11-05 NOTE — OCCUPATIONAL THERAPY NOTE
OCCUPATIONAL THERAPY TREATMENT NOTE - INPATIENT        Room Number: 546/546-A          Problem List  Principal Problem:    Cellulitis of right lower extremity  Active Problems:    Nausea and vomiting    Episodic mood disorder (HCC)    Odynophagia    Malnutrition (HCC)    Pain      OCCUPATIONAL THERAPY ASSESSMENT   Patient demonstrates limited progress this session, goals remain in progress.    Patient is requiring MAX A (Mod A x 2 persons as a result of the following impairments: pain, weakness, limited reach, decreased standing balance.    Patient continues to function below baseline with ADLs and functional mobility.  Next session anticipate patient to progress LE dressing , commode transfer, functional mobility to and from washroom.  Occupational Therapy will continue to follow patient for duration of hospitalization.    Patient continues to benefit from continued skilled OT services: to promote return to prior level of function and safety with continuous assistance and gradual rehabilitative therapy.     PLAN DURING HOSPITALIZATION           SUBJECTIVE  \"My leg is ok\"     OBJECTIVE  Precautions: Bed/chair alarm    WEIGHT BEARING RESTRICTION     PAIN ASSESSMENT  Ratin    ACTIVITY TOLERANCE  Room air    ACTIVITIES OF DAILY LIVING ASSESSMENT  AM-PAC ‘6-Clicks’ Inpatient Daily Activity Short Form  How much help from another person does the patient currently need…  -   Putting on and taking off regular lower body clothing?: A Lot  -   Bathing (including washing, rinsing, drying)?: A Lot  -   Toileting, which includes using toilet, bedpan or urinal? : A Lot  -   Putting on and taking off regular upper body clothing?: A Little  -   Taking care of personal grooming such as brushing teeth?: A Little  -   Eating meals?: None    AM-PAC Score:  Score: 16  Approx Degree of Impairment: 53.32%  Standardized Score (AM-PAC Scale): 35.96  CMS Modifier (G-Code): CK    FUNCTIONAL TRANSFER ASSESSMENT  Sit to Stand: Edge of  Bed  Edge of Bed: Moderate Assist (x2)  Toilet Transfer: Not Tested    BED MOBILITY  Supine to Sit : Moderate Assist    FUNCTIONAL ADL ASSESSMENT  Grooming Seated: Supervision  LB Dressing Seated: Maximum Assist    Skilled Therapy Provided: RN cleared pt for participation in occupational therapy session, which was completed in collaboration with PT. Upon arrival, pt was supine in bed and agreeable to activity. No family was present during session. Pt benefited from additional time, verbal cues to maximize participation. Pt with mild confusion during activity - cues to redirect prn.     To assess pt's safe participation during daily tasks while also providing intermittent education to maximize safety and participation, occupational therapist facilitated the following: bed mobility, functional transfer (SPT - simulated commode t/f to bedside chair), seated self-feeding and grooming. SLP present with pt post activity. Vitals stable with activity.       EDUCATION PROVIDED  Patient Education : Role of Occupational Therapy; Plan of Care; Functional Transfer Techniques; Other (safety, pacing -- ADLs)  Patient's Response to Education: Verbalized Understanding; Returned Demonstration    The patient's Approx Degree of Impairment: 53.32% has been calculated based on documentation in the [facility] '6 clicks' Inpatient Daily Activity Short Form.  Research supports that patients with this level of impairment may benefit from REHAB.  Final disposition will be made by interdisciplinary medical team.    Patient End of Session: Up in chair;Needs met;RN aware of session/findings;Call light within reach;With  staff    OT Goals:  Patients self stated goal is: to go back to rehab      Patient will complete functional transfer with CGA  Comment: ongoing    Patient will complete toileting with min A  Comment:  ongoing    Patient will tolerate standing for 2 minutes in prep for adls with CGA   Comment: ongoing     Patient will complete item  retrieval with CGA  Comment: ongoing             Goals  on:   Frequency: 3-5x/wk    OT Session Time: 25 minutes  Self-Care Home Management: 15 minutes  Therapeutic Activity: 10 minutes

## 2024-11-05 NOTE — PROGRESS NOTES
[facility]     Gastroenterology Progress Note    Sheri Garzon Patient Status:  Inpatient    1960 MRN [id number]   Location [facility] 5SW/SE Attending Marlena Morris MD   Hosp Day # 9 PCP TERI MCKENNA MD       Subjective:   Patient feeling better today     Only ate small amount of lunch  Discussed importance of trial of another meal  Denies abdominal pain, dysphagia and globus     Objective:   Blood pressure 115/70, pulse 88, temperature 98 °F (36.7 °C), temperature source Oral, resp. rate 18, height 5' 2\" (1.575 m), weight 164 lb (74.4 kg), SpO2 100%. Body mass index is 30 kg/m².    Gen: awake, alert patient, NAD  HEENT: EOMI, the sclera appears anicteric, oropharynx clear, mucus membranes appear moist  CV: RRR  Lung: no conversational dyspnea   Abdomen: soft NTND abdomen with NABS appreciated   Skin: dry, warm, no jaundice  Ext: no LE edema is evident  Neuro: Alert and interactive  Psych: calm, cooperative    Assessment and Plan:   Sheri Garzon is a 64 year old female w/ PMHx of GERD, HTN, asthma, HLD, RA on plaquenil, CAD on ASA/PLAVIX who presents with intractable nausea and emesis and right leg wound.      #Nausea  #Dysphagia   -worsening symptoms over last 3 weeks including increase in GERD without PPI therapy  -last EGD 2024 without mass, ulcer or stricture, CT a/p with non specific gastric wall thickening   -barium esophogram with luminal narrowing/web-like stricture of the cervical esophagus measuring 4.5 mm with a large amount of reflux and HH.   -initial plan for EGD 10/31; however, canceled due to recent plavix use (10/28).   -s/p EGD :               Impression:  1.  Cervical esophageal web(s).  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  An immune mediated etiology should be excluded as well.  2.  Small hiatal hernia  3.  Status post esophageal dilatation to 12 mm TTS  -Path  pending  -Continue IV iron repletion along with carafate TID and PPI BID  -Start fluconazole, discussed importance of trial of diet to see if she is able to tolerate medication   -discussed with psych, will discontinue Seroquel during this time due to interaction with Fluconazole    Recommend:  -plan for fluconazole 200 mg daily x 14 days  -Diet as tolerated  -IV PPI BID, transition to PO at discharge  -Carafate TID with meals  -IV iron repletion    Pt is stable from a GI standpoint.  GI will sign off at this time.  Please, reach out to our team if new GI concerns arise.  Thank you for the opportunity to participate in this patient's care.      Case discussed with Reggie Erwin MD and Pura CHUNG.    Juana Carroll PA-C  [facility]  11/5/2024      Results:     Lab Results   Component Value Date    WBC 8.9 11/02/2024    HGB 9.8 (L) 11/02/2024    HCT 30.6 (L) 11/02/2024    .0 11/02/2024    CREATSERUM 0.39 (L) 11/04/2024    BUN <5 (L) 11/04/2024     11/04/2024    K 3.3 (L) 11/04/2024     (H) 11/04/2024    CO2 15.0 (L) 11/04/2024     (H) 11/04/2024    CA 6.1 (L) 11/04/2024    ALB 2.1 (L) 11/02/2024    ALKPHO 331 (H) 10/30/2024    BILT 0.2 10/30/2024    TP 3.9 (L) 10/30/2024    AST 23 10/30/2024    ALT 31 10/30/2024    LIP 31 10/27/2024    DDIMER 1.69 (H) 07/23/2024    MG 2.0 10/30/2024    PHOS 3.4 11/03/2024    CK 50 08/27/2024    B12 748 08/21/2024    ETOH <3 09/05/2024       No results found.  EKG 12 Lead    Result Date: 11/5/2024  Sinus tachycardia Nonspecific T wave abnormality Abnormal ECG When compared with ECG of 27-OCT-2024 17:42, Criteria for Anterior infarct are no longer Present Confirmed by RICKI ROBERTSON ([id number]) on 11/5/2024 7:05:39 AM

## 2024-11-05 NOTE — PROGRESS NOTES
[facility]  part of [facility]  Hospitalist Progress Note     Sheri Garzon Patient Status:  Inpatient    1960  64 year old CSN [id number]   Location 546/546-A Attending John Hinds MD   Hosp Day # 9 PCP TERI MCKENNA MD     Assessment & Plan:   ----------------------------------  Odynophagia/esophageal web.  Abnormal esophagram  -Encouraged oral intake, there may be a functional issue now that has developed - despite prompting she did not eat anything yesterday except for a bite of grilled cheese  -GI consult  -Restart oral medications for now  -EGD  shows esophageal web which was dilated  -Diet as tolerated  -Psychiatry on consult, added Remeron - hopefully will help with appetite  -Palliative care consulted as well    Right lower extremity ulcer.  Possible overlying cellulitis on right foot dorsum possible.  Ulceration on the back of the right lower leg does not appear infected.  White count is normal.  Afebrile.  -Continue vancomycin for now  -Cxs show MRSA sensitive to bactrim  -outpt vascular evaluation if fails to heal    Hypoglycemia.  Due to poor oral intake.  Continue hypoglycemic protocol.  Strongly encouraged continued oral intake.  At this point her oral intake is the limiting factor to discharge  -Dietitian    Left upper extremity swelling.  May have some focal edema.  No evidence of DVT associated with PICC line.  PICC line is in proper place and functioning well.  -Manage expectantly  -DC IV fluids when acidosis improved, oral intake improved    Other problems  Metabolic acidosis  Anxiety  Asthma  Adrenal insufficiency  GERD  Hypertension  Dyslipidemia  History of coronary artery disease  Rheumatoid arthritis  Sleep apnea    DVT Mechanical Prophylaxis:     Early ambuation  DVT Pharmacologic Prophylaxis   Medication    heparin (Porcine) 5000 UNIT/ML injection 5,000 Units      DVT Pharmacologic prophylaxis: Aspirin 162 mg       code status: full  dispo:  11/3,  refusing SULTANA.  I discussed with her that it is unlikely she will do well at home given her level of debility  If applicable, malnutrition status at bottom of note    I personally reviewed the available laboratories, imaging including. I discussed/will discuss the case with consultants. I ordered laboratories and/or radiographic studies. I adjusted medications as detailed above.  Medical decision making high, risk is high.       Greater than 55 minutes spent, Greater than 50% spent counseling and in coordination of care, reviewing labs, discussing results with patient, coordinating care with care team and ordering labs for tomorrow and adjusting medications as needed      Subjective:   ----------------------------------    Discussed how she didn't eat anything yesterday.  Promised to eat more today.      10 ROS completed and otherwise negative.       Objective:   Chief Complaint:   Chief Complaint   Patient presents with    Nausea/Vomiting/Diarrhea     ----------------------------------  Temp:  [97.6 °F (36.4 °C)-98 °F (36.7 °C)] 98 °F (36.7 °C)  Pulse:  [69-88] 88  Resp:  [16-18] 18  BP: (101-116)/(61-77) 115/70  SpO2:  [94 %-100 %] 100 %    Gen: No acute distress  Pulm: Lungs clear, normal respiratory effort  CV: Heart with regular rate and rhythm  Abd: Abdomen soft, nontender, nondistended, bowel sounds present  Neuro: No acute focal deficits  MSK: moves extremities  Skin: Warm and dry  Psych: Normal affect  Ext: no c/c/e      Labs:  Lab Results   Component Value Date    HGB 9.8 (L) 11/02/2024    WBC 8.9 11/02/2024    .0 11/02/2024     11/04/2024    K 3.3 (L) 11/04/2024    CREATSERUM 0.39 (L) 11/04/2024    AST 23 10/30/2024    ALT 31 10/30/2024            ondansetron  4 mg Oral TID (Nitrates)    alteplase (Activase) 2 mg in sterile water for injection (PF) 2.2 mL IV push to declot line  2 mg Intravenous Once    mirtazapine  7.5 mg Oral Nightly    vancomycin  750 mg Intravenous Q24H    sucralfate  1 g  Oral TID AC and HS (2200)    predniSONE  10 mg Oral BID    aspirin  81 mg Oral Daily    clopidogrel  75 mg Oral Daily    docusate sodium  100 mg Oral BID    hydroxychloroquine  200 mg Oral BID    lamoTRIgine  50 mg Oral BID    metoprolol tartrate  12.5 mg Oral BID    oxybutynin ER  10 mg Oral Daily    QUEtiapine  50 mg Oral Nightly    heparin  5,000 Units Subcutaneous Q8H FALLON    pantoprazole  40 mg Intravenous Q12H       HYDROcodone-acetaminophen    acetaminophen    dextrose    glucose **OR** glucose **OR** glucose-vitamin C **OR** dextrose **OR** glucose **OR** glucose **OR** glucose-vitamin C    acetaminophen    albuterol    melatonin    ondansetron    prochlorperazine      Dietitian Malnutrition Assessment    Evaluation for Malnutrition: Criteria for non-severe malnutrition diagnosis-         acute illness/injury related to Wt loss 5% in 1 month., Energy intake less than 75% for greater than 7 days.         RD Malnutrition Care Plan: Encouraged increased PO intake., Intiated ONS (oral nutritional supplements).    Body Fat/Muscle Mass: no wasting noted, malnutrition related to PO intake and weight loss        Physician Assessment     Patient has a diagnosis of moderate malnutrition

## 2024-11-05 NOTE — PHYSICAL THERAPY NOTE
PHYSICAL THERAPY TREATMENT NOTE - INPATIENT     Room Number: 546/546-A       Presenting Problem: cellulitis of right lower extremity       Problem List  Principal Problem:    Cellulitis of right lower extremity  Active Problems:    Nausea and vomiting    Episodic mood disorder (HCC)    Odynophagia    Malnutrition ([facility])    Pain      PHYSICAL THERAPY ASSESSMENT   Patient demonstrates good  progress this session, goals  remain in progress.      Patient is requiring moderate assist and assist of 2  as a result of the following impairments: decreased functional strength, pain, and medical status.     Patient continues to function below baseline with bed mobility, transfers, and gait.  Next session anticipate patient to progress bed mobility, transfers, and gait.  Physical Therapy will continue to follow patient for duration of hospitalization.    Patient continues to benefit from continued skilled PT services: to promote return to prior level of function and safety with continuous assistance and gradual rehabilitative therapy .    PLAN DURING HOSPITALIZATION  Nursing Mobility Recommendation : Lift Equipment  PT Device Recommendation: Rolling walker  PT Treatment Plan: Bed mobility;Body mechanics;Patient education;Transfer training;Balance training  Frequency (Obs): 3-5x/week     SUBJECTIVE  \"I can eat in the bedside chair\"    OBJECTIVE  Precautions: Bed/chair alarm    PAIN ASSESSMENT   Ratin  Location: R knee and b heels  Management Techniques: Activity promotion;Body mechanics;Relaxation;Repositioning    BALANCE  Static Sitting: Fair  Dynamic Sitting: Fair -  Static Standing: Poor +  Dynamic Standing: Poor    AM-PAC '6-Clicks' INPATIENT SHORT FORM - BASIC MOBILITY  How much difficulty does the patient currently have...  Patient Difficulty: Turning over in bed (including adjusting bedclothes, sheets and blankets)?: A Lot   Patient Difficulty: Sitting down on and standing up from a chair with arms (e.g., wheelchair,  bedside commode, etc.): A Lot   Patient Difficulty: Moving from lying on back to sitting on the side of the bed?: A Lot   How much help from another person does the patient currently need...   Help from Another: Moving to and from a bed to a chair (including a wheelchair)?: A Lot   Help from Another: Need to walk in hospital room?: Total   Help from Another: Climbing 3-5 steps with a railing?: Total     AM-PAC Score:  Raw Score: 10   Approx Degree of Impairment: 76.75%   Standardized Score (AM-PAC Scale): 32.29   CMS Modifier (G-Code): CL    FUNCTIONAL ABILITY STATUS  Functional Mobility/Gait Assessment  Gait Assistance: Not tested  Distance (ft): 0  Assistive Device: None  Pattern:  (not tested)  Rolling:  not tested   Supine to Sit: moderate assist  Sit to Supine:  not tested   Sit to Stand: moderate assist and assist of 2    Skilled Therapy Provided: Patient on room air. Patient currently with mod A x 2 for mobility during the session. Patient able to stand pivot transfer with mod A x 2. The patient's Approx Degree of Impairment: 76.75% has been calculated based on documentation in the [facility] '6 clicks' Inpatient Daily Activity Short Form.  Research supports that patients with this level of impairment may benefit from long term care, however patient would benefit from rehab facility. Patient received semi-fowlers in bed, agreeable to physical therapy.     PATIENT EDUCATION  Education Provided To: Patient  Patient Education: Role of Physical Therapy;Plan of Care  Patient's Response to Education: Requires Further Education             Final disposition will be made by interdisciplinary medical team.    Patient End of Session: Up in chair;Needs met;Call light within reach;RN aware of session/findings;All patient questions and concerns addressed    CURRENT GOALS   Goals to be met by: 11/4/24  Patient Goal Patient's self-stated goal is: none stated   Goal #1 Patient is able to demonstrate supine - sit EOB @ level:  supervision      Goal #1   Current Status Not met   Goal #2 Patient is able to demonstrate transfers Sit to/from Stand at assistance level: supervision with walker - rolling      Goal #2  Current Status Not met    Goal #3 Patient is able to ambulate at least 15 feet with assist device: walker - rolling at assistance level: supervision   Goal #3   Current Status Not met   Goal #4     Goal #4   Current Status     Goal #5 Patient to demonstrate independence with home activity/exercise instructions provided to patient in preparation for discharge.   Goal #5   Current Status In progress     Therapeutic Activity: 23 minutes

## 2024-11-05 NOTE — PROGRESS NOTES
11/05/24 1012   Wound 10/28/24 Leg Lower;Posterior;Right   Date First Assessed/Time First Assessed: 10/28/24 0019   Present on Original Admission: Yes  Primary Wound Type: Venous Ulcer  Location: Leg  Wound Location Orientation: Lower;Posterior;Right   Wound Image    Site Assessment Clean;Fragile;Granulation tissue;Moist;Painful;Red   Closure Not approximated   Drainage Amount Scant   Drainage Description Serosanguineous   Treatments Cleansed;Saline   Dressing Aquacel Foam;Xeroform   Dressing Changed Changed   Dressing Status Dressing Changed;Removed;Old drainage   Wound Length (cm) 2.8 cm   Wound Width (cm) 3.3 cm   Wound Surface Area (cm^2) 9.24 cm^2   Wound Depth (cm) 0.1 cm   Wound Volume (cm^3) 0.924 cm^3   Wound Healing % 37   Non-staged Wound Description Partial thickness   Norma-wound Assessment Clean;Dry;Intact;Painful   Wound Granulation Tissue Red   Wound Bed Granulation (%) 100 %   State of Healing Non-healing   Wound Odor None   Wound 10/28/24 Foot Dorsal;Right   Date First Assessed/Time First Assessed: 10/28/24 0019   Present on Original Admission: Yes  Primary Wound Type: Diabetic Ulcer  Location: Foot  Wound Location Orientation: Dorsal;Right   Wound Image    Site Assessment Fragile;Painful;Tan   Closure Not approximated   Drainage Amount Scant   Drainage Description Tan   Treatments Cleansed;Saline;Honey Gel   Dressing 2x2s;Aquacel Foam   Dressing Changed Changed   Dressing Status Dressing Changed;Removed;Old drainage   Wound Length (cm) 0.3 cm   Wound Width (cm) 0.5 cm   Wound Surface Area (cm^2) 0.15 cm^2   Wound Depth (cm) 0.1 cm   Wound Volume (cm^3) 0.015 cm^3   Non-staged Wound Description Full thickness   Norma-wound Assessment Clean;Dry;Intact;Painful   Wound Bed Slough (%) 100 %   State of Healing Non-healing   Wound Odor None   Wound Follow Up   Follow up needed Yes     Pt seen for follow up on above wounds which are largely the same as the last assessment on 10/28. Pt is not eating, only  having \"a little clear ensure\". I discussed with the pt without proper nutrition, the wounds will not heal. Recommend to continue the xeroform to the right posterior calf and medihoney to the right dorsal foot.

## 2024-11-05 NOTE — PALLIATIVE CARE NOTE
[facility]  part of [facility]  Palliative Care Progress Note    Sheri Garzon Patient Status:  Inpatient    1960 MRN [id number]   Location [facility] 5SW/SE Attending Marlena Morris MD   Hosp Day # 9 PCP TERI MCKENNA MD     The  Cures Act makes medical notes like these available to patients in the interest of transparency. Please be advised this is a medical document. Medical documents are intended to carry relevant information, facts as evident, and the clinical opinion of the practitioner. The medical note is intended as peer to peer communication and may appear blunt or direct. It is written in medical language and may contain abbreviations or verbiage that are unfamiliar.     Subjective     When I entered the room, Viviana is in the bed she is awake and alert, watching program on her phone. Viviana c/o pain generalized, Viviana states she took a bite of her grilled cheese yesterday and became nauseated. Viviana states she becomes nauseated when she tries to eat. Discussed she has been refusing her Carafate and other medications. I educated Viviana on what Carafate is and why she is on it. Viviana agreed to taking her Carafate today, discussed will add Zofran 3 times daily before meals. Viviana requested to have her Norco changed to liquid form to make it easier to swallow. Discussed use of the Lortab as needed for pain. Viviana agreed, we also discussed trying to eat a few bites with each meal to get herself used to eating again. Viviana agreed to this plan today.     Symptom assessment: pain, nausea, anorexia    Review of pertinent medication requirements in past 24 hours: Tylenol, Norco 5/325 X2      Palliative Care symptom needs assessed:   Pertinent items are noted in HPI/below    Fatigue:  Yes Viviana is mainly in the bed  Dyspnea: denies   Current O2 therapy: RA  Cough: denies  Pain Present: left knee and left heel pain, generalized pain  Non-verbal signs of pain present:  No  Appetite: poor she has been declining to eat, took a quarter of her grilled cheese last night  Constipation: denies  Diarrhea: denies  Nausea/Vomiting: + intermittent nausea with eating  Depression: episodic mood disorder, h/o bipolar  Anxiety: + anxiety      Allergies:  Allergies[1]    Medications:     Current Facility-Administered Medications:     HYDROcodone-acetaminophen 7.5-325 MG/15ML solution 15 mL, 15 mL, Oral, Q4H PRN    acetaminophen (Tylenol) 160 MG/5ML oral liquid 650 mg, 650 mg, Oral, Q4H PRN    dextrose 10% infusion, , Intravenous, Continuous    mirtazapine (Remeron) tab 7.5 mg, 7.5 mg, Oral, Nightly    dextrose 50% injection 50 mL, 50 mL, Intravenous, PRN    glucose (Dex4) 15 GM/59ML oral liquid 15 g, 15 g, Oral, Q15 Min PRN **OR** glucose (Glutose) 40% oral gel 15 g, 15 g, Oral, Q15 Min PRN **OR** glucose-vitamin C (Dex-4) chewable tab 4 tablet, 4 tablet, Oral, Q15 Min PRN **OR** dextrose 50% injection 50 mL, 50 mL, Intravenous, Q15 Min PRN **OR** glucose (Dex4) 15 GM/59ML oral liquid 30 g, 30 g, Oral, Q15 Min PRN **OR** glucose (Glutose) 40% oral gel 30 g, 30 g, Oral, Q15 Min PRN **OR** glucose-vitamin C (Dex-4) chewable tab 8 tablet, 8 tablet, Oral, Q15 Min PRN    vancomycin (Vancocin) 750 mg in sodium chloride 0.9% 250 mL IVPB-ADDV, 750 mg, Intravenous, Q24H    acetaminophen (Tylenol) 160 MG/5ML oral liquid 650 mg, 650 mg, Oral, Q6H PRN    sucralfate (Carafate) 1 GM/10ML oral suspension 1 g, 1 g, Oral, TID AC and HS (2200)    predniSONE (Deltasone) tab 10 mg, 10 mg, Oral, BID    albuterol (Ventolin HFA) 108 (90 Base) MCG/ACT inhaler 2 puff, 2 puff, Inhalation, Q4H PRN    aspirin chewable tab 81 mg, 81 mg, Oral, Daily    clopidogrel (Plavix) tab 75 mg, 75 mg, Oral, Daily    docusate sodium (Colace) cap 100 mg, 100 mg, Oral, BID    hydroxychloroquine (Plaquenil) tab 200 mg, 200 mg, Oral, BID    lamoTRIgine (LaMICtal) tab 50 mg, 50 mg, Oral, BID    metoprolol tartrate (Lopressor) partial tab 12.5  mg, 12.5 mg, Oral, BID    oxybutynin ER (Ditropan-XL) 24 hr tab 10 mg, 10 mg, Oral, Daily    QUEtiapine (SEROquel) tab 50 mg, 50 mg, Oral, Nightly    heparin (Porcine) 5000 UNIT/ML injection 5,000 Units, 5,000 Units, Subcutaneous, Q8H F[place]ON    melatonin tab 3 mg, 3 mg, Oral, Nightly PRN    ondansetron (Zofran) 4 MG/2ML injection 4 mg, 4 mg, Intravenous, Q6H PRN    prochlorperazine (Compazine) 10 MG/2ML injection 5 mg, 5 mg, Intravenous, Q8H PRN    pantoprazole (Protonix) 40 mg in sodium chloride 0.9% PF 10 mL IV push, 40 mg, Intravenous, Q12H    Objective     Vital Signs:  Blood pressure 115/70, pulse 88, temperature 98 °F (36.7 °C), temperature source Oral, resp. rate 18, height 5' 2\" (1.575 m), weight 164 lb (74.4 kg), SpO2 100%.  Body mass index is 30 kg/m².  Present Level of pain: generalized pain 6/10  Non-verbal signs of pain present: No    Physical Exam:  General: Viviana is lying in the bed, watching show on her phone, she is awake and alert, pleasant with flat affect.  HEENT: very dry oral MM and lips, lower gums pale appearing.   Cardiac: Regular rate and rhythm, S1, S2 normal, no murmur, rub or gallop.  Lungs: Clear without wheezes, rales, rhonchi or dullness.  Normal excursions and effort.  Abdomen: Soft, non-tender, +bowel sounds, no rebound or guarding. + BM  Extremities: Without clubbing, cyanosis. BLE trace Edema present. + upper extremity edema  Neurologic: Alert and oriented X4, pleasant with flat affect, normal affect.  Skin: Warm and dry.  Lower left heel wound and right lower extremity wound.    Prior to admission Palliative performance scale PPSv2 (%): 50    Hematology:  Lab Results   Component Value Date    WBC 8.9 11/02/2024    HGB 9.8 (L) 11/02/2024    HCT 30.6 (L) 11/02/2024    .0 11/02/2024       Coags:No results found for: \"PT\", \"INR\", \"PTT\"    Chemistry:  Lab Results   Component Value Date    CREATSERUM 0.39 (L) 11/04/2024    BUN <5 (L) 11/04/2024     11/04/2024    K 3.3 (L)  11/04/2024     (H) 11/04/2024    CO2 15.0 (L) 11/04/2024     (H) 11/04/2024    CA 6.1 (L) 11/04/2024    ALB 2.1 (L) 11/02/2024    ALKPHO 331 (H) 10/30/2024    BILT 0.2 10/30/2024    TP 3.9 (L) 10/30/2024    AST 23 10/30/2024    ALT 31 10/30/2024    DDIMER 1.69 (H) 07/23/2024    MG 2.0 10/30/2024    PHOS 3.4 11/03/2024       Imaging:  No results found.      Summary of Discussion    11/5/24 Viviana states she took a bite of her grilled cheese yesterday and became nauseated. Viviana states she becomes nauseated when she tries to eat. Discussed she has been refusing her Carafate and other medications. I educated Viviana on what Carafate is and why she is on it. Viviana agreed to taking her Carafate today, discussed will add Zofran 3 times daily before meals. Viviana requested to have her Norco changed to liquid form to make it easier to swallow. Discussed use of the Lortab as needed for pain. Viviana agreed, we also discussed trying to eat a few bites with each meal to get herself used to eating again. Viviana agreed to this plan today.   Viviana stated she would not want feeding tube.  Viviana wants to defer filling out HCPOA pw again today will reassess tomorrow.     Assessment and Recommendation      Cellulitis of right lower extremity/wound    Hypoglycemia    Left upper extremity swelling    Nausea and vomiting    Episodic mood disorder-Psyche following    Odynophagia/esophageal web-EGD 11/2 esophageal web dilated by GI    Malnutrition-nutritional supplements    Anxiety    Adrenal insufficiency    GERD    HTN    HLD    RA    CAD    NIKOLAI    Lower extremity wounds     Pain  -change to liquid form Lortab as per pt request for liquid. Lortab 7.5 mg as needed   -Tylenol as needed     Decreased appetite/anorexia  -on Remeron    Nausea with eating  -schedule Zofran 3 times daily before meals  - agreed to take the Carafate that is ordered.   -discussed taking a few bites of each meal to get herself used to eating again.   -Viviana does  not want feeding tube.      Goals of care counseling  -see above for details  -Pt is Full Code  -Agreeable to palliative care following  -Dispo: [facility] with [facility]. SW to help with dc planning.   -declined  services  -ongoing GOC discussions may be needed over time.  -Viviana is expressing she would not want feeding tube  -Provided emotional support to pt who is coping adequately     Advance care planning  -see above for details  -Pt's Dtr Rod Hoover is HC surrogate [phone]  -Sheri wishes to fill out HCPOA pw tomorrow blank copy left with Viviana to review.      Palliative Performance Scale 40%     Discussed today's visit with  Dr Velasco, Dr Hardin and Pura CHUNG    Palliative Care Follow Up: Palliative care team will follow up tomorrow. Feel free to contact our team with any questions or concerns.    Thank you for allowing Palliative Care services to participate in the care of Sheri Garzon.    A total of 25 minutes were spent on this follow-up, which included all of the following: chart review, direct face to face contact, history taking, physical examination, counseling and coordinating care, and documentation.     Saba Mauricio, [id number]  11/5/2024   9:59AM  Palliative Care Services       [1]   Allergies  Allergen Reactions    Cephalosporins ANAPHYLAXIS, NAUSEA AND VOMITING and OTHER (SEE COMMENTS)     Other reaction(s): Fever    - Tolerated multiple doses of ceftriaxone on 7/2024 without any complications  - Tolerated multiple doses of cefepime 8/2024 without complications    Gadolinium Derivatives FEVER    Penicillins OTHER (SEE COMMENTS)     esophagitis    Sulfa Antibiotics OTHER (SEE COMMENTS)     esophagitis    Bananas ITCHING

## 2024-11-05 NOTE — PLAN OF CARE
Problem: Patient Centered Care  Goal: Patient preferences are identified and integrated in the patient's plan of care  Description: Interventions:  - What would you like us to know as we care for you? \"I fell twice at rehab\"  - Provide timely, complete, and accurate information to patient/family  - Incorporate patient and family knowledge, values, beliefs, and cultural backgrounds into the planning and delivery of care  - Encourage patient/family to participate in care and decision-making at the level they choose  - Honor patient and family perspectives and choices  Outcome: Progressing     Problem: Patient/Family Goals  Goal: Patient/Family Long Term Goal  Description: Patient's Long Term Goal: Discharge home    Interventions:  - IVF  -Wound care  -IV antibiotics  -PT/OT  - See additional Care Plan goals for specific interventions  Outcome: Progressing  Goal: Patient/Family Short Term Goal  Description: Patient's Short Term Goal: Regain Strength    Interventions:   - PT/OT  - Dietary consult  - See additional Care Plan goals for specific interventions  Outcome: Progressing     Problem: PAIN - ADULT  Goal: Verbalizes/displays adequate comfort level or patient's stated pain goal  Description: INTERVENTIONS:  - Encourage pt to monitor pain and request assistance  - Assess pain using appropriate pain scale  - Administer analgesics based on type and severity of pain and evaluate response  - Implement non-pharmacological measures as appropriate and evaluate response  - Consider cultural and social influences on pain and pain management  - Manage/alleviate anxiety  - Utilize distraction and/or relaxation techniques  - Monitor for opioid side effects  - Notify MD/LIP if interventions unsuccessful or patient reports new pain  - Anticipate increased pain with activity and pre-medicate as appropriate  Outcome: Progressing     Problem: RISK FOR INFECTION - ADULT  Goal: Absence of fever/infection during anticipated  neutropenic period  Description: INTERVENTIONS  - Monitor WBC  - Administer growth factors as ordered  - Implement neutropenic guidelines  Outcome: Progressing     Problem: SAFETY ADULT - FALL  Goal: Free from fall injury  Description: INTERVENTIONS:  - Assess pt frequently for physical needs  - Identify cognitive and physical deficits and behaviors that affect risk of falls.  - [place] fall precautions as indicated by assessment.  - Educate pt/family on patient safety including physical limitations  - Instruct pt to call for assistance with activity based on assessment  - Modify environment to reduce risk of injury  - Provide assistive devices as appropriate  - Consider OT/PT consult to assist with strengthening/mobility  - Encourage toileting schedule  Outcome: Progressing     Problem: GASTROINTESTINAL - ADULT  Goal: Minimal or absence of nausea and vomiting  Description: INTERVENTIONS:  - Maintain adequate hydration with IV or PO as ordered and tolerated  - Nasogastric tube to low intermittent suction as ordered  - Evaluate effectiveness of ordered antiemetic medications  - Provide nonpharmacologic comfort measures as appropriate  - Advance diet as tolerated, if ordered  - Obtain nutritional consult as needed  - Evaluate fluid balance  Outcome: Progressing  Goal: Maintains or returns to baseline bowel function  Description: INTERVENTIONS:  - Assess bowel function  - Maintain adequate hydration with IV or PO as ordered and tolerated  - Evaluate effectiveness of GI medications  - Encourage mobilization and activity  - Obtain nutritional consult as needed  - Establish a toileting routine/schedule  - Consider collaborating with pharmacy to review patient's medication profile  Outcome: Progressing     Problem: SKIN/TISSUE INTEGRITY - ADULT  Goal: Skin integrity remains intact  Description: INTERVENTIONS  - Assess and document risk factors for pressure ulcer development  - Assess and document skin integrity  -  Monitor for areas of redness and/or skin breakdown  - Initiate interventions, skin care algorithm/standards of care as needed  Outcome: Progressing  Goal: Incision(s), wounds(s) or drain site(s) healing without S/S of infection  Description: INTERVENTIONS:  - Assess and document risk factors for pressure ulcer development  - Assess and document skin integrity  - Assess and document dressing/incision, wound bed, drain sites and surrounding tissue  - Implement wound care per orders  - Initiate isolation precautions as appropriate  - Initiate Pressure Ulcer prevention bundle as indicated  Outcome: Progressing  Goal: Oral mucous membranes remain intact  Description: INTERVENTIONS  - Assess oral mucosa and hygiene practices  - Implement preventative oral hygiene regimen  - Implement oral medicated treatments as ordered  Outcome: Progressing     Problem: MUSCULOSKELETAL - ADULT  Goal: Return mobility to safest level of function  Description: INTERVENTIONS:  - Assess patient stability and activity tolerance for standing, transferring and ambulating w/ or w/o assistive devices  - Assist with transfers and ambulation using safe patient handling equipment as needed  - Ensure adequate protection for wounds/incisions during mobilization  - Obtain PT/OT consults as needed  - Advance activity as appropriate  - Communicate ordered activity level and limitations with patient/family  Outcome: Progressing  Goal: Maintain proper alignment of affected body part  Description: INTERVENTIONS:  - Support and protect limb and body alignment per provider's orders  - Instruct and reinforce with patient and family use of appropriate assistive device and precautions (e.g. spinal or hip dislocation precautions)  Outcome: Progressing

## 2024-11-05 NOTE — SLP NOTE
SPEECH DAILY NOTE - INPATIENT    ASSESSMENT & PLAN   ASSESSMENT    Proper PPE worn. Hands sanitized upon entrance/exit Pt room.       Pt alert, afebrile and on room air. Pt seen for swallow analysis per BSE recommendations (after consulting with RN). Pt agreed to participate. Pt's preferred method of learning verbal. Pt upright in chair; observed with current diet of soft low fiber/thin liquid diet for monitoring diet tolerance. Swallowing precautions/strategies discussed; Pt able to self-cue for execution. Functional bite reflex/mastication/lingual skills on soft low fiber consistency. No significant oral retention. Pharyngeal response appeared to trigger within 1-2 sec per hyolaryngeal elevation to completion (functional rise/strength per palpation). No overt CSA on soft low fiber/thin liquids. Collaborated with RN regarding Pt's swallowing plan of care. Per RN, Pt with good tolerance of current diet. No report of difficulty taking meds. Sp02 ~97% during this session. Call light within Pt's reach upon SLP discharge from room.        CXR 11/03/24:  LUNGS/PLEURA: Minimal curvilinear opacity in the left mid to lower lung, may represent small amount of atelectasis or scarring.  There is no focal consolidation.  The pleural spaces are clear.             PLAN: Pt is discharged from skilled swallowing intervention secondary to functional swallowing skills on least restrictive safest diet.       Diet Recommendations - Solids:  (soft low fiber)  Diet Recommendations - Liquids: Thin Liquids    Compensatory Strategies Recommended: Alternate consistencies, small bites/sips  Medication Administration Recommendations: Whole in puree    Patient Experiencing Pain: No    Treatment Plan  Treatment Plan/Recommendations: Aspiration precautions    Interdisciplinary Communication: Discussed with RN  Plan posted at bedside      GOALS  Goal #1 The patient will tolerate clear thin liquid consistency without overt signs or symptoms of  aspiration with 100 % accuracy over one session(s).    No CSA on thin liquids.        GOAL MET      Goal #2 The patient/family/caregiver will demonstrate understanding and implementation of aspiration precautions and swallow strategies independently over 1-2 session(s).    Swallowing precautions posted in room.    GOAL MET     Goal #3 The patient will tolerate trial upgrade of SOFT/SOLIDS (with MD approval) and thin liquids without overt signs or symptoms of aspiration with 100 % accuracy over one session(s).    No CSA on current diet. Functional oral skills.        GOAL MET     FOLLOW UP  Follow Up Needed (Documentation Required): No  SLP Follow-up Date: 11/05/24  Number of Visits to Meet Established Goals:  (1-2)    Session: 1    If you have any questions, please contact   Natalie Edwards M.S. CCC/SLP  Speech-Language Pathologist  [facility]  #[phone]

## 2024-11-06 PROBLEM — Z71.89 GOALS OF CARE, COUNSELING/DISCUSSION: Status: ACTIVE | Noted: 2024-01-01

## 2024-11-06 PROBLEM — Z51.5 PALLIATIVE CARE ENCOUNTER: Status: ACTIVE | Noted: 2024-11-06

## 2024-11-06 PROBLEM — Z51.5 PALLIATIVE CARE ENCOUNTER: Status: ACTIVE | Noted: 2024-01-01

## 2024-11-06 PROBLEM — Z71.89 GOALS OF CARE, COUNSELING/DISCUSSION: Status: ACTIVE | Noted: 2024-11-06

## 2024-11-06 NOTE — CM/SW NOTE
LAURA followed up on discharge planning.    Patient has 49 skilled days left for rehab.    LAURA called  w/ [facility] to see if pt can be screened for Medicaid.  At this time, pt is 64 so cannot qualify for spend-down.      If auth not obtained for return to [facility], pt will have to discharge home w/ home health care and family OR be agreeable to respite care stay.    Update 3:00 PM    Per liaison Shannan copeland/[facility], pt's premiums have not been paid for October and November.    LAURA met with pt bedside and called her [facility] customer service # and spoke to Sachi who confirmed pt has a balance of $369.64 due.  LAURA assisted the pt in paying the balance via credit card over the phone.  Reference #[id number] was processed.    LAURA gave above reference # to Shannan in order to process auth request.    PLAN: DC to [facility] pending auth    / to remain available for support and/or discharge planning.     Simona Gonzales MSW, LSW [phone]

## 2024-11-06 NOTE — PLAN OF CARE
Problem: ALTERED NUTRIENT INTAKE - ADULT  Goal: Nutrient intake appropriate for improving, restoring or maintaining nutritional needs  Description: INTERVENTIONS:  - Assess nutritional status and recommend course of action  - Monitor oral intake, labs, and treatment plans  - Recommend appropriate diets, oral nutritional supplements, and vitamin/mineral supplements  - Provide specific nutrition education as appropriate  Outcome: Not Progressing

## 2024-11-06 NOTE — PALLIATIVE CARE NOTE
[facility]  Palliative Care Progress Note    Sheri Garzon Patient Status:  Inpatient    1960 MRN [id number]   Location [facility] 5SW/SE Attending Marlena Morris MD   Hosp Day # 10 PCP TERI MCKENNA MD     The  Cures Act makes medical notes like these available to patients in the interest of transparency. Please be advised this is a medical document. Medical documents are intended to carry relevant information, facts as evident, and the clinical opinion of the practitioner. The medical note is intended as peer to peer communication and may appear blunt or direct. It is written in medical language and may contain abbreviations or verbiage that are unfamiliar.     Subjective     When I entered the room, Viviana is in the bed she is awake and alert, watching TV and visiting with her sister Tiffani.    Symptom assessment: pain, nausea, anorexia    Review of pertinent medication requirements in past 24 hours: Zofran 4mg X1, Lortab 7.5mg X3    Palliative Care symptom needs assessed:   Pertinent items are noted in HPI/below    Fatigue:  Yes Viviana is mainly in the bed  Dyspnea: denies   Current O2 therapy: RA  Cough: denies  Pain Present: left knee and left heel pain, generalized pain  Non-verbal signs of pain present: No  Appetite: poor she has been declining to eat, drinking nessa dry during exam today.  Constipation: denies  Diarrhea: denies  Nausea/Vomiting: + intermittent nausea with eating  Depression: episodic mood disorder, h/o bipolar  Anxiety: + anxiety    Allergies:  Allergies[1]    Medications:     Current Facility-Administered Medications:     HYDROcodone-acetaminophen 7.5-325 MG/15ML solution 15 mL, 15 mL, Oral, Q4H PRN    ondansetron (Zofran-ODT) disintegrating tab 4 mg, 4 mg, Oral, TID (Nitrates)    acetaminophen (Tylenol) 160 MG/5ML oral liquid 650 mg, 650 mg, Oral, Q4H PRN    dextrose 10% infusion, , Intravenous, Continuous     mirtazapine (Remeron) tab 7.5 mg, 7.5 mg, Oral, Nightly    dextrose 50% injection 50 mL, 50 mL, Intravenous, PRN    glucose (Dex4) 15 GM/59ML oral liquid 15 g, 15 g, Oral, Q15 Min PRN **OR** glucose (Glutose) 40% oral gel 15 g, 15 g, Oral, Q15 Min PRN **OR** glucose-vitamin C (Dex-4) chewable tab 4 tablet, 4 tablet, Oral, Q15 Min PRN **OR** dextrose 50% injection 50 mL, 50 mL, Intravenous, Q15 Min PRN **OR** glucose (Dex4) 15 GM/59ML oral liquid 30 g, 30 g, Oral, Q15 Min PRN **OR** glucose (Glutose) 40% oral gel 30 g, 30 g, Oral, Q15 Min PRN **OR** glucose-vitamin C (Dex-4) chewable tab 8 tablet, 8 tablet, Oral, Q15 Min PRN    vancomycin (Vancocin) 750 mg in sodium chloride 0.9% 250 mL IVPB-ADDV, 750 mg, Intravenous, Q24H    acetaminophen (Tylenol) 160 MG/5ML oral liquid 650 mg, 650 mg, Oral, Q6H PRN    sucralfate (Carafate) 1 GM/10ML oral suspension 1 g, 1 g, Oral, TID AC and HS (2200)    predniSONE (Deltasone) tab 10 mg, 10 mg, Oral, BID    albuterol (Ventolin HFA) 108 (90 Base) MCG/ACT inhaler 2 puff, 2 puff, Inhalation, Q4H PRN    aspirin chewable tab 81 mg, 81 mg, Oral, Daily    clopidogrel (Plavix) tab 75 mg, 75 mg, Oral, Daily    hydroxychloroquine (Plaquenil) tab 200 mg, 200 mg, Oral, BID    lamoTRIgine (LaMICtal) tab 50 mg, 50 mg, Oral, BID    metoprolol tartrate (Lopressor) partial tab 12.5 mg, 12.5 mg, Oral, BID    oxybutynin ER (Ditropan-XL) 24 hr tab 10 mg, 10 mg, Oral, Daily    heparin (Porcine) 5000 UNIT/ML injection 5,000 Units, 5,000 Units, Subcutaneous, Q8H [place]    melatonin tab 3 mg, 3 mg, Oral, Nightly PRN    ondansetron (Zofran) 4 MG/2ML injection 4 mg, 4 mg, Intravenous, Q6H PRN    prochlorperazine (Compazine) 10 MG/2ML injection 5 mg, 5 mg, Intravenous, Q8H PRN    pantoprazole (Protonix) 40 mg in sodium chloride 0.9% PF 10 mL IV push, 40 mg, Intravenous, Q12H    Objective     Vital Signs:  Blood pressure 111/74, pulse 87, temperature 97.9 °F (36.6 °C), temperature source Oral, resp. rate 16,  height 5' 2\" (1.575 m), weight 164 lb (74.4 kg), SpO2 99%.  Body mass index is 30 kg/m².  Present Level of pain: generalized pain 4/10  Non-verbal signs of pain present: No    Physical Exam:  General: Viviana is lying in the bed, watching TV show, she is awake and alert 0X4, pleasant with flat affect.  HEENT: very dry oral MM and lips,    Cardiac: Regular rate and rhythm, S1, S2 normal, no murmur, rub or gallop.  Lungs: Clear without wheezes, rales, rhonchi or dullness.  Normal excursions and effort.  Abdomen: Soft, non-tender, +bowel sounds, no rebound or guarding. + BM  Extremities: Without clubbing, cyanosis. BLE trace Edema present. + upper extremity edema  Neurologic: Alert and oriented X4, pleasant with flat affect, normal affect.  Skin: Warm and dry.  Lower left heel wound and right lower extremity wound.    Prior to admission Palliative performance scale PPSv2 (%): 50    Hematology:  Lab Results   Component Value Date    WBC 10.5 11/06/2024    HGB 9.1 (L) 11/06/2024    HCT 27.6 (L) 11/06/2024    .0 11/06/2024       Coags:No results found for: \"PT\", \"INR\", \"PTT\"    Chemistry:  Lab Results   Component Value Date    CREATSERUM 0.54 (L) 11/06/2024    BUN <5 (L) 11/06/2024     11/06/2024    K 4.2 11/06/2024    K 4.2 11/06/2024     (H) 11/06/2024    CO2 16.0 (L) 11/06/2024     (H) 11/06/2024    CA 8.0 (L) 11/06/2024    ALB 2.1 (L) 11/02/2024    ALKPHO 331 (H) 10/30/2024    BILT 0.2 10/30/2024    TP 3.9 (L) 10/30/2024    AST 23 10/30/2024    ALT 31 10/30/2024    DDIMER 1.69 (H) 07/23/2024    MG 1.6 11/06/2024    PHOS 3.4 11/03/2024       Imaging:  No results found.      Summary of Discussion    11/6/24 I followed up with Viviana today her sister Tiffani was present at the bedside. We discussed medications and barriers to taking the medications which Viviana appears to be picking and choosing what she wants to take and not take.   Reinforced medication compliance to help with symptom management to  enable Viviana to feel more comfortable eating.  Viviana stated she ate some grilled cheese sandwich which I reminded her that was 2 days ago and she only took a bite.   Viviana is drinking nessa dry soda on exam. reinforced trying to eat a few bites of each meal throughout the day. Tiffani and family have been bringing food in from home which Viviana is not eating either.   Discussed with Viviana she is starving herself which will lead to increased decline/death. Viviana stated she doesn't want to die nor does she want feeding tube. Viviana agreed with me and her sister Tiffani she will try to take her medications and try to eat few bites each meal today. Tiffani and Viviana are aware that [facility] will not accept her if she is not eating and there is no feeding plan.     11/5/24 Viviana states she took a bite of her grilled cheese yesterday and became nauseated. Viviana states she becomes nauseated when she tries to eat. Discussed she has been refusing her Carafate and other medications. I educated Viviana on what Carafate is and why she is on it. Viviana agreed to taking her Carafate today, discussed will add Zofran 3 times daily before meals. Viviana requested to have her Norco changed to liquid form to make it easier to swallow. Discussed use of the Lortab as needed for pain. iVviana agreed, we also discussed trying to eat a few bites with each meal to get herself used to eating again. Viviana agreed to this plan today.   Viviana stated she would not want feeding tube.  Viviana wants to defer filling out HCPOA pw again today will reassess tomorrow.     Assessment and Recommendation       Cellulitis of right lower extremity/wound    Hypoglycemia    Left upper extremity swelling    Nausea and vomiting    Episodic mood disorder-Psyche following    Odynophagia/esophageal web-EGD 11/2 esophageal web dilated by GI    Malnutrition-nutritional supplements    Anxiety    Adrenal insufficiency    GERD    HTN    HLD    RA    CAD    NIKOLAI    Lower extremity wounds     Pain  -Lortab  liquid. Lortab 7.5 mg as needed   -Tylenol as needed     Decreased appetite/anorexia  -on Remeron    Nausea with eating  -schedule Zofran 3 times daily before meals  - agreed to take the Carafate that is ordered.   -discussed taking a few bites of each meal to get herself used to eating again.   -Viviana does not want feeding tube.      Goals of care counseling  -see above for details  -Pt is Full Code  -Agreeable to palliative care following  -Dispo: [facility] with [facility]. SW to help with dc planning.   -declined  services  -ongoing GOC discussions may be needed over time.  -Viviana is expressing she would not want feeding tube  -Provided emotional support to pt who is coping adequately     Advance care planning  -see above for details  -Pt's Dtr Rod Hoover is [facility] surrogate [phone]  -HCPOA pw is att he bedside for Viviana to review she will let me know when she would like to fill out the PW.      Palliative Performance Scale 40%     Discussed today's visit with  Dr Velasco, Shilpa RN and Luiza SMITH    Palliative Care Follow Up: Palliative care team will follow up on Friday. Feel free to contact our team with any questions or concerns.    Thank you for allowing Palliative Care services to participate in the care of Sheri Garzon.    A total of 25 minutes were spent on this follow-up, which included all of the following: chart review, direct face to face contact, history taking, physical examination, counseling and coordinating care, and documentation.     Saba Mauricio, [id number]  11/6/2024   10:59AM  Palliative Care Services         [1]   Allergies  Allergen Reactions    Cephalosporins ANAPHYLAXIS, NAUSEA AND VOMITING and OTHER (SEE COMMENTS)     Other reaction(s): Fever    - Tolerated multiple doses of ceftriaxone on 7/2024 without any complications  - Tolerated multiple doses of cefepime 8/2024 without complications    Gadolinium Derivatives FEVER    Penicillins OTHER (SEE COMMENTS)     esophagitis    Sulfa  Antibiotics OTHER (SEE COMMENTS)     esophagitis    Bananas ITCHING

## 2024-11-06 NOTE — PLAN OF CARE
Pt alert and oriented x4. Pt on room air. PRN pain medication given per orders. Pt max assist. Pt refusing to ambulate to chair at this point. Primary RN educated patient on importance on of ambulation and sitting in the chair to prevent skin breakdown. Poor appetite.  Problem: Patient Centered Care  Goal: Patient preferences are identified and integrated in the patient's plan of care  Description: Interventions:  - What would you like us to know as we care for you? \"I fell twice at rehab\"  - Provide timely, complete, and accurate information to patient/family  - Incorporate patient and family knowledge, values, beliefs, and cultural backgrounds into the planning and delivery of care  - Encourage patient/family to participate in care and decision-making at the level they choose  - Honor patient and family perspectives and choices  Outcome: Progressing     Problem: Patient/Family Goals  Goal: Patient/Family Long Term Goal  Description: Patient's Long Term Goal: Discharge home    Interventions:  - IVF  -Wound care  -IV antibiotics  -PT/OT  - See additional Care Plan goals for specific interventions  Outcome: Progressing  Goal: Patient/Family Short Term Goal  Description: Patient's Short Term Goal: Regain Strength    Interventions:   - PT/OT  - Dietary consult  - See additional Care Plan goals for specific interventions  Outcome: Progressing     Problem: PAIN - ADULT  Goal: Verbalizes/displays adequate comfort level or patient's stated pain goal  Description: INTERVENTIONS:  - Encourage pt to monitor pain and request assistance  - Assess pain using appropriate pain scale  - Administer analgesics based on type and severity of pain and evaluate response  - Implement non-pharmacological measures as appropriate and evaluate response  - Consider cultural and social influences on pain and pain management  - Manage/alleviate anxiety  - Utilize distraction and/or relaxation techniques  - Monitor for opioid side effects  -  Notify MD/LIP if interventions unsuccessful or patient reports new pain  - Anticipate increased pain with activity and pre-medicate as appropriate  Outcome: Progressing     Problem: RISK FOR INFECTION - ADULT  Goal: Absence of fever/infection during anticipated neutropenic period  Description: INTERVENTIONS  - Monitor WBC  - Administer growth factors as ordered  - Implement neutropenic guidelines  Outcome: Progressing     Problem: SAFETY ADULT - FALL  Goal: Free from fall injury  Description: INTERVENTIONS:  - Assess pt frequently for physical needs  - Identify cognitive and physical deficits and behaviors that affect risk of falls.  - [place] fall precautions as indicated by assessment.  - Educate pt/family on patient safety including physical limitations  - Instruct pt to call for assistance with activity based on assessment  - Modify environment to reduce risk of injury  - Provide assistive devices as appropriate  - Consider OT/PT consult to assist with strengthening/mobility  - Encourage toileting schedule  Outcome: Progressing     Problem: GASTROINTESTINAL - ADULT  Goal: Minimal or absence of nausea and vomiting  Description: INTERVENTIONS:  - Maintain adequate hydration with IV or PO as ordered and tolerated  - Nasogastric tube to low intermittent suction as ordered  - Evaluate effectiveness of ordered antiemetic medications  - Provide nonpharmacologic comfort measures as appropriate  - Advance diet as tolerated, if ordered  - Obtain nutritional consult as needed  - Evaluate fluid balance  Outcome: Progressing  Goal: Maintains or returns to baseline bowel function  Description: INTERVENTIONS:  - Assess bowel function  - Maintain adequate hydration with IV or PO as ordered and tolerated  - Evaluate effectiveness of GI medications  - Encourage mobilization and activity  - Obtain nutritional consult as needed  - Establish a toileting routine/schedule  - Consider collaborating with pharmacy to review patient's  medication profile  Outcome: Progressing     Problem: SKIN/TISSUE INTEGRITY - ADULT  Goal: Skin integrity remains intact  Description: INTERVENTIONS  - Assess and document risk factors for pressure ulcer development  - Assess and document skin integrity  - Monitor for areas of redness and/or skin breakdown  - Initiate interventions, skin care algorithm/standards of care as needed  Outcome: Progressing  Goal: Incision(s), wounds(s) or drain site(s) healing without S/S of infection  Description: INTERVENTIONS:  - Assess and document risk factors for pressure ulcer development  - Assess and document skin integrity  - Assess and document dressing/incision, wound bed, drain sites and surrounding tissue  - Implement wound care per orders  - Initiate isolation precautions as appropriate  - Initiate Pressure Ulcer prevention bundle as indicated  Outcome: Progressing  Goal: Oral mucous membranes remain intact  Description: INTERVENTIONS  - Assess oral mucosa and hygiene practices  - Implement preventative oral hygiene regimen  - Implement oral medicated treatments as ordered  Outcome: Progressing     Problem: MUSCULOSKELETAL - ADULT  Goal: Return mobility to safest level of function  Description: INTERVENTIONS:  - Assess patient stability and activity tolerance for standing, transferring and ambulating w/ or w/o assistive devices  - Assist with transfers and ambulation using safe patient handling equipment as needed  - Ensure adequate protection for wounds/incisions during mobilization  - Obtain PT/OT consults as needed  - Advance activity as appropriate  - Communicate ordered activity level and limitations with patient/family  Outcome: Progressing  Goal: Maintain proper alignment of affected body part  Description: INTERVENTIONS:  - Support and protect limb and body alignment per provider's orders  - Instruct and reinforce with patient and family use of appropriate assistive device and precautions (e.g. spinal or hip  dislocation precautions)  Outcome: Progressing     Problem: HEMATOLOGIC - ADULT  Goal: Free from bleeding injury  Description: (Example usage: patient with low platelets)  INTERVENTIONS:  - Avoid intramuscular injections, enemas and rectal medication administration  - Ensure safe mobilization of patient  - Hold pressure on venipuncture sites to achieve adequate hemostasis  - Assess for signs and symptoms of internal bleeding  - Monitor lab trends  - Patient is to report abnormal signs of bleeding to staff  - Avoid use of toothpicks and dental floss  - Use electric shaver for shaving  - Use soft bristle tooth brush  - Limit straining and forceful nose blowing  Outcome: Progressing

## 2024-11-06 NOTE — PLAN OF CARE
Problem: Patient Centered Care  Goal: Patient preferences are identified and integrated in the patient's plan of care  Description: Interventions:  - What would you like us to know as we care for you? \"I fell twice at rehab\"  - Provide timely, complete, and accurate information to patient/family  - Incorporate patient and family knowledge, values, beliefs, and cultural backgrounds into the planning and delivery of care  - Encourage patient/family to participate in care and decision-making at the level they choose  - Honor patient and family perspectives and choices  Outcome: Progressing     Problem: Patient/Family Goals  Goal: Patient/Family Long Term Goal  Description: Patient's Long Term Goal: Discharge home    Interventions:  - IVF  -Wound care  -IV antibiotics  -PT/OT  - See additional Care Plan goals for specific interventions  Outcome: Progressing  Goal: Patient/Family Short Term Goal  Description: Patient's Short Term Goal: Regain Strength    Interventions:   - PT/OT  - Dietary consult  - See additional Care Plan goals for specific interventions  Outcome: Progressing     Problem: PAIN - ADULT  Goal: Verbalizes/displays adequate comfort level or patient's stated pain goal  Description: INTERVENTIONS:  - Encourage pt to monitor pain and request assistance  - Assess pain using appropriate pain scale  - Administer analgesics based on type and severity of pain and evaluate response  - Implement non-pharmacological measures as appropriate and evaluate response  - Consider cultural and social influences on pain and pain management  - Manage/alleviate anxiety  - Utilize distraction and/or relaxation techniques  - Monitor for opioid side effects  - Notify MD/LIP if interventions unsuccessful or patient reports new pain  - Anticipate increased pain with activity and pre-medicate as appropriate  Outcome: Progressing     Problem: RISK FOR INFECTION - ADULT  Goal: Absence of fever/infection during anticipated  neutropenic period  Description: INTERVENTIONS  - Monitor WBC  - Administer growth factors as ordered  - Implement neutropenic guidelines  Outcome: Progressing     Problem: SAFETY ADULT - FALL  Goal: Free from fall injury  Description: INTERVENTIONS:  - Assess pt frequently for physical needs  - Identify cognitive and physical deficits and behaviors that affect risk of falls.  - [place] fall precautions as indicated by assessment.  - Educate pt/family on patient safety including physical limitations  - Instruct pt to call for assistance with activity based on assessment  - Modify environment to reduce risk of injury  - Provide assistive devices as appropriate  - Consider OT/PT consult to assist with strengthening/mobility  - Encourage toileting schedule  Outcome: Progressing     Problem: GASTROINTESTINAL - ADULT  Goal: Minimal or absence of nausea and vomiting  Description: INTERVENTIONS:  - Maintain adequate hydration with IV or PO as ordered and tolerated  - Nasogastric tube to low intermittent suction as ordered  - Evaluate effectiveness of ordered antiemetic medications  - Provide nonpharmacologic comfort measures as appropriate  - Advance diet as tolerated, if ordered  - Obtain nutritional consult as needed  - Evaluate fluid balance  Outcome: Progressing  Goal: Maintains or returns to baseline bowel function  Description: INTERVENTIONS:  - Assess bowel function  - Maintain adequate hydration with IV or PO as ordered and tolerated  - Evaluate effectiveness of GI medications  - Encourage mobilization and activity  - Obtain nutritional consult as needed  - Establish a toileting routine/schedule  - Consider collaborating with pharmacy to review patient's medication profile  Outcome: Progressing     Problem: SKIN/TISSUE INTEGRITY - ADULT  Goal: Incision(s), wounds(s) or drain site(s) healing without S/S of infection  Description: INTERVENTIONS:  - Assess and document risk factors for pressure ulcer development  -  Assess and document skin integrity  - Assess and document dressing/incision, wound bed, drain sites and surrounding tissue  - Implement wound care per orders  - Initiate isolation precautions as appropriate  - Initiate Pressure Ulcer prevention bundle as indicated  Outcome: Progressing  Goal: Oral mucous membranes remain intact  Description: INTERVENTIONS  - Assess oral mucosa and hygiene practices  - Implement preventative oral hygiene regimen  - Implement oral medicated treatments as ordered  Outcome: Progressing     Problem: MUSCULOSKELETAL - ADULT  Goal: Return mobility to safest level of function  Description: INTERVENTIONS:  - Assess patient stability and activity tolerance for standing, transferring and ambulating w/ or w/o assistive devices  - Assist with transfers and ambulation using safe patient handling equipment as needed  - Ensure adequate protection for wounds/incisions during mobilization  - Obtain PT/OT consults as needed  - Advance activity as appropriate  - Communicate ordered activity level and limitations with patient/family  Outcome: Progressing  Goal: Maintain proper alignment of affected body part  Description: INTERVENTIONS:  - Support and protect limb and body alignment per provider's orders  - Instruct and reinforce with patient and family use of appropriate assistive device and precautions (e.g. spinal or hip dislocation precautions)  Outcome: Progressing

## 2024-11-06 NOTE — PROGRESS NOTES
Patient is a 64 year old -American,female with past medical history of hypertension, rheumatoid arthritis, obesity, chronic adrenal insufficiency secondary to chronic corticosteroid use, asthma, pancreatitis, obstructive sleep apnea, obesity, hyperlipidemia, anemia, chronic kidney disease  nonocclusive coronary artery disease, and peripheral arterial disease who also has past mental lorenzo history of anxiety and depression  who was admitted to the hospital for Cellulitis of right lower extremity: The patient has been demonstrating increased anxiety and depressive symptoms.Patient indicated for psych consult for evaluation and advise.  Consult Duration   The patient seen for over 50-minute, follow-up evaluation, over 50% counseling and coordinating care addressing anxiety, depression.    Record reviewed, communication with attending, communication with RN and patient seen face to face evaluation.  History of Present Illness:   Communicating with the team, patient continues to refuse some medications and has not been eating.     The patient receiving Lamictal 50 mg BID, Remeron 7.5 mg    The patient is seen today in her room.    She reports feeling tired today. She notes that she did not sleep well last night.     She notes that she has been trying to eat and that she took all of her medications today. She notes that her medications have not been on time.     The patient otherwise continues to report increased anxiety and intermittent pain when swallowing.    Provided patient with supportive psychotherapy including listening, emotional support and encouragement. Patient continues to report that she has been making effort to drink and swallow her pills and that she took her medications this morning.      The patient is not demonstrating any apryl or psychosis  The patient denies any auditory or visual hallucinations  Patient denying any auditory visual hallucination.    Past Psychiatric/Medication History:  1.  Prior diagnoses: Depression, Anxiety  2. Past psychiatric inpatient: Denies           3. Past outpatient history: Denies  4. Past suicide history: None  5. Medication history: Denies     Social History:   The patient is a 64 year old  female. She is a retired  of 35 years. She retired and moved to [place] with her daughter. Her health declined since retiring and moving to [place] and moved back to [place] with her daughter in June 2024. She and her daughter moved back in with her eldest sister, Gladys. She was admitted to rehab for decreased mobility and chronic wound care on multiple occasions.      Family History:  Daughter: depression  Medical History:   Past Medical History  Past Medical History:    Allergic rhinitis    Anxiety    Arthritis    RA and Osteoarthritis    Asthma (HCC)    Depression    Not officially diagnosed    Esophageal reflux    Essential hypertension    High blood pressure    High cholesterol    Hyperlipidemia    Myocardial infarction (HCC)    Cardiac event    Osteoarthritis    Pancreatitis (HCC)    Problems with swallowing    RA (rheumatoid arthritis) (HCC)    Sleep apnea       Past Surgical History  Past Surgical History:   Procedure Laterality Date    Colonoscopy      Debridement  08/15/2024    Sharp excisional debridement of RLE posterior leg wound    Egd  07/26/2024    Hiatal hernia    Other surgical history      Revision of uterine anomaly      Rotator cuff repair      Wrist fracture surgery         Family History  Family History   Problem Relation Age of Onset    Diabetes Mother     Genetic Disease Mother     Heart Disorder Mother     Hypertension Mother     Obesity Mother     Diabetes Father     Hypertension Father     Depression Daughter     Psychiatric Daughter        Social History  Social History     Socioeconomic History    Marital status: Single   Tobacco Use    Smoking status: Never    Smokeless tobacco: Never   Vaping Use    Vaping status: Never Used    Substance and Sexual Activity    Alcohol use: Not Currently    Drug use: Never     Social Drivers of Health     Food Insecurity: No Food Insecurity (10/28/2024)    Food Insecurity     Food Insecurity: Never true   Transportation Needs: No Transportation Needs (10/28/2024)    Transportation Needs     Lack of Transportation: No   Housing Stability: Low Risk  (10/28/2024)    Housing Stability     Housing Instability: No           Current Medications:  Current Facility-Administered Medications   Medication Dose Route Frequency    mirtazapine (Remeron) tab 15 mg  15 mg Oral Nightly    HYDROcodone-acetaminophen 7.5-325 MG/15ML solution 15 mL  15 mL Oral Q4H PRN    ondansetron (Zofran-ODT) disintegrating tab 4 mg  4 mg Oral TID (Nitrates)    acetaminophen (Tylenol) 160 MG/5ML oral liquid 650 mg  650 mg Oral Q4H PRN    dextrose 10% infusion   Intravenous Continuous    dextrose 50% injection 50 mL  50 mL Intravenous PRN    glucose (Dex4) 15 GM/59ML oral liquid 15 g  15 g Oral Q15 Min PRN    Or    glucose (Glutose) 40% oral gel 15 g  15 g Oral Q15 Min PRN    Or    glucose-vitamin C (Dex-4) chewable tab 4 tablet  4 tablet Oral Q15 Min PRN    Or    dextrose 50% injection 50 mL  50 mL Intravenous Q15 Min PRN    Or    glucose (Dex4) 15 GM/59ML oral liquid 30 g  30 g Oral Q15 Min PRN    Or    glucose (Glutose) 40% oral gel 30 g  30 g Oral Q15 Min PRN    Or    glucose-vitamin C (Dex-4) chewable tab 8 tablet  8 tablet Oral Q15 Min PRN    vancomycin (Vancocin) 750 mg in sodium chloride 0.9% 250 mL IVPB-ADDV  750 mg Intravenous Q24H    acetaminophen (Tylenol) 160 MG/5ML oral liquid 650 mg  650 mg Oral Q6H PRN    sucralfate (Carafate) 1 GM/10ML oral suspension 1 g  1 g Oral TID AC and HS (2200)    predniSONE (Deltasone) tab 10 mg  10 mg Oral BID    albuterol (Ventolin HFA) 108 (90 Base) MCG/ACT inhaler 2 puff  2 puff Inhalation Q4H PRN    aspirin chewable tab 81 mg  81 mg Oral Daily    clopidogrel (Plavix) tab 75 mg  75 mg Oral Daily     hydroxychloroquine (Plaquenil) tab 200 mg  200 mg Oral BID    lamoTRIgine (LaMICtal) tab 50 mg  50 mg Oral BID    metoprolol tartrate (Lopressor) partial tab 12.5 mg  12.5 mg Oral BID    oxybutynin ER (Ditropan-XL) 24 hr tab 10 mg  10 mg Oral Daily    heparin (Porcine) 5000 UNIT/ML injection 5,000 Units  5,000 Units Subcutaneous Q8H FALLON    melatonin tab 3 mg  3 mg Oral Nightly PRN    ondansetron (Zofran) 4 MG/2ML injection 4 mg  4 mg Intravenous Q6H PRN    prochlorperazine (Compazine) 10 MG/2ML injection 5 mg  5 mg Intravenous Q8H PRN    pantoprazole (Protonix) 40 mg in sodium chloride 0.9% PF 10 mL IV push  40 mg Intravenous Q12H     Medications Prior to Admission   Medication Sig    docusate sodium 100 MG Oral Cap Take 1 capsule (100 mg total) by mouth 2 (two) times daily.    ondansetron (ZOFRAN) 4 mg tablet Take 1 tablet (4 mg total) by mouth every 8 (eight) hours as needed for Nausea.    HYDROcodone-acetaminophen  MG Oral Tab Take 1 tablet by mouth every 4 (four) hours as needed.    [] lamoTRIgine 25 MG Oral Tab Take 2 tablets (50 mg total) by mouth 2 (two) times daily.    [] pantoprazole 40 MG Oral Tab EC Take 1 tablet (40 mg total) by mouth 2 (two) times daily before meals.    [] QUEtiapine 50 MG Oral Tab Take 1 tablet (50 mg total) by mouth nightly.    [] metoprolol tartrate 25 MG Oral Tab Take 0.5 tablets (12.5 mg total) by mouth 2 (two) times daily. (Patient taking differently: Take 0.5 tablets (12.5 mg total) by mouth 2 (two) times daily. Hold for SBP <100 and HR <60)    predniSONE 10 MG Oral Tab Take 20mg (2 tabs) in AM and 10mg (1tab) for 4 days then resume 10mg twice  a day indefinitely (Patient taking differently: Take 1 tablet (10 mg total) by mouth 2 (two) times daily. Take 20mg (2 tabs) in AM and 10mg (1tab) for 4 days then resume 10mg twice  a day indefinitely)    Acetaminophen ER (ACETAMINOPHEN 8 HOUR) 650 MG Oral Tab CR Take 2-3 tablets (1,300-1,950 mg total)  by mouth every 8 (eight) hours as needed for Pain.    albuterol 108 (90 Base) MCG/ACT Inhalation Aero Soln Inhale 2 puffs into the lungs every 4 to 6 hours as needed for Wheezing.    hydroxychloroquine 200 MG Oral Tab Take 1 tablet (200 mg total) by mouth 2 (two) times daily.    Aspirin 81 MG Oral Cap Take 81 mg by mouth daily.    atorvastatin 10 MG Oral Tab Take 1 tablet (10 mg total) by mouth daily.    clopidogrel 75 MG Oral Tab Take 1 tablet (75 mg total) by mouth daily.    ergocalciferol 1.25 MG (62934 UT) Oral Cap Take 1 capsule (50,000 Units total) by mouth once a week.    oxybutynin ER 10 MG Oral Tablet 24 Hr Take 1 tablet (10 mg total) by mouth daily.    folic acid 1 MG Oral Tab Take 1 tablet (1 mg total) by mouth daily.    collagenase 250 UNIT/GM External Ointment Apply topically daily.    clotrimazole-betamethasone 1-0.05 % External Cream Apply 1 Application topically 2 (two) times daily. Apply to groin and abdominal folds    [] sodium bicarbonate 650 MG Oral Tab Take 1 tablet (650 mg total) by mouth 2 (two) times daily.    Copper (COPPERMIN) 5 MG Oral Tab Take 1 tablet by mouth daily.    ibuprofen 200 MG Oral Tab Take 3 tablets (600 mg total) by mouth every 8 (eight) hours as needed for Pain.    [] Ferrous Gluconate 324 (38 Fe) MG Oral Tab Take 1 tablet (325 mg total) by mouth daily with breakfast.    Potassium Chloride ER 10 MEQ Oral Tab CR Take 1 tablet (10 mEq total) by mouth 2 (two) times daily.       Allergies  Allergies[1]    Review of Systems:   As by Admitting/Attending    Results:   Laboratory Data:  Lab Results   Component Value Date    WBC 10.5 2024    HGB 9.1 (L) 2024    HCT 27.6 (L) 2024    .0 2024    CREATSERUM 0.54 (L) 2024    BUN <5 (L) 2024     2024    K 4.2 2024    K 4.2 2024     (H) 2024    CO2 16.0 (L) 2024     (H) 2024    CA 8.0 (L) 2024    ALB 2.1 (L) 2024     ALKPHO 331 (H) 10/30/2024    TP 3.9 (L) 10/30/2024    AST 23 10/30/2024    ALT 31 10/30/2024    LIP 31 10/27/2024    DDIMER 1.69 (H) 07/23/2024    MG 1.6 11/06/2024    PHOS 3.4 11/03/2024    CK 50 08/27/2024    B12 748 08/21/2024    ETOH <3 09/05/2024         Imaging:  No results found.    Vital Signs:   Blood pressure 111/74, pulse 87, temperature 97.9 °F (36.6 °C), temperature source Oral, resp. rate 16, height 5' 2\" (1.575 m), weight 74.4 kg (164 lb), SpO2 99%.    Mental Status Exam:   Appearance: Stated age female, in hospital gown, laying down in hospital bed.  Multiple blanket over her  Psychomotor: No psychomotor agitation or retardation.  Orientation: Alert and oriented to person, place, date and condition.  Gait: Not evaluated.  Attitude/Coorperation: cooperative, pleasant.  Behavior: Appropriate communication.    Speech: Regular rate and rhythm speech.  Mood: Patient admitted feeling anxious specially nightly  Affect: Anxious affect, congruent with the mood.  Thought process: mostly Linear  Thought content: No reports of  suicidal or homicidal ideation.  Perceptions: Patient denies auditory or visual hallucinations  Concentration: intact  Memory: intact  Intellect: Average.  Judgment and Insight: Questionable.     Impression:     Episodic mood disorder     Cellulitis of right lower extremity    Nausea and vomiting    Patient is a 64 year old -American,female with past medical history of hypertension, rheumatoid arthritis, obesity, chronic adrenal insufficiency secondary to chronic corticosteroid use, asthma, pancreatitis, obstructive sleep apnea, obesity, hyperlipidemia, anemia, chronic kidney disease  nonocclusive coronary artery disease, and peripheral arterial disease who also has past mental lorenzo history of anxiety and depression  who was admitted to the hospital for Cellulitis of right lower extremity: The patient has been demonstrating increased anxiety and depressive symptoms. Patient was  started on Lamictal and Seroquel during her last admission and would like those medications continued.     10/29/2024: The patient has been demonstrating some alternation in her cognition and thought process. Patient refusing medications and reporting to team that she can not swallow pills. She demonstrates some increased anxiety, worry. Patient reporting that she can take her pills otherwise not all at one time.     11/1/2024: No issues reported from the team. She has not demonstrated any concerning behaviors. She continues to have some difficulty swallowing her medications. EGD scheduled for tomorrow.    11/3/2024: Medical team reporting no physical reason for patient to have difficulty swallowing and questioning for reevaluation.  Patient herself addressing some anxiety.  Patient encouraged to cooperative team and agree that she is going to eat slowly with small pieces.  Otherwise the patient has not been consistent with her medication treatment but she took her Seroquel last night.  Patient continue reporting difficulty sleeping with increased anxiety nightly.    11/4/2024: The patient took her medications this morning and ate lunch. She continues to report some anxiety and difficulty sleeping at night.    11/5/2024: The patient has been eating and taking her medications.    11/6/2024: Communicating with the team, the patient has not been eating and being selective with medications. Provided patient with supportive psychotherapy including listening, emotional support and encouragement. Patient reporting that she has been making effort to drink and swallow her pills and that she took her medications this morning.      Discussed risk and benefit, acknowledging the current symptom and severity.  At this point, I would recommend the following approach:     Focus on safety  Focus on education and support.  Focus on insight orientation helping the patient understand diagnosis and treatment plan.  Encourage compliant  on medication and treatment.  Increase Remeron to 15 mg nightly.  Continue Seroquel 50 mg nightly  Continue Lamictal 25 mg BID  Processed with patient at length, the initiation of the above psychotropic medications I advised the patient of the risks, benefits, alternatives and potential side effects. The patient consents to administration of the medications and understands the right to refuse medications at any time. The patient verbalized understanding.   Coordinate plan with team    Orders This Visit:  Orders Placed This Encounter   Procedures    CBC With Differential With Platelet    Comp Metabolic Panel (14)    Magnesium    Lipase    Urinalysis, Routine    Lactic Acid, Plasma    Magnesium    CBC With Differential With Platelet    Basic Metabolic Panel (8)    Potassium    Magnesium    Vancomycin Trough, Serum    Vancomycin Peak, Serum    Hepatic Function Panel (7)    Iron And Tibc    Ferritin    CBC With Differential With Platelet    Basic Metabolic Panel (8)    Potassium    Potassium    CBC With Differential With Platelet    Basic Metabolic Panel (8)    Renal Function Panel    CBC With Differential With Platelet    Basic Metabolic Panel (8)    Phosphorus    Basic Metabolic Panel (8)    Potassium    Basic Metabolic Panel (8)    CBC With Differential With Platelet    Magnesium    Potassium    Potassium    Basic Metabolic Panel (8)    CBC With Differential With Platelet    Magnesium    Vancomycin Trough, Serum    CBC, Platelet; No Differential    Basic Metabolic Panel (8)    Magnesium    Magnesium    Specimen to Pathology Tissue    Aerobic Bacterial Culture    Blood Culture    MRSA Screen by PCR    Rapid SARS-CoV-2 by PCR       Meds This Visit:  Requested Prescriptions      No prescriptions requested or ordered in this encounter       EBONY Kyle  11/6/2024    Note to Patient: The 21st Century Cures Act makes medical notes like these available to patients in the interest of transparency. However, be advised  this is a medical document. It is intended as peer to peer communication. It is written in medical language and may contain abbreviations or verbiage that are unfamiliar. It may appear blunt or direct. Medical documents are intended to carry relevant information, facts as evident, and the clinical opinion of the practitioner. This note may have been transcribed using a voice dictation system. Voice recognition errors may occur. This should not be taken to alter the content or meaning of this note.           [1]   Allergies  Allergen Reactions    Cephalosporins ANAPHYLAXIS, NAUSEA AND VOMITING and OTHER (SEE COMMENTS)     Other reaction(s): Fever    - Tolerated multiple doses of ceftriaxone on 7/2024 without any complications  - Tolerated multiple doses of cefepime 8/2024 without complications    Gadolinium Derivatives FEVER    Penicillins OTHER (SEE COMMENTS)     esophagitis    Sulfa Antibiotics OTHER (SEE COMMENTS)     esophagitis    Bananas ITCHING

## 2024-11-06 NOTE — DIETARY NOTE
ADULT NUTRITION REASSESSMENT      RECOMMENDATIONS TO MD: See nutrition intervention for ONS (oral nutrition supplements)  If po and ONS intake does not improve in the next 1-2 days, pt would be candidate for Supplemental temporary Enteral Nutrition (EN) Support.       Pt is at moderate nutrition risk.  Pt meets moderate malnutrition criteria.      CRITERIA FOR MALNUTRITION DIAGNOSIS: Criteria for non-severe malnutrition diagnosis: acute illness/injury related to wt loss 5% in 1 month and energy intake less than 75% for greater than 7 days.     ADMITTING DIAGNOSIS:  Cellulitis of right lower extremity [L03.115]     PERTINENT PAST MEDICAL HISTORY:    Past Medical History:    Allergic rhinitis    Anxiety    Arthritis    RA and Osteoarthritis    Asthma (HCC)    Depression    Not officially diagnosed    Esophageal reflux    Essential hypertension    High blood pressure    High cholesterol    Hyperlipidemia    Myocardial infarction (HCC)    Cardiac event    Osteoarthritis    Pancreatitis (HCC)    Problems with swallowing    RA (rheumatoid arthritis) (HCC)    Sleep apnea    has a past surgical history that includes Rotator cuff repair; wrist fracture surgery; revision of uterine anomaly; colonoscopy; other surgical history; Debridement (08/15/2024); and egd (07/26/2024).     PATIENT STATUS: Initial 10/30/24: Patient (pt) identified at Nutrition risk due to unintentional wt loss after the screening process. Presented with N/V, inability to keep food down. Other Medical findings:  Odynophagia. Upon visit, pt lying in bed. Just completed Esophagram with findings of esophageal stricture per  APN. Place on cl liq diet. Pt reports pain in swallow food and vomiting d/t unable to keep food down, but less pain in liquid intake. Also c/o dysgeusia, hence po intake and appetite diminished, pt states in the past 2 days PTA but H&P indicated worsening symptoms over the past 3 weeks. Diet hx/eval:     Prior to acute illness, pt reports  eating fairly at NH in mostly 2 small meals. Wt eval:    15# or 8% significant wt loss in the past 2-3 weeks based on reported usual wt of 180# (consistent with emr wt hx), compared to current wt of 164# today. Nutrition findings:      Agree with the current diet and Provision for Oral Nutrition supplements (ONS) is also indicated to support the patient’s nutritional needs. Plan esophageal dilation and will await diet advancement, Otherwise, consider temporary nutrition support if unable to advance diet beyond cl liq.    11/6/2024 Update:  EGD 11/2 showed esophageal web, s/p dilation. Pt is poorly eating. From 11/1--11/5 pt has been declining meals. Pt denies N/V post prandial but just not having good appetite. Ensure clears where changed to Ensure Enlive. At visit, this writer offered the Ensure at bedside, pour it over a cup of ice and pt drunk 50%, states she likes it. Hypokalemia and Hypomagnesemia partly d/t poor po. IV D10 @ 75 ml/hr restarted. Kcl and Mag Oxide in place. Wt is same at 164 # from admit. On prednisone. Discussed with RN to address Bicarb deficit since 10/27.   Will continue Ensure Enlive BID. Encouraged pt to order 3 meals/day and take Ensure between meals. If po and ONS intake does not improve in the next 1-2 days, pt would be candidate for Supplemental temporary Enteral Nutrition Support.     FOOD/NUTRITION INTAKE ANALYSIS:    Current Appetite: Fair  Current Intake:  0% from 11/ to 11/5 but until pt drunk Ensure 50% today at visit. .   Current Intake Meeting Needs: No, even with oral nutrition supplements (ONS) ordered  Percent Meals Eaten (last 6 days)       Date/Time Percent Meals Eaten (%)    10/31/24 1339 50 %    11/01/24 0907 10 %    11/01/24 1500 0 %     Percent Meals Eaten (%): Refused to eat. at 11/01/24 1500    11/02/24 1755 --     Percent Meals Eaten (%): pt refused dinner tray at 11/02/24 1755    11/02/24 1959 0 %     Percent Meals Eaten (%): pt refusing dinner at 11/02/24 1959     11/03/24 0916 0 %     Percent Meals Eaten (%): pt refused breakfast tray at 11/03/24 0916    11/03/24 1340 --     Percent Meals Eaten (%): pt refused lunch at 11/03/24 1340    11/04/24 0950 0 %     Percent Meals Eaten (%): refused at 11/04/24 0950    11/04/24 1529 0 %    11/04/24 1700 --     Percent Meals Eaten (%): pt refused dinner at 11/04/24 1700    11/05/24 0800 0 %    11/05/24 1200 0 %    11/05/24 1720 0 %           Food Allergies:  bananas  Cultural/Ethnic/Episcopalian Preferences: None    GASTROINTESTINAL: +BM 11/5  and Esophageal stricture.--s/p dilation. N/V resolved.        MEDICATIONS: reviewed     dextrose 75 mL/hr at 11/06/24 0856      mirtazapine  15 mg Oral Nightly    ondansetron  4 mg Oral TID (Nitrates)    vancomycin  750 mg Intravenous Q24H    sucralfate  1 g Oral TID AC and HS (2200)    predniSONE  10 mg Oral BID    aspirin  81 mg Oral Daily    clopidogrel  75 mg Oral Daily    hydroxychloroquine  200 mg Oral BID    lamoTRIgine  50 mg Oral BID    metoprolol tartrate  12.5 mg Oral BID    oxybutynin ER  10 mg Oral Daily    heparin  5,000 Units Subcutaneous Q8H FALLON    pantoprazole  40 mg Intravenous Q12H       LABS: reviewed   Recent Labs     11/03/24  0954 11/04/24  0634 11/04/24  2013 11/05/24  1530 11/06/24  0641   GLU 68* 140*  --  93 109*   BUN 6* <5*  --  5* <5*   CREATSERUM 0.51* 0.39*  --  0.54* 0.54*   CA 7.8* 6.1*  --  8.2* 8.0*   MG  --   --   --  1.7 1.6    142  --  140 138   K 3.3* 3.0*  3.0* 3.3* 4.8 4.2  4.2   * 117*  --  116* 116*   CO2 16.0* 15.0*  --  14.0* 16.0*   PHOS 3.4  --   --   --   --    OSMOCALC 292  --   --  287  --        NUTRITION RELATED PHYSICAL FINDINGS:  - Nutrition Focused Physical Exam (NFPE): no wasting noted, malnutrition related to PO intake and weight loss   - Fluid Accumulation: non-pitting Bilateral Feet and +1 Bilateral Feet see RN documentation for details  - Skin Integrity:  L E ulcer-->  see RN documentation for details    ANTHROPOMETRICS:  HT:  157.5 cm (5' 2\")  WT: 74.4 kg (164 lb) 11/2  --same since 10/30  BMI: Body mass index is 30 kg/m².  BMI CLASSIFICATION: 30-34.9 kg/m2 - obesity class I  IBW: 110 lbs        149% IBW  Usual Body Wt: 180 lbs per pt and consistent with emr wt hx      92% UBW  WEIGHT HISTORY:    Patient Weight(s) for the past 336 hrs:   Weight   11/02/24 0732 74.4 kg (164 lb)   10/30/24 1100 74.8 kg (164 lb 14.4 oz)   10/28/24 0014 68.9 kg (151 lb 12.8 oz)   10/27/24 1739 82.2 kg (181 lb 3.5 oz)     Wt Readings from Last 10 Encounters:   11/02/24 74.4 kg (164 lb)   09/05/24 82.2 kg (181 lb 3.2 oz)   08/27/24 87.7 kg (193 lb 5 oz)   08/21/24 89.8 kg (198 lb)   08/08/24 82.6 kg (182 lb)   07/22/24 83.4 kg (183 lb 12.8 oz)   06/28/24 87.1 kg (192 lb)   09/06/21 72.6 kg (160 lb)   08/11/21 77.1 kg (170 lb)       NUTRITION DIAGNOSIS/PROBLEM:    Moderate Malnutrition related to Acute - Physiological causes resulting in anorexia or diminished intake in the setting of esophageal stricture as evidenced by N/V x 3 weeks, poor po <50%, 8% significant wt loss.    Nutrition Diagnosis Progress: Improvement (unresolved)  , pt remains with poor po.       NUTRITION INTERVENTION:     NUTRITION PRESCRIPTION:   Estimated Nutrition needs: Dosing wt of 74.8 kg --wt taken on 10/30 .  Calories: 0368-9303 calories/day (22-25 calories per kg Dosing wt)  Protein: 65-75 g protein/day (1.3-1.5 g protein/kg 50 kg Ideal body wt (IBW))    - Diet:       Procedures    Low Fiber/Soft diet Low Fiber/Soft; Is Patient on Accuchecks? No        - RD Malnutrition Care Plan: Encouraged increased PO intake and Initiated ONS (oral nutritional supplements)  **If po and ONS intake does not improve in the next 1-2 days, pt would be candidate for Supplemental temporary Enteral Nutrition Support.   - Medical Food Supplements- Ensure Enlive (350 calories/ 20 g protein each) BID Vanilla or Strawberry-  - Vitamin and mineral supplements: none Will monitor po & ONS intake to evaluate  provision for Multi-Vit & Min supplements.  - Feeding assistance: meal set up  - Nutrition education: Discussed importance of adequate energy and protein intake   - Coordination of nutrition care: collaboration with other providers  - Discharge and transfer of nutrition care to new setting or provider: monitor plans      MONITOR AND EVALUATE/NUTRITION GOALS:   - Food and Nutrient Intake:    Monitor: adequacy of PO intake, adequacy of supplement intake, and for PO diet advancement  - Food and Nutrient Administration:    Monitor: need for temporary enteral nutrition  - Anthropometric Measurement:    Monitor weight  - Nutrition Goals:    halt wt loss, maintain wt within 5%, PO and supplement greater than 75% of needs, labs within acceptable limits, minimize lean body mass loss, support body systems, and improved GI status potential Supplemental EN.       RD will follow up.    Pattie Rachel RD, LDN, [facility]  Clinical Dietitian  [phone]

## 2024-11-07 NOTE — PROGRESS NOTES
[facility]  part of [facility]  Hospitalist Progress Note     Sheri Garzon Patient Status:  Inpatient    1960  64 year old CSN [id number]   Location 546/546-A Attending John Hinds MD   Hosp Day # 11 PCP TERI MCKENNA MD     Assessment & Plan:   ----------------------------------  Odynophagia/esophageal web.  Abnormal esophagram  -Encouraged oral intake, there may be a functional issue now that has developed - despite prompting she did not eat anything yesterday except for a bite of grilled cheese  -GI consult  -Restart oral medications for now  -EGD  shows esophageal web which was dilated  -Diet as tolerated  -Psychiatry on consult, added Remeron - hopefully will help with appetite  -Palliative care consulted as well  -Eating a little better but not much    Metabolic Acidosis  -likely due to starvation and RTA  -resume home bicarb    Right lower extremity ulcer.  Possible overlying cellulitis on right foot dorsum possible.  Ulceration on the back of the right lower leg does not appear infected.  White count is normal.  Afebrile.  -Continue vancomycin for now  -Cxs show MRSA sensitive to bactrim - abx now stopped  -outpt vascular evaluation if fails to heal    Hypoglycemia.  Due to poor oral intake.  Continue hypoglycemic protocol.  Strongly encouraged continued oral intake.  At this point her oral intake is the limiting factor to discharge  -Dietitian    Left upper extremity swelling.  May have some focal edema.  No evidence of DVT associated with PICC line.  PICC line is in proper place and functioning well.  -Manage expectantly    Other problems  Metabolic acidosis  Anxiety  Asthma  Adrenal insufficiency  GERD  Hypertension  Dyslipidemia  History of coronary artery disease  Rheumatoid arthritis  Sleep apnea    DVT Mechanical Prophylaxis:     Early ambuation  DVT Pharmacologic Prophylaxis   Medication    heparin (Porcine) 5000 UNIT/ML injection 5,000 Units      DVT  Pharmacologic prophylaxis: Aspirin 162 mg       code status: full  dispo: HH 11/3, refusing SULTANA.  I discussed with her that it is unlikely she will do well at home given her level of debility  If applicable, malnutrition status at bottom of note    I personally reviewed the available laboratories, imaging including. I discussed/will discuss the case with consultants. I ordered laboratories and/or radiographic studies. I adjusted medications as detailed above.  Medical decision making high, risk is high.       Greater than 55 minutes spent, Greater than 50% spent counseling and in coordination of care, reviewing labs, discussing results with patient, coordinating care with care team and ordering labs for tomorrow and adjusting medications as needed      Subjective:   ----------------------------------    Discussed oral intake and dc plans.  Denies chest pain or sob.  No n/v/abd pain.  Lots of counseling provided    10 ROS completed and otherwise negative.       Objective:   Chief Complaint:   Chief Complaint   Patient presents with    Nausea/Vomiting/Diarrhea     ----------------------------------  Temp:  [97.6 °F (36.4 °C)-98.6 °F (37 °C)] 98 °F (36.7 °C)  Pulse:  [85-93] 90  Resp:  [16-20] 16  BP: (106-120)/(40-83) 110/40  SpO2:  [94 %-98 %] 97 %    Gen: No acute distress  Pulm: Lungs clear, normal respiratory effort  CV: Heart with regular rate and rhythm  Abd: Abdomen soft, nontender, nondistended, bowel sounds present  Neuro: No acute focal deficits  MSK: moves extremities  Skin: Warm and dry  Psych: Normal affect  Ext: no c/c/e      Labs:  Lab Results   Component Value Date    HGB 9.4 (L) 11/07/2024    WBC 11.0 11/07/2024    .0 11/07/2024     11/07/2024    K 4.2 11/07/2024    CREATSERUM 0.68 11/07/2024    AST 23 10/30/2024    ALT 31 10/30/2024            sodium bicarbonate  650 mg Oral BID    mirtazapine  15 mg Oral Nightly    ondansetron  4 mg Oral TID (Nitrates)    vancomycin  750 mg Intravenous  Q24H    sucralfate  1 g Oral TID AC and HS (2200)    predniSONE  10 mg Oral BID    aspirin  81 mg Oral Daily    clopidogrel  75 mg Oral Daily    hydroxychloroquine  200 mg Oral BID    lamoTRIgine  50 mg Oral BID    metoprolol tartrate  12.5 mg Oral BID    oxybutynin ER  10 mg Oral Daily    heparin  5,000 Units Subcutaneous Q8H FALLON    pantoprazole  40 mg Intravenous Q12H       HYDROcodone-acetaminophen    acetaminophen    dextrose    glucose **OR** glucose **OR** glucose-vitamin C **OR** dextrose **OR** glucose **OR** glucose **OR** glucose-vitamin C    acetaminophen    albuterol    melatonin    ondansetron    prochlorperazine      Dietitian Malnutrition Assessment    Evaluation for Malnutrition: Criteria for non-severe malnutrition diagnosis-         acute illness/injury related to Wt loss 5% in 1 month., Energy intake less than 75% for greater than 7 days.         RD Malnutrition Care Plan: Encouraged increased PO intake., Intiated ONS (oral nutritional supplements).    Body Fat/Muscle Mass: no wasting noted, malnutrition related to PO intake and weight loss        Physician Assessment     Patient has a diagnosis of moderate malnutrition

## 2024-11-07 NOTE — PROGRESS NOTES
Patient is a 64 year old -American,female with past medical history of hypertension, rheumatoid arthritis, obesity, chronic adrenal insufficiency secondary to chronic corticosteroid use, asthma, pancreatitis, obstructive sleep apnea, obesity, hyperlipidemia, anemia, chronic kidney disease  nonocclusive coronary artery disease, and peripheral arterial disease who also has past mental lorenzo history of anxiety and depression  who was admitted to the hospital for Cellulitis of right lower extremity: The patient has been demonstrating increased anxiety and depressive symptoms.Patient indicated for psych consult for evaluation and advise.  Consult Duration   The patient seen for over 50-minute, follow-up evaluation, over 50% counseling and coordinating care addressing anxiety, depression.    Record reviewed, communication with attending, communication with RN and patient seen face to face evaluation.  History of Present Illness:   Communicating with the team, patient continues to be challenging for the team. Patient selective with medications and meals she eats.     The patient receiving Lamictal 50 mg BID, Remeron 15 mg    The patient is seen today in her room. The patient was watching a show on her phone    She reports that she is feeling find today. She notes that she has been taking her medications and eating at each meal.    I encouraged patient to continue to work with the team.    She continues to endorses some anxiety, worry, rumination related to her physical health otherwise she notes improvement.     She repeatedly denies any suicidal ideations. She states that life is worth living.     Provided patient with supportive psychotherapy including listening, emotional support and encouragement. Patient continues to report that she has been making effort to drink and swallow her pills and that she took her medications this morning.      The patient is not demonstrating any apryl or psychosis  The patient  denies any auditory or visual hallucinations  Patient denying any auditory visual hallucination.    Past Psychiatric/Medication History:  1. Prior diagnoses: Depression, Anxiety  2. Past psychiatric inpatient: Denies           3. Past outpatient history: Denies  4. Past suicide history: None  5. Medication history: Denies     Social History:   The patient is a 64 year old  female. She is a retired  of 35 years. She retired and moved to [place] with her daughter. Her health declined since retiring and moving to [place] and moved back to [place] with her daughter in June 2024. She and her daughter moved back in with her eldest sister, Gladys. She was admitted to rehab for decreased mobility and chronic wound care on multiple occasions.      Family History:  Daughter: depression  Medical History:   Past Medical History  Past Medical History:    Allergic rhinitis    Anxiety    Arthritis    RA and Osteoarthritis    Asthma (HCC)    Depression    Not officially diagnosed    Esophageal reflux    Essential hypertension    High blood pressure    High cholesterol    Hyperlipidemia    Myocardial infarction (HCC)    Cardiac event    Osteoarthritis    Pancreatitis (HCC)    Problems with swallowing    RA (rheumatoid arthritis) (HCC)    Sleep apnea       Past Surgical History  Past Surgical History:   Procedure Laterality Date    Colonoscopy      Debridement  08/15/2024    Sharp excisional debridement of RLE posterior leg wound    Egd  07/26/2024    Hiatal hernia    Other surgical history      Revision of uterine anomaly      Rotator cuff repair      Wrist fracture surgery         Family History  Family History   Problem Relation Age of Onset    Diabetes Mother     Genetic Disease Mother     Heart Disorder Mother     Hypertension Mother     Obesity Mother     Diabetes Father     Hypertension Father     Depression Daughter     Psychiatric Daughter        Social History  Social History     Socioeconomic  History    Marital status: Single   Tobacco Use    Smoking status: Never    Smokeless tobacco: Never   Vaping Use    Vaping status: Never Used   Substance and Sexual Activity    Alcohol use: Not Currently    Drug use: Never     Social Drivers of Health     Food Insecurity: No Food Insecurity (10/28/2024)    Food Insecurity     Food Insecurity: Never true   Transportation Needs: No Transportation Needs (10/28/2024)    Transportation Needs     Lack of Transportation: No   Housing Stability: Low Risk  (10/28/2024)    Housing Stability     Housing Instability: No           Current Medications:  Current Facility-Administered Medications   Medication Dose Route Frequency    sodium bicarbonate tab 650 mg  650 mg Oral BID    mirtazapine (Remeron) tab 15 mg  15 mg Oral Nightly    HYDROcodone-acetaminophen 7.5-325 MG/15ML solution 15 mL  15 mL Oral Q4H PRN    ondansetron (Zofran-ODT) disintegrating tab 4 mg  4 mg Oral TID (Nitrates)    acetaminophen (Tylenol) 160 MG/5ML oral liquid 650 mg  650 mg Oral Q4H PRN    dextrose 10% infusion   Intravenous Continuous    dextrose 50% injection 50 mL  50 mL Intravenous PRN    glucose (Dex4) 15 GM/59ML oral liquid 15 g  15 g Oral Q15 Min PRN    Or    glucose (Glutose) 40% oral gel 15 g  15 g Oral Q15 Min PRN    Or    glucose-vitamin C (Dex-4) chewable tab 4 tablet  4 tablet Oral Q15 Min PRN    Or    dextrose 50% injection 50 mL  50 mL Intravenous Q15 Min PRN    Or    glucose (Dex4) 15 GM/59ML oral liquid 30 g  30 g Oral Q15 Min PRN    Or    glucose (Glutose) 40% oral gel 30 g  30 g Oral Q15 Min PRN    Or    glucose-vitamin C (Dex-4) chewable tab 8 tablet  8 tablet Oral Q15 Min PRN    vancomycin (Vancocin) 750 mg in sodium chloride 0.9% 250 mL IVPB-ADDV  750 mg Intravenous Q24H    acetaminophen (Tylenol) 160 MG/5ML oral liquid 650 mg  650 mg Oral Q6H PRN    sucralfate (Carafate) 1 GM/10ML oral suspension 1 g  1 g Oral TID AC and HS (2200)    predniSONE (Deltasone) tab 10 mg  10 mg Oral  BID    albuterol (Ventolin HFA) 108 (90 Base) MCG/ACT inhaler 2 puff  2 puff Inhalation Q4H PRN    aspirin chewable tab 81 mg  81 mg Oral Daily    clopidogrel (Plavix) tab 75 mg  75 mg Oral Daily    hydroxychloroquine (Plaquenil) tab 200 mg  200 mg Oral BID    lamoTRIgine (LaMICtal) tab 50 mg  50 mg Oral BID    metoprolol tartrate (Lopressor) partial tab 12.5 mg  12.5 mg Oral BID    oxybutynin ER (Ditropan-XL) 24 hr tab 10 mg  10 mg Oral Daily    heparin (Porcine) 5000 UNIT/ML injection 5,000 Units  5,000 Units Subcutaneous Q8H FALLON    melatonin tab 3 mg  3 mg Oral Nightly PRN    ondansetron (Zofran) 4 MG/2ML injection 4 mg  4 mg Intravenous Q6H PRN    prochlorperazine (Compazine) 10 MG/2ML injection 5 mg  5 mg Intravenous Q8H PRN    pantoprazole (Protonix) 40 mg in sodium chloride 0.9% PF 10 mL IV push  40 mg Intravenous Q12H     Medications Prior to Admission   Medication Sig    docusate sodium 100 MG Oral Cap Take 1 capsule (100 mg total) by mouth 2 (two) times daily.    ondansetron (ZOFRAN) 4 mg tablet Take 1 tablet (4 mg total) by mouth every 8 (eight) hours as needed for Nausea.    HYDROcodone-acetaminophen  MG Oral Tab Take 1 tablet by mouth every 4 (four) hours as needed.    [] pantoprazole 40 MG Oral Tab EC Take 1 tablet (40 mg total) by mouth 2 (two) times daily before meals.    [] QUEtiapine 50 MG Oral Tab Take 1 tablet (50 mg total) by mouth nightly.    predniSONE 10 MG Oral Tab Take 20mg (2 tabs) in AM and 10mg (1tab) for 4 days then resume 10mg twice  a day indefinitely (Patient taking differently: Take 1 tablet (10 mg total) by mouth 2 (two) times daily. Take 20mg (2 tabs) in AM and 10mg (1tab) for 4 days then resume 10mg twice  a day indefinitely)    Acetaminophen ER (ACETAMINOPHEN 8 HOUR) 650 MG Oral Tab CR Take 2-3 tablets (1,300-1,950 mg total) by mouth every 8 (eight) hours as needed for Pain.    albuterol 108 (90 Base) MCG/ACT Inhalation Aero Soln Inhale 2 puffs into the lungs  every 4 to 6 hours as needed for Wheezing.    hydroxychloroquine 200 MG Oral Tab Take 1 tablet (200 mg total) by mouth 2 (two) times daily.    Aspirin 81 MG Oral Cap Take 81 mg by mouth daily.    atorvastatin 10 MG Oral Tab Take 1 tablet (10 mg total) by mouth daily.    clopidogrel 75 MG Oral Tab Take 1 tablet (75 mg total) by mouth daily.    ergocalciferol 1.25 MG (33212 UT) Oral Cap Take 1 capsule (50,000 Units total) by mouth once a week.    oxybutynin ER 10 MG Oral Tablet 24 Hr Take 1 tablet (10 mg total) by mouth daily.    folic acid 1 MG Oral Tab Take 1 tablet (1 mg total) by mouth daily.    collagenase 250 UNIT/GM External Ointment Apply topically daily.    [DISCONTINUED] lamoTRIgine 25 MG Oral Tab Take 2 tablets (50 mg total) by mouth 2 (two) times daily.    [DISCONTINUED] metoprolol tartrate 25 MG Oral Tab Take 0.5 tablets (12.5 mg total) by mouth 2 (two) times daily. (Patient taking differently: Take 0.5 tablets (12.5 mg total) by mouth 2 (two) times daily. Hold for SBP <100 and HR <60)    clotrimazole-betamethasone 1-0.05 % External Cream Apply 1 Application topically 2 (two) times daily. Apply to groin and abdominal folds    [DISCONTINUED] sodium bicarbonate 650 MG Oral Tab Take 1 tablet (650 mg total) by mouth 2 (two) times daily.    Copper (COPPERMIN) 5 MG Oral Tab Take 1 tablet by mouth daily.    ibuprofen 200 MG Oral Tab Take 3 tablets (600 mg total) by mouth every 8 (eight) hours as needed for Pain.    [] Ferrous Gluconate 324 (38 Fe) MG Oral Tab Take 1 tablet (325 mg total) by mouth daily with breakfast.    Potassium Chloride ER 10 MEQ Oral Tab CR Take 1 tablet (10 mEq total) by mouth 2 (two) times daily.       Allergies  Allergies[1]    Review of Systems:   As by Admitting/Attending    Results:   Laboratory Data:  Lab Results   Component Value Date    WBC 11.0 2024    HGB 9.4 (L) 2024    HCT 29.2 (L) 2024    .0 2024    CREATSERUM 0.68 2024    BUN 6 (L)  11/07/2024     11/07/2024    K 4.2 11/07/2024     (H) 11/07/2024    CO2 16.0 (L) 11/07/2024     (H) 11/07/2024    CA 8.4 (L) 11/07/2024    ALB 2.1 (L) 11/02/2024    ALKPHO 331 (H) 10/30/2024    TP 3.9 (L) 10/30/2024    AST 23 10/30/2024    ALT 31 10/30/2024    LIP 31 10/27/2024    DDIMER 1.69 (H) 07/23/2024    MG 1.7 11/07/2024    PHOS 3.4 11/03/2024    CK 50 08/27/2024    B12 748 08/21/2024    ETOH <3 09/05/2024         Imaging:  No results found.    Vital Signs:   Blood pressure 110/40, pulse 90, temperature 98 °F (36.7 °C), temperature source Oral, resp. rate 16, height 5' 2\" (1.575 m), weight 74.4 kg (164 lb), SpO2 97%.    Mental Status Exam:   Appearance: Stated age female, in hospital gown, laying down in hospital bed.  Multiple blanket over her  Psychomotor: No psychomotor agitation or retardation.  Orientation: Alert and oriented to person, place, date and condition.  Gait: Not evaluated.  Attitude/Coorperation: cooperative, pleasant.  Behavior: Appropriate communication.    Speech: Regular rate and rhythm speech.  Mood: Patient admitted feeling anxious specially nightly  Affect: Anxious affect, congruent with the mood.  Thought process: mostly Linear  Thought content: No reports of  suicidal or homicidal ideation.  Perceptions: Patient denies auditory or visual hallucinations  Concentration: intact  Memory: intact  Intellect: Average.  Judgment and Insight: Questionable.     Impression:     Episodic mood disorder     Cellulitis of right lower extremity    Nausea and vomiting    Patient is a 64 year old -American,female with past medical history of hypertension, rheumatoid arthritis, obesity, chronic adrenal insufficiency secondary to chronic corticosteroid use, asthma, pancreatitis, obstructive sleep apnea, obesity, hyperlipidemia, anemia, chronic kidney disease  nonocclusive coronary artery disease, and peripheral arterial disease who also has past mental lorenzo history of anxiety  and depression  who was admitted to the hospital for Cellulitis of right lower extremity: The patient has been demonstrating increased anxiety and depressive symptoms. Patient was started on Lamictal and Seroquel during her last admission and would like those medications continued.     10/29/2024: The patient has been demonstrating some alternation in her cognition and thought process. Patient refusing medications and reporting to team that she can not swallow pills. She demonstrates some increased anxiety, worry. Patient reporting that she can take her pills otherwise not all at one time.     11/1/2024: No issues reported from the team. She has not demonstrated any concerning behaviors. She continues to have some difficulty swallowing her medications. EGD scheduled for tomorrow.    11/3/2024: Medical team reporting no physical reason for patient to have difficulty swallowing and questioning for reevaluation.  Patient herself addressing some anxiety.  Patient encouraged to cooperative team and agree that she is going to eat slowly with small pieces.  Otherwise the patient has not been consistent with her medication treatment but she took her Seroquel last night.  Patient continue reporting difficulty sleeping with increased anxiety nightly.    11/4/2024: The patient took her medications this morning and ate lunch. She continues to report some anxiety and difficulty sleeping at night.    11/5/2024: The patient has been eating and taking her medications.    11/6/2024: Communicating with the team, the patient has not been eating and being selective with medications. Provided patient with supportive psychotherapy including listening, emotional support and encouragement. Patient reporting that she has been making effort to drink and swallow her pills and that she took her medications this morning.      11/7/2024: The patient reporting that she has been taking her medications and eating at each meal. She reports  improvement in her mood.     Discussed risk and benefit, acknowledging the current symptom and severity.  At this point, I would recommend the following approach:     Focus on safety  Focus on education and support.  Focus on insight orientation helping the patient understand diagnosis and treatment plan.  Encourage compliant on medication and treatment.  Continue Remeron to 15 mg nightly.  Continue Seroquel 50 mg nightly  Continue Lamictal 25 mg BID  Processed with patient at length, the initiation of the above psychotropic medications I advised the patient of the risks, benefits, alternatives and potential side effects. The patient consents to administration of the medications and understands the right to refuse medications at any time. The patient verbalized understanding.   Coordinate plan with team    Orders This Visit:  Orders Placed This Encounter   Procedures    CBC With Differential With Platelet    Comp Metabolic Panel (14)    Magnesium    Lipase    Urinalysis, Routine    Lactic Acid, Plasma    Magnesium    CBC With Differential With Platelet    Basic Metabolic Panel (8)    Potassium    Magnesium    Vancomycin Trough, Serum    Vancomycin Peak, Serum    Hepatic Function Panel (7)    Iron And Tibc    Ferritin    CBC With Differential With Platelet    Basic Metabolic Panel (8)    Potassium    Potassium    CBC With Differential With Platelet    Basic Metabolic Panel (8)    Renal Function Panel    CBC With Differential With Platelet    Basic Metabolic Panel (8)    Phosphorus    Basic Metabolic Panel (8)    Potassium    Basic Metabolic Panel (8)    CBC With Differential With Platelet    Magnesium    Potassium    Potassium    Basic Metabolic Panel (8)    CBC With Differential With Platelet    Magnesium    Vancomycin Trough, Serum    CBC, Platelet; No Differential    Basic Metabolic Panel (8)    Magnesium    Magnesium    Basic Metabolic Panel (8)    CBC With Differential With Platelet    Magnesium    Specimen to  Pathology Tissue    Aerobic Bacterial Culture    Blood Culture    MRSA Screen by PCR    Rapid SARS-CoV-2 by PCR       Meds This Visit:  Requested Prescriptions     Signed Prescriptions Disp Refills    sodium bicarbonate 650 MG Oral Tab 60 tablet 0     Sig: Take 1 tablet (650 mg total) by mouth 2 (two) times daily.    HYDROcodone-acetaminophen 7.5-325 MG/15ML Oral Solution 1 each 0     Sig: Take 15 mL by mouth every 4 (four) hours as needed.    lamoTRIgine 25 MG Oral Tab 120 tablet 0     Sig: Take 2 tablets (50 mg total) by mouth 2 (two) times daily.    metoprolol tartrate 25 MG Oral Tab 30 tablet 0     Sig: Take 0.5 tablets (12.5 mg total) by mouth 2 (two) times daily. Hold for SBP <100 and HR <60    mirtazapine 15 MG Oral Tab 30 tablet 0     Sig: Take 1 tablet (15 mg total) by mouth nightly.    sucralfate 1 GM/10ML Oral Suspension 1200 mL 0     Sig: Take 10 mL (1 g total) by mouth 4 (four) times daily before meals and nightly (2200).       Luiza Darden, EBONY  11/7/2024    Note to Patient: The 21st Century Cures Act makes medical notes like these available to patients in the interest of transparency. However, be advised this is a medical document. It is intended as peer to peer communication. It is written in medical language and may contain abbreviations or verbiage that are unfamiliar. It may appear blunt or direct. Medical documents are intended to carry relevant information, facts as evident, and the clinical opinion of the practitioner. This note may have been transcribed using a voice dictation system. Voice recognition errors may occur. This should not be taken to alter the content or meaning of this note.           [1]   Allergies  Allergen Reactions    Cephalosporins ANAPHYLAXIS, NAUSEA AND VOMITING and OTHER (SEE COMMENTS)     Other reaction(s): Fever    - Tolerated multiple doses of ceftriaxone on 7/2024 without any complications  - Tolerated multiple doses of cefepime 8/2024 without complications     Gadolinium Derivatives FEVER    Penicillins OTHER (SEE COMMENTS)     esophagitis    Sulfa Antibiotics OTHER (SEE COMMENTS)     esophagitis    Bananas ITCHING

## 2024-11-07 NOTE — OCCUPATIONAL THERAPY NOTE
OCCUPATIONAL THERAPY TREATMENT NOTE - INPATIENT        Room Number: 546/546-A    Problem List  Principal Problem:    Cellulitis of right lower extremity  Active Problems:    Nausea and vomiting    Episodic mood disorder (HCC)    Odynophagia    Malnutrition (HCC)    Pain    Palliative care encounter    Goals of care, counseling/discussion      OCCUPATIONAL THERAPY ASSESSMENT   Patient demonstrates fair progress this session, goals remain in progress.    Patient is requiring moderate assist as a result of the following impairments: decreased functional strength, decreased endurance, and pain.    Patient continues to function below baseline with adls and functional mobility.  Next session anticipate patient to progress grooming, bed mobility, and transfers.  Occupational Therapy will continue to follow patient for duration of hospitalization.    Patient continues to benefit from continued skilled OT services: to promote return to prior level of function and safety with continuous assistance and gradual rehabilitative therapy.     PLAN DURING HOSPITALIZATION     OT Treatment Plan: Compensatory technique education;Patient/Family training;Patient/Family education;Endurance training;Functional transfer training;ADL training     SUBJECTIVE  Pt declined transfer to chair    OBJECTIVE  Precautions: Bed/chair alarm    PAIN ASSESSMENT  Ratin    ACTIVITY TOLERANCE  good    O2 SATURATIONS  Activity on room air    ACTIVITIES OF DAILY LIVING ASSESSMENT  AM-PAC ‘6-Clicks’ Inpatient Daily Activity Short Form  How much help from another person does the patient currently need…  -   Putting on and taking off regular lower body clothing?: A Lot  -   Bathing (including washing, rinsing, drying)?: A Lot  -   Toileting, which includes using toilet, bedpan or urinal? : A Lot  -   Putting on and taking off regular upper body clothing?: A Lot  -   Taking care of personal grooming such as brushing teeth?: A Little  -   Eating meals?: A  [place]    AM-PAC Score:  Score: 14  Approx Degree of Impairment: 59.67%  Standardized Score (AM-PAC Scale): 33.39  CMS Modifier (G-Code): CK    FUNCTIONAL ADL ASSESSMENT  Eating: setup assist  Grooming: min assist  UB Dressing: mod assist  LB Dressing: max assist  Toileting: na    Skilled Therapy Provided: RN contacted prior to start of care. Treatment coordinated w/ PT. Pt agreeable to sitting eob only. Pt received in bed, alert and oriented. Pt currently requires mod/max a x 2 for supine<>sit. Pt maintained unsupported sitting w/ close supervision/cga. Pt completed grooming task from eob w/ set up/min a. Pt tolerated unsupported sitting ~15 prior to return to bed. Pt presenting w/ bue edema;R>L (RN aware)     At end of session pt returned to bed abd left w/ all needs in reach and alarm on. RN aware of pt's status and performance in therapy      EDUCATION PROVIDED  Patient Education : Role of Occupational Therapy; Plan of Care; Functional Transfer Techniques; Fall Prevention  Patient's Response to Education: Verbalized Understanding    The patient's Approx Degree of Impairment: 59.67% has been calculated based on documentation in the [facility] '6 clicks' Inpatient Daily Activity Short Form.  Research supports that patients with this level of impairment may benefit from IRF.  Final disposition will be made by interdisciplinary medical team.    Patient End of Session: In bed;Needs met;Call light within reach;RN aware of session/findings;All patient questions and concerns addressed;Alarm set    OT Goals:   Patient will complete functional transfer with CGA  Comment: pt declined transfer to chair    Patient will complete toileting with min A  Comment: na    Patient will tolerate standing for 2 minutes in prep for adls with CGA   Comment: na    Patient will complete item retrieval with CGA  Comment: na            Goals  on: 24  Frequency: 3-5x/wk    Therapeutic Activity: 23 minutes

## 2024-11-07 NOTE — PHYSICAL THERAPY NOTE
PHYSICAL THERAPY TREATMENT NOTE - INPATIENT     Room Number: 546/546-A       Presenting Problem: cellulitis of right lower extremity       Problem List  Principal Problem:    Cellulitis of right lower extremity  Active Problems:    Nausea and vomiting    Episodic mood disorder (HCC)    Odynophagia    Malnutrition (HCC)    Pain    Palliative care encounter    Goals of care, counseling/discussion      PHYSICAL THERAPY ASSESSMENT   Patient demonstrates fair progress this session, goals  remain in progress.      Patient is requiring moderate assist and maximum assist as a result of the following impairments: decreased functional strength, decreased endurance/aerobic capacity, pain, impaired sitting and standing balance, decreased muscular endurance, and medical status.     Patient continues to function below baseline with bed mobility, transfers, and gait.  Next session anticipate patient to progress bed mobility, transfers, and gait.  Physical Therapy will continue to follow patient for duration of hospitalization.    Patient continues to benefit from continued skilled PT services: to promote return to prior level of function and safety with continuous assistance and gradual rehabilitative therapy .    PLAN DURING HOSPITALIZATION  Nursing Mobility Recommendation : Lift Equipment  PT Device Recommendation: Rolling walker  PT Treatment Plan: Bed mobility;Body mechanics;Patient education;Transfer training;Balance training  Frequency (Obs): 3-5x/week     SUBJECTIVE  I don't want to sit in that chair    OBJECTIVE  Precautions: Bed/chair alarm      PAIN ASSESSMENT   Rating:  (did not quantify per pain scale)  Location: RLE  Management Techniques: Activity promotion;Repositioning    BALANCE  Static Sitting: Fair +  Dynamic Sitting: Fair -  Static Standing: Not tested  Dynamic Standing: Not tested    AM-PAC '6-Clicks' INPATIENT SHORT FORM - BASIC MOBILITY  How much difficulty does the patient currently have...  Patient  Difficulty: Turning over in bed (including adjusting bedclothes, sheets and blankets)?: A Lot   Patient Difficulty: Sitting down on and standing up from a chair with arms (e.g., wheelchair, bedside commode, etc.): A Lot   Patient Difficulty: Moving from lying on back to sitting on the side of the bed?: A Lot   How much help from another person does the patient currently need...   Help from Another: Moving to and from a bed to a chair (including a wheelchair)?: A Lot   Help from Another: Need to walk in hospital room?: Total   Help from Another: Climbing 3-5 steps with a railing?: Total     AM-PAC Score:  Raw Score: 10   Approx Degree of Impairment: 76.75%   Standardized Score (AM-PAC Scale): 32.29   CMS Modifier (G-Code): CL    FUNCTIONAL ABILITY STATUS  Functional Mobility/Gait Assessment  Gait Assistance: Not tested  Distance (ft): 0  Assistive Device: None  Pattern:  (not tested)  Rolling: moderate assist  Supine to Sit: moderate assist x2  Sit to Supine: maximum assist x2, and for boosting towards HOB      Skilled Therapy Provided: Pt agreeable to therapy, identified by name and . Coordinated session with OT to maximize pt outcomes. Pt in bed agreeable to sit EOB but not transfer to chair. Pt requiring Mod A x2 supine to sit with HOB elevated, sat with initial CGA for static sitting balance, progressing to SBA. Pt able to sit EOB for therex and self care 15 mins. Pt requiring Max A x2 to return to supine and for repositioning.       The patient's Approx Degree of Impairment: 76.75% has been calculated based on documentation in the [facility] '6 clicks' Inpatient Daily Activity Short Form.  Research supports that patients with this level of impairment may benefit from rehab.  Final disposition will be made by interdisciplinary medical team.    THERAPEUTIC EXERCISES  Lower Extremity Ankle pumps  LAQ     Position Sitting       Patient End of Session: In bed;Needs met;Call light within reach;RN aware of  session/findings;Alarm set    CURRENT GOALS   Goals to be met by: 11/4/24  Patient Goal Patient's self-stated goal is: none stated   Goal #1 Patient is able to demonstrate supine - sit EOB @ level: supervision      Goal #1   Current Status Not met   Goal #2 Patient is able to demonstrate transfers Sit to/from Stand at assistance level: supervision with walker - rolling      Goal #2  Current Status Not met    Goal #3 Patient is able to ambulate at least 15 feet with assist device: walker - rolling at assistance level: supervision   Goal #3   Current Status Not met   Goal #4     Goal #4   Current Status     Goal #5 Patient to demonstrate independence with home activity/exercise instructions provided to patient in preparation for discharge.   Goal #5   Current Status In progress       Therapeutic Activity: 23 minutes

## 2024-11-07 NOTE — PLAN OF CARE
Problem: Patient Centered Care  Goal: Patient preferences are identified and integrated in the patient's plan of care  Description: Interventions:  - What would you like us to know as we care for you? \"I fell twice at rehab\"  - Provide timely, complete, and accurate information to patient/family  - Incorporate patient and family knowledge, values, beliefs, and cultural backgrounds into the planning and delivery of care  - Encourage patient/family to participate in care and decision-making at the level they choose  - Honor patient and family perspectives and choices  Outcome: Progressing     Problem: Patient/Family Goals  Goal: Patient/Family Long Term Goal  Description: Patient's Long Term Goal: Discharge home    Interventions:  - IVF  -Wound care  -IV antibiotics  -PT/OT  - See additional Care Plan goals for specific interventions  Outcome: Progressing  Goal: Patient/Family Short Term Goal  Description: Patient's Short Term Goal: Regain Strength    Interventions:   - PT/OT  - Dietary consult  - See additional Care Plan goals for specific interventions  Outcome: Progressing     Problem: PAIN - ADULT  Goal: Verbalizes/displays adequate comfort level or patient's stated pain goal  Description: INTERVENTIONS:  - Encourage pt to monitor pain and request assistance  - Assess pain using appropriate pain scale  - Administer analgesics based on type and severity of pain and evaluate response  - Implement non-pharmacological measures as appropriate and evaluate response  - Consider cultural and social influences on pain and pain management  - Manage/alleviate anxiety  - Utilize distraction and/or relaxation techniques  - Monitor for opioid side effects  - Notify MD/LIP if interventions unsuccessful or patient reports new pain  - Anticipate increased pain with activity and pre-medicate as appropriate  Outcome: Progressing     Problem: RISK FOR INFECTION - ADULT  Goal: Absence of fever/infection during anticipated  neutropenic period  Description: INTERVENTIONS  - Monitor WBC  - Administer growth factors as ordered  - Implement neutropenic guidelines  Outcome: Progressing     Problem: SAFETY ADULT - FALL  Goal: Free from fall injury  Description: INTERVENTIONS:  - Assess pt frequently for physical needs  - Identify cognitive and physical deficits and behaviors that affect risk of falls.  - [place] fall precautions as indicated by assessment.  - Educate pt/family on patient safety including physical limitations  - Instruct pt to call for assistance with activity based on assessment  - Modify environment to reduce risk of injury  - Provide assistive devices as appropriate  - Consider OT/PT consult to assist with strengthening/mobility  - Encourage toileting schedule  Outcome: Progressing     Problem: GASTROINTESTINAL - ADULT  Goal: Minimal or absence of nausea and vomiting  Description: INTERVENTIONS:  - Maintain adequate hydration with IV or PO as ordered and tolerated  - Nasogastric tube to low intermittent suction as ordered  - Evaluate effectiveness of ordered antiemetic medications  - Provide nonpharmacologic comfort measures as appropriate  - Advance diet as tolerated, if ordered  - Obtain nutritional consult as needed  - Evaluate fluid balance  Outcome: Progressing  Goal: Maintains or returns to baseline bowel function  Description: INTERVENTIONS:  - Assess bowel function  - Maintain adequate hydration with IV or PO as ordered and tolerated  - Evaluate effectiveness of GI medications  - Encourage mobilization and activity  - Obtain nutritional consult as needed  - Establish a toileting routine/schedule  - Consider collaborating with pharmacy to review patient's medication profile  Outcome: Progressing     Problem: SKIN/TISSUE INTEGRITY - ADULT  Goal: Skin integrity remains intact  Description: INTERVENTIONS  - Assess and document risk factors for pressure ulcer development  - Assess and document skin integrity  -  Monitor for areas of redness and/or skin breakdown  - Initiate interventions, skin care algorithm/standards of care as needed  Outcome: Progressing  Goal: Incision(s), wounds(s) or drain site(s) healing without S/S of infection  Description: INTERVENTIONS:  - Assess and document risk factors for pressure ulcer development  - Assess and document skin integrity  - Assess and document dressing/incision, wound bed, drain sites and surrounding tissue  - Implement wound care per orders  - Initiate isolation precautions as appropriate  - Initiate Pressure Ulcer prevention bundle as indicated  Outcome: Progressing  Goal: Oral mucous membranes remain intact  Description: INTERVENTIONS  - Assess oral mucosa and hygiene practices  - Implement preventative oral hygiene regimen  - Implement oral medicated treatments as ordered  Outcome: Progressing     Problem: MUSCULOSKELETAL - ADULT  Goal: Return mobility to safest level of function  Description: INTERVENTIONS:  - Assess patient stability and activity tolerance for standing, transferring and ambulating w/ or w/o assistive devices  - Assist with transfers and ambulation using safe patient handling equipment as needed  - Ensure adequate protection for wounds/incisions during mobilization  - Obtain PT/OT consults as needed  - Advance activity as appropriate  - Communicate ordered activity level and limitations with patient/family  Outcome: Progressing  Goal: Maintain proper alignment of affected body part  Description: INTERVENTIONS:  - Support and protect limb and body alignment per provider's orders  - Instruct and reinforce with patient and family use of appropriate assistive device and precautions (e.g. spinal or hip dislocation precautions)  Outcome: Progressing     Problem: HEMATOLOGIC - ADULT  Goal: Free from bleeding injury  Description: (Example usage: patient with low platelets)  INTERVENTIONS:  - Avoid intramuscular injections, enemas and rectal medication  administration  - Ensure safe mobilization of patient  - Hold pressure on venipuncture sites to achieve adequate hemostasis  - Assess for signs and symptoms of internal bleeding  - Monitor lab trends  - Patient is to report abnormal signs of bleeding to staff  - Avoid use of toothpicks and dental floss  - Use electric shaver for shaving  - Use soft bristle tooth brush  - Limit straining and forceful nose blowing  Outcome: Progressing

## 2024-11-07 NOTE — PROGRESS NOTES
[facility]  part of [facility]  Hospitalist Progress Note     Sheri Garzon Patient Status:  Inpatient    1960  64 year old CSN [id number]   Location 546/546-A Attending John Hinds MD   Hosp Day # 10 PCP TERI MCKENNA MD     Assessment & Plan:   ----------------------------------  Odynophagia/esophageal web.  Abnormal esophagram  -Encouraged oral intake, there may be a functional issue now that has developed - despite prompting she did not eat anything yesterday except for a bite of grilled cheese  -GI consult  -Restart oral medications for now  -EGD  shows esophageal web which was dilated  -Diet as tolerated  -Psychiatry on consult, added Remeron - hopefully will help with appetite  -Palliative care consulted as well  -Still not eating     Metabolic Acidosis  -likely due to starvation    Right lower extremity ulcer.  Possible overlying cellulitis on right foot dorsum possible.  Ulceration on the back of the right lower leg does not appear infected.  White count is normal.  Afebrile.  -Continue vancomycin for now  -Cxs show MRSA sensitive to bactrim  -outpt vascular evaluation if fails to heal    Hypoglycemia.  Due to poor oral intake.  Continue hypoglycemic protocol.  Strongly encouraged continued oral intake.  At this point her oral intake is the limiting factor to discharge  -Dietitian    Left upper extremity swelling.  May have some focal edema.  No evidence of DVT associated with PICC line.  PICC line is in proper place and functioning well.  -Manage expectantly  -DC IV fluids when acidosis improved, oral intake improved    Other problems  Metabolic acidosis  Anxiety  Asthma  Adrenal insufficiency  GERD  Hypertension  Dyslipidemia  History of coronary artery disease  Rheumatoid arthritis  Sleep apnea    DVT Mechanical Prophylaxis:     Early ambuation  DVT Pharmacologic Prophylaxis   Medication    heparin (Porcine) 5000 UNIT/ML injection 5,000 Units      DVT Pharmacologic  prophylaxis: Aspirin 162 mg       code status: full  dispo: HH 11/3, refusing SULTANA.  I discussed with her that it is unlikely she will do well at home given her level of debility  If applicable, malnutrition status at bottom of note    I personally reviewed the available laboratories, imaging including. I discussed/will discuss the case with consultants. I ordered laboratories and/or radiographic studies. I adjusted medications as detailed above.  Medical decision making high, risk is high.       Greater than 55 minutes spent, Greater than 50% spent counseling and in coordination of care, reviewing labs, discussing results with patient, coordinating care with care team and ordering labs for tomorrow and adjusting medications as needed      Subjective:   ----------------------------------    Discussed how she didn't eat anything yesterday.  Promised to eat more today.  Lots of counseling provided    10 ROS completed and otherwise negative.       Objective:   Chief Complaint:   Chief Complaint   Patient presents with    Nausea/Vomiting/Diarrhea     ----------------------------------  Temp:  [97.9 °F (36.6 °C)-98.6 °F (37 °C)] 98.6 °F (37 °C)  Pulse:  [78-87] 85  Resp:  [16-20] 20  BP: (106-120)/(70-81) 120/79  SpO2:  [97 %-99 %] 97 %    Gen: No acute distress  Pulm: Lungs clear, normal respiratory effort  CV: Heart with regular rate and rhythm  Abd: Abdomen soft, nontender, nondistended, bowel sounds present  Neuro: No acute focal deficits  MSK: moves extremities  Skin: Warm and dry  Psych: Normal affect  Ext: no c/c/e      Labs:  Lab Results   Component Value Date    HGB 9.1 (L) 11/06/2024    WBC 10.5 11/06/2024    .0 11/06/2024     11/06/2024    K 4.2 11/06/2024    K 4.2 11/06/2024    CREATSERUM 0.54 (L) 11/06/2024    AST 23 10/30/2024    ALT 31 10/30/2024            mirtazapine  15 mg Oral Nightly    ondansetron  4 mg Oral TID (Nitrates)    vancomycin  750 mg Intravenous Q24H    sucralfate  1 g Oral TID  AC and HS (2200)    predniSONE  10 mg Oral BID    aspirin  81 mg Oral Daily    clopidogrel  75 mg Oral Daily    hydroxychloroquine  200 mg Oral BID    lamoTRIgine  50 mg Oral BID    metoprolol tartrate  12.5 mg Oral BID    oxybutynin ER  10 mg Oral Daily    heparin  5,000 Units Subcutaneous Q8H FALLON    pantoprazole  40 mg Intravenous Q12H       HYDROcodone-acetaminophen    acetaminophen    dextrose    glucose **OR** glucose **OR** glucose-vitamin C **OR** dextrose **OR** glucose **OR** glucose **OR** glucose-vitamin C    acetaminophen    albuterol    melatonin    ondansetron    prochlorperazine      Dietitian Malnutrition Assessment    Evaluation for Malnutrition: Criteria for non-severe malnutrition diagnosis-         acute illness/injury related to Wt loss 5% in 1 month., Energy intake less than 75% for greater than 7 days.         RD Malnutrition Care Plan: Encouraged increased PO intake., Intiated ONS (oral nutritional supplements).    Body Fat/Muscle Mass: no wasting noted, malnutrition related to PO intake and weight loss        Physician Assessment     Patient has a diagnosis of moderate malnutrition

## 2024-11-08 ENCOUNTER — APPOINTMENT (OUTPATIENT)
Dept: GENERAL RADIOLOGY | Facility: HOSPITAL | Age: 64
End: 2024-11-08
Attending: INTERNAL MEDICINE
Payer: COMMERCIAL

## 2024-11-08 NOTE — PLAN OF CARE
Problem: Patient Centered Care  Goal: Patient preferences are identified and integrated in the patient's plan of care  Description: Interventions:  - What would you like us to know as we care for you? \"I fell twice at rehab\"  - Provide timely, complete, and accurate information to patient/family  - Incorporate patient and family knowledge, values, beliefs, and cultural backgrounds into the planning and delivery of care  - Encourage patient/family to participate in care and decision-making at the level they choose  - Honor patient and family perspectives and choices  Outcome: Progressing     Problem: Patient/Family Goals  Goal: Patient/Family Long Term Goal  Description: Patient's Long Term Goal: Discharge home    Interventions:  - IVF  -Wound care  -IV antibiotics  -PT/OT  - See additional Care Plan goals for specific interventions  Outcome: Progressing  Goal: Patient/Family Short Term Goal  Description: Patient's Short Term Goal: Regain Strength    Interventions:   - PT/OT  - Dietary consult  - See additional Care Plan goals for specific interventions  Outcome: Progressing     Problem: PAIN - ADULT  Goal: Verbalizes/displays adequate comfort level or patient's stated pain goal  Description: INTERVENTIONS:  - Encourage pt to monitor pain and request assistance  - Assess pain using appropriate pain scale  - Administer analgesics based on type and severity of pain and evaluate response  - Implement non-pharmacological measures as appropriate and evaluate response  - Consider cultural and social influences on pain and pain management  - Manage/alleviate anxiety  - Utilize distraction and/or relaxation techniques  - Monitor for opioid side effects  - Notify MD/LIP if interventions unsuccessful or patient reports new pain  - Anticipate increased pain with activity and pre-medicate as appropriate  Outcome: Progressing     Problem: RISK FOR INFECTION - ADULT  Goal: Absence of fever/infection during anticipated  neutropenic period  Description: INTERVENTIONS  - Monitor WBC  - Administer growth factors as ordered  - Implement neutropenic guidelines  Outcome: Progressing     Problem: SAFETY ADULT - FALL  Goal: Free from fall injury  Description: INTERVENTIONS:  - Assess pt frequently for physical needs  - Identify cognitive and physical deficits and behaviors that affect risk of falls.  - [place] fall precautions as indicated by assessment.  - Educate pt/family on patient safety including physical limitations  - Instruct pt to call for assistance with activity based on assessment  - Modify environment to reduce risk of injury  - Provide assistive devices as appropriate  - Consider OT/PT consult to assist with strengthening/mobility  - Encourage toileting schedule  Outcome: Progressing     Problem: GASTROINTESTINAL - ADULT  Goal: Minimal or absence of nausea and vomiting  Description: INTERVENTIONS:  - Maintain adequate hydration with IV or PO as ordered and tolerated  - Nasogastric tube to low intermittent suction as ordered  - Evaluate effectiveness of ordered antiemetic medications  - Provide nonpharmacologic comfort measures as appropriate  - Advance diet as tolerated, if ordered  - Obtain nutritional consult as needed  - Evaluate fluid balance  Outcome: Progressing  Goal: Maintains or returns to baseline bowel function  Description: INTERVENTIONS:  - Assess bowel function  - Maintain adequate hydration with IV or PO as ordered and tolerated  - Evaluate effectiveness of GI medications  - Encourage mobilization and activity  - Obtain nutritional consult as needed  - Establish a toileting routine/schedule  - Consider collaborating with pharmacy to review patient's medication profile  Outcome: Progressing     Problem: SKIN/TISSUE INTEGRITY - ADULT  Goal: Skin integrity remains intact  Description: INTERVENTIONS  - Assess and document risk factors for pressure ulcer development  - Assess and document skin integrity  -  Monitor for areas of redness and/or skin breakdown  - Initiate interventions, skin care algorithm/standards of care as needed  Outcome: Not Progressing  Goal: Incision(s), wounds(s) or drain site(s) healing without S/S of infection  Description: INTERVENTIONS:  - Assess and document risk factors for pressure ulcer development  - Assess and document skin integrity  - Assess and document dressing/incision, wound bed, drain sites and surrounding tissue  - Implement wound care per orders  - Initiate isolation precautions as appropriate  - Initiate Pressure Ulcer prevention bundle as indicated  Outcome: Progressing  Goal: Oral mucous membranes remain intact  Description: INTERVENTIONS  - Assess oral mucosa and hygiene practices  - Implement preventative oral hygiene regimen  - Implement oral medicated treatments as ordered  Outcome: Progressing     Problem: MUSCULOSKELETAL - ADULT  Goal: Return mobility to safest level of function  Description: INTERVENTIONS:  - Assess patient stability and activity tolerance for standing, transferring and ambulating w/ or w/o assistive devices  - Assist with transfers and ambulation using safe patient handling equipment as needed  - Ensure adequate protection for wounds/incisions during mobilization  - Obtain PT/OT consults as needed  - Advance activity as appropriate  - Communicate ordered activity level and limitations with patient/family  Outcome: Not Progressing  Goal: Maintain proper alignment of affected body part  Description: INTERVENTIONS:  - Support and protect limb and body alignment per provider's orders  - Instruct and reinforce with patient and family use of appropriate assistive device and precautions (e.g. spinal or hip dislocation precautions)  Outcome: Progressing     Problem: HEMATOLOGIC - ADULT  Goal: Free from bleeding injury  Description: (Example usage: patient with low platelets)  INTERVENTIONS:  - Avoid intramuscular injections, enemas and rectal medication  administration  - Ensure safe mobilization of patient  - Hold pressure on venipuncture sites to achieve adequate hemostasis  - Assess for signs and symptoms of internal bleeding  - Monitor lab trends  - Patient is to report abnormal signs of bleeding to staff  - Avoid use of toothpicks and dental floss  - Use electric shaver for shaving  - Use soft bristle tooth brush  - Limit straining and forceful nose blowing  Outcome: Progressing

## 2024-11-08 NOTE — PROGRESS NOTES
Progress Note     Sheri Garzon Patient Status:  Inpatient    1960 MRN [id number]   Location [facility] 5SW/SE Attending Marlena oMrris MD   Hosp Day # 12 PCP TERI MCKENNA MD     Chief Complaint:   Chief Complaint   Patient presents with    Nausea/Vomiting/Diarrhea         Subjective:   S: Patient seen and examined, chart reviewed.   Still c/o of food stuck      Review of Systems:   10 point ROS completed and was negative, except for pertinent positive and negatives stated in subjective.                Objective:   Vital signs:  Temp:  [97.3 °F (36.3 °C)-99 °F (37.2 °C)] 97.5 °F (36.4 °C)  Pulse:  [83-99] 83  Resp:  [16-18] 16  BP: ()/(63-74) 107/66  SpO2:  [98 %-100 %] 100 %    Wt Readings from Last 6 Encounters:   24 164 lb (74.4 kg)   24 181 lb 3.2 oz (82.2 kg)   24 193 lb 5 oz (87.7 kg)   24 198 lb (89.8 kg)   24 182 lb (82.6 kg)   24 183 lb 12.8 oz (83.4 kg)       Physical Exam:    General: No acute distress.   Respiratory: Clear to auscultation bilaterally. No wheezes. No rhonchi.  Cardiovascular: S1, S2. Regular rate and rhythm. No murmurs, rubs or gallops.   Abdomen: Soft, nontender, nondistended.  Positive bowel sounds. No rebound or guarding.  Neurologic: No focal neurological deficits.   Musculoskeletal: Moves all extremities.  Extremities: No edema.    Results:   Diagnostic Data:      Labs:    Labs Last 24 Hours:   BMP     CBC    Other     Na 140 Cl 114 BUN 6 Glu 114   Hb 8.7   PTT - Procal -   K 3.9 CO2 18.0 Cr 0.71   WBC 13.8 >< .0  INR - CRP -   Renal Lytes Endo    Hct 26.8   Trop - D dim -   eGFR - Ca 8.1 POC Gluc  117    LFT   pBNP - Lactic -   eGFR AA - PO4 - A1c -   AST - APk - Prot -  LDL -      Recent Labs   Lab 24  0602 24  1530 24  0641 24  0607 24  0520   RBC 3.45*   < > 3.15* 3.34* 3.06*   HGB 9.8*   < > 9.1* 9.4* 8.7*   HCT 30.6*   < > 27.6* 29.2* 26.8*   MCV 88.7   < > 87.6 87.4 87.6    MCH 28.4   < > 28.9 28.1 28.4   MCHC 32.0   < > 33.0 32.2 32.5   RDW 16.2*   < > 16.3* 16.4* 16.0*   NEPRELIM 5.06  --  8.30* 9.15*  --    WBC 8.9   < > 10.5 11.0 13.8*   .0   < > 157.0 163.0 136.0*    < > = values in this interval not displayed.       No results found for: \"PT\", \"INR\"    Recent Labs   Lab 11/02/24  0602 11/03/24  0954 11/06/24  0641 11/07/24  0607 11/08/24  0520   GLU 69*   < > 109* 133* 114*   BUN 5*   < > <5* 6* 6*   CREATSERUM 0.53*   < > 0.54* 0.68 0.71   CA 7.8*   < > 8.0* 8.4* 8.1*   ALB 2.1*  --   --   --   --       < > 138 137 140   K 3.8   < > 4.2  4.2 4.2 3.9   *   < > 116* 115* 114*   CO2 15.0*   < > 16.0* 16.0* 18.0*    < > = values in this interval not displayed.       No results for input(s): \"SHEMAR\", \"LIP\" in the last 168 hours.    Recent Labs   Lab 11/02/24  0602 11/03/24  0954 11/05/24  1530 11/06/24  0641 11/07/24  0607 11/08/24  0520   MG  --   --    < > 1.6 1.7 1.7   PHOS 1.8* 3.4  --   --   --   --     < > = values in this interval not displayed.       No results for input(s): \"URINE\", \"CULTI\", \"BLDSMR\" in the last 168 hours.      No results found.        Pro-Calcitonin  No results for input(s): \"PCT\" in the last 168 hours.    Cardiac  No results for input(s): \"TROP\", \"PBNP\" in the last 168 hours.    Imaging: Imaging data reviewed in Epic.    No results found.       Medications:    magnesium oxide  400 mg Oral Once    ondansetron  8 mg Oral TID (Nitrates)    sodium bicarbonate  650 mg Oral BID    mirtazapine  15 mg Oral Nightly    vancomycin  750 mg Intravenous Q24H    sucralfate  1 g Oral TID AC and HS (2200)    predniSONE  10 mg Oral BID    aspirin  81 mg Oral Daily    clopidogrel  75 mg Oral Daily    hydroxychloroquine  200 mg Oral BID    lamoTRIgine  50 mg Oral BID    metoprolol tartrate  12.5 mg Oral BID    oxybutynin ER  10 mg Oral Daily    heparin  5,000 Units Subcutaneous Q8H FALLON    pantoprazole  40 mg Intravenous Q12H       Assessment & Plan:    ASSESSMENT / PLAN:     Odynophagia/esophageal web.  Abnormal esophagram  -Encouraged oral intake, there may be a functional issue now that has developed - despite prompting she did not eat anything yesterday except for a bite of grilled cheese  -GI consult  Repeat double contrast study to confirm resolution of web/obstruction.   -Restart oral medications for now  -EGD 11/2 shows esophageal web which was dilated  -Diet as tolerated  -Psychiatry on consult, added Remeron - hopefully will help with appetite  -Palliative care consulted as well  -Eating a little better but not much     Metabolic Acidosis  -likely due to starvation and RTA  -resume home bicarb     Right lower extremity ulcer.  Possible overlying cellulitis on right foot dorsum possible.  Ulceration on the back of the right lower leg does not appear infected.  White count is normal.  Afebrile.  -Continue vancomycin for now  -Cxs show MRSA sensitive to bactrim - abx now stopped  -outpt vascular evaluation if fails to heal     Hypoglycemia.  Due to poor oral intake.  Continue hypoglycemic protocol.  Strongly encouraged continued oral intake.  At this point her oral intake is the limiting factor to discharge  -Dietitian     Left upper extremity swelling.  May have some focal edema.  No evidence of DVT associated with PICC line.  PICC line is in proper place and functioning well.  -Manage expectantly     Other problems  Metabolic acidosis  Anxiety  Asthma  Adrenal insufficiency  GERD  Hypertension  Dyslipidemia  History of coronary artery disease  Rheumatoid arthritis  Sleep apnea     DVT Mechanical Prophylaxis:     Early ambuation      DVT Pharmacologic Prophylaxis   Medication    heparin (Porcine) 5000 UNIT/ML injection 5,000 Units      DVT Pharmacologic prophylaxis: Aspirin 162 mg        code status: full  dispo:  11/3, refusing [facility].  I discussed with her that it is unlikely she will do well at home given her level of debility  If applicable,  malnutrition status at bottom of note     I personally reviewed the available laboratories, imaging including. I discussed/will discuss the case with consultants. I ordered laboratories and/or radiographic studies. I adjusted medications as detailed above.  Medical decision making high, risk is high.         Greater than 55 minutes spent, Greater than 50% spent counseling and in coordination of care, reviewing labs, discussing results with patient, coordinating care with care team and ordering labs for tomorrow and adjusting medications as needed      Plan of care discussed with nurse and patient.     Osmar Chong MD, FAAP, FACP  [facility] Hospitalist  I respond to [email or web address] Chat and [employer]

## 2024-11-08 NOTE — PLAN OF CARE
Problem: Patient Centered Care  Goal: Patient preferences are identified and integrated in the patient's plan of care  Description: Interventions:  - What would you like us to know as we care for you? \"I fell twice at rehab\"  - Provide timely, complete, and accurate information to patient/family  - Incorporate patient and family knowledge, values, beliefs, and cultural backgrounds into the planning and delivery of care  - Encourage patient/family to participate in care and decision-making at the level they choose  - Honor patient and family perspectives and choices  Outcome: Progressing     Problem: Patient/Family Goals  Goal: Patient/Family Long Term Goal  Description: Patient's Long Term Goal: Discharge home    Interventions:  - IVF  -Wound care  -IV antibiotics  -PT/OT  - See additional Care Plan goals for specific interventions  Outcome: Progressing  Goal: Patient/Family Short Term Goal  Description: Patient's Short Term Goal: Regain Strength    Interventions:   - PT/OT  - Dietary consult  - See additional Care Plan goals for specific interventions  Outcome: Progressing     Problem: PAIN - ADULT  Goal: Verbalizes/displays adequate comfort level or patient's stated pain goal  Description: INTERVENTIONS:  - Encourage pt to monitor pain and request assistance  - Assess pain using appropriate pain scale  - Administer analgesics based on type and severity of pain and evaluate response  - Implement non-pharmacological measures as appropriate and evaluate response  - Consider cultural and social influences on pain and pain management  - Manage/alleviate anxiety  - Utilize distraction and/or relaxation techniques  - Monitor for opioid side effects  - Notify MD/LIP if interventions unsuccessful or patient reports new pain  - Anticipate increased pain with activity and pre-medicate as appropriate  Outcome: Progressing     Problem: RISK FOR INFECTION - ADULT  Goal: Absence of fever/infection during anticipated  neutropenic period  Description: INTERVENTIONS  - Monitor WBC  - Administer growth factors as ordered  - Implement neutropenic guidelines  Outcome: Progressing     Problem: SAFETY ADULT - FALL  Goal: Free from fall injury  Description: INTERVENTIONS:  - Assess pt frequently for physical needs  - Identify cognitive and physical deficits and behaviors that affect risk of falls.  - Tracy fall precautions as indicated by assessment.  - Educate pt/family on patient safety including physical limitations  - Instruct pt to call for assistance with activity based on assessment  - Modify environment to reduce risk of injury  - Provide assistive devices as appropriate  - Consider OT/PT consult to assist with strengthening/mobility  - Encourage toileting schedule  Outcome: Progressing     Problem: GASTROINTESTINAL - ADULT  Goal: Maintains or returns to baseline bowel function  Description: INTERVENTIONS:  - Assess bowel function  - Maintain adequate hydration with IV or PO as ordered and tolerated  - Evaluate effectiveness of GI medications  - Encourage mobilization and activity  - Obtain nutritional consult as needed  - Establish a toileting routine/schedule  - Consider collaborating with pharmacy to review patient's medication profile  Outcome: Progressing     Problem: SKIN/TISSUE INTEGRITY - ADULT  Goal: Skin integrity remains intact  Description: INTERVENTIONS  - Assess and document risk factors for pressure ulcer development  - Assess and document skin integrity  - Monitor for areas of redness and/or skin breakdown  - Initiate interventions, skin care algorithm/standards of care as needed  Outcome: Progressing  Goal: Incision(s), wounds(s) or drain site(s) healing without S/S of infection  Description: INTERVENTIONS:  - Assess and document risk factors for pressure ulcer development  - Assess and document skin integrity  - Assess and document dressing/incision, wound bed, drain sites and surrounding tissue  - Implement  wound care per orders  - Initiate isolation precautions as appropriate  - Initiate Pressure Ulcer prevention bundle as indicated  Outcome: Progressing  Goal: Oral mucous membranes remain intact  Description: INTERVENTIONS  - Assess oral mucosa and hygiene practices  - Implement preventative oral hygiene regimen  - Implement oral medicated treatments as ordered  Outcome: Progressing     Problem: GASTROINTESTINAL - ADULT  Goal: Minimal or absence of nausea and vomiting  Description: INTERVENTIONS:  - Maintain adequate hydration with IV or PO as ordered and tolerated  - Nasogastric tube to low intermittent suction as ordered  - Evaluate effectiveness of ordered antiemetic medications  - Provide nonpharmacologic comfort measures as appropriate  - Advance diet as tolerated, if ordered  - Obtain nutritional consult as needed  - Evaluate fluid balance  Outcome: Not Progressing     Problem: MUSCULOSKELETAL - ADULT  Goal: Return mobility to safest level of function  Description: INTERVENTIONS:  - Assess patient stability and activity tolerance for standing, transferring and ambulating w/ or w/o assistive devices  - Assist with transfers and ambulation using safe patient handling equipment as needed  - Ensure adequate protection for wounds/incisions during mobilization  - Obtain PT/OT consults as needed  - Advance activity as appropriate  - Communicate ordered activity level and limitations with patient/family  Outcome: Not Progressing  Goal: Maintain proper alignment of affected body part  Description: INTERVENTIONS:  - Support and protect limb and body alignment per provider's orders  - Instruct and reinforce with patient and family use of appropriate assistive device and precautions (e.g. spinal or hip dislocation precautions)  Outcome: Not Progressing

## 2024-11-08 NOTE — PALLIATIVE CARE NOTE
[facility]  part of [facility]  Palliative Care Progress Note    Sheri Garzon Patient Status:  Inpatient    1960 MRN [id number]   Location [facility] 5SW/SE Attending Marlena Morris MD   Hosp Day # 12 PCP TERI MCKENNA MD     The  Cures Act makes medical notes like these available to patients in the interest of transparency. Please be advised this is a medical document. Medical documents are intended to carry relevant information, facts as evident, and the clinical opinion of the practitioner. The medical note is intended as peer to peer communication and may appear blunt or direct. It is written in medical language and may contain abbreviations or verbiage that are unfamiliar.     Subjective     When I entered the room, Viviana is in the bed she is awake and alert, getting cleaned up by staff + incontinence of urine. Viviana c/o feeling like pills are getting stuck in her throat , c/o continued nausea, states she has taken some bites of food, took a couple spoonfuls of cereal today. She is going for esophagus Xray.    Symptom assessment: continued chronic pain in her legs, + nausea, anorexia    Review of pertinent medication requirements in past 24 hours: Zofran 4mg X2 Lortab 7.5mg X3    Palliative Care symptom needs assessed:   Pertinent items are noted in HPI/below    Fatigue:  Yes Viviana is mainly in the bed  Dyspnea: denies   Current O2 therapy: RA  Cough: denies  Pain Present: left knee and left heel pain, generalized pain  Non-verbal signs of pain present: No  Appetite: Viviana has been eating a little bit took some teaspoons of food yesterday and a couple or teaspoons for cereal this morning.   Viviana c/o feeling like pills are getting stuck in her throat.  Constipation: denies  Diarrhea: denies  Nausea/Vomiting: + intermittent nausea with eating  Depression: episodic mood disorder, h/o bipolar  Anxiety: + anxiety    Allergies:  Allergies[1]    Medications:      Current Facility-Administered Medications:     magnesium oxide (Mag-Ox) tab 400 mg, 400 mg, Oral, Once    ondansetron (Zofran-ODT) disintegrating tab 8 mg, 8 mg, Oral, TID (Nitrates)    sodium bicarbonate tab 650 mg, 650 mg, Oral, BID    mirtazapine (Remeron) tab 15 mg, 15 mg, Oral, Nightly    HYDROcodone-acetaminophen 7.5-325 MG/15ML solution 15 mL, 15 mL, Oral, Q4H PRN    acetaminophen (Tylenol) 160 MG/5ML oral liquid 650 mg, 650 mg, Oral, Q4H PRN    dextrose 10% infusion, , Intravenous, Continuous    dextrose 50% injection 50 mL, 50 mL, Intravenous, PRN    glucose (Dex4) 15 GM/59ML oral liquid 15 g, 15 g, Oral, Q15 Min PRN **OR** glucose (Glutose) 40% oral gel 15 g, 15 g, Oral, Q15 Min PRN **OR** glucose-vitamin C (Dex-4) chewable tab 4 tablet, 4 tablet, Oral, Q15 Min PRN **OR** dextrose 50% injection 50 mL, 50 mL, Intravenous, Q15 Min PRN **OR** glucose (Dex4) 15 GM/59ML oral liquid 30 g, 30 g, Oral, Q15 Min PRN **OR** glucose (Glutose) 40% oral gel 30 g, 30 g, Oral, Q15 Min PRN **OR** glucose-vitamin C (Dex-4) chewable tab 8 tablet, 8 tablet, Oral, Q15 Min PRN    vancomycin (Vancocin) 750 mg in sodium chloride 0.9% 250 mL IVPB-ADDV, 750 mg, Intravenous, Q24H    acetaminophen (Tylenol) 160 MG/5ML oral liquid 650 mg, 650 mg, Oral, Q6H PRN    sucralfate (Carafate) 1 GM/10ML oral suspension 1 g, 1 g, Oral, TID AC and HS (2200)    predniSONE (Deltasone) tab 10 mg, 10 mg, Oral, BID    albuterol (Ventolin HFA) 108 (90 Base) MCG/ACT inhaler 2 puff, 2 puff, Inhalation, Q4H PRN    aspirin chewable tab 81 mg, 81 mg, Oral, Daily    clopidogrel (Plavix) tab 75 mg, 75 mg, Oral, Daily    hydroxychloroquine (Plaquenil) tab 200 mg, 200 mg, Oral, BID    lamoTRIgine (LaMICtal) tab 50 mg, 50 mg, Oral, BID    metoprolol tartrate (Lopressor) partial tab 12.5 mg, 12.5 mg, Oral, BID    oxybutynin ER (Ditropan-XL) 24 hr tab 10 mg, 10 mg, Oral, Daily    heparin (Porcine) 5000 UNIT/ML injection 5,000 Units, 5,000 Units, Subcutaneous,  Q8H FALLON    melatonin tab 3 mg, 3 mg, Oral, Nightly PRN    prochlorperazine (Compazine) 10 MG/2ML injection 5 mg, 5 mg, Intravenous, Q8H PRN    pantoprazole (Protonix) 40 mg in sodium chloride 0.9% PF 10 mL IV push, 40 mg, Intravenous, Q12H    Objective     Vital Signs:  Blood pressure 107/66, pulse 83, temperature 97.5 °F (36.4 °C), temperature source Oral, resp. rate 16, height 5' 2\" (1.575 m), weight 164 lb (74.4 kg), SpO2 100%.  Body mass index is 30 kg/m².  Present Level of pain: generalized pain 5/10  Non-verbal signs of pain present: No    Physical Exam:  General: Viviana is lying in the bed, she is awake and alert 0X4, pleasant with flat affect.  HEENT: very dry oral MM and lips,    Cardiac: Regular rate and rhythm, S1, S2 normal, no murmur, rub or gallop.  Lungs: Clear without wheezes, rales, rhonchi or dullness.  Normal excursions and effort.  Abdomen: Soft, non-tender, +bowel sounds, no rebound or guarding. + BM  Extremities: Without clubbing, cyanosis. BLE trace Edema present. + upper extremity edema  Neurologic: Alert and oriented X4, pleasant with flat affect, normal affect.  Skin: Warm and dry.  Groin excoriation form incontinence, Viviana declines use of the purwick due to allergic skin reaction in the past,   Lower left heel wound and right lower extremity wound.    Prior to admission Palliative performance scale PPSv2 (%): 50    Hematology:  Lab Results   Component Value Date    WBC 13.8 (H) 11/08/2024    HGB 8.7 (L) 11/08/2024    HCT 26.8 (L) 11/08/2024    .0 (L) 11/08/2024       Coags:No results found for: \"PT\", \"INR\", \"PTT\"    Chemistry:  Lab Results   Component Value Date    CREATSERUM 0.71 11/08/2024    BUN 6 (L) 11/08/2024     11/08/2024    K 3.9 11/08/2024     (H) 11/08/2024    CO2 18.0 (L) 11/08/2024     (H) 11/08/2024    CA 8.1 (L) 11/08/2024    ALB 2.1 (L) 11/02/2024    ALKPHO 331 (H) 10/30/2024    BILT 0.2 10/30/2024    TP 3.9 (L) 10/30/2024    AST 23 10/30/2024    ALT  31 10/30/2024    DDIMER 1.69 (H) 07/23/2024    MG 1.7 11/08/2024    PHOS 3.4 11/03/2024       Imaging:  No results found.      Summary of Discussion    11/8/24 Viviana has taken some teaspoons of food since I last saw her. She took a couple teaspoons of cereal this morning, Viviana is expressing she feels like pills are stuck in her throat. Viviana is going for esophagus Xray. Discussed with Viviana will follow up next week.  Viviana continues to decline some scheduled medications.     11/6/24 I followed up with Viviana today her sister Tiffani was present at the bedside. We discussed medications and barriers to taking the medications which Viviana appears to be picking and choosing what she wants to take and not take.   Reinforced medication compliance to help with symptom management to enable Viviana to feel more comfortable eating.  Viviana stated she ate some grilled cheese sandwich which I reminded her that was 2 days ago and she only took a bite.   Viviana is drinking nessa dry soda on exam. reinforced trying to eat a few bites of each meal throughout the day. Tiffani and family have been bringing food in from home which Viviana is not eating either.   Discussed with Viviana she is starving herself which will lead to increased decline/death. Viviana stated she doesn't want to die nor does she want feeding tube. Viviana agreed with me and her sister Tiffani she will try to take her medications and try to eat few bites each meal today. Tiffani and Viviana are aware that [facility] will not accept her if she is not eating and there is no feeding plan.     11/5/24 Viviana states she took a bite of her grilled cheese yesterday and became nauseated. Viviana states she becomes nauseated when she tries to eat. Discussed she has been refusing her Carafate and other medications. I educated Viviana on what Carafate is and why she is on it. Viviana agreed to taking her Carafate today, discussed will add Zofran 3 times daily before meals. Viviana requested to have her Norco changed to liquid  form to make it easier to swallow. Discussed use of the Lortab as needed for pain. Viviana agreed, we also discussed trying to eat a few bites with each meal to get herself used to eating again. Viviana agreed to this plan today.   Viviana stated she would not want feeding tube.  Viviana wants to defer filling out HCPOA pw again today will reassess tomorrow.     Assessment and Recommendation       Cellulitis of right lower extremity/wound    Hypoglycemia    Left upper extremity swelling    Nausea and vomiting    Episodic mood disorder-Psyche following    Odynophagia/esophageal web-EGD 11/2 esophageal web dilated by GI    Malnutrition-nutritional supplements    Anxiety    Adrenal insufficiency    GERD    HTN    HLD    RA    CAD    NIKOLAI    Lower extremity wounds     Pain  -Lortab liquid. Lortab 7.5 mg every 4 hours as needed for pain  -Tylenol as needed     Decreased appetite/anorexia  -on Remeron  -Psyche following    Nausea with eating  -scheduled Zofran 3 times daily before meals-increased to 8mg today by primary care  -discussed taking a few bites of each meal to get herself used to eating again.   -Viviana does not want feeding tube.      Goals of care counseling  -see above for details  -Pt is Full Code  -Agreeable to palliative care following  -Dispo: [facility] with [facility]. SW to help with dc planning.   -declined  services  -ongoing GOC discussions may be needed over time.  -Viviana is expressing she would not want feeding tube  -Provided emotional support to pt who is coping adequately     Advance care planning  -see above for details  -Pt's Dtr Salvadoreunice Kiko is HC surrogate [phone]  -HCPOA pw is att he bedside for Viviana to review she will let me know when she would like to fill out the PW.      Palliative Performance Scale 40%     Palliative Care Follow Up: Palliative care team will follow up next week.  Feel free to contact our team with any questions or concerns.    Thank you for allowing Palliative Care services to  participate in the care of Sheri Garzon.    A total of 25 minutes were spent on this follow-up, which included all of the following: chart review, direct face to face contact, history taking, physical examination, counseling and coordinating care, and documentation.     Saba Mauricio, [id number]  11/8/2024   10:59AM  Palliative Care Services         [1]   Allergies  Allergen Reactions    Cephalosporins ANAPHYLAXIS, NAUSEA AND VOMITING and OTHER (SEE COMMENTS)     Other reaction(s): Fever    - Tolerated multiple doses of ceftriaxone on 7/2024 without any complications  - Tolerated multiple doses of cefepime 8/2024 without complications    Gadolinium Derivatives FEVER    Penicillins OTHER (SEE COMMENTS)     esophagitis    Sulfa Antibiotics OTHER (SEE COMMENTS)     esophagitis    Bananas ITCHING

## 2024-11-08 NOTE — PLAN OF CARE
Problem: Patient Centered Care  Goal: Patient preferences are identified and integrated in the patient's plan of care  Description: Interventions:  - What would you like us to know as we care for you? \"I fell twice at rehab\"  - Provide timely, complete, and accurate information to patient/family  - Incorporate patient and family knowledge, values, beliefs, and cultural backgrounds into the planning and delivery of care  - Encourage patient/family to participate in care and decision-making at the level they choose  - Honor patient and family perspectives and choices  11/8/2024 1347 by Easton Andrea RN  Outcome: Progressing  11/8/2024 1346 by Easton Andrea RN  Outcome: Progressing     Problem: Patient/Family Goals  Goal: Patient/Family Long Term Goal  Description: Patient's Long Term Goal: Discharge home    Interventions:  - IVF  -Wound care  -IV antibiotics  -PT/OT  - See additional Care Plan goals for specific interventions  11/8/2024 1347 by Easton Andrea RN  Outcome: Progressing  11/8/2024 1346 by Easton Andrea RN  Outcome: Progressing  Goal: Patient/Family Short Term Goal  Description: Patient's Short Term Goal: Regain Strength    Interventions:   - PT/OT  - Dietary consult  - See additional Care Plan goals for specific interventions  11/8/2024 1347 by Easton Andrea RN  Outcome: Progressing  11/8/2024 1346 by Easton Andrea RN  Outcome: Progressing     Problem: PAIN - ADULT  Goal: Verbalizes/displays adequate comfort level or patient's stated pain goal  Description: INTERVENTIONS:  - Encourage pt to monitor pain and request assistance  - Assess pain using appropriate pain scale  - Administer analgesics based on type and severity of pain and evaluate response  - Implement non-pharmacological measures as appropriate and evaluate response  - Consider cultural and social influences on pain and pain management  - Manage/alleviate anxiety  - Utilize distraction and/or relaxation techniques  - Monitor for opioid side  effects  - Notify MD/LIP if interventions unsuccessful or patient reports new pain  - Anticipate increased pain with activity and pre-medicate as appropriate  11/8/2024 1347 by Easton Andrea RN  Outcome: Progressing  11/8/2024 1346 by Easton Andrea RN  Outcome: Progressing     Problem: RISK FOR INFECTION - ADULT  Goal: Absence of fever/infection during anticipated neutropenic period  Description: INTERVENTIONS  - Monitor WBC  - Administer growth factors as ordered  - Implement neutropenic guidelines  11/8/2024 1347 by Easton Adnrea RN  Outcome: Progressing  11/8/2024 1346 by Easton Andrea RN  Outcome: Progressing     Problem: SAFETY ADULT - FALL  Goal: Free from fall injury  Description: INTERVENTIONS:  - Assess pt frequently for physical needs  - Identify cognitive and physical deficits and behaviors that affect risk of falls.  - [place] fall precautions as indicated by assessment.  - Educate pt/family on patient safety including physical limitations  - Instruct pt to call for assistance with activity based on assessment  - Modify environment to reduce risk of injury  - Provide assistive devices as appropriate  - Consider OT/PT consult to assist with strengthening/mobility  - Encourage toileting schedule  11/8/2024 1347 by Easton Andrea RN  Outcome: Progressing  11/8/2024 1346 by Easton Andrea RN  Outcome: Progressing     Problem: GASTROINTESTINAL - ADULT  Goal: Minimal or absence of nausea and vomiting  Description: INTERVENTIONS:  - Maintain adequate hydration with IV or PO as ordered and tolerated  - Nasogastric tube to low intermittent suction as ordered  - Evaluate effectiveness of ordered antiemetic medications  - Provide nonpharmacologic comfort measures as appropriate  - Advance diet as tolerated, if ordered  - Obtain nutritional consult as needed  - Evaluate fluid balance  11/8/2024 1347 by Easton Andrea RN  Outcome: Not Progressing  11/8/2024 1346 by Easton Andrea RN  Outcome: Progressing  Goal: Maintains or  returns to baseline bowel function  Description: INTERVENTIONS:  - Assess bowel function  - Maintain adequate hydration with IV or PO as ordered and tolerated  - Evaluate effectiveness of GI medications  - Encourage mobilization and activity  - Obtain nutritional consult as needed  - Establish a toileting routine/schedule  - Consider collaborating with pharmacy to review patient's medication profile  11/8/2024 1347 by Easton Andrea RN  Outcome: Not Progressing  11/8/2024 1346 by Easton Andrea RN  Outcome: Progressing     Problem: SKIN/TISSUE INTEGRITY - ADULT  Goal: Skin integrity remains intact  Description: INTERVENTIONS  - Assess and document risk factors for pressure ulcer development  - Assess and document skin integrity  - Monitor for areas of redness and/or skin breakdown  - Initiate interventions, skin care algorithm/standards of care as needed  11/8/2024 1347 by Easton Andrea RN  Outcome: Progressing  11/8/2024 1346 by Easton Andrea RN  Outcome: Progressing  Goal: Incision(s), wounds(s) or drain site(s) healing without S/S of infection  Description: INTERVENTIONS:  - Assess and document risk factors for pressure ulcer development  - Assess and document skin integrity  - Assess and document dressing/incision, wound bed, drain sites and surrounding tissue  - Implement wound care per orders  - Initiate isolation precautions as appropriate  - Initiate Pressure Ulcer prevention bundle as indicated  11/8/2024 1347 by Easton Andrea RN  Outcome: Progressing  11/8/2024 1346 by Easton Andrea RN  Outcome: Progressing  Goal: Oral mucous membranes remain intact  Description: INTERVENTIONS  - Assess oral mucosa and hygiene practices  - Implement preventative oral hygiene regimen  - Implement oral medicated treatments as ordered  11/8/2024 1347 by Easton Andrea RN  Outcome: Progressing  11/8/2024 1346 by Easton Andrea RN  Outcome: Progressing     Problem: MUSCULOSKELETAL - ADULT  Goal: Return mobility to safest level of  function  Description: INTERVENTIONS:  - Assess patient stability and activity tolerance for standing, transferring and ambulating w/ or w/o assistive devices  - Assist with transfers and ambulation using safe patient handling equipment as needed  - Ensure adequate protection for wounds/incisions during mobilization  - Obtain PT/OT consults as needed  - Advance activity as appropriate  - Communicate ordered activity level and limitations with patient/family  11/8/2024 1347 by Easton Andrea RN  Outcome: Progressing  11/8/2024 1346 by Easton Andrea RN  Outcome: Progressing  Goal: Maintain proper alignment of affected body part  Description: INTERVENTIONS:  - Support and protect limb and body alignment per provider's orders  - Instruct and reinforce with patient and family use of appropriate assistive device and precautions (e.g. spinal or hip dislocation precautions)  11/8/2024 1347 by Easton Andrea RN  Outcome: Progressing  11/8/2024 1346 by Easton Andrea RN  Outcome: Progressing     Problem: HEMATOLOGIC - ADULT  Goal: Free from bleeding injury  Description: (Example usage: patient with low platelets)  INTERVENTIONS:  - Avoid intramuscular injections, enemas and rectal medication administration  - Ensure safe mobilization of patient  - Hold pressure on venipuncture sites to achieve adequate hemostasis  - Assess for signs and symptoms of internal bleeding  - Monitor lab trends  - Patient is to report abnormal signs of bleeding to staff  - Avoid use of toothpicks and dental floss  - Use electric shaver for shaving  - Use soft bristle tooth brush  - Limit straining and forceful nose blowing  11/8/2024 1347 by Easton Andrea RN  Outcome: Progressing  11/8/2024 1346 by Easton Andrea RN  Outcome: Progressing     Problem: Diabetes/Glucose Control  Goal: Glucose maintained within prescribed range  Description: INTERVENTIONS:  - Monitor Blood Glucose as ordered  - Assess for signs and symptoms of hyperglycemia and hypoglycemia  -  Administer ordered medications to maintain glucose within target range  - Assess barriers to adequate nutritional intake and initiate nutrition consult as needed  - Instruct patient on self management of diabetes  11/8/2024 1347 by Easton Andrea, RN  Outcome: Progressing  11/8/2024 1346 by Easton Andrea, RN  Outcome: Progressing

## 2024-11-09 PROBLEM — R13.10 ODYNOPHAGIA: Status: RESOLVED | Noted: 2024-01-01 | Resolved: 2024-01-01

## 2024-11-09 PROBLEM — R11.2 NAUSEA AND VOMITING: Status: RESOLVED | Noted: 2024-10-28 | Resolved: 2024-11-09

## 2024-11-09 PROBLEM — L03.115 CELLULITIS OF RIGHT LOWER EXTREMITY: Status: RESOLVED | Noted: 2024-01-01 | Resolved: 2024-01-01

## 2024-11-09 PROBLEM — R11.2 NAUSEA AND VOMITING: Status: RESOLVED | Noted: 2024-01-01 | Resolved: 2024-01-01

## 2024-11-09 PROBLEM — R13.10 ODYNOPHAGIA: Status: RESOLVED | Noted: 2024-10-29 | Resolved: 2024-11-09

## 2024-11-09 PROBLEM — R52 PAIN: Status: RESOLVED | Noted: 2024-11-04 | Resolved: 2024-11-09

## 2024-11-09 PROBLEM — L03.115 CELLULITIS OF RIGHT LOWER EXTREMITY: Status: RESOLVED | Noted: 2024-08-12 | Resolved: 2024-11-09

## 2024-11-09 PROBLEM — R52 PAIN: Status: RESOLVED | Noted: 2024-01-01 | Resolved: 2024-01-01

## 2024-11-09 NOTE — CM/SW NOTE
Notified by RN that patient is cleared to DC. Still awaiting ins auth to [employer]. Message sent requesting an update.     Natalie Iniguez, MSW, LSW    [id number]

## 2024-11-09 NOTE — PLAN OF CARE
Problem: Patient Centered Care  Goal: Patient preferences are identified and integrated in the patient's plan of care  Description: Interventions:  - What would you like us to know as we care for you? \"I fell twice at rehab\"  - Provide timely, complete, and accurate information to patient/family  - Incorporate patient and family knowledge, values, beliefs, and cultural backgrounds into the planning and delivery of care  - Encourage patient/family to participate in care and decision-making at the level they choose  - Honor patient and family perspectives and choices  Outcome: Progressing     Problem: Patient/Family Goals  Goal: Patient/Family Long Term Goal  Description: Patient's Long Term Goal: Discharge home    Interventions:  - IVF  -Wound care  -IV antibiotics  -PT/OT  - See additional Care Plan goals for specific interventions  Outcome: Progressing  Goal: Patient/Family Short Term Goal  Description: Patient's Short Term Goal: Regain Strength    Interventions:   - PT/OT  - Dietary consult  - See additional Care Plan goals for specific interventions  Outcome: Progressing     Problem: PAIN - ADULT  Goal: Verbalizes/displays adequate comfort level or patient's stated pain goal  Description: INTERVENTIONS:  - Encourage pt to monitor pain and request assistance  - Assess pain using appropriate pain scale  - Administer analgesics based on type and severity of pain and evaluate response  - Implement non-pharmacological measures as appropriate and evaluate response  - Consider cultural and social influences on pain and pain management  - Manage/alleviate anxiety  - Utilize distraction and/or relaxation techniques  - Monitor for opioid side effects  - Notify MD/LIP if interventions unsuccessful or patient reports new pain  - Anticipate increased pain with activity and pre-medicate as appropriate  Outcome: Progressing     Problem: RISK FOR INFECTION - ADULT  Goal: Absence of fever/infection during anticipated  neutropenic period  Description: INTERVENTIONS  - Monitor WBC  - Administer growth factors as ordered  - Implement neutropenic guidelines  Outcome: Progressing     Problem: SAFETY ADULT - FALL  Goal: Free from fall injury  Description: INTERVENTIONS:  - Assess pt frequently for physical needs  - Identify cognitive and physical deficits and behaviors that affect risk of falls.  - [place] fall precautions as indicated by assessment.  - Educate pt/family on patient safety including physical limitations  - Instruct pt to call for assistance with activity based on assessment  - Modify environment to reduce risk of injury  - Provide assistive devices as appropriate  - Consider OT/PT consult to assist with strengthening/mobility  - Encourage toileting schedule  Outcome: Progressing     Problem: GASTROINTESTINAL - ADULT  Goal: Minimal or absence of nausea and vomiting  Description: INTERVENTIONS:  - Maintain adequate hydration with IV or PO as ordered and tolerated  - Nasogastric tube to low intermittent suction as ordered  - Evaluate effectiveness of ordered antiemetic medications  - Provide nonpharmacologic comfort measures as appropriate  - Advance diet as tolerated, if ordered  - Obtain nutritional consult as needed  - Evaluate fluid balance  Outcome: Progressing  Goal: Maintains or returns to baseline bowel function  Description: INTERVENTIONS:  - Assess bowel function  - Maintain adequate hydration with IV or PO as ordered and tolerated  - Evaluate effectiveness of GI medications  - Encourage mobilization and activity  - Obtain nutritional consult as needed  - Establish a toileting routine/schedule  - Consider collaborating with pharmacy to review patient's medication profile  Outcome: Progressing     Problem: SKIN/TISSUE INTEGRITY - ADULT  Goal: Skin integrity remains intact  Description: INTERVENTIONS  - Assess and document risk factors for pressure ulcer development  - Assess and document skin integrity  -  Monitor for areas of redness and/or skin breakdown  - Initiate interventions, skin care algorithm/standards of care as needed  Outcome: Progressing  Goal: Incision(s), wounds(s) or drain site(s) healing without S/S of infection  Description: INTERVENTIONS:  - Assess and document risk factors for pressure ulcer development  - Assess and document skin integrity  - Assess and document dressing/incision, wound bed, drain sites and surrounding tissue  - Implement wound care per orders  - Initiate isolation precautions as appropriate  - Initiate Pressure Ulcer prevention bundle as indicated  Outcome: Progressing  Goal: Oral mucous membranes remain intact  Description: INTERVENTIONS  - Assess oral mucosa and hygiene practices  - Implement preventative oral hygiene regimen  - Implement oral medicated treatments as ordered  Outcome: Progressing     Problem: MUSCULOSKELETAL - ADULT  Goal: Return mobility to safest level of function  Description: INTERVENTIONS:  - Assess patient stability and activity tolerance for standing, transferring and ambulating w/ or w/o assistive devices  - Assist with transfers and ambulation using safe patient handling equipment as needed  - Ensure adequate protection for wounds/incisions during mobilization  - Obtain PT/OT consults as needed  - Advance activity as appropriate  - Communicate ordered activity level and limitations with patient/family  Outcome: Progressing  Goal: Maintain proper alignment of affected body part  Description: INTERVENTIONS:  - Support and protect limb and body alignment per provider's orders  - Instruct and reinforce with patient and family use of appropriate assistive device and precautions (e.g. spinal or hip dislocation precautions)  Outcome: Progressing     Problem: HEMATOLOGIC - ADULT  Goal: Free from bleeding injury  Description: (Example usage: patient with low platelets)  INTERVENTIONS:  - Avoid intramuscular injections, enemas and rectal medication  administration  - Ensure safe mobilization of patient  - Hold pressure on venipuncture sites to achieve adequate hemostasis  - Assess for signs and symptoms of internal bleeding  - Monitor lab trends  - Patient is to report abnormal signs of bleeding to staff  - Avoid use of toothpicks and dental floss  - Use electric shaver for shaving  - Use soft bristle tooth brush  - Limit straining and forceful nose blowing  Outcome: Progressing     Problem: Diabetes/Glucose Control  Goal: Glucose maintained within prescribed range  Description: INTERVENTIONS:  - Monitor Blood Glucose as ordered  - Assess for signs and symptoms of hyperglycemia and hypoglycemia  - Administer ordered medications to maintain glucose within target range  - Assess barriers to adequate nutritional intake and initiate nutrition consult as needed  - Instruct patient on self management of diabetes  Outcome: Progressing

## 2024-11-09 NOTE — PLAN OF CARE
Patient discharged to [facility]. Report given to Nancy CHUNG. PICC line removed by Mandy CHUNG. Patient left via ambulance with all belongings.   Problem: Patient Centered Care  Goal: Patient preferences are identified and integrated in the patient's plan of care  Description: Interventions:  - What would you like us to know as we care for you? \"I fell twice at rehab\"  - Provide timely, complete, and accurate information to patient/family  - Incorporate patient and family knowledge, values, beliefs, and cultural backgrounds into the planning and delivery of care  - Encourage patient/family to participate in care and decision-making at the level they choose  - Honor patient and family perspectives and choices  Outcome: Progressing     Problem: Patient/Family Goals  Goal: Patient/Family Long Term Goal  Description: Patient's Long Term Goal: Discharge home    Interventions:  - IVF  -Wound care  -IV antibiotics  -PT/OT  - See additional Care Plan goals for specific interventions  Outcome: Progressing  Goal: Patient/Family Short Term Goal  Description: Patient's Short Term Goal: Regain Strength    Interventions:   - PT/OT  - Dietary consult  - See additional Care Plan goals for specific interventions  Outcome: Progressing     Problem: PAIN - ADULT  Goal: Verbalizes/displays adequate comfort level or patient's stated pain goal  Description: INTERVENTIONS:  - Encourage pt to monitor pain and request assistance  - Assess pain using appropriate pain scale  - Administer analgesics based on type and severity of pain and evaluate response  - Implement non-pharmacological measures as appropriate and evaluate response  - Consider cultural and social influences on pain and pain management  - Manage/alleviate anxiety  - Utilize distraction and/or relaxation techniques  - Monitor for opioid side effects  - Notify MD/LIP if interventions unsuccessful or patient reports new pain  - Anticipate increased pain with activity  and pre-medicate as appropriate  Outcome: Progressing     Problem: RISK FOR INFECTION - ADULT  Goal: Absence of fever/infection during anticipated neutropenic period  Description: INTERVENTIONS  - Monitor WBC  - Administer growth factors as ordered  - Implement neutropenic guidelines  Outcome: Progressing     Problem: SAFETY ADULT - FALL  Goal: Free from fall injury  Description: INTERVENTIONS:  - Assess pt frequently for physical needs  - Identify cognitive and physical deficits and behaviors that affect risk of falls.  - [place] fall precautions as indicated by assessment.  - Educate pt/family on patient safety including physical limitations  - Instruct pt to call for assistance with activity based on assessment  - Modify environment to reduce risk of injury  - Provide assistive devices as appropriate  - Consider OT/PT consult to assist with strengthening/mobility  - Encourage toileting schedule  Outcome: Progressing     Problem: GASTROINTESTINAL - ADULT  Goal: Minimal or absence of nausea and vomiting  Description: INTERVENTIONS:  - Maintain adequate hydration with IV or PO as ordered and tolerated  - Nasogastric tube to low intermittent suction as ordered  - Evaluate effectiveness of ordered antiemetic medications  - Provide nonpharmacologic comfort measures as appropriate  - Advance diet as tolerated, if ordered  - Obtain nutritional consult as needed  - Evaluate fluid balance  Outcome: Progressing  Goal: Maintains or returns to baseline bowel function  Description: INTERVENTIONS:  - Assess bowel function  - Maintain adequate hydration with IV or PO as ordered and tolerated  - Evaluate effectiveness of GI medications  - Encourage mobilization and activity  - Obtain nutritional consult as needed  - Establish a toileting routine/schedule  - Consider collaborating with pharmacy to review patient's medication profile  Outcome: Progressing     Problem: SKIN/TISSUE INTEGRITY - ADULT  Goal: Skin integrity remains  intact  Description: INTERVENTIONS  - Assess and document risk factors for pressure ulcer development  - Assess and document skin integrity  - Monitor for areas of redness and/or skin breakdown  - Initiate interventions, skin care algorithm/standards of care as needed  Outcome: Progressing  Goal: Incision(s), wounds(s) or drain site(s) healing without S/S of infection  Description: INTERVENTIONS:  - Assess and document risk factors for pressure ulcer development  - Assess and document skin integrity  - Assess and document dressing/incision, wound bed, drain sites and surrounding tissue  - Implement wound care per orders  - Initiate isolation precautions as appropriate  - Initiate Pressure Ulcer prevention bundle as indicated  Outcome: Progressing  Goal: Oral mucous membranes remain intact  Description: INTERVENTIONS  - Assess oral mucosa and hygiene practices  - Implement preventative oral hygiene regimen  - Implement oral medicated treatments as ordered  Outcome: Progressing     Problem: MUSCULOSKELETAL - ADULT  Goal: Return mobility to safest level of function  Description: INTERVENTIONS:  - Assess patient stability and activity tolerance for standing, transferring and ambulating w/ or w/o assistive devices  - Assist with transfers and ambulation using safe patient handling equipment as needed  - Ensure adequate protection for wounds/incisions during mobilization  - Obtain PT/OT consults as needed  - Advance activity as appropriate  - Communicate ordered activity level and limitations with patient/family  Outcome: Progressing  Goal: Maintain proper alignment of affected body part  Description: INTERVENTIONS:  - Support and protect limb and body alignment per provider's orders  - Instruct and reinforce with patient and family use of appropriate assistive device and precautions (e.g. spinal or hip dislocation precautions)  Outcome: Progressing     Problem: HEMATOLOGIC - ADULT  Goal: Free from bleeding  injury  Description: (Example usage: patient with low platelets)  INTERVENTIONS:  - Avoid intramuscular injections, enemas and rectal medication administration  - Ensure safe mobilization of patient  - Hold pressure on venipuncture sites to achieve adequate hemostasis  - Assess for signs and symptoms of internal bleeding  - Monitor lab trends  - Patient is to report abnormal signs of bleeding to staff  - Avoid use of toothpicks and dental floss  - Use electric shaver for shaving  - Use soft bristle tooth brush  - Limit straining and forceful nose blowing  Outcome: Progressing     Problem: Diabetes/Glucose Control  Goal: Glucose maintained within prescribed range  Description: INTERVENTIONS:  - Monitor Blood Glucose as ordered  - Assess for signs and symptoms of hyperglycemia and hypoglycemia  - Administer ordered medications to maintain glucose within target range  - Assess barriers to adequate nutritional intake and initiate nutrition consult as needed  - Instruct patient on self management of diabetes  Outcome: Progressing

## 2024-11-09 NOTE — DISCHARGE SUMMARY
[facility]  part of [facility]    Discharge Summary    Sheri Garzon Patient Status:  Inpatient    1960 MRN [id number]   Location [facility] 5SW/SE Attending Osmar Chong MD   Hosp Day # 13 PCP TERI MCKENNA MD     Date of Admission: 10/27/2024 Disposition: SNF Subacute Rehab     Date of Discharge: 2024    Admitting Diagnosis: Cellulitis of right lower extremity [L03.115]    Hospital Discharge Diagnoses:   Odynophagia/esophageal web.    Metabolic Acidosis  Right lower extremity ulcer.   Hypoglycemia.   Left upper extremity swelling.    Metabolic acidosis  Anxiety  Asthma  Adrenal insufficiency  GERD  Hypertension  Dyslipidemia  History of coronary artery disease  Rheumatoid arthritis  Sleep apnea    Lace+ Score: 59  59-90 High Risk  29-58 Medium Risk  0-28   Low Risk.    TCM Follow-Up Recommendation:  LACE > 58: High Risk of readmission after discharge from the hospital.      Problem List:   Patient Active Problem List   Diagnosis    Dehydration    Metabolic acidosis    Bilious vomiting with nausea    Difficult intravenous access    Hypokalemia    Hypomagnesemia    Hiatal hernia    Cellulitis    RTA (renal tubular acidosis)    Adrenal insufficiency (Dylan's disease) (HCC)    Current chronic use of systemic steroids    Neutropenia (HCC)    Anemia of infection    Weakness generalized    Leg edema    Adrenal insufficiency (HCC)    Altered mental status, unspecified altered mental status type    Acute psychosis (HCC)    Moderate bipolar I disorder, current or most recent episode depressed, with psychotic features, with mixed features (HCC)    Episodic mood disorder (HCC)    Malnutrition (HCC)    Palliative care encounter    Goals of care, counseling/discussion       Reason for Admission: N/V    Physical Exam:   General appearance: alert, appears stated age and cooperative  Pulmonary:  clear to auscultation bilaterally  Cardiovascular: S1, S2 normal, no murmur,  click, rub or gallop, regular rate and rhythm  Abdominal: soft, non-tender; bowel sounds normal; no masses,  no organomegaly  Extremities: extremities normal, atraumatic, no cyanosis or edema  Psychiatric: calm      History of Present Illness: Sheri Garzon is a 64 year old female with history of anxiety, asthma, GERD, HTN, HLD, MI, RA, sleep apnea who was sent to the ED from SNF for evaluation of intractable nausea and vomiting, right leg wound.  Symptoms started a few weeks ago, patient has had difficulty eating and keeping food down.  Patient notes episodes\" after taking certain medications.    No chest pain, shortness of breath, cough, fever, chills.  No diarrhea.  A few days ago, she had abdominal pain which resolved after treatment of constipation.  She has also had a chronic right lower extremity wound.  Today wound was noted with thick brown-colored drainage, prompting ED visit.     She was recently admitted and treated for cellulitis.  Wound was being managed at the nursing home.  Cultures obtained.  She has been started on vancomycin.     CTAP: Nonspecific gastric wall thickening which may be indicative of underlying gastritis/gastroduodenitis.  If not already performed, endoscopic evaluation may be of benefit.  Otherwise no acute intra-abdominal process.  Labs stable except potassium 3.1, CO2 18, alk phos 534  Vital stable.     She has been admitted for gastritis, unable to tolerate p.o., recurrent cellulitis.       Hospital Course:   Odynophagia/esophageal web.  Abnormal esophagram  -Encouraged oral intake, there may be a functional issue now that has developed - despite prompting she did not eat anything yesterday except for a bite of grilled cheese  -GI consult  Repeat double contrast study to confirmed no more obstruction.    -tolerating  oral medications for now  -EGD 11/2 shows esophageal web which was dilated  -Diet tolerated  -Psychiatry on consult, added Remeron - hopefully will help with  appetite  -Palliative care consulted as well  -Eating a little better but not much     Metabolic Acidosis  -likely due to starvation and RTA  -resume home bicarb     Right lower extremity ulcer.  Possible overlying cellulitis on right foot dorsum possible.  Ulceration on the back of the right lower leg does not appear infected.  White count is normal.  Afebrile.  -abx stopped after vanco and bactrim    -outpt vascular evaluation if fails to heal     Hypoglycemia.  Due to poor oral intake.  Continue hypoglycemic protocol.  Strongly encouraged continued oral intake.  At this point her oral intake is the limiting factor to discharge  -Dietitian     Left upper extremity swelling.  May have some focal edema.  No evidence of DVT associated with PICC line.  PICC line is in proper place and functioning well.  -Manage expectantly     Other problems  Metabolic acidosis  Anxiety  Asthma  Adrenal insufficiency  GERD  Hypertension  Dyslipidemia  History of coronary artery disease  Rheumatoid arthritis  Sleep apnea    Consultations: GI, Psych    Procedures: Double contrast esophagram    Complications: none    Discharge Condition: Good    Discharge Medications:      Discharge Medications        START taking these medications        Instructions Prescription details   HYDROcodone-acetaminophen 7.5-325 MG/15ML Soln  Replaces: HYDROcodone-acetaminophen  MG Tabs      Take 15 mL by mouth every 4 (four) hours as needed.   Stop taking on: November 17, 2024  Quantity: 1 each  Refills: 0     mirtazapine 15 MG Tabs  Commonly known as: Remeron      Take 1 tablet (15 mg total) by mouth nightly.   Quantity: 30 tablet  Refills: 0     ondansetron 8 MG Tbdp  Commonly known as: Zofran-ODT      Take 1 tablet (8 mg total) by mouth TID (Nitrates).   Refills: 0     sucralfate 1 GM/10ML Susp  Commonly known as: Carafate      Take 10 mL (1 g total) by mouth 4 (four) times daily before meals and nightly (2200).   Stop taking on: December 7,  2024  Quantity: 1200 mL  Refills: 0            CHANGE how you take these medications        Instructions Prescription details   predniSONE 10 MG Tabs  Commonly known as: Deltasone  What changed:   how much to take  how to take this  when to take this      Take 20mg (2 tabs) in AM and 10mg (1tab) for 4 days then resume 10mg twice  a day indefinitely   Quantity: 30 tablet  Refills: 0            CONTINUE taking these medications        Instructions Prescription details   Acetaminophen 8 Hour 650 MG Tbcr  Generic drug: Acetaminophen ER      Take 2-3 tablets (1,300-1,950 mg total) by mouth every 8 (eight) hours as needed for Pain.   Refills: 0     albuterol 108 (90 Base) MCG/ACT Aers  Commonly known as: Ventolin HFA      Inhale 2 puffs into the lungs every 4 to 6 hours as needed for Wheezing.   Refills: 0     Aspirin 81 MG Caps      Take 81 mg by mouth daily.   Refills: 0     atorvastatin 10 MG Tabs  Commonly known as: Lipitor      Take 1 tablet (10 mg total) by mouth daily.   Refills: 0     clopidogrel 75 MG Tabs  Commonly known as: Plavix      Take 1 tablet (75 mg total) by mouth daily.   Refills: 0     clotrimazole-betamethasone 1-0.05 % Crea  Commonly known as: Lotrisone      Apply 1 Application topically 2 (two) times daily. Apply to groin and abdominal folds   Refills: 0     collagenase 250 UNIT/GM Oint  Commonly known as: Santyl      Apply topically daily.   Refills: 0     Coppermin 5 MG Tabs  Generic drug: Copper      Take 1 tablet by mouth daily.   Quantity: 30 tablet  Refills: 0     docusate sodium 100 MG Caps  Commonly known as: Colace      Take 1 capsule (100 mg total) by mouth 2 (two) times daily.   Refills: 0     ergocalciferol 1.25 MG (92761 UT) Caps  Commonly known as: Vitamin D2      Take 1 capsule (50,000 Units total) by mouth once a week.   Refills: 0     folic acid 1 MG Tabs  Commonly known as: Folvite      Take 1 tablet (1 mg total) by mouth daily.   Quantity: 90 tablet  Refills: 0      hydroxychloroquine 200 MG Tabs  Commonly known as: Plaquenil      Take 1 tablet (200 mg total) by mouth 2 (two) times daily.   Quantity: 180 tablet  Refills: 0     lamoTRIgine 25 MG Tabs  Commonly known as: LaMICtal      Take 2 tablets (50 mg total) by mouth 2 (two) times daily.   Stop taking on: December 7, 2024  Quantity: 120 tablet  Refills: 0     metoprolol tartrate 25 MG Tabs  Commonly known as: Lopressor      Take 0.5 tablets (12.5 mg total) by mouth 2 (two) times daily. Hold for SBP <100 and HR <60   Stop taking on: December 7, 2024  Quantity: 30 tablet  Refills: 0     ondansetron 4 mg tablet  Commonly known as: Zofran      Take 1 tablet (4 mg total) by mouth every 8 (eight) hours as needed for Nausea.   Refills: 0     oxybutynin ER 10 MG Tb24  Commonly known as: Ditropan-XL      Take 1 tablet (10 mg total) by mouth daily.   Refills: 0     sodium bicarbonate 650 MG Tabs      Take 1 tablet (650 mg total) by mouth 2 (two) times daily.   Stop taking on: December 7, 2024  Quantity: 60 tablet  Refills: 0            STOP taking these medications      Ferrous Gluconate 324 (38 Fe) MG Tabs        HYDROcodone-acetaminophen  MG Tabs  Commonly known as: Norco  Replaced by: HYDROcodone-acetaminophen 7.5-325 MG/15ML Soln        ibuprofen 200 MG Tabs  Commonly known as: Motrin        pantoprazole 40 MG Tbec  Commonly known as: Protonix        Potassium Chloride ER 10 MEQ Tbcr        QUEtiapine 50 MG Tabs  Commonly known as: SEROquel                  Where to Get Your Medications        [id number] [place], [place] - [address] AT [facility], [phone], [phone]  [address] [place] [address]      Phone: [phone]   lamoTRIgine 25 MG Tabs  metoprolol tartrate 25 MG Tabs  sodium bicarbonate 650 MG Tabs       Please  your prescriptions at the location directed by your doctor or nurse    Bring a paper prescription for each  of these medications  HYDROcodone-acetaminophen 7.5-325 MG/15ML Soln  mirtazapine 15 MG Tabs  sucralfate 1 GM/10ML Susp         Follow up Visits: Follow-up with  in 1 week    Follow up Labs:      Other Discharge Instructions:     Osmar Chong MD  11/9/2024  10:18 AM    > 35 min

## 2024-11-09 NOTE — PLAN OF CARE
Problem: Patient Centered Care  Goal: Patient preferences are identified and integrated in the patient's plan of care  Description: Interventions:  - What would you like us to know as we care for you? \"I fell twice at rehab\"  - Provide timely, complete, and accurate information to patient/family  - Incorporate patient and family knowledge, values, beliefs, and cultural backgrounds into the planning and delivery of care  - Encourage patient/family to participate in care and decision-making at the level they choose  - Honor patient and family perspectives and choices  11/9/2024 0149 by Kenya Fraire RN  Outcome: Progressing  11/9/2024 0149 by Kenya Fraire RN  Outcome: Progressing     Problem: Patient/Family Goals  Goal: Patient/Family Long Term Goal  Description: Patient's Long Term Goal: Discharge home    Interventions:  - IVF  -Wound care  -IV antibiotics  -PT/OT  - See additional Care Plan goals for specific interventions  11/9/2024 0149 by Kenya Fraire RN  Outcome: Progressing  11/9/2024 0149 by Kenya Fraire RN  Outcome: Progressing  Goal: Patient/Family Short Term Goal  Description: Patient's Short Term Goal: Regain Strength    Interventions:   - PT/OT  - Dietary consult  - See additional Care Plan goals for specific interventions  11/9/2024 0149 by Kenya Fraire RN  Outcome: Progressing  11/9/2024 0149 by Kenya Fraire RN  Outcome: Progressing     Problem: PAIN - ADULT  Goal: Verbalizes/displays adequate comfort level or patient's stated pain goal  Description: INTERVENTIONS:  - Encourage pt to monitor pain and request assistance  - Assess pain using appropriate pain scale  - Administer analgesics based on type and severity of pain and evaluate response  - Implement non-pharmacological measures as appropriate and evaluate response  - Consider cultural and social influences on pain and pain management  - Manage/alleviate anxiety  - Utilize distraction and/or  relaxation techniques  - Monitor for opioid side effects  - Notify MD/LIP if interventions unsuccessful or patient reports new pain  - Anticipate increased pain with activity and pre-medicate as appropriate  11/9/2024 0149 by Kenya Fraire RN  Outcome: Progressing  11/9/2024 0149 by Kenya Fraire RN  Outcome: Progressing     Problem: RISK FOR INFECTION - ADULT  Goal: Absence of fever/infection during anticipated neutropenic period  Description: INTERVENTIONS  - Monitor WBC  - Administer growth factors as ordered  - Implement neutropenic guidelines  11/9/2024 0149 by Kenya Fraire, RN  Outcome: Progressing  11/9/2024 0149 by Kenya Fraire RN  Outcome: Progressing     Problem: SAFETY ADULT - FALL  Goal: Free from fall injury  Description: INTERVENTIONS:  - Assess pt frequently for physical needs  - Identify cognitive and physical deficits and behaviors that affect risk of falls.  - [place] fall precautions as indicated by assessment.  - Educate pt/family on patient safety including physical limitations  - Instruct pt to call for assistance with activity based on assessment  - Modify environment to reduce risk of injury  - Provide assistive devices as appropriate  - Consider OT/PT consult to assist with strengthening/mobility  - Encourage toileting schedule  11/9/2024 0149 by Kenya Fraire RN  Outcome: Progressing  11/9/2024 0149 by Kenya Fraire RN  Outcome: Progressing     Problem: GASTROINTESTINAL - ADULT  Goal: Minimal or absence of nausea and vomiting  Description: INTERVENTIONS:  - Maintain adequate hydration with IV or PO as ordered and tolerated  - Nasogastric tube to low intermittent suction as ordered  - Evaluate effectiveness of ordered antiemetic medications  - Provide nonpharmacologic comfort measures as appropriate  - Advance diet as tolerated, if ordered  - Obtain nutritional consult as needed  - Evaluate fluid balance  11/9/2024 0149 by Kenya Fraire  GLEN RN  Outcome: Progressing  11/9/2024 0149 by Kenya Fraire RN  Outcome: Progressing  Goal: Maintains or returns to baseline bowel function  Description: INTERVENTIONS:  - Assess bowel function  - Maintain adequate hydration with IV or PO as ordered and tolerated  - Evaluate effectiveness of GI medications  - Encourage mobilization and activity  - Obtain nutritional consult as needed  - Establish a toileting routine/schedule  - Consider collaborating with pharmacy to review patient's medication profile  11/9/2024 0149 by Kenya Fraire RN  Outcome: Progressing  11/9/2024 0149 by Kenya Fraire RN  Outcome: Progressing     Problem: SKIN/TISSUE INTEGRITY - ADULT  Goal: Skin integrity remains intact  Description: INTERVENTIONS  - Assess and document risk factors for pressure ulcer development  - Assess and document skin integrity  - Monitor for areas of redness and/or skin breakdown  - Initiate interventions, skin care algorithm/standards of care as needed  11/9/2024 0149 by Kenya Fraire RN  Outcome: Progressing  11/9/2024 0149 by Kenya Fraire RN  Outcome: Progressing  Goal: Incision(s), wounds(s) or drain site(s) healing without S/S of infection  Description: INTERVENTIONS:  - Assess and document risk factors for pressure ulcer development  - Assess and document skin integrity  - Assess and document dressing/incision, wound bed, drain sites and surrounding tissue  - Implement wound care per orders  - Initiate isolation precautions as appropriate  - Initiate Pressure Ulcer prevention bundle as indicated  11/9/2024 0149 by Kenya Fraire RN  Outcome: Progressing  11/9/2024 0149 by Kenya Fraire RN  Outcome: Progressing  Goal: Oral mucous membranes remain intact  Description: INTERVENTIONS  - Assess oral mucosa and hygiene practices  - Implement preventative oral hygiene regimen  - Implement oral medicated treatments as ordered  11/9/2024 0149 by Kenya Fraire  GLEN, RN  Outcome: Progressing  11/9/2024 0149 by Kenya Fraire RN  Outcome: Progressing     Problem: MUSCULOSKELETAL - ADULT  Goal: Return mobility to safest level of function  Description: INTERVENTIONS:  - Assess patient stability and activity tolerance for standing, transferring and ambulating w/ or w/o assistive devices  - Assist with transfers and ambulation using safe patient handling equipment as needed  - Ensure adequate protection for wounds/incisions during mobilization  - Obtain PT/OT consults as needed  - Advance activity as appropriate  - Communicate ordered activity level and limitations with patient/family  11/9/2024 0149 by Kenya Fraire RN  Outcome: Progressing  11/9/2024 0149 by Kenya Fraire RN  Outcome: Progressing  Goal: Maintain proper alignment of affected body part  Description: INTERVENTIONS:  - Support and protect limb and body alignment per provider's orders  - Instruct and reinforce with patient and family use of appropriate assistive device and precautions (e.g. spinal or hip dislocation precautions)  11/9/2024 0149 by Kenya Fraire RN  Outcome: Progressing  11/9/2024 0149 by Kenya Fraire RN  Outcome: Progressing     Problem: HEMATOLOGIC - ADULT  Goal: Free from bleeding injury  Description: (Example usage: patient with low platelets)  INTERVENTIONS:  - Avoid intramuscular injections, enemas and rectal medication administration  - Ensure safe mobilization of patient  - Hold pressure on venipuncture sites to achieve adequate hemostasis  - Assess for signs and symptoms of internal bleeding  - Monitor lab trends  - Patient is to report abnormal signs of bleeding to staff  - Avoid use of toothpicks and dental floss  - Use electric shaver for shaving  - Use soft bristle tooth brush  - Limit straining and forceful nose blowing  11/9/2024 0149 by Kenya Fraire RN  Outcome: Progressing  11/9/2024 0149 by Kenya Fraire RN  Outcome:  Progressing     Problem: Diabetes/Glucose Control  Goal: Glucose maintained within prescribed range  Description: INTERVENTIONS:  - Monitor Blood Glucose as ordered  - Assess for signs and symptoms of hyperglycemia and hypoglycemia  - Administer ordered medications to maintain glucose within target range  - Assess barriers to adequate nutritional intake and initiate nutrition consult as needed  - Instruct patient on self management of diabetes  11/9/2024 0149 by Kenya Fraire, RN  Outcome: Progressing  11/9/2024 0149 by Kenya Fraire, RN  Outcome: Progressing

## 2024-11-09 NOTE — CM/SW NOTE
Notified by BTE that auth approved, bed avail. Patient cleared to DC. RN to notify patient/family of DC.     [facility].   RN # for report is [phone]-patient will go to room 104.       Natalie Iniguez, MSW, LSW    [id number]

## 2024-11-11 ENCOUNTER — INITIAL APN SNF VISIT (OUTPATIENT)
Dept: INTERNAL MEDICINE CLINIC | Facility: SKILLED NURSING FACILITY | Age: 64
End: 2024-11-11

## 2024-11-11 DIAGNOSIS — E46 MALNUTRITION, UNSPECIFIED TYPE (HCC): ICD-10-CM

## 2024-11-11 DIAGNOSIS — L03.119 CELLULITIS OF LOWER EXTREMITY, UNSPECIFIED LATERALITY: ICD-10-CM

## 2024-11-11 DIAGNOSIS — R53.1 WEAKNESS GENERALIZED: ICD-10-CM

## 2024-11-11 DIAGNOSIS — R60.0 LEG EDEMA: ICD-10-CM

## 2024-11-11 DIAGNOSIS — E87.20 METABOLIC ACIDOSIS: Primary | ICD-10-CM

## 2024-11-11 DIAGNOSIS — R11.14 BILIOUS VOMITING WITH NAUSEA: ICD-10-CM

## 2024-11-11 NOTE — PROGRESS NOTES
Sheri Garzon  : 1960  Age 64 year old  female patient is admitted to [facility] for rehabilitation and strengthening.      [facility] Admission: 10/27/24 - 24    Chief complaint: cellulitis of Right lower extremity, back pain, gastritis    HPI   64 year old female with history of anxiety, asthma, GERD, HTN, HLD, MI, RA, sleep apnea who was sent to the ED from SNF for evaluation of intractable nausea and vomiting, right leg wound. Symptoms started a few weeks ago, patient has had difficulty eating and keeping food down.  Patient notes episodes\" after taking certain medications.   No chest pain, shortness of breath, cough, fever, chills.  No diarrhea.  A few days ago, she had abdominal pain which resolved after treatment of constipation.  She has also had a chronic right lower extremity wound.  Today wound was noted with thick brown-colored drainage, prompting ED visit. She was recently admitted and treated for cellulitis.  Wound was being managed at the nursing home.  Cultures obtained.  She was stabilized and sent to [facility] for wound care and rehab    Patient seen as initial aprn visit, she is resting in bed. Wound care is coming to do her legs. She has some back pain due to her current position. She has no current complaints. Pain to her legs and back. No shortness of breath or chest pain. No nausea or vomiting. She isnt eating much due to the vomiting she had prior but she is trying. VSS    Past Medical History:    Allergic rhinitis    Anxiety    Arthritis    RA and Osteoarthritis    Asthma (HCC)    Depression    Not officially diagnosed    Esophageal reflux    Essential hypertension    High blood pressure    High cholesterol    Hyperlipidemia    Myocardial infarction (HCC)    Cardiac event    Osteoarthritis    Pancreatitis (HCC)    Problems with swallowing    RA (rheumatoid arthritis) (HCC)    Sleep apnea     Past Surgical History:   Procedure Laterality Date    Colonoscopy       Debridement  08/15/2024    Sharp excisional debridement of RLE posterior leg wound    Egd  07/26/2024    Hiatal hernia    Other surgical history      Revision of uterine anomaly      Rotator cuff repair      Wrist fracture surgery       Family History   Problem Relation Age of Onset    Diabetes Mother     Genetic Disease Mother     Heart Disorder Mother     Hypertension Mother     Obesity Mother     Diabetes Father     Hypertension Father     Depression Daughter     Psychiatric Daughter      Social History     Socioeconomic History    Marital status: Single   Tobacco Use    Smoking status: Never    Smokeless tobacco: Never   Vaping Use    Vaping status: Never Used   Substance and Sexual Activity    Alcohol use: Not Currently    Drug use: Never     Social Drivers of Health     Food Insecurity: No Food Insecurity (10/28/2024)    Food Insecurity     Food Insecurity: Never true   Transportation Needs: No Transportation Needs (10/28/2024)    Transportation Needs     Lack of Transportation: No   Housing Stability: Low Risk  (10/28/2024)    Housing Stability     Housing Instability: No       IMMUNIZATIONS  Immunization History   Administered Date(s) Administered    Covid-19 Vaccine Pfizer 30 mcg/0.3 ml 03/27/2021, 04/19/2021, 09/27/2021, 09/25/2024        ALLERGIES:  Allergies[1]    CODE STATUS:  Full Code    ADVANCED CARE PLANNING TEAM: Will need family care plan       CURRENT MEDICATIONS - reviewed and updated on SNF EMAR       SUBJECTIVE    Patient seen as initial aprn visit, she is resting in bed. Wound care is coming to do her legs. She has some back pain due to her current position. She has no current complaints. Pain to her legs and back. No shortness of breath or chest pain. No nausea or vomiting. She isnt eating much due to the vomiting she had prior but she is trying. VSS    PHYSICAL EXAM:  GENERAL HEALTH:well developed, well nourished, in no apparent distress   LINES, TUBES, DRAINS:  none  SKIN: no rashes, no  suspicious lesions, warm, dry  WOUND: see wound assessment,   EYES: PERRLA, EOMI, sclera anicteric, conjunctiva normal; there is no nystagmus, no drainage from eyes  HENT: normocephalic; normal nose, no nasal drainage, mucous membranes pink, moist, pharynx no exudate, no visible cerumen.   NECK:supple, FROM; no JVD, no TMG, no carotid bruits   BREAST: deferred exam   RESPIRATORY:clear to percussion and auscultation  CARDIOVASCULAR: S1, S2 normal, RRR; no S3, no S4  ABDOMEN:  normal active BS+, soft, nondistended; no organomegaly, no masses; no bruits; nontender, no guarding, no rebound tenderness.  :Deferred  LYMPHATIC:no lymphedema  MUSCULOSKELETAL: no acute synovitis upper or lower extremity   EXTREMITIES/VASCULAR:no cyanosis, clubbing or edema  NEUROLOGIC:intact; no sensorimotor deficit and follows commands  PSYCHIATRIC: alert and oriented x 3; affect appropriate     DIAGNOSTICS REVIEWED AT THIS VISIT:  Labs 11/14/24  Pending     SEE PLAN BELOW  Odynophagia/esophageal web.  Abnormal esophagram  - Encouraged oral intake, there may be a functional issue now that has developed - despite prompting she did not eat anything yesterday except for a bite of grilled cheese  - GI consult  Repeat double contrast study to confirmed no more obstruction.    - tolerating  oral medications for now  - EGD 11/2 shows esophageal web which was dilated  - Diet tolerated  - continue prednisone 10mg, to be completed   - continue mirtazapine 15mg daily   - continue Zofran 8mg daily   - continue sucralfate 10ml Q4with meals and HS  - Palliative care consulted as well  - Eating a little better but not much  - Psych to follow in rehab     Metabolic Acidosis   -likely due to starvation and RTA  - continue Sodium Bicarbonate 650mg BID  - monitor      Right lower extremity ulcer.  Possible overlying cellulitis on right foot dorsum possible.  Ulceration on the back of the right lower leg does not appear infected.  - abx stopped after vanco  and bactrim    - continue Norco 7.5 15ML Q4PRN  - continue tylenol 650mg Q4PRN   - monitor      HTN  Hx of MI  - Qshift vitals   - continue Plavix 75mg daily  - continue ASA 81mg daily   - continue Metoprolol  12.5mg BID  - monitor     RA  - continue Hydroxychloroquine 200mg BID  - monitor     Seizures  - continue lamoTRIgine 50mg BID,     HLD  - continue atorvastatin 10mg HS  - monitor     Overactive bladder  - continue oxyBUTYnin 10mg daily  - monitor     Supplement  - continue folic acid daily  - continue Ergocalciferol   - continue Coppermin daily    FOLLOW UP APPOINTMENTS  Future Appointments   Date Time Provider Department Center   12/13/2024 10:30 AM [facility] RESOURCE [facility] HEM ONC EMO   12/13/2024 11:30 AM Mercy Khan MD [facility] HEM ONC EMO        This is a 60 minute visit and greater than 50% of the time was spent counseling the patient and/or coordinating care. Patient is a full code    Note to patient: The 21st Century Cures Act makes medical notes like these available to patients in the interest of transparency. However, this is a medical document intended as peer to peer communication. It is written in medical language and may contain abbreviations or verbiage that are unfamiliar. It may appear blunt or direct. Medical documents are intended to carry relevant information, facts as evident, and the clinical opinion of the practitioner who signs the document.     Meredith Brito, APRN  11/11/24         [1]   Allergies  Allergen Reactions    Cephalosporins ANAPHYLAXIS, NAUSEA AND VOMITING and OTHER (SEE COMMENTS)     Other reaction(s): Fever    - Tolerated multiple doses of ceftriaxone on 7/2024 without any complications  - Tolerated multiple doses of cefepime 8/2024 without complications    Gadolinium Derivatives FEVER    Penicillins OTHER (SEE COMMENTS)     esophagitis    Sulfa Antibiotics OTHER (SEE COMMENTS)     esophagitis    Bananas ITCHING

## 2024-11-12 PROBLEM — R73.9 HYPERGLYCEMIA: Status: ACTIVE | Noted: 2024-01-01

## 2024-11-12 PROBLEM — E87.3 RESPIRATORY ALKALOSIS: Status: ACTIVE | Noted: 2024-01-01

## 2024-11-12 PROBLEM — D69.6 THROMBOCYTOPENIA (HCC): Status: ACTIVE | Noted: 2024-01-01

## 2024-11-12 PROBLEM — E16.2 HYPOGLYCEMIA: Status: ACTIVE | Noted: 2024-01-01

## 2024-11-12 PROBLEM — N17.9 ACUTE KIDNEY INJURY (HCC): Status: ACTIVE | Noted: 2024-01-01

## 2024-11-12 NOTE — ED INITIAL ASSESSMENT (HPI)
Pt via EMS from [facility] for hypoglycemia. Per EMS, sudden onset of AMS, glucose reading low on monitor. Pt given glucagon by EMS. Per family pt normally a/o x4, no hx diabetes.

## 2024-11-13 PROBLEM — A41.9 SEPTIC SHOCK (HCC): Status: ACTIVE | Noted: 2024-01-01

## 2024-11-13 PROBLEM — Z71.89 ADVANCE CARE PLANNING: Status: ACTIVE | Noted: 2024-01-01

## 2024-11-13 PROBLEM — R65.21 SEPTIC SHOCK (HCC): Status: ACTIVE | Noted: 2024-01-01

## 2024-11-13 NOTE — PROGRESS NOTES
Called by RN to evaluate patient room 206    On my arrival patient in bed intubated, earlier admitted with hypoglycemia and hypothermia.  Later in the ER became hypotensive as well and started on pressors.  She was found to be extremely acidotic as well.    On exam  Temperature 96.7  Pulse 130  BP 54/39  Pulse ox currently unrecordable  Appears awake  Chest clear  Heart tachycardic  Abdomen soft  Extremities 2+ pitting edema  No focal logical deficit    Assessment and plan    Septic shock  Appears UTI related, patient received sepsis bolus in ER, cultures pending.  Will start patient on meropenem 500 mg IV piggyback every 8 hours.    Severe hypoglycemia  Likely sepsis related, currently receiving dextrose infusion, continue to monitor with every hour Accu-Cheks.    Possible adrenal insufficiency  Patient started on Solu-Cortef, endocrine on board    Transaminitis  Monitor trend, likely shock liver developing    35 minutes of critical care time spent with patient occluding coordinating care with ancillary staff.      [facility]  part of [facility]      Sepsis Reassessment Note    BP (!) 54/39   Pulse (!) 129   Temp 96.7 °F (35.9 °C) (Temporal)   Resp (!) 28   Wt 164 lb 0.4 oz (74.4 kg)   SpO2 100%   BMI 30.00 kg/m²      I completed the sepsis reassessment at 0200    Cardiac:  Regularity: Regular  Rate: Tachycardic  Heart Sounds: S1,S2    Lungs:   Right: Clear  Left: Clear    Peripheral Pulses:  Radial: Right 1+ or Left 1+      Capillary Refill:  <3 Secs    Skin:  Temp/Moisture: Warm and Dry  Color: Normal      RONNELL SUAREZ MD  11/13/2024  2:11 AM

## 2024-11-13 NOTE — CONSULTS
[facility]  part of [facility]    Report of Consultation    Sheri Garzon Patient Status:  Inpatient    1960 MRN [id number]   Location [facility] 2W/SW Attending John Hinds MD   Hosp Day # 0 PCP TERI MCKENNA MD     Date of Admission:  2024  Date of Consult:  2024   Reason for Consultation:   AMRIT, acidosis    History of Present Illness:   Patient is a 64 year old female who was admitted to the hospital for Hypoglycemia:      Patient has a history of rheumatoid arthritis and has previously been seen by kidney specialist in her life for ongoing acidosis.  She is thought to have a type II RTA, but this has not been clearly proven.    In addition she is thought to have iatrogenic adrenal insufficiency.    Patient was at rehab and was unable to get her steroids there.  Subsequently she came to the hospital from the nursing facility where she had confusion and hypoglycemia.  She was also severely hypotensive and appeared septic from a urinary source.    She is currently intubated and maxed out on 3 pressors.  Lactic acid is 9.    Her daughter is at the bedside.  Urine output is negligible  Past Medical History  Past Medical History:    Allergic rhinitis    Anxiety    Arthritis    RA and Osteoarthritis    Asthma (HCC)    Depression    Not officially diagnosed    Esophageal reflux    Essential hypertension    High blood pressure    High cholesterol    Hyperlipidemia    Myocardial infarction (HCC)    Cardiac event    Osteoarthritis    Pancreatitis (HCC)    Problems with swallowing    RA (rheumatoid arthritis) (HCC)    Sleep apnea       Past Surgical History  Past Surgical History:   Procedure Laterality Date    Colonoscopy      Debridement  08/15/2024    Sharp excisional debridement of RLE posterior leg wound    Egd  2024    Hiatal hernia    Other surgical history      Revision of uterine anomaly      Rotator cuff repair      Wrist fracture surgery         Family  History  Family History   Problem Relation Age of Onset    Diabetes Mother     Genetic Disease Mother     Heart Disorder Mother     Hypertension Mother     Obesity Mother     Diabetes Father     Hypertension Father     Depression Daughter     Psychiatric Daughter        Social History  Social History     Socioeconomic History    Marital status: Single   Tobacco Use    Smoking status: Never    Smokeless tobacco: Never   Vaping Use    Vaping status: Never Used   Substance and Sexual Activity    Alcohol use: Not Currently    Drug use: Never     Social Drivers of Health     Food Insecurity: No Food Insecurity (10/28/2024)    Food Insecurity     Food Insecurity: Never true   Transportation Needs: No Transportation Needs (10/28/2024)    Transportation Needs     Lack of Transportation: No   Housing Stability: Low Risk  (10/28/2024)    Housing Stability     Housing Instability: No       Current Medications:  Current Facility-Administered Medications   Medication Dose Route Frequency    ondansetron (Zofran) 4 MG/2ML injection 4 mg  4 mg Intravenous Q6H PRN    prochlorperazine (Compazine) 10 MG/2ML injection 5 mg  5 mg Intravenous Q8H PRN    hydrocortisone Na succinate PF (Solu-CORTEF) injection 100 mg  100 mg Intravenous Q8H    vasopressin (Vasostrict) 20 Units in sodium chloride 0.9% 100 mL infusion for septic shock  0.01-0.03 Units/min Intravenous Continuous    sodium bicarbonate 8.4 % injection        propofol (Diprivan) 10 MG/ML injection        albumin human (Albumin) 25% injection        potassium chloride 40 mEq/100mL IVPB premix (central line) 40 mEq  40 mEq Intravenous Once    propofol (Diprivan) 10 mg/mL infusion premix  5-50 mcg/kg/min Intravenous Continuous    phenylephrine (Davion-Synephrine) 50 mg in sodium chloride 0.9% 250 mL infusion   mcg/min Intravenous Continuous    dexmedeTOMIDine in sodium chloride 0.9% (Precedex) 400 mcg/100mL infusion premix  0.2-1.5 mcg/kg/hr Intravenous Continuous    morphINE PF  4 MG/ML injection        meropenem (Merrem) 1 g in sodium chloride 0.9% 100 mL IVPB-MBP  1 g Intravenous 2 times per day    sodium chloride 0.9% infusion   Intravenous Once    pantoprazole (Protonix) 40 mg in sodium chloride 0.9% PF 10 mL IV push  40 mg Intravenous Daily    sodium bicarbonate 150 mEq in dextrose 5% 1,000 mL infusion  150 mEq Intravenous Continuous    glucose (Dex4) 15 GM/59ML oral liquid 15 g  15 g Oral Q15 Min PRN    Or    glucose (Glutose) 40% oral gel 15 g  15 g Oral Q15 Min PRN    Or    glucose-vitamin C (Dex-4) chewable tab 4 tablet  4 tablet Oral Q15 Min PRN    Or    dextrose 50% injection 50 mL  50 mL Intravenous Q15 Min PRN    Or    glucose (Dex4) 15 GM/59ML oral liquid 30 g  30 g Oral Q15 Min PRN    Or    glucose (Glutose) 40% oral gel 30 g  30 g Oral Q15 Min PRN    Or    glucose-vitamin C (Dex-4) chewable tab 8 tablet  8 tablet Oral Q15 Min PRN    insulin aspart (NovoLOG) 100 Units/mL FlexPen 1-5 Units  1-5 Units Subcutaneous TID CC    fentaNYL (Sublimaze) 50 mcg/mL injection 25 mcg  25 mcg Intravenous Q1H PRN    sodium chloride 0.9 % IV bolus 1,000 mL  1,000 mL Intravenous Once    norepinephrine (Levophed) 4 mg/250mL infusion premix  0.5-30 mcg/min Intravenous Continuous     Medications Prior to Admission   Medication Sig    ondansetron 8 MG Oral Tablet Dispersible Take 1 tablet (8 mg total) by mouth TID (Nitrates).    sodium bicarbonate 650 MG Oral Tab Take 1 tablet (650 mg total) by mouth 2 (two) times daily.    HYDROcodone-acetaminophen 7.5-325 MG/15ML Oral Solution Take 15 mL by mouth every 4 (four) hours as needed.    lamoTRIgine 25 MG Oral Tab Take 2 tablets (50 mg total) by mouth 2 (two) times daily.    metoprolol tartrate 25 MG Oral Tab Take 0.5 tablets (12.5 mg total) by mouth 2 (two) times daily. Hold for SBP <100 and HR <60    mirtazapine 15 MG Oral Tab Take 1 tablet (15 mg total) by mouth nightly.    sucralfate 1 GM/10ML Oral Suspension Take 10 mL (1 g total) by mouth 4 (four)  times daily before meals and nightly (2200).    docusate sodium 100 MG Oral Cap Take 1 capsule (100 mg total) by mouth 2 (two) times daily.    ondansetron (ZOFRAN) 4 mg tablet Take 1 tablet (4 mg total) by mouth every 8 (eight) hours as needed for Nausea.    collagenase 250 UNIT/GM External Ointment Apply topically daily.    predniSONE 10 MG Oral Tab Take 20mg (2 tabs) in AM and 10mg (1tab) for 4 days then resume 10mg twice  a day indefinitely (Patient taking differently: Take 1 tablet (10 mg total) by mouth 2 (two) times daily. Take 20mg (2 tabs) in AM and 10mg (1tab) for 4 days then resume 10mg twice  a day indefinitely)    clotrimazole-betamethasone 1-0.05 % External Cream Apply 1 Application topically 2 (two) times daily. Apply to groin and abdominal folds    Copper (COPPERMIN) 5 MG Oral Tab Take 1 tablet by mouth daily.    Acetaminophen ER (ACETAMINOPHEN 8 HOUR) 650 MG Oral Tab CR Take 2-3 tablets (1,300-1,950 mg total) by mouth every 8 (eight) hours as needed for Pain.    albuterol 108 (90 Base) MCG/ACT Inhalation Aero Soln Inhale 2 puffs into the lungs every 4 to 6 hours as needed for Wheezing.    hydroxychloroquine 200 MG Oral Tab Take 1 tablet (200 mg total) by mouth 2 (two) times daily.    Aspirin 81 MG Oral Cap Take 81 mg by mouth daily.    atorvastatin 10 MG Oral Tab Take 1 tablet (10 mg total) by mouth daily.    clopidogrel 75 MG Oral Tab Take 1 tablet (75 mg total) by mouth daily.    ergocalciferol 1.25 MG (33506 UT) Oral Cap Take 1 capsule (50,000 Units total) by mouth once a week.    oxybutynin ER 10 MG Oral Tablet 24 Hr Take 1 tablet (10 mg total) by mouth daily.    folic acid 1 MG Oral Tab Take 1 tablet (1 mg total) by mouth daily.       Allergies  Allergies[1]    Review of Systems:     General: weak       A comprehensive 12 point review of systems was completed.  Pertinent positives as above and all the rest were negative.     Physical Exam:   BP (!) 73/28 (BP Location: Right arm)   Pulse 112    Temp 96.7 °F (35.9 °C) (Temporal)   Resp (!) 30   Wt 164 lb 0.4 oz (74.4 kg)   SpO2 (!) 81%   BMI 30.00 kg/m²      Intake/Output Summary (Last 24 hours) at 11/13/2024 0954  Last data filed at 11/13/2024 0728  Gross per 24 hour   Intake 3839.6 ml   Output 450 ml   Net 3389.6 ml     Wt Readings from Last 1 Encounters:   11/12/24 164 lb 0.4 oz (74.4 kg)       Exam  Gen: No acute distress  Heent: NC AT, mucous memb clear, neck supple  Pulm: Lungs clear, normal respiratory effort  CV: Heart with regular rate and rhythm, no edema  Abd: Abdomen soft, nontender, nondistended, no organomegaly, bowel sounds present  Skin: no symptoms reported  Psych: unable to assess        Results:     Laboratory Data:  Recent Labs   Lab 11/07/24  0607 11/08/24  0520 11/12/24  1902 11/13/24  0550   RBC 3.34* 3.06* 3.24* 2.28*   HGB 9.4* 8.7* 9.0* 6.6*   HCT 29.2* 26.8* 26.7* 20.4*   MCV 87.4 87.6 82.4 89.5   MCH 28.1 28.4 27.8 28.9   MCHC 32.2 32.5 33.7 32.4   RDW 16.4* 16.0* 17.0* 17.5*   NEPRELIM 9.15*  --  7.19 9.84*   WBC 11.0 13.8* 8.6 11.8*   .0 136.0* 121.0* 77.0*         Recent Labs   Lab 11/12/24  1902 11/13/24  0129 11/13/24  0550   * 287* 416*   BUN 18 14 17   CREATSERUM 1.14* 0.95 1.21*   CA 7.9* 5.7* 6.4*    143 138   K 3.7 2.8* 3.6   * 121* 112   CO2 12.0* <10.0* <10.0*        Imaging:  CT CHEST+ABDOMEN+PELVIS(ALL CNTRST ONLY)(CPT=71260/84055)    Result Date: 11/13/2024  CONCLUSION:   1. Lingular pneumonia.  Dense atelectasis in the bilateral lower lobes with suspected superimposed pneumonia.  Endotracheal tube terminates 2.4 cm above the joselyn.  2. No acute abnormality in the abdomen or pelvis.  3. Small amount of air in the urinary bladder lumen likely relates to recent instrumentation.  4. Additional chronic or incidental findings are described in the body of this report.      Preliminary report was given by [facility].  There are no clinically significant discrepancies.    [facility]     Dictated by (CST): Ti Zhou MD on 11/13/2024 at 7:52 AM     Finalized by (CST): Ti Zhou MD on 11/13/2024 at 8:04 AM          CT BRAIN OR HEAD (CPT=70450)    Result Date: 11/13/2024  CONCLUSION:   No acute intracranial abnormality.      Preliminary report was given by [facility].  There are no clinically significant discrepancies.    [facility]    Dictated by (CST): Ti Zhou MD on 11/13/2024 at 7:50 AM     Finalized by (CST): Ti Zhou MD on 11/13/2024 at 7:52 AM          XR CHEST AP PORTABLE  (CPT=71045)    Result Date: 11/13/2024  CONCLUSION:   The endotracheal tube terminates at the joselyn.  Recommend 2-3 cm of retraction.  Retrocardiac opacity, probably atelectasis, but consolidation is not excluded.      Preliminary report was given by [facility].  There are no clinically significant discrepancies.    [facility]    Dictated by (CST): Ti Zhou MD on 11/13/2024 at 7:26 AM     Finalized by (CST): Ti Zhou MD on 11/13/2024 at 7:29 AM          XR CHEST AP PORTABLE  (CPT=71045)    Result Date: 11/12/2024  CONCLUSION:   Mild linear appearing opacity left lung base which may reflect atelectasis with or without superimposed pneumonia.  Consider short-term follow-up chest radiographs.    Dictated by (CST): Theodore Armando MD on 11/12/2024 at 8:56 PM     Finalized by (CST): Theodore Armando MD on 11/12/2024 at 8:59 PM                   Impression/Receommendations:   1 - AMRIT/acidosis  Patient is currently on 3 pressors.  I explained to her daughter that she is not a dialysis candidate.  They understand and they understand that her death is imminent.    Will continue treatment with IV bicarbonate.    2 -septic shock/hypotension  The patient is on 3 pressors as well as stress dose steroids.  She is also on meropenem and vancomycin for presumed urinary tract infection    3 -adrenal insufficiency  Endocrine is seeing the patient.  She is on stress dose steroids    Thank you for  allowing me to participate in the care of your patient.    BRODY LAKHANI MD  11/13/2024        [1]   Allergies  Allergen Reactions    Cephalosporins ANAPHYLAXIS, NAUSEA AND VOMITING and OTHER (SEE COMMENTS)     Other reaction(s): Fever    - Tolerated multiple doses of ceftriaxone on 7/2024 without any complications  - Tolerated multiple doses of cefepime 8/2024 without complications    Gadolinium Derivatives FEVER    Penicillins OTHER (SEE COMMENTS)     esophagitis    Sulfa Antibiotics OTHER (SEE COMMENTS)     esophagitis    Bananas ITCHING

## 2024-11-13 NOTE — H&P
[facility]    PATIENT'S NAME: CYNDI MORROW   ATTENDING PHYSICIAN: Kurt Escalera MD   PATIENT ACCOUNT#:   [id number]    LOCATION:  [address]  MEDICAL RECORD #:   [id number]       YOB: 1960  ADMISSION DATE:       11/12/2024    HISTORY AND PHYSICAL EXAMINATION    (Addendum added 11/13/2024)    CHIEF COMPLAINT:  Profound hypoglycemia and altered mental status.    HISTORY OF PRESENT ILLNESS:  Patient is a 64-year-old  female who resides in [facility].  Sent today for evaluation of increased encephalopathy and low blood glucose.  She was given glucagon at the nursing facility and sent to the emergency department for evaluation.  Accu-Chek was 13.  She was given IV Solu-Cortef via peripheral line access and given D50 twice with persistent hypoglycemia.  Started on D10 drip.  CBC, chemistry, TSH, liver function tests, lactic acid, blood cultures, and troponin were obtained.  Patient will be admitted to progressive care unit.      PAST MEDICAL HISTORY:  Reviewing the records, patient has a history of obesity; hypertension; renal tubular acidosis type 1, proximal; rheumatoid arthritis with iatrogenic adrenal insufficiency; anemia of iron deficiency; obstructive sleep apnea; hyperlipidemia; anxiety; depression; left lower extremity cellulitis with MRSA with chronic stasis ulcer on the distal posterior right leg; nonobstructive coronary artery disease; and peripheral arterial disease, mild to moderate.  She has severe musculoskeletal deconditioning, generalized osteoarthritis and osteoporosis with evidence of vertebral compression fractures on imaging studies.     PAST SURGICAL HISTORY:  Right shoulder rotator cuff repair, wrist fracture, open reduction and internal fixation of right distal calf, wound debridement.    MEDICATIONS:  Please see medication reconciliation list.     ALLERGIES:  Records indicate gadolinium, cephalosporin, penicillin, and  sulfa.    FAMILY HISTORY:  Positive for diabetes mellitus type 2 and heart disease.    SOCIAL HISTORY:  No tobacco, alcohol, or drug use.  Resides in a nursing facility.  Almost bedbound.  Requires assistance in her basic activities of daily living.     REVIEW OF SYSTEMS:  Difficult to obtain from the patient.  Apparently, she had encephalopathy noted by nursing staff today, but it is not clear if the patient has been taking any of her medications recently.  Other 12-point review of systems is unobtainable from the patient.  She is very obtunded.      PHYSICAL EXAMINATION:    GENERAL:  Obtunded.  Does not appear to be in distress.  VITAL SIGNS:  Temperature 97.2; pulse 138, sinus tachycardia on monitor; respiratory rate 30; blood pressure 140/100; pulse ox 98% on room air.  HEENT:  Atraumatic.  Dry mucous membranes.    NECK:  Supple.    LUNGS:  Clear to auscultation bilaterally.  Normal respiratory effort.    HEART:  Regular rate and rhythm.  S1 and S2 auscultated.  Tachycardic.   ABDOMEN:  Soft, nondistended.  No tenderness.  Positive bowel sounds.   EXTREMITIES:  Nonpitting edema, both legs.  Posterior right distal leg with chronic stasis wound with granulation tissue.   NEUROLOGIC:  Unable to assess.    ASSESSMENT:    1.   Profound hypoglycemia with possible component of adrenal insufficiency.  Rule out infectious or septic component.    2.   Essential hypertension.  3.   Renal tubular acidosis with chronic metabolic acidosis and hypokalemia.      PLAN:  Patient will be admitted to progressive care unit.  IV D10 drip.  N.p.o.  Aspiration precautions.  Obtain endocrinology consult, and pending kidney function, possible nephrology consult.  Blood cultures were obtained in the emergency room.  Lactic acid still pending.  Further recommendations to follow.     ADDENDUM (Job [id number]):    Lactic acid came back at 6.0.  CBC stable numbers.  No leukocytosis or left shift.  Chemistry showed GFR of 54 which is below her  baseline.  Calculated osmolality 300, bicarbonate 12, chloride 117.  ALT, AST slightly elevated at 85 and 180, alkaline phosphatase 373.  Troponin was 90 of unclear significance.  Will continue aggressive IV fluid management.  Obtain nephrology consult.    *START*  Dictated By Christianne Davis MD  d: 11/12/2024 19:00:21  t: 11/12/2024 19:16:22  Job [id number]  FB/

## 2024-11-13 NOTE — ED QUICK NOTES
Orders for admission, patient is aware of plan and ready to go upstairs. Any questions, please call ED RN Fior at extension [phone].     Patient Covid vaccination status: Fully vaccinated     COVID Test Ordered in ED: None    COVID Suspicion at Admission: N/A    Running Infusions:    sodium chloride 2,232 mL (11/12/24 1919)    sodium bicarbonate in 0.45% NS infusion 42 mL/hr at 11/12/24 2054        Mental Status/LOC at time of transport: A&Ox 1-2 currently.  Her normal is A&Ox4.  Family @ bedside  Extremely hard stick  Blood sugar checks are only achievable from left ear lobe with a 22g IV. (Fold ear lobe and stick)    Other pertinent information:   CIWA score: N/A   NIH score:  N/A

## 2024-11-13 NOTE — SPIRITUAL CARE NOTE
Spiritual Care Visit Note    Patient Name: Sheri Garzon Date of Spiritual Care Visit: 24   : 1960 Primary Dx: Hypoglycemia       Referred By: Referral From: Nurse    Spiritual Care Taxonomy:    Intended Effects: Demonstrate caring and concern    Methods: Assist with finding purpose;Collaborate with care team member;Encourage end of life review;Encourage self care;Encourage self reflection;Encourage sharing of feelings;Encourage someone to recognize their strengths;Explore gavin and values;Explore nature of God;Explore presence of God;Explore quality of life;Exploring hope;Offer emotional support;Offer spiritual/Confucianist support    Interventions: Acknowledge current situation;Acknowledge response to difficult experience;Active listening;Ask guided questions;Ask guided questions about gavin;Ask guided questions about cultural and Confucianist values;Ask questions to bring forth feelings;[place] for care team member(s);Discuss concerns;Discuss coping mechanism with someone;Discuss spirituality/Tenriism with someone;Explain  role;Facilitate communication between patient and/or family member and care team;Perform a blessing;[facility];Provide Grief Processing Session;Provide grief resources;Share words of hope and inspiration;Silent prayer    Visit Type/Summary:     - Spiritual Care: Responded to a request via the on call phone Consulted with RN prior to visit. Offered empathic listening and emotional support. Provided resources related to grief and grieving. Provided information regarding grief support groups. Patient and family expressed appreciation for  visit. Provided information regarding how to contact Spiritual Care and left a Spiritual Care information card. Provided support for Patient's spiritual/Confucianist requests. Offered a blessing. Offered prayer. Provided a reading from a sacred text.  remains available for follow up.     Aria Anne    Spiritual Care  support can be requested via an [facility] consult. For urgent/immediate needs, please contact the On Call  at: [place]: ext [phone]

## 2024-11-13 NOTE — PLAN OF CARE
Restraints discontinued.     Problem: Safety Risk - Non-Violent Restraints  Goal: Patient will remain free from self-harm  Description: INTERVENTIONS:  - Apply the least restrictive restraint to prevent harm  - Notify patient and family of reasons restraints applied  - Assess for any contributing factors to confusion (electrolyte disturbances, delirium, medications)  - Discontinue any unnecessary medical devices as soon as possible  - Assess the patient's physical comfort, circulation, skin condition, hydration, nutrition and elimination needs   - Reorient and redirection as needed  - Assess for the need to continue restraints  Outcome: Completed

## 2024-11-13 NOTE — RESPIRATORY THERAPY NOTE
[facility] was called for ABG. ABG attempted but unsuccessful, Pt extremities are swollen, unable to feel the pulse. Later [facility] was called to run the ABG, ran the ABG.      Latest Reference Range & Units 11/12/24 21:56   ABG PH 7.35 - 7.45  7.02 (LL)   ABG PCO2 35 - 45 mm Hg 23 (L)   ABG PO2 80 - 100 mm Hg 39 (LL)   ABG HCO3 21.0 - 27.0 mEq/L 6.0 (L)   ABG O2 SATURATION 94.0 - 100.0 % 56.0 (LL)   Blood Gas Base Excess -2.0 - 2.0 mmol/L -23.3 (L)   TOTAL HEMOGLOBIN 12.0 - 16.0 g/dL 8.8 (L)   METHEMOGLOBIN 0.4 - 1.5 % SAT 1.0   POTASSIUM BLOOD GAS 3.6 - 5.1 mmol/L 3.9   SODIUM BLOOD  - 145 mmol/L 135   Lactic Acid (Blood Gas) 0.5 - 2.0 mmol/L 9.6 (HH)   Oxygen Delivery Device  VAPOTHERM   Oxygen Content 15.0 - 23.0 Vol% 6.9 (L)   CARBOXYHEMOGLOBIN 0.0 - 3.0 % 1.5   FiO2  50.00   IONIZED CALCIUM 0.95 - 1.32 mmol/L 1.15   L/M L/min 40.00   (LL): Data is critically low  (HH): Data is critically high  (L): Data is abnormally low    Assisted with intubation. Pt got intubated at 2217 on 11/12/2024 with 7.0 ETT secured at 25 cm at the lip. ERMD asked to pull out the ETT by 2CM. Pulled the ETT out and secured at 23 at the lip.     Mechanical Ventilator settings :   11/12/24 2217   Vent Information   Vent Mode VC/AC   Settings   FiO2 (%) 100 %   Resp Rate (Set) 16   Vt (Set, mL) 500 mL   Waveform Decelerating ramp   PEEP/CPAP (cm H2O) 5 cm H20       Transported the Pt to CT and back to her room in ER without any incident.    Transported the Pt to CCU without any incident.    Report was given the the RCP covering the area, RCP to continue monitor.

## 2024-11-13 NOTE — RESPIRATORY THERAPY NOTE
Patient received from ER intubated, bilateral bs, suction provided as needed, no changes made, RT will continue to monitor.           11/13/24 0338   Vent Information   Interface Invasive   Vent Type AV   Vent plugged into main power? Yes   Vent Mode VC/AC   Settings   FiO2 (%) 100 %   Resp Rate (Set) 16   Vt (Set, mL) 500 mL   Waveform Decelerating ramp   PEEP/CPAP (cm H2O) 5 cm H20   Peak Flow LPM 60   Trigger Sensitivity Flow (L/min) 1 L/min   Humidification Heat and moisture exchanger   Readings   Total RR 25   Minute Ventilation (L/min) 11.5 L/min   Expiratory Tidal Volume 430 mL   PIP Observed (cm H2O) 25 cm H2O   MAP (cm H2O) 17   I/E Ratio 1:2.8   Plateau Pressure (cm H2O) 27 cm H2O   Static Compliance (L/cm H2O) 20   Dynamic Compliance (L/cm H2O) 17 L/cm H2O

## 2024-11-13 NOTE — ED PROVIDER NOTES
Patient Seen in: [facility] Emergency Department      History     Chief Complaint   Patient presents with    Hypoglycemia     Stated Complaint: hypoglycemic glucagon given    Subjective:   HPI      Patient presents emergency department with altered mental state and hypoglycemia.  Patient is unable to provide any adequate history.  Paramedics were called to the [facility] nursing [facility] where she has had for confusion and low blood sugar.  Upon arrival they found her blood sugar to be 13.  They were unable to establish an IV and transported her here.  She was given glucagon immediately as well as D50 which did not produce an adequate response and so she was given a second dose.  No further history is available and the patient is altered and unable to provide history.    Objective:     No pertinent past medical history.            No pertinent past surgical history.              No pertinent social history.                Physical Exam     ED Triage Vitals   BP 11/12/24 1808 (!) 144/114   Pulse 11/12/24 1807 (!) 138   Resp 11/12/24 1807 (!) 30   Temp 11/12/24 1807 97.2 °F (36.2 °C)   Temp src 11/12/24 1936 Rectal   SpO2 11/12/24 2325 100 %   O2 Device 11/12/24 2010 High flow/High humidity       Current Vitals:   Vital Signs  BP: 97/62  Pulse: (!) 46  Resp: 19  Temp: 99 °F (37.2 °C)  Temp src: Rectal  MAP (mmHg): 73    Oxygen Therapy  SpO2: 90 %  O2 Device: Ventilator  ETCO2 (mmHg): 8 mmHg  FiO2 (%): 100 %  O2 Flow Rate (L/min): 40 L/min        Physical Exam  Vitals and nursing note reviewed.   Constitutional:       Appearance: She is well-developed. She is ill-appearing.      Comments: Somnolent but arousable   HENT:      Head: Normocephalic.      Nose: Nose normal.      Mouth/Throat:      Mouth: Mucous membranes are moist.   Eyes:      Extraocular Movements: Extraocular movements intact.      Conjunctiva/sclera: Conjunctivae normal.      Pupils: Pupils are equal, round, and reactive to light.    Cardiovascular:      Rate and Rhythm: Regular rhythm. Tachycardia present.      Heart sounds: No murmur heard.  Pulmonary:      Effort: Pulmonary effort is normal. No respiratory distress.      Breath sounds: Normal breath sounds.   Abdominal:      General: There is no distension.      Palpations: Abdomen is soft.      Tenderness: There is no abdominal tenderness.   Musculoskeletal:         General: No tenderness. Normal range of motion.      Cervical back: Normal range of motion and neck supple.      Right lower leg: Edema present.      Left lower leg: Edema present.   Skin:     Capillary Refill: Capillary refill takes less than 2 seconds.      Findings: No rash.   Neurological:      Comments: Patient is unable to follow an organized neurologic examination.             ED Course     Labs Reviewed   CBC WITH DIFFERENTIAL WITH PLATELET - Abnormal; Notable for the following components:       Result Value    RBC 3.24 (*)     HGB 9.0 (*)     HCT 26.7 (*)     RDW-SD 49.9 (*)     RDW 17.0 (*)     .0 (*)     All other components within normal limits   COMP METABOLIC PANEL (14) - Abnormal; Notable for the following components:    Glucose 139 (*)     Chloride 117 (*)     CO2 12.0 (*)     Creatinine 1.14 (*)     Calcium, Total 7.9 (*)     Calculated Osmolality 300 (*)     eGFR-Cr 54 (*)     ALT 85 (*)      (*)     Alkaline Phosphatase 373 (*)     Total Protein 3.9 (*)     Albumin 1.9 (*)     All other components within normal limits   TROPONIN I HIGH SENSITIVITY - Abnormal; Notable for the following components:    Troponin I (High Sensitivity) 90 (*)     All other components within normal limits   LACTIC ACID, PLASMA - Abnormal; Notable for the following components:    Lactic Acid 6.0 (*)     All other components within normal limits   HEPATIC FUNCTION PANEL (7) - Abnormal; Notable for the following components:     (*)     ALT 85 (*)     Alkaline Phosphatase 373 (*)     Bilirubin, Direct 0.5 (*)      Total Protein 3.9 (*)     Albumin 1.9 (*)     All other components within normal limits   URINALYSIS, ROUTINE - Abnormal; Notable for the following components:    Clarity Urine Turbid (*)     Blood Urine 1+ (*)     Protein Urine 70 (*)     Leukocyte Esterase Urine 500 (*)     WBC Urine >50 (*)     RBC Urine 3-5 (*)     Bacteria Urine Rare (*)     All other components within normal limits   PROTHROMBIN TIME (PT) - Abnormal; Notable for the following components:    PT 27.5 (*)     INR 2.41 (*)     All other components within normal limits   PTT, ACTIVATED - Abnormal; Notable for the following components:    PTT 94.5 (*)     All other components within normal limits   EXPANDED BLOOD GAS, ARTERIAL - Abnormal; Notable for the following components:    ABG pH 7.02 (*)     ABG pCO2 23 (*)     ABG PO2 39 (*)     ABG HCO3 6.0 (*)     Blood Gas Base Excess -23.3 (*)     Lactic Acid (Blood Gas) 9.6 (*)     Total Hemoglobin 8.8 (*)     ABG O2 Saturation 56.0 (*)     Oxygen Content 6.9 (*)     All other components within normal limits   MANUAL DIFFERENTIAL - Abnormal; Notable for the following components:    Lymphocyte Absolute Manual 0.60 (*)     Metamyelocyte Absolute Manual 0.26 (*)     Myelocyte Absolute Manual 0.09 (*)     Promyelocyte Absolute Manual 0.09 (*)     NRBC 2 (*)     RBC Morphology See morphology below (*)     Platelet Morphology See morphology below (*)     Microcytosis 2+ (*)     Hypochromia 2+ (*)     Giant platelets Few (*)     All other components within normal limits   LIPID PANEL - Abnormal; Notable for the following components:    HDL Cholesterol <5 (*)     All other components within normal limits   LACTIC ACID REFLEX POST POSTIVE - Abnormal; Notable for the following components:    Lactic Acid 8.8 (*)     All other components within normal limits   POCT GLUCOSE - Abnormal; Notable for the following components:    POC Glucose  13 (*)     All other components within normal limits   POCT GLUCOSE -  Abnormal; Notable for the following components:    POC Glucose  69 (*)     All other components within normal limits   TSH W REFLEX TO FREE T4 - Normal   POCT GLUCOSE - Normal   POCT GLUCOSE - Normal   ARTERIAL BLOOD GAS   LACTIC ACID REFLEX POST POSITIVE [id number]   RAINBOW DRAW LAVENDER   RAINBOW DRAW LIGHT GREEN   RAINBOW DRAW BLUE   RAINBOW DRAW GOLD   BLOOD CULTURE   BLOOD CULTURE     EKG    Rate, intervals and axes as noted on EKG Report.  Rate: 136 bpm  Rhythm: Sinus Rhythm  Reading: Nonspecific changes, abnormal                       MDM          Admission disposition: 11/12/2024 10:39 PM           Medical Decision Making  Differential diagnosis considered for sepsis, electrolyte disturbance, adrenal insufficiency, dehydration, infection.    Problems Addressed:  Adrenal insufficiency (HCC): acute illness or injury  Altered mental status, unspecified altered mental status type: acute illness or injury  Hypoglycemia: acute illness or injury    Amount and/or Complexity of Data Reviewed  Labs: ordered. Decision-making details documented in ED Course.     Details: Significant metabolic acidosis is present.  Mild kidney dysfunction.  Elevated LFTs and coagulation parameters.  Radiology: ordered and independent interpretation performed. Decision-making details documented in ED Course.     Details: Chest x-ray unremarkable.  CT the brain, chest abdomen and pelvis shows infiltrated IV in the arm.  CT the brain shows no intracranial hemorrhage.  Evidence of pneumonia on chest CT.  No acute intra-abdominal pathology identified.  ECG/medicine tests: ordered and independent interpretation performed. Decision-making details documented in ED Course.  Discussion of management or test interpretation with external provider(s): Patient had multiple peripheral IVs initiated in the emergency department.  She was given large fluid bolus and immediately started on antibiotics for presumed sepsis.  Due to the difficult vascular  access nature of this patient, medics were initiated prior to and blood cultures being obtained. Vancomycin and cefepime were initiated.  Despite supplemental oxygen and IV fluids, patient continued to have shortness of breath and hypotension so in discussion with the family it was determined that she would be intubated for airway protection and a central line will be placed for resuscitation purposes.      Intubation    Indications:  Respiratory distress    Procedure Details:  Intubation Method:  Direct  Patient Status:  Paralyzed (RSI)  Preoxygenation:  Nonrebreather mask    Anesthesia:     Pretreatment meds:  None    Sedation:      Sedation:  Etomidate  Paralytic:  Succinylcholine  Tube Size (mm):  7.0  Tube Type:  Cuffed  Breath Sounds:  Auscultated bilaterally  Cuff inflated?  Yes  Tube secured by respiratory therapist  Chest x-ray shows adequate position following intubation  Tube repositioned:  Tube repositioned successfully    Patient tolerance: Patient tolerated the procedure well.  Comments:       Procedure:  Central line placement: After verbal informed consent from patient; with the risks explained to be bleeding, infection, pain, retroperitoneal bleeding and or pneumothorax; maximal sterile barrier technique was uses including cap, gown, sterile gloves, large sheet, handwashing and chlorexidine prep.  The area anesthetized with 1% lidocaine.  The right femoral vein was punctured under ultrasound guidance then a wire introducer was placed, a triple lumen catheter was placed using Seldinger technique.  No complications.  Blood return low pressure, dark blood.  Patient tolerated procedure well.   Line placement verified by simple flow.    The procedure was performed by myself.      This case was discussed with the hospitalist who evaluated the patient in the emergency department as well as critical care, Dr. Wallace.  I also discussed this case with nephrology Dr. Stewart who recommended initiating a bicarb  drip and D10.  Endocrinology, Dr. Lake was consulted as well.  She was given a large dose of Solu-Cortef for concerns of adrenal insufficiency triggering this episode today as well.    Risk  Prescription drug management.  Parenteral controlled substances.  Decision regarding hospitalization.  Risk Details:   Critical Care Documentation    There were greater than 30 minutes of critical care time spent directly on this patient and this time did not include procedures but did include:    12 minutes for documentation  10 minutes for physical exam, re-examination and discussing results with patient and family  18 minutes discussing case with admitting physicians and consultants  10 minutes interpreting labs and diagnostic testing        Critical Care  Total time providing critical care: 50 minutes        Disposition and Plan     Clinical Impression:  1. Hypoglycemia    2. Adrenal insufficiency (HCC)    3. Altered mental status, unspecified altered mental status type         Disposition:  Admit  11/12/2024 10:39 pm    Follow-up:  No follow-up provider specified.        Medications Prescribed:  Current Discharge Medication List              Supplementary Documentation:     [facility]  [facility] of [facility]      Sepsis Reassessment Note    BP (!) 86/62   Pulse 111   Temp 99 °F (37.2 °C) (Rectal)   Resp 16   Wt 74.4 kg   SpO2 100%   BMI 30.00 kg/m²      I completed the sepsis reassessment at 1155pm    Cardiac:  Regularity: Regular  Rate: Tachycardic  Heart Sounds: S1,S2    Lungs:   Right: Clear  Left: Clear    Peripheral Pulses:  Radial: Right 1+ or Left 1+      Capillary Refill:  >3 Secs    Skin:  Temp/Moisture: Clammy   Color: Normal       Real Escalera MD  11/12/2024  11:48 PM             Hospital Problems       Present on Admission  Date Reviewed: 10/27/2024            ICD-10-CM Noted POA    * (Principal) Hypoglycemia E16.2 11/12/2024 Unknown    Acute kidney injury (HCC) N17.9 11/12/2024 Yes     Adrenal insufficiency (HCC) E27.40 8/27/2024 Unknown    Altered mental status, unspecified altered mental status type R41.82 9/5/2024 Unknown    Hyperglycemia R73.9 11/12/2024 Yes    Respiratory alkalosis E87.3 11/12/2024 Yes    Thrombocytopenia (HCC) D69.6 11/12/2024 Yes

## 2024-11-13 NOTE — PLAN OF CARE
Patient is on Levo @ 30mcg/min, Vaso @0.03 units/min, Davion @320mcg/min and NaBicarb @ 100ml/hr. No further escalation of care, BT refused by daughter (POA).    Problem: Patient Centered Care  Goal: Patient preferences are identified and integrated in the patient's plan of care  Description: Interventions:  - What would you like us to know as we care for you?   - Provide timely, complete, and accurate information to patient/family  - Incorporate patient and family knowledge, values, beliefs, and cultural backgrounds into the planning and delivery of care  - Encourage patient/family to participate in care and decision-making at the level they choose  - Honor patient and family perspectives and choices  Outcome: Not Progressing     Problem: Patient/Family Goals  Goal: Patient/Family Long Term Goal  Description: Patient's Long Term Goal:     Interventions:  -   - See additional Care Plan goals for specific interventions  Outcome: Not Progressing  Goal: Patient/Family Short Term Goal  Description: Patient's Short Term Goal:     Interventions:   -   - See additional Care Plan goals for specific interventions  Outcome: Not Progressing     Problem: CARDIOVASCULAR - ADULT  Goal: Maintains optimal cardiac output and hemodynamic stability  Description: INTERVENTIONS:  - Monitor vital signs, rhythm, and trends  - Monitor for bleeding, hypotension and signs of decreased cardiac output  - Evaluate effectiveness of vasoactive medications to optimize hemodynamic stability  - Monitor arterial and/or venous puncture sites for bleeding and/or hematoma  - Assess quality of pulses, skin color and temperature  - Assess for signs of decreased coronary artery perfusion - ex. Angina  - Evaluate fluid balance, assess for edema, trend weights  Outcome: Not Progressing  Goal: Absence of cardiac arrhythmias or at baseline  Description: INTERVENTIONS:  - Continuous cardiac monitoring, monitor vital signs, obtain 12 lead EKG if indicated  -  Evaluate effectiveness of antiarrhythmic and heart rate control medications as ordered  - Initiate emergency measures for life threatening arrhythmias  - Monitor electrolytes and administer replacement therapy as ordered  Outcome: Not Progressing     Problem: RESPIRATORY - ADULT  Goal: Achieves optimal ventilation and oxygenation  Description: INTERVENTIONS:  - Assess for changes in respiratory status  - Assess for changes in mentation and behavior  - Position to facilitate oxygenation and minimize respiratory effort  - Oxygen supplementation based on oxygen saturation or ABGs  - Provide Smoking Cessation handout, if applicable  - Encourage broncho-pulmonary hygiene including cough, deep breathe, Incentive Spirometry  - Assess the need for suctioning and perform as needed  - Assess and instruct to report SOB or any respiratory difficulty  - Respiratory Therapy support as indicated  - Manage/alleviate anxiety  - Monitor for signs/symptoms of CO2 retention  Outcome: Not Progressing     Problem: METABOLIC/FLUID AND ELECTROLYTES - ADULT  Goal: Glucose maintained within prescribed range  Description: INTERVENTIONS:  - Monitor Blood Glucose as ordered  - Assess for signs and symptoms of hyperglycemia and hypoglycemia  - Administer ordered medications to maintain glucose within target range  - Assess barriers to adequate nutritional intake and initiate nutrition consult as needed  - Instruct patient on self management of diabetes  Outcome: Not Progressing  Goal: Electrolytes maintained within normal limits  Description: INTERVENTIONS:  - Monitor labs and rhythm and assess patient for signs and symptoms of electrolyte imbalances  - Administer electrolyte replacement as ordered  - Monitor response to electrolyte replacements, including rhythm and repeat lab results as appropriate  - Fluid restriction as ordered  - Instruct patient on fluid and nutrition restrictions as appropriate  Outcome: Not Progressing  Goal: Hemodynamic  stability and optimal renal function maintained  Description: INTERVENTIONS:  - Monitor labs and assess for signs and symptoms of volume excess or deficit  - Monitor intake, output and patient weight  - Monitor urine specific gravity, serum osmolarity and serum sodium as indicated or ordered  - Monitor response to interventions for patient's volume status, including labs, urine output, blood pressure (other measures as available)  - Encourage oral intake as appropriate  - Instruct patient on fluid and nutrition restrictions as appropriate  Outcome: Not Progressing

## 2024-11-13 NOTE — CONSULTS
[facility]  part of [facility]    Report of Consultation    Sheri Garzon Patient Status:  Inpatient    1960 MRN [id number]   Location [facility] 2W/SW Attending John Hinds MD   Hosp Day # 0 PCP TERI MCKENNA MD     Date of Admission:  2024  Date of Consult: 2024    Reason for Consultation:   Consults  Septic shock with multiple organ system failure      History provided by:sister and daughter  HPI:     Chief Complaint   Patient presents with    Hypoglycemia     HPI    64-year-old female with very complex underlying medical problem with multiple prolonged admissions and she was just discharged few days ago to rehab and she is recently completely bedbound.  Adrenal insufficiency, rheumatoid arthritis, failure to thrive and dysphagia and a dyne aphasia, HTN and HL, CAD, pancreatitis.    Patient presented from rehab with lethargy and low blood pressure and patient was obtunded in ER with low blood sugar and extremely acidotic patient was giving D50 and Solu-Cortef since patient with known adrenal insufficiency and patient was very tachypneic and tachycardic she was intubated and placed on mechanical ventilation and central line was placed and patient received sepsis bolus and started on pressors would likely urine source of infection with urinalysis indicating UTI  Patient now intubated on mechanical ventilation on 3 pressors and she is obtunded and unresponsive on family at bedside with no further review of system unable to obtain  Chart and notes no recent admissions and records are reviewed    History     Past Medical History:    Allergic rhinitis    Anxiety    Arthritis    RA and Osteoarthritis    Asthma (HCC)    Depression    Not officially diagnosed    Esophageal reflux    Essential hypertension    High blood pressure    High cholesterol    Hyperlipidemia    Myocardial infarction (HCC)    Cardiac event    Osteoarthritis    Pancreatitis (HCC)    Problems with  swallowing    RA (rheumatoid arthritis) (HCC)    Sleep apnea     Past Surgical History:   Procedure Laterality Date    Colonoscopy      Debridement  08/15/2024    Sharp excisional debridement of RLE posterior leg wound    Egd  07/26/2024    Hiatal hernia    Other surgical history      Revision of uterine anomaly      Rotator cuff repair      Wrist fracture surgery       Family History   Problem Relation Age of Onset    Diabetes Mother     Genetic Disease Mother     Heart Disorder Mother     Hypertension Mother     Obesity Mother     Diabetes Father     Hypertension Father     Depression Daughter     Psychiatric Daughter      Social History:  Social History     Socioeconomic History    Marital status: Single   Tobacco Use    Smoking status: Never    Smokeless tobacco: Never   Vaping Use    Vaping status: Never Used   Substance and Sexual Activity    Alcohol use: Not Currently    Drug use: Never     Social Drivers of Health     Food Insecurity: No Food Insecurity (10/28/2024)    Food Insecurity     Food Insecurity: Never true   Transportation Needs: No Transportation Needs (10/28/2024)    Transportation Needs     Lack of Transportation: No   Housing Stability: Low Risk  (10/28/2024)    Housing Stability     Housing Instability: No     Allergies/Medications:   Allergies: Allergies[1]  Medications Prior to Admission   Medication Sig    ondansetron 8 MG Oral Tablet Dispersible Take 1 tablet (8 mg total) by mouth TID (Nitrates).    sodium bicarbonate 650 MG Oral Tab Take 1 tablet (650 mg total) by mouth 2 (two) times daily.    HYDROcodone-acetaminophen 7.5-325 MG/15ML Oral Solution Take 15 mL by mouth every 4 (four) hours as needed.    lamoTRIgine 25 MG Oral Tab Take 2 tablets (50 mg total) by mouth 2 (two) times daily.    metoprolol tartrate 25 MG Oral Tab Take 0.5 tablets (12.5 mg total) by mouth 2 (two) times daily. Hold for SBP <100 and HR <60    mirtazapine 15 MG Oral Tab Take 1 tablet (15 mg total) by mouth nightly.     sucralfate 1 GM/10ML Oral Suspension Take 10 mL (1 g total) by mouth 4 (four) times daily before meals and nightly (2200).    docusate sodium 100 MG Oral Cap Take 1 capsule (100 mg total) by mouth 2 (two) times daily.    ondansetron (ZOFRAN) 4 mg tablet Take 1 tablet (4 mg total) by mouth every 8 (eight) hours as needed for Nausea.    collagenase 250 UNIT/GM External Ointment Apply topically daily.    predniSONE 10 MG Oral Tab Take 20mg (2 tabs) in AM and 10mg (1tab) for 4 days then resume 10mg twice  a day indefinitely (Patient taking differently: Take 1 tablet (10 mg total) by mouth 2 (two) times daily. Take 20mg (2 tabs) in AM and 10mg (1tab) for 4 days then resume 10mg twice  a day indefinitely)    clotrimazole-betamethasone 1-0.05 % External Cream Apply 1 Application topically 2 (two) times daily. Apply to groin and abdominal folds    Copper (COPPERMIN) 5 MG Oral Tab Take 1 tablet by mouth daily.    Acetaminophen ER (ACETAMINOPHEN 8 HOUR) 650 MG Oral Tab CR Take 2-3 tablets (1,300-1,950 mg total) by mouth every 8 (eight) hours as needed for Pain.    albuterol 108 (90 Base) MCG/ACT Inhalation Aero Soln Inhale 2 puffs into the lungs every 4 to 6 hours as needed for Wheezing.    hydroxychloroquine 200 MG Oral Tab Take 1 tablet (200 mg total) by mouth 2 (two) times daily.    Aspirin 81 MG Oral Cap Take 81 mg by mouth daily.    atorvastatin 10 MG Oral Tab Take 1 tablet (10 mg total) by mouth daily.    clopidogrel 75 MG Oral Tab Take 1 tablet (75 mg total) by mouth daily.    ergocalciferol 1.25 MG (27885 UT) Oral Cap Take 1 capsule (50,000 Units total) by mouth once a week.    oxybutynin ER 10 MG Oral Tablet 24 Hr Take 1 tablet (10 mg total) by mouth daily.    folic acid 1 MG Oral Tab Take 1 tablet (1 mg total) by mouth daily.       Review of Systems:     Unable to perform ROS      Physical Exam:   Vital Signs:   weight is 164 lb 0.4 oz (74.4 kg). Her temporal temperature is 96.7 °F (35.9 °C). Her blood pressure  is 79/53 (abnormal) and her pulse is 130 (abnormal). Her respiration is 30 (abnormal) and oxygen saturation is 87% (abnormal).   Physical Exam  Vitals and nursing note reviewed.   Constitutional:       General: She is in acute distress.      Appearance: She is ill-appearing.   HENT:      Head: Atraumatic.      Nose: Nose normal.      Mouth/Throat:      Mouth: Mucous membranes are dry.   Eyes:      General: No scleral icterus.  Cardiovascular:      Rate and Rhythm: Regular rhythm. Tachycardia present.   Pulmonary:      Effort: Respiratory distress present.      Breath sounds: No stridor. No wheezing, rhonchi or rales.   Abdominal:      General: Bowel sounds are normal. There is distension.      Palpations: Abdomen is soft.      Tenderness: There is no guarding.   Musculoskeletal:      Cervical back: Neck supple.      Right lower leg: Edema present.      Left lower leg: Edema present.   Neurological:      Comments: Intubated on mechanical ventilation and obtunded, unresponsive         Results:     Lab Results   Component Value Date    WBC 11.8 (H) 11/13/2024    HGB 6.6 (LL) 11/13/2024    HCT 20.4 (L) 11/13/2024    PLT 77.0 (L) 11/13/2024    CREATSERUM 1.21 (H) 11/13/2024    BUN 17 11/13/2024     11/13/2024    K 3.6 11/13/2024     11/13/2024    CO2 <10.0 (LL) 11/13/2024     (H) 11/13/2024    CA 6.4 (L) 11/13/2024    ALB 1.7 (L) 11/13/2024    ALKPHO 275 (H) 11/13/2024    BILT 0.6 11/13/2024    TP 3.2 (L) 11/13/2024     (H) 11/13/2024    ALT 98 (H) 11/13/2024    PTT 94.5 (H) 11/12/2024    INR 3.96 (H) 11/13/2024    TSH 2.414 11/12/2024    LIP 31 10/27/2024    DDIMER 1.69 (H) 07/23/2024    MG 1.7 11/13/2024    PHOS 3.5 11/13/2024    TROPHS 90 (HH) 11/12/2024    CK 50 08/27/2024    B12 748 08/21/2024    ETOH <3 09/05/2024     CT CHEST+ABDOMEN+PELVIS(ALL CNTRST ONLY)(CPT=71260/54323)    Result Date: 11/13/2024  CONCLUSION:   1. Lingular pneumonia.  Dense atelectasis in the bilateral lower lobes  with suspected superimposed pneumonia.  Endotracheal tube terminates 2.4 cm above the joselyn.  2. No acute abnormality in the abdomen or pelvis.  3. Small amount of air in the urinary bladder lumen likely relates to recent instrumentation.  4. Additional chronic or incidental findings are described in the body of this report.      Preliminary report was given by [facility].  There are no clinically significant discrepancies.    elm-remote    Dictated by (CST): Ti Zhou MD on 11/13/2024 at 7:52 AM     Finalized by (CST): Ti Zhou MD on 11/13/2024 at 8:04 AM          CT BRAIN OR HEAD (CPT=70450)    Result Date: 11/13/2024  CONCLUSION:   No acute intracranial abnormality.      Preliminary report was given by [facility].  There are no clinically significant discrepancies.    elm-remote    Dictated by (CST): Ti Zhou MD on 11/13/2024 at 7:50 AM     Finalized by (CST): Ti Zhou MD on 11/13/2024 at 7:52 AM          XR CHEST AP PORTABLE  (CPT=71045)    Result Date: 11/13/2024  CONCLUSION:   The endotracheal tube terminates at the joselyn.  Recommend 2-3 cm of retraction.  Retrocardiac opacity, probably atelectasis, but consolidation is not excluded.      Preliminary report was given by [facility].  There are no clinically significant discrepancies.    elm-remote    Dictated by (CST): Ti Zhou MD on 11/13/2024 at 7:26 AM     Finalized by (CST): Ti Zhou MD on 11/13/2024 at 7:29 AM          XR CHEST AP PORTABLE  (CPT=71045)    Result Date: 11/12/2024  CONCLUSION:   Mild linear appearing opacity left lung base which may reflect atelectasis with or without superimposed pneumonia.  Consider short-term follow-up chest radiographs.    Dictated by (CST): Theodore Armando MD on 11/12/2024 at 8:56 PM     Finalized by (CST): Theodore Armando MD on 11/12/2024 at 8:59 PM         EKG    Result Date: 11/12/2024  Sinus tachycardia Left axis deviation Low voltage QRS, consider pulmonary  disease, pericardial effusion, or normal variant Cannot rule out Anterior infarct , age undetermined Abnormal ECG When compared with ECG of 03-NOV-2024 20:58, The axis Shifted left Minimal criteria for Anterior infarct are now Present     Impression:       1-septic shock with multiple organ system failure  Source likely urosepsis    Plan ;  Sepsis reassessment protocol  Broad-spectrum antibiotics with meropenem and vancomycin  Check cultures  Central line in place patient on multiple pressors    2-profound metabolic acidosis  Lactic acidosis with underlying history of renal tubular acidosis  IV bicarbonate drip and treat underlying septic shock    3-acute respiratory failure required intubation  Secondary to above with septic shock and profound acidosis  Vent support and management    4-acute on chronic adrenal insufficiency  Patient with known chronic adrenal insufficiency and now septic shock  IV Solu-Cortef  Endo following    5-hypoglycemia  Patient received D50 now on dextrose and received Solu-Cortef  Endocrinology following    6-failure to thrive and recently completely bedbound  Multiple recent prolonged hospitalizations with history of odynophagia and esophageal web and failure to thrive    7-transaminitis and shock liver and coagulopathy  Supportive care    8-acute on chronic anemia  Transfuse for hemoglobin less than 7    9-DVT prophylaxis  SCD only since patient with high INR and anemia    10-DNAR/select    Prognosis is guarded  Critical, high risk and complex  Discussed with daughter and sister at bedside at length  Discussed with the staff  > 35 min cct         [facility]  part of [facility]      Sepsis Reassessment Note    BP (!) 79/53 (BP Location: Right arm)   Pulse (!) 130   Temp 96.7 °F (35.9 °C) (Temporal)   Resp (!) 30   Wt 164 lb 0.4 oz (74.4 kg)   SpO2 (!) 87%   BMI 30.00 kg/m²      I completed the sepsis reassessment at 7 am     Cardiac:  Regularity: Regular  Rate:  Tachycardic  Heart Sounds: S1,S2    Lungs:   Right: Clear  Left: Clear    Peripheral Pulses:  Radial: Right 1+ or Left 1+      Capillary Refill:  <3 Secs    Skin:  Temp/Moisture: Warm and Dry  Color: Normal      Francisco Rockwell MD  11/13/2024  8:29 AM                         Francisco Rockwell MD  11/13/2024         [1]   Allergies  Allergen Reactions    Cephalosporins ANAPHYLAXIS, NAUSEA AND VOMITING and OTHER (SEE COMMENTS)     Other reaction(s): Fever    - Tolerated multiple doses of ceftriaxone on 7/2024 without any complications  - Tolerated multiple doses of cefepime 8/2024 without complications    Gadolinium Derivatives FEVER    Penicillins OTHER (SEE COMMENTS)     esophagitis    Sulfa Antibiotics OTHER (SEE COMMENTS)     esophagitis    Bananas ITCHING

## 2024-11-13 NOTE — WOUND PROGRESS NOTE
Wound consult received for R foot and R leg chronic wounds. Pt is not stable at this time, having bedside procedure. Spoke to RN. Will re try later today if appropriate.

## 2024-11-13 NOTE — PROGRESS NOTES
[facility]  part of [facility]  Hospitalist Progress Note     Sheri Garzon Patient Status:  Inpatient    1960  64 year old CSN [id number]   Location -A Attending John Hinds MD   Hosp Day # 0 PCP TERI MCKENNA MD     Assessment & Plan:   ----------------------------------  Septic shock.  Due to UTI.  Next out on 3 pressors.  -Continue pressors.  -Will not escalate care    Urinary tract infection.  There is pus in the urine upon Cruz placement.    Goals of care/advance care planning.  I discussed with the patient's daughter at the bedside this morning.  We discussed her overall grim prognosis given the multiorgan failure.  Hypotension despite maximum pressor support, worsening tachycardia, tachypnea.  Daughter acknowledged that she understands how sick the patient is and does not want to escalate care.  She would not be in favor of CPR, shocks, ACLS should there be a non-perfusing rhythm.  She is confirmed DNR status.  35 minutes spent discussing advance care planning goals of care.    Other problems  AMRIT  Hypertension  Hypoglycemia  Anasarca  Hypokalemia      DVT Mechanical Prophylaxis:   SCDs,    DVT Pharmacologic Prophylaxis   Medication   None              code status: DNR  dispo: to be determined  If applicable, malnutrition status at bottom of note    I personally reviewed the available laboratories, imaging including. I discussed/will discuss the case with consultants. I ordered laboratories and/or radiographic studies. I adjusted medications as detailed above.  Medical decision making high, risk is high.  Discussed with critical care    Subjective:   ----------------------------------  Intubated sedated      Objective:   Chief Complaint:   Chief Complaint   Patient presents with    Hypoglycemia     ----------------------------------  Temp:  [95.8 °F (35.4 °C)-99 °F (37.2 °C)] 97.4 °F (36.3 °C)  Pulse:  [] 148  Resp:  [16-37] 28  BP: ()/()  32/22  SpO2:  [57 %-100 %] 73 %  FiO2 (%):  [50 %-100 %] 100 %  Gen: Intubated sedated  HEENT: NCAT, neck supple, no carotid bruit.  CV: Tachycardic, S1S2, and intact distal pulses. No gallop, rub, murmur.  Pulm: Coarse breath sounds  Abd: Soft, NTND, BS normal, no mass, no HSM, no rebound/guarding.   Neuro: Withdraws to pain  MS: No joint effusions.  2+ pitting edema in all 4 extremities  Skin: Skin is warm and dry. No rashes, erythema, diaphoresis.   Psych: Not assessed    Labs:  Lab Results   Component Value Date    HGB 6.6 (LL) 11/13/2024    WBC 11.8 (H) 11/13/2024    PLT 77.0 (L) 11/13/2024     11/13/2024    K 3.6 11/13/2024    CREATSERUM 1.21 (H) 11/13/2024    INR 3.96 (H) 11/13/2024     (H) 11/13/2024    ALT 98 (H) 11/13/2024            hydrocortisone sodium succinate  100 mg Intravenous Q8H    sodium bicarbonate        propofol        albumin human        morphINE PF        meropenem  1 g Intravenous 2 times per day    sodium chloride   Intravenous Once    pantoprazole  40 mg Intravenous Daily    insulin aspart  1-5 Units Subcutaneous TID CC    sodium chloride  1,000 mL Intravenous Once       ondansetron    prochlorperazine    sodium bicarbonate    propofol    albumin human    morphINE PF    glucose **OR** glucose **OR** glucose-vitamin C **OR** dextrose **OR** glucose **OR** glucose **OR** glucose-vitamin C    fentaNYL

## 2024-11-13 NOTE — CONSULTS
[facility]  part of [facility]    Report of Consultation    Sheri Garzon Patient Status:  Inpatient    1960 MRN [id number]   Location [facility] 2/ Attending Christianne Davis MD   Hosp Day # 0 PCP TERI MCKENNA MD     Date of Admission:  2024  Date of Consult:  2024    Reason for Consultation:  Adrenal Insufficiency     History of Present Illness:  Sheri Garzon is a a(n) 64 year old female.     65 y/o F with iatrogenic adrenal insufficiency presents with adrenal crisis. She is unable to provide any history due to clinical status, family at bedside.     Per family she was discharge to rehab 2 days ago on Friday.  However the rehab was unable to get her medications and likely did not receive prednisone dose until  evening. Although the timing of prednisone is unclear.  She does have a long history of adrenal insufficiency leading to hypoglycemia during previous hospitalizations.     History:  Past Medical History:    Allergic rhinitis    Anxiety    Arthritis    RA and Osteoarthritis    Asthma (HCC)    Depression    Not officially diagnosed    Esophageal reflux    Essential hypertension    High blood pressure    High cholesterol    Hyperlipidemia    Myocardial infarction (HCC)    Cardiac event    Osteoarthritis    Pancreatitis (HCC)    Problems with swallowing    RA (rheumatoid arthritis) (HCC)    Sleep apnea     Past Surgical History:   Procedure Laterality Date    Colonoscopy      Debridement  08/15/2024    Sharp excisional debridement of RLE posterior leg wound    Egd  2024    Hiatal hernia    Other surgical history      Revision of uterine anomaly      Rotator cuff repair      Wrist fracture surgery       Family History   Problem Relation Age of Onset    Diabetes Mother     Genetic Disease Mother     Heart Disorder Mother     Hypertension Mother     Obesity Mother     Diabetes Father     Hypertension Father     Depression Daughter      Psychiatric Daughter       reports that she has never smoked. She has never used smokeless tobacco. She reports that she does not currently use alcohol. She reports that she does not use drugs.    Allergies:  Allergies[1]    Medications:    Current Facility-Administered Medications:     [Held by provider] heparin (Porcine) 5000 UNIT/ML injection 5,000 Units, 5,000 Units, Subcutaneous, 2 times per day    ondansetron (Zofran) 4 MG/2ML injection 4 mg, 4 mg, Intravenous, Q6H PRN    prochlorperazine (Compazine) 10 MG/2ML injection 5 mg, 5 mg, Intravenous, Q8H PRN    hydrocortisone Na succinate PF (Solu-CORTEF) injection 100 mg, 100 mg, Intravenous, Q8H    vasopressin (Vasostrict) 20 Units in sodium chloride 0.9% 100 mL infusion for septic shock, 0.01-0.03 Units/min, Intravenous, Continuous    sodium bicarbonate 8.4 % injection, , ,     propofol (Diprivan) 10 MG/ML injection, , ,     albumin human (Albumin) 25% injection, , ,     sodium bicarbonate 150 mEq in dextrose 5% 1,000 mL infusion, 150 mEq, Intravenous, Continuous    potassium chloride 40 mEq/100mL IVPB premix (central line) 40 mEq, 40 mEq, Intravenous, Once    propofol (Diprivan) 10 mg/mL infusion premix, 5-50 mcg/kg/min, Intravenous, Continuous    phenylephrine (Davion-Synephrine) 50 mg in sodium chloride 0.9% 250 mL infusion,  mcg/min, Intravenous, Continuous    dexmedeTOMIDine in sodium chloride 0.9% (Precedex) 400 mcg/100mL infusion premix, 0.2-1.5 mcg/kg/hr, Intravenous, Continuous    fentaNYL in sodium chloride 0.9% (Sublimaze) 1000 mcg/100mL infusion premix,  mcg/hr, Intravenous, Continuous    morphINE PF 4 MG/ML injection, , ,     meropenem (Merrem) 1 g in sodium chloride 0.9% 100 mL IVPB-MBP, 1 g, Intravenous, 2 times per day    sodium chloride 0.9% infusion, , Intravenous, Once    sodium chloride 0.9 % IV bolus 1,000 mL, 1,000 mL, Intravenous, Once    norepinephrine (Levophed) 4 mg/250mL infusion premix, 0.5-30 mcg/min, Intravenous,  Continuous    Unable to perform ROS given clinical status     Physical Exam:  Blood pressure (!) 79/53, pulse (!) 130, temperature 96.7 °F (35.9 °C), temperature source Temporal, resp. rate (!) 30, weight 164 lb 0.4 oz (74.4 kg), SpO2 (!) 87%.    General: Intubated, sedated   HEENT: Exam is unremarkable.  Lungs: Course BS   Abdomen:  Bowel sounds present  Skin: Normal texture and turgor.    Impression and Plan:  Patient Active Problem List   Diagnosis    Dehydration    Metabolic acidosis    Bilious vomiting with nausea    Difficult intravenous access    Hypokalemia    Hypomagnesemia    Hiatal hernia    Cellulitis    RTA (renal tubular acidosis)    Adrenal insufficiency (Dylan's disease) (HCC)    Current chronic use of systemic steroids    Neutropenia (HCC)    Anemia of infection    Weakness generalized    Leg edema    Adrenal insufficiency (HCC)    Altered mental status, unspecified altered mental status type    Acute psychosis (HCC)    Moderate bipolar I disorder, current or most recent episode depressed, with psychotic features, with mixed features (HCC)    Episodic mood disorder (HCC)    Malnutrition (HCC)    Palliative care encounter    Goals of care, counseling/discussion    Hypoglycemia    Thrombocytopenia (HCC)    Acute kidney injury (HCC)    Respiratory alkalosis    Hyperglycemia     Adrenal Insufficiency   - Dosing might have been delayed at rehab although unclear  - Continue Hydrocortisone 100mg IV Q8 hours  - discussed severity of hypoglycemia is likely not due to entirely to adrenal insufficiency  - In the past she has been without steroid and BG level range 50-60   - Continue current steroid regimen    2. Hypoglycemia  - Improved   - Maintained on bicarb drip with D5 leading to hyperglycemia  - Start Low dose CF  - Will titrate insulin as needed    Will follow    Meredith Powell MD  11/13/2024  7:26 AM         [1]   Allergies  Allergen Reactions    Cephalosporins ANAPHYLAXIS, NAUSEA AND VOMITING and  OTHER (SEE COMMENTS)     Other reaction(s): Fever    - Tolerated multiple doses of ceftriaxone on 7/2024 without any complications  - Tolerated multiple doses of cefepime 8/2024 without complications    Gadolinium Derivatives FEVER    Penicillins OTHER (SEE COMMENTS)     esophagitis    Sulfa Antibiotics OTHER (SEE COMMENTS)     esophagitis    Bananas ITCHING

## 2024-11-14 NOTE — PROGRESS NOTES
At 0532 while at the bedside talking to the family cardiac monitor flat lined showing zero respiration and zero heart rate.  No palpable pulse. Pupils non reactive to light, Daughter and sister at the bedside Nocturnalist, TL, Nurse supervisor and all consults notified. LUIS notified.Patient not a candidate for donation. Per daughter patient wants to be cremated but she does not know yet what crematorium. Informed daughter to call Public safety within 24 hours.

## 2024-11-14 NOTE — DISCHARGE SUMMARY
[facility]  part of [facility]     DISCHARGE SUMMARY     Sheri Garzon Patient Status:  Inpatient    1960 MRN [id number]   Location [facility] 2W/SW Attending John Hinds MD   Hosp Day # 1 PCP TERI MCKENNA MD     DATE OF ADMISSION: 2024  DATE OF DISCHARGE:  24  DISPOSITION:     DISCHARGE DIAGNOSES:  Septic shock  Urinary tract infection  AMRIT  Hypertension  Hypoglycemia  Anasarca  Hypokalemia    HISTORY OF PRESENT ILLNESS (COPIED FROM ADMISSION H&P)  Patient is a 64-year-old  female who resides in [facility]. Sent today for evaluation of increased encephalopathy and low blood glucose. She was given glucagon at the nursing facility and sent to the emergency department for evaluation. Accu-Chek was 13. She was given IV Solu-Cortef via peripheral line access and given D50 twice with persistent hypoglycemia. Started on D10 drip. CBC, chemistry, TSH, liver function tests, lactic acid, blood cultures, and troponin were obtained. Patient will be admitted to progressive care unit.     HOSPITAL COURSE:  Patient was admitted to the ICU.  She was quite ill at that time with septic shock requiring 3 pressors.  Even with this there is minimal response to blood pressure.  She became progressively tachycardic, tachypneic.  Prognosis was grim.  Had long discussion with the patient's daughter regarding this and she elected to not escalate care.  Patient continued to decline over the course of the evening on  and eventually passed away on .

## 2024-11-14 NOTE — PLAN OF CARE
Patient remains on comatose state. On maximum dose of Levophed at 30 mcg/min, Davion-synephrine 320 mg/min, Vasopressin 0.03 unit/min and Bicarb drip at 100 cc/hr. Sinus tach on the monitor with BP ranges from 30's to 50's. Accucheck discontinued. Daughter at the bedside.         Problem: Patient Centered Care  Goal: Patient preferences are identified and integrated in the patient's plan of care  Description: Interventions:  - What would you like us to know as we care for you?   - Provide timely, complete, and accurate information to patient/family  - Incorporate patient and family knowledge, values, beliefs, and cultural backgrounds into the planning and delivery of care  - Encourage patient/family to participate in care and decision-making at the level they choose  - Honor patient and family perspectives and choices  11/14/2024 0518 by Eboni Black RN  Outcome: Not Progressing     Problem: Patient/Family Goals  Goal: Patient/Family Long Term Goal  Description: Patient's Long Term Goal:     Interventions:  - See additional Care Plan goals for specific interventions  11/14/2024 0518 by Eboni Balck RN  Outcome: Not Progressing  Goal: Patient/Family Short Term Goal  Description: Patient's Short Term Goal:     Interventions:   - monitor vitals/labs  -keep comfortable  - See additional Care Plan goals for specific interventions  11/14/2024 0518 by Eboni Black RN  Outcome: Not Progressing  Problem: CARDIOVASCULAR - ADULT  Goal: Maintains optimal cardiac output and hemodynamic stability  Description: INTERVENTIONS:  - Monitor vital signs, rhythm, and trends  - Monitor for bleeding, hypotension and signs of decreased cardiac output  - Evaluate effectiveness of vasoactive medications to optimize hemodynamic stability  - Monitor arterial and/or venous puncture sites for bleeding and/or hematoma  - Assess quality of pulses, skin color and temperature  - Assess for signs of decreased coronary artery perfusion - ex.  Angina  - Evaluate fluid balance, assess for edema, trend weights  11/14/2024 0518 by Eboni Black, RN  Outcome: Not Progressing  Goal: Absence of cardiac arrhythmias or at baseline  Description: INTERVENTIONS:  - Continuous cardiac monitoring, monitor vital signs, obtain 12 lead EKG if indicated  - Evaluate effectiveness of antiarrhythmic and heart rate control medications as ordered  - Initiate emergency measures for life threatening arrhythmias  - Monitor electrolytes and administer replacement therapy as ordered  11/14/2024 0518 by Eboni Black, RN  Outcome: Not Progressing    Problem: RESPIRATORY - ADULT  Goal: Achieves optimal ventilation and oxygenation  Description: INTERVENTIONS:  - Assess for changes in respiratory status  - Assess for changes in mentation and behavior  - Position to facilitate oxygenation and minimize respiratory effort  - Oxygen supplementation based on oxygen saturation or ABGs  - Provide Smoking Cessation handout, if applicable  - Encourage broncho-pulmonary hygiene including cough, deep breathe, Incentive Spirometry  - Assess the need for suctioning and perform as needed  - Assess and instruct to report SOB or any respiratory difficulty  - Respiratory Therapy support as indicated  - Manage/alleviate anxiety  - Monitor for signs/symptoms of CO2 retention  11/14/2024 0518 by Eboni Black, RN  Outcome: Not Progressing

## 2024-11-14 NOTE — SPIRITUAL CARE NOTE
Spiritual Care Visit Note    Patient Name: Sheri Garzon Date of Spiritual Care Visit: 24   : 1960 Primary Dx: Hypoglycemia       Referred By: Referral From: Nurse    Spiritual Care Taxonomy:    Intended Effects: Helping someone feel comforted    Methods: Encouraging spiritual/Denominational practices;Offer emotional support    Interventions: Active listening;Ask guided questions;Acknowledge response to difficult experience;Share words of hope and inspiration;Silent prayer    Visit Type/Summary:     - Spiritual Care: Responded to a request via the on call phone Consulted with RN prior to visit. Offered empathic listening and emotional support. Patient and family expressed appreciation for  visit. Writer extended words of comfort. Family expressed their thanks for emotional support provided by Spiritual Care. No other need at this time.     ERIN Ray CAMII   [id number]     Spiritual Care support can be requested via an [facility] consult. For urgent/immediate needs, please contact the On Call  at: [place]: cno [phone]

## 2024-11-15 NOTE — PAYOR COMM NOTE
--------------  DISCHARGE REVIEW    Payor: [facility] OUT OF STATE HMO  Subscriber #:  [id number]  Authorization Number: [id number]    Admit date: 24  Admit time:  12:54 AM  Discharge Date: 2024  5:32 AM     Admitting Physician: Christianne Davis MD  Attending Physician:  No att. providers found  Primary Care Physician: Sulma Juarez MD          Discharge Summary Notes        Discharge Summary signed by John Hinds MD at 2024  8:06 AM       Author: John Hinds MD Specialty: HOSPITALIST Author Type: Physician    Filed: 2024  8:06 AM Date of Service: 2024  8:04 AM Status: Signed    : John Hinds MD (Physician)           [facility]  part of [facility]     DISCHARGE SUMMARY     Sheri Garzon Patient Status:  Inpatient    1960 MRN [id number]   Location [facility] 2W/ Attending John Hinds MD   Hosp Day # 1 PCP SULMA JUAREZ MD     DATE OF ADMISSION: 2024  DATE OF DISCHARGE:  24  DISPOSITION:     DISCHARGE DIAGNOSES:  Septic shock  Urinary tract infection  AMRIT  Hypertension  Hypoglycemia  Anasarca  Hypokalemia    HISTORY OF PRESENT ILLNESS (COPIED FROM ADMISSION H&P)  Patient is a 64-year-old  female who resides in [facility]. Sent today for evaluation of increased encephalopathy and low blood glucose. She was given glucagon at the nursing facility and sent to the emergency department for evaluation. Accu-Chek was 13. She was given IV Solu-Cortef via peripheral line access and given D50 twice with persistent hypoglycemia. Started on D10 drip. CBC, chemistry, TSH, liver function tests, lactic acid, blood cultures, and troponin were obtained. Patient will be admitted to progressive care unit.     HOSPITAL COURSE:  Patient was admitted to the ICU.  She was quite ill at that time with septic shock requiring 3 pressors.  Even with this there is minimal response to blood pressure.  She  became progressively tachycardic, tachypneic.  Prognosis was grim.  Had long discussion with the patient's daughter regarding this and she elected to not escalate care.  Patient continued to decline over the course of the evening on 11/13 and eventually passed away on 11/14.    Electronically signed by John Hinds MD on 11/14/2024  8:06 AM         REVIEWER COMMENTS

## 2024-11-15 NOTE — PAYOR COMM NOTE
--------------  ADMISSION REVIEW     Payor: [employer]  Subscriber #:  [id number]  Authorization Number: [id number]    Admit date: 11/13/24  Admit time: 12:54 AM       REVIEW DOCUMENTATION:     ED Provider Notes          Stated Complaint: hypoglycemic glucagon given    Subjective:   HPI      Patient presents emergency department with altered mental state and hypoglycemia.  Patient is unable to provide any adequate history.  Paramedics were called to the [facility] where she has had for confusion and low blood sugar.  Upon arrival they found her blood sugar to be 13.  They were unable to establish an IV and transported her here.  She was given glucagon immediately as well as D50 which did not produce an adequate response and so she was given a second dose.  No further history is available and the patient is altered and unable to provide history.        Physical Exam     ED Triage Vitals   BP 11/12/24 1808 (!) 144/114   Pulse 11/12/24 1807 (!) 138   Resp 11/12/24 1807 (!) 30   Temp 11/12/24 1807 97.2 °F (36.2 °C)   Temp src 11/12/24 1936 Rectal   SpO2 11/12/24 2325 100 %   O2 Device 11/12/24 2010 High flow/High humidity       Current Vitals:   Vital Signs  BP: 97/62  Pulse: (!) 46  Resp: 19  Temp: 99 °F (37.2 °C)  Temp src: Rectal  MAP (mmHg): 73    Oxygen Therapy  SpO2: 90 %  O2 Device: Ventilator  ETCO2 (mmHg): 8 mmHg  FiO2 (%): 100 %  O2 Flow Rate (L/min): 40 L/min        Physical Exam  Vitals and nursing note reviewed.   Constitutional:       Appearance: She is well-developed. She is ill-appearing.      Comments: Somnolent but arousable   HENT:      Head: Normocephalic.      Nose: Nose normal.      Mouth/Throat:      Mouth: Mucous membranes are moist.   Eyes:      Extraocular Movements: Extraocular movements intact.      Conjunctiva/sclera: Conjunctivae normal.      Pupils: Pupils are equal, round, and reactive to light.   Cardiovascular:      Rate and Rhythm: Regular rhythm.  Tachycardia present.      Heart sounds: No murmur heard.  Pulmonary:      Effort: Pulmonary effort is normal. No respiratory distress.      Breath sounds: Normal breath sounds.   Abdominal:      General: There is no distension.      Palpations: Abdomen is soft.      Tenderness: There is no abdominal tenderness.   Musculoskeletal:         General: No tenderness. Normal range of motion.      Cervical back: Normal range of motion and neck supple.      Right lower leg: Edema present.      Left lower leg: Edema present.   Skin:     Capillary Refill: Capillary refill takes less than 2 seconds.      Findings: No rash.   Neurological:      Comments: Patient is unable to follow an organized neurologic examination.             ED Course     Labs Reviewed   CBC WITH DIFFERENTIAL WITH PLATELET - Abnormal; Notable for the following components:       Result Value    RBC 3.24 (*)     HGB 9.0 (*)     HCT 26.7 (*)     RDW-SD 49.9 (*)     RDW 17.0 (*)     .0 (*)     All other components within normal limits   COMP METABOLIC PANEL (14) - Abnormal; Notable for the following components:    Glucose 139 (*)     Chloride 117 (*)     CO2 12.0 (*)     Creatinine 1.14 (*)     Calcium, Total 7.9 (*)     Calculated Osmolality 300 (*)     eGFR-Cr 54 (*)     ALT 85 (*)      (*)     Alkaline Phosphatase 373 (*)     Total Protein 3.9 (*)     Albumin 1.9 (*)     All other components within normal limits   TROPONIN I HIGH SENSITIVITY - Abnormal; Notable for the following components:    Troponin I (High Sensitivity) 90 (*)     All other components within normal limits   LACTIC ACID, PLASMA - Abnormal; Notable for the following components:    Lactic Acid 6.0 (*)     All other components within normal limits   HEPATIC FUNCTION PANEL (7) - Abnormal; Notable for the following components:     (*)     ALT 85 (*)     Alkaline Phosphatase 373 (*)     Bilirubin, Direct 0.5 (*)     Total Protein 3.9 (*)     Albumin 1.9 (*)     All other  components within normal limits   URINALYSIS, ROUTINE - Abnormal; Notable for the following components:    Clarity Urine Turbid (*)     Blood Urine 1+ (*)     Protein Urine 70 (*)     Leukocyte Esterase Urine 500 (*)     WBC Urine >50 (*)     RBC Urine 3-5 (*)     Bacteria Urine Rare (*)     All other components within normal limits   PROTHROMBIN TIME (PT) - Abnormal; Notable for the following components:    PT 27.5 (*)     INR 2.41 (*)     All other components within normal limits   PTT, ACTIVATED - Abnormal; Notable for the following components:    PTT 94.5 (*)     All other components within normal limits   EXPANDED BLOOD GAS, ARTERIAL - Abnormal; Notable for the following components:    ABG pH 7.02 (*)     ABG pCO2 23 (*)     ABG PO2 39 (*)     ABG HCO3 6.0 (*)     Blood Gas Base Excess -23.3 (*)     Lactic Acid (Blood Gas) 9.6 (*)     Total Hemoglobin 8.8 (*)     ABG O2 Saturation 56.0 (*)     Oxygen Content 6.9 (*)     All other components within normal limits   MANUAL DIFFERENTIAL - Abnormal; Notable for the following components:    Lymphocyte Absolute Manual 0.60 (*)     Metamyelocyte Absolute Manual 0.26 (*)     Myelocyte Absolute Manual 0.09 (*)     Promyelocyte Absolute Manual 0.09 (*)     NRBC 2 (*)     RBC Morphology See morphology below (*)     Platelet Morphology See morphology below (*)     Microcytosis 2+ (*)     Hypochromia 2+ (*)     Giant platelets Few (*)     All other components within normal limits   LIPID PANEL - Abnormal; Notable for the following components:    HDL Cholesterol <5 (*)     All other components within normal limits   LACTIC ACID REFLEX POST POSTIVE - Abnormal; Notable for the following components:    Lactic Acid 8.8 (*)     All other components within normal limits   POCT GLUCOSE - Abnormal; Notable for the following components:    POC Glucose  13 (*)     All other components within normal limits   POCT GLUCOSE - Abnormal; Notable for the following components:    POC Glucose   69 (*)     All other components within normal limits   TSH W REFLEX TO FREE T4 - Normal   POCT GLUCOSE - Normal   POCT GLUCOSE - Normal   EKG    Rate, intervals and axes as noted on EKG Report.  Rate: 136 bpm  Rhythm: Sinus Rhythm  Reading: Nonspecific changes, abnormal      MDM          Admission disposition: 11/12/2024 10:39 PM    Intubation    Indications:  Respiratory distress    Procedure Details:  Intubation Method:  Direct  Patient Status:  Paralyzed (RSI)  Preoxygenation:  Nonrebreather mask    Anesthesia:     Pretreatment meds:  None    Sedation:      Sedation:  Etomidate  Paralytic:  Succinylcholine  Tube Size (mm):  7.0  Tube Type:  Cuffed  Breath Sounds:  Auscultated bilaterally  Cuff inflated?  Yes  Tube secured by respiratory therapist  Chest x-ray shows adequate position following intubation  Tube repositioned:  Tube repositioned successfully    Patient tolerance: Patient tolerated the procedure well.  Comments:       Procedure:  Central line placement: After verbal informed consent from patient; with the risks explained to be bleeding, infection, pain, retroperitoneal bleeding and or pneumothorax; maximal sterile barrier technique was uses including cap, gown, sterile gloves, large sheet, handwashing and chlorexidine prep.  The area anesthetized with 1% lidocaine.  The right femoral vein was punctured under ultrasound guidance then a wire introducer was placed, a triple lumen catheter was placed using Seldinger technique.  No complications.  Blood return low pressure, dark blood.  Patient tolerated procedure well.   Line placement verified by simple flow.    The procedure was performed by myself.      This case was discussed with the hospitalist who evaluated the patient in the emergency department as well as critical care, Dr. Wallace.  I also discussed this case with nephrology Dr. Stewart who recommended initiating a bicarb drip and D10.  Endocrinology, Dr. Lake was consulted as well.  She was  given a large dose of Solu-Cortef for concerns of adrenal insufficiency triggering this episode today as well.    Risk  Prescription drug management.  Parenteral controlled substances.  Decision regarding hospitalization.  Risk Details:   Critical Care Documentation    There were greater than 30 minutes of critical care time spent directly on this patient and this time did not include procedures but did include:    12 minutes for documentation  10 minutes for physical exam, re-examination and discussing results with patient and family  18 minutes discussing case with admitting physicians and consultants  10 minutes interpreting labs and diagnostic testing        Critical Care  Total time providing critical care: 50 minutes        Disposition and Plan     Clinical Impression:  1. Hypoglycemia    2. Adrenal insufficiency (HCC)    3. Altered mental status, unspecified altered mental status type         Disposition:  Admit  11/12/2024 10:39 pm    Follow-up:  No follow-up provider specified.        Supplementary Documentation:     [facility]  part of [facility]      Sepsis Reassessment Note    BP (!) 86/62   Pulse 111   Temp 99 °F (37.2 °C) (Rectal)   Resp 16   Wt 74.4 kg   SpO2 100%   BMI 30.00 kg/m²      I completed the sepsis reassessment at 1155pm    Cardiac:  Regularity: Regular  Rate: Tachycardic  Heart Sounds: S1,S2    Lungs:   Right: Clear  Left: Clear    Peripheral Pulses:  Radial: Right 1+ or Left 1+      Capillary Refill:  >3 Secs    Skin:  Temp/Moisture: Clammy   Color: Normal       Real Escalera MD  11/12/2024  11:48 PM             Hospital Problems       Present on Admission  Date Reviewed: 10/27/2024            ICD-10-CM Noted POA    * (Principal) Hypoglycemia E16.2 11/12/2024 Unknown    Acute kidney injury (HCC) N17.9 11/12/2024 Yes    Adrenal insufficiency (HCC) E27.40 8/27/2024 Unknown    Altered mental status, unspecified altered mental status type R41.82 9/5/2024 Unknown     Hyperglycemia R73.9 11/12/2024 Yes    Respiratory alkalosis E87.3 11/12/2024 Yes    Thrombocytopenia (HCC) D69.6 11/12/2024 Yes         11/13  HOSPITALIST:    Assessment & Plan:  ----------------------------------  Septic shock.  Due to UTI.  Next out on 3 pressors.  -Continue pressors.  -Will not escalate care     Urinary tract infection.  There is pus in the urine upon Cruz placement.     Goals of care/advance care planning.  I discussed with the patient's daughter at the bedside this morning.  We discussed her overall grim prognosis given the multiorgan failure.  Hypotension despite maximum pressor support, worsening tachycardia, tachypnea.  Daughter acknowledged that she understands how sick the patient is and does not want to escalate care.  She would not be in favor of CPR, shocks, ACLS should there be a non-perfusing rhythm.  She is confirmed DNR status.  35 minutes spent discussing advance care planning goals of care.     Other problems  AMRIT  Hypertension  Hypoglycemia  Anasarca  Hypokalemia             Vitals (last day) before discharge       Date/Time Temp Pulse Resp BP SpO2 Weight O2 Device O2 Flow Rate (L/min) [facility]    11/14/24 0000 -- 122 30 33/24 44 % -- -- --     11/13/24 2300 -- 129 28 70/29 -- -- -- --     11/13/24 2200 -- 125 33 51/34 75 % -- Ventilator --     11/13/24 2113 -- 132 32 49/30 74 % -- Ventilator --     11/13/24 2100 -- 123 31 34/15 66 % -- Ventilator --     11/13/24 2000 -- 124 61 54/44 64 % -- Ventilator --     11/13/24 1900 -- 99 41 35/23 50 % -- Ventilator --     11/13/24 1800 -- 132 32 46/32 59 % -- Ventilator --     11/13/24 1700 -- 132 30 35/19 61 % -- Ventilator --     11/13/24 1600 97.7 °F (36.5 °C) 140 28 34/20 65 % -- Ventilator --     11/13/24 1500 -- 138 34 97/71 53 % -- Ventilator -- MV    11/13/24 1400 -- 130 27 39/27 73 % -- Ventilator -- JR    11/13/24 1300 -- 148 28 32/22 73 % -- Ventilator -- JR    11/13/24 1200 97.4 °F (36.3 °C) 141 27 41/31 65  % -- Ventilator -- JR    11/13/24 1100 -- 131 31 54/37 57 % -- Ventilator -- JR    11/13/24 1000 -- 133 35 131/75 -- -- -- -- JR    11/13/24 0900 -- 112 30 73/28 81 % -- Ventilator -- MV    11/13/24 0800 97 °F (36.1 °C) -- -- -- -- -- -- -- JR    11/13/24 0800 -- 106 29 86/57 73 % -- Ventilator -- MV    11/13/24 0700 -- 130 30 79/53 87 % -- Ventilator -- NA    11/13/24 0645 -- 97 29 114/91 74 % -- -- -- NA    11/13/24 0630 -- 89 29 64/42 82 % -- -- -- NA    11/13/24 0615 -- 132 28 98/61 -- -- -- -- NA    11/13/24 0600 -- 131 31 102/59 -- -- Ventilator -- NA    11/13/24 0545 -- 129 26 72/48 -- -- -- -- NA    11/13/24 0530 -- 129 28 54/39 -- -- -- -- NA    11/13/24 0515 -- 134 24 62/46 -- -- -- -- NA    11/13/24 0510 -- 133 30 53/26 -- -- -- -- NA    11/13/24 0505 -- 130 29 86/75 -- -- -- -- NA    11/13/24 0500 -- 127 20 65/54 -- -- -- -- NA    11/13/24 0450 -- 125 26 65/54 -- -- -- -- NA    11/13/24 0440 -- 123 26 54/39 -- -- -- -- NA    11/13/24 0430 -- 125 22 62/27 -- -- Ventilator -- NA    11/13/24 0420 -- 125 22 61/31 -- -- -- -- NA    11/13/24 0415 -- 127 26 57/42 -- -- -- -- NA    11/13/24 0400 96.7 °F (35.9 °C) 129 26 92/36 -- -- Ventilator -- NA    11/13/24 0345 -- 128 26 98/81 -- -- -- -- NA    11/13/24 0330 -- 124 22 78/50 -- -- -- -- NA    11/13/24 0300 -- 123 27 93/69 100 % -- Ventilator -- NA    11/13/24 0230 -- 124 27 107/86 100 % -- Ventilator -- NA    11/13/24 0200 -- 114 24 105/70 100 % -- Ventilator -- NA    11/13/24 0140 -- 114 23 126/101 100 % -- Ventilator -- NA    11/13/24 0135 -- 111 22 114/81 100 % -- Ventilator -- NA    11/13/24 0130 -- 108 27 98/78 100 % -- Ventilator -- NA    11/13/24 0125 -- 111 23 116/59 97 % -- Ventilator -- NA    11/13/24 0120 -- 109 17 62/23 90 % -- Ventilator -- NA    11/13/24 0110 -- 103 17 118/96 82 % -- Ventilator -- NA    11/13/24 0108 -- 112 25 -- -- -- -- -- NA    11/13/24 0105 95.8 °F (35.4 °C) 105 19 84/38 79 % -- Ventilator -- NA    11/13/24 0100 -- 95 23  86/70 -- -- Ventilator -- NA    11/13/24 0040 -- 46 19 97/62 90 % -- -- -- LT    11/13/24 0025 -- 113 19 108/72 97 % -- -- -- LT    11/13/24 0020 -- 114 17 77/53 -- -- -- -- LT    11/13/24 0010 -- 117 18 83/73 100 % -- -- -- LT

## 2024-11-15 NOTE — PAYOR COMM NOTE
--------------  DISCHARGE REVIEW    Payor: [facility] STATE [facility]MO  Subscriber #:  [id number]  Authorization Number: [id number]    Admit date: 10/27/24  Admit time:  11:57 PM  Discharge Date: 2024  6:00 PM     Admitting Physician: Jocelyn Rivera MD  Attending Physician:  No att. providers found  Primary Care Physician: Sulma Juarez MD          Discharge Summary Notes        Discharge Summary signed by Osmar Chong MD at 2024 10:23 AM       Author: Osmar Chong MD Specialty: HOSPITALIST Author Type: Physician    Filed: 2024 10:23 AM Date of Service: 2024 10:18 AM Status: Signed    : Osmar Chong MD (Physician)         [facility]  part of [facility]    Discharge Summary    Sheri Garzon Patient Status:  Inpatient    1960 MRN [id number]   Location [facility] 5SW/SE Attending Osmar Chong MD   Hosp Day # 13 PCP SULMA JUAREZ MD     Date of Admission: 10/27/2024 Disposition: SNF Subacute Rehab     Date of Discharge: 2024    Admitting Diagnosis: Cellulitis of right lower extremity [L03.115]    Hospital Discharge Diagnoses:   Odynophagia/esophageal web.    Metabolic Acidosis  Right lower extremity ulcer.   Hypoglycemia.   Left upper extremity swelling.    Metabolic acidosis  Anxiety  Asthma  Adrenal insufficiency  GERD  Hypertension  Dyslipidemia  History of coronary artery disease  Rheumatoid arthritis  Sleep apnea    Lace+ Score: 59  59-90 High Risk  29-58 Medium Risk  0-28   Low Risk.    TCM Follow-Up Recommendation:  LACE > 58: High Risk of readmission after discharge from the hospital.      Problem List:   Patient Active Problem List   Diagnosis    Dehydration    Metabolic acidosis    Bilious vomiting with nausea    Difficult intravenous access    Hypokalemia    Hypomagnesemia    Hiatal hernia    Cellulitis    RTA (renal tubular acidosis)    Adrenal insufficiency ([place]'s disease) (HCC)    Current  chronic use of systemic steroids    Neutropenia (HCC)    Anemia of infection    Weakness generalized    Leg edema    Adrenal insufficiency (HCC)    Altered mental status, unspecified altered mental status type    Acute psychosis (HCC)    Moderate bipolar I disorder, current or most recent episode depressed, with psychotic features, with mixed features (HCC)    Episodic mood disorder (HCC)    Malnutrition (HCC)    Palliative care encounter    Goals of care, counseling/discussion       Reason for Admission: N/V    Physical Exam:   General appearance: alert, appears stated age and cooperative  Pulmonary:  clear to auscultation bilaterally  Cardiovascular: S1, S2 normal, no murmur, click, rub or gallop, regular rate and rhythm  Abdominal: soft, non-tender; bowel sounds normal; no masses,  no organomegaly  Extremities: extremities normal, atraumatic, no cyanosis or edema  Psychiatric: calm      History of Present Illness: Sheri Garzon is a 64 year old female with history of anxiety, asthma, GERD, HTN, HLD, MI, RA, sleep apnea who was sent to the ED from SNF for evaluation of intractable nausea and vomiting, right leg wound.  Symptoms started a few weeks ago, patient has had difficulty eating and keeping food down.  Patient notes episodes\" after taking certain medications.    No chest pain, shortness of breath, cough, fever, chills.  No diarrhea.  A few days ago, she had abdominal pain which resolved after treatment of constipation.  She has also had a chronic right lower extremity wound.  Today wound was noted with thick brown-colored drainage, prompting ED visit.     She was recently admitted and treated for cellulitis.  Wound was being managed at the nursing home.  Cultures obtained.  She has been started on vancomycin.     CTAP: Nonspecific gastric wall thickening which may be indicative of underlying gastritis/gastroduodenitis.  If not already performed, endoscopic evaluation may be of benefit.  Otherwise no acute  intra-abdominal process.  Labs stable except potassium 3.1, CO2 18, alk phos 534  Vital stable.     She has been admitted for gastritis, unable to tolerate p.o., recurrent cellulitis.       Hospital Course:   Odynophagia/esophageal web.  Abnormal esophagram  -Encouraged oral intake, there may be a functional issue now that has developed - despite prompting she did not eat anything yesterday except for a bite of grilled cheese  -GI consult  Repeat double contrast study to confirmed no more obstruction.    -tolerating  oral medications for now  -EGD 11/2 shows esophageal web which was dilated  -Diet tolerated  -Psychiatry on consult, added Remeron - hopefully will help with appetite  -Palliative care consulted as well  -Eating a little better but not much     Metabolic Acidosis  -likely due to starvation and RTA  -resume home bicarb     Right lower extremity ulcer.  Possible overlying cellulitis on right foot dorsum possible.  Ulceration on the back of the right lower leg does not appear infected.  White count is normal.  Afebrile.  -abx stopped after vanco and bactrim    -outpt vascular evaluation if fails to heal     Hypoglycemia.  Due to poor oral intake.  Continue hypoglycemic protocol.  Strongly encouraged continued oral intake.  At this point her oral intake is the limiting factor to discharge  -Dietitian     Left upper extremity swelling.  May have some focal edema.  No evidence of DVT associated with PICC line.  PICC line is in proper place and functioning well.  -Manage expectantly     Other problems  Metabolic acidosis  Anxiety  Asthma  Adrenal insufficiency  GERD  Hypertension  Dyslipidemia  History of coronary artery disease  Rheumatoid arthritis  Sleep apnea    Consultations: GI, Psych    Procedures: Double contrast esophagram    Complications: none    Discharge Condition: Good    Discharge Medications:      Discharge Medications        START taking these medications        Instructions Prescription  details   HYDROcodone-acetaminophen 7.5-325 MG/15ML Soln  Replaces: HYDROcodone-acetaminophen  MG Tabs      Take 15 mL by mouth every 4 (four) hours as needed.   Stop taking on: November 17, 2024  Quantity: 1 each  Refills: 0     mirtazapine 15 MG Tabs  Commonly known as: Remeron      Take 1 tablet (15 mg total) by mouth nightly.   Quantity: 30 tablet  Refills: 0     ondansetron 8 MG Tbdp  Commonly known as: Zofran-ODT      Take 1 tablet (8 mg total) by mouth TID (Nitrates).   Refills: 0     sucralfate 1 GM/10ML Susp  Commonly known as: Carafate      Take 10 mL (1 g total) by mouth 4 (four) times daily before meals and nightly (2200).   Stop taking on: December 7, 2024  Quantity: 1200 mL  Refills: 0            CHANGE how you take these medications        Instructions Prescription details   predniSONE 10 MG Tabs  Commonly known as: Deltasone  What changed:   how much to take  how to take this  when to take this      Take 20mg (2 tabs) in AM and 10mg (1tab) for 4 days then resume 10mg twice  a day indefinitely   Quantity: 30 tablet  Refills: 0            CONTINUE taking these medications        Instructions Prescription details   Acetaminophen 8 Hour 650 MG Tbcr  Generic drug: Acetaminophen ER      Take 2-3 tablets (1,300-1,950 mg total) by mouth every 8 (eight) hours as needed for Pain.   Refills: 0     albuterol 108 (90 Base) MCG/ACT Aers  Commonly known as: Ventolin HFA      Inhale 2 puffs into the lungs every 4 to 6 hours as needed for Wheezing.   Refills: 0     Aspirin 81 MG Caps      Take 81 mg by mouth daily.   Refills: 0     atorvastatin 10 MG Tabs  Commonly known as: Lipitor      Take 1 tablet (10 mg total) by mouth daily.   Refills: 0     clopidogrel 75 MG Tabs  Commonly known as: Plavix      Take 1 tablet (75 mg total) by mouth daily.   Refills: 0     clotrimazole-betamethasone 1-0.05 % Crea  Commonly known as: Lotrisone      Apply 1 Application topically 2 (two) times daily. Apply to groin and  abdominal folds   Refills: 0     collagenase 250 UNIT/GM Oint  Commonly known as: Santyl      Apply topically daily.   Refills: 0     Coppermin 5 MG Tabs  Generic drug: Copper      Take 1 tablet by mouth daily.   Quantity: 30 tablet  Refills: 0     docusate sodium 100 MG Caps  Commonly known as: Colace      Take 1 capsule (100 mg total) by mouth 2 (two) times daily.   Refills: 0     ergocalciferol 1.25 MG (56367 UT) Caps  Commonly known as: Vitamin D2      Take 1 capsule (50,000 Units total) by mouth once a week.   Refills: 0     folic acid 1 MG Tabs  Commonly known as: Folvite      Take 1 tablet (1 mg total) by mouth daily.   Quantity: 90 tablet  Refills: 0     hydroxychloroquine 200 MG Tabs  Commonly known as: Plaquenil      Take 1 tablet (200 mg total) by mouth 2 (two) times daily.   Quantity: 180 tablet  Refills: 0     lamoTRIgine 25 MG Tabs  Commonly known as: LaMICtal      Take 2 tablets (50 mg total) by mouth 2 (two) times daily.   Stop taking on: December 7, 2024  Quantity: 120 tablet  Refills: 0     metoprolol tartrate 25 MG Tabs  Commonly known as: Lopressor      Take 0.5 tablets (12.5 mg total) by mouth 2 (two) times daily. Hold for SBP <100 and HR <60   Stop taking on: December 7, 2024  Quantity: 30 tablet  Refills: 0     ondansetron 4 mg tablet  Commonly known as: Zofran      Take 1 tablet (4 mg total) by mouth every 8 (eight) hours as needed for Nausea.   Refills: 0     oxybutynin ER 10 MG Tb24  Commonly known as: Ditropan-XL      Take 1 tablet (10 mg total) by mouth daily.   Refills: 0     sodium bicarbonate 650 MG Tabs      Take 1 tablet (650 mg total) by mouth 2 (two) times daily.   Stop taking on: December 7, 2024  Quantity: 60 tablet  Refills: 0            STOP taking these medications      Ferrous Gluconate 324 (38 Fe) MG Tabs        HYDROcodone-acetaminophen  MG Tabs  Commonly known as: Norco  Replaced by: HYDROcodone-acetaminophen 7.5-325 MG/15ML Soln        ibuprofen 200 MG  Tabs  Commonly known as: Motrin        pantoprazole 40 MG Tbec  Commonly known as: Protonix        Potassium Chloride ER 10 MEQ Tbcr        QUEtiapine 50 MG Tabs  Commonly known as: SEROquel                  Where to Get Your Medications        These medications were sent to [id number] - [place], [place] - [address] AT [facility], [phone], [phone] [address], [facility] [address]      Phone: [phone]   lamoTRIgine 25 MG Tabs  metoprolol tartrate 25 MG Tabs  sodium bicarbonate 650 MG Tabs       Please  your prescriptions at the location directed by your doctor or nurse    Bring a paper prescription for each of these medications  HYDROcodone-acetaminophen 7.5-325 MG/15ML Soln  mirtazapine 15 MG Tabs  sucralfate 1 GM/10ML Susp         Follow up Visits: Follow-up with  in 1 week    Follow up Labs:      Other Discharge Instructions:     Osmar Chong MD  11/9/2024  10:18 AM    > 35 min     Electronically signed by Osmar Chong MD on 11/9/2024 10:23 AM         REVIEWER COMMENTS

## 2024-11-18 ENCOUNTER — TELEPHONE (OUTPATIENT)
Dept: INTERNAL MEDICINE UNIT | Facility: HOSPITAL | Age: 64
End: 2024-11-18

## 2024-11-18 NOTE — TELEPHONE ENCOUNTER
Death certificate completed and signed by Hospitalist MD Dr. Hinds. Submitted via fax to [phone]. Confirmation fax received.

## 2024-11-18 NOTE — PAYOR COMM NOTE
Pt       DISCHARGE REVIEW    Payor: [facility] OUT OF STATE HMO  Subscriber #:  [id number]  Authorization Number: [id number]    Admit date: 24  Admit time:  12:54 AM  Discharge Date: 2024  5:32 AM     Admitting Physician: Christianne Davis MD  Attending Physician:  No att. providers found  Primary Care Physician: Sulma Juarez MD          Discharge Summary Notes        Discharge Summary signed by John Hinds MD at 2024  8:06 AM       Author: John Hinds MD Specialty: HOSPITALIST Author Type: Physician    Filed: 2024  8:06 AM Date of Service: 2024  8:04 AM Status: Signed    : John Hinds MD (Physician)           [facility]  part of [facility]     DISCHARGE SUMMARY     Sheri Garzon Patient Status:  Inpatient    1960 MRN [id number]   Location [facility] 2W/ Attending John Hinds MD   Hosp Day # 1 PCP SULMA JUAREZ MD     DATE OF ADMISSION: 2024  DATE OF DISCHARGE:  24  DISPOSITION:     DISCHARGE DIAGNOSES:  Septic shock  Urinary tract infection  AMRIT  Hypertension  Hypoglycemia  Anasarca  Hypokalemia    HISTORY OF PRESENT ILLNESS (COPIED FROM ADMISSION H&P)  Patient is a 64-year-old  female who resides in [facility]. Sent today for evaluation of increased encephalopathy and low blood glucose. She was given glucagon at the nursing facility and sent to the emergency department for evaluation. Accu-Chek was 13. She was given IV Solu-Cortef via peripheral line access and given D50 twice with persistent hypoglycemia. Started on D10 drip. CBC, chemistry, TSH, liver function tests, lactic acid, blood cultures, and troponin were obtained. Patient will be admitted to progressive care unit.     HOSPITAL COURSE:  Patient was admitted to the ICU.  She was quite ill at that time with septic shock requiring 3 pressors.  Even with this there is minimal response to blood pressure.  She  became progressively tachycardic, tachypneic.  Prognosis was grim.  Had long discussion with the patient's daughter regarding this and she elected to not escalate care.  Patient continued to decline over the course of the evening on 11/13 and eventually passed away on 11/14.    Electronically signed by John Hinds MD on 11/14/2024  8:06 AM         REVIEWER COMMENTS

## 2024-11-20 LAB — BACTERIA BLD CULT: POSITIVE

## 2024-11-21 LAB — C ALBICANS DNA BLD POS QL NAA+NON-PROBE: DETECTED

## (undated) DEVICE — FORCEPS BX LG 2.4MM X 240CM NDL RAD JAW 4

## (undated) DEVICE — GIJAW SINGLE-USE BIOPSY FORCEPS WITH NEEDLE: Brand: GIJAW

## (undated) DEVICE — KIT CLEAN ENDOKIT 1.1OZ GOWNX2

## (undated) DEVICE — HERCULES 3 STAGE BALLOON ESOPHAGEAL: Brand: HERCULES

## (undated) DEVICE — MEDI-VAC NON-CONDUCTIVE SUCTION TUBING: Brand: CARDINAL HEALTH

## (undated) DEVICE — V2 SPECIMEN COLLECTION MANIFOLD KIT: Brand: NEPTUNE

## (undated) DEVICE — CONMED SCOPE SAVER BITE BLOCK, 20X27 MM: Brand: SCOPE SAVER

## (undated) DEVICE — KIT ENDO ORCAPOD 160/180/190

## (undated) DEVICE — MEDI-VAC NON-CONDUCTIVE SUCTION TUBING 6MM X 1.8M (6FT.) L: Brand: CARDINAL HEALTH

## (undated) DEVICE — YANKAUER,BULB TIP,W/O VENT,RIGID,STERILE: Brand: MEDLINE

## (undated) DEVICE — CO2 CANNULA,SSOFT,ADLT,7O2,4CO2,FEMALE: Brand: MEDLINE

## (undated) DEVICE — SYRINGE, LUER SLIP, STERILE, 60ML: Brand: MEDLINE

## (undated) DEVICE — DEVICE INFL 60ML 15ATM PRSS COOK SPHR

## (undated) NOTE — LETTER
[facility]  Administration of Anesthesia  I, Sheri Garzon agree to be cared for by a physician anesthesiologist alone and/or with a nurse anesthetist, who is specially trained to monitor me and give me medicine to put me to sleep or keep me comfortable during my procedure    I understand that my anesthesiologist and/or anesthetist is not an employee or agent of [facility] or [employer]. He or she works for [facility]    As the patient asking for anesthesia services, I agree to:  Allow the anesthesiologist (anesthesia doctor) to give me medicine and do additional procedures as necessary. Some examples are: Starting or using an “IV” to give me medicine, fluids or blood during my procedure, and having a breathing tube placed to help me breathe when I’m asleep (intubation). In the event that my heart stops working properly, I understand that my anesthesiologist will make every effort to sustain my life, unless otherwise directed by [facility] Do Not Resuscitate documents.  Tell my anesthesia doctor before my procedure:  If I am pregnant.  The last time that I ate or drank.  iii. All of the medicines I take (including prescriptions, herbal supplements, and pills I can buy without a prescription (including street drugs/illegal medications). Failure to inform my anesthesiologist about these medicines may increase my risk of anesthetic complications.  iv.If I am allergic to anything or have had a reaction to anesthesia before.  I understand how the anesthesia medicine will help me (benefits).  I understand that with any type of anesthesia medicine there are risks:  The most common risks are: nausea, vomiting, sore throat, muscle soreness, damage to my eyes, mouth, or teeth (from breathing tube placement).  Rare risks include: remembering what happened during my procedure, allergic reactions to medications, injury to my airway, heart, lungs, vision, nerves, or  muscles and in extremely rare instances death.  My doctor has explained to me other choices available to me for my care (alternatives).  Pregnant Patients (“epidural”):  I understand that the risks of having an epidural (medicine given into my back to help control pain during labor), include itching, low blood pressure, difficulty urinating, headache or slowing of the baby’s heart. Very rare risks include infection, bleeding, seizure, irregular heart rhythms and nerve injury.  Regional Anesthesia (“spinal”, “epidural”, & “nerve blocks”):  I understand that rare but potential complications include headache, bleeding, infection, seizure, irregular heart rhythms, and nerve injury.    _____________________________________________________________________________  Patient (or Representative) Signature/Relationship to Patient  Date   Time    _____________________________________________________________________________   Name (if used)    Language/Organization   Time    _____________________________________________________________________________  Nurse Anesthetist Signature     Date   Time  _____________________________________________________________________________  Anesthesiologist Signature     Date   Time  I have discussed the procedure and information above with the patient (or patient’s representative) and answered their questions. The patient or their representative has agreed to have anesthesia services.    _____________________________________________________________________________  Witness        Date   Time  I have verified that the signature is that of the patient or patient’s representative, and that it was signed before the procedure  Patient Name: Sheri Garzon     : 1960                 Printed: 2024 at 7:42 AM    Medical Record #: [id number]                                            Page 1 of 1  ----------ANESTHESIA CONSENT----------

## (undated) NOTE — Clinical Note
[facility]  Administration of Anesthesia  1. I, Sheri Garzon agree to be cared for by a physician anesthesiologist alone and/or with a nurse anesthetist, who is specially trained to monitor me and give me medicine to put me to sleep or keep me comfortable during my procedure    I understand that my anesthesiologist and/or anesthetist is not an employee or agent of [facility] or [employer]. He or she works for [facility]    2. As the patient asking for anesthesia services, I agree to:  a. Allow the anesthesiologist (anesthesia doctor) to give me medicine and do additional procedures as necessary. Some examples are: Starting or using an “IV” to give me medicine, fluids or blood during my procedure, and having a breathing tube placed to help me breathe when I’m asleep (intubation). In the event that my heart stops working properly, I understand that my anesthesiologist will make every effort to sustain my life, unless otherwise directed by [facility] Do Not Resuscitate documents.  b. Tell my anesthesia doctor before my procedure:  i. If I am pregnant.  ii. The last time that I ate or drank.  iii. All of the medicines I take (including prescriptions, herbal supplements, and pills I can buy without a prescription (including street drugs/illegal medications). Failure to inform my anesthesiologist about these medicines may increase my risk of anesthetic complications.  iv.If I am allergic to anything or have had a reaction to anesthesia before.  3. I understand how the anesthesia medicine will help me (benefits).  4. I understand that with any type of anesthesia medicine there are risks:  a. The most common risks are: nausea, vomiting, sore throat, muscle soreness, damage to my eyes, mouth, or teeth (from breathing tube placement).  b. Rare risks include: remembering what happened during my procedure, allergic reactions to medications, injury to my airway, heart,  lungs, vision, nerves, or muscles and in extremely rare instances death.  5. My doctor has explained to me other choices available to me for my care (alternatives).  6. Pregnant Patients (“epidural”):  I understand that the risks of having an epidural (medicine given into my back to help control pain during labor), include itching, low blood pressure, difficulty urinating, headache or slowing of the baby’s heart. Very rare risks include infection, bleeding, seizure, irregular heart rhythms and nerve injury.  7. Regional Anesthesia (“spinal”, “epidural”, & “nerve blocks”):  I understand that rare but potential complications include headache, bleeding, infection, seizure, irregular heart rhythms, and nerve injury.    _____________________________________________________________________________  Patient (or Representative) Signature/Relationship to Patient  Date   Time    _____________________________________________________________________________   Name (if used)    Language/Organization   Time    _____________________________________________________________________________  Nurse Anesthetist Signature     Date   Time  _____________________________________________________________________________  Anesthesiologist Signature     Date   Time  I have discussed the procedure and information above with the patient (or patient’s representative) and answered their questions. The patient or their representative has agreed to have anesthesia services.    _____________________________________________________________________________  Witness        Date   Time  I have verified that the signature is that of the patient or patient’s representative, and that it was signed before the procedure  Patient Name: Sheri Garzon     : 1960                 Printed: 10/29/2024 at 1:04 PM    Medical Record #: [id number]                                            Page 1 of 1  ----------ANESTHESIA CONSENT----------

## (undated) NOTE — LETTER
[facility]  Administration of Anesthesia  I, Sheri Garzon agree to be cared for by a physician anesthesiologist alone and/or with a nurse anesthetist, who is specially trained to monitor me and give me medicine to put me to sleep or keep me comfortable during my procedure    I understand that my anesthesiologist and/or anesthetist is not an employee or agent of [facility] or [employer]. He or she works for [facility]    As the patient asking for anesthesia services, I agree to:  Allow the anesthesiologist (anesthesia doctor) to give me medicine and do additional procedures as necessary. Some examples are: Starting or using an “IV” to give me medicine, fluids or blood during my procedure, and having a breathing tube placed to help me breathe when I’m asleep (intubation). In the event that my heart stops working properly, I understand that my anesthesiologist will make every effort to sustain my life, unless otherwise directed by [facility] Do Not Resuscitate documents.  Tell my anesthesia doctor before my procedure:  If I am pregnant.  The last time that I ate or drank.  iii. All of the medicines I take (including prescriptions, herbal supplements, and pills I can buy without a prescription (including street drugs/illegal medications). Failure to inform my anesthesiologist about these medicines may increase my risk of anesthetic complications.  iv.If I am allergic to anything or have had a reaction to anesthesia before.  I understand how the anesthesia medicine will help me (benefits).  I understand that with any type of anesthesia medicine there are risks:  The most common risks are: nausea, vomiting, sore throat, muscle soreness, damage to my eyes, mouth, or teeth (from breathing tube placement).  Rare risks include: remembering what happened during my procedure, allergic reactions to medications, injury to my airway, heart, lungs, vision, nerves, or  muscles and in extremely rare instances death.  My doctor has explained to me other choices available to me for my care (alternatives).  Pregnant Patients (“epidural”):  I understand that the risks of having an epidural (medicine given into my back to help control pain during labor), include itching, low blood pressure, difficulty urinating, headache or slowing of the baby’s heart. Very rare risks include infection, bleeding, seizure, irregular heart rhythms and nerve injury.  Regional Anesthesia (“spinal”, “epidural”, & “nerve blocks”):  I understand that rare but potential complications include headache, bleeding, infection, seizure, irregular heart rhythms, and nerve injury.    _____________________________________________________________________________  Patient (or Representative) Signature/Relationship to Patient  Date   Time    _____________________________________________________________________________   Name (if used)    Language/Organization   Time    _____________________________________________________________________________  Nurse Anesthetist Signature     Date   Time  _____________________________________________________________________________  Anesthesiologist Signature     Date   Time  I have discussed the procedure and information above with the patient (or patient’s representative) and answered their questions. The patient or their representative has agreed to have anesthesia services.    _____________________________________________________________________________  Witness        Date   Time  I have verified that the signature is that of the patient or patient’s representative, and that it was signed before the procedure  Patient Name: Sheri Garzon     : 1960                 Printed: 2024 at 7:56 AM    Medical Record #: [id number]                                            Page 1 of 1  ----------ANESTHESIA CONSENT----------

## (undated) NOTE — LETTER
[facility] [address], [place], [place]    Authorization for Surgical Operation and Procedure                               I hereby authorize Maryse Hardin MD, my physician and his/her assistants (if applicable), which may include medical students, residents, and/or fellows, to perform the following surgical operation/ procedure and administer such anesthesia as may be determined necessary by my physician: Operation/Procedure name (s) ESOPHAGOGASTRODUODENOSCOPY (EGD) on Sheri Garzon   2.   I recognize that during the surgical operation/procedure, unforeseen conditions may necessitate additional or different procedures than those listed above.  I, therefore, further authorize and request that the above-named surgeon, assistants, or designees perform such procedures as are, in their judgment, necessary and desirable.    3.   My surgeon/physician has discussed prior to my surgery the potential benefits, risks and side effects of this procedure; the likelihood of achieving goals; and potential problems that might occur during recuperation.  They also discussed reasonable alternatives to the procedure, including risks, benefits, and side effects related to the alternatives and risks related to not receiving this procedure.  I have had all my questions answered and I acknowledge that no guarantee has been made as to the result that may be obtained.    4.   Should the need arise during my operation/procedure, which includes change of level of care prior to discharge, I also consent to the administration of blood and/or blood products.  Further, I understand that despite careful testing and screening of blood or blood products by collecting agencies, I may still be subject to ill effects as a result of receiving a blood transfusion and/or blood products.  The following are some, but not all, of the potential risks that can occur: fever and allergic reactions, hemolytic reactions, transmission of  diseases such as Hepatitis, AIDS and Cytomegalovirus (CMV) and fluid overload.  In the event that I wish to have an autologous transfusion of my own blood, or a directed donor transfusion, I will discuss this with my physician.  Check only if Refusing Blood or Blood Products  I understand refusal of blood or blood products as deemed necessary by my physician may have serious consequences to my condition to include possible death. I hereby assume responsibility for my refusal and release the hospital, its personnel, and my physicians from any responsibility for the consequences of my refusal.    o  Refuse   5.   I authorize the use of any specimen, organs, tissues, body parts or foreign objects that may be removed from my body during the operation/procedure for diagnosis, research or teaching purposes and their subsequent disposal by hospital authorities.  I also authorize the release of specimen test results and/or written reports to my treating physician on the hospital medical staff or other referring or consulting physicians involved in my care, at the discretion of the Pathologist or my treating physician.    6.   I consent to the photographing or videotaping of the operations or procedures to be performed, including appropriate portions of my body for medical, scientific, or educational purposes, provided my identity is not revealed by the pictures or by descriptive texts accompanying them.  If the procedure has been photographed/videotaped, the surgeon will obtain the original picture, image, videotape or CD.  The hospital will not be responsible for storage, release or maintenance of the picture, image, tape or CD.    7.   I consent to the presence of a  or observers in the operating room as deemed necessary by my physician or their designees.    8.   I recognize that in the event my procedure results in extended X-Ray/fluoroscopy time, I may develop a skin reaction.    9. If I have a Do Not  Attempt Resuscitation (DNAR) order in place, that status will be suspended while in the operating room, procedural suite, and during the recovery period unless otherwise explicitly stated by me (or a person authorized to consent on my behalf). The surgeon or my attending physician will determine when the applicable recovery period ends for purposes of reinstating the DNAR order.  10. Patients having a sterilization procedure: I understand that if the procedure is successful the results will be permanent and it will therefore be impossible for me to inseminate, conceive, or bear children.  I also understand that the procedure is intended to result in sterility, although the result has not been guaranteed.   11. I acknowledge that my physician has explained sedation/analgesia administration to me including the risk and benefits I consent to the administration of sedation/analgesia as may be necessary or desirable in the judgment of my physician.    I CERTIFY THAT I HAVE READ AND FULLY UNDERSTAND THE ABOVE CONSENT TO OPERATION and/or OTHER PROCEDURE.     ____________________________________  _________________________________        ______________________________  Signature of Patient    Signature of Responsible Person                Printed Name of Responsible Person                                      ____________________________________  _____________________________                ________________________________  Signature of Witness        Date  Time         Relationship to Patient    STATEMENT OF PHYSICIAN My signature below affirms that prior to the time of the procedure; I have explained to the patient and/or his/her legal representative, the risks and benefits involved in the proposed treatment and any reasonable alternative to the proposed treatment. I have also explained the risks and benefits involved in refusal of the proposed treatment and alternatives to the proposed treatment and have answered the  patient's questions. If I have a significant financial interest in a co-management agreement or a significant financial interest in any product or implant, or other significant relationship used in this procedure/surgery, I have disclosed this and had a discussion with my patient.     _____________________________________________________              _____________________________  (Signature of Physician)                                                                                         (Date)                                   (Time)  Patient Name: Sheri Garzon      : 1960      Printed: 2024     Medical Record #: [id number]                                      Page 1 of 1

## (undated) NOTE — LETTER
[place] [address]  Authorization for Invasive Procedures  Date: 10/31/2024        Time: 11:10 AM    I hereby authorize VICOTR M Wynne, my physician and his/her assistants (if applicable), which may include medical students, residents, and/or fellows, to perform the following surgical operation/ procedure and administer such anesthesia as may be determined necessary by my physician: Esophagogastroduodenoscopy with possible biopsy and dilatation on Sheri MONISHA Garzon  2.   I recognize that during the surgical operation/procedure, unforeseen conditions may necessitate additional or different procedures than those listed above.  I, therefore, further authorize and request that the above-named surgeon, assistants, or designees perform such procedures as are, in their judgment, necessary and desirable.    3.   My surgeon/physician has discussed prior to my surgery the potential benefits, risks and side effects of this procedure; the likelihood of achieving goals; and potential problems that might occur during recuperation.  They also discussed reasonable alternatives to the procedure, including risks, benefits, and side effects related to the alternatives and risks related to not receiving this procedure.  I have had all my questions answered and I acknowledge that no guarantee has been made as to the result that may be obtained.    4.   Should the need arise during my operation/procedure, which includes change of level of care prior to discharge, I also consent to the administration of blood and/or blood products.  Further, I understand that despite careful testing and screening of blood or blood products by collecting agencies, I may still be subject to ill effects as a result of receiving a blood transfusion and/or blood products.  The following are some, but not all, of the potential risks that can occur: fever and allergic reactions, hemolytic reactions, transmission of diseases such as  Hepatitis, AIDS and Cytomegalovirus (CMV) and fluid overload.  In the event that I wish to have an autologous transfusion of my own blood, or a directed donor transfusion, I will discuss this with my physician.   Check only if Refusing Blood or Blood Products  I understand refusal of blood or blood products as deemed necessary by my physician may have serious consequences to my condition to include possible death. I hereby assume responsibility for my refusal and release the hospital, its personnel, and my physicians from any responsibility for the consequences of my refusal.         o  Refuse         5.   I authorize the use of any specimen, organs, tissues, body parts or foreign objects that may be removed from my body during the operation/procedure for diagnosis, research or teaching purposes and their subsequent disposal by hospital authorities.  I also authorize the release of specimen test results and/or written reports to my treating physician on the hospital medical staff or other referring or consulting physicians involved in my care, at the discretion of the Pathologist or my treating physician.    6.   I consent to the photographing or videotaping of the operations or procedures to be performed, including appropriate portions of my body for medical, scientific, or educational purposes, provided my identity is not revealed by the pictures or by descriptive texts accompanying them.  If the procedure has been photographed/videotaped, the surgeon will obtain the original picture, image, videotape or CD.  The hospital will not be responsible for storage, release or maintenance of the picture, image, tape or CD.    7.   I consent to the presence of a  or observers in the operating room as deemed necessary by my physician or their designees.    8.   I recognize that in the event my procedure results in extended X-Ray/fluoroscopy time, I may develop a skin reaction.    9. If I have a Do Not  Attempt Resuscitation (DNAR) order in place, that status will be suspended while in the operating room, procedural suite, and during the recovery period unless otherwise explicitly stated by me (or a person authorized to consent on my behalf). The surgeon or my attending physician will determine when the applicable recovery period ends for purposes of reinstating the DNAR order.  10. Patients having a sterilization procedure: I understand that if the procedure is successful the results will be permanent and it will therefore be impossible for me to inseminate, conceive, or bear children.  I also understand that the procedure is intended to result in sterility, although the result has not been guaranteed.   11. I acknowledge that my physician has explained sedation/analgesia administration to me including the risk and benefits I consent to the administration of sedation/analgesia as may be necessary or desirable in the judgment of my physician.    I CERTIFY THAT I HAVE READ AND FULLY UNDERSTAND THE ABOVE CONSENT TO OPERATION and/or OTHER PROCEDURE.        ____________________________________       _________________________________      ______________________________  Signature of Patient         Signature of Responsible Person        Printed Name of Responsible Person        ____________________________________      _________________________________      ______________________________       Signature of Witness          Relationship to Patient                       Date                                       Time  Patient Name: Sheri Garzon  : 1960    Reviewed: 2024   Printed: 2024  Medical Record #: [id number] Page 1 of 2             STATEMENT OF PHYSICIAN My signature below affirms that prior to the time of the procedure; I have explained to the patient and/or his/her legal representative, the risks and benefits involved in the proposed treatment and any reasonable alternative to the  proposed treatment. I have also explained the risks and benefits involved in refusal of the proposed treatment and alternatives to the proposed treatment and have answered the patient's questions. If I have a significant financial interest in a co-management agreement or a significant financial interest in any product or implant, or other significant relationship used in this procedure/surgery, I have disclosed this and had a discussion with my patient.     _______________________________________________________________ _____________________________  (Signature of Physician)                                                                                         (Date)                                   (Time)  Patient Name: Sheri Garzon  : 1960    Reviewed: 2024   Printed: 2024  Medical Record #: [id number] Page 2 of 2

## (undated) NOTE — LETTER
[place] [address]  Authorization for Invasive Procedures  Date: 10/30/2024        Time: 11:59 AM    I hereby authorize Dr. Maryse Hardin, my physician and his/her assistants (if applicable), which may include medical students, residents, and/or fellows, to perform the following surgical operation/ procedure and administer such anesthesia as may be determined necessary by my physician: Esophagogastroduodenoscopy with possible biopsy and dilation on Sheri BEARDEN Duran  2.   I recognize that during the surgical operation/procedure, unforeseen conditions may necessitate additional or different procedures than those listed above.  I, therefore, further authorize and request that the above-named surgeon, assistants, or designees perform such procedures as are, in their judgment, necessary and desirable.    3.   My surgeon/physician has discussed prior to my surgery the potential benefits, risks and side effects of this procedure; the likelihood of achieving goals; and potential problems that might occur during recuperation.  They also discussed reasonable alternatives to the procedure, including risks, benefits, and side effects related to the alternatives and risks related to not receiving this procedure.  I have had all my questions answered and I acknowledge that no guarantee has been made as to the result that may be obtained.    4.   Should the need arise during my operation/procedure, which includes change of level of care prior to discharge, I also consent to the administration of blood and/or blood products.  Further, I understand that despite careful testing and screening of blood or blood products by collecting agencies, I may still be subject to ill effects as a result of receiving a blood transfusion and/or blood products.  The following are some, but not all, of the potential risks that can occur: fever and allergic reactions, hemolytic reactions, transmission of diseases such as  Hepatitis, AIDS and Cytomegalovirus (CMV) and fluid overload.  In the event that I wish to have an autologous transfusion of my own blood, or a directed donor transfusion, I will discuss this with my physician.   Check only if Refusing Blood or Blood Products  I understand refusal of blood or blood products as deemed necessary by my physician may have serious consequences to my condition to include possible death. I hereby assume responsibility for my refusal and release the hospital, its personnel, and my physicians from any responsibility for the consequences of my refusal.         o  Refuse         5.   I authorize the use of any specimen, organs, tissues, body parts or foreign objects that may be removed from my body during the operation/procedure for diagnosis, research or teaching purposes and their subsequent disposal by hospital authorities.  I also authorize the release of specimen test results and/or written reports to my treating physician on the hospital medical staff or other referring or consulting physicians involved in my care, at the discretion of the Pathologist or my treating physician.    6.   I consent to the photographing or videotaping of the operations or procedures to be performed, including appropriate portions of my body for medical, scientific, or educational purposes, provided my identity is not revealed by the pictures or by descriptive texts accompanying them.  If the procedure has been photographed/videotaped, the surgeon will obtain the original picture, image, videotape or CD.  The hospital will not be responsible for storage, release or maintenance of the picture, image, tape or CD.    7.   I consent to the presence of a  or observers in the operating room as deemed necessary by my physician or their designees.    8.   I recognize that in the event my procedure results in extended X-Ray/fluoroscopy time, I may develop a skin reaction.    9. If I have a Do Not  Attempt Resuscitation (DNAR) order in place, that status will be suspended while in the operating room, procedural suite, and during the recovery period unless otherwise explicitly stated by me (or a person authorized to consent on my behalf). The surgeon or my attending physician will determine when the applicable recovery period ends for purposes of reinstating the DNAR order.  10. Patients having a sterilization procedure: I understand that if the procedure is successful the results will be permanent and it will therefore be impossible for me to inseminate, conceive, or bear children.  I also understand that the procedure is intended to result in sterility, although the result has not been guaranteed.   11. I acknowledge that my physician has explained sedation/analgesia administration to me including the risk and benefits I consent to the administration of sedation/analgesia as may be necessary or desirable in the judgment of my physician.    I CERTIFY THAT I HAVE READ AND FULLY UNDERSTAND THE ABOVE CONSENT TO OPERATION and/or OTHER PROCEDURE.        ____________________________________       _________________________________      ______________________________  Signature of Patient         Signature of Responsible Person        Printed Name of Responsible Person        ____________________________________      _________________________________      ______________________________       Signature of Witness          Relationship to Patient                       Date                                       Time  Patient Name: Sheri Garzon  : 1960    Reviewed: 2024   Printed: 2024  Medical Record #: [id number] Page 1 of 2             STATEMENT OF PHYSICIAN My signature below affirms that prior to the time of the procedure; I have explained to the patient and/or his/her legal representative, the risks and benefits involved in the proposed treatment and any reasonable alternative to the  proposed treatment. I have also explained the risks and benefits involved in refusal of the proposed treatment and alternatives to the proposed treatment and have answered the patient's questions. If I have a significant financial interest in a co-management agreement or a significant financial interest in any product or implant, or other significant relationship used in this procedure/surgery, I have disclosed this and had a discussion with my patient.     _______________________________________________________________ _____________________________  (Signature of Physician)                                                                                         (Date)                                   (Time)  Patient Name: Sheri Grazon  : 1960    Reviewed: 2024   Printed: 2024  Medical Record #: [id number] Page 2 of 2

## (undated) NOTE — LETTER
[facility] [address], [place], [place]    Authorization for Surgical Operation and Procedure                               I hereby authorize Tahira Escobar MD, my physician and his/her assistants (if applicable), which may include medical students, residents, and/or fellows, to perform the following surgical operation/ procedure and administer such anesthesia as may be determined necessary by my physician: Operation/Procedure name (s) ESOPHAGOGASTRODUODENOSCOPY (EGD) on Sheri Garzon   2.   I recognize that during the surgical operation/procedure, unforeseen conditions may necessitate additional or different procedures than those listed above.  I, therefore, further authorize and request that the above-named surgeon, assistants, or designees perform such procedures as are, in their judgment, necessary and desirable.    3.   My surgeon/physician has discussed prior to my surgery the potential benefits, risks and side effects of this procedure; the likelihood of achieving goals; and potential problems that might occur during recuperation.  They also discussed reasonable alternatives to the procedure, including risks, benefits, and side effects related to the alternatives and risks related to not receiving this procedure.  I have had all my questions answered and I acknowledge that no guarantee has been made as to the result that may be obtained.    4.   Should the need arise during my operation/procedure, which includes change of level of care prior to discharge, I also consent to the administration of blood and/or blood products.  Further, I understand that despite careful testing and screening of blood or blood products by collecting agencies, I may still be subject to ill effects as a result of receiving a blood transfusion and/or blood products.  The following are some, but not all, of the potential risks that can occur: fever and allergic reactions, hemolytic reactions,  transmission of diseases such as Hepatitis, AIDS and Cytomegalovirus (CMV) and fluid overload.  In the event that I wish to have an autologous transfusion of my own blood, or a directed donor transfusion, I will discuss this with my physician.  Check only if Refusing Blood or Blood Products  I understand refusal of blood or blood products as deemed necessary by my physician may have serious consequences to my condition to include possible death. I hereby assume responsibility for my refusal and release the hospital, its personnel, and my physicians from any responsibility for the consequences of my refusal.    o  Refuse   5.   I authorize the use of any specimen, organs, tissues, body parts or foreign objects that may be removed from my body during the operation/procedure for diagnosis, research or teaching purposes and their subsequent disposal by hospital authorities.  I also authorize the release of specimen test results and/or written reports to my treating physician on the hospital medical staff or other referring or consulting physicians involved in my care, at the discretion of the Pathologist or my treating physician.    6.   I consent to the photographing or videotaping of the operations or procedures to be performed, including appropriate portions of my body for medical, scientific, or educational purposes, provided my identity is not revealed by the pictures or by descriptive texts accompanying them.  If the procedure has been photographed/videotaped, the surgeon will obtain the original picture, image, videotape or CD.  The hospital will not be responsible for storage, release or maintenance of the picture, image, tape or CD.    7.   I consent to the presence of a  or observers in the operating room as deemed necessary by my physician or their designees.    8.   I recognize that in the event my procedure results in extended X-Ray/fluoroscopy time, I may develop a skin reaction.    9. If  I have a Do Not Attempt Resuscitation (DNAR) order in place, that status will be suspended while in the operating room, procedural suite, and during the recovery period unless otherwise explicitly stated by me (or a person authorized to consent on my behalf). The surgeon or my attending physician will determine when the applicable recovery period ends for purposes of reinstating the DNAR order.  10. Patients having a sterilization procedure: I understand that if the procedure is successful the results will be permanent and it will therefore be impossible for me to inseminate, conceive, or bear children.  I also understand that the procedure is intended to result in sterility, although the result has not been guaranteed.   11. I acknowledge that my physician has explained sedation/analgesia administration to me including the risk and benefits I consent to the administration of sedation/analgesia as may be necessary or desirable in the judgment of my physician.    I CERTIFY THAT I HAVE READ AND FULLY UNDERSTAND THE ABOVE CONSENT TO OPERATION and/or OTHER PROCEDURE.     ____________________________________  _________________________________        ______________________________  Signature of Patient    Signature of Responsible Person                Printed Name of Responsible Person                                      ____________________________________  _____________________________                ________________________________  Signature of Witness        Date  Time         Relationship to Patient    STATEMENT OF PHYSICIAN My signature below affirms that prior to the time of the procedure; I have explained to the patient and/or his/her legal representative, the risks and benefits involved in the proposed treatment and any reasonable alternative to the proposed treatment. I have also explained the risks and benefits involved in refusal of the proposed treatment and alternatives to the proposed treatment and have  answered the patient's questions. If I have a significant financial interest in a co-management agreement or a significant financial interest in any product or implant, or other significant relationship used in this procedure/surgery, I have disclosed this and had a discussion with my patient.     _____________________________________________________              _____________________________  (Signature of Physician)                                                                                         (Date)                                   (Time)  Patient Name: Sheri Garzon      : 1960      Printed: 10/31/2024     Medical Record #: [id number]                                      Page 1 of 1

## (undated) NOTE — LETTER
[facility]  Administration of Anesthesia  ISheri agree to be cared for by a physician anesthesiologist alone and/or with a nurse anesthetist, who is specially trained to monitor me and give me medicine to put me to sleep or keep me comfortable during my procedure    I understand that my anesthesiologist and/or anesthetist is not an employee or agent of [facility] or [employer]. He or she works for [facility]    As the patient asking for anesthesia services, I agree to:  Allow the anesthesiologist (anesthesia doctor) to give me medicine and do additional procedures as necessary. Some examples are: Starting or using an “IV” to give me medicine, fluids or blood during my procedure, and having a breathing tube placed to help me breathe when I’m asleep (intubation). In the event that my heart stops working properly, I understand that my anesthesiologist will make every effort to sustain my life, unless otherwise directed by [facility] Do Not Resuscitate documents.  Tell my anesthesia doctor before my procedure:  If I am pregnant.  The last time that I ate or drank.  iii. All of the medicines I take (including prescriptions, herbal supplements, and pills I can buy without a prescription (including street drugs/illegal medications). Failure to inform my anesthesiologist about these medicines may increase my risk of anesthetic complications.  iv.If I am allergic to anything or have had a reaction to anesthesia before.  I understand how the anesthesia medicine will help me (benefits).  I understand that with any type of anesthesia medicine there are risks:  The most common risks are: nausea, vomiting, sore throat, muscle soreness, damage to my eyes, mouth, or teeth (from breathing tube placement).  Rare risks include: remembering what happened during my procedure, allergic reactions to medications, injury to my airway, heart, lungs, vision, nerves, or  muscles and in extremely rare instances death.  My doctor has explained to me other choices available to me for my care (alternatives).  Pregnant Patients (“epidural”):  I understand that the risks of having an epidural (medicine given into my back to help control pain during labor), include itching, low blood pressure, difficulty urinating, headache or slowing of the baby’s heart. Very rare risks include infection, bleeding, seizure, irregular heart rhythms and nerve injury.  Regional Anesthesia (“spinal”, “epidural”, & “nerve blocks”):  I understand that rare but potential complications include headache, bleeding, infection, seizure, irregular heart rhythms, and nerve injury.    _____________________________________________________________________________  Patient (or Representative) Signature/Relationship to Patient  Date   Time    _____________________________________________________________________________   Name (if used)    Language/Organization   Time    _____________________________________________________________________________  Nurse Anesthetist Signature     Date   Time  _____________________________________________________________________________  Anesthesiologist Signature     Date   Time  I have discussed the procedure and information above with the patient (or patient’s representative) and answered their questions. The patient or their representative has agreed to have anesthesia services.    _____________________________________________________________________________  Witness        Date   Time  I have verified that the signature is that of the patient or patient’s representative, and that it was signed before the procedure  Patient Name: Sheri Garzon     : 1960                 Printed: 2024 at 3:07 PM    Medical Record #: [id number]                                            Page 1 of 1  ----------ANESTHESIA CONSENT----------

## (undated) NOTE — LETTER
[facility]  [address], [place], [place]    Authorization for Surgical Operation and Procedure                               I hereby authorize NEEL Wynne MD, my physician and his/her assistants (if applicable), which may include medical students, residents, and/or fellows, to perform the following surgical operation/ procedure and administer such anesthesia as may be determined necessary by my physician: Operation/Procedure name (s) ESOPHAGOGASTRODUODENOSCOPY (EGD) on Sheri Garzon   2.   I recognize that during the surgical operation/procedure, unforeseen conditions may necessitate additional or different procedures than those listed above.  I, therefore, further authorize and request that the above-named surgeon, assistants, or designees perform such procedures as are, in their judgment, necessary and desirable.    3.   My surgeon/physician has discussed prior to my surgery the potential benefits, risks and side effects of this procedure; the likelihood of achieving goals; and potential problems that might occur during recuperation.  They also discussed reasonable alternatives to the procedure, including risks, benefits, and side effects related to the alternatives and risks related to not receiving this procedure.  I have had all my questions answered and I acknowledge that no guarantee has been made as to the result that may be obtained.    4.   Should the need arise during my operation/procedure, which includes change of level of care prior to discharge, I also consent to the administration of blood and/or blood products.  Further, I understand that despite careful testing and screening of blood or blood products by collecting agencies, I may still be subject to ill effects as a result of receiving a blood transfusion and/or blood products.  The following are some, but not all, of the potential risks that can occur: fever and allergic reactions, hemolytic reactions, transmission  of diseases such as Hepatitis, AIDS and Cytomegalovirus (CMV) and fluid overload.  In the event that I wish to have an autologous transfusion of my own blood, or a directed donor transfusion, I will discuss this with my physician.  Check only if Refusing Blood or Blood Products  I understand refusal of blood or blood products as deemed necessary by my physician may have serious consequences to my condition to include possible death. I hereby assume responsibility for my refusal and release the hospital, its personnel, and my physicians from any responsibility for the consequences of my refusal.    o  Refuse   5.   I authorize the use of any specimen, organs, tissues, body parts or foreign objects that may be removed from my body during the operation/procedure for diagnosis, research or teaching purposes and their subsequent disposal by hospital authorities.  I also authorize the release of specimen test results and/or written reports to my treating physician on the hospital medical staff or other referring or consulting physicians involved in my care, at the discretion of the Pathologist or my treating physician.    6.   I consent to the photographing or videotaping of the operations or procedures to be performed, including appropriate portions of my body for medical, scientific, or educational purposes, provided my identity is not revealed by the pictures or by descriptive texts accompanying them.  If the procedure has been photographed/videotaped, the surgeon will obtain the original picture, image, videotape or CD.  The hospital will not be responsible for storage, release or maintenance of the picture, image, tape or CD.    7.   I consent to the presence of a  or observers in the operating room as deemed necessary by my physician or their designees.    8.   I recognize that in the event my procedure results in extended X-Ray/fluoroscopy time, I may develop a skin reaction.    9. If I have a Do  Not Attempt Resuscitation (DNAR) order in place, that status will be suspended while in the operating room, procedural suite, and during the recovery period unless otherwise explicitly stated by me (or a person authorized to consent on my behalf). The surgeon or my attending physician will determine when the applicable recovery period ends for purposes of reinstating the DNAR order.  10. Patients having a sterilization procedure: I understand that if the procedure is successful the results will be permanent and it will therefore be impossible for me to inseminate, conceive, or bear children.  I also understand that the procedure is intended to result in sterility, although the result has not been guaranteed.   11. I acknowledge that my physician has explained sedation/analgesia administration to me including the risk and benefits I consent to the administration of sedation/analgesia as may be necessary or desirable in the judgment of my physician.    I CERTIFY THAT I HAVE READ AND FULLY UNDERSTAND THE ABOVE CONSENT TO OPERATION and/or OTHER PROCEDURE.     ____________________________________  _________________________________        ______________________________  Signature of Patient    Signature of Responsible Person                Printed Name of Responsible Person                                      ____________________________________  _____________________________                ________________________________  Signature of Witness        Date  Time         Relationship to Patient    STATEMENT OF PHYSICIAN My signature below affirms that prior to the time of the procedure; I have explained to the patient and/or his/her legal representative, the risks and benefits involved in the proposed treatment and any reasonable alternative to the proposed treatment. I have also explained the risks and benefits involved in refusal of the proposed treatment and alternatives to the proposed treatment and have answered the  patient's questions. If I have a significant financial interest in a co-management agreement or a significant financial interest in any product or implant, or other significant relationship used in this procedure/surgery, I have disclosed this and had a discussion with my patient.     _____________________________________________________              _____________________________  (Signature of Physician)                                                                                         (Date)                                   (Time)  Patient Name: Sheri Garzon      : 1960      Printed: 2024     Medical Record #: [id number]                                      Page 1 of 1

## (undated) NOTE — LETTER
[facility] 2W/SW  [address] [facility] [address]  [phone]    Blood Transfusion Consent    In the course of your treatment, it may become necessary to administer a transfusion of blood or blood components. This form provides basic information concerning this procedure and, if signed by you, authorizes its administration. By signing this form, you agree that all of your questions about the administration of blood or blood products have been answered by the ordering medical professional or designee.    Description of Procedure  Blood is introduced into one of your veins, commonly in the arm, using a sterilized disposable needle. The amount of blood transfused, and whether the transfusion will be of blood or blood components is a judgement the physician will make based on your particular needs.    Risks  The transfusion is a common procedure of low risk.  MINOR AND TEMPORARY REACTIONS ARE NOT UNCOMMON, including a slight bruise, swelling or local reaction in the area where the needle pierces your skin, or a nonserious reaction to the transfused material itself, including headache, fever or mild skin reaction, such as rash.  Serious reactions are possible, though very unlikely, and include severe allergic reaction (shock) and destruction (hemolysis) of transfused blood cells.  Infectious diseases which are known to be transmitted by blood transfusion include certain types of viral Hepatitis(liver infection from a virus), Human Immunodeficiency Virus (HIV-1,2) infection, a viral infection known to cause Acquired Immunodeficiency Syndrome (AIDS), as well as certain other bacterial, viral, and parasitic diseases. While a minimal risk of acquiring an infectious disease from transfused blood exists, in accordance with the Federal and State law, all due care has been taken in donor selection and testing to avoid transmission of disease.    Alternatives  If loss of blood poses serious threats during your  treatment, THERE IS NO EFFECTIVE ALTERNATIVE TO BLOOD TRANSFUSION. However, if you have any further questions on this matter, your provider will fully explain the alternatives to you if it has not already been done.    I, ______________________________, have read/had read to me the above. I understand the matters bearing on the decision whether or not to authorize a transfusion of blood or blood components. I have no questions which have not been answered to my full satisfaction. I hereby consent to such transfusion as my physician may deem necessary or advisable in the course of my treatment.    ______________________________________________                    ___________________________  (Signature of Patient or Responsible party in case of minor,                 (Printed Name of Patient or incompetent, or unconscious patient)              Responsible Party)    ___________________________               _____________________  (Relationship to Patient if not self)                                    (Date and Time)    __________________________                                                           ______________________              (Signature of Witness)               (Printed Name of Witness)     Language line ()    Telephone/Verbal/Video Consent    __________________________                     ____________________  (Signature of 2nd Witness           (Printed Name of 2nd  Telephone/Verbal/Video Consent)           Witness)    Patient Name: Sheri Garzon     : 1960                 Printed: 2024     Medical Record #: [id number]      Rev: 2023

## (undated) NOTE — LETTER
[facility] [address], [place], [place]    Authorization for Surgical Operation and Procedure                               I hereby authorize Maryse Hardin MD, my physician and his/her assistants (if applicable), which may include medical students, residents, and/or fellows, to perform the following surgical operation/ procedure and administer such anesthesia as may be determined necessary by my physician: Operation/Procedure name (s) ESOPHAGOGASTRODUODENOSCOPY (EGD) on Sheri Garzon   2.   I recognize that during the surgical operation/procedure, unforeseen conditions may necessitate additional or different procedures than those listed above.  I, therefore, further authorize and request that the above-named surgeon, assistants, or designees perform such procedures as are, in their judgment, necessary and desirable.    3.   My surgeon/physician has discussed prior to my surgery the potential benefits, risks and side effects of this procedure; the likelihood of achieving goals; and potential problems that might occur during recuperation.  They also discussed reasonable alternatives to the procedure, including risks, benefits, and side effects related to the alternatives and risks related to not receiving this procedure.  I have had all my questions answered and I acknowledge that no guarantee has been made as to the result that may be obtained.    4.   Should the need arise during my operation/procedure, which includes change of level of care prior to discharge, I also consent to the administration of blood and/or blood products.  Further, I understand that despite careful testing and screening of blood or blood products by collecting agencies, I may still be subject to ill effects as a result of receiving a blood transfusion and/or blood products.  The following are some, but not all, of the potential risks that can occur: fever and allergic reactions, hemolytic reactions, transmission of  diseases such as Hepatitis, AIDS and Cytomegalovirus (CMV) and fluid overload.  In the event that I wish to have an autologous transfusion of my own blood, or a directed donor transfusion, I will discuss this with my physician.  Check only if Refusing Blood or Blood Products  I understand refusal of blood or blood products as deemed necessary by my physician may have serious consequences to my condition to include possible death. I hereby assume responsibility for my refusal and release the hospital, its personnel, and my physicians from any responsibility for the consequences of my refusal.    o  Refuse   5.   I authorize the use of any specimen, organs, tissues, body parts or foreign objects that may be removed from my body during the operation/procedure for diagnosis, research or teaching purposes and their subsequent disposal by hospital authorities.  I also authorize the release of specimen test results and/or written reports to my treating physician on the hospital medical staff or other referring or consulting physicians involved in my care, at the discretion of the Pathologist or my treating physician.    6.   I consent to the photographing or videotaping of the operations or procedures to be performed, including appropriate portions of my body for medical, scientific, or educational purposes, provided my identity is not revealed by the pictures or by descriptive texts accompanying them.  If the procedure has been photographed/videotaped, the surgeon will obtain the original picture, image, videotape or CD.  The hospital will not be responsible for storage, release or maintenance of the picture, image, tape or CD.    7.   I consent to the presence of a  or observers in the operating room as deemed necessary by my physician or their designees.    8.   I recognize that in the event my procedure results in extended X-Ray/fluoroscopy time, I may develop a skin reaction.    9. If I have a Do Not  Attempt Resuscitation (DNAR) order in place, that status will be suspended while in the operating room, procedural suite, and during the recovery period unless otherwise explicitly stated by me (or a person authorized to consent on my behalf). The surgeon or my attending physician will determine when the applicable recovery period ends for purposes of reinstating the DNAR order.  10. Patients having a sterilization procedure: I understand that if the procedure is successful the results will be permanent and it will therefore be impossible for me to inseminate, conceive, or bear children.  I also understand that the procedure is intended to result in sterility, although the result has not been guaranteed.   11. I acknowledge that my physician has explained sedation/analgesia administration to me including the risk and benefits I consent to the administration of sedation/analgesia as may be necessary or desirable in the judgment of my physician.    I CERTIFY THAT I HAVE READ AND FULLY UNDERSTAND THE ABOVE CONSENT TO OPERATION and/or OTHER PROCEDURE.     ____________________________________  _________________________________        ______________________________  Signature of Patient    Signature of Responsible Person                Printed Name of Responsible Person                                      ____________________________________  _____________________________                ________________________________  Signature of Witness        Date  Time         Relationship to Patient    STATEMENT OF PHYSICIAN My signature below affirms that prior to the time of the procedure; I have explained to the patient and/or his/her legal representative, the risks and benefits involved in the proposed treatment and any reasonable alternative to the proposed treatment. I have also explained the risks and benefits involved in refusal of the proposed treatment and alternatives to the proposed treatment and have answered the  patient's questions. If I have a significant financial interest in a co-management agreement or a significant financial interest in any product or implant, or other significant relationship used in this procedure/surgery, I have disclosed this and had a discussion with my patient.     _____________________________________________________              _____________________________  (Signature of Physician)                                                                                         (Date)                                   (Time)  Patient Name: Sheri Garzon      : 1960      Printed: 2024     Medical Record #: [id number]                                      Page 1 of 1

## (undated) NOTE — LETTER
[facility]  part of [facility]     PICC INSERTION CONSENT     I agree to have a Peripherally Inserted Central Catheter (PICC) placed in my arm.   1. The PICC insertion procedure, care, maintenance, risks, benefits, and complications have been explained to me by my physician, ________________________, and I understand them.   2. I understand that this may not be the only way I can receive my medication. I understand that my physician has determined that the PICC would be the safest and most effective means of administering my medication at this time. If there are other options of giving medication into my veins those options have been explained to me by my physician and I have chosen this one.   3. I realize a nurse who has been specially trained and certified by the hospital and ’s representative to insert a PICC will perform this procedure. My catheter will be inserted by _____________________________.   4. I have been informed by my doctor of the nature and purpose of this procedure and the risks involved and the possibility of complications. I realize that this is an invasive procedure and has certain risks such as air embolism (air entering the catheter or my vein), arterial puncture (a tearing of one of my arteries), infection, irregular heartbeat and venous thrombosis (a blood clot in a vein) nerve injury and fracture of the catheter with or without migration.   5. In order to numb the area where the line will be placed, a small amount of anesthetic medication will be injected as ordered by my physician.   6. I understand that while the catheter will be placed in my upper arm the end of the catheter will come to rest in an area near my heart.     7. The person performing this procedure has discussed the potential benefits, risks, and side effects of the PICC; the likelihood of achieving goals; and potential problems that might occur during recuperation. They also discussed  reasonable alternatives to the PICC, including risks, benefits, and side effects related to the alternatives and risks related to not receiving this procedure.    8.  I have expressed any questions about this procedure to my physician or the PICC Proceduralist and he/she has answered them.  I certify that I have read and understand this consent to the insertion of a PICC.      _________________________________________________________   Date     Time     Patient/Guardian Signature       ____________________________________   Printed name of Patient/Guardian          ________________________________________________________________    Date        Time                   Witnessing RN Signature      Patient Name: Sheri Garzon     : 1960                 Printed: 2024     Medical Record #: [id number]

## (undated) NOTE — Clinical Note
TCM assessment completed. Please see notes.  The patient is scheduled to establish care with you on 08/08/2024.  Thank you.

## (undated) NOTE — LETTER
[facility]  Administration of Anesthesia  I, Sheri Garzon agree to be cared for by a physician anesthesiologist alone and/or with a nurse anesthetist, who is specially trained to monitor me and give me medicine to put me to sleep or keep me comfortable during my procedure    I understand that my anesthesiologist and/or anesthetist is not an employee or agent of [facility] or [employer]. He or she works for [facility]    As the patient asking for anesthesia services, I agree to:  Allow the anesthesiologist (anesthesia doctor) to give me medicine and do additional procedures as necessary. Some examples are: Starting or using an “IV” to give me medicine, fluids or blood during my procedure, and having a breathing tube placed to help me breathe when I’m asleep (intubation). In the event that my heart stops working properly, I understand that my anesthesiologist will make every effort to sustain my life, unless otherwise directed by [facility] Do Not Resuscitate documents.  Tell my anesthesia doctor before my procedure:  If I am pregnant.  The last time that I ate or drank.  iii. All of the medicines I take (including prescriptions, herbal supplements, and pills I can buy without a prescription (including street drugs/illegal medications). Failure to inform my anesthesiologist about these medicines may increase my risk of anesthetic complications.  iv.If I am allergic to anything or have had a reaction to anesthesia before.  I understand how the anesthesia medicine will help me (benefits).  I understand that with any type of anesthesia medicine there are risks:  The most common risks are: nausea, vomiting, sore throat, muscle soreness, damage to my eyes, mouth, or teeth (from breathing tube placement).  Rare risks include: remembering what happened during my procedure, allergic reactions to medications, injury to my airway, heart, lungs, vision, nerves, or  muscles and in extremely rare instances death.  My doctor has explained to me other choices available to me for my care (alternatives).  Pregnant Patients (“epidural”):  I understand that the risks of having an epidural (medicine given into my back to help control pain during labor), include itching, low blood pressure, difficulty urinating, headache or slowing of the baby’s heart. Very rare risks include infection, bleeding, seizure, irregular heart rhythms and nerve injury.  Regional Anesthesia (“spinal”, “epidural”, & “nerve blocks”):  I understand that rare but potential complications include headache, bleeding, infection, seizure, irregular heart rhythms, and nerve injury.    _____________________________________________________________________________  Patient (or Representative) Signature/Relationship to Patient  Date   Time    _____________________________________________________________________________   Name (if used)    Language/Organization   Time    _____________________________________________________________________________  Nurse Anesthetist Signature     Date   Time  _____________________________________________________________________________  Anesthesiologist Signature     Date   Time  I have discussed the procedure and information above with the patient (or patient’s representative) and answered their questions. The patient or their representative has agreed to have anesthesia services.    _____________________________________________________________________________  Witness        Date   Time  I have verified that the signature is that of the patient or patient’s representative, and that it was signed before the procedure  Patient Name: Sheri Garzon     : 1960                 Printed: 10/30/2024 at 11:58 AM    Medical Record #: [id number]                                            Page 1 of 1  ----------ANESTHESIA CONSENT----------

## (undated) NOTE — LETTER
[place] [address]  Authorization for Invasive Procedures  Date: 11/1/2024         Time: 5:22 PM    I hereby authorize Dr. Reggie Erwin, my physician and his/her assistants (if applicable), which may include medical students, residents, and/or fellows, to perform the following surgical operation/ procedure and administer such anesthesia as may be determined necessary by my physician: Esophagogastroduodenoscopy with possible biopsy and dilatation on Sheri MONISHA Garzon  2.   I recognize that during the surgical operation/procedure, unforeseen conditions may necessitate additional or different procedures than those listed above.  I, therefore, further authorize and request that the above-named surgeon, assistants, or designees perform such procedures as are, in their judgment, necessary and desirable.    3.   My surgeon/physician has discussed prior to my surgery the potential benefits, risks and side effects of this procedure; the likelihood of achieving goals; and potential problems that might occur during recuperation.  They also discussed reasonable alternatives to the procedure, including risks, benefits, and side effects related to the alternatives and risks related to not receiving this procedure.  I have had all my questions answered and I acknowledge that no guarantee has been made as to the result that may be obtained.    4.   Should the need arise during my operation/procedure, which includes change of level of care prior to discharge, I also consent to the administration of blood and/or blood products.  Further, I understand that despite careful testing and screening of blood or blood products by collecting agencies, I may still be subject to ill effects as a result of receiving a blood transfusion and/or blood products.  The following are some, but not all, of the potential risks that can occur: fever and allergic reactions, hemolytic reactions, transmission of diseases such  as Hepatitis, AIDS and Cytomegalovirus (CMV) and fluid overload.  In the event that I wish to have an autologous transfusion of my own blood, or a directed donor transfusion, I will discuss this with my physician.   Check only if Refusing Blood or Blood Products  I understand refusal of blood or blood products as deemed necessary by my physician may have serious consequences to my condition to include possible death. I hereby assume responsibility for my refusal and release the hospital, its personnel, and my physicians from any responsibility for the consequences of my refusal.         o  Refuse         5.   I authorize the use of any specimen, organs, tissues, body parts or foreign objects that may be removed from my body during the operation/procedure for diagnosis, research or teaching purposes and their subsequent disposal by hospital authorities.  I also authorize the release of specimen test results and/or written reports to my treating physician on the hospital medical staff or other referring or consulting physicians involved in my care, at the discretion of the Pathologist or my treating physician.    6.   I consent to the photographing or videotaping of the operations or procedures to be performed, including appropriate portions of my body for medical, scientific, or educational purposes, provided my identity is not revealed by the pictures or by descriptive texts accompanying them.  If the procedure has been photographed/videotaped, the surgeon will obtain the original picture, image, videotape or CD.  The hospital will not be responsible for storage, release or maintenance of the picture, image, tape or CD.    7.   I consent to the presence of a  or observers in the operating room as deemed necessary by my physician or their designees.    8.   I recognize that in the event my procedure results in extended X-Ray/fluoroscopy time, I may develop a skin reaction.    9. If I have a Do Not  Attempt Resuscitation (DNAR) order in place, that status will be suspended while in the operating room, procedural suite, and during the recovery period unless otherwise explicitly stated by me (or a person authorized to consent on my behalf). The surgeon or my attending physician will determine when the applicable recovery period ends for purposes of reinstating the DNAR order.  10. Patients having a sterilization procedure: I understand that if the procedure is successful the results will be permanent and it will therefore be impossible for me to inseminate, conceive, or bear children.  I also understand that the procedure is intended to result in sterility, although the result has not been guaranteed.   11. I acknowledge that my physician has explained sedation/analgesia administration to me including the risk and benefits I consent to the administration of sedation/analgesia as may be necessary or desirable in the judgment of my physician.    I CERTIFY THAT I HAVE READ AND FULLY UNDERSTAND THE ABOVE CONSENT TO OPERATION and/or OTHER PROCEDURE.        ____________________________________       _________________________________      ______________________________  Signature of Patient         Signature of Responsible Person        Printed Name of Responsible Person        ____________________________________      _________________________________      ______________________________       Signature of Witness          Relationship to Patient                       Date                                       Time  Patient Name: Sheri Garzon  : 1960    Reviewed: 2024   Printed: 2024  Medical Record #: [id number] Page 1 of 2             STATEMENT OF PHYSICIAN My signature below affirms that prior to the time of the procedure; I have explained to the patient and/or his/her legal representative, the risks and benefits involved in the proposed treatment and any reasonable alternative to the  proposed treatment. I have also explained the risks and benefits involved in refusal of the proposed treatment and alternatives to the proposed treatment and have answered the patient's questions. If I have a significant financial interest in a co-management agreement or a significant financial interest in any product or implant, or other significant relationship used in this procedure/surgery, I have disclosed this and had a discussion with my patient.     _______________________________________________________________ _____________________________  (Signature of Physician)                                                                                         (Date)                                   (Time)  Patient Name: Sheri Garzon  : 1960    Reviewed: 2024   Printed: 2024  Medical Record #: [id number] Page 2 of 2

## (undated) NOTE — LETTER
[facility]  Administration of Anesthesia  I, Sheri Garzon agree to be cared for by a physician anesthesiologist alone and/or with a nurse anesthetist, who is specially trained to monitor me and give me medicine to put me to sleep or keep me comfortable during my procedure    I understand that my anesthesiologist and/or anesthetist is not an employee or agent of [facility] or [employer]. He or she works for [facility]    As the patient asking for anesthesia services, I agree to:  Allow the anesthesiologist (anesthesia doctor) to give me medicine and do additional procedures as necessary. Some examples are: Starting or using an “IV” to give me medicine, fluids or blood during my procedure, and having a breathing tube placed to help me breathe when I’m asleep (intubation). In the event that my heart stops working properly, I understand that my anesthesiologist will make every effort to sustain my life, unless otherwise directed by [facility] Do Not Resuscitate documents.  Tell my anesthesia doctor before my procedure:  If I am pregnant.  The last time that I ate or drank.  iii. All of the medicines I take (including prescriptions, herbal supplements, and pills I can buy without a prescription (including street drugs/illegal medications). Failure to inform my anesthesiologist about these medicines may increase my risk of anesthetic complications.  iv.If I am allergic to anything or have had a reaction to anesthesia before.  I understand how the anesthesia medicine will help me (benefits).  I understand that with any type of anesthesia medicine there are risks:  The most common risks are: nausea, vomiting, sore throat, muscle soreness, damage to my eyes, mouth, or teeth (from breathing tube placement).  Rare risks include: remembering what happened during my procedure, allergic reactions to medications, injury to my airway, heart, lungs, vision, nerves, or  muscles and in extremely rare instances death.  My doctor has explained to me other choices available to me for my care (alternatives).  Pregnant Patients (“epidural”):  I understand that the risks of having an epidural (medicine given into my back to help control pain during labor), include itching, low blood pressure, difficulty urinating, headache or slowing of the baby’s heart. Very rare risks include infection, bleeding, seizure, irregular heart rhythms and nerve injury.  Regional Anesthesia (“spinal”, “epidural”, & “nerve blocks”):  I understand that rare but potential complications include headache, bleeding, infection, seizure, irregular heart rhythms, and nerve injury.    _____________________________________________________________________________  Patient (or Representative) Signature/Relationship to Patient  Date   Time    _____________________________________________________________________________   Name (if used)    Language/Organization   Time    _____________________________________________________________________________  Nurse Anesthetist Signature     Date   Time  _____________________________________________________________________________  Anesthesiologist Signature     Date   Time  I have discussed the procedure and information above with the patient (or patient’s representative) and answered their questions. The patient or their representative has agreed to have anesthesia services.    _____________________________________________________________________________  Witness        Date   Time  I have verified that the signature is that of the patient or patient’s representative, and that it was signed before the procedure  Patient Name: Sheri Garzon     : 1960                 Printed: 10/31/2024 at 7:34 AM    Medical Record #: [id number]                                            Page 1 of 1  ----------ANESTHESIA CONSENT----------

## (undated) NOTE — LETTER
[facility]  Administration of Anesthesia  I, Sheri Garzon agree to be cared for by a physician anesthesiologist alone and/or with a nurse anesthetist, who is specially trained to monitor me and give me medicine to put me to sleep or keep me comfortable during my procedure    I understand that my anesthesiologist and/or anesthetist is not an employee or agent of [facility] or [employer]. He or she works for [facility]    As the patient asking for anesthesia services, I agree to:  Allow the anesthesiologist (anesthesia doctor) to give me medicine and do additional procedures as necessary. Some examples are: Starting or using an “IV” to give me medicine, fluids or blood during my procedure, and having a breathing tube placed to help me breathe when I’m asleep (intubation). In the event that my heart stops working properly, I understand that my anesthesiologist will make every effort to sustain my life, unless otherwise directed by [facility] Do Not Resuscitate documents.  Tell my anesthesia doctor before my procedure:  If I am pregnant.  The last time that I ate or drank.  iii. All of the medicines I take (including prescriptions, herbal supplements, and pills I can buy without a prescription (including street drugs/illegal medications). Failure to inform my anesthesiologist about these medicines may increase my risk of anesthetic complications.  iv.If I am allergic to anything or have had a reaction to anesthesia before.  I understand how the anesthesia medicine will help me (benefits).  I understand that with any type of anesthesia medicine there are risks:  The most common risks are: nausea, vomiting, sore throat, muscle soreness, damage to my eyes, mouth, or teeth (from breathing tube placement).  Rare risks include: remembering what happened during my procedure, allergic reactions to medications, injury to my airway, heart, lungs, vision, nerves, or  muscles and in extremely rare instances death.  My doctor has explained to me other choices available to me for my care (alternatives).  Pregnant Patients (“epidural”):  I understand that the risks of having an epidural (medicine given into my back to help control pain during labor), include itching, low blood pressure, difficulty urinating, headache or slowing of the baby’s heart. Very rare risks include infection, bleeding, seizure, irregular heart rhythms and nerve injury.  Regional Anesthesia (“spinal”, “epidural”, & “nerve blocks”):  I understand that rare but potential complications include headache, bleeding, infection, seizure, irregular heart rhythms, and nerve injury.    _____________________________________________________________________________  Patient (or Representative) Signature/Relationship to Patient  Date   Time    _____________________________________________________________________________   Name (if used)    Language/Organization   Time    _____________________________________________________________________________  Nurse Anesthetist Signature     Date   Time  _____________________________________________________________________________  Anesthesiologist Signature     Date   Time  I have discussed the procedure and information above with the patient (or patient’s representative) and answered their questions. The patient or their representative has agreed to have anesthesia services.    _____________________________________________________________________________  Witness        Date   Time  I have verified that the signature is that of the patient or patient’s representative, and that it was signed before the procedure  Patient Name: Sheri Garzon     : 1960                 Printed: 2024 at 7:35 AM    Medical Record #: [id number]                                            Page 1 of 1  ----------ANESTHESIA CONSENT----------